# Patient Record
Sex: FEMALE | Race: WHITE | NOT HISPANIC OR LATINO | Employment: OTHER | URBAN - METROPOLITAN AREA
[De-identification: names, ages, dates, MRNs, and addresses within clinical notes are randomized per-mention and may not be internally consistent; named-entity substitution may affect disease eponyms.]

---

## 2017-11-29 ENCOUNTER — HOSPITAL ENCOUNTER (OUTPATIENT)
Dept: MAMMOGRAPHY | Facility: HOSPITAL | Age: 77
Discharge: HOME | End: 2017-11-29
Attending: FAMILY MEDICINE | Admitting: FAMILY MEDICINE

## 2018-02-27 ENCOUNTER — CONVERSION ENCOUNTER (OUTPATIENT)
Dept: FAMILY MEDICINE CLINIC | Facility: CLINIC | Age: 78
End: 2018-02-27

## 2018-03-07 ENCOUNTER — HOSPITAL ENCOUNTER (OUTPATIENT)
Dept: CARDIOLOGY | Facility: HOSPITAL | Age: 78
Discharge: HOME | End: 2018-03-07
Attending: FAMILY MEDICINE | Admitting: FAMILY MEDICINE

## 2018-05-18 ENCOUNTER — CONVERSION ENCOUNTER (OUTPATIENT)
Dept: FAMILY MEDICINE CLINIC | Facility: CLINIC | Age: 78
End: 2018-05-18

## 2018-05-18 ENCOUNTER — HOSPITAL ENCOUNTER (OUTPATIENT)
Dept: FAMILY MEDICINE CLINIC | Facility: CLINIC | Age: 78
Setting detail: SPECIMEN
Discharge: HOME | End: 2018-05-18
Attending: FAMILY MEDICINE | Admitting: FAMILY MEDICINE

## 2018-05-18 LAB
ALBUMIN SERPL-MCNC: 4 G/DL (ref 3.5–4.8)
ALBUMIN/GLOB SERPL: 1.4 {RATIO} (ref 1–1.7)
ALP SERPL-CCNC: 60 IU/L (ref 32–91)
ALT SERPL-CCNC: 14 IU/L (ref 14–54)
ANION GAP SERPL CALC-SCNC: 9.7 MMOL/L (ref 10–20)
AST SERPL-CCNC: 18 IU/L (ref 15–41)
BILIRUB SERPL-MCNC: 0.9 MG/DL (ref 0.3–1.2)
BUN SERPL-MCNC: 11 MG/DL (ref 8–20)
BUN/CREAT SERPL: 15.7 (ref 5.4–26.2)
CALCIUM SERPL-MCNC: 9.5 MG/DL (ref 8.9–10.3)
CHLORIDE SERPL-SCNC: 106 MMOL/L (ref 101–111)
CHOLEST SERPL-MCNC: 210 MG/DL
CHOLEST/HDLC SERPL: 2.5 {RATIO}
CONV CO2: 28 MMOL/L (ref 22–32)
CONV LDL CHOLESTEROL DIRECT: 104 MG/DL (ref 0–100)
CONV TOTAL PROTEIN: 6.8 G/DL (ref 6.1–7.9)
CREAT UR-MCNC: 0.7 MG/DL (ref 0.4–1)
GLOBULIN UR ELPH-MCNC: 2.8 G/DL (ref 2.5–3.8)
GLUCOSE SERPL-MCNC: 85 MG/DL (ref 65–99)
HDLC SERPL-MCNC: 83 MG/DL
LDLC/HDLC SERPL: 1.2 {RATIO}
LIPID INTERPRETATION: ABNORMAL
POTASSIUM SERPL-SCNC: 3.7 MMOL/L (ref 3.6–5.1)
SODIUM SERPL-SCNC: 140 MMOL/L (ref 136–144)
TRIGL SERPL-MCNC: 51 MG/DL
VLDLC SERPL CALC-MCNC: 23.3 MG/DL

## 2018-07-12 ENCOUNTER — CONVERSION ENCOUNTER (OUTPATIENT)
Dept: FAMILY MEDICINE CLINIC | Facility: CLINIC | Age: 78
End: 2018-07-12

## 2018-07-17 ENCOUNTER — HOSPITAL ENCOUNTER (OUTPATIENT)
Dept: CARDIOLOGY | Facility: HOSPITAL | Age: 78
Discharge: HOME | End: 2018-07-17
Attending: PODIATRIST | Admitting: PODIATRIST

## 2018-08-08 ENCOUNTER — CONVERSION ENCOUNTER (OUTPATIENT)
Dept: FAMILY MEDICINE CLINIC | Facility: CLINIC | Age: 78
End: 2018-08-08

## 2018-08-21 ENCOUNTER — HOSPITAL ENCOUNTER (OUTPATIENT)
Dept: CARDIOLOGY | Facility: HOSPITAL | Age: 78
Discharge: HOME | End: 2018-08-21
Attending: SURGERY | Admitting: SURGERY

## 2018-09-05 ENCOUNTER — CONVERSION ENCOUNTER (OUTPATIENT)
Dept: FAMILY MEDICINE CLINIC | Facility: CLINIC | Age: 78
End: 2018-09-05

## 2018-10-24 ENCOUNTER — CONVERSION ENCOUNTER (OUTPATIENT)
Dept: FAMILY MEDICINE CLINIC | Facility: CLINIC | Age: 78
End: 2018-10-24

## 2018-10-24 ENCOUNTER — HOSPITAL ENCOUNTER (OUTPATIENT)
Dept: FAMILY MEDICINE CLINIC | Facility: CLINIC | Age: 78
Setting detail: SPECIMEN
Discharge: HOME | End: 2018-10-24
Attending: FAMILY MEDICINE | Admitting: FAMILY MEDICINE

## 2018-10-24 LAB
ALBUMIN SERPL-MCNC: 3.8 G/DL (ref 3.5–4.8)
ALBUMIN/GLOB SERPL: 1.3 {RATIO} (ref 1–1.7)
ALP SERPL-CCNC: 68 IU/L (ref 32–91)
ALT SERPL-CCNC: 15 IU/L (ref 14–54)
ANION GAP SERPL CALC-SCNC: 14 MMOL/L (ref 10–20)
AST SERPL-CCNC: 20 IU/L (ref 15–41)
BASOPHILS # BLD AUTO: 0 10*3/UL (ref 0–0.2)
BASOPHILS NFR BLD AUTO: 0 % (ref 0–2)
BILIRUB SERPL-MCNC: 0.7 MG/DL (ref 0.3–1.2)
BUN SERPL-MCNC: 12 MG/DL (ref 8–20)
BUN/CREAT SERPL: 15 (ref 5.4–26.2)
CALCIUM SERPL-MCNC: 9.2 MG/DL (ref 8.9–10.3)
CHLORIDE SERPL-SCNC: 106 MMOL/L (ref 101–111)
CONV CO2: 27 MMOL/L (ref 22–32)
CONV TOTAL PROTEIN: 6.8 G/DL (ref 6.1–7.9)
CREAT UR-MCNC: 0.8 MG/DL (ref 0.4–1)
DIFFERENTIAL METHOD BLD: (no result)
EOSINOPHIL # BLD AUTO: 0 10*3/UL (ref 0–0.3)
EOSINOPHIL # BLD AUTO: 1 % (ref 0–3)
ERYTHROCYTE [DISTWIDTH] IN BLOOD BY AUTOMATED COUNT: 14 % (ref 11.5–14.5)
GLOBULIN UR ELPH-MCNC: 3 G/DL (ref 2.5–3.8)
GLUCOSE SERPL-MCNC: 144 MG/DL (ref 65–99)
HCT VFR BLD AUTO: 38.3 % (ref 35–49)
HGB BLD-MCNC: 12.5 G/DL (ref 12–15)
LYMPHOCYTES # BLD AUTO: 1.2 10*3/UL (ref 0.8–4.8)
LYMPHOCYTES NFR BLD AUTO: 20 % (ref 18–42)
MCH RBC QN AUTO: 25.7 PG (ref 26–32)
MCHC RBC AUTO-ENTMCNC: 32.6 G/DL (ref 32–36)
MCV RBC AUTO: 78.9 FL (ref 80–94)
MONOCYTES # BLD AUTO: 0.5 10*3/UL (ref 0.1–1.3)
MONOCYTES NFR BLD AUTO: 8 % (ref 2–11)
NEUTROPHILS # BLD AUTO: 4.3 10*3/UL (ref 2.3–8.6)
NEUTROPHILS NFR BLD AUTO: 71 % (ref 50–75)
NRBC BLD AUTO-RTO: 0 /100{WBCS}
NRBC/RBC NFR BLD MANUAL: 0 10*3/UL
PLATELET # BLD AUTO: 224 10*3/UL (ref 150–450)
PMV BLD AUTO: 8.7 FL (ref 7.4–10.4)
POTASSIUM SERPL-SCNC: 4 MMOL/L (ref 3.6–5.1)
RBC # BLD AUTO: 4.85 10*6/UL (ref 4–5.4)
SODIUM SERPL-SCNC: 143 MMOL/L (ref 136–144)
TSH SERPL-ACNC: 0.62 UIU/ML (ref 0.34–5.6)
WBC # BLD AUTO: 6 10*3/UL (ref 4.5–11.5)

## 2018-11-01 ENCOUNTER — ON CAMPUS - OUTPATIENT (AMBULATORY)
Dept: URBAN - METROPOLITAN AREA HOSPITAL 2 | Facility: HOSPITAL | Age: 78
End: 2018-11-01
Payer: MEDICARE

## 2018-11-01 VITALS
RESPIRATION RATE: 18 BRPM | RESPIRATION RATE: 20 BRPM | HEIGHT: 64 IN | DIASTOLIC BLOOD PRESSURE: 56 MMHG | HEART RATE: 73 BPM | SYSTOLIC BLOOD PRESSURE: 141 MMHG | HEART RATE: 72 BPM | TEMPERATURE: 97.58 F | RESPIRATION RATE: 15 BRPM | SYSTOLIC BLOOD PRESSURE: 125 MMHG | DIASTOLIC BLOOD PRESSURE: 79 MMHG | RESPIRATION RATE: 17 BRPM | HEART RATE: 80 BPM | SYSTOLIC BLOOD PRESSURE: 144 MMHG | OXYGEN SATURATION: 96 % | SYSTOLIC BLOOD PRESSURE: 133 MMHG | SYSTOLIC BLOOD PRESSURE: 152 MMHG | DIASTOLIC BLOOD PRESSURE: 75 MMHG | RESPIRATION RATE: 16 BRPM | SYSTOLIC BLOOD PRESSURE: 122 MMHG | DIASTOLIC BLOOD PRESSURE: 73 MMHG | SYSTOLIC BLOOD PRESSURE: 128 MMHG | HEART RATE: 81 BPM | HEART RATE: 70 BPM | SYSTOLIC BLOOD PRESSURE: 108 MMHG | OXYGEN SATURATION: 99 % | HEART RATE: 64 BPM | SYSTOLIC BLOOD PRESSURE: 150 MMHG | DIASTOLIC BLOOD PRESSURE: 71 MMHG | OXYGEN SATURATION: 98 % | WEIGHT: 157 LBS | DIASTOLIC BLOOD PRESSURE: 81 MMHG | DIASTOLIC BLOOD PRESSURE: 84 MMHG

## 2018-11-01 DIAGNOSIS — K44.9 DIAPHRAGMATIC HERNIA WITHOUT OBSTRUCTION OR GANGRENE: ICD-10-CM

## 2018-11-01 DIAGNOSIS — K57.30 DIVERTICULOSIS OF LARGE INTESTINE WITHOUT PERFORATION OR ABS: ICD-10-CM

## 2018-11-01 DIAGNOSIS — Z86.010 PERSONAL HISTORY OF COLONIC POLYPS: ICD-10-CM

## 2018-11-01 DIAGNOSIS — R13.10 DYSPHAGIA, UNSPECIFIED: ICD-10-CM

## 2018-11-01 DIAGNOSIS — K21.9 GASTRO-ESOPHAGEAL REFLUX DISEASE WITHOUT ESOPHAGITIS: ICD-10-CM

## 2018-11-01 DIAGNOSIS — K64.8 OTHER HEMORRHOIDS: ICD-10-CM

## 2018-11-01 PROCEDURE — 43450 DILATE ESOPHAGUS 1/MULT PASS: CPT | Performed by: INTERNAL MEDICINE

## 2018-11-01 PROCEDURE — 43235 EGD DIAGNOSTIC BRUSH WASH: CPT | Mod: 59 | Performed by: INTERNAL MEDICINE

## 2018-11-01 PROCEDURE — G0105 COLORECTAL SCRN; HI RISK IND: HCPCS | Performed by: INTERNAL MEDICINE

## 2018-11-01 RX ADMIN — GLYCOPYRROLATE 0.2 MG: 0.2 INJECTION, SOLUTION INTRAMUSCULAR; INTRAVENOUS at 10:34

## 2018-11-08 ENCOUNTER — HOSPITAL ENCOUNTER (OUTPATIENT)
Dept: MAMMOGRAPHY | Facility: HOSPITAL | Age: 78
Discharge: HOME | End: 2018-11-08

## 2018-11-21 ENCOUNTER — CONVERSION ENCOUNTER (OUTPATIENT)
Dept: FAMILY MEDICINE CLINIC | Facility: CLINIC | Age: 78
End: 2018-11-21

## 2018-11-21 ENCOUNTER — HOSPITAL ENCOUNTER (OUTPATIENT)
Dept: FAMILY MEDICINE CLINIC | Facility: CLINIC | Age: 78
Setting detail: SPECIMEN
Discharge: HOME | End: 2018-11-21
Attending: FAMILY MEDICINE | Admitting: FAMILY MEDICINE

## 2019-02-20 ENCOUNTER — CONVERSION ENCOUNTER (OUTPATIENT)
Dept: FAMILY MEDICINE CLINIC | Facility: CLINIC | Age: 79
End: 2019-02-20

## 2019-05-24 ENCOUNTER — CONVERSION ENCOUNTER (OUTPATIENT)
Dept: FAMILY MEDICINE CLINIC | Facility: CLINIC | Age: 79
End: 2019-05-24

## 2019-05-24 ENCOUNTER — HOSPITAL ENCOUNTER (OUTPATIENT)
Dept: FAMILY MEDICINE CLINIC | Facility: CLINIC | Age: 79
Setting detail: SPECIMEN
Discharge: HOME | End: 2019-05-24
Attending: FAMILY MEDICINE | Admitting: FAMILY MEDICINE

## 2019-05-24 LAB
BACTERIA SPEC AEROBE CULT: NORMAL
Lab: NORMAL
MICRO REPORT STATUS: NORMAL
SPECIMEN SOURCE: NORMAL

## 2019-06-03 VITALS
HEART RATE: 63 BPM | OXYGEN SATURATION: 96 % | DIASTOLIC BLOOD PRESSURE: 81 MMHG | BODY MASS INDEX: 29.83 KG/M2 | BODY MASS INDEX: 30.46 KG/M2 | BODY MASS INDEX: 30.43 KG/M2 | HEIGHT: 61 IN | HEIGHT: 61 IN | DIASTOLIC BLOOD PRESSURE: 70 MMHG | SYSTOLIC BLOOD PRESSURE: 141 MMHG | RESPIRATION RATE: 20 BRPM | HEIGHT: 61 IN | DIASTOLIC BLOOD PRESSURE: 84 MMHG | SYSTOLIC BLOOD PRESSURE: 131 MMHG | OXYGEN SATURATION: 96 % | HEIGHT: 61 IN | HEART RATE: 83 BPM | OXYGEN SATURATION: 98 % | DIASTOLIC BLOOD PRESSURE: 77 MMHG | HEART RATE: 80 BPM | HEART RATE: 77 BPM | WEIGHT: 157 LBS | SYSTOLIC BLOOD PRESSURE: 184 MMHG | HEART RATE: 74 BPM | OXYGEN SATURATION: 97 % | OXYGEN SATURATION: 96 % | SYSTOLIC BLOOD PRESSURE: 152 MMHG | DIASTOLIC BLOOD PRESSURE: 73 MMHG | BODY MASS INDEX: 29.83 KG/M2 | HEIGHT: 61 IN | WEIGHT: 158 LBS | HEART RATE: 78 BPM | OXYGEN SATURATION: 96 % | OXYGEN SATURATION: 95 % | WEIGHT: 161.2 LBS | DIASTOLIC BLOOD PRESSURE: 86 MMHG | HEART RATE: 70 BPM | BODY MASS INDEX: 29.64 KG/M2 | WEIGHT: 158 LBS | BODY MASS INDEX: 29.66 KG/M2 | OXYGEN SATURATION: 96 % | DIASTOLIC BLOOD PRESSURE: 79 MMHG | SYSTOLIC BLOOD PRESSURE: 168 MMHG | DIASTOLIC BLOOD PRESSURE: 73 MMHG | HEART RATE: 78 BPM | SYSTOLIC BLOOD PRESSURE: 183 MMHG | SYSTOLIC BLOOD PRESSURE: 138 MMHG | HEIGHT: 61 IN | SYSTOLIC BLOOD PRESSURE: 167 MMHG

## 2019-06-04 VITALS — OXYGEN SATURATION: 93 % | SYSTOLIC BLOOD PRESSURE: 130 MMHG | HEART RATE: 93 BPM | DIASTOLIC BLOOD PRESSURE: 65 MMHG

## 2019-06-06 NOTE — PROGRESS NOTES
Referring Provider:  Etelvina Ulloa MD  Primary Provider:  Artemio Main MD    CC:  soa, cough and sinus pressure.    History of Present Illness:         This is a 78 years old female who presents with shortness of breath.  Chest x-ray from earlier this week shows a possible lung nodule and CT has been ordered. Cough.  Postnasal drainage.  Sinus pressure.  The patient complains of shortness of breath,   cough and wheezing, but denies chest pain.  Associated symptoms include productive cough.  Significant past history includes hypertension.  The patient reports that symptoms are worse with deep breath.        Past Medical History:     Reviewed history from 02/20/2019 and no changes required:        Anemic-50+ yrs ago        Arthritis        tendonitis, wears bilateral ankle braces, [place]        IBS        hiatal hernia        heart cath 1998, calcified mitral valve        Hepatitis A vaccine, [facility] 2018        EGD and Colonoscopy 11/18, Dr. Bolton        hypertension    Past Surgical History:     Reviewed history from 11/21/2018 and no changes required:        Tonsillectomy-1945        Total Abdominal Hysterectomy-1987        Appendectomy-1987        Cholecystectomy-1996        Cataract Extraction-2009        angle closure glaucoma-laser-2001        EGD- hiatal hernia 11/1/18    Family History Summary:      Reviewed history Last on 05/21/2019 and no changes required:05/28/2019  Brother - Has Family History of Alcoholism - hepatits - Entered On: 8/8/2018  Mother - Has Family History of Stroke/CVA - Entered On: 8/8/2018  Father - Has Family History of Kidney/Renal Disease - Entered On: 8/8/2018  Father - Has Family History of Coronary Heart Disease - Entered On: 8/8/2018  Father - Has Family History of Bleeding Disease - Entered On: 8/8/2018  Mother - Has Family History of Hypertension - Entered On: 2/27/2018  Brother - Has Family History of Arthritis - Entered On: 2/27/2018  Mother - Has Family History of  Arthritis - Entered On: 2018    General Comments - FH:  FH Breast Cancer  FH Heart Disease  FH Stroke  FH Uterine Cancer      Social History:     Reviewed history from 2018 and no changes required:        Patient has never smoked.        Passive Smoke: N        Alcohol Use: N        Drug Use: N        Regular Exercise: N                 50 years        retired teacher        Risk Factors:     Smoked Tobacco Use:  Never smoker  Passive smoke exposure:  no  Drug use:  no  Caffeine use:  1 drinks per day  Alcohol use:  no  Exercise:  no  Seatbelt use:  100 %  Sun Exposure:  occasionally    Previous Tobacco Use: Signed On - 2019  Smoked Tobacco Use:  Never smoker  Passive smoke exposure:  no  Drug use:  no  Caffeine use:  1 drinks per day    Previous Alcohol Use: Signed On - 2019  Alcohol use:  no  Exercise:  no  Seatbelt use:  100 %  Sun Exposure:  occasionally    Colonoscopy History:     Date of Last Colonoscopy:  2018    Mammogram History:     Date of Last Mammogram:  2018      Review of Systems        See HPI      Vital Signs:    Patient Profile:    78 Years Old Female  Height:     61 inches (162.56 cm)  O2 Sat:     93 %  Temp:       99.3 degrees F oral  Pulse rate: 93 / minute  BP Sittin / 65  (left arm)    Cuff size:  regular      Problems: Active problems were reviewed with the patient during this visit.  Medications: Medications were reviewed with the patient during this visit.  Allergies: Allergies were reviewed with the patient during this visit.        Vitals Entered By: Lani Su CMA (May 24, 2019 2:49 PM)      Physical Exam    General:      mild respiratory distress.    Head:      normocephalic and atraumatic.    Ears:      TM's intact and clear with normal canals with grossly normal hearing.    Mouth:      no deformity or lesions with good dentition.    Neck:      no masses, thyromegaly, or abnormal cervical nodes.    Lungs:      wheezes on L and wheezes on  R.    Heart:      regular rhythm and normal rate.        Blood Pressure:  Today's BP: 130/65 mm Hg    Labwork:   Most Recent Lab Results:   LDL: 104 mg/dL 05/18/2018    Active Medications (reviewed today):  LEG CRAMP RELIEF ORAL TABLET (HOMEOPATHIC PRODUCTS)   VITAMIN B12 1000 MCG ORAL TABLET EXTENDED RELEASE (CYANOCOBALAMIN) Take 1 tablet by mouth daily  VITAMIN D3 2000 UNIT ORAL CAPSULE (CHOLECALCIFEROL) Take 1 tablet by mouth daily  TRIAMCINOLONE ACETONIDE 0.1 % EXTERNAL CREAM (TRIAMCINOLONE ACETONIDE) apply to affected area twice daily as needed for rash  LOSARTAN POTASSIUM 50 MG ORAL TABLET (LOSARTAN POTASSIUM) Take 1 tablet by mouth daily  PREMARIN VAGINAL CREAM 30GM (ESTROGENS, CONJUGATED) INSERT 1 GRAM VAGINALLY EVERY DAY AS DIRECTED    Current Allergies (reviewed today):  SULFA (Critical)  CIPRO (Critical)        Impression & Recommendations:    Problem # 1:  BRONCHITIS-ACUTE (ICD-466.0) (TDE89-O52.9)    Take antibiotics and other medications as directed. Encouraged to push clear liquids, get enough rest, and take acetaminophen as needed. To be seen in 5-7 days if no improvement, sooner if worse.    Her updated medication list for this problem includes:     Doxycycline Monohydrate 100 Mg Oral Capsule (Doxycycline monohydrate) ..... Take one (1) tablet by mouth twice a day      Medications Added to Medication List This Visit:  1)  Prednisone 10 Mg Oral Tablet (Prednisone) .... Take one (1) tablet by mouth daily  2)  Doxycycline Monohydrate 100 Mg Oral Capsule (Doxycycline monohydrate) .... Take one (1) tablet by mouth twice a day    Other Orders:  Urinalysis Dip Stick/Tablet Rgnt NON-AUTO W/O [place] (95554)  [facility] CULTURE,URINE ([facility])              Medications:  PREDNISONE 10 MG ORAL TABLET (PREDNISONE) Take one (1) tablet by mouth daily  #5[Tablet] x 0      Entered and Authorized by:  Etelvina Ulloa MD      Electronically signed by:   Etelvina Ulloa MD on 05/24/2019      Method used:    [facility]                [id number]              [address]              [place], [place]  [id number]              Ph: [phone]              Fax: [phone]      Note to Pharmacy: Route: ORAL;       RxID:   [id number]  DOXYCYCLINE MONOHYDRATE 100 MG ORAL CAPSULE (DOXYCYCLINE MONOHYDRATE) Take one (1) tablet by mouth twice a day  #20[Capsule] x 0      Entered and Authorized by:  Etelvina Ulloa MD      Electronically signed by:   Etelvina Ulloa MD on 05/24/2019      Method used:    [facility]               [id number]              [address] [place] [address].              [place], [place]  [id number]              Ph: [phone]              Fax: [phone]      Note to Pharmacy: Route: ORAL;       RxID:   [id number]                Medication Administration    Orders Added:  1)  Urinalysis Dip Stick/Tablet Rgnt NON-AUTO W/O [place] [13334]  2)  [facility] CULTURE,URINE [[facility]NC]  3)  Ofc Vst, Est Level III [79884]  ]    Date Collected: 05/24/2019  Date Received: 05/24/2019    Routine Urinalysis          Normal Range   Color:      yellow          Color: Yellow   Appearance:  sl. cloudy         Appearance: Clear  Leukocytes:  negative       Leukocytes: Negative   Nitrite:         negative       Nitrite: Negative  Urobilinogen:    0.2 mg/dL      Urobilinogen: Negative mg/dL  Protein:     100 mg/dL      Protein:  Negative mg/dL  pH:      5.5        pH:  4.5-8.0  Blood:       moderate       Blood:  Negative  Spec. Gravity:   1.030      Spec Gravity:  1.005-1.030  Ketones:     smal (15) mg/dL        Ketones:  Negative mg/dL  Bilirubin:   2+     Bilirubin:  Negative  Glucose:     normal mg/dL       Glucose:  Negative mg/dL   Comments:    C&S sent                Electronically signed by Etelvina Ulloa MD on 05/28/2019 at 3:52 PM  ________________________________________________________________________       Disclaimer: Converted Note message may not contain all data elements  that existed in the legacy source system. Please see [facility] for the original note details.

## 2019-08-14 ENCOUNTER — OFFICE VISIT (OUTPATIENT)
Dept: FAMILY MEDICINE CLINIC | Facility: CLINIC | Age: 79
End: 2019-08-14

## 2019-08-14 VITALS
TEMPERATURE: 98.1 F | OXYGEN SATURATION: 95 % | HEART RATE: 85 BPM | WEIGHT: 153 LBS | DIASTOLIC BLOOD PRESSURE: 79 MMHG | HEIGHT: 61 IN | BODY MASS INDEX: 28.89 KG/M2 | SYSTOLIC BLOOD PRESSURE: 159 MMHG

## 2019-08-14 DIAGNOSIS — R06.02 SHORTNESS OF BREATH: ICD-10-CM

## 2019-08-14 DIAGNOSIS — F43.21 GRIEF REACTION: ICD-10-CM

## 2019-08-14 DIAGNOSIS — R91.1 LUNG NODULE: Primary | ICD-10-CM

## 2019-08-14 PROBLEM — Z00.00 ENCOUNTER FOR GENERAL ADULT MEDICAL EXAMINATION WITHOUT ABNORMAL FINDINGS: Status: ACTIVE | Noted: 2018-05-18

## 2019-08-14 PROBLEM — F43.20 GRIEF REACTION: Status: ACTIVE | Noted: 2019-08-14

## 2019-08-14 PROBLEM — Z78.0 POSTMENOPAUSAL STATUS: Status: ACTIVE | Noted: 2018-05-18

## 2019-08-14 PROBLEM — R53.83 FATIGUE: Status: ACTIVE | Noted: 2018-10-24

## 2019-08-14 PROBLEM — L65.9 ALOPECIA: Status: ACTIVE | Noted: 2018-11-21

## 2019-08-14 PROBLEM — E55.9 VITAMIN D DEFICIENCY: Status: ACTIVE | Noted: 2018-11-21

## 2019-08-14 PROBLEM — I10 HYPERTENSION: Status: ACTIVE | Noted: 2018-10-24

## 2019-08-14 PROBLEM — I78.1 NEVUS, NON-NEOPLASTIC: Status: ACTIVE | Noted: 2018-07-12

## 2019-08-14 PROBLEM — M19.90 ARTHRITIS: Status: ACTIVE | Noted: 2019-08-14

## 2019-08-14 PROCEDURE — 99213 OFFICE O/P EST LOW 20 MIN: CPT | Performed by: FAMILY MEDICINE

## 2019-08-14 RX ORDER — LOSARTAN POTASSIUM 50 MG/1
50 TABLET ORAL DAILY
Qty: 30 TABLET | Refills: 11 | Status: SHIPPED | OUTPATIENT
Start: 2019-08-14 | End: 2019-09-25

## 2019-08-14 RX ORDER — CONJUGATED ESTROGENS 0.62 MG/G
CREAM VAGINAL
Refills: 3 | COMMUNITY
Start: 2019-08-07 | End: 2020-02-12 | Stop reason: SDUPTHER

## 2019-08-14 RX ORDER — ZOSTER VACCINE RECOMBINANT, ADJUVANTED 50 MCG/0.5
KIT INTRAMUSCULAR
Refills: 0 | COMMUNITY
Start: 2019-07-01 | End: 2021-01-05

## 2019-08-14 RX ORDER — LOSARTAN POTASSIUM 50 MG/1
TABLET ORAL DAILY
Refills: 1 | COMMUNITY
Start: 2019-08-07 | End: 2019-08-14 | Stop reason: SDUPTHER

## 2019-08-14 RX ORDER — ACETAMINOPHEN 160 MG
2000 TABLET,DISINTEGRATING ORAL DAILY
COMMUNITY
Start: 2019-02-20

## 2019-08-14 RX ORDER — TRIAMCINOLONE ACETONIDE 1 MG/G
CREAM TOPICAL
COMMUNITY
Start: 2018-11-21 | End: 2020-01-16

## 2019-08-27 ENCOUNTER — HOSPITAL ENCOUNTER (OUTPATIENT)
Dept: RESPIRATORY THERAPY | Facility: HOSPITAL | Age: 79
Discharge: HOME | End: 2019-08-27
Admitting: FAMILY MEDICINE

## 2019-08-27 VITALS — OXYGEN SATURATION: 97 % | HEART RATE: 74 BPM | RESPIRATION RATE: 17 BRPM

## 2019-08-27 DIAGNOSIS — R06.02 SHORTNESS OF BREATH: ICD-10-CM

## 2019-08-27 PROCEDURE — 94729 DIFFUSING CAPACITY: CPT

## 2019-08-27 PROCEDURE — 94727 GAS DIL/WSHOT DETER LNG VOL: CPT

## 2019-08-27 PROCEDURE — 94060 EVALUATION OF WHEEZING: CPT

## 2019-08-27 RX ORDER — ALBUTEROL SULFATE 2.5 MG/3ML
2.5 SOLUTION RESPIRATORY (INHALATION) ONCE
Status: COMPLETED | OUTPATIENT
Start: 2019-08-27 | End: 2019-08-27

## 2019-08-27 RX ADMIN — ALBUTEROL SULFATE 2.5 MG: 2.5 SOLUTION RESPIRATORY (INHALATION) at 15:30

## 2019-09-20 ENCOUNTER — TELEPHONE (OUTPATIENT)
Dept: FAMILY MEDICINE CLINIC | Facility: CLINIC | Age: 79
End: 2019-09-20

## 2019-09-20 DIAGNOSIS — R06.02 SHORTNESS OF BREATH: Primary | ICD-10-CM

## 2019-09-20 NOTE — TELEPHONE ENCOUNTER
Please let her know that her PFT shows an obstructive lung pattern.  This could be a sign of COPD.  I recommend that she see a pulmonologist to help determine what is going on.

## 2019-09-25 ENCOUNTER — TELEPHONE (OUTPATIENT)
Dept: FAMILY MEDICINE CLINIC | Facility: CLINIC | Age: 79
End: 2019-09-25

## 2019-09-25 ENCOUNTER — CLINICAL SUPPORT (OUTPATIENT)
Dept: FAMILY MEDICINE CLINIC | Facility: CLINIC | Age: 79
End: 2019-09-25

## 2019-09-25 ENCOUNTER — OFFICE VISIT (OUTPATIENT)
Dept: FAMILY MEDICINE CLINIC | Facility: CLINIC | Age: 79
End: 2019-09-25

## 2019-09-25 VITALS
OXYGEN SATURATION: 97 % | DIASTOLIC BLOOD PRESSURE: 78 MMHG | HEART RATE: 72 BPM | SYSTOLIC BLOOD PRESSURE: 167 MMHG | TEMPERATURE: 98.1 F

## 2019-09-25 DIAGNOSIS — R06.02 SHORTNESS OF BREATH: ICD-10-CM

## 2019-09-25 DIAGNOSIS — I10 HYPERTENSION, UNSPECIFIED TYPE: Primary | ICD-10-CM

## 2019-09-25 DIAGNOSIS — Z23 NEED FOR IMMUNIZATION AGAINST INFLUENZA: Primary | ICD-10-CM

## 2019-09-25 PROCEDURE — 99213 OFFICE O/P EST LOW 20 MIN: CPT | Performed by: FAMILY MEDICINE

## 2019-09-25 PROCEDURE — G0008 ADMIN INFLUENZA VIRUS VAC: HCPCS | Performed by: FAMILY MEDICINE

## 2019-09-25 PROCEDURE — 90653 IIV ADJUVANT VACCINE IM: CPT | Performed by: FAMILY MEDICINE

## 2019-09-25 RX ORDER — AMLODIPINE BESYLATE 5 MG/1
5 TABLET ORAL DAILY
Qty: 90 TABLET | Refills: 1 | Status: SHIPPED | OUTPATIENT
Start: 2019-09-25 | End: 2020-02-12 | Stop reason: SDUPTHER

## 2019-09-25 NOTE — ASSESSMENT & PLAN NOTE
She has been referred to pulmonologist She has never smoked.   Her sister had COPD without being a smoker.

## 2019-09-25 NOTE — PROGRESS NOTES
Subjective   Chief Complaint   Patient presents with   • Medication Problem     recalled Losartan for a 5th time   • Adenopathy     Chrystal Ruiz is a 78 y.o. female.     Hypertension   This is a chronic problem. The current episode started more than 1 year ago. The problem has been waxing and waning since onset. The problem is uncontrolled. Associated symptoms include anxiety, neck pain and shortness of breath. Pertinent negatives include no blurred vision, chest pain, headaches, palpitations or peripheral edema. Past treatments include ACE inhibitors. Current antihypertension treatment includes angiotensin blockers. There are no compliance problems.       No past medical history on file.  No past surgical history on file.  Allergies   Allergen Reactions   • Ciprofloxacin Unknown (See Comments)   • Sulfa Antibiotics Unknown (See Comments)     Social History     Socioeconomic History   • Marital status:      Spouse name: Not on file   • Number of children: Not on file   • Years of education: Not on file   • Highest education level: Not on file   Tobacco Use   • Smoking status: Never Smoker   • Smokeless tobacco: Never Used   Substance and Sexual Activity   • Alcohol use: No     Frequency: Never     Social History     Tobacco Use   Smoking Status Never Smoker   Smokeless Tobacco Never Used       family history includes COPD in her sister; Heart disease in her sister.  Current Outpatient Medications on File Prior to Visit   Medication Sig Dispense Refill   • Cholecalciferol (VITAMIN D3) 2000 units capsule VITAMIN D3 2000 UNIT CAPS     • Cyanocobalamin (VITAMIN B12) 1000 MCG tablet controlled-release Daily.     • Homeopathic Products (LEG CRAMP RELIEF) tablet LEG CRAMP RELIEF TABS     • PREMARIN 0.625 MG/GM vaginal cream INSERT 1 GRAM VAGINALLY QD UTD  3   • SHINGRIX 50 MCG/0.5ML reconstituted suspension ADM 0.5ML IM UTD  0   • triamcinolone (KENALOG) 0.1 % cream TRIAMCINOLONE ACETONIDE 0.1 % CREA     •  [DISCONTINUED] losartan (COZAAR) 50 MG tablet Take 1 tablet by mouth Daily. 30 tablet 11     No current facility-administered medications on file prior to visit.      Patient Active Problem List   Diagnosis   • Grief reaction   • Alopecia   • Arthritis   • Back pain   • Encounter for general adult medical examination without abnormal findings   • Fatigue   • Hypertension   • Lung nodule   • Vitamin D deficiency   • Shortness of breath   • Postmenopausal status   • Nevus, non-neoplastic       The following portions of the patient's history were reviewed and updated as appropriate: allergies, current medications, past family history, past medical history, past social history, past surgical history and problem list.    Review of Systems   Constitutional: Negative for chills and fever.   HENT: Negative for sinus pressure and sore throat.    Eyes: Negative for blurred vision.   Respiratory: Positive for shortness of breath. Negative for cough.    Cardiovascular: Negative for chest pain and palpitations.   Gastrointestinal: Negative for abdominal pain.   Endocrine: Negative for polyuria.   Musculoskeletal: Positive for neck pain.   Skin: Negative for rash.   Neurological: Negative for dizziness and headache.   Hematological: Negative for adenopathy.   Psychiatric/Behavioral: Negative for depressed mood.       Objective   /78 (BP Location: Left arm, Patient Position: Sitting, Cuff Size: Adult)   Pulse 72   Temp 98.1 °F (36.7 °C) (Oral)   SpO2 97%   Physical Exam   Constitutional: She is oriented to person, place, and time. She appears well-developed. No distress.   HENT:   Head: Normocephalic.   Eyes: Conjunctivae and lids are normal.   Neck: Trachea normal. No thyroid mass and no thyromegaly present.   Cardiovascular: Normal rate, regular rhythm and normal heart sounds.   Pulmonary/Chest: Effort normal and breath sounds normal.   Lymphadenopathy:     She has no cervical adenopathy.   Neurological: She is alert  and oriented to person, place, and time.   Skin: Skin is warm and dry.   Psychiatric: She has a normal mood and affect. Her speech is normal and behavior is normal. She is attentive.       No visits with results within 1 Week(s) from this visit.   Latest known visit with results is:   Admission on 05/29/2019, Discharged on 05/31/2019   Component Date Value Ref Range Status   • WBC 05/29/2019 7.2  4.5 - 11.5 10*3/uL Final   • RBC 05/29/2019 5.34  4.00 - 5.40 10*6/uL Final   • Hemoglobin 05/29/2019 13.7  12.0 - 15.0 g/dL Final   • Hematocrit 05/29/2019 42.3  35 - 49 % Final   • MCV 05/29/2019 79.3* 80 - 94 fL Final   • MCH 05/29/2019 25.7* 26 - 32 pg Final   • MCHC 05/29/2019 32.4  32 - 36 g/dL Final   • RDW 05/29/2019 13.3  11.5 - 14.5 % Final   • Platelets 05/29/2019 280  150 - 450 10*3/uL Final   • MPV 05/29/2019 8.8  7.4 - 10.4 fL Final   • Differential Type 05/29/2019 AUTO   Final   • Neutrophils Absolute 05/29/2019 4.8  2.3 - 8.6 10*3/uL Final   • Lymphocytes Absolute 05/29/2019 1.9  0.8 - 4.8 10*3/uL Final   • Monocytes Absolute 05/29/2019 0.4  0.1 - 1.3 10*3/uL Final   • Eosinophils Absolute 05/29/2019 0.0  0.0 - 0.3 10*3/uL Final   • Basophils Absolute 05/29/2019 0.0  0 - 0.2 10*3/uL Final   • Neutrophil Rel % 05/29/2019 68  50 - 75 % Final   • Lymphocyte Rel % 05/29/2019 26  18 - 42 % Final   • Monocyte Rel % 05/29/2019 6  2 - 11 % Final   • Eosinophil Rel % 05/29/2019 0  0 - 3 % Final   • Basophil Rel % 05/29/2019 0  0 - 2 % Final   • nRBC 05/29/2019 0  0 /100[WBCs] Final   • Absolute nRBC 05/29/2019 0  10*3/uL Final   • Sodium 05/29/2019 140  136 - 144 mmol/L Final   • Potassium 05/29/2019 3.2* 3.6 - 5.1 mmol/L Final   • Chloride 05/29/2019 103  101 - 111 mmol/L Final   • CO2 05/29/2019 24  22 - 32 mmol/L Final   • Glucose 05/29/2019 128* 65 - 99 mg/dL Final   • BUN 05/29/2019 17  8 - 20 mg/dL Final   • Creatinine 05/29/2019 0.8  0.4 - 1.0 mg/dl Final   • Calcium 05/29/2019 9.5  8.9 - 10.3 mg/dL Final   •  Total Protein 05/29/2019 7.4  6.1 - 7.9 g/dL Final   • Albumin 05/29/2019 4.1  3.5 - 4.8 g/dL Final   • Total Bilirubin 05/29/2019 1.0  0.3 - 1.2 mg/dL Final   • Alkaline Phosphatase 05/29/2019 57  32 - 91 IU/L Final   • AST (SGOT) 05/29/2019 18  15 - 41 IU/L Final   • ALT (SGPT) 05/29/2019 16  14 - 54 IU/L Final   • Anion Gap 05/29/2019 16.2  10 - 20 Final   • BUN/Creatinine Ratio 05/29/2019 21.3  5.4 - 26.2 Final   • GFR MDRD Non  05/29/2019 >60  >60 mL/min/1.73m2 Final   • GFR MDRD  05/29/2019 >60  >60 mL/min/1.73m2 Final   • Globulin 05/29/2019 3.3  2.5 - 3.8 G/dL Final   • A/G Ratio 05/29/2019 1.2  1.0 - 1.7 Final   • Troponin I 05/29/2019 <0.03  0.00 - 0.03 ng/mL Final    Comment: (SEE BELOW)  0.00 - 0.03    Negative. Repeat testing in 4-6 hrs if                clinically indicated.    0.04 - 0.29    Suspicious for myocardial injury. Serial                 measurements and clinical correlation may be                 necessary to confirm or exclude diagnosis of                acute coronary syndrome.                Repeat testing in 4-6 hrs if indicated.         >0.29    Consistent with myocardial injury.                Recommend clinical and laboratory correlation.    NOTE: Results may be falsely decreased if patient taking Biotin     • BNP 05/29/2019 71  <100 pg/mL Final    Comment: (SEE BELOW)  NOTE: Results may be falsely decreased if patient taking Biotin     • D-Dimer, Quant 05/29/2019 0.25  0.17 - 0.59 mg/L FEU Final   • Magnesium 05/29/2019 1.7* 1.8 - 2.5 mg/dL Final   • Troponin I 05/29/2019 <0.03  0.00 - 0.03 ng/mL Final    Comment: (SEE BELOW)  0.00 - 0.03    Negative. Repeat testing in 4-6 hrs if                clinically indicated.    0.04 - 0.29    Suspicious for myocardial injury. Serial                 measurements and clinical correlation may be                 necessary to confirm or exclude diagnosis of                acute coronary syndrome.                 Repeat testing in 4-6 hrs if indicated.         >0.29    Consistent with myocardial injury.                Recommend clinical and laboratory correlation.    NOTE: Results may be falsely decreased if patient taking Biotin     • ADENOVIRUS, PCR 05/29/2019 Not Detected  Not Detected Final   • Coronavirus 229E 05/29/2019 Not Detected  Not Detected Final   • Coronavirus HKU1 05/29/2019 Not Detected  Not Detected Final   • Coronavirus NL63 05/29/2019 Not Detected  Not Detected Final   • Coronavirus OC43 05/29/2019 Not Detected  Not Detected Final   • Human Metapneumovirus 05/29/2019 Not Detected  Not Detected Final   • Human Rhinovirus/Enterovirus 05/29/2019 Detected* Not Detected Final   • Influenza A PCR 05/29/2019 Not Detected  Not Detected Final   • Influenza A H3 05/29/2019 Not Detected  Not Detected Final   • Influenza A H1 2009 PCR 05/29/2019 Not Detected  Not Detected Final   • Influenza A H1 05/29/2019 Not Detected  Not Detected Final   • Influenza B PCR 05/29/2019 Not Detected  Not Detected Final   • Parainfluenza Virus 1 05/29/2019 Not Detected  Not Detected Final   • Parainfluenza Virus 2 05/29/2019 Not Detected  Not Detected Final   • Parainfluenza Virus 3 05/29/2019 Not Detected  Not Detected Final   • Parainfluenza Virus 4 05/29/2019 Not Detected  Not Detected Final   • RSV, PCR 05/29/2019 Not Detected  Not Detected Final   • Bordetella pertussis pcr 05/29/2019 Not Detected  Not Detected Final   • Chlamydophila pneumoniae PCR 05/29/2019 Not Detected  Not Detected Final   • Mycoplasma pneumo by PCR 05/29/2019 Not Detected  Not Detected Final   • Specimen 05/29/2019 ADITYA   Final   • Troponin I 05/30/2019 <0.03  0.00 - 0.03 ng/mL Final    Comment: (SEE BELOW)  0.00 - 0.03    Negative. Repeat testing in 4-6 hrs if                clinically indicated.    0.04 - 0.29    Suspicious for myocardial injury. Serial                 measurements and clinical correlation may be                 necessary to confirm or  exclude diagnosis of                acute coronary syndrome.                Repeat testing in 4-6 hrs if indicated.         >0.29    Consistent with myocardial injury.                Recommend clinical and laboratory correlation.    NOTE: Results may be falsely decreased if patient taking Biotin     • WBC 05/30/2019 5.2  4.5 - 11.5 10*3/uL Final   • RBC 05/30/2019 5.17  4.00 - 5.40 10*6/uL Final   • Hemoglobin 05/30/2019 13.1  12.0 - 15.0 g/dL Final   • Hematocrit 05/30/2019 41.0  35 - 49 % Final   • MCV 05/30/2019 79.4* 80 - 94 fL Final   • MCH 05/30/2019 25.3* 26 - 32 pg Final   • MCHC 05/30/2019 31.9* 32 - 36 g/dL Final   • RDW 05/30/2019 13.3  11.5 - 14.5 % Final   • Platelets 05/30/2019 252  150 - 450 10*3/uL Final   • MPV 05/30/2019 8.6  7.4 - 10.4 fL Final   • Differential Type 05/30/2019 AUTO   Final   • Neutrophils Absolute 05/30/2019 4.4  2.3 - 8.6 10*3/uL Final   • Lymphocytes Absolute 05/30/2019 0.7* 0.8 - 4.8 10*3/uL Final   • Monocytes Absolute 05/30/2019 0.1  0.1 - 1.3 10*3/uL Final   • Eosinophils Absolute 05/30/2019 0.0  0.0 - 0.3 10*3/uL Final   • Basophils Absolute 05/30/2019 0.0  0 - 0.2 10*3/uL Final   • Neutrophil Rel % 05/30/2019 85* 50 - 75 % Final   • Lymphocyte Rel % 05/30/2019 14* 18 - 42 % Final   • Monocyte Rel % 05/30/2019 1* 2 - 11 % Final   • Eosinophil Rel % 05/30/2019 0  0 - 3 % Final   • Basophil Rel % 05/30/2019 0  0 - 2 % Final   • nRBC 05/30/2019 0  0 /100[WBCs] Final   • Absolute nRBC 05/30/2019 0  10*3/uL Final   • Magnesium 05/30/2019 1.6* 1.8 - 2.5 mg/dL Final   • Sodium 05/30/2019 137  136 - 144 mmol/L Final   • Potassium 05/30/2019 4.5  3.6 - 5.1 mmol/L Final   • Chloride 05/30/2019 104  101 - 111 mmol/L Final   • CO2 05/30/2019 24  22 - 32 mmol/L Final   • Glucose 05/30/2019 158* 65 - 99 mg/dL Final   • BUN 05/30/2019 14  8 - 20 mg/dL Final   • Creatinine 05/30/2019 0.7  0.4 - 1.0 mg/dl Final   • Anion Gap 05/30/2019 13.5  10 - 20 Final   • BUN/Creatinine Ratio 05/30/2019  20.0  5.4 - 26.2 Final   • GFR MDRD Non  05/30/2019 >60  >60 mL/min/1.73m2 Final   • GFR MDRD  05/30/2019 >60  >60 mL/min/1.73m2 Final   • Calcium 05/30/2019 8.9  8.9 - 10.3 mg/dL Final   • Potassium 05/31/2019 3.8  3.6 - 5.1 mmol/L Final           Assessment/Plan   Problems Addressed this Visit        Cardiovascular and Mediastinum    Hypertension - Primary    Relevant Medications    amLODIPine (NORVASC) 5 MG tablet       Respiratory    Shortness of breath     She has been referred to pulmonologist She has never smoked.   Her sister had COPD without being a smoker.               Chrystal was seen today for medication problem and adenopathy.    Diagnoses and all orders for this visit:    Hypertension, unspecified type    Shortness of breath    Other orders  -     amLODIPine (NORVASC) 5 MG tablet; Take 1 tablet by mouth Daily.

## 2019-10-29 DIAGNOSIS — Z12.39 SCREENING FOR BREAST CANCER: Primary | ICD-10-CM

## 2019-10-29 DIAGNOSIS — R92.8 ABNORMAL MAMMOGRAM OF RIGHT BREAST: ICD-10-CM

## 2019-10-29 DIAGNOSIS — Z12.31 ENCOUNTER FOR SCREENING MAMMOGRAM FOR MALIGNANT NEOPLASM OF BREAST: ICD-10-CM

## 2019-11-08 ENCOUNTER — HOSPITAL ENCOUNTER (OUTPATIENT)
Dept: MAMMOGRAPHY | Facility: HOSPITAL | Age: 79
Discharge: HOME | End: 2019-11-08
Admitting: FAMILY MEDICINE

## 2019-11-08 PROCEDURE — 77063 BREAST TOMOSYNTHESIS BI: CPT

## 2019-11-08 PROCEDURE — 77067 SCR MAMMO BI INCL CAD: CPT

## 2019-11-14 ENCOUNTER — HOSPITAL ENCOUNTER (OUTPATIENT)
Dept: MAMMOGRAPHY | Facility: HOSPITAL | Age: 79
Discharge: HOME | End: 2019-11-14
Admitting: FAMILY MEDICINE

## 2019-11-14 ENCOUNTER — HOSPITAL ENCOUNTER (OUTPATIENT)
Dept: ULTRASOUND IMAGING | Facility: HOSPITAL | Age: 79
Discharge: HOME | End: 2019-11-14

## 2019-11-14 DIAGNOSIS — R92.8 ABNORMAL MAMMOGRAM OF RIGHT BREAST: ICD-10-CM

## 2019-11-14 PROCEDURE — G0279 TOMOSYNTHESIS, MAMMO: HCPCS

## 2019-11-14 PROCEDURE — 77065 DX MAMMO INCL CAD UNI: CPT

## 2020-01-16 RX ORDER — TRIAMCINOLONE ACETONIDE 1 MG/G
CREAM TOPICAL 2 TIMES DAILY
Qty: 80 G | Refills: 1 | Status: SHIPPED | OUTPATIENT
Start: 2020-01-16 | End: 2020-03-24

## 2020-02-12 ENCOUNTER — OFFICE VISIT (OUTPATIENT)
Dept: FAMILY MEDICINE CLINIC | Facility: CLINIC | Age: 80
End: 2020-02-12

## 2020-02-12 VITALS
BODY MASS INDEX: 29.42 KG/M2 | OXYGEN SATURATION: 95 % | SYSTOLIC BLOOD PRESSURE: 161 MMHG | HEART RATE: 83 BPM | DIASTOLIC BLOOD PRESSURE: 77 MMHG | TEMPERATURE: 97.4 F | WEIGHT: 157 LBS

## 2020-02-12 DIAGNOSIS — Z00.00 MEDICARE ANNUAL WELLNESS VISIT, SUBSEQUENT: Primary | ICD-10-CM

## 2020-02-12 DIAGNOSIS — I10 ESSENTIAL HYPERTENSION: ICD-10-CM

## 2020-02-12 DIAGNOSIS — Z83.49 FH: THYROID CONDITION: ICD-10-CM

## 2020-02-12 DIAGNOSIS — Z82.49 FAM HX-ISCHEM HEART DISEASE: ICD-10-CM

## 2020-02-12 PROCEDURE — 84443 ASSAY THYROID STIM HORMONE: CPT | Performed by: FAMILY MEDICINE

## 2020-02-12 PROCEDURE — 80053 COMPREHEN METABOLIC PANEL: CPT | Performed by: FAMILY MEDICINE

## 2020-02-12 PROCEDURE — G0439 PPPS, SUBSEQ VISIT: HCPCS | Performed by: FAMILY MEDICINE

## 2020-02-12 PROCEDURE — 80061 LIPID PANEL: CPT | Performed by: FAMILY MEDICINE

## 2020-02-12 PROCEDURE — 36415 COLL VENOUS BLD VENIPUNCTURE: CPT | Performed by: FAMILY MEDICINE

## 2020-02-12 RX ORDER — AMLODIPINE BESYLATE 10 MG/1
10 TABLET ORAL DAILY
Qty: 90 TABLET | Refills: 3 | Status: SHIPPED | OUTPATIENT
Start: 2020-02-12 | End: 2021-02-15 | Stop reason: SDUPTHER

## 2020-02-12 RX ORDER — CONJUGATED ESTROGENS 0.62 MG/G
CREAM VAGINAL 2 TIMES WEEKLY
Qty: 30 G | Refills: 3 | Status: SHIPPED | OUTPATIENT
Start: 2020-02-13 | End: 2021-01-06 | Stop reason: SDUPTHER

## 2020-02-12 NOTE — PROGRESS NOTES
Medicare Wellness Visit   The ABC's of the Annual Wellness Visit    Chief Complaint   Patient presents with   • Follow-up     6 mos f/u, also exposed to flu b over the wkend       HPI:  Chrystal Ruiz, -1940, is a 79 y.o. female who presents for a Medicare Wellness Visit.    Recent Hospitalizations:  No hospitalization(s) within the last year..    Current Medical Providers:  Patient Care Team:  Etelvina Ulloa MD as PCP - General  Etelvina Ulloa MD as PCP - Family Medicine  [facility] (Pulmonary Disease)    Health Habits and Functional and Cognitive Screening and Depression Screening:  Functional & Cognitive Status 2020   Do you have difficulty preparing food and eating? No   Do you have difficulty bathing yourself, getting dressed or grooming yourself? No   Do you have difficulty using the toilet? No   Do you have difficulty moving around from place to place? No   Do you have trouble with steps or getting out of a bed or a chair? No   Current Diet Well Balanced Diet   Dental Exam Up to date   Eye Exam Up to date   Do you need help using the phone?  No   Are you deaf or do you have serious difficulty hearing?  No   Do you need help with transportation? No   Do you need help shopping? No   Do you need help preparing meals?  No   Do you need help with housework?  No   Do you need help with laundry? No   Do you need help taking your medications? No   Do you ever drive or ride in a car without wearing a seat belt? No   Have you felt unusual stress, anger or loneliness in the last month? No   Who do you live with? Alone   If you need help, do you have trouble finding someone available to you? No   Do you have difficulty concentrating, remembering or making decisions? Yes       Compared to one year ago, the patient feels her physical health is better and her mental health is better.    Depression Screen:  PHQ-2/PHQ-9 Depression Screening 2020   Little interest or  pleasure in doing things 0   Feeling down, depressed, or hopeless 0   Trouble falling or staying asleep, or sleeping too much 0   Feeling tired or having little energy 0   Poor appetite or overeating 0   Feeling bad about yourself - or that you are a failure or have let yourself or your family down 0   Trouble concentrating on things, such as reading the newspaper or watching television 0   Moving or speaking so slowly that other people could have noticed. Or the opposite - being so fidgety or restless that you have been moving around a lot more than usual 0   Thoughts that you would be better off dead, or of hurting yourself in some way 0   Total Score 0   If you checked off any problems, how difficult have these problems made it for you to do your work, take care of things at home, or get along with other people? Not difficult at all         Past Medical/Family/Social History:  The following portions of the patient's history were reviewed and updated as appropriate: allergies, current medications, past family history, past medical history, past social history, past surgical history and problem list.    Allergies   Allergen Reactions   • Ciprofloxacin Unknown (See Comments)   • Sulfa Antibiotics Unknown (See Comments)         Current Outpatient Medications:   •  BREO ELLIPTA 200-25 MCG/INH inhaler, INHALE 1 PUFF PO ONCE A DAY, Disp: , Rfl:   •  amLODIPine (NORVASC) 10 MG tablet, Take 1 tablet by mouth Daily., Disp: 90 tablet, Rfl: 3  •  Cholecalciferol (VITAMIN D3) 2000 units capsule, VITAMIN D3 2000 UNIT CAPS, Disp: , Rfl:   •  Cyanocobalamin (VITAMIN B12) 1000 MCG tablet controlled-release, Daily., Disp: , Rfl:   •  Homeopathic Products (LEG CRAMP RELIEF) tablet, LEG CRAMP RELIEF TABS, Disp: , Rfl:   •  PREMARIN 0.625 MG/GM vaginal cream, Insert  into the vagina 2 (Two) Times a Week., Disp: 30 g, Rfl: 3  •  SHINGRIX 50 MCG/0.5ML reconstituted suspension, ADM 0.5ML IM UTD, Disp: , Rfl: 0  •  triamcinolone  (KENALOG) 0.1 % cream, Apply  topically to the appropriate area as directed 2 (Two) Times a Day., Disp: 80 g, Rfl: 1    Aspirin use counseling: Does not need ASA (and currently is not on it)    Current medication list contains no high risk medications.  No harmful drug interactions have been identified.     Family History   Problem Relation Age of Onset   • COPD Sister    • Heart disease Sister    • Breast cancer Mother    • Stroke Mother    • Heart disease Mother        Social History     Tobacco Use   • Smoking status: Never Smoker   • Smokeless tobacco: Never Used   Substance Use Topics   • Alcohol use: No     Frequency: Never       Past Surgical History:   Procedure Laterality Date   • OOPHORECTOMY         Patient Active Problem List   Diagnosis   • Grief reaction   • Alopecia   • Arthritis   • Back pain   • Encounter for general adult medical examination without abnormal findings   • Fatigue   • Hypertension   • Lung nodule   • Vitamin D deficiency   • Shortness of breath   • Postmenopausal status   • Nevus, non-neoplastic   • Medicare annual wellness visit, subsequent   • Fam hx-ischem heart disease       Review of Systems   Constitutional: Negative for chills and fever.   HENT: Negative for sinus pressure and sore throat.    Eyes: Negative for blurred vision.   Respiratory: Negative for cough and shortness of breath.    Cardiovascular: Negative for chest pain and palpitations.   Gastrointestinal: Negative for abdominal pain.   Endocrine: Negative for polyuria.   Skin: Negative for rash.   Neurological: Negative for dizziness and headache.   Hematological: Negative for adenopathy.   Psychiatric/Behavioral: Negative for depressed mood.       Objective     Vitals:    02/12/20 1414   BP: 161/77   BP Location: Right arm   Patient Position: Sitting   Cuff Size: Adult   Pulse: 83   Temp: 97.4 °F (36.3 °C)   TempSrc: Tympanic   SpO2: 95%   Weight: 71.2 kg (157 lb)       Patient's Body mass index is 29.42 kg/m². BMI  is above normal parameters. Recommendations include: exercise counseling.      No exam data present    The patient has no evidence of cognitve impairment.     Physical Exam   Constitutional: She is oriented to person, place, and time. She appears well-developed. No distress.   HENT:   Head: Normocephalic.   Eyes: Conjunctivae and lids are normal.   Neck: Trachea normal. No thyroid mass and no thyromegaly present.   Cardiovascular: Normal rate, regular rhythm and normal heart sounds.   Pulmonary/Chest: Effort normal and breath sounds normal.   Lymphadenopathy:     She has no cervical adenopathy.   Neurological: She is alert and oriented to person, place, and time.   Skin: Skin is warm and dry.   Psychiatric: She has a normal mood and affect. Her speech is normal and behavior is normal. She is attentive.       Recent Lab Results:     Lab Results   Component Value Date    CHOL 209 (H) 02/12/2020    TRIG 50 02/12/2020    HDL 90 (H) 02/12/2020    VLDL 10 02/12/2020    LDLHDL 1.21 02/12/2020     Office Visit on 02/12/2020   Component Date Value Ref Range Status   • Total Cholesterol 02/12/2020 209* 0 - 200 mg/dL Final   • Triglycerides 02/12/2020 50  0 - 150 mg/dL Final   • HDL Cholesterol 02/12/2020 90* 40 - 60 mg/dL Final   • LDL Cholesterol  02/12/2020 109* 0 - 100 mg/dL Final   • VLDL Cholesterol 02/12/2020 10  mg/dL Final   • LDL/HDL Ratio 02/12/2020 1.21   Final   • Glucose 02/12/2020 87  65 - 99 mg/dL Final   • BUN 02/12/2020 12  8 - 23 mg/dL Final   • Creatinine 02/12/2020 0.61  0.57 - 1.00 mg/dL Final   • Sodium 02/12/2020 141  136 - 145 mmol/L Final   • Potassium 02/12/2020 4.0  3.5 - 5.2 mmol/L Final   • Chloride 02/12/2020 103  98 - 107 mmol/L Final   • CO2 02/12/2020 28.0  22.0 - 29.0 mmol/L Final   • Calcium 02/12/2020 9.1  8.6 - 10.5 mg/dL Final   • Total Protein 02/12/2020 6.8  6.0 - 8.5 g/dL Final   • Albumin 02/12/2020 4.20  3.50 - 5.20 g/dL Final   • ALT (SGPT) 02/12/2020 13  1 - 33 U/L Final   • AST  (SGOT) 02/12/2020 15  1 - 32 U/L Final   • Alkaline Phosphatase 02/12/2020 67  39 - 117 U/L Final   • Total Bilirubin 02/12/2020 0.8  0.2 - 1.2 mg/dL Final   • eGFR Non African Amer 02/12/2020 94  >60 mL/min/1.73 Final   • Globulin 02/12/2020 2.6  gm/dL Final   • A/G Ratio 02/12/2020 1.6  g/dL Final   • BUN/Creatinine Ratio 02/12/2020 19.7  7.0 - 25.0 Final   • Anion Gap 02/12/2020 10.0  5.0 - 15.0 mmol/L Final   • TSH 02/12/2020 0.946  0.270 - 4.200 uIU/mL Final         Assessment/Plan   Age-appropriate Screening Schedule:  Refer to the list below for future screening recommendations based on patient's age, sex and/or medical conditions.      Health Maintenance   Topic Date Due   • TDAP/TD VACCINES (1 - Tdap) 10/26/1951   • ZOSTER VACCINE (2 of 2) 12/04/2019   • INFLUENZA VACCINE  Completed       Medicare Risks and Personalized Health Plan:  Advance Directive Discussion  Breast Cancer/Mammogram Screening  Diabetic Lab Screening   Osteoprorosis Risk      CMS-Preventive Services Quick Reference  Medicare Preventive Services Addressed:  Annual Wellness Visit (AWV)  Bone Density Measurements  Diabetes Screening-Lab Order for either glucose quantitative blood (except reagent strip), glucose;post glucose dose(includes glucose), or glucose tolerance test-3 specimens(includes glucose)  Screening Mammography     Advance Care Planning:  ACP discussion was held with the patient during this visit. Patient does not have an advance directive, information provided.    Diagnoses and all orders for this visit:    1. Medicare annual wellness visit, subsequent (Primary)    2. Essential hypertension  -     Lipid Panel  -     Comprehensive Metabolic Panel    3. Fam hx-ischem heart disease  -     Lipid Panel  -     Comprehensive Metabolic Panel    4. FH: thyroid condition  -     TSH    Other orders  -     amLODIPine (NORVASC) 10 MG tablet; Take 1 tablet by mouth Daily.  Dispense: 90 tablet; Refill: 3  -     PREMARIN 0.625 MG/GM vaginal  cream; Insert  into the vagina 2 (Two) Times a Week.  Dispense: 30 g; Refill: 3        An After Visit Summary and PPPS with all of these plans were given to the patient.      Follow Up:  Return in about 1 year (around 2/12/2021) for Medicare Wellness.

## 2020-02-13 LAB
ALBUMIN SERPL-MCNC: 4.2 G/DL (ref 3.5–5.2)
ALBUMIN/GLOB SERPL: 1.6 G/DL
ALP SERPL-CCNC: 67 U/L (ref 39–117)
ALT SERPL W P-5'-P-CCNC: 13 U/L (ref 1–33)
ANION GAP SERPL CALCULATED.3IONS-SCNC: 10 MMOL/L (ref 5–15)
AST SERPL-CCNC: 15 U/L (ref 1–32)
BILIRUB SERPL-MCNC: 0.8 MG/DL (ref 0.2–1.2)
BUN BLD-MCNC: 12 MG/DL (ref 8–23)
BUN/CREAT SERPL: 19.7 (ref 7–25)
CALCIUM SPEC-SCNC: 9.1 MG/DL (ref 8.6–10.5)
CHLORIDE SERPL-SCNC: 103 MMOL/L (ref 98–107)
CHOLEST SERPL-MCNC: 209 MG/DL (ref 0–200)
CO2 SERPL-SCNC: 28 MMOL/L (ref 22–29)
CREAT BLD-MCNC: 0.61 MG/DL (ref 0.57–1)
GFR SERPL CREATININE-BSD FRML MDRD: 94 ML/MIN/1.73
GLOBULIN UR ELPH-MCNC: 2.6 GM/DL
GLUCOSE BLD-MCNC: 87 MG/DL (ref 65–99)
HDLC SERPL-MCNC: 90 MG/DL (ref 40–60)
LDLC SERPL CALC-MCNC: 109 MG/DL (ref 0–100)
LDLC/HDLC SERPL: 1.21 {RATIO}
POTASSIUM BLD-SCNC: 4 MMOL/L (ref 3.5–5.2)
PROT SERPL-MCNC: 6.8 G/DL (ref 6–8.5)
SODIUM BLD-SCNC: 141 MMOL/L (ref 136–145)
TRIGL SERPL-MCNC: 50 MG/DL (ref 0–150)
TSH SERPL DL<=0.05 MIU/L-ACNC: 0.95 UIU/ML (ref 0.27–4.2)
VLDLC SERPL-MCNC: 10 MG/DL

## 2020-02-28 PROBLEM — Z83.49 FH: THYROID CONDITION: Status: ACTIVE | Noted: 2020-02-28

## 2020-03-20 ENCOUNTER — OFFICE VISIT (OUTPATIENT)
Dept: FAMILY MEDICINE CLINIC | Facility: CLINIC | Age: 80
End: 2020-03-20

## 2020-03-20 VITALS
BODY MASS INDEX: 29.8 KG/M2 | DIASTOLIC BLOOD PRESSURE: 77 MMHG | WEIGHT: 159 LBS | TEMPERATURE: 98.5 F | HEART RATE: 74 BPM | SYSTOLIC BLOOD PRESSURE: 151 MMHG | OXYGEN SATURATION: 94 %

## 2020-03-20 DIAGNOSIS — L03.119 CELLULITIS OF LOWER LEG: Primary | ICD-10-CM

## 2020-03-20 DIAGNOSIS — R60.0 PEDAL EDEMA: ICD-10-CM

## 2020-03-20 DIAGNOSIS — L95.9 VASCULITIS OF SKIN: ICD-10-CM

## 2020-03-20 PROCEDURE — 99213 OFFICE O/P EST LOW 20 MIN: CPT | Performed by: FAMILY MEDICINE

## 2020-03-20 RX ORDER — PREDNISONE 10 MG/1
10 TABLET ORAL 2 TIMES DAILY
Qty: 10 TABLET | Refills: 0 | Status: SHIPPED | OUTPATIENT
Start: 2020-03-20 | End: 2021-01-05

## 2020-03-20 RX ORDER — CEPHALEXIN 500 MG/1
500 CAPSULE ORAL 3 TIMES DAILY
Qty: 21 CAPSULE | Refills: 0 | Status: SHIPPED | OUTPATIENT
Start: 2020-03-20 | End: 2020-04-29

## 2020-03-20 NOTE — PROGRESS NOTES
Subjective   Chief Complaint   Patient presents with   • Rash     lower legs     Chrystal Ruiz is a 79 y.o. female.     Rash   This is a new problem. The current episode started today. The problem has been gradually worsening since onset. The affected locations include the right lower leg and left lower leg. The rash is characterized by redness and itchiness. She was exposed to nothing. Pertinent negatives include no anorexia, congestion, cough, facial edema, fever, rhinorrhea, shortness of breath or sore throat. Past treatments include topical steroids. The treatment provided no relief.      Past Medical History:   Diagnosis Date   • Asthma    • Hypertension      Past Surgical History:   Procedure Laterality Date   • OOPHORECTOMY       Allergies   Allergen Reactions   • Ciprofloxacin Unknown (See Comments)   • Sulfa Antibiotics Unknown (See Comments)     Social History     Socioeconomic History   • Marital status:      Spouse name: Not on file   • Number of children: Not on file   • Years of education: Not on file   • Highest education level: Not on file   Tobacco Use   • Smoking status: Never Smoker   • Smokeless tobacco: Never Used   Substance and Sexual Activity   • Alcohol use: No     Frequency: Never   • Sexual activity: Not Currently     Social History     Tobacco Use   Smoking Status Never Smoker   Smokeless Tobacco Never Used       family history includes Breast cancer in her mother; COPD in her sister; Heart disease in her mother and sister; Stroke in her mother.  Current Outpatient Medications on File Prior to Visit   Medication Sig Dispense Refill   • amLODIPine (NORVASC) 10 MG tablet Take 1 tablet by mouth Daily. 90 tablet 3   • BREO ELLIPTA 200-25 MCG/INH inhaler INHALE 1 PUFF PO ONCE A DAY     • Cholecalciferol (VITAMIN D3) 2000 units capsule VITAMIN D3 2000 UNIT CAPS     • Cyanocobalamin (VITAMIN B12) 1000 MCG tablet controlled-release Daily.     • Homeopathic Products (LEG CRAMP RELIEF)  tablet LEG CRAMP RELIEF TABS     • PREMARIN 0.625 MG/GM vaginal cream Insert  into the vagina 2 (Two) Times a Week. 30 g 3   • SHINGRIX 50 MCG/0.5ML reconstituted suspension ADM 0.5ML IM UTD  0   • triamcinolone (KENALOG) 0.1 % cream Apply  topically to the appropriate area as directed 2 (Two) Times a Day. 80 g 1     No current facility-administered medications on file prior to visit.      Patient Active Problem List   Diagnosis   • Grief reaction   • Alopecia   • Arthritis   • Back pain   • Encounter for general adult medical examination without abnormal findings   • Fatigue   • Hypertension   • Lung nodule   • Vitamin D deficiency   • Shortness of breath   • Postmenopausal status   • Nevus, non-neoplastic   • Medicare annual wellness visit, subsequent   • Fam hx-ischem heart disease   • FH: thyroid condition   • Cellulitis of lower leg   • Vasculitis of skin   • Pedal edema       The following portions of the patient's history were reviewed and updated as appropriate: allergies, current medications, past family history, past medical history, past social history, past surgical history and problem list.    Review of Systems   Constitutional: Negative for chills and fever.   HENT: Negative for congestion, rhinorrhea, sinus pressure and sore throat.    Eyes: Negative for blurred vision.   Respiratory: Negative for cough and shortness of breath.    Cardiovascular: Negative for chest pain and palpitations.   Gastrointestinal: Negative for abdominal pain and anorexia.   Endocrine: Negative for polyuria.   Skin: Positive for rash.   Neurological: Negative for dizziness and headache.   Hematological: Negative for adenopathy.   Psychiatric/Behavioral: Negative for depressed mood.       Objective   /77 (BP Location: Left arm, Patient Position: Sitting, Cuff Size: Adult)   Pulse 74   Temp 98.5 °F (36.9 °C) (Oral)   Wt 72.1 kg (159 lb)   SpO2 94%   BMI 29.80 kg/m²   Physical Exam   Constitutional: She is oriented to  person, place, and time. She appears well-developed. No distress.   HENT:   Head: Normocephalic.   Eyes: Conjunctivae and lids are normal.   Neck: Trachea normal. No thyroid mass and no thyromegaly present.   Cardiovascular: Normal rate, regular rhythm and normal heart sounds.   Pulmonary/Chest: Effort normal and breath sounds normal.   Musculoskeletal: She exhibits edema.   Lymphadenopathy:     She has no cervical adenopathy.   Neurological: She is alert and oriented to person, place, and time.   Skin: Skin is warm and dry. Rash noted. Rash is macular.        Psychiatric: She has a normal mood and affect. Her speech is normal and behavior is normal. She is attentive.       No visits with results within 1 Week(s) from this visit.   Latest known visit with results is:   Office Visit on 02/12/2020   Component Date Value Ref Range Status   • Total Cholesterol 02/12/2020 209* 0 - 200 mg/dL Final   • Triglycerides 02/12/2020 50  0 - 150 mg/dL Final   • HDL Cholesterol 02/12/2020 90* 40 - 60 mg/dL Final   • LDL Cholesterol  02/12/2020 109* 0 - 100 mg/dL Final   • VLDL Cholesterol 02/12/2020 10  mg/dL Final   • LDL/HDL Ratio 02/12/2020 1.21   Final   • Glucose 02/12/2020 87  65 - 99 mg/dL Final   • BUN 02/12/2020 12  8 - 23 mg/dL Final   • Creatinine 02/12/2020 0.61  0.57 - 1.00 mg/dL Final   • Sodium 02/12/2020 141  136 - 145 mmol/L Final   • Potassium 02/12/2020 4.0  3.5 - 5.2 mmol/L Final   • Chloride 02/12/2020 103  98 - 107 mmol/L Final   • CO2 02/12/2020 28.0  22.0 - 29.0 mmol/L Final   • Calcium 02/12/2020 9.1  8.6 - 10.5 mg/dL Final   • Total Protein 02/12/2020 6.8  6.0 - 8.5 g/dL Final   • Albumin 02/12/2020 4.20  3.50 - 5.20 g/dL Final   • ALT (SGPT) 02/12/2020 13  1 - 33 U/L Final   • AST (SGOT) 02/12/2020 15  1 - 32 U/L Final   • Alkaline Phosphatase 02/12/2020 67  39 - 117 U/L Final   • Total Bilirubin 02/12/2020 0.8  0.2 - 1.2 mg/dL Final   • eGFR Non African Amer 02/12/2020 94  >60 mL/min/1.73 Final   •  Globulin 02/12/2020 2.6  gm/dL Final   • A/G Ratio 02/12/2020 1.6  g/dL Final   • BUN/Creatinine Ratio 02/12/2020 19.7  7.0 - 25.0 Final   • Anion Gap 02/12/2020 10.0  5.0 - 15.0 mmol/L Final   • TSH 02/12/2020 0.946  0.270 - 4.200 uIU/mL Final       COVID-19 RISK SCREEN     1. Has the patient been exposed to a known COVID-19 (+) person or a person under investigation (PUI) for COVID-19 infection?  -- No     2. Has the patient traveled to or are they from a Level III endemic country? --  No    3. Has the patient traveled to or are they from a local community endemic area?   --  No    4. Does the patient have baseline higher exposure risk such as working in healthcare field or currently residing in healthcare facility?  --  No     Helpful websites (please copy and paste in an internet browser):       https://www.cdc.gov/coronavirus/2019-ncov/travelers/map-and-travel-notices.html#travel-1       https://govstatus.[place]/sdnhmxu00       https://www.[place].gov/coronavirus/      Assessment/Plan   Problems Addressed this Visit        Cardiovascular and Mediastinum    Vasculitis of skin     If not a lot better by Monday she may need a biopsy.            Other    Cellulitis of lower leg - Primary     Will treat with Keflex.  Patient advised to call back on Monday with an update.          Pedal edema     Chronic, stable.

## 2020-03-20 NOTE — PATIENT INSTRUCTIONS
Cellulitis, Adult    Cellulitis is a skin infection. The infected area is often warm, red, swollen, and sore. It occurs most often in the arms and lower legs. It is very important to get treated for this condition.  What are the causes?  This condition is caused by bacteria. The bacteria enter through a break in the skin, such as a cut, burn, insect bite, open sore, or crack.  What increases the risk?  This condition is more likely to occur in people who:  · Have a weak body defense system (immune system).  · Have open cuts, burns, bites, or scrapes on the skin.  · Are older than 60 years of age.  · Have a blood sugar problem (diabetes).  · Have a long-lasting (chronic) liver disease (cirrhosis) or kidney disease.  · Are very overweight (obese).  · Have a skin problem, such as:  ? Itchy rash (eczema).  ? Slow movement of blood in the veins (venous stasis).  ? Fluid buildup below the skin (edema).  · Have been treated with high-energy rays (radiation).  · Use IV drugs.  What are the signs or symptoms?  Symptoms of this condition include:  · Skin that is:  ? Red.  ? Streaking.  ? Spotting.  ? Swollen.  ? Sore or painful when you touch it.  ? Warm.  · A fever.  · Chills.  · Blisters.  How is this diagnosed?  This condition is diagnosed based on:  · Medical history.  · Physical exam.  · Blood tests.  · Imaging tests.  How is this treated?  Treatment for this condition may include:  · Medicines to treat infections or allergies.  · Home care, such as:  ? Rest.  ? Placing cold or warm cloths (compresses) on the skin.  · Hospital care, if the condition is very bad.  Follow these instructions at home:  Medicines  · Take over-the-counter and prescription medicines only as told by your doctor.  · If you were prescribed an antibiotic medicine, take it as told by your doctor. Do not stop taking it even if you start to feel better.  General instructions    · Drink enough fluid to keep your pee (urine) pale yellow.  · Do not touch  or rub the infected area.  · Raise (elevate) the infected area above the level of your heart while you are sitting or lying down.  · Place cold or warm cloths on the area as told by your doctor.  · Keep all follow-up visits as told by your doctor. This is important.  Contact a doctor if:  · You have a fever.  · You do not start to get better after 1-2 days of treatment.  · Your bone or joint under the infected area starts to hurt after the skin has healed.  · Your infection comes back. This can happen in the same area or another area.  · You have a swollen bump in the area.  · You have new symptoms.  · You feel ill and have muscle aches and pains.  Get help right away if:  · Your symptoms get worse.  · You feel very sleepy.  · You throw up (vomit) or have watery poop (diarrhea) for a long time.  · You see red streaks coming from the area.  · Your red area gets larger.  · Your red area turns dark in color.  These symptoms may represent a serious problem that is an emergency. Do not wait to see if the symptoms will go away. Get medical help right away. Call your local emergency services (911 in the [place] Do not drive yourself to the hospital.  Summary  · Cellulitis is a skin infection. The area is often warm, red, swollen, and sore.  · This condition is treated with medicines, rest, and cold and warm cloths.  · Take all medicines only as told by your doctor.  · Tell your doctor if symptoms do not start to get better after 1-2 days of treatment.  This information is not intended to replace advice given to you by your health care provider. Make sure you discuss any questions you have with your health care provider.  Document Released: 06/05/2009 Document Revised: 05/09/2019 Document Reviewed: 05/09/2019  [employer] Patient Education © 2020 [employer]

## 2020-03-23 ENCOUNTER — TELEPHONE (OUTPATIENT)
Dept: FAMILY MEDICINE CLINIC | Facility: CLINIC | Age: 80
End: 2020-03-23

## 2020-03-24 RX ORDER — TRIAMCINOLONE ACETONIDE 0.25 MG/G
OINTMENT TOPICAL 2 TIMES DAILY
Qty: 80 G | Refills: 0 | Status: SHIPPED | OUTPATIENT
Start: 2020-03-24 | End: 2021-01-06

## 2020-04-14 ENCOUNTER — TELEPHONE (OUTPATIENT)
Dept: FAMILY MEDICINE CLINIC | Facility: CLINIC | Age: 80
End: 2020-04-14

## 2020-04-14 DIAGNOSIS — Z12.31 VISIT FOR SCREENING MAMMOGRAM: Primary | ICD-10-CM

## 2020-04-14 DIAGNOSIS — R92.8 ABNORMAL MAMMOGRAM OF RIGHT BREAST: Primary | ICD-10-CM

## 2020-04-29 RX ORDER — AMOXICILLIN AND CLAVULANATE POTASSIUM 875; 125 MG/1; MG/1
1 TABLET, FILM COATED ORAL 2 TIMES DAILY
Qty: 20 TABLET | Refills: 0 | Status: SHIPPED | OUTPATIENT
Start: 2020-04-29 | End: 2021-01-05

## 2020-04-30 ENCOUNTER — TELEPHONE (OUTPATIENT)
Dept: FAMILY MEDICINE CLINIC | Facility: CLINIC | Age: 80
End: 2020-04-30

## 2020-05-01 NOTE — TELEPHONE ENCOUNTER
She needs to go to the ER.  By the time I get the CT done I won't have results before the weekend.

## 2020-05-19 ENCOUNTER — APPOINTMENT (OUTPATIENT)
Dept: MAMMOGRAPHY | Facility: HOSPITAL | Age: 80
End: 2020-05-19

## 2020-06-18 ENCOUNTER — APPOINTMENT (OUTPATIENT)
Dept: MAMMOGRAPHY | Facility: HOSPITAL | Age: 80
End: 2020-06-18

## 2020-07-29 ENCOUNTER — TELEPHONE (OUTPATIENT)
Dept: FAMILY MEDICINE CLINIC | Facility: CLINIC | Age: 80
End: 2020-07-29

## 2020-07-29 ENCOUNTER — HOSPITAL ENCOUNTER (OUTPATIENT)
Dept: MAMMOGRAPHY | Facility: HOSPITAL | Age: 80
Discharge: HOME | End: 2020-07-29
Admitting: FAMILY MEDICINE

## 2020-07-29 DIAGNOSIS — R92.8 ABNORMAL MAMMOGRAM OF RIGHT BREAST: ICD-10-CM

## 2020-07-29 PROCEDURE — 77065 DX MAMMO INCL CAD UNI: CPT

## 2020-07-29 PROCEDURE — G0279 TOMOSYNTHESIS, MAMMO: HCPCS

## 2020-08-12 ENCOUNTER — HOSPITAL ENCOUNTER (OUTPATIENT)
Dept: MAMMOGRAPHY | Facility: HOSPITAL | Age: 80
Discharge: HOME | End: 2020-08-12
Admitting: FAMILY MEDICINE

## 2020-08-12 DIAGNOSIS — R92.1 CALCIFICATION OF RIGHT BREAST: ICD-10-CM

## 2020-08-12 PROCEDURE — A4648 IMPLANTABLE TISSUE MARKER: HCPCS

## 2020-08-12 PROCEDURE — 88305 TISSUE EXAM BY PATHOLOGIST: CPT | Performed by: FAMILY MEDICINE

## 2020-08-12 PROCEDURE — 25010000003 LIDOCAINE 1 % SOLUTION: Performed by: FAMILY MEDICINE

## 2020-08-12 RX ORDER — LIDOCAINE HYDROCHLORIDE 10 MG/ML
3 INJECTION, SOLUTION INFILTRATION; PERINEURAL ONCE
Status: COMPLETED | OUTPATIENT
Start: 2020-08-12 | End: 2020-08-12

## 2020-08-12 RX ORDER — LIDOCAINE HYDROCHLORIDE AND EPINEPHRINE 10; 10 MG/ML; UG/ML
10 INJECTION, SOLUTION INFILTRATION; PERINEURAL ONCE
Status: COMPLETED | OUTPATIENT
Start: 2020-08-12 | End: 2020-08-12

## 2020-08-12 RX ADMIN — LIDOCAINE HYDROCHLORIDE,EPINEPHRINE BITARTRATE 8 ML: 10; .01 INJECTION, SOLUTION INFILTRATION; PERINEURAL at 13:25

## 2020-08-12 RX ADMIN — LIDOCAINE HYDROCHLORIDE 3 ML: 10 INJECTION, SOLUTION INFILTRATION; PERINEURAL at 13:25

## 2020-08-13 LAB
LAB AP CASE REPORT: NORMAL
LAB AP DIAGNOSIS COMMENT: NORMAL
PATH REPORT.FINAL DX SPEC: NORMAL
PATH REPORT.GROSS SPEC: NORMAL

## 2021-01-05 ENCOUNTER — OFFICE VISIT (OUTPATIENT)
Dept: FAMILY MEDICINE CLINIC | Facility: CLINIC | Age: 81
End: 2021-01-05

## 2021-01-05 ENCOUNTER — LAB (OUTPATIENT)
Dept: LAB | Facility: HOSPITAL | Age: 81
End: 2021-01-05

## 2021-01-05 ENCOUNTER — TELEPHONE (OUTPATIENT)
Dept: FAMILY MEDICINE CLINIC | Facility: CLINIC | Age: 81
End: 2021-01-05

## 2021-01-05 ENCOUNTER — HOSPITAL ENCOUNTER (OUTPATIENT)
Dept: CARDIOLOGY | Facility: HOSPITAL | Age: 81
Discharge: HOME | End: 2021-01-05

## 2021-01-05 VITALS
BODY MASS INDEX: 28.67 KG/M2 | TEMPERATURE: 97.1 F | OXYGEN SATURATION: 96 % | SYSTOLIC BLOOD PRESSURE: 148 MMHG | HEART RATE: 79 BPM | DIASTOLIC BLOOD PRESSURE: 66 MMHG | WEIGHT: 153 LBS

## 2021-01-05 DIAGNOSIS — L95.9 VASCULITIS OF SKIN: ICD-10-CM

## 2021-01-05 DIAGNOSIS — R60.0 PEDAL EDEMA: Primary | ICD-10-CM

## 2021-01-05 DIAGNOSIS — R06.02 SHORTNESS OF BREATH: ICD-10-CM

## 2021-01-05 DIAGNOSIS — M54.2 NECK PAIN: ICD-10-CM

## 2021-01-05 DIAGNOSIS — M79.662 BILATERAL CALF PAIN: ICD-10-CM

## 2021-01-05 DIAGNOSIS — R60.0 PEDAL EDEMA: ICD-10-CM

## 2021-01-05 DIAGNOSIS — M79.661 BILATERAL CALF PAIN: ICD-10-CM

## 2021-01-05 LAB

## 2021-01-05 PROCEDURE — 80053 COMPREHEN METABOLIC PANEL: CPT | Performed by: FAMILY MEDICINE

## 2021-01-05 PROCEDURE — 99214 OFFICE O/P EST MOD 30 MIN: CPT | Performed by: FAMILY MEDICINE

## 2021-01-05 PROCEDURE — 83880 ASSAY OF NATRIURETIC PEPTIDE: CPT

## 2021-01-05 PROCEDURE — 85025 COMPLETE CBC W/AUTO DIFF WBC: CPT | Performed by: FAMILY MEDICINE

## 2021-01-05 PROCEDURE — 93970 EXTREMITY STUDY: CPT

## 2021-01-05 PROCEDURE — 36415 COLL VENOUS BLD VENIPUNCTURE: CPT

## 2021-01-05 PROCEDURE — 86140 C-REACTIVE PROTEIN: CPT
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
## 2021-01-06 DIAGNOSIS — I83.11 VARICOSE VEINS OF BOTH LOWER EXTREMITIES WITH INFLAMMATION: ICD-10-CM

## 2021-01-06 DIAGNOSIS — R60.0 PEDAL EDEMA: Primary | ICD-10-CM

## 2021-01-06 DIAGNOSIS — I83.12 VARICOSE VEINS OF BOTH LOWER EXTREMITIES WITH INFLAMMATION: ICD-10-CM

## 2021-01-06 LAB
ALBUMIN SERPL-MCNC: 4.4 G/DL (ref 3.5–5.2)
ALBUMIN/GLOB SERPL: 1.8 G/DL
ALP SERPL-CCNC: 73 U/L (ref 39–117)
ALT SERPL W P-5'-P-CCNC: 15 U/L (ref 1–33)
ANION GAP SERPL CALCULATED.3IONS-SCNC: 11 MMOL/L (ref 5–15)
AST SERPL-CCNC: 19 U/L (ref 1–32)
BASOPHILS # BLD AUTO: 0.02 10*3/MM3 (ref 0–0.2)
BASOPHILS NFR BLD AUTO: 0.3 % (ref 0–1.5)
BILIRUB SERPL-MCNC: 0.7 MG/DL (ref 0–1.2)
BUN SERPL-MCNC: 14 MG/DL (ref 8–23)
BUN/CREAT SERPL: 22.2 (ref 7–25)
CALCIUM SPEC-SCNC: 9.1 MG/DL (ref 8.6–10.5)
CHLORIDE SERPL-SCNC: 106 MMOL/L (ref 98–107)
CO2 SERPL-SCNC: 26 MMOL/L (ref 22–29)
CREAT SERPL-MCNC: 0.63 MG/DL (ref 0.57–1)
CRP SERPL-MCNC: 0.18 MG/DL (ref 0–0.5)
DEPRECATED RDW RBC AUTO: 34.1 FL (ref 37–54)
EOSINOPHIL # BLD AUTO: 0.07 10*3/MM3 (ref 0–0.4)
EOSINOPHIL NFR BLD AUTO: 1.1 % (ref 0.3–6.2)
ERYTHROCYTE [DISTWIDTH] IN BLOOD BY AUTOMATED COUNT: 12.3 % (ref 12.3–15.4)
GFR SERPL CREATININE-BSD FRML MDRD: 91 ML/MIN/1.73
GLOBULIN UR ELPH-MCNC: 2.4 GM/DL
GLUCOSE SERPL-MCNC: 72 MG/DL (ref 65–99)
HCT VFR BLD AUTO: 36.3 % (ref 34–46.6)
HGB BLD-MCNC: 11.9 G/DL (ref 12–15.9)
IMM GRANULOCYTES # BLD AUTO: 0.02 10*3/MM3 (ref 0–0.05)
IMM GRANULOCYTES NFR BLD AUTO: 0.3 % (ref 0–0.5)
LYMPHOCYTES # BLD AUTO: 1.49 10*3/MM3 (ref 0.7–3.1)
LYMPHOCYTES NFR BLD AUTO: 23.7 % (ref 19.6–45.3)
MCH RBC QN AUTO: 25.9 PG (ref 26.6–33)
MCHC RBC AUTO-ENTMCNC: 32.8 G/DL (ref 31.5–35.7)
MCV RBC AUTO: 79.1 FL (ref 79–97)
MONOCYTES # BLD AUTO: 0.51 10*3/MM3 (ref 0.1–0.9)
MONOCYTES NFR BLD AUTO: 8.1 % (ref 5–12)
NEUTROPHILS NFR BLD AUTO: 4.19 10*3/MM3 (ref 1.7–7)
NEUTROPHILS NFR BLD AUTO: 66.5 % (ref 42.7–76)
NRBC BLD AUTO-RTO: 0 /100 WBC (ref 0–0.2)
NT-PROBNP SERPL-MCNC: 222.2 PG/ML (ref 0–1800)
PLATELET # BLD AUTO: 267 10*3/MM3 (ref 140–450)
PMV BLD AUTO: 10.5 FL (ref 6–12)
POTASSIUM SERPL-SCNC: 3.5 MMOL/L (ref 3.5–5.2)
PROT SERPL-MCNC: 6.8 G/DL (ref 6–8.5)
RBC # BLD AUTO: 4.59 10*6/MM3 (ref 3.77–5.28)
SODIUM SERPL-SCNC: 143 MMOL/L (ref 136–145)
WBC # BLD AUTO: 6.3 10*3/MM3 (ref 3.4–10.8)

## 2021-01-06 RX ORDER — CONJUGATED ESTROGENS 0.62 MG/G
CREAM VAGINAL 2 TIMES WEEKLY
Qty: 30 G | Refills: 3 | Status: ON HOLD | OUTPATIENT
Start: 2021-01-07 | End: 2022-07-07

## 2021-01-06 RX ORDER — TRIAMCINOLONE ACETONIDE 1 MG/G
CREAM TOPICAL 2 TIMES DAILY
Qty: 80 G | Refills: 2 | Status: SHIPPED | OUTPATIENT
Start: 2021-01-06 | End: 2021-05-28 | Stop reason: SDUPTHER

## 2021-01-06 RX ORDER — TRIAMCINOLONE ACETONIDE 1 MG/G
CREAM TOPICAL 2 TIMES DAILY
COMMUNITY
End: 2021-01-06 | Stop reason: SDUPTHER

## 2021-01-08 ENCOUNTER — TELEPHONE (OUTPATIENT)
Dept: FAMILY MEDICINE CLINIC | Facility: CLINIC | Age: 81
End: 2021-01-08

## 2021-01-08 NOTE — TELEPHONE ENCOUNTER
PATIENT NEEDS A REFERRAL TO A VASCULAR SPECIALIST.  PLEASE ADVISE    PATIENT CALL BACK #: [phone] cell- [phone]

## 2021-01-15 ENCOUNTER — TELEPHONE (OUTPATIENT)
Dept: FAMILY MEDICINE CLINIC | Facility: CLINIC | Age: 81
End: 2021-01-15

## 2021-01-15 NOTE — TELEPHONE ENCOUNTER
Caller: Chrystal Ruiz    Relationship to patient: Self    Best call back number: [phone]    Patient is needing: PATIENT IS CALLING IN REGARDS TO THE VASCULAR SURGERY REFERRAL THAT WAS SENT IN FOR HER. SHE CALLED THEM AND THEY STATED THAT THEY NEVER RECEIVED A REFERRAL SINCE THEY DO NOT ACCEPT FAXES AND SO DR HERNANDEZ OFFICE WOULD HAVE TO CONTACT THEM DIRECTLY. PATIENT STATED THAT SHE HAS BEEN DEALING WITH THIS ISSUE FOR ALMOST TWO WEEKS AND WOULD LIKE TO BE REFERRED AND MADE AN APPOINTMENT AS SOON AS POSSIBLE. PLEASE CONTACT PATIENT IF NEEDED    PLEASE ADVISE

## 2021-01-15 NOTE — TELEPHONE ENCOUNTER
I called Dr. Farrell's office and gave them her information.  They were going to call her to get her scheduled for an appt.

## 2021-01-18 ENCOUNTER — TELEPHONE (OUTPATIENT)
Dept: FAMILY MEDICINE CLINIC | Facility: CLINIC | Age: 81
End: 2021-01-18

## 2021-01-18 NOTE — TELEPHONE ENCOUNTER
Caller: Chrystal Ruiz    Relationship to patient: Self    Best call back number: [phone]     Concerns or Questions if Applicable:   MS. RUIZ WOULD  LIKE A CALL BACK REGARDING HER REFERRAL , SHE WOULD NOT GIVE ANY DETAILS .

## 2021-01-18 NOTE — TELEPHONE ENCOUNTER
PT CALLED TO REQUEST A CALL BACK FROM THE OFFICE TO DISCUSS A RECENT REFERRAL.    PT STATES THE QUESTION IS TIME SENSITIVE AND WOULD LIKE A CALL BACK ASAP.    CALLBACK NUMBER: [phone]

## 2021-01-18 NOTE — TELEPHONE ENCOUNTER
HER APPOINTMENT HAD ALREADY BEEN  SCHEDULED WITH DR. SANCHEZ , SHE CHANGED HER MIND & NOW HAS AN APPOINTMENT WITH DR. CUEVAS ON 01/22/21 @ 1:30. SHE WANTS TO THANK YOU FOR YOUR HELP & KINDNESS.

## 2021-01-29 ENCOUNTER — OFFICE VISIT (OUTPATIENT)
Dept: FAMILY MEDICINE CLINIC | Facility: CLINIC | Age: 81
End: 2021-01-29

## 2021-01-29 ENCOUNTER — TELEPHONE (OUTPATIENT)
Dept: FAMILY MEDICINE CLINIC | Facility: CLINIC | Age: 81
End: 2021-01-29

## 2021-01-29 VITALS
TEMPERATURE: 97.7 F | SYSTOLIC BLOOD PRESSURE: 132 MMHG | HEART RATE: 75 BPM | DIASTOLIC BLOOD PRESSURE: 67 MMHG | OXYGEN SATURATION: 94 %

## 2021-01-29 DIAGNOSIS — T50.Z95A ADVERSE EFFECT OF VACCINE, INITIAL ENCOUNTER: Primary | ICD-10-CM

## 2021-01-29 PROCEDURE — 99213 OFFICE O/P EST LOW 20 MIN: CPT | Performed by: FAMILY MEDICINE

## 2021-02-15 ENCOUNTER — TELEPHONE (OUTPATIENT)
Dept: FAMILY MEDICINE CLINIC | Facility: CLINIC | Age: 81
End: 2021-02-15

## 2021-02-15 RX ORDER — AMLODIPINE BESYLATE 10 MG/1
10 TABLET ORAL DAILY
Qty: 90 TABLET | Refills: 3 | Status: SHIPPED | OUTPATIENT
Start: 2021-02-15 | End: 2022-01-21

## 2021-02-15 NOTE — TELEPHONE ENCOUNTER
Pt requesting refill for amlodpine besylate 10 mg to go to [facility] on corner of [address] quantity of 90 please. Thanks.

## 2021-03-17 ENCOUNTER — OFFICE VISIT (OUTPATIENT)
Dept: FAMILY MEDICINE CLINIC | Facility: CLINIC | Age: 81
End: 2021-03-17

## 2021-03-17 ENCOUNTER — LAB (OUTPATIENT)
Dept: FAMILY MEDICINE CLINIC | Facility: CLINIC | Age: 81
End: 2021-03-17

## 2021-03-17 VITALS
WEIGHT: 152 LBS | OXYGEN SATURATION: 96 % | SYSTOLIC BLOOD PRESSURE: 137 MMHG | DIASTOLIC BLOOD PRESSURE: 68 MMHG | BODY MASS INDEX: 28.49 KG/M2 | HEART RATE: 66 BPM | TEMPERATURE: 97.7 F

## 2021-03-17 DIAGNOSIS — R06.02 SHORTNESS OF BREATH: ICD-10-CM

## 2021-03-17 DIAGNOSIS — R60.0 PEDAL EDEMA: ICD-10-CM

## 2021-03-17 DIAGNOSIS — R25.1 TREMOR: ICD-10-CM

## 2021-03-17 DIAGNOSIS — E78.5 HYPERLIPIDEMIA, UNSPECIFIED HYPERLIPIDEMIA TYPE: ICD-10-CM

## 2021-03-17 DIAGNOSIS — R91.1 LUNG NODULE: ICD-10-CM

## 2021-03-17 DIAGNOSIS — Z00.00 MEDICARE ANNUAL WELLNESS VISIT, SUBSEQUENT: Primary | ICD-10-CM

## 2021-03-17 PROCEDURE — 80053 COMPREHEN METABOLIC PANEL: CPT | Performed by: FAMILY MEDICINE

## 2021-03-17 PROCEDURE — 80061 LIPID PANEL: CPT | Performed by: FAMILY MEDICINE

## 2021-03-17 PROCEDURE — 36415 COLL VENOUS BLD VENIPUNCTURE: CPT | Performed by: FAMILY MEDICINE

## 2021-03-17 PROCEDURE — G0439 PPPS, SUBSEQ VISIT: HCPCS | Performed by: FAMILY MEDICINE

## 2021-03-17 PROCEDURE — 83880 ASSAY OF NATRIURETIC PEPTIDE: CPT | Performed by: FAMILY MEDICINE

## 2021-03-17 NOTE — PROGRESS NOTES
"Medicare Wellness Visit   The ABC's of the Annual Wellness Visit    Chief Complaint   Patient presents with   • Medicare Wellness-subsequent       HPI:  Chrystal Ruiz, -1940, is a 80 y.o. female who presents for a Medicare Wellness Visit. She feels like she has had more \"shakiness\" lately. She mostly notices it in her hands.  She sometimes has some problems writing.She has family history of strokes.   Recent Hospitalizations:  No hospitalization(s) within the last year..    Current Medical Providers:  Patient Care Team:  Eetlvina Ulloa MD as PCP - General  Etelvina Ulloa MD as PCP - Family Medicine  Mukund Farias MD as Consulting Physician (Pulmonary Disease)  Alan Wynn DPM as Consulting Physician (Podiatry)  Demetris Farrell MD as Surgeon (General Surgery)    Health Habits and Functional and Cognitive Screening and Depression Screening:  Functional & Cognitive Status 3/17/2021   Do you have difficulty preparing food and eating? No   Do you have difficulty bathing yourself, getting dressed or grooming yourself? No   Do you have difficulty using the toilet? No   Do you have difficulty moving around from place to place? No   Do you have trouble with steps or getting out of a bed or a chair? No   Current Diet Well Balanced Diet   Dental Exam Up to date   Eye Exam Up to date   Do you need help using the phone?  No   Are you deaf or do you have serious difficulty hearing?  No   Do you need help with transportation? No   Do you need help shopping? No   Do you need help preparing meals?  No   Do you need help with housework?  No   Do you need help with laundry? No   Do you need help taking your medications? No   Do you ever drive or ride in a car without wearing a seat belt? No   Have you felt unusual stress, anger or loneliness in the last month? No   Who do you live with? Alone   If you need help, do you have trouble finding someone available to you? No   Do you have difficulty " concentrating, remembering or making decisions? No       Compared to one year ago, the patient feels her physical health is the same and her mental health is the same.    Depression Screen:  PHQ-2/PHQ-9 Depression Screening 3/17/2021   Little interest or pleasure in doing things 0   Feeling down, depressed, or hopeless 0   Trouble falling or staying asleep, or sleeping too much -   Feeling tired or having little energy -   Poor appetite or overeating -   Feeling bad about yourself - or that you are a failure or have let yourself or your family down -   Trouble concentrating on things, such as reading the newspaper or watching television -   Moving or speaking so slowly that other people could have noticed. Or the opposite - being so fidgety or restless that you have been moving around a lot more than usual -   Thoughts that you would be better off dead, or of hurting yourself in some way -   Total Score 0   If you checked off any problems, how difficult have these problems made it for you to do your work, take care of things at home, or get along with other people? -         Past Medical/Family/Social History:  The following portions of the patient's history were reviewed and updated as appropriate: allergies, current medications, past family history, past medical history, past social history, past surgical history and problem list.    Allergies   Allergen Reactions   • Ciprofloxacin Unknown (See Comments)   • Sulfa Antibiotics Unknown (See Comments)         Current Outpatient Medications:   •  amLODIPine (NORVASC) 10 MG tablet, Take 1 tablet by mouth Daily., Disp: 90 tablet, Rfl: 3  •  BREO ELLIPTA 200-25 MCG/INH inhaler, INHALE 1 PUFF PO ONCE A DAY, Disp: , Rfl:   •  Cholecalciferol (VITAMIN D3) 2000 units capsule, VITAMIN D3 2000 UNIT CAPS, Disp: , Rfl:   •  Cyanocobalamin (VITAMIN B12) 1000 MCG tablet controlled-release, Daily., Disp: , Rfl:   •  Homeopathic Products (LEG CRAMP RELIEF) tablet, LEG CRAMP RELIEF  TABS, Disp: , Rfl:   •  Premarin 0.625 MG/GM vaginal cream, Insert  into the vagina 2 (Two) Times a Week., Disp: 30 g, Rfl: 3  •  triamcinolone (KENALOG) 0.1 % cream, Apply  topically to the appropriate area as directed 2 (Two) Times a Day., Disp: 80 g, Rfl: 2    Aspirin use counseling: Does not need ASA (and currently is not on it)    Current medication list contains no high risk medications.  No harmful drug interactions have been identified.     Family History   Problem Relation Age of Onset   • COPD Sister    • Heart disease Sister    • Breast cancer Mother    • Stroke Mother    • Heart disease Mother        Social History     Tobacco Use   • Smoking status: Never Smoker   • Smokeless tobacco: Never Used   Substance Use Topics   • Alcohol use: No       Past Surgical History:   Procedure Laterality Date   • OOPHORECTOMY         Patient Active Problem List   Diagnosis   • Grief reaction   • Alopecia   • Arthritis   • Back pain   • Encounter for general adult medical examination without abnormal findings   • Fatigue   • Hypertension   • Lung nodule   • Vitamin D deficiency   • Shortness of breath   • Postmenopausal status   • Nevus, non-neoplastic   • Medicare annual wellness visit, subsequent   • Fam hx-ischem heart disease   • FH: thyroid condition   • Cellulitis of lower leg   • Vasculitis of skin   • Pedal edema   • Bilateral calf pain   • Neck pain   • Immunization reaction   • Hyperlipidemia   • Tremor       Review of Systems    Objective     Vitals:    03/17/21 1343   BP: 137/68   BP Location: Left arm   Patient Position: Sitting   Cuff Size: Adult   Pulse: 66   Temp: 97.7 °F (36.5 °C)   SpO2: 96%   Weight: 68.9 kg (152 lb)       Patient's Body mass index is 28.49 kg/m². BMI is above normal parameters. Recommendations include: exercise counseling.      No exam data present    The patient has no evidence of cognitve impairment.     Physical Exam  Vitals reviewed.   Constitutional:       General: She is not in  acute distress.     Appearance: She is well-developed.   HENT:      Head: Normocephalic.   Eyes:      General: Lids are normal.      Conjunctiva/sclera: Conjunctivae normal.   Neck:      Thyroid: No thyroid mass or thyromegaly.      Trachea: Trachea normal.   Cardiovascular:      Rate and Rhythm: Normal rate and regular rhythm.      Heart sounds: Normal heart sounds.   Pulmonary:      Effort: Pulmonary effort is normal.      Breath sounds: Normal breath sounds.   Abdominal:      Palpations: Abdomen is soft.   Musculoskeletal:      Cervical back: Normal range of motion.      Right lower leg: Edema present.      Left lower leg: Edema present.   Lymphadenopathy:      Cervical: No cervical adenopathy.   Skin:     General: Skin is warm and dry.   Neurological:      Mental Status: She is alert and oriented to person, place, and time.   Psychiatric:         Attention and Perception: She is attentive.         Mood and Affect: Mood normal.         Speech: Speech normal.         Behavior: Behavior normal.         Recent Lab Results:     Lab Results   Component Value Date    CHOL 209 (H) 02/12/2020    TRIG 50 02/12/2020    HDL 90 (H) 02/12/2020    VLDL 10 02/12/2020    LDLHDL 1.21 02/12/2020     No visits with results within 1 Week(s) from this visit.   Latest known visit with results is:   Hospital Outpatient Visit on 01/05/2021   Component Date Value Ref Range Status   • Right Common Femoral Spont 01/05/2021 Y   Final   • Right Common Femoral Phasic 01/05/2021 Y   Final   • Right Common Femoral Augment 01/05/2021 Y   Final   • Right Common Femoral Competent 01/05/2021 Y   Final   • Right Common Femoral Compress 01/05/2021 C   Final   • Right Saphenofemoral Junction Comp* 01/05/2021 C   Final   • Right Proximal Femoral Compress 01/05/2021 C   Final   • Right Mid Femoral Spont 01/05/2021 Y   Final   • Right Mid Femoral Phasic 01/05/2021 Y   Final   • Right Mid Femoral Augment 01/05/2021 Y   Final   • Right Mid Femoral  Competent 01/05/2021 Y   Final   • Right Mid Femoral Compress 01/05/2021 C   Final   • Right Distal Femoral Compress 01/05/2021 C   Final   • Right Popliteal Spont 01/05/2021 Y   Final   • Right Popliteal Phasic 01/05/2021 Y   Final   • Right Popliteal Augment 01/05/2021 Y   Final   • Right Popliteal Competent 01/05/2021 Y   Final   • Right Popliteal Compress 01/05/2021 C   Final   • Right Posterior Tibial Compress 01/05/2021 C   Final   • Right Peroneal Compress 01/05/2021 C   Final   • Right GastronemiusSoleal Compress 01/05/2021 C   Final   • Right Greater Saph AK Compress 01/05/2021 C   Final   • Right Greater Saph BK Compress 01/05/2021 C   Final   • Right Lesser Saph Compress 01/05/2021 C   Final   • Left Common Femoral Spont 01/05/2021 Y   Final   • Left Common Femoral Phasic 01/05/2021 Y   Final   • Left Common Femoral Augment 01/05/2021 Y   Final   • Left Common Femoral Competent 01/05/2021 Y   Final   • Left Common Femoral Compress 01/05/2021 C   Final   • Left Saphenofemoral Junction Compr* 01/05/2021 C   Final   • Left Proximal Femoral Compress 01/05/2021 C   Final   • Left Mid Femoral Spont 01/05/2021 Y   Final   • Left Mid Femoral Phasic 01/05/2021 Y   Final   • Left Mid Femoral Augment 01/05/2021 Y   Final   • Left Mid Femoral Competent 01/05/2021 Y   Final   • Left Mid Femoral Compress 01/05/2021 C   Final   • Left Distal Femoral Compress 01/05/2021 C   Final   • Left Popliteal Spont 01/05/2021 Y   Final   • Left Popliteal Phasic 01/05/2021 Y   Final   • Left Popliteal Augment 01/05/2021 Y   Final   • Left Popliteal Competent 01/05/2021 Y   Final   • Left Popliteal Compress 01/05/2021 C   Final   • Left Posterior Tibial Compress 01/05/2021 C   Final   • Left Peroneal Compress 01/05/2021 C   Final   • Left GastronemiusSoleal Compress 01/05/2021 C   Final   • Left Greater Saph AK Compress 01/05/2021 C   Final   • Left Greater Saph BK Compress 01/05/2021 C   Final   • Left Lesser Saph Compress  01/05/2021 C   Final         Assessment/Plan   Age-appropriate Screening Schedule:  Refer to the list below for future screening recommendations based on patient's age, sex and/or medical conditions.      Health Maintenance   Topic Date Due   • DXA SCAN  Never done   • TDAP/TD VACCINES (1 - Tdap) Never done   • LIPID PANEL  03/17/2021   • INFLUENZA VACCINE  Completed   • ZOSTER VACCINE  Completed       Medicare Risks and Personalized Health Plan:  Advance Directive Discussion  Breast Cancer/Mammogram Screening  Cardiovascular risk  Diabetic Lab Screening   Fall Risk  Immunizations Discussed/Encouraged (specific immunizations; Pneumococcal 23 )      CMS-Preventive Services Quick Reference  Medicare Preventive Services Addressed:  Annual Wellness Visit (AWV)    Advance Care Planning:  ACP discussion was held with the patient during this visit. Patient does not have an advance directive, information provided.    Diagnoses and all orders for this visit:    1. Medicare annual wellness visit, subsequent (Primary)  Assessment & Plan:  Pt declines pneumovax today.      2. Hyperlipidemia, unspecified hyperlipidemia type  -     Lipid Panel  -     Comprehensive Metabolic Panel    3. Tremor  Assessment & Plan:  She declines referral to neurology.      4. Pedal edema  Assessment & Plan:  She is scheduled for a vascular procedure on 4/7/21.    Orders:  -     BNP; Future    5. Shortness of breath   -     BNP; Future    6. Lung nodule  Assessment & Plan:  Pt declines follow up CT scan.  She has an appt to see Dr. Farias.    Orders:  -     Cancel: CT Chest Without Contrast; Future      An After Visit Summary and PPPS with all of these plans were given to the patient.      Follow Up:  Return in about 1 year (around 3/17/2022) for Medicare Wellness.

## 2021-03-18 LAB
ALBUMIN SERPL-MCNC: 4.3 G/DL (ref 3.5–5.2)
ALBUMIN/GLOB SERPL: 1.7 G/DL
ALP SERPL-CCNC: 71 U/L (ref 39–117)
ALT SERPL W P-5'-P-CCNC: 13 U/L (ref 1–33)
ANION GAP SERPL CALCULATED.3IONS-SCNC: 9.9 MMOL/L (ref 5–15)
AST SERPL-CCNC: 20 U/L (ref 1–32)
BILIRUB SERPL-MCNC: 0.7 MG/DL (ref 0–1.2)
BUN SERPL-MCNC: 15 MG/DL (ref 8–23)
BUN/CREAT SERPL: 24.2 (ref 7–25)
CALCIUM SPEC-SCNC: 9.1 MG/DL (ref 8.6–10.5)
CHLORIDE SERPL-SCNC: 105 MMOL/L (ref 98–107)
CHOLEST SERPL-MCNC: 202 MG/DL (ref 0–200)
CO2 SERPL-SCNC: 26.1 MMOL/L (ref 22–29)
CREAT SERPL-MCNC: 0.62 MG/DL (ref 0.57–1)
GFR SERPL CREATININE-BSD FRML MDRD: 92 ML/MIN/1.73
GLOBULIN UR ELPH-MCNC: 2.6 GM/DL
GLUCOSE SERPL-MCNC: 82 MG/DL (ref 65–99)
HDLC SERPL-MCNC: 94 MG/DL (ref 40–60)
LDLC SERPL CALC-MCNC: 99 MG/DL (ref 0–100)
LDLC/HDLC SERPL: 1.05 {RATIO}
NT-PROBNP SERPL-MCNC: 192.8 PG/ML (ref 0–1800)
POTASSIUM SERPL-SCNC: 3.9 MMOL/L (ref 3.5–5.2)
PROT SERPL-MCNC: 6.9 G/DL (ref 6–8.5)
SODIUM SERPL-SCNC: 141 MMOL/L (ref 136–145)
TRIGL SERPL-MCNC: 46 MG/DL (ref 0–150)
VLDLC SERPL-MCNC: 9 MG/DL (ref 5–40)

## 2021-04-02 NOTE — TELEPHONE ENCOUNTER
SHE SAID DR. AVALOS USUALLY PRESCRIBES THIS FOR HER, BUT HER AND HER PHARMACY  CAN'T GET IN TOUCH WITH HIS OFFICE. SHE EVEN WENT TO THE OFFICE AND IT HAS MOVED. IT IS EMPTY.

## 2021-05-28 NOTE — TELEPHONE ENCOUNTER
Patient called in requesting a re-fill for    triamcinolone (KENALOG) 0.1 % cream    *PT SAID SHE WILL NEED IT TODAY    [facility] [address]    BEST CALL BACK # [phone]

## 2021-05-31 RX ORDER — TRIAMCINOLONE ACETONIDE 1 MG/G
CREAM TOPICAL 2 TIMES DAILY
Qty: 80 G | Refills: 2 | Status: SHIPPED | OUTPATIENT
Start: 2021-05-31 | End: 2022-01-21 | Stop reason: SDUPTHER

## 2021-10-08 ENCOUNTER — OFFICE VISIT (OUTPATIENT)
Dept: FAMILY MEDICINE CLINIC | Facility: CLINIC | Age: 81
End: 2021-10-08

## 2021-10-08 VITALS
SYSTOLIC BLOOD PRESSURE: 151 MMHG | BODY MASS INDEX: 27.17 KG/M2 | OXYGEN SATURATION: 94 % | TEMPERATURE: 98.4 F | WEIGHT: 145 LBS | DIASTOLIC BLOOD PRESSURE: 69 MMHG | HEART RATE: 85 BPM

## 2021-10-08 DIAGNOSIS — K62.5 BRIGHT RED RECTAL BLEEDING: ICD-10-CM

## 2021-10-08 DIAGNOSIS — J01.00 ACUTE NON-RECURRENT MAXILLARY SINUSITIS: Primary | ICD-10-CM

## 2021-10-08 PROCEDURE — 99213 OFFICE O/P EST LOW 20 MIN: CPT | Performed by: FAMILY MEDICINE

## 2021-10-08 RX ORDER — AMOXICILLIN 500 MG/1
500 CAPSULE ORAL 3 TIMES DAILY
Qty: 30 CAPSULE | Refills: 0 | Status: SHIPPED | OUTPATIENT
Start: 2021-10-08 | End: 2021-10-18 | Stop reason: SDUPTHER

## 2021-10-08 NOTE — ASSESSMENT & PLAN NOTE
Probable internal hemorrhoid.  Patient declines suppository medication.  She will increase water and fiber intake.

## 2021-10-08 NOTE — PROGRESS NOTES
Chief Complaint  Sinusitis and Rectal Bleeding    Subjective          Chrystal Ruiz presents to [facility]  Ear pressure, nasal drainage that started about 3 weeks ago.  Nasal pain and congestion.  No injury to her nose.  Her nose feels like something is crushed inside of it.  She gets a freckle on her nose that has a black center but it cleared up.  Occasional nose bleeds from her left nostril. Cheek bones feel heavy and she has some swelling beneath her left eye for the last week.      Sinusitis  This is a new problem. The current episode started 1 to 4 weeks ago. The problem has been gradually worsening since onset. There has been no fever. Associated symptoms include congestion and sinus pressure. Pertinent negatives include no chills, coughing, shortness of breath or sore throat.   Rectal Bleeding  This is a new problem. Associated symptoms include congestion. Pertinent negatives include no chills, coughing or sore throat.       Objective   Vital Signs:   /69 (BP Location: Left arm, Patient Position: Sitting, Cuff Size: Adult)   Pulse 85   Temp 98.4 °F (36.9 °C) (Infrared)   Wt 65.8 kg (145 lb)   SpO2 94%   BMI 27.17 kg/m²     Physical Exam  Vitals and nursing note reviewed.   Constitutional:       Appearance: Normal appearance.   HENT:      Right Ear: Tympanic membrane normal.      Left Ear: Tympanic membrane normal.      Nose:      Right Sinus: Maxillary sinus tenderness present.      Left Sinus: Maxillary sinus tenderness present.      Mouth/Throat:      Mouth: Mucous membranes are moist.   Cardiovascular:      Heart sounds: Normal heart sounds.   Pulmonary:      Breath sounds: Normal breath sounds.   Neurological:      Mental Status: She is alert.   Psychiatric:         Mood and Affect: Mood normal.        Result Review :   The following data was reviewed by: Etelvina Ulloa MD on 10/08/2021:  Common labs    Common Labsle 1/5/21 1/5/21 3/17/21 3/17/21    3975  1634 1424 1424   Glucose  72  82   BUN  14  15   Creatinine  0.63  0.62   eGFR Non African Am  91  92   Sodium  143  141   Potassium  3.5  3.9   Chloride  106  105   Calcium  9.1  9.1   Albumin  4.40  4.30   Total Bilirubin  0.7  0.7   Alkaline Phosphatase  73  71   AST (SGOT)  19  20   ALT (SGPT)  15  13   WBC 6.30      Hemoglobin 11.9 (A)      Hematocrit 36.3      Platelets 267      Total Cholesterol   202 (A)    Triglycerides   46    HDL Cholesterol   94 (A)    LDL Cholesterol    99    (A) Abnormal value                      Assessment and Plan    Diagnoses and all orders for this visit:    1. Acute non-recurrent maxillary sinusitis (Primary)  -     amoxicillin (AMOXIL) 500 MG capsule; Take 1 capsule by mouth 3 (Three) Times a Day.  Dispense: 30 capsule; Refill: 0    2. Bright red rectal bleeding  Assessment & Plan:  Probable internal hemorrhoid.  Patient declines suppository medication.  She will increase water and fiber intake.           Follow Up   No follow-ups on file.  Patient was given instructions and counseling regarding her condition or for health maintenance advice. Please see specific information pulled into the AVS if appropriate.

## 2021-10-11 ENCOUNTER — OFFICE VISIT (OUTPATIENT)
Dept: FAMILY MEDICINE CLINIC | Facility: CLINIC | Age: 81
End: 2021-10-11

## 2021-10-11 ENCOUNTER — LAB (OUTPATIENT)
Dept: LAB | Facility: HOSPITAL | Age: 81
End: 2021-10-11

## 2021-10-11 ENCOUNTER — HOSPITAL ENCOUNTER (OUTPATIENT)
Dept: GENERAL RADIOLOGY | Facility: HOSPITAL | Age: 81
Discharge: HOME | End: 2021-10-11

## 2021-10-11 VITALS
SYSTOLIC BLOOD PRESSURE: 132 MMHG | TEMPERATURE: 98 F | OXYGEN SATURATION: 95 % | HEART RATE: 88 BPM | DIASTOLIC BLOOD PRESSURE: 68 MMHG

## 2021-10-11 DIAGNOSIS — R91.1 LUNG NODULE: Primary | ICD-10-CM

## 2021-10-11 DIAGNOSIS — R05.9 COUGH: ICD-10-CM

## 2021-10-11 DIAGNOSIS — R05.9 COUGH: Primary | ICD-10-CM

## 2021-10-11 DIAGNOSIS — R07.1 CHEST PAIN ON BREATHING: ICD-10-CM

## 2021-10-11 LAB
ALBUMIN SERPL-MCNC: 4 G/DL (ref 3.5–5.2)
ALBUMIN/GLOB SERPL: 1.1 G/DL
ALP SERPL-CCNC: 94 U/L (ref 39–117)
ALT SERPL W P-5'-P-CCNC: 15 U/L (ref 1–33)
ANION GAP SERPL CALCULATED.3IONS-SCNC: 8.4 MMOL/L (ref 5–15)
AST SERPL-CCNC: 15 U/L (ref 1–32)
BASOPHILS # BLD AUTO: 0.02 10*3/MM3 (ref 0–0.2)
BASOPHILS NFR BLD AUTO: 0.2 % (ref 0–1.5)
BILIRUB SERPL-MCNC: 0.7 MG/DL (ref 0–1.2)
BUN SERPL-MCNC: 14 MG/DL (ref 8–23)
BUN/CREAT SERPL: 18.9 (ref 7–25)
CALCIUM SPEC-SCNC: 9.4 MG/DL (ref 8.6–10.5)
CHLORIDE SERPL-SCNC: 103 MMOL/L (ref 98–107)
CO2 SERPL-SCNC: 26.6 MMOL/L (ref 22–29)
CREAT SERPL-MCNC: 0.74 MG/DL (ref 0.57–1)
DEPRECATED RDW RBC AUTO: 36.4 FL (ref 37–54)
EOSINOPHIL # BLD AUTO: 0.08 10*3/MM3 (ref 0–0.4)
EOSINOPHIL NFR BLD AUTO: 0.8 % (ref 0.3–6.2)
ERYTHROCYTE [DISTWIDTH] IN BLOOD BY AUTOMATED COUNT: 12.9 % (ref 12.3–15.4)
GFR SERPL CREATININE-BSD FRML MDRD: 75 ML/MIN/1.73
GLOBULIN UR ELPH-MCNC: 3.5 GM/DL
GLUCOSE SERPL-MCNC: 100 MG/DL (ref 65–99)
HCT VFR BLD AUTO: 36.5 % (ref 34–46.6)
HGB BLD-MCNC: 11.5 G/DL (ref 12–15.9)
IMM GRANULOCYTES # BLD AUTO: 0.03 10*3/MM3 (ref 0–0.05)
IMM GRANULOCYTES NFR BLD AUTO: 0.3 % (ref 0–0.5)
LYMPHOCYTES # BLD AUTO: 1.38 10*3/MM3 (ref 0.7–3.1)
LYMPHOCYTES NFR BLD AUTO: 14.6 % (ref 19.6–45.3)
MCH RBC QN AUTO: 24.7 PG (ref 26.6–33)
MCHC RBC AUTO-ENTMCNC: 31.5 G/DL (ref 31.5–35.7)
MCV RBC AUTO: 78.3 FL (ref 79–97)
MONOCYTES # BLD AUTO: 0.81 10*3/MM3 (ref 0.1–0.9)
MONOCYTES NFR BLD AUTO: 8.6 % (ref 5–12)
NEUTROPHILS NFR BLD AUTO: 7.13 10*3/MM3 (ref 1.7–7)
NEUTROPHILS NFR BLD AUTO: 75.5 % (ref 42.7–76)
NRBC BLD AUTO-RTO: 0 /100 WBC (ref 0–0.2)
PLATELET # BLD AUTO: 337 10*3/MM3 (ref 140–450)
PMV BLD AUTO: 10.4 FL (ref 6–12)
POTASSIUM SERPL-SCNC: 4.5 MMOL/L (ref 3.5–5.2)
PROT SERPL-MCNC: 7.5 G/DL (ref 6–8.5)
RBC # BLD AUTO: 4.66 10*6/MM3 (ref 3.77–5.28)
SARS-COV-2 ORF1AB RESP QL NAA+PROBE: NOT DETECTED
SODIUM SERPL-SCNC: 138 MMOL/L (ref 136–145)
WBC # BLD AUTO: 9.45 10*3/MM3 (ref 3.4–10.8)

## 2021-10-11 PROCEDURE — 93000 ELECTROCARDIOGRAM COMPLETE: CPT | Performed by: FAMILY MEDICINE

## 2021-10-11 PROCEDURE — U0004 COV-19 TEST NON-CDC HGH THRU: HCPCS

## 2021-10-11 PROCEDURE — 80053 COMPREHEN METABOLIC PANEL: CPT | Performed by: FAMILY MEDICINE

## 2021-10-11 PROCEDURE — C9803 HOPD COVID-19 SPEC COLLECT: HCPCS

## 2021-10-11 PROCEDURE — 36415 COLL VENOUS BLD VENIPUNCTURE: CPT | Performed by: FAMILY MEDICINE

## 2021-10-11 PROCEDURE — U0005 INFEC AGEN DETEC AMPLI PROBE: HCPCS

## 2021-10-11 PROCEDURE — 85025 COMPLETE CBC W/AUTO DIFF WBC: CPT | Performed by: FAMILY MEDICINE

## 2021-10-11 PROCEDURE — 71046 X-RAY EXAM CHEST 2 VIEWS: CPT

## 2021-10-11 PROCEDURE — 99214 OFFICE O/P EST MOD 30 MIN: CPT | Performed by: FAMILY MEDICINE

## 2021-10-11 NOTE — PROGRESS NOTES
Chief Complaint  Shoulder Pain (rt shoulder blade pain radiates down to mid back and under rt axilla), Shortness of Breath, and Chest Pain (this am)    Subjective          Chrystal Ruiz presents to [facility]  She is feeling worse than a few days ago when she was here.  More shortness of breath and cough.  She sees Dr. Farias and has an appointment on 10/27/21.  Right upper back pain.  Sweats at night but no documented fever.    Shortness of Breath  This is a new problem. The current episode started in the past 7 days. The problem occurs constantly. Associated symptoms include chest pain, rhinorrhea and sputum production. Pertinent negatives include no ear pain, fever, headaches, leg swelling or sore throat.   Chest Pain   This is a new problem. The current episode started in the past 7 days. The onset quality is undetermined. The problem occurs daily. The problem has been waxing and waning. The pain is present in the lateral region. The pain is mild. The quality of the pain is described as sharp. The pain radiates to the upper back. Associated symptoms include shortness of breath and sputum production. Pertinent negatives include no fever or headaches. The pain is aggravated by breathing.       Objective   Vital Signs:   /68 (BP Location: Left arm, Patient Position: Sitting, Cuff Size: Adult)   Pulse 88   Temp 98 °F (36.7 °C) (Infrared)   SpO2 95%     Physical Exam  Vitals and nursing note reviewed.   Constitutional:       Appearance: Normal appearance.   Cardiovascular:      Rate and Rhythm: Regular rhythm.      Heart sounds: Normal heart sounds.   Pulmonary:      Breath sounds: Normal breath sounds.   Neurological:      Mental Status: She is alert. Mental status is at baseline.   Psychiatric:         Mood and Affect: Mood normal.        Result Review :   The following data was reviewed by: Etelvina Ulloa MD on 10/11/2021:  Common labs    Common Labsle 1/5/21 1/5/21  3/17/21 3/17/21    1634 1634 1424 1424   Glucose  72  82   BUN  14  15   Creatinine  0.63  0.62   eGFR Non African Am  91  92   Sodium  143  141   Potassium  3.5  3.9   Chloride  106  105   Calcium  9.1  9.1   Albumin  4.40  4.30   Total Bilirubin  0.7  0.7   Alkaline Phosphatase  73  71   AST (SGOT)  19  20   ALT (SGPT)  15  13   WBC 6.30      Hemoglobin 11.9 (A)      Hematocrit 36.3      Platelets 267      Total Cholesterol   202 (A)    Triglycerides   46    HDL Cholesterol   94 (A)    LDL Cholesterol    99    (A) Abnormal value                 ECG 12 Lead    Date/Time: 10/11/2021 12:34 PM  Performed by: Etelvina Ulloa MD  Authorized by: Etelvina Ulloa MD   Comparison: compared with previous ECG from 5/29/2019  Similar to previous ECG  Rhythm: sinus rhythm  Rate: normal  BPM: 78  Conduction: conduction normal  ST Segments: ST segments normal  QRS axis: normal  Other: no other findings    Clinical impression: normal ECG              Assessment and Plan    Diagnoses and all orders for this visit:    1. Cough (Primary)  -     XR Chest 2 View  -     CBC & Differential  -     Comprehensive Metabolic Panel  -     COVID-19,[facility] ROUTINE, NP/OP SWAB IN TRANSPORT MEDIA OR ESWAB 72 HR TAT - Swab, Anterior nasal; Future    2. Chest pain on breathing  -     ECG 12 Lead        Follow Up   No follow-ups on file.  Patient was given instructions and counseling regarding her condition or for health maintenance advice. Please see specific information pulled into the AVS if appropriate.

## 2021-10-12 DIAGNOSIS — R91.1 LUNG NODULE: ICD-10-CM

## 2021-10-12 DIAGNOSIS — Z12.31 VISIT FOR SCREENING MAMMOGRAM: Primary | ICD-10-CM

## 2021-10-14 ENCOUNTER — HOSPITAL ENCOUNTER (OUTPATIENT)
Dept: MAMMOGRAPHY | Facility: HOSPITAL | Age: 81
Discharge: HOME | End: 2021-10-14
Admitting: FAMILY MEDICINE

## 2021-10-14 ENCOUNTER — APPOINTMENT (OUTPATIENT)
Dept: OTHER | Facility: HOSPITAL | Age: 81
End: 2021-10-14

## 2021-10-14 DIAGNOSIS — Z09 FOLLOW UP: ICD-10-CM

## 2021-10-14 PROCEDURE — 77063 BREAST TOMOSYNTHESIS BI: CPT

## 2021-10-14 PROCEDURE — 77067 SCR MAMMO BI INCL CAD: CPT

## 2021-10-15 DIAGNOSIS — R92.8 ABNORMAL MAMMOGRAM OF LEFT BREAST: Primary | ICD-10-CM

## 2021-10-18 ENCOUNTER — TELEPHONE (OUTPATIENT)
Dept: FAMILY MEDICINE CLINIC | Facility: CLINIC | Age: 81
End: 2021-10-18

## 2021-10-18 DIAGNOSIS — J01.00 ACUTE NON-RECURRENT MAXILLARY SINUSITIS: ICD-10-CM

## 2021-10-18 RX ORDER — AMOXICILLIN 500 MG/1
500 CAPSULE ORAL 3 TIMES DAILY
Qty: 30 CAPSULE | Refills: 0 | Status: SHIPPED | OUTPATIENT
Start: 2021-10-18 | End: 2021-11-02

## 2021-10-18 NOTE — TELEPHONE ENCOUNTER
Caller: Chrystal Ruiz    Relationship: Self      Medication requested (name and dosage):   amoxicillin (AMOXIL) 500 MG capsule    Pharmacy where request should be sent:   [id number] [address] [place] [address] AT [facility] - [phone] [facility] [phone] FX     Additional details provided by patient: PATIENT STATES SHE HAS ONE LEFT FOR TODAY BUT PATIENT STATES SHE IS STILL NOT FEELING WELL. PATIENT STATES SHE IS STILL VERY CONGESTED AND WOULD LIKE TO REQUEST MORE ANTIBIOTICS     Best call back number: [phone]    Does the patient have less than a 3 day supply:  [x] Yes  [] No    Lyssa South Rep   10/18/21 08:25 EDT

## 2021-10-22 ENCOUNTER — HOSPITAL ENCOUNTER (OUTPATIENT)
Dept: ULTRASOUND IMAGING | Facility: HOSPITAL | Age: 81
Discharge: HOME | End: 2021-10-22

## 2021-10-22 ENCOUNTER — HOSPITAL ENCOUNTER (OUTPATIENT)
Dept: MAMMOGRAPHY | Facility: HOSPITAL | Age: 81
Discharge: HOME | End: 2021-10-22

## 2021-10-22 DIAGNOSIS — N64.89 BREAST ASYMMETRY: ICD-10-CM

## 2021-10-22 DIAGNOSIS — R92.8 ABNORMAL MAMMOGRAM OF LEFT BREAST: ICD-10-CM

## 2021-10-22 PROCEDURE — 77065 DX MAMMO INCL CAD UNI: CPT

## 2021-10-22 PROCEDURE — G0279 TOMOSYNTHESIS, MAMMO: HCPCS

## 2021-10-28 ENCOUNTER — OFFICE VISIT (OUTPATIENT)
Dept: SURGERY | Facility: CLINIC | Age: 81
End: 2021-10-28

## 2021-10-28 VITALS
HEART RATE: 81 BPM | HEIGHT: 61 IN | TEMPERATURE: 98.6 F | WEIGHT: 145 LBS | OXYGEN SATURATION: 98 % | BODY MASS INDEX: 27.38 KG/M2 | SYSTOLIC BLOOD PRESSURE: 131 MMHG | DIASTOLIC BLOOD PRESSURE: 66 MMHG

## 2021-10-28 DIAGNOSIS — R06.02 SHORTNESS OF BREATH: ICD-10-CM

## 2021-10-28 DIAGNOSIS — R91.8 LUNG NODULES: Primary | ICD-10-CM

## 2021-10-28 DIAGNOSIS — J43.1 PANLOBULAR EMPHYSEMA (HCC): ICD-10-CM

## 2021-10-28 PROCEDURE — 99205 OFFICE O/P NEW HI 60 MIN: CPT | Performed by: THORACIC SURGERY (CARDIOTHORACIC VASCULAR SURGERY)

## 2021-10-28 RX ORDER — ACETAMINOPHEN 500 MG
500 TABLET ORAL EVERY 6 HOURS PRN
COMMUNITY

## 2021-10-30 NOTE — PROGRESS NOTES
"Chief Complaint  Lung Nodule (New Patient referral Dr. Ulloa)    Subjective          Chrystal Ruiz presents to [facility] in consultation.  History of Present Illness  Ms. Zaldivar is a very pleasant 81-year-old lady who approximately 2 years ago was hospitalized secondary to worsening shortness of breath and dyspnea on exertion.  At that time she was found to have multiple lung nodules and has been followed serially since that time and a PET obtained at that time was negative.  She was recently seen and examined by her primary care physician with complaints of sinus pain, worsening shortness of breath and dyspnea on exertion that began a few weeks ago.  On work-up a chest x-ray was obtained which demonstrated lung nodules, this was followed with CT scan.  She does complain of worsening night sweats over the past several weeks as well as significant fatigue.    She was recently seen by Dr. Farias who has followed her for these lung nodules.  Her last CT surveillance was in April 2021 which demonstrated small bilateral lung nodules.  Her last FEV1 was 0.9.    She is a never smoker and does not have any history of exposure to tobacco.  She does have a family history of COPD in 2 of her siblings who are also never smokers.  She has no personal history of cancer.  She has a family history of cancer in her mother who had bilateral breast cancer at the age of 85.  Objective   Vital Signs:   /66 (BP Location: Left arm, Patient Position: Sitting, Cuff Size: Adult)   Pulse 81   Temp 98.6 °F (37 °C) (Infrared)   Ht 154.9 cm (61\")   Wt 65.8 kg (145 lb)   SpO2 98%   BMI 27.40 kg/m²     Physical Exam  Vitals and nursing note reviewed.   Constitutional:       Appearance: She is well-developed.   HENT:      Head: Normocephalic and atraumatic.      Nose: Nose normal.   Eyes:      Conjunctiva/sclera: Conjunctivae normal.   Cardiovascular:      Rate and Rhythm: Normal rate.   Pulmonary:      " Effort: Pulmonary effort is normal.   Abdominal:      Palpations: Abdomen is soft.   Musculoskeletal:      Cervical back: Neck supple.   Skin:     General: Skin is warm and dry.   Neurological:      Mental Status: She is alert and oriented to person, place, and time.   Psychiatric:         Behavior: Behavior normal.         Thought Content: Thought content normal.         Judgment: Judgment normal.        Result Review :            I have independently reviewed the CT of the chest performed on 10/11/2021 which demonstrates multiple new lung nodules throughout bilateral lungs, the largest of which is in the right upper lobe and measures 3.2 cm.  This is concerning for malignancy given that they were not present in April 2021.  There is no mediastinal or hilar lymphadenopathy.  There is no pleural pericardial effusion.  There is moderate COPD.     Assessment and Plan      Ms. Zaldivar is a very pleasant 81-year-old lady with multiple new bilateral pulmonary nodules.  Given that they are new since April 2021, they are concerning for malignancy.  This could also represent an inflammatory process given her worsening shortness of breath.  She will need a CT-guided biopsy for diagnosis.  Dr. Farias has already ordered this.  She will also need a PET CT scan for further work-up and staging.  I will plan to see her back after she obtains her CT-guided biopsy and PET CT scan.  Diagnoses and all orders for this visit:    1. Lung nodules (Primary)    2. Shortness of breath    3. Panlobular emphysema (HCC)      I spent 60 minutes caring for Chrystal on this date of service. This time includes time spent by me in the following activities:preparing for the visit, reviewing tests, obtaining and/or reviewing a separately obtained history, performing a medically appropriate examination and/or evaluation , counseling and educating the patient/family/caregiver, ordering medications, tests, or procedures, referring and communicating with other  health care professionals  and independently interpreting results and communicating that information with the patient/family/caregiver  Follow Up   No follow-ups on file.  Patient was given instructions and counseling regarding her condition or for health maintenance advice. Please see specific information pulled into the AVS if appropriate.

## 2021-11-01 ENCOUNTER — PATIENT ROUNDING (BHMG ONLY) (OUTPATIENT)
Dept: SURGERY | Facility: CLINIC | Age: 81
End: 2021-11-01

## 2021-11-01 ENCOUNTER — TELEPHONE (OUTPATIENT)
Dept: FAMILY MEDICINE CLINIC | Facility: CLINIC | Age: 81
End: 2021-11-01

## 2021-11-01 ENCOUNTER — TRANSCRIBE ORDERS (OUTPATIENT)
Dept: INTERVENTIONAL RADIOLOGY/VASCULAR | Facility: HOSPITAL | Age: 81
End: 2021-11-01

## 2021-11-01 DIAGNOSIS — Z01.812 ENCOUNTER FOR PREPROCEDURE SCREENING LABORATORY TESTING FOR COVID-19: Primary | ICD-10-CM

## 2021-11-01 DIAGNOSIS — Z20.822 ENCOUNTER FOR PREPROCEDURE SCREENING LABORATORY TESTING FOR COVID-19: Primary | ICD-10-CM

## 2021-11-03 ENCOUNTER — LAB (OUTPATIENT)
Dept: LAB | Facility: HOSPITAL | Age: 81
End: 2021-11-03

## 2021-11-03 DIAGNOSIS — Z20.822 ENCOUNTER FOR PREPROCEDURE SCREENING LABORATORY TESTING FOR COVID-19: ICD-10-CM

## 2021-11-03 DIAGNOSIS — Z01.812 ENCOUNTER FOR PREPROCEDURE SCREENING LABORATORY TESTING FOR COVID-19: ICD-10-CM

## 2021-11-03 PROCEDURE — U0004 COV-19 TEST NON-CDC HGH THRU: HCPCS

## 2021-11-03 PROCEDURE — U0005 INFEC AGEN DETEC AMPLI PROBE: HCPCS

## 2021-11-03 PROCEDURE — C9803 HOPD COVID-19 SPEC COLLECT: HCPCS

## 2021-11-04 ENCOUNTER — HOSPITAL ENCOUNTER (OUTPATIENT)
Dept: CARDIOLOGY | Facility: HOSPITAL | Age: 81
Discharge: HOME | End: 2021-11-04
Admitting: PODIATRIST

## 2021-11-04 ENCOUNTER — TRANSCRIBE ORDERS (OUTPATIENT)
Dept: ADMINISTRATIVE | Facility: HOSPITAL | Age: 81
End: 2021-11-04

## 2021-11-04 DIAGNOSIS — R60.9 EDEMA, UNSPECIFIED TYPE: ICD-10-CM

## 2021-11-04 DIAGNOSIS — M79.604 PAIN AND SWELLING OF LOWER EXTREMITY, RIGHT: ICD-10-CM

## 2021-11-04 DIAGNOSIS — M79.604 PAIN AND SWELLING OF LOWER EXTREMITY, RIGHT: Primary | ICD-10-CM

## 2021-11-04 DIAGNOSIS — M79.89 PAIN AND SWELLING OF LOWER EXTREMITY, RIGHT: Primary | ICD-10-CM

## 2021-11-04 DIAGNOSIS — M79.89 PAIN AND SWELLING OF LOWER EXTREMITY, RIGHT: ICD-10-CM

## 2021-11-04 DIAGNOSIS — R60.9 SWELLING: ICD-10-CM

## 2021-11-04 LAB
BH CV LOWER VASCULAR LEFT COMMON FEMORAL AUGMENT: NORMAL
BH CV LOWER VASCULAR LEFT COMMON FEMORAL COMPETENT: NORMAL
BH CV LOWER VASCULAR LEFT COMMON FEMORAL COMPRESS: NORMAL
BH CV LOWER VASCULAR LEFT COMMON FEMORAL PHASIC: NORMAL
BH CV LOWER VASCULAR LEFT COMMON FEMORAL SPONT: NORMAL
BH CV LOWER VASCULAR RIGHT COMMON FEMORAL AUGMENT: NORMAL
BH CV LOWER VASCULAR RIGHT COMMON FEMORAL COMPETENT: NORMAL
BH CV LOWER VASCULAR RIGHT COMMON FEMORAL COMPRESS: NORMAL
BH CV LOWER VASCULAR RIGHT COMMON FEMORAL PHASIC: NORMAL
BH CV LOWER VASCULAR RIGHT COMMON FEMORAL SPONT: NORMAL
BH CV LOWER VASCULAR RIGHT DISTAL FEMORAL COMPRESS: NORMAL
BH CV LOWER VASCULAR RIGHT GASTRONEMIUS COMPRESS: NORMAL
BH CV LOWER VASCULAR RIGHT GREATER SAPH AK COMPRESS: NORMAL
BH CV LOWER VASCULAR RIGHT GREATER SAPH BK COMPRESS: NORMAL
BH CV LOWER VASCULAR RIGHT LESSER SAPH COMPRESS: NORMAL
BH CV LOWER VASCULAR RIGHT MID FEMORAL AUGMENT: NORMAL
BH CV LOWER VASCULAR RIGHT MID FEMORAL COMPETENT: NORMAL
BH CV LOWER VASCULAR RIGHT MID FEMORAL COMPRESS: NORMAL
BH CV LOWER VASCULAR RIGHT MID FEMORAL PHASIC: NORMAL
BH CV LOWER VASCULAR RIGHT MID FEMORAL SPONT: NORMAL
BH CV LOWER VASCULAR RIGHT PERONEAL COMPRESS: NORMAL
BH CV LOWER VASCULAR RIGHT POPLITEAL AUGMENT: NORMAL
BH CV LOWER VASCULAR RIGHT POPLITEAL COMPETENT: NORMAL
BH CV LOWER VASCULAR RIGHT POPLITEAL COMPRESS: NORMAL
BH CV LOWER VASCULAR RIGHT POPLITEAL PHASIC: NORMAL
BH CV LOWER VASCULAR RIGHT POPLITEAL SPONT: NORMAL
BH CV LOWER VASCULAR RIGHT POSTERIOR TIBIAL COMPRESS: NORMAL
BH CV LOWER VASCULAR RIGHT PROXIMAL FEMORAL COMPRESS: NORMAL
BH CV LOWER VASCULAR RIGHT SAPHENOFEMORAL JUNCTION COMPRESS: NORMAL
BH CV VAS POP FLUID COLLECTED: 1
MAXIMAL PREDICTED HEART RATE: 138 BPM
SARS-COV-2 ORF1AB RESP QL NAA+PROBE: NOT DETECTED
STRESS TARGET HR: 117 BPM

## 2021-11-04 PROCEDURE — 93971 EXTREMITY STUDY: CPT

## 2021-11-05 ENCOUNTER — HOSPITAL ENCOUNTER (OUTPATIENT)
Dept: GENERAL RADIOLOGY | Facility: HOSPITAL | Age: 81
Discharge: HOME | End: 2021-11-05

## 2021-11-05 ENCOUNTER — HOSPITAL ENCOUNTER (OUTPATIENT)
Dept: CT IMAGING | Facility: HOSPITAL | Age: 81
Discharge: HOME | End: 2021-11-05

## 2021-11-05 VITALS
RESPIRATION RATE: 23 BRPM | HEART RATE: 68 BPM | SYSTOLIC BLOOD PRESSURE: 111 MMHG | WEIGHT: 145 LBS | OXYGEN SATURATION: 92 % | TEMPERATURE: 99.3 F | DIASTOLIC BLOOD PRESSURE: 54 MMHG | BODY MASS INDEX: 27.38 KG/M2 | HEIGHT: 61 IN

## 2021-11-05 DIAGNOSIS — J98.4 PULMONARY LESION, RIGHT: ICD-10-CM

## 2021-11-05 LAB
APTT PPP: 28.2 SECONDS (ref 24–31)
BASOPHILS # BLD AUTO: 0.1 10*3/MM3 (ref 0–0.2)
BASOPHILS NFR BLD AUTO: 0.5 % (ref 0–1.5)
DEPRECATED RDW RBC AUTO: 36.3 FL (ref 37–54)
EOSINOPHIL # BLD AUTO: 0.1 10*3/MM3 (ref 0–0.4)
EOSINOPHIL NFR BLD AUTO: 1 % (ref 0.3–6.2)
ERYTHROCYTE [DISTWIDTH] IN BLOOD BY AUTOMATED COUNT: 13.6 % (ref 12.3–15.4)
HCT VFR BLD AUTO: 34.3 % (ref 34–46.6)
HGB BLD-MCNC: 11.2 G/DL (ref 12–15.9)
INR PPP: 1.03 (ref 0.93–1.1)
LYMPHOCYTES # BLD AUTO: 1 10*3/MM3 (ref 0.7–3.1)
LYMPHOCYTES NFR BLD AUTO: 9.6 % (ref 19.6–45.3)
MCH RBC QN AUTO: 24.5 PG (ref 26.6–33)
MCHC RBC AUTO-ENTMCNC: 32.6 G/DL (ref 31.5–35.7)
MCV RBC AUTO: 75.1 FL (ref 79–97)
MONOCYTES # BLD AUTO: 1 10*3/MM3 (ref 0.1–0.9)
MONOCYTES NFR BLD AUTO: 9.3 % (ref 5–12)
NEUTROPHILS NFR BLD AUTO: 79.6 % (ref 42.7–76)
NEUTROPHILS NFR BLD AUTO: 8.1 10*3/MM3 (ref 1.7–7)
NRBC BLD AUTO-RTO: 0.1 /100 WBC (ref 0–0.2)
PLATELET # BLD AUTO: 376 10*3/MM3 (ref 140–450)
PMV BLD AUTO: 7.3 FL (ref 6–12)
PROTHROMBIN TIME: 11.4 SECONDS (ref 9.6–11.7)
RBC # BLD AUTO: 4.56 10*6/MM3 (ref 3.77–5.28)
WBC # BLD AUTO: 10.3 10*3/MM3 (ref 3.4–10.8)

## 2021-11-05 PROCEDURE — 0 LIDOCAINE 1 % SOLUTION: Performed by: RADIOLOGY

## 2021-11-05 PROCEDURE — 25010000002 MIDAZOLAM PER 1 MG: Performed by: RADIOLOGY

## 2021-11-05 PROCEDURE — 85730 THROMBOPLASTIN TIME PARTIAL: CPT | Performed by: RADIOLOGY

## 2021-11-05 PROCEDURE — 87070 CULTURE OTHR SPECIMN AEROBIC: CPT | Performed by: RADIOLOGY

## 2021-11-05 PROCEDURE — 71045 X-RAY EXAM CHEST 1 VIEW: CPT

## 2021-11-05 PROCEDURE — 88305 TISSUE EXAM BY PATHOLOGIST: CPT | Performed by: RADIOLOGY

## 2021-11-05 PROCEDURE — 88333 PATH CONSLTJ SURG CYTO XM 1: CPT | Performed by: RADIOLOGY

## 2021-11-05 PROCEDURE — 99152 MOD SED SAME PHYS/QHP 5/>YRS: CPT

## 2021-11-05 PROCEDURE — 87205 SMEAR GRAM STAIN: CPT | Performed by: RADIOLOGY

## 2021-11-05 PROCEDURE — 25010000002 ONDANSETRON PER 1 MG: Performed by: RADIOLOGY

## 2021-11-05 PROCEDURE — 85610 PROTHROMBIN TIME: CPT | Performed by: RADIOLOGY

## 2021-11-05 PROCEDURE — 88334 PATH CONSLTJ SURG CYTO XM EA: CPT | Performed by: RADIOLOGY

## 2021-11-05 PROCEDURE — 85025 COMPLETE CBC W/AUTO DIFF WBC: CPT | Performed by: RADIOLOGY

## 2021-11-05 PROCEDURE — 25010000002 FENTANYL CITRATE (PF) 50 MCG/ML SOLUTION: Performed by: RADIOLOGY

## 2021-11-05 RX ORDER — LIDOCAINE HYDROCHLORIDE 10 MG/ML
INJECTION, SOLUTION INFILTRATION; PERINEURAL
Status: DISPENSED
Start: 2021-11-05 | End: 2021-11-05

## 2021-11-05 RX ORDER — ACETAMINOPHEN 325 MG/1
650 TABLET ORAL EVERY 4 HOURS PRN
Status: DISCONTINUED | OUTPATIENT
Start: 2021-11-05 | End: 2021-11-06 | Stop reason: HOSPADM

## 2021-11-05 RX ORDER — FENTANYL CITRATE 50 UG/ML
INJECTION, SOLUTION INTRAMUSCULAR; INTRAVENOUS
Status: DISPENSED
Start: 2021-11-05 | End: 2021-11-05

## 2021-11-05 RX ORDER — LIDOCAINE HYDROCHLORIDE 10 MG/ML
INJECTION, SOLUTION INFILTRATION; PERINEURAL
Status: COMPLETED | OUTPATIENT
Start: 2021-11-05 | End: 2021-11-05

## 2021-11-05 RX ORDER — ONDANSETRON 2 MG/ML
INJECTION INTRAMUSCULAR; INTRAVENOUS
Status: DISPENSED
Start: 2021-11-05 | End: 2021-11-05

## 2021-11-05 RX ORDER — MIDAZOLAM HYDROCHLORIDE 1 MG/ML
INJECTION INTRAMUSCULAR; INTRAVENOUS
Status: COMPLETED | OUTPATIENT
Start: 2021-11-05 | End: 2021-11-05

## 2021-11-05 RX ORDER — SODIUM CHLORIDE 0.9 % (FLUSH) 0.9 %
10 SYRINGE (ML) INJECTION AS NEEDED
Status: DISCONTINUED | OUTPATIENT
Start: 2021-11-05 | End: 2021-11-05 | Stop reason: HOSPADM

## 2021-11-05 RX ORDER — ONDANSETRON 2 MG/ML
INJECTION INTRAMUSCULAR; INTRAVENOUS
Status: COMPLETED | OUTPATIENT
Start: 2021-11-05 | End: 2021-11-05

## 2021-11-05 RX ORDER — MIDAZOLAM HYDROCHLORIDE 1 MG/ML
INJECTION INTRAMUSCULAR; INTRAVENOUS
Status: DISPENSED
Start: 2021-11-05 | End: 2021-11-05

## 2021-11-05 RX ORDER — SODIUM CHLORIDE 0.9 % (FLUSH) 0.9 %
10 SYRINGE (ML) INJECTION EVERY 12 HOURS SCHEDULED
Status: DISCONTINUED | OUTPATIENT
Start: 2021-11-05 | End: 2021-11-05 | Stop reason: HOSPADM

## 2021-11-05 RX ORDER — SODIUM CHLORIDE 9 MG/ML
75 INJECTION, SOLUTION INTRAVENOUS CONTINUOUS
Status: DISCONTINUED | OUTPATIENT
Start: 2021-11-05 | End: 2021-11-06 | Stop reason: HOSPADM

## 2021-11-05 RX ORDER — FENTANYL CITRATE 50 UG/ML
INJECTION, SOLUTION INTRAMUSCULAR; INTRAVENOUS
Status: COMPLETED | OUTPATIENT
Start: 2021-11-05 | End: 2021-11-05

## 2021-11-05 RX ADMIN — LIDOCAINE HYDROCHLORIDE 8 ML: 10 INJECTION, SOLUTION INFILTRATION; PERINEURAL at 10:57

## 2021-11-05 RX ADMIN — MIDAZOLAM 0.5 MG: 1 INJECTION INTRAMUSCULAR; INTRAVENOUS at 10:59

## 2021-11-05 RX ADMIN — FENTANYL CITRATE 50 MCG: 50 INJECTION, SOLUTION INTRAMUSCULAR; INTRAVENOUS at 10:57

## 2021-11-05 RX ADMIN — SODIUM CHLORIDE 75 ML/HR: 0.9 INJECTION, SOLUTION INTRAVENOUS at 09:39

## 2021-11-05 RX ADMIN — FENTANYL CITRATE 50 MCG: 50 INJECTION, SOLUTION INTRAMUSCULAR; INTRAVENOUS at 10:59

## 2021-11-05 RX ADMIN — MIDAZOLAM 0.5 MG: 1 INJECTION INTRAMUSCULAR; INTRAVENOUS at 10:57

## 2021-11-05 RX ADMIN — Medication 10 ML: at 09:39

## 2021-11-05 RX ADMIN — ONDANSETRON 4 MG: 2 INJECTION INTRAMUSCULAR; INTRAVENOUS at 10:38

## 2021-11-05 NOTE — DISCHARGE INSTRUCTIONS
A responsible adult should stay with you and you should rest quietly for the rest of the day. Do not drink alcohol, drive or cook for 24 hours following your procedure.  Progress your diet as tolerated.  Resume your usual medications including aspirin.  When you remove your dressing in 48 hours, a small amount of blood is to be expected. Do not be alarmed.  If you feel it is bleeding excessively apply pressure and proceed to the Emergency room.  Do not shower, bath, or get your dressing wet at all for 48 hours.  You may shower after the dressing is removed. No lifting more that 10 pounds for 48 hours.  If severe pain, increased shortness of air or racing heartbeat occur, seek immediate medical attention.  Follow up with Dr. Farias for results.

## 2021-11-08 LAB
BACTERIA FLD CULT: NORMAL
GRAM STN SPEC: NORMAL
LAB AP CASE REPORT: NORMAL
LAB AP DIAGNOSIS COMMENT: NORMAL
Lab: NORMAL
PATH REPORT.FINAL DX SPEC: NORMAL
PATH REPORT.GROSS SPEC: NORMAL

## 2021-11-11 ENCOUNTER — OFFICE VISIT (OUTPATIENT)
Dept: SURGERY | Facility: CLINIC | Age: 81
End: 2021-11-11

## 2021-11-11 VITALS
HEIGHT: 61 IN | OXYGEN SATURATION: 97 % | DIASTOLIC BLOOD PRESSURE: 74 MMHG | WEIGHT: 144 LBS | BODY MASS INDEX: 27.19 KG/M2 | SYSTOLIC BLOOD PRESSURE: 146 MMHG | TEMPERATURE: 98.2 F | HEART RATE: 78 BPM

## 2021-11-11 DIAGNOSIS — R91.1 LUNG NODULE: Primary | ICD-10-CM

## 2021-11-11 PROCEDURE — 99214 OFFICE O/P EST MOD 30 MIN: CPT | Performed by: THORACIC SURGERY (CARDIOTHORACIC VASCULAR SURGERY)

## 2021-11-15 ENCOUNTER — OFFICE VISIT (OUTPATIENT)
Dept: FAMILY MEDICINE CLINIC | Facility: CLINIC | Age: 81
End: 2021-11-15

## 2021-11-15 VITALS — DIASTOLIC BLOOD PRESSURE: 70 MMHG | SYSTOLIC BLOOD PRESSURE: 144 MMHG | TEMPERATURE: 97.1 F | HEART RATE: 85 BPM

## 2021-11-15 DIAGNOSIS — Z23 NEED FOR VACCINATION: ICD-10-CM

## 2021-11-15 DIAGNOSIS — R91.1 LUNG NODULE: ICD-10-CM

## 2021-11-15 DIAGNOSIS — M19.90 ARTHRITIS: ICD-10-CM

## 2021-11-15 DIAGNOSIS — L03.115 CELLULITIS OF RIGHT ANTERIOR LOWER LEG: Primary | ICD-10-CM

## 2021-11-15 DIAGNOSIS — M25.561 ACUTE PAIN OF RIGHT KNEE: ICD-10-CM

## 2021-11-15 PROCEDURE — G0008 ADMIN INFLUENZA VIRUS VAC: HCPCS | Performed by: FAMILY MEDICINE

## 2021-11-15 PROCEDURE — 90662 IIV NO PRSV INCREASED AG IM: CPT | Performed by: FAMILY MEDICINE

## 2021-11-15 PROCEDURE — 99214 OFFICE O/P EST MOD 30 MIN: CPT | Performed by: FAMILY MEDICINE

## 2021-11-15 RX ORDER — CEPHALEXIN 500 MG/1
500 CAPSULE ORAL 3 TIMES DAILY
Qty: 21 CAPSULE | Refills: 0 | Status: SHIPPED | OUTPATIENT
Start: 2021-11-15 | End: 2021-12-01

## 2021-12-01 ENCOUNTER — OFFICE VISIT (OUTPATIENT)
Dept: FAMILY MEDICINE CLINIC | Facility: CLINIC | Age: 81
End: 2021-12-01

## 2021-12-01 VITALS
DIASTOLIC BLOOD PRESSURE: 78 MMHG | OXYGEN SATURATION: 97 % | WEIGHT: 149 LBS | HEART RATE: 77 BPM | BODY MASS INDEX: 28.15 KG/M2 | TEMPERATURE: 97.7 F | SYSTOLIC BLOOD PRESSURE: 134 MMHG

## 2021-12-01 DIAGNOSIS — M25.561 ACUTE PAIN OF RIGHT KNEE: Primary | ICD-10-CM

## 2021-12-01 DIAGNOSIS — R91.1 LUNG NODULE: ICD-10-CM

## 2021-12-01 PROCEDURE — 99213 OFFICE O/P EST LOW 20 MIN: CPT | Performed by: FAMILY MEDICINE

## 2021-12-01 NOTE — PROGRESS NOTES
Chief Complaint  Leg Pain (rt leg f/u wants to try to fix b4 surgery )    Subjective          Chrystal Ruiz presents to [facility]  She has a follow up with Dr. Farias next week and Dr. Mcconnell on Jan 6, 2022.    Leg Pain   The incident occurred more than 1 week ago. There was no injury mechanism. The pain is present in the right knee. The quality of the pain is described as aching. The pain is moderate. The pain has been constant since onset. Pertinent negatives include no inability to bear weight, muscle weakness, numbness or tingling. She reports no foreign bodies present. The symptoms are aggravated by movement and weight bearing. She has tried rest, non-weight bearing and acetaminophen for the symptoms. The treatment provided no relief.       Objective   Vital Signs:   /78 (BP Location: Left arm, Patient Position: Sitting, Cuff Size: Adult)   Pulse 77   Temp 97.7 °F (36.5 °C) (Infrared)   Wt 67.6 kg (149 lb)   SpO2 97%   BMI 28.15 kg/m²     Physical Exam  Vitals and nursing note reviewed.   Constitutional:       Appearance: Normal appearance.   Cardiovascular:      Rate and Rhythm: Regular rhythm.      Heart sounds: Normal heart sounds.   Pulmonary:      Breath sounds: Normal breath sounds.   Musculoskeletal:      Cervical back: Neck supple.      Right knee: Swelling present. Decreased range of motion. Tenderness present.   Skin:     General: Skin is warm.   Neurological:      General: No focal deficit present.      Mental Status: She is alert.   Psychiatric:         Mood and Affect: Mood normal.        Result Review :   The following data was reviewed by: Etelvina Ulloa MD on 12/01/2021:  Common labs    Common Labsle 3/17/21 3/17/21 10/11/21 10/11/21 11/5/21    1424 1424 1257 1257    Glucose  82  100 (A)    BUN  15  14    Creatinine  0.62  0.74    eGFR Non African Am  92  75    Sodium  141  138    Potassium  3.9  4.5    Chloride  105  103    Calcium  9.1  9.4     Albumin  4.30  4.00    Total Bilirubin  0.7  0.7    Alkaline Phosphatase  71  94    AST (SGOT)  20  15    ALT (SGPT)  13  15    WBC   9.45  10.30   Hemoglobin   11.5 (A)  11.2 (A)   Hematocrit   36.5  34.3   Platelets   337  376   Total Cholesterol 202 (A)       Triglycerides 46       HDL Cholesterol 94 (A)       LDL Cholesterol  99       (A) Abnormal value                      Assessment and Plan    Diagnoses and all orders for this visit:    1. Acute pain of right knee (Primary)  Assessment & Plan:  Tylenol 1-2 tabs po q4h prn  We will try to get her in to see an Ortho ASAP    Orders:  -     Ambulatory Referral to Orthopedic Surgery    2. Lung nodule  Assessment & Plan:  She is expecting lung surgery soon for possible lung cancer and will need to be able to ambulate after surgery.         Follow Up   No follow-ups on file.  Patient was given instructions and counseling regarding her condition or for health maintenance advice. Please see specific information pulled into the AVS if appropriate.

## 2021-12-03 ENCOUNTER — OFFICE VISIT (OUTPATIENT)
Dept: ORTHOPEDIC SURGERY | Facility: CLINIC | Age: 81
End: 2021-12-03

## 2021-12-03 ENCOUNTER — TELEPHONE (OUTPATIENT)
Dept: SURGERY | Facility: CLINIC | Age: 81
End: 2021-12-03

## 2021-12-03 VITALS
DIASTOLIC BLOOD PRESSURE: 72 MMHG | BODY MASS INDEX: 28.13 KG/M2 | HEIGHT: 61 IN | SYSTOLIC BLOOD PRESSURE: 130 MMHG | HEART RATE: 91 BPM | WEIGHT: 149 LBS

## 2021-12-03 DIAGNOSIS — M25.561 CHRONIC PAIN OF RIGHT KNEE: Primary | ICD-10-CM

## 2021-12-03 DIAGNOSIS — G89.29 CHRONIC PAIN OF RIGHT KNEE: Primary | ICD-10-CM

## 2021-12-03 PROCEDURE — 99203 OFFICE O/P NEW LOW 30 MIN: CPT | Performed by: ORTHOPAEDIC SURGERY

## 2021-12-03 PROCEDURE — 20610 DRAIN/INJ JOINT/BURSA W/O US: CPT | Performed by: ORTHOPAEDIC SURGERY

## 2021-12-03 RX ORDER — TRIAMCINOLONE ACETONIDE 40 MG/ML
80 INJECTION, SUSPENSION INTRA-ARTICULAR; INTRAMUSCULAR
Status: COMPLETED | OUTPATIENT
Start: 2021-12-03 | End: 2021-12-03

## 2021-12-03 RX ORDER — LIDOCAINE HYDROCHLORIDE 10 MG/ML
2 INJECTION, SOLUTION EPIDURAL; INFILTRATION; INTRACAUDAL; PERINEURAL
Status: COMPLETED | OUTPATIENT
Start: 2021-12-03 | End: 2021-12-03

## 2021-12-03 RX ADMIN — LIDOCAINE HYDROCHLORIDE 2 ML: 10 INJECTION, SOLUTION EPIDURAL; INFILTRATION; INTRACAUDAL; PERINEURAL at 11:32

## 2021-12-03 RX ADMIN — TRIAMCINOLONE ACETONIDE 80 MG: 40 INJECTION, SUSPENSION INTRA-ARTICULAR; INTRAMUSCULAR at 11:32

## 2021-12-03 NOTE — PROGRESS NOTES
Large Joint Arthrocentesis  Date/Time: 12/3/2021 11:32 AM  Consent given by: patient  Site marked: site marked  Timeout: Immediately prior to procedure a time out was called to verify the correct patient, procedure, equipment, support staff and site/side marked as required   Supporting Documentation  Indications: pain   Procedure Details  Location: knee -   Needle size: 25 G  Medications administered: 80 mg triamcinolone acetonide 40 MG/ML; 2 mL lidocaine PF 1% 1 %  Patient tolerance: patient tolerated the procedure well with no immediate complications

## 2021-12-03 NOTE — PROGRESS NOTES
"Chief Complaint  Pain and Initial Evaluation of the Right Knee    Subjective    History of Present Illness      Chrystal Ruiz is a 81 y.o. female who presents to [facility] for right knee pain.    History of Present Illness     The patient presents to the office with increasing pain and discomfort of the right knee. She states her symptoms have been becoming worse and her mobility is progressively becoming limited because of the knee pain and discomfort. She reports that she is limping quite a bit and in fact, her left hip has now started to bother her to some degree because of her right knee pain and discomfort. She denies any history of trauma to the knee. She adds that she has a history of osteoarthritis in her family and states that her mother had bilateral knee replacements. Additionally, she notes that she has a lung nodule and is most certainly not interested in any type of surgical options at this point until the primary status can be squared away.    Pain Location:  medial aspect of the right  knee  Radiation: none  Quality: sharp, stabbing  Intensity/Severity: moderate to severe, worsening over the past 6 weeks  Duration: worsening over the past 6 weeks  Progression of symptoms: yes  Onset quality: gradual   Timing: intermittent  Aggravating Factors: going up and down stairs, deep flexion of the knee  Alleviating Factors: NSAIDs  Previous Episodes: yes  Associated Symptoms: pain, swelling, clicking, and catching  ADLs Affected: yes  Previous Treatment: NSAIDs       Objective   Vital Signs:   /72   Pulse 91   Ht 154.9 cm (61\")   Wt 67.6 kg (149 lb)   BMI 28.15 kg/m²     Physical Exam  Physical Exam  Vitals signs and nursing note reviewed.   Constitutional:       Appearance: Normal appearance.   Pulmonary:      Effort: Pulmonary effort is normal.   Skin:     General: Skin is warm and dry.      Capillary Refill: Capillary refill takes less than 2 seconds. "   Neurological:      General: No focal deficit present.      Mental Status: He is alert and oriented to person, place, and time. Mental status is at baseline.   Psychiatric:         Mood and Affect: Mood normal.         Behavior: Behavior normal.         Thought Content: Thought content normal.         Judgment: Judgment normal.     Ortho Exam   Right knee (varus). Patient has crepitus throughout range of motion. Positive patellar grind test. Mild effusion. Lachman is negative. Pivot shift is negative. Anterior and posterior drawer signs are negative. Significant joint line tenderness is noted on the medial aspect of the knee. Patient has a varus orientation of the knee. There is fullness and tenderness in the Popliteal fossa. Mild distention of a Popliteal cyst is noted in this location. Range of motion in flexion is from 0-110 degrees. Neurovascular status is intact.  Dorsalis pedis and posterior tibial artery pulses are palpable. Common peroneal nerve function is well preserved. Patient's gait is cautious and antalgic. Skin and soft tissues are mildly swollen, consistent with synovitis and effusion. The patient has a significant limp with the first few steps after starting the gait cycle. Getting out of a chair takes a lot of effort due to pain on knee flexion.             Result Review :   The following data was reviewed by: Mihai Oneill MD on 12/03/2021:    xrays obtained today  bilateral Knee X-Ray  Indication: Pain and discomfort on the medial aspect of the knee.  AP, Lateral views  Findings: Moderately advanced osteoarthritis of the knee with narrowing of the joint space. Bone-on-bone appearance is not yet noted. The patellofemoral distance is still well preserved.  no bony lesion  Soft tissues within normal limits  decreased joint spaces  Hardware appropriately positioned not applicable      no prior studies available for comparison.    This patient's x-ray report was graded according to the Kellgren and  Nima classification.  This took into account the joint space narrowing, osteophyte formation, sclerosis of the distal femur/proximal tibia along with deformity of those bones.  The findings were indicative of K L grade 2.    X-RAY was ordered and reviewed by Mihai Oneill MD          Procedures           Assessment   Assessment and Plan    Diagnoses and all orders for this visit:    1. Chronic pain of right knee (Primary)  -     XR Knee 3 View Right          Follow Up   · Compression/brace  · Rest, ice, compression, and elevation (RICE) therapy  · Stretching and strengthening exercises  · OTC Tylenol 500-1000mg by mouth every 6 hours as needed for pain   · Follow up in 3 month(s)  • Patient was given instructions and counseling regarding her condition or for health maintenance advice. Please see specific information pulled into the AVS if appropriate.   •  Compression brace to the knee to prevent it from buckling and giving out.  • Intra-articular injection of steroid from an anteromedial approach.  • Viscosupplementation risks and benefits discussed with the patient and we will call her insurance company to make sure that they approve her Monovisc injection, which can be given to her in about 6 weeks.  • Calcium and vitamin D for bone health.  • I offered a brace to the patient to prevent her from popping and giving out.  • Falls precautions discussed with the patient as well.  • The patient will follow up in 6 to 8 weeks for possible viscosupplementation injection.  • Nonoperative management discussed with the patient at length at this point.    Mihai Oneill MD   Date of Encounter: 12/3/2021        EMR Dragon/Transcription disclaimer:  Much of this encounter note is an electronic transcription/translation of spoken language to printed text. The electronic translation of spoken language may permit erroneous, or at times, nonsensical words or phrases to be inadvertently transcribed; Although I have reviewed the  note for such errors, some may still exist.     Transcribed from ambient dictation for Mihai Oneill MD by Marie Lion.  12/03/21   12:14 EST    Patient verbalized consent to the visit recording.  I have personally performed the services described in this document as transcribed by the above individual, and it is both accurate and complete.  Mihai Oneill MD  12/3/2021  12:19 EST

## 2021-12-03 NOTE — PROGRESS NOTES
"Chief Complaint  Lung Nodule (2 week follow up PET and CT guided lung biopsy)    Subjective          Chrystal Ruiz presents to [facility] in follow-up.  History of Present Illness  Ms. Ruiz is a very pleasant 81-year-old lady who approximately 2 years ago was hospitalized secondary to worsening shortness of breath and dyspnea on exertion.  At that time she was found to have multiple lung nodules and has been followed serially since that time and a PET obtained at that time was negative.  She was recently seen and examined by her primary care physician with complaints of sinus pain, worsening shortness of breath and dyspnea on exertion that began a few weeks ago.  On work-up a chest x-ray was obtained which demonstrated lung nodules, this was followed with CT scan.  She does complain of worsening night sweats over the past several weeks as well as significant fatigue.     She was recently seen by Dr. Farias who has followed her for these lung nodules.  Her last CT surveillance was in April 2021 which demonstrated small bilateral lung nodules.  Her last FEV1 was 0.9.     She is a never smoker and does not have any history of exposure to tobacco.  She does have a family history of COPD in 2 of her siblings who are also never smokers.  She has no personal history of cancer.  She has a family history of cancer in her mother who had bilateral breast cancer at the age of 85.    The patient presents today to discuss her recent PET CT scan and biopsy.  She has no new complaints.  Objective   Vital Signs:   /74 (BP Location: Left arm, Patient Position: Sitting, Cuff Size: Adult)   Pulse 78   Temp 98.2 °F (36.8 °C) (Infrared)   Ht 154.9 cm (61\")   Wt 65.3 kg (144 lb)   SpO2 97%   BMI 27.21 kg/m²     Physical Exam  Vitals and nursing note reviewed.   Constitutional:       Appearance: She is well-developed.   HENT:      Head: Normocephalic and atraumatic.      Nose: Nose normal. "   Eyes:      Conjunctiva/sclera: Conjunctivae normal.   Cardiovascular:      Rate and Rhythm: Normal rate.   Pulmonary:      Effort: Pulmonary effort is normal.   Abdominal:      Palpations: Abdomen is soft.   Musculoskeletal:      Cervical back: Neck supple.   Skin:     General: Skin is warm and dry.   Neurological:      Mental Status: She is alert and oriented to person, place, and time.   Psychiatric:         Behavior: Behavior normal.         Thought Content: Thought content normal.         Judgment: Judgment normal.        Result Review :       Data reviewed: Radiologic studies :     I have independently reviewed the PET CT scan performed on 11/8/2021 which demonstrates multiple enlarging bilateral hypermetabolic lung nodules.  The largest of which measures 2 cm in the right apex.  There is no other evidence of metastatic disease.        Pathology from CT-guided biopsy: Benign lung parenchyma with chronic inflammation and fibrosis  Assessment and Plan      Ms. Joseph tate 81-year-old lady with multiple pulmonary nodules that are enlarging and hypermetabolic.  The largest of which measures 2 cm right apex which is concerning for malignancy.  CT-guided biopsy of the lesions demonstrate some chronic fibrosis/inflammation which could be an explanation for these nodules.  Given the concern for malignancy, discussed possible VATS wedge resection for definitive diagnosis versus watchful waiting.  The patient would like to get a short-term surveillance CT scan and I will plan to see her in 8 weeks with a CT of the chest.  Diagnoses and all orders for this visit:    1. Lung nodule (Primary)  -     CT Chest Without Contrast Diagnostic; Future      I spent 30 minutes caring for Chrystal on this date of service. This time includes time spent by me in the following activities:preparing for the visit, reviewing tests, obtaining and/or reviewing a separately obtained history, performing a medically appropriate examination  and/or evaluation , counseling and educating the patient/family/caregiver and independently interpreting results and communicating that information with the patient/family/caregiver  Follow Up   No follow-ups on file.  Patient was given instructions and counseling regarding her condition or for health maintenance advice. Please see specific information pulled into the AVS if appropriate.

## 2021-12-03 NOTE — TELEPHONE ENCOUNTER
PATIENT CALLED THE OFFICE TODAY.  PATIENT STATES SHE IS HAVING UPCOMING SURGERY BUT I DON'T SEE THE CASE REQUEST. I HAVE HER BOOKED FOR A 8 WEEK FOLLOW UP CT CHEST ON 1-6-22 WITH YOU. SHE IS CONCERNED BECAUSE A MAJOR PART OF HER LUNG SURGERY RECOVERY WILL BE HER ABILITY TO MOVE AROUND. SHE STATES SHE IS IN SOME TROUBLE WITH HER KNEE AND IS NOT VERY MOBILE AT THE MOMENT. SHE IS GOING TO SEE OUR  ORTHO DR. GOLD MORALES BUT IS AFRAID HE IS GOING TO WANT TO GIVE HER A STEROID INJECTION AND SHE KNOWS SHE CAN'T HAVE STEROIDS AROUND SURGERY TIME. SHE IS VERY ANXIOUS AND WANTS TO KNOW WHAT SHE SHOULD ALLOW DR. PELAEZ TO DO TO HER KNEE AND NOT PREVENT HER FROM HAVING LUNG SURGERY    Per Dr. cMconnell - she can have steroids in her knee.    Patient infomred

## 2021-12-05 PROBLEM — Z23 NEED FOR VACCINATION: Status: ACTIVE | Noted: 2021-12-05

## 2021-12-19 NOTE — ASSESSMENT & PLAN NOTE
She is expecting lung surgery soon for possible lung cancer and will need to be able to ambulate after surgery.

## 2021-12-29 ENCOUNTER — TELEPHONE (OUTPATIENT)
Dept: FAMILY MEDICINE CLINIC | Facility: CLINIC | Age: 81
End: 2021-12-29

## 2021-12-29 RX ORDER — CEPHALEXIN 500 MG/1
500 CAPSULE ORAL 3 TIMES DAILY
Qty: 21 CAPSULE | Refills: 0 | Status: SHIPPED | OUTPATIENT
Start: 2021-12-29 | End: 2022-01-21

## 2021-12-29 NOTE — TELEPHONE ENCOUNTER
The patients legs from knee down are swollen w/ rash, she is afraid it may be cellulitis, her legs seem to be weeping as well. She doesn't want to go to the UC as recommended. There are no appointments to give her.     Thank you

## 2021-12-29 NOTE — TELEPHONE ENCOUNTER
I will send in a Rx for Keflex antibiotic.  If she does not improve she will have to make an appt to see me here or go to an C.

## 2022-01-06 ENCOUNTER — OFFICE VISIT (OUTPATIENT)
Dept: SURGERY | Facility: CLINIC | Age: 82
End: 2022-01-06

## 2022-01-06 VITALS
BODY MASS INDEX: 27 KG/M2 | DIASTOLIC BLOOD PRESSURE: 71 MMHG | TEMPERATURE: 99.3 F | HEIGHT: 61 IN | HEART RATE: 106 BPM | OXYGEN SATURATION: 97 % | SYSTOLIC BLOOD PRESSURE: 123 MMHG | WEIGHT: 143 LBS

## 2022-01-06 DIAGNOSIS — R91.1 LUNG NODULE: Primary | ICD-10-CM

## 2022-01-06 PROCEDURE — 99215 OFFICE O/P EST HI 40 MIN: CPT | Performed by: THORACIC SURGERY (CARDIOTHORACIC VASCULAR SURGERY)

## 2022-01-10 ENCOUNTER — PREP FOR SURGERY (OUTPATIENT)
Dept: OTHER | Facility: HOSPITAL | Age: 82
End: 2022-01-10

## 2022-01-10 DIAGNOSIS — R79.1 ABNORMAL COAGULATION PROFILE: ICD-10-CM

## 2022-01-10 DIAGNOSIS — R91.1 LUNG NODULE: Primary | ICD-10-CM

## 2022-01-10 PROBLEM — R91.8 LUNG NODULES: Status: ACTIVE | Noted: 2022-01-10

## 2022-01-10 RX ORDER — CEFAZOLIN SODIUM 2 G/100ML
2 INJECTION, SOLUTION INTRAVENOUS ONCE
Status: CANCELLED | OUTPATIENT
Start: 2022-02-04 | End: 2022-01-10

## 2022-01-10 RX ORDER — ACETAMINOPHEN 500 MG
1000 TABLET ORAL ONCE
Status: CANCELLED | OUTPATIENT
Start: 2022-02-04 | End: 2022-01-10

## 2022-01-10 RX ORDER — GABAPENTIN 300 MG/1
300 CAPSULE ORAL ONCE
Status: CANCELLED | OUTPATIENT
Start: 2022-02-04 | End: 2022-01-10

## 2022-01-10 RX ORDER — SODIUM CHLORIDE 0.9 % (FLUSH) 0.9 %
3-10 SYRINGE (ML) INJECTION AS NEEDED
Status: CANCELLED | OUTPATIENT
Start: 2022-02-04

## 2022-01-10 RX ORDER — SODIUM CHLORIDE 0.9 % (FLUSH) 0.9 %
3 SYRINGE (ML) INJECTION EVERY 12 HOURS SCHEDULED
Status: CANCELLED | OUTPATIENT
Start: 2022-02-04

## 2022-01-10 RX ORDER — HEPARIN SODIUM 5000 [USP'U]/ML
5000 INJECTION, SOLUTION INTRAVENOUS; SUBCUTANEOUS ONCE
Status: CANCELLED | OUTPATIENT
Start: 2022-02-04

## 2022-01-19 ENCOUNTER — TELEPHONE (OUTPATIENT)
Dept: FAMILY MEDICINE CLINIC | Facility: CLINIC | Age: 82
End: 2022-01-19

## 2022-01-21 ENCOUNTER — OFFICE VISIT (OUTPATIENT)
Dept: FAMILY MEDICINE CLINIC | Facility: CLINIC | Age: 82
End: 2022-01-21

## 2022-01-21 ENCOUNTER — LAB (OUTPATIENT)
Dept: FAMILY MEDICINE CLINIC | Facility: CLINIC | Age: 82
End: 2022-01-21

## 2022-01-21 VITALS
WEIGHT: 153 LBS | TEMPERATURE: 98.4 F | OXYGEN SATURATION: 96 % | SYSTOLIC BLOOD PRESSURE: 117 MMHG | BODY MASS INDEX: 28.91 KG/M2 | HEART RATE: 136 BPM | DIASTOLIC BLOOD PRESSURE: 73 MMHG

## 2022-01-21 DIAGNOSIS — I10 ESSENTIAL (PRIMARY) HYPERTENSION: ICD-10-CM

## 2022-01-21 DIAGNOSIS — R60.0 PEDAL EDEMA: ICD-10-CM

## 2022-01-21 DIAGNOSIS — L30.9 DERMATITIS: ICD-10-CM

## 2022-01-21 DIAGNOSIS — R00.0 TACHYCARDIA: ICD-10-CM

## 2022-01-21 DIAGNOSIS — R00.0 TACHYCARDIA: Primary | ICD-10-CM

## 2022-01-21 PROCEDURE — 84443 ASSAY THYROID STIM HORMONE: CPT | Performed by: FAMILY MEDICINE

## 2022-01-21 PROCEDURE — 83880 ASSAY OF NATRIURETIC PEPTIDE: CPT | Performed by: FAMILY MEDICINE

## 2022-01-21 PROCEDURE — 85025 COMPLETE CBC W/AUTO DIFF WBC: CPT | Performed by: FAMILY MEDICINE

## 2022-01-21 PROCEDURE — 36415 COLL VENOUS BLD VENIPUNCTURE: CPT | Performed by: FAMILY MEDICINE

## 2022-01-21 PROCEDURE — 99214 OFFICE O/P EST MOD 30 MIN: CPT | Performed by: FAMILY MEDICINE

## 2022-01-21 PROCEDURE — 80053 COMPREHEN METABOLIC PANEL: CPT | Performed by: FAMILY MEDICINE

## 2022-01-21 RX ORDER — METOPROLOL SUCCINATE 25 MG/1
25 TABLET, EXTENDED RELEASE ORAL DAILY
Qty: 90 TABLET | Refills: 1 | Status: ON HOLD | OUTPATIENT
Start: 2022-01-21 | End: 2022-03-21

## 2022-01-21 RX ORDER — TRIAMCINOLONE ACETONIDE 1 MG/G
CREAM TOPICAL 2 TIMES DAILY
Qty: 80 G | Refills: 2 | Status: SHIPPED | OUTPATIENT
Start: 2022-01-21 | End: 2022-08-24

## 2022-01-21 RX ORDER — AMLODIPINE BESYLATE 5 MG/1
5 TABLET ORAL DAILY
Qty: 90 TABLET | Refills: 1 | Status: SHIPPED | OUTPATIENT
Start: 2022-01-21 | End: 2022-02-01 | Stop reason: HOSPADM

## 2022-01-22 LAB
ALBUMIN SERPL-MCNC: 3 G/DL (ref 3.5–5.2)
ALBUMIN/GLOB SERPL: 0.9 G/DL
ALP SERPL-CCNC: 152 U/L (ref 39–117)
ALT SERPL W P-5'-P-CCNC: 17 U/L (ref 1–33)
ANION GAP SERPL CALCULATED.3IONS-SCNC: 12.1 MMOL/L (ref 5–15)
AST SERPL-CCNC: 19 U/L (ref 1–32)
BASOPHILS # BLD AUTO: 0.04 10*3/MM3 (ref 0–0.2)
BASOPHILS NFR BLD AUTO: 0.2 % (ref 0–1.5)
BILIRUB SERPL-MCNC: 0.6 MG/DL (ref 0–1.2)
BUN SERPL-MCNC: 13 MG/DL (ref 8–23)
BUN/CREAT SERPL: 17.8 (ref 7–25)
CALCIUM SPEC-SCNC: 8.8 MG/DL (ref 8.6–10.5)
CHLORIDE SERPL-SCNC: 100 MMOL/L (ref 98–107)
CO2 SERPL-SCNC: 23.9 MMOL/L (ref 22–29)
CREAT SERPL-MCNC: 0.73 MG/DL (ref 0.57–1)
DEPRECATED RDW RBC AUTO: 38.2 FL (ref 37–54)
EOSINOPHIL # BLD AUTO: 0.13 10*3/MM3 (ref 0–0.4)
EOSINOPHIL NFR BLD AUTO: 0.7 % (ref 0.3–6.2)
ERYTHROCYTE [DISTWIDTH] IN BLOOD BY AUTOMATED COUNT: 14.3 % (ref 12.3–15.4)
GFR SERPL CREATININE-BSD FRML MDRD: 76 ML/MIN/1.73
GLOBULIN UR ELPH-MCNC: 3.5 GM/DL
GLUCOSE SERPL-MCNC: 121 MG/DL (ref 65–99)
HCT VFR BLD AUTO: 29.3 % (ref 34–46.6)
HGB BLD-MCNC: 8.9 G/DL (ref 12–15.9)
IMM GRANULOCYTES # BLD AUTO: 0.21 10*3/MM3 (ref 0–0.05)
IMM GRANULOCYTES NFR BLD AUTO: 1.1 % (ref 0–0.5)
LYMPHOCYTES # BLD AUTO: 1.3 10*3/MM3 (ref 0.7–3.1)
LYMPHOCYTES NFR BLD AUTO: 6.6 % (ref 19.6–45.3)
MCH RBC QN AUTO: 22.9 PG (ref 26.6–33)
MCHC RBC AUTO-ENTMCNC: 30.4 G/DL (ref 31.5–35.7)
MCV RBC AUTO: 75.3 FL (ref 79–97)
MONOCYTES # BLD AUTO: 1.29 10*3/MM3 (ref 0.1–0.9)
MONOCYTES NFR BLD AUTO: 6.6 % (ref 5–12)
NEUTROPHILS NFR BLD AUTO: 16.62 10*3/MM3 (ref 1.7–7)
NEUTROPHILS NFR BLD AUTO: 84.8 % (ref 42.7–76)
NRBC BLD AUTO-RTO: 0 /100 WBC (ref 0–0.2)
NT-PROBNP SERPL-MCNC: 1624 PG/ML (ref 0–1800)
PLATELET # BLD AUTO: 497 10*3/MM3 (ref 140–450)
PMV BLD AUTO: 9.8 FL (ref 6–12)
POTASSIUM SERPL-SCNC: 4.4 MMOL/L (ref 3.5–5.2)
PROT SERPL-MCNC: 6.5 G/DL (ref 6–8.5)
RBC # BLD AUTO: 3.89 10*6/MM3 (ref 3.77–5.28)
SODIUM SERPL-SCNC: 136 MMOL/L (ref 136–145)
TSH SERPL DL<=0.05 MIU/L-ACNC: 1.48 UIU/ML (ref 0.27–4.2)
WBC NRBC COR # BLD: 19.59 10*3/MM3 (ref 3.4–10.8)

## 2022-01-22 RX ORDER — HYDROCHLOROTHIAZIDE 12.5 MG/1
12.5 TABLET ORAL DAILY
Qty: 90 TABLET | Refills: 1 | Status: SHIPPED | OUTPATIENT
Start: 2022-01-22 | End: 2022-02-01 | Stop reason: HOSPADM

## 2022-01-24 ENCOUNTER — HOSPITAL ENCOUNTER (INPATIENT)
Facility: HOSPITAL | Age: 82
LOS: 7 days | Discharge: HOME HEALTH | End: 2022-02-01
Attending: HOSPITALIST | Admitting: HOSPITALIST

## 2022-01-24 ENCOUNTER — APPOINTMENT (OUTPATIENT)
Dept: GENERAL RADIOLOGY | Facility: HOSPITAL | Age: 82
End: 2022-01-24

## 2022-01-24 DIAGNOSIS — I50.31 ACUTE DIASTOLIC HEART FAILURE: ICD-10-CM

## 2022-01-24 DIAGNOSIS — R00.2 PALPITATIONS: ICD-10-CM

## 2022-01-24 DIAGNOSIS — R60.0 BILATERAL LOWER EXTREMITY EDEMA: ICD-10-CM

## 2022-01-24 DIAGNOSIS — R53.1 GENERALIZED WEAKNESS: Primary | ICD-10-CM

## 2022-01-24 LAB
ALBUMIN SERPL-MCNC: 2.8 G/DL (ref 3.5–5.2)
ALBUMIN/GLOB SERPL: 0.8 G/DL
ALP SERPL-CCNC: 117 U/L (ref 39–117)
ALT SERPL W P-5'-P-CCNC: 12 U/L (ref 1–33)
ANION GAP SERPL CALCULATED.3IONS-SCNC: 11 MMOL/L (ref 5–15)
AST SERPL-CCNC: 12 U/L (ref 1–32)
BACTERIA UR QL AUTO: ABNORMAL /HPF
BASOPHILS # BLD AUTO: 0.1 10*3/MM3 (ref 0–0.2)
BASOPHILS NFR BLD AUTO: 1 % (ref 0–1.5)
BILIRUB SERPL-MCNC: 0.3 MG/DL (ref 0–1.2)
BILIRUB UR QL STRIP: NEGATIVE
BUN SERPL-MCNC: 12 MG/DL (ref 8–23)
BUN/CREAT SERPL: 24 (ref 7–25)
CALCIUM SPEC-SCNC: 8.9 MG/DL (ref 8.6–10.5)
CHLORIDE SERPL-SCNC: 105 MMOL/L (ref 98–107)
CLARITY UR: CLEAR
CO2 SERPL-SCNC: 23 MMOL/L (ref 22–29)
COLOR UR: ABNORMAL
CREAT SERPL-MCNC: 0.5 MG/DL (ref 0.57–1)
DEPRECATED RDW RBC AUTO: 42 FL (ref 37–54)
EOSINOPHIL # BLD AUTO: 0.1 10*3/MM3 (ref 0–0.4)
EOSINOPHIL NFR BLD AUTO: 1 % (ref 0.3–6.2)
ERYTHROCYTE [DISTWIDTH] IN BLOOD BY AUTOMATED COUNT: 16.5 % (ref 12.3–15.4)
GFR SERPL CREATININE-BSD FRML MDRD: 118 ML/MIN/1.73
GLOBULIN UR ELPH-MCNC: 3.5 GM/DL
GLUCOSE SERPL-MCNC: 108 MG/DL (ref 65–99)
GLUCOSE UR STRIP-MCNC: NEGATIVE MG/DL
HCT VFR BLD AUTO: 26.3 % (ref 34–46.6)
HGB BLD-MCNC: 8.8 G/DL (ref 12–15.9)
HGB UR QL STRIP.AUTO: ABNORMAL
HOLD SPECIMEN: NORMAL
HYALINE CASTS UR QL AUTO: ABNORMAL /LPF
KETONES UR QL STRIP: ABNORMAL
LEUKOCYTE ESTERASE UR QL STRIP.AUTO: ABNORMAL
LYMPHOCYTES # BLD AUTO: 0.9 10*3/MM3 (ref 0.7–3.1)
LYMPHOCYTES NFR BLD AUTO: 5.8 % (ref 19.6–45.3)
MCH RBC QN AUTO: 23.6 PG (ref 26.6–33)
MCHC RBC AUTO-ENTMCNC: 33.3 G/DL (ref 31.5–35.7)
MCV RBC AUTO: 70.9 FL (ref 79–97)
MONOCYTES # BLD AUTO: 0.7 10*3/MM3 (ref 0.1–0.9)
MONOCYTES NFR BLD AUTO: 4.5 % (ref 5–12)
NEUTROPHILS NFR BLD AUTO: 13.1 10*3/MM3 (ref 1.7–7)
NEUTROPHILS NFR BLD AUTO: 87.7 % (ref 42.7–76)
NITRITE UR QL STRIP: NEGATIVE
NRBC BLD AUTO-RTO: 0 /100 WBC (ref 0–0.2)
NT-PROBNP SERPL-MCNC: 1130 PG/ML (ref 0–1800)
PH UR STRIP.AUTO: 6.5 [PH] (ref 5–8)
PLATELET # BLD AUTO: 469 10*3/MM3 (ref 140–450)
PMV BLD AUTO: 6.7 FL (ref 6–12)
POTASSIUM SERPL-SCNC: 3.9 MMOL/L (ref 3.5–5.2)
PROT SERPL-MCNC: 6.3 G/DL (ref 6–8.5)
PROT UR QL STRIP: ABNORMAL
RBC # BLD AUTO: 3.71 10*6/MM3 (ref 3.77–5.28)
RBC # UR STRIP: ABNORMAL /HPF
REF LAB TEST METHOD: ABNORMAL
SARS-COV-2 RNA PNL SPEC NAA+PROBE: NOT DETECTED
SODIUM SERPL-SCNC: 139 MMOL/L (ref 136–145)
SP GR UR STRIP: 1.02 (ref 1–1.03)
SQUAMOUS #/AREA URNS HPF: ABNORMAL /HPF
TROPONIN T SERPL-MCNC: <0.01 NG/ML (ref 0–0.03)
UROBILINOGEN UR QL STRIP: ABNORMAL
WBC # UR STRIP: ABNORMAL /HPF
WBC NRBC COR # BLD: 14.9 10*3/MM3 (ref 3.4–10.8)

## 2022-01-24 PROCEDURE — 83880 ASSAY OF NATRIURETIC PEPTIDE: CPT | Performed by: NURSE PRACTITIONER

## 2022-01-24 PROCEDURE — 71045 X-RAY EXAM CHEST 1 VIEW: CPT

## 2022-01-24 PROCEDURE — 93005 ELECTROCARDIOGRAM TRACING: CPT | Performed by: NURSE PRACTITIONER

## 2022-01-24 PROCEDURE — 81001 URINALYSIS AUTO W/SCOPE: CPT | Performed by: NURSE PRACTITIONER

## 2022-01-24 PROCEDURE — 99223 1ST HOSP IP/OBS HIGH 75: CPT | Performed by: HOSPITALIST

## 2022-01-24 PROCEDURE — G0378 HOSPITAL OBSERVATION PER HR: HCPCS

## 2022-01-24 PROCEDURE — 25010000002 ENOXAPARIN PER 10 MG: Performed by: HOSPITALIST

## 2022-01-24 PROCEDURE — 25010000002 FUROSEMIDE PER 20 MG: Performed by: NURSE PRACTITIONER

## 2022-01-24 PROCEDURE — 85025 COMPLETE CBC W/AUTO DIFF WBC: CPT | Performed by: NURSE PRACTITIONER

## 2022-01-24 PROCEDURE — 87635 SARS-COV-2 COVID-19 AMP PRB: CPT | Performed by: HOSPITALIST

## 2022-01-24 PROCEDURE — 84484 ASSAY OF TROPONIN QUANT: CPT | Performed by: NURSE PRACTITIONER

## 2022-01-24 PROCEDURE — 80053 COMPREHEN METABOLIC PANEL: CPT | Performed by: NURSE PRACTITIONER

## 2022-01-24 PROCEDURE — 99284 EMERGENCY DEPT VISIT MOD MDM: CPT

## 2022-01-24 RX ORDER — FUROSEMIDE 10 MG/ML
40 INJECTION INTRAMUSCULAR; INTRAVENOUS EVERY 12 HOURS
Status: DISCONTINUED | OUTPATIENT
Start: 2022-01-25 | End: 2022-01-29

## 2022-01-24 RX ORDER — FUROSEMIDE 10 MG/ML
40 INJECTION INTRAMUSCULAR; INTRAVENOUS ONCE
Status: COMPLETED | OUTPATIENT
Start: 2022-01-24 | End: 2022-01-24

## 2022-01-24 RX ORDER — ACETAMINOPHEN 650 MG/1
650 SUPPOSITORY RECTAL EVERY 4 HOURS PRN
Status: DISCONTINUED | OUTPATIENT
Start: 2022-01-24 | End: 2022-02-01 | Stop reason: HOSPADM

## 2022-01-24 RX ORDER — METOPROLOL SUCCINATE 25 MG/1
25 TABLET, EXTENDED RELEASE ORAL DAILY
Status: DISCONTINUED | OUTPATIENT
Start: 2022-01-24 | End: 2022-02-01 | Stop reason: HOSPADM

## 2022-01-24 RX ORDER — ACETAMINOPHEN 325 MG/1
650 TABLET ORAL EVERY 4 HOURS PRN
Status: DISCONTINUED | OUTPATIENT
Start: 2022-01-24 | End: 2022-02-01 | Stop reason: HOSPADM

## 2022-01-24 RX ORDER — AMLODIPINE BESYLATE 5 MG/1
5 TABLET ORAL DAILY
Status: DISCONTINUED | OUTPATIENT
Start: 2022-01-24 | End: 2022-01-28

## 2022-01-24 RX ORDER — SODIUM CHLORIDE 0.9 % (FLUSH) 0.9 %
10 SYRINGE (ML) INJECTION AS NEEDED
Status: DISCONTINUED | OUTPATIENT
Start: 2022-01-24 | End: 2022-02-01 | Stop reason: HOSPADM

## 2022-01-24 RX ORDER — SODIUM CHLORIDE 0.9 % (FLUSH) 0.9 %
10 SYRINGE (ML) INJECTION EVERY 12 HOURS SCHEDULED
Status: DISCONTINUED | OUTPATIENT
Start: 2022-01-24 | End: 2022-02-01 | Stop reason: HOSPADM

## 2022-01-24 RX ORDER — ACETAMINOPHEN 160 MG/5ML
650 SOLUTION ORAL EVERY 4 HOURS PRN
Status: DISCONTINUED | OUTPATIENT
Start: 2022-01-24 | End: 2022-02-01 | Stop reason: HOSPADM

## 2022-01-24 RX ORDER — ONDANSETRON 2 MG/ML
4 INJECTION INTRAMUSCULAR; INTRAVENOUS EVERY 4 HOURS PRN
Status: DISCONTINUED | OUTPATIENT
Start: 2022-01-24 | End: 2022-02-01 | Stop reason: HOSPADM

## 2022-01-24 RX ADMIN — METOPROLOL SUCCINATE 25 MG: 25 TABLET, EXTENDED RELEASE ORAL at 21:03

## 2022-01-24 RX ADMIN — SODIUM CHLORIDE, PRESERVATIVE FREE 10 ML: 5 INJECTION INTRAVENOUS at 21:03

## 2022-01-24 RX ADMIN — AMLODIPINE BESYLATE 5 MG: 5 TABLET ORAL at 21:03

## 2022-01-24 RX ADMIN — FUROSEMIDE 40 MG: 10 INJECTION INTRAMUSCULAR; INTRAVENOUS at 18:48

## 2022-01-24 RX ADMIN — ENOXAPARIN SODIUM 40 MG: 40 INJECTION SUBCUTANEOUS at 21:03

## 2022-01-25 ENCOUNTER — APPOINTMENT (OUTPATIENT)
Dept: CARDIOLOGY | Facility: HOSPITAL | Age: 82
End: 2022-01-25

## 2022-01-25 LAB
ALBUMIN SERPL-MCNC: 2.3 G/DL (ref 3.5–5.2)
ALBUMIN/GLOB SERPL: 0.6 G/DL
ALP SERPL-CCNC: 109 U/L (ref 39–117)
ALT SERPL W P-5'-P-CCNC: 12 U/L (ref 1–33)
ANION GAP SERPL CALCULATED.3IONS-SCNC: 10 MMOL/L (ref 5–15)
AST SERPL-CCNC: 13 U/L (ref 1–32)
BASOPHILS # BLD AUTO: 0.1 10*3/MM3 (ref 0–0.2)
BASOPHILS NFR BLD AUTO: 0.5 % (ref 0–1.5)
BH CV ECHO MEAS - ACS: 1.6 CM
BH CV ECHO MEAS - AO MAX PG (FULL): 2.2 MMHG
BH CV ECHO MEAS - AO MAX PG: 7 MMHG
BH CV ECHO MEAS - AO MEAN PG (FULL): 1 MMHG
BH CV ECHO MEAS - AO MEAN PG: 3.6 MMHG
BH CV ECHO MEAS - AO ROOT AREA (BSA CORRECTED): 1.6
BH CV ECHO MEAS - AO ROOT AREA: 5.9 CM^2
BH CV ECHO MEAS - AO ROOT DIAM: 2.8 CM
BH CV ECHO MEAS - AO V2 MAX: 131.8 CM/SEC
BH CV ECHO MEAS - AO V2 MEAN: 88.6 CM/SEC
BH CV ECHO MEAS - AO V2 VTI: 23.7 CM
BH CV ECHO MEAS - ASC AORTA: 2.9 CM
BH CV ECHO MEAS - AVA(I,A): 2.3 CM^2
BH CV ECHO MEAS - AVA(I,D): 2.3 CM^2
BH CV ECHO MEAS - AVA(V,A): 1.9 CM^2
BH CV ECHO MEAS - AVA(V,D): 1.9 CM^2
BH CV ECHO MEAS - BSA(HAYCOCK): 1.8 M^2
BH CV ECHO MEAS - BSA: 1.7 M^2
BH CV ECHO MEAS - BZI_BMI: 28.9 KILOGRAMS/M^2
BH CV ECHO MEAS - BZI_METRIC_HEIGHT: 154.9 CM
BH CV ECHO MEAS - BZI_METRIC_WEIGHT: 69.4 KG
BH CV ECHO MEAS - EDV(CUBED): 93.3 ML
BH CV ECHO MEAS - EDV(MOD-SP4): 51.6 ML
BH CV ECHO MEAS - EDV(TEICH): 94.1 ML
BH CV ECHO MEAS - EF(CUBED): 61 %
BH CV ECHO MEAS - EF(MOD-BP): 63 %
BH CV ECHO MEAS - EF(MOD-SP4): 63.2 %
BH CV ECHO MEAS - EF(TEICH): 52.7 %
BH CV ECHO MEAS - ESV(CUBED): 36.4 ML
BH CV ECHO MEAS - ESV(MOD-SP4): 19 ML
BH CV ECHO MEAS - ESV(TEICH): 44.6 ML
BH CV ECHO MEAS - FS: 26.9 %
BH CV ECHO MEAS - IVS/LVPW: 0.84
BH CV ECHO MEAS - IVSD: 0.81 CM
BH CV ECHO MEAS - LA DIMENSION(2D): 3.6 CM
BH CV ECHO MEAS - LV DIASTOLIC VOL/BSA (35-75): 30.6 ML/M^2
BH CV ECHO MEAS - LV MASS(C)D: 132.1 GRAMS
BH CV ECHO MEAS - LV MASS(C)DI: 78.4 GRAMS/M^2
BH CV ECHO MEAS - LV MAX PG: 4.8 MMHG
BH CV ECHO MEAS - LV MEAN PG: 2.6 MMHG
BH CV ECHO MEAS - LV SYSTOLIC VOL/BSA (12-30): 11.3 ML/M^2
BH CV ECHO MEAS - LV V1 MAX: 109.2 CM/SEC
BH CV ECHO MEAS - LV V1 MEAN: 75.3 CM/SEC
BH CV ECHO MEAS - LV V1 VTI: 23.3 CM
BH CV ECHO MEAS - LVIDD: 4.5 CM
BH CV ECHO MEAS - LVIDS: 3.3 CM
BH CV ECHO MEAS - LVOT AREA: 2.3 CM^2
BH CV ECHO MEAS - LVOT DIAM: 1.7 CM
BH CV ECHO MEAS - LVPWD: 0.96 CM
BH CV ECHO MEAS - MV A MAX VEL: 48.1 CM/SEC
BH CV ECHO MEAS - MV DEC SLOPE: 412.7 CM/SEC^2
BH CV ECHO MEAS - MV DEC TIME: 0.23 SEC
BH CV ECHO MEAS - MV E MAX VEL: 95.5 CM/SEC
BH CV ECHO MEAS - MV E/A: 2
BH CV ECHO MEAS - MV MAX PG: 3.5 MMHG
BH CV ECHO MEAS - MV MEAN PG: 1.3 MMHG
BH CV ECHO MEAS - MV V2 MAX: 94.2 CM/SEC
BH CV ECHO MEAS - MV V2 MEAN: 54.4 CM/SEC
BH CV ECHO MEAS - MV V2 VTI: 23.3 CM
BH CV ECHO MEAS - MVA(VTI): 2.3 CM^2
BH CV ECHO MEAS - PA ACC TIME: 0.06 SEC
BH CV ECHO MEAS - PA MAX PG (FULL): 1.3 MMHG
BH CV ECHO MEAS - PA MAX PG: 3.9 MMHG
BH CV ECHO MEAS - PA MEAN PG (FULL): 0.98 MMHG
BH CV ECHO MEAS - PA MEAN PG: 2.7 MMHG
BH CV ECHO MEAS - PA PR(ACCEL): 49.9 MMHG
BH CV ECHO MEAS - PA V2 MAX: 99.1 CM/SEC
BH CV ECHO MEAS - PA V2 MEAN: 79.3 CM/SEC
BH CV ECHO MEAS - PA V2 VTI: 17.9 CM
BH CV ECHO MEAS - PI END-D VEL: 85.1 CM/SEC
BH CV ECHO MEAS - PI MAX PG: 16 MMHG
BH CV ECHO MEAS - PI MAX VEL: 199.9 CM/SEC
BH CV ECHO MEAS - RAP SYSTOLE: 3 MMHG
BH CV ECHO MEAS - RV MAX PG: 2.6 MMHG
BH CV ECHO MEAS - RV MEAN PG: 1.7 MMHG
BH CV ECHO MEAS - RV V1 MAX: 81.3 CM/SEC
BH CV ECHO MEAS - RV V1 MEAN: 62.2 CM/SEC
BH CV ECHO MEAS - RV V1 VTI: 18.9 CM
BH CV ECHO MEAS - RVDD: 2.4 CM
BH CV ECHO MEAS - RVSP: 26.6 MMHG
BH CV ECHO MEAS - SI(AO): 83.6 ML/M^2
BH CV ECHO MEAS - SI(CUBED): 33.8 ML/M^2
BH CV ECHO MEAS - SI(LVOT): 32.1 ML/M^2
BH CV ECHO MEAS - SI(MOD-SP4): 19.3 ML/M^2
BH CV ECHO MEAS - SI(TEICH): 29.4 ML/M^2
BH CV ECHO MEAS - SV(AO): 140.9 ML
BH CV ECHO MEAS - SV(CUBED): 56.9 ML
BH CV ECHO MEAS - SV(LVOT): 54 ML
BH CV ECHO MEAS - SV(MOD-SP4): 32.6 ML
BH CV ECHO MEAS - SV(TEICH): 49.6 ML
BH CV ECHO MEAS - TR MAX VEL: 242.6 CM/SEC
BILIRUB SERPL-MCNC: 0.3 MG/DL (ref 0–1.2)
BUN SERPL-MCNC: 10 MG/DL (ref 8–23)
BUN/CREAT SERPL: 20 (ref 7–25)
CALCIUM SPEC-SCNC: 8.3 MG/DL (ref 8.6–10.5)
CHLORIDE SERPL-SCNC: 101 MMOL/L (ref 98–107)
CHOLEST SERPL-MCNC: 128 MG/DL (ref 0–200)
CO2 SERPL-SCNC: 25 MMOL/L (ref 22–29)
CREAT SERPL-MCNC: 0.5 MG/DL (ref 0.57–1)
D-LACTATE SERPL-SCNC: 1.1 MMOL/L (ref 0.5–2)
DEPRECATED RDW RBC AUTO: 41.6 FL (ref 37–54)
EOSINOPHIL # BLD AUTO: 0.3 10*3/MM3 (ref 0–0.4)
EOSINOPHIL NFR BLD AUTO: 1.7 % (ref 0.3–6.2)
ERYTHROCYTE [DISTWIDTH] IN BLOOD BY AUTOMATED COUNT: 16.5 % (ref 12.3–15.4)
GFR SERPL CREATININE-BSD FRML MDRD: 118 ML/MIN/1.73
GLOBULIN UR ELPH-MCNC: 3.7 GM/DL
GLUCOSE SERPL-MCNC: 108 MG/DL (ref 65–99)
HBA1C MFR BLD: 6.3 % (ref 3.5–5.6)
HCT VFR BLD AUTO: 27.6 % (ref 34–46.6)
HDLC SERPL-MCNC: 35 MG/DL (ref 40–60)
HGB BLD-MCNC: 8.9 G/DL (ref 12–15.9)
LDLC SERPL CALC-MCNC: 76 MG/DL (ref 0–100)
LDLC/HDLC SERPL: 2.16 {RATIO}
LYMPHOCYTES # BLD AUTO: 1.3 10*3/MM3 (ref 0.7–3.1)
LYMPHOCYTES NFR BLD AUTO: 7.9 % (ref 19.6–45.3)
MAGNESIUM SERPL-MCNC: 1.5 MG/DL (ref 1.6–2.4)
MCH RBC QN AUTO: 23.1 PG (ref 26.6–33)
MCHC RBC AUTO-ENTMCNC: 32.1 G/DL (ref 31.5–35.7)
MCV RBC AUTO: 71.8 FL (ref 79–97)
MONOCYTES # BLD AUTO: 1 10*3/MM3 (ref 0.1–0.9)
MONOCYTES NFR BLD AUTO: 6.4 % (ref 5–12)
MRSA DNA SPEC QL NAA+PROBE: NORMAL
NEUTROPHILS NFR BLD AUTO: 13.6 10*3/MM3 (ref 1.7–7)
NEUTROPHILS NFR BLD AUTO: 83.5 % (ref 42.7–76)
NRBC BLD AUTO-RTO: 0 /100 WBC (ref 0–0.2)
PHOSPHATE SERPL-MCNC: 2.7 MG/DL (ref 2.5–4.5)
PLATELET # BLD AUTO: 503 10*3/MM3 (ref 140–450)
PMV BLD AUTO: 7.2 FL (ref 6–12)
POTASSIUM SERPL-SCNC: 3.8 MMOL/L (ref 3.5–5.2)
PROCALCITONIN SERPL-MCNC: 0.13 NG/ML (ref 0–0.25)
PROT SERPL-MCNC: 6 G/DL (ref 6–8.5)
RBC # BLD AUTO: 3.85 10*6/MM3 (ref 3.77–5.28)
SODIUM SERPL-SCNC: 136 MMOL/L (ref 136–145)
T4 FREE SERPL-MCNC: 1.34 NG/DL (ref 0.93–1.7)
TRIGL SERPL-MCNC: 87 MG/DL (ref 0–150)
TSH SERPL DL<=0.05 MIU/L-ACNC: 2.07 UIU/ML (ref 0.27–4.2)
VLDLC SERPL-MCNC: 17 MG/DL (ref 5–40)
WBC NRBC COR # BLD: 16.3 10*3/MM3 (ref 3.4–10.8)

## 2022-01-25 PROCEDURE — 87641 MR-STAPH DNA AMP PROBE: CPT | Performed by: HOSPITALIST

## 2022-01-25 PROCEDURE — 84443 ASSAY THYROID STIM HORMONE: CPT | Performed by: HOSPITALIST

## 2022-01-25 PROCEDURE — 84145 PROCALCITONIN (PCT): CPT | Performed by: HOSPITALIST

## 2022-01-25 PROCEDURE — 93306 TTE W/DOPPLER COMPLETE: CPT | Performed by: INTERNAL MEDICINE

## 2022-01-25 PROCEDURE — 83735 ASSAY OF MAGNESIUM: CPT | Performed by: HOSPITALIST

## 2022-01-25 PROCEDURE — 84439 ASSAY OF FREE THYROXINE: CPT | Performed by: HOSPITALIST

## 2022-01-25 PROCEDURE — 80061 LIPID PANEL: CPT | Performed by: HOSPITALIST

## 2022-01-25 PROCEDURE — 85025 COMPLETE CBC W/AUTO DIFF WBC: CPT | Performed by: HOSPITALIST

## 2022-01-25 PROCEDURE — 25010000002 FUROSEMIDE PER 20 MG: Performed by: HOSPITALIST

## 2022-01-25 PROCEDURE — 97162 PT EVAL MOD COMPLEX 30 MIN: CPT

## 2022-01-25 PROCEDURE — 83605 ASSAY OF LACTIC ACID: CPT | Performed by: HOSPITALIST

## 2022-01-25 PROCEDURE — 25010000002 ENOXAPARIN PER 10 MG: Performed by: HOSPITALIST

## 2022-01-25 PROCEDURE — 84100 ASSAY OF PHOSPHORUS: CPT | Performed by: HOSPITALIST

## 2022-01-25 PROCEDURE — 83036 HEMOGLOBIN GLYCOSYLATED A1C: CPT | Performed by: HOSPITALIST

## 2022-01-25 PROCEDURE — 93306 TTE W/DOPPLER COMPLETE: CPT

## 2022-01-25 PROCEDURE — 25010000002 MAGNESIUM SULFATE 2 GM/50ML SOLUTION: Performed by: HOSPITALIST

## 2022-01-25 PROCEDURE — 80053 COMPREHEN METABOLIC PANEL: CPT | Performed by: HOSPITALIST

## 2022-01-25 PROCEDURE — 99232 SBSQ HOSP IP/OBS MODERATE 35: CPT | Performed by: HOSPITALIST

## 2022-01-25 PROCEDURE — 25010000002 CEFTRIAXONE PER 250 MG: Performed by: HOSPITALIST

## 2022-01-25 RX ORDER — MAGNESIUM SULFATE HEPTAHYDRATE 40 MG/ML
2 INJECTION, SOLUTION INTRAVENOUS AS NEEDED
Status: DISCONTINUED | OUTPATIENT
Start: 2022-01-25 | End: 2022-02-01 | Stop reason: HOSPADM

## 2022-01-25 RX ORDER — MAGNESIUM SULFATE HEPTAHYDRATE 40 MG/ML
4 INJECTION, SOLUTION INTRAVENOUS AS NEEDED
Status: DISCONTINUED | OUTPATIENT
Start: 2022-01-25 | End: 2022-02-01 | Stop reason: HOSPADM

## 2022-01-25 RX ORDER — DOCUSATE SODIUM 100 MG/1
100 CAPSULE, LIQUID FILLED ORAL 2 TIMES DAILY
Status: DISCONTINUED | OUTPATIENT
Start: 2022-01-25 | End: 2022-01-29

## 2022-01-25 RX ADMIN — DOCUSATE SODIUM 100 MG: 100 CAPSULE, LIQUID FILLED ORAL at 21:09

## 2022-01-25 RX ADMIN — ENOXAPARIN SODIUM 40 MG: 40 INJECTION SUBCUTANEOUS at 17:04

## 2022-01-25 RX ADMIN — MAGNESIUM SULFATE HEPTAHYDRATE 2 G: 2 INJECTION, SOLUTION INTRAVENOUS at 14:20

## 2022-01-25 RX ADMIN — FUROSEMIDE 40 MG: 10 INJECTION, SOLUTION INTRAMUSCULAR; INTRAVENOUS at 05:07

## 2022-01-25 RX ADMIN — CEFTRIAXONE 1 G: 1 INJECTION, POWDER, FOR SOLUTION INTRAMUSCULAR; INTRAVENOUS at 11:42

## 2022-01-25 RX ADMIN — MAGNESIUM SULFATE HEPTAHYDRATE 2 G: 2 INJECTION, SOLUTION INTRAVENOUS at 09:45

## 2022-01-25 RX ADMIN — SODIUM CHLORIDE, PRESERVATIVE FREE 10 ML: 5 INJECTION INTRAVENOUS at 08:15

## 2022-01-25 RX ADMIN — METOPROLOL SUCCINATE 25 MG: 25 TABLET, EXTENDED RELEASE ORAL at 08:13

## 2022-01-25 RX ADMIN — ACETAMINOPHEN 650 MG: 325 TABLET, FILM COATED ORAL at 14:35

## 2022-01-25 RX ADMIN — MAGNESIUM SULFATE HEPTAHYDRATE 2 G: 2 INJECTION, SOLUTION INTRAVENOUS at 12:23

## 2022-01-25 RX ADMIN — ACETAMINOPHEN 650 MG: 325 TABLET, FILM COATED ORAL at 03:25

## 2022-01-25 RX ADMIN — AMLODIPINE BESYLATE 5 MG: 5 TABLET ORAL at 08:13

## 2022-01-25 RX ADMIN — FUROSEMIDE 40 MG: 10 INJECTION, SOLUTION INTRAMUSCULAR; INTRAVENOUS at 17:04

## 2022-01-25 RX ADMIN — SODIUM CHLORIDE, PRESERVATIVE FREE 10 ML: 5 INJECTION INTRAVENOUS at 21:09

## 2022-01-26 ENCOUNTER — APPOINTMENT (OUTPATIENT)
Dept: CT IMAGING | Facility: HOSPITAL | Age: 82
End: 2022-01-26

## 2022-01-26 ENCOUNTER — APPOINTMENT (OUTPATIENT)
Dept: CARDIOLOGY | Facility: HOSPITAL | Age: 82
End: 2022-01-26

## 2022-01-26 LAB
ANION GAP SERPL CALCULATED.3IONS-SCNC: 10 MMOL/L (ref 5–15)
BASOPHILS # BLD AUTO: 0.2 10*3/MM3 (ref 0–0.2)
BASOPHILS NFR BLD AUTO: 1 % (ref 0–1.5)
BH CV LOW VAS RIGHT LESSER SAPH VESSEL: 1
BH CV LOWER VASCULAR LEFT COMMON FEMORAL AUGMENT: NORMAL
BH CV LOWER VASCULAR LEFT COMMON FEMORAL COMPETENT: NORMAL
BH CV LOWER VASCULAR LEFT COMMON FEMORAL COMPRESS: NORMAL
BH CV LOWER VASCULAR LEFT COMMON FEMORAL PHASIC: NORMAL
BH CV LOWER VASCULAR LEFT COMMON FEMORAL SPONT: NORMAL
BH CV LOWER VASCULAR LEFT DISTAL FEMORAL COMPRESS: NORMAL
BH CV LOWER VASCULAR LEFT GASTRONEMIUS COMPRESS: NORMAL
BH CV LOWER VASCULAR LEFT GREATER SAPH AK COMPRESS: NORMAL
BH CV LOWER VASCULAR LEFT GREATER SAPH AK THROMBUS: NORMAL
BH CV LOWER VASCULAR LEFT GREATER SAPH BK COMPRESS: NORMAL
BH CV LOWER VASCULAR LEFT LESSER SAPH COMPRESS: NORMAL
BH CV LOWER VASCULAR LEFT LESSER SAPH THROMBUS: NORMAL
BH CV LOWER VASCULAR LEFT MID FEMORAL AUGMENT: NORMAL
BH CV LOWER VASCULAR LEFT MID FEMORAL COMPETENT: NORMAL
BH CV LOWER VASCULAR LEFT MID FEMORAL COMPRESS: NORMAL
BH CV LOWER VASCULAR LEFT MID FEMORAL PHASIC: NORMAL
BH CV LOWER VASCULAR LEFT MID FEMORAL SPONT: NORMAL
BH CV LOWER VASCULAR LEFT PERONEAL COMPRESS: NORMAL
BH CV LOWER VASCULAR LEFT POPLITEAL AUGMENT: NORMAL
BH CV LOWER VASCULAR LEFT POPLITEAL COMPETENT: NORMAL
BH CV LOWER VASCULAR LEFT POPLITEAL COMPRESS: NORMAL
BH CV LOWER VASCULAR LEFT POPLITEAL PHASIC: NORMAL
BH CV LOWER VASCULAR LEFT POPLITEAL SPONT: NORMAL
BH CV LOWER VASCULAR LEFT POSTERIOR TIBIAL COMPRESS: NORMAL
BH CV LOWER VASCULAR LEFT PROXIMAL FEMORAL COMPRESS: NORMAL
BH CV LOWER VASCULAR LEFT SAPHENOFEMORAL JUNCTION COMPRESS: NORMAL
BH CV LOWER VASCULAR RIGHT COMMON FEMORAL AUGMENT: NORMAL
BH CV LOWER VASCULAR RIGHT COMMON FEMORAL COMPETENT: NORMAL
BH CV LOWER VASCULAR RIGHT COMMON FEMORAL COMPRESS: NORMAL
BH CV LOWER VASCULAR RIGHT COMMON FEMORAL PHASIC: NORMAL
BH CV LOWER VASCULAR RIGHT COMMON FEMORAL SPONT: NORMAL
BH CV LOWER VASCULAR RIGHT DISTAL FEMORAL COMPRESS: NORMAL
BH CV LOWER VASCULAR RIGHT GASTRONEMIUS COMPRESS: NORMAL
BH CV LOWER VASCULAR RIGHT GREATER SAPH AK COMPRESS: NORMAL
BH CV LOWER VASCULAR RIGHT GREATER SAPH BK COMPRESS: NORMAL
BH CV LOWER VASCULAR RIGHT LESSER SAPH COMPRESS: NORMAL
BH CV LOWER VASCULAR RIGHT LESSER SAPH THROMBUS: NORMAL
BH CV LOWER VASCULAR RIGHT MID FEMORAL AUGMENT: NORMAL
BH CV LOWER VASCULAR RIGHT MID FEMORAL COMPETENT: NORMAL
BH CV LOWER VASCULAR RIGHT MID FEMORAL COMPRESS: NORMAL
BH CV LOWER VASCULAR RIGHT MID FEMORAL PHASIC: NORMAL
BH CV LOWER VASCULAR RIGHT MID FEMORAL SPONT: NORMAL
BH CV LOWER VASCULAR RIGHT PERONEAL COMPRESS: NORMAL
BH CV LOWER VASCULAR RIGHT POPLITEAL AUGMENT: NORMAL
BH CV LOWER VASCULAR RIGHT POPLITEAL COMPETENT: NORMAL
BH CV LOWER VASCULAR RIGHT POPLITEAL COMPRESS: NORMAL
BH CV LOWER VASCULAR RIGHT POPLITEAL PHASIC: NORMAL
BH CV LOWER VASCULAR RIGHT POPLITEAL SPONT: NORMAL
BH CV LOWER VASCULAR RIGHT POSTERIOR TIBIAL COMPRESS: NORMAL
BH CV LOWER VASCULAR RIGHT PROXIMAL FEMORAL COMPRESS: NORMAL
BH CV LOWER VASCULAR RIGHT SAPHENOFEMORAL JUNCTION COMPRESS: NORMAL
BUN SERPL-MCNC: 15 MG/DL (ref 8–23)
BUN/CREAT SERPL: 27.8 (ref 7–25)
CALCIUM SPEC-SCNC: 8 MG/DL (ref 8.6–10.5)
CHLORIDE SERPL-SCNC: 100 MMOL/L (ref 98–107)
CO2 SERPL-SCNC: 26 MMOL/L (ref 22–29)
CREAT SERPL-MCNC: 0.54 MG/DL (ref 0.57–1)
CRP SERPL-MCNC: 16.43 MG/DL (ref 0–0.5)
DEPRECATED RDW RBC AUTO: 41.6 FL (ref 37–54)
EOSINOPHIL # BLD AUTO: 0.4 10*3/MM3 (ref 0–0.4)
EOSINOPHIL NFR BLD AUTO: 2.2 % (ref 0.3–6.2)
ERYTHROCYTE [DISTWIDTH] IN BLOOD BY AUTOMATED COUNT: 16.6 % (ref 12.3–15.4)
GFR SERPL CREATININE-BSD FRML MDRD: 108 ML/MIN/1.73
GLUCOSE SERPL-MCNC: 148 MG/DL (ref 65–99)
HCT VFR BLD AUTO: 27.5 % (ref 34–46.6)
HGB BLD-MCNC: 8.7 G/DL (ref 12–15.9)
LYMPHOCYTES # BLD AUTO: 1 10*3/MM3 (ref 0.7–3.1)
LYMPHOCYTES NFR BLD AUTO: 5.5 % (ref 19.6–45.3)
MAGNESIUM SERPL-MCNC: 2.2 MG/DL (ref 1.6–2.4)
MAXIMAL PREDICTED HEART RATE: 138 BPM
MCH RBC QN AUTO: 22.6 PG (ref 26.6–33)
MCHC RBC AUTO-ENTMCNC: 31.6 G/DL (ref 31.5–35.7)
MCV RBC AUTO: 71.6 FL (ref 79–97)
MONOCYTES # BLD AUTO: 0.8 10*3/MM3 (ref 0.1–0.9)
MONOCYTES NFR BLD AUTO: 4.4 % (ref 5–12)
NEUTROPHILS NFR BLD AUTO: 16 10*3/MM3 (ref 1.7–7)
NEUTROPHILS NFR BLD AUTO: 86.9 % (ref 42.7–76)
NRBC BLD AUTO-RTO: 0 /100 WBC (ref 0–0.2)
PLATELET # BLD AUTO: 480 10*3/MM3 (ref 140–450)
PMV BLD AUTO: 7.2 FL (ref 6–12)
POTASSIUM SERPL-SCNC: 3.7 MMOL/L (ref 3.5–5.2)
PROCALCITONIN SERPL-MCNC: 0.15 NG/ML (ref 0–0.25)
QT INTERVAL: 390 MS
RBC # BLD AUTO: 3.84 10*6/MM3 (ref 3.77–5.28)
SODIUM SERPL-SCNC: 136 MMOL/L (ref 136–145)
STRESS TARGET HR: 117 BPM
WBC NRBC COR # BLD: 18.4 10*3/MM3 (ref 3.4–10.8)

## 2022-01-26 PROCEDURE — 25010000002 ENOXAPARIN PER 10 MG: Performed by: HOSPITALIST

## 2022-01-26 PROCEDURE — 70486 CT MAXILLOFACIAL W/O DYE: CPT

## 2022-01-26 PROCEDURE — 84145 PROCALCITONIN (PCT): CPT | Performed by: INTERNAL MEDICINE

## 2022-01-26 PROCEDURE — 99232 SBSQ HOSP IP/OBS MODERATE 35: CPT | Performed by: HOSPITALIST

## 2022-01-26 PROCEDURE — 87040 BLOOD CULTURE FOR BACTERIA: CPT | Performed by: INTERNAL MEDICINE

## 2022-01-26 PROCEDURE — 93970 EXTREMITY STUDY: CPT

## 2022-01-26 PROCEDURE — 25010000002 FUROSEMIDE PER 20 MG: Performed by: HOSPITALIST

## 2022-01-26 PROCEDURE — 25010000002 CEFTRIAXONE PER 250 MG: Performed by: HOSPITALIST

## 2022-01-26 PROCEDURE — 83735 ASSAY OF MAGNESIUM: CPT | Performed by: HOSPITALIST

## 2022-01-26 PROCEDURE — 86140 C-REACTIVE PROTEIN: CPT | Performed by: HOSPITALIST

## 2022-01-26 PROCEDURE — 85025 COMPLETE CBC W/AUTO DIFF WBC: CPT | Performed by: HOSPITALIST

## 2022-01-26 PROCEDURE — 80048 BASIC METABOLIC PNL TOTAL CA: CPT | Performed by: HOSPITALIST

## 2022-01-26 RX ADMIN — ACETAMINOPHEN 650 MG: 325 TABLET, FILM COATED ORAL at 03:31

## 2022-01-26 RX ADMIN — DOCUSATE SODIUM 100 MG: 100 CAPSULE, LIQUID FILLED ORAL at 21:25

## 2022-01-26 RX ADMIN — SODIUM CHLORIDE, PRESERVATIVE FREE 10 ML: 5 INJECTION INTRAVENOUS at 08:31

## 2022-01-26 RX ADMIN — ACETAMINOPHEN 650 MG: 325 TABLET, FILM COATED ORAL at 18:03

## 2022-01-26 RX ADMIN — ENOXAPARIN SODIUM 40 MG: 40 INJECTION SUBCUTANEOUS at 15:41

## 2022-01-26 RX ADMIN — CEFTRIAXONE 1 G: 1 INJECTION, POWDER, FOR SOLUTION INTRAMUSCULAR; INTRAVENOUS at 11:27

## 2022-01-26 RX ADMIN — SODIUM CHLORIDE, PRESERVATIVE FREE 10 ML: 5 INJECTION INTRAVENOUS at 21:26

## 2022-01-26 RX ADMIN — FUROSEMIDE 40 MG: 10 INJECTION, SOLUTION INTRAMUSCULAR; INTRAVENOUS at 05:40

## 2022-01-26 RX ADMIN — FUROSEMIDE 40 MG: 10 INJECTION, SOLUTION INTRAMUSCULAR; INTRAVENOUS at 17:03

## 2022-01-26 RX ADMIN — DOCUSATE SODIUM 100 MG: 100 CAPSULE, LIQUID FILLED ORAL at 08:31

## 2022-01-26 RX ADMIN — METOPROLOL SUCCINATE 25 MG: 25 TABLET, EXTENDED RELEASE ORAL at 08:31

## 2022-01-27 PROCEDURE — 97116 GAIT TRAINING THERAPY: CPT

## 2022-01-27 PROCEDURE — 97535 SELF CARE MNGMENT TRAINING: CPT

## 2022-01-27 PROCEDURE — 93010 ELECTROCARDIOGRAM REPORT: CPT | Performed by: INTERNAL MEDICINE

## 2022-01-27 PROCEDURE — 63710000001 PREDNISONE PER 1 MG: Performed by: INTERNAL MEDICINE

## 2022-01-27 PROCEDURE — 25010000002 FUROSEMIDE PER 20 MG: Performed by: HOSPITALIST

## 2022-01-27 PROCEDURE — 93005 ELECTROCARDIOGRAM TRACING: CPT | Performed by: INTERNAL MEDICINE

## 2022-01-27 PROCEDURE — 97166 OT EVAL MOD COMPLEX 45 MIN: CPT

## 2022-01-27 PROCEDURE — 97110 THERAPEUTIC EXERCISES: CPT

## 2022-01-27 PROCEDURE — 25010000002 ENOXAPARIN PER 10 MG: Performed by: HOSPITALIST

## 2022-01-27 PROCEDURE — 99222 1ST HOSP IP/OBS MODERATE 55: CPT | Performed by: INTERNAL MEDICINE

## 2022-01-27 PROCEDURE — 99232 SBSQ HOSP IP/OBS MODERATE 35: CPT | Performed by: HOSPITALIST

## 2022-01-27 PROCEDURE — 25010000002 PIPERACILLIN SOD-TAZOBACTAM PER 1 G: Performed by: INTERNAL MEDICINE

## 2022-01-27 RX ORDER — POLYETHYLENE GLYCOL 3350 17 G/17G
17 POWDER, FOR SOLUTION ORAL DAILY PRN
Status: DISCONTINUED | OUTPATIENT
Start: 2022-01-27 | End: 2022-01-29

## 2022-01-27 RX ORDER — ASPIRIN 81 MG/1
81 TABLET ORAL DAILY
Status: DISCONTINUED | OUTPATIENT
Start: 2022-01-27 | End: 2022-02-01 | Stop reason: HOSPADM

## 2022-01-27 RX ORDER — PREDNISONE 20 MG/1
20 TABLET ORAL
Status: DISCONTINUED | OUTPATIENT
Start: 2022-01-27 | End: 2022-01-30

## 2022-01-27 RX ADMIN — DOCUSATE SODIUM 100 MG: 100 CAPSULE, LIQUID FILLED ORAL at 09:22

## 2022-01-27 RX ADMIN — PREDNISONE 20 MG: 20 TABLET ORAL at 09:22

## 2022-01-27 RX ADMIN — SODIUM CHLORIDE, PRESERVATIVE FREE 10 ML: 5 INJECTION INTRAVENOUS at 20:27

## 2022-01-27 RX ADMIN — ASPIRIN 81 MG: 81 TABLET, COATED ORAL at 09:22

## 2022-01-27 RX ADMIN — PIPERACILLIN AND TAZOBACTAM 3.38 G: 3; .375 INJECTION, POWDER, LYOPHILIZED, FOR SOLUTION INTRAVENOUS at 17:07

## 2022-01-27 RX ADMIN — DOCUSATE SODIUM 100 MG: 100 CAPSULE, LIQUID FILLED ORAL at 20:27

## 2022-01-27 RX ADMIN — FUROSEMIDE 40 MG: 10 INJECTION, SOLUTION INTRAMUSCULAR; INTRAVENOUS at 17:07

## 2022-01-27 RX ADMIN — SODIUM CHLORIDE, PRESERVATIVE FREE 10 ML: 5 INJECTION INTRAVENOUS at 09:23

## 2022-01-27 RX ADMIN — METOPROLOL SUCCINATE 25 MG: 25 TABLET, EXTENDED RELEASE ORAL at 09:22

## 2022-01-27 RX ADMIN — FUROSEMIDE 40 MG: 10 INJECTION, SOLUTION INTRAMUSCULAR; INTRAVENOUS at 05:57

## 2022-01-27 RX ADMIN — PIPERACILLIN AND TAZOBACTAM 3.38 G: 3; .375 INJECTION, POWDER, LYOPHILIZED, FOR SOLUTION INTRAVENOUS at 23:56

## 2022-01-27 RX ADMIN — ACETAMINOPHEN 650 MG: 325 TABLET, FILM COATED ORAL at 03:52

## 2022-01-27 RX ADMIN — ENOXAPARIN SODIUM 40 MG: 40 INJECTION SUBCUTANEOUS at 17:08

## 2022-01-27 RX ADMIN — PIPERACILLIN AND TAZOBACTAM 3.38 G: 3; .375 INJECTION, POWDER, LYOPHILIZED, FOR SOLUTION INTRAVENOUS at 09:22

## 2022-01-27 RX ADMIN — AMLODIPINE BESYLATE 5 MG: 5 TABLET ORAL at 09:22

## 2022-01-27 RX ADMIN — POLYETHYLENE GLYCOL 3350 17 G: 17 POWDER, FOR SOLUTION ORAL at 09:22

## 2022-01-28 ENCOUNTER — APPOINTMENT (OUTPATIENT)
Dept: NUCLEAR MEDICINE | Facility: HOSPITAL | Age: 82
End: 2022-01-28

## 2022-01-28 LAB
ANION GAP SERPL CALCULATED.3IONS-SCNC: 9 MMOL/L (ref 5–15)
BASOPHILS # BLD AUTO: 0 10*3/MM3 (ref 0–0.2)
BASOPHILS NFR BLD AUTO: 0.3 % (ref 0–1.5)
BH CV REST NUCLEAR ISOTOPE DOSE: 7.8 MCI
BH CV STRESS BP STAGE 1: NORMAL
BH CV STRESS COMMENTS STAGE 1: NORMAL
BH CV STRESS DOSE REGADENOSON STAGE 1: 0.4
BH CV STRESS DURATION MIN STAGE 1: 4
BH CV STRESS DURATION SEC STAGE 1: 10
BH CV STRESS HR STAGE 1: 118
BH CV STRESS NUCLEAR ISOTOPE DOSE: 21.2 MCI
BH CV STRESS PROTOCOL 1: NORMAL
BH CV STRESS RECOVERY BP: NORMAL MMHG
BH CV STRESS RECOVERY HR: 96 BPM
BH CV STRESS STAGE 1: 1
BUN SERPL-MCNC: 17 MG/DL (ref 8–23)
BUN/CREAT SERPL: 31.5 (ref 7–25)
CALCIUM SPEC-SCNC: 8.5 MG/DL (ref 8.6–10.5)
CHLORIDE SERPL-SCNC: 99 MMOL/L (ref 98–107)
CO2 SERPL-SCNC: 29 MMOL/L (ref 22–29)
CREAT SERPL-MCNC: 0.54 MG/DL (ref 0.57–1)
DEPRECATED RDW RBC AUTO: 41.6 FL (ref 37–54)
EOSINOPHIL # BLD AUTO: 0.1 10*3/MM3 (ref 0–0.4)
EOSINOPHIL NFR BLD AUTO: 0.4 % (ref 0.3–6.2)
ERYTHROCYTE [DISTWIDTH] IN BLOOD BY AUTOMATED COUNT: 16.5 % (ref 12.3–15.4)
GFR SERPL CREATININE-BSD FRML MDRD: 108 ML/MIN/1.73
GLUCOSE SERPL-MCNC: 156 MG/DL (ref 65–99)
HCT VFR BLD AUTO: 25.3 % (ref 34–46.6)
HGB BLD-MCNC: 8.2 G/DL (ref 12–15.9)
LYMPHOCYTES # BLD AUTO: 1.5 10*3/MM3 (ref 0.7–3.1)
LYMPHOCYTES NFR BLD AUTO: 9.5 % (ref 19.6–45.3)
MAGNESIUM SERPL-MCNC: 1.8 MG/DL (ref 1.6–2.4)
MAXIMAL PREDICTED HEART RATE: 138 BPM
MCH RBC QN AUTO: 23.1 PG (ref 26.6–33)
MCHC RBC AUTO-ENTMCNC: 32.5 G/DL (ref 31.5–35.7)
MCV RBC AUTO: 71.1 FL (ref 79–97)
MONOCYTES # BLD AUTO: 0.8 10*3/MM3 (ref 0.1–0.9)
MONOCYTES NFR BLD AUTO: 4.8 % (ref 5–12)
NEUTROPHILS NFR BLD AUTO: 13.6 10*3/MM3 (ref 1.7–7)
NEUTROPHILS NFR BLD AUTO: 85 % (ref 42.7–76)
NRBC BLD AUTO-RTO: 0.1 /100 WBC (ref 0–0.2)
PERCENT MAX PREDICTED HR: 85.51 %
PLATELET # BLD AUTO: 484 10*3/MM3 (ref 140–450)
PMV BLD AUTO: 7.3 FL (ref 6–12)
POTASSIUM SERPL-SCNC: 3.4 MMOL/L (ref 3.5–5.2)
QT INTERVAL: 382 MS
RBC # BLD AUTO: 3.56 10*6/MM3 (ref 3.77–5.28)
SODIUM SERPL-SCNC: 137 MMOL/L (ref 136–145)
STRESS BASELINE BP: NORMAL MMHG
STRESS BASELINE HR: 103 BPM
STRESS PERCENT HR: 101 %
STRESS POST PEAK BP: NORMAL MMHG
STRESS POST PEAK HR: 118 BPM
STRESS TARGET HR: 117 BPM
TSH SERPL DL<=0.05 MIU/L-ACNC: 0.51 UIU/ML (ref 0.27–4.2)
WBC NRBC COR # BLD: 16 10*3/MM3 (ref 3.4–10.8)

## 2022-01-28 PROCEDURE — 25010000002 ENOXAPARIN PER 10 MG: Performed by: HOSPITALIST

## 2022-01-28 PROCEDURE — 83735 ASSAY OF MAGNESIUM: CPT | Performed by: INTERNAL MEDICINE

## 2022-01-28 PROCEDURE — A9502 TC99M TETROFOSMIN: HCPCS | Performed by: HOSPITALIST

## 2022-01-28 PROCEDURE — 25010000002 PIPERACILLIN SOD-TAZOBACTAM PER 1 G: Performed by: INTERNAL MEDICINE

## 2022-01-28 PROCEDURE — 93018 CV STRESS TEST I&R ONLY: CPT | Performed by: INTERNAL MEDICINE

## 2022-01-28 PROCEDURE — 0 TECHNETIUM TETROFOSMIN KIT: Performed by: HOSPITALIST

## 2022-01-28 PROCEDURE — 84443 ASSAY THYROID STIM HORMONE: CPT | Performed by: INTERNAL MEDICINE

## 2022-01-28 PROCEDURE — 25010000002 REGADENOSON 0.4 MG/5ML SOLUTION: Performed by: HOSPITALIST

## 2022-01-28 PROCEDURE — 85025 COMPLETE CBC W/AUTO DIFF WBC: CPT | Performed by: HOSPITALIST

## 2022-01-28 PROCEDURE — 93017 CV STRESS TEST TRACING ONLY: CPT

## 2022-01-28 PROCEDURE — 99232 SBSQ HOSP IP/OBS MODERATE 35: CPT | Performed by: HOSPITALIST

## 2022-01-28 PROCEDURE — 25010000002 FUROSEMIDE PER 20 MG: Performed by: HOSPITALIST

## 2022-01-28 PROCEDURE — 80048 BASIC METABOLIC PNL TOTAL CA: CPT | Performed by: HOSPITALIST

## 2022-01-28 PROCEDURE — 78452 HT MUSCLE IMAGE SPECT MULT: CPT

## 2022-01-28 PROCEDURE — 78452 HT MUSCLE IMAGE SPECT MULT: CPT | Performed by: INTERNAL MEDICINE

## 2022-01-28 PROCEDURE — 99232 SBSQ HOSP IP/OBS MODERATE 35: CPT | Performed by: INTERNAL MEDICINE

## 2022-01-28 RX ORDER — LACTULOSE 10 G/15ML
30 SOLUTION ORAL 3 TIMES DAILY
Status: DISCONTINUED | OUTPATIENT
Start: 2022-01-28 | End: 2022-01-29

## 2022-01-28 RX ORDER — DILTIAZEM HYDROCHLORIDE 180 MG/1
180 CAPSULE, COATED, EXTENDED RELEASE ORAL
Status: DISCONTINUED | OUTPATIENT
Start: 2022-01-28 | End: 2022-02-01 | Stop reason: HOSPADM

## 2022-01-28 RX ADMIN — PIPERACILLIN AND TAZOBACTAM 3.38 G: 3; .375 INJECTION, POWDER, LYOPHILIZED, FOR SOLUTION INTRAVENOUS at 15:02

## 2022-01-28 RX ADMIN — TETROFOSMIN 1 DOSE: 1.38 INJECTION, POWDER, LYOPHILIZED, FOR SOLUTION INTRAVENOUS at 11:40

## 2022-01-28 RX ADMIN — DILTIAZEM HYDROCHLORIDE 180 MG: 180 CAPSULE, COATED, EXTENDED RELEASE ORAL at 13:50

## 2022-01-28 RX ADMIN — FUROSEMIDE 40 MG: 10 INJECTION, SOLUTION INTRAMUSCULAR; INTRAVENOUS at 05:31

## 2022-01-28 RX ADMIN — PIPERACILLIN AND TAZOBACTAM 3.38 G: 3; .375 INJECTION, POWDER, LYOPHILIZED, FOR SOLUTION INTRAVENOUS at 22:11

## 2022-01-28 RX ADMIN — SODIUM CHLORIDE, PRESERVATIVE FREE 10 ML: 5 INJECTION INTRAVENOUS at 22:12

## 2022-01-28 RX ADMIN — REGADENOSON 0.4 MG: 0.08 INJECTION, SOLUTION INTRAVENOUS at 11:40

## 2022-01-28 RX ADMIN — TETROFOSMIN 1 DOSE: 1.38 INJECTION, POWDER, LYOPHILIZED, FOR SOLUTION INTRAVENOUS at 10:15

## 2022-01-28 RX ADMIN — PIPERACILLIN AND TAZOBACTAM 3.38 G: 3; .375 INJECTION, POWDER, LYOPHILIZED, FOR SOLUTION INTRAVENOUS at 08:37

## 2022-01-28 RX ADMIN — ENOXAPARIN SODIUM 40 MG: 40 INJECTION SUBCUTANEOUS at 15:01

## 2022-01-28 RX ADMIN — FUROSEMIDE 40 MG: 10 INJECTION, SOLUTION INTRAMUSCULAR; INTRAVENOUS at 18:21

## 2022-01-28 RX ADMIN — SODIUM CHLORIDE, PRESERVATIVE FREE 10 ML: 5 INJECTION INTRAVENOUS at 08:37

## 2022-01-28 RX ADMIN — LACTULOSE 30 G: 20 SOLUTION ORAL at 15:01

## 2022-01-29 LAB
ANION GAP SERPL CALCULATED.3IONS-SCNC: 11 MMOL/L (ref 5–15)
BASOPHILS # BLD AUTO: 0.1 10*3/MM3 (ref 0–0.2)
BASOPHILS NFR BLD AUTO: 0.4 % (ref 0–1.5)
BUN SERPL-MCNC: 17 MG/DL (ref 8–23)
BUN/CREAT SERPL: 25 (ref 7–25)
CALCIUM SPEC-SCNC: 8.5 MG/DL (ref 8.6–10.5)
CHLORIDE SERPL-SCNC: 95 MMOL/L (ref 98–107)
CO2 SERPL-SCNC: 29 MMOL/L (ref 22–29)
CREAT SERPL-MCNC: 0.68 MG/DL (ref 0.57–1)
CRP SERPL-MCNC: 12.33 MG/DL (ref 0–0.5)
DEPRECATED RDW RBC AUTO: 42 FL (ref 37–54)
EOSINOPHIL # BLD AUTO: 0.2 10*3/MM3 (ref 0–0.4)
EOSINOPHIL NFR BLD AUTO: 1.2 % (ref 0.3–6.2)
ERYTHROCYTE [DISTWIDTH] IN BLOOD BY AUTOMATED COUNT: 16.8 % (ref 12.3–15.4)
GFR SERPL CREATININE-BSD FRML MDRD: 83 ML/MIN/1.73
GLUCOSE SERPL-MCNC: 132 MG/DL (ref 65–99)
HCT VFR BLD AUTO: 28.4 % (ref 34–46.6)
HGB BLD-MCNC: 9.3 G/DL (ref 12–15.9)
LYMPHOCYTES # BLD AUTO: 1.7 10*3/MM3 (ref 0.7–3.1)
LYMPHOCYTES NFR BLD AUTO: 8.3 % (ref 19.6–45.3)
MCH RBC QN AUTO: 23 PG (ref 26.6–33)
MCHC RBC AUTO-ENTMCNC: 32.7 G/DL (ref 31.5–35.7)
MCV RBC AUTO: 70.4 FL (ref 79–97)
MONOCYTES # BLD AUTO: 1.5 10*3/MM3 (ref 0.1–0.9)
MONOCYTES NFR BLD AUTO: 7.2 % (ref 5–12)
NEUTROPHILS NFR BLD AUTO: 17.2 10*3/MM3 (ref 1.7–7)
NEUTROPHILS NFR BLD AUTO: 82.9 % (ref 42.7–76)
NRBC BLD AUTO-RTO: 0 /100 WBC (ref 0–0.2)
PLATELET # BLD AUTO: 556 10*3/MM3 (ref 140–450)
PMV BLD AUTO: 7.1 FL (ref 6–12)
POTASSIUM SERPL-SCNC: 3.4 MMOL/L (ref 3.5–5.2)
RBC # BLD AUTO: 4.04 10*6/MM3 (ref 3.77–5.28)
SODIUM SERPL-SCNC: 135 MMOL/L (ref 136–145)
WBC NRBC COR # BLD: 20.7 10*3/MM3 (ref 3.4–10.8)

## 2022-01-29 PROCEDURE — 86140 C-REACTIVE PROTEIN: CPT | Performed by: HOSPITALIST

## 2022-01-29 PROCEDURE — 99232 SBSQ HOSP IP/OBS MODERATE 35: CPT | Performed by: INTERNAL MEDICINE

## 2022-01-29 PROCEDURE — 25010000002 FUROSEMIDE PER 20 MG: Performed by: HOSPITALIST

## 2022-01-29 PROCEDURE — 80048 BASIC METABOLIC PNL TOTAL CA: CPT | Performed by: HOSPITALIST

## 2022-01-29 PROCEDURE — 25010000002 ENOXAPARIN PER 10 MG: Performed by: HOSPITALIST

## 2022-01-29 PROCEDURE — 63710000001 PREDNISONE PER 1 MG: Performed by: INTERNAL MEDICINE

## 2022-01-29 PROCEDURE — 85025 COMPLETE CBC W/AUTO DIFF WBC: CPT | Performed by: HOSPITALIST

## 2022-01-29 PROCEDURE — 25010000002 PIPERACILLIN SOD-TAZOBACTAM PER 1 G: Performed by: INTERNAL MEDICINE

## 2022-01-29 PROCEDURE — 99232 SBSQ HOSP IP/OBS MODERATE 35: CPT | Performed by: HOSPITALIST

## 2022-01-29 RX ORDER — POTASSIUM CHLORIDE 20 MEQ/1
40 TABLET, EXTENDED RELEASE ORAL ONCE
Status: DISCONTINUED | OUTPATIENT
Start: 2022-01-29 | End: 2022-01-29 | Stop reason: SDUPTHER

## 2022-01-29 RX ORDER — POTASSIUM CHLORIDE 20 MEQ/1
40 TABLET, EXTENDED RELEASE ORAL ONCE
Status: COMPLETED | OUTPATIENT
Start: 2022-01-29 | End: 2022-01-29

## 2022-01-29 RX ORDER — FUROSEMIDE 10 MG/ML
20 INJECTION INTRAMUSCULAR; INTRAVENOUS
Status: DISCONTINUED | OUTPATIENT
Start: 2022-01-30 | End: 2022-01-30

## 2022-01-29 RX ADMIN — PIPERACILLIN AND TAZOBACTAM 3.38 G: 3; .375 INJECTION, POWDER, LYOPHILIZED, FOR SOLUTION INTRAVENOUS at 08:21

## 2022-01-29 RX ADMIN — METOPROLOL SUCCINATE 25 MG: 25 TABLET, EXTENDED RELEASE ORAL at 08:24

## 2022-01-29 RX ADMIN — PREDNISONE 20 MG: 20 TABLET ORAL at 08:21

## 2022-01-29 RX ADMIN — DOCUSATE SODIUM 100 MG: 100 CAPSULE, LIQUID FILLED ORAL at 08:21

## 2022-01-29 RX ADMIN — DILTIAZEM HYDROCHLORIDE 180 MG: 180 CAPSULE, COATED, EXTENDED RELEASE ORAL at 08:21

## 2022-01-29 RX ADMIN — LACTULOSE 30 G: 20 SOLUTION ORAL at 08:21

## 2022-01-29 RX ADMIN — POTASSIUM CHLORIDE 40 MEQ: 1500 TABLET, EXTENDED RELEASE ORAL at 06:15

## 2022-01-29 RX ADMIN — SODIUM CHLORIDE, PRESERVATIVE FREE 10 ML: 5 INJECTION INTRAVENOUS at 08:22

## 2022-01-29 RX ADMIN — PIPERACILLIN AND TAZOBACTAM 3.38 G: 3; .375 INJECTION, POWDER, LYOPHILIZED, FOR SOLUTION INTRAVENOUS at 15:44

## 2022-01-29 RX ADMIN — PIPERACILLIN AND TAZOBACTAM 3.38 G: 3; .375 INJECTION, POWDER, LYOPHILIZED, FOR SOLUTION INTRAVENOUS at 23:51

## 2022-01-29 RX ADMIN — FUROSEMIDE 40 MG: 10 INJECTION, SOLUTION INTRAMUSCULAR; INTRAVENOUS at 06:15

## 2022-01-29 RX ADMIN — ACETAMINOPHEN 650 MG: 325 TABLET, FILM COATED ORAL at 00:16

## 2022-01-29 RX ADMIN — SODIUM CHLORIDE, PRESERVATIVE FREE 10 ML: 5 INJECTION INTRAVENOUS at 20:00

## 2022-01-29 RX ADMIN — POTASSIUM CHLORIDE 40 MEQ: 1500 TABLET, EXTENDED RELEASE ORAL at 15:44

## 2022-01-29 RX ADMIN — ENOXAPARIN SODIUM 40 MG: 40 INJECTION SUBCUTANEOUS at 15:44

## 2022-01-29 RX ADMIN — ASPIRIN 81 MG: 81 TABLET, COATED ORAL at 08:21

## 2022-01-30 LAB
ANION GAP SERPL CALCULATED.3IONS-SCNC: 9 MMOL/L (ref 5–15)
BASOPHILS # BLD AUTO: 0 10*3/MM3 (ref 0–0.2)
BASOPHILS NFR BLD AUTO: 0.3 % (ref 0–1.5)
BUN SERPL-MCNC: 25 MG/DL (ref 8–23)
BUN/CREAT SERPL: 38.5 (ref 7–25)
CALCIUM SPEC-SCNC: 8.8 MG/DL (ref 8.6–10.5)
CHLORIDE SERPL-SCNC: 98 MMOL/L (ref 98–107)
CO2 SERPL-SCNC: 29 MMOL/L (ref 22–29)
CREAT SERPL-MCNC: 0.65 MG/DL (ref 0.57–1)
DEPRECATED RDW RBC AUTO: 40.7 FL (ref 37–54)
EOSINOPHIL # BLD AUTO: 0.1 10*3/MM3 (ref 0–0.4)
EOSINOPHIL NFR BLD AUTO: 0.6 % (ref 0.3–6.2)
ERYTHROCYTE [DISTWIDTH] IN BLOOD BY AUTOMATED COUNT: 16.2 % (ref 12.3–15.4)
GFR SERPL CREATININE-BSD FRML MDRD: 87 ML/MIN/1.73
GLUCOSE SERPL-MCNC: 144 MG/DL (ref 65–99)
HCT VFR BLD AUTO: 25.7 % (ref 34–46.6)
HGB BLD-MCNC: 8.3 G/DL (ref 12–15.9)
LYMPHOCYTES # BLD AUTO: 1.8 10*3/MM3 (ref 0.7–3.1)
LYMPHOCYTES NFR BLD AUTO: 10.1 % (ref 19.6–45.3)
MCH RBC QN AUTO: 22.7 PG (ref 26.6–33)
MCHC RBC AUTO-ENTMCNC: 32.3 G/DL (ref 31.5–35.7)
MCV RBC AUTO: 70.2 FL (ref 79–97)
MONOCYTES # BLD AUTO: 0.9 10*3/MM3 (ref 0.1–0.9)
MONOCYTES NFR BLD AUTO: 4.9 % (ref 5–12)
NEUTROPHILS NFR BLD AUTO: 14.7 10*3/MM3 (ref 1.7–7)
NEUTROPHILS NFR BLD AUTO: 84.1 % (ref 42.7–76)
NRBC BLD AUTO-RTO: 0 /100 WBC (ref 0–0.2)
PLATELET # BLD AUTO: 516 10*3/MM3 (ref 140–450)
PMV BLD AUTO: 7 FL (ref 6–12)
POTASSIUM SERPL-SCNC: 4.1 MMOL/L (ref 3.5–5.2)
RBC # BLD AUTO: 3.67 10*6/MM3 (ref 3.77–5.28)
SODIUM SERPL-SCNC: 136 MMOL/L (ref 136–145)
WBC NRBC COR # BLD: 17.4 10*3/MM3 (ref 3.4–10.8)

## 2022-01-30 PROCEDURE — 25010000002 PIPERACILLIN SOD-TAZOBACTAM PER 1 G: Performed by: INTERNAL MEDICINE

## 2022-01-30 PROCEDURE — 80048 BASIC METABOLIC PNL TOTAL CA: CPT | Performed by: HOSPITALIST

## 2022-01-30 PROCEDURE — 25010000002 FUROSEMIDE PER 20 MG: Performed by: INTERNAL MEDICINE

## 2022-01-30 PROCEDURE — 99232 SBSQ HOSP IP/OBS MODERATE 35: CPT | Performed by: INTERNAL MEDICINE

## 2022-01-30 PROCEDURE — 85025 COMPLETE CBC W/AUTO DIFF WBC: CPT | Performed by: HOSPITALIST

## 2022-01-30 PROCEDURE — 99232 SBSQ HOSP IP/OBS MODERATE 35: CPT | Performed by: HOSPITALIST

## 2022-01-30 PROCEDURE — 25010000002 ENOXAPARIN PER 10 MG: Performed by: HOSPITALIST

## 2022-01-30 PROCEDURE — 63710000001 PREDNISONE PER 1 MG: Performed by: INTERNAL MEDICINE

## 2022-01-30 RX ORDER — CEFDINIR 300 MG/1
300 CAPSULE ORAL EVERY 12 HOURS SCHEDULED
Status: DISCONTINUED | OUTPATIENT
Start: 2022-01-30 | End: 2022-02-01 | Stop reason: HOSPADM

## 2022-01-30 RX ORDER — PREDNISONE 10 MG/1
10 TABLET ORAL
Status: DISCONTINUED | OUTPATIENT
Start: 2022-01-31 | End: 2022-02-01 | Stop reason: HOSPADM

## 2022-01-30 RX ADMIN — ASPIRIN 81 MG: 81 TABLET, COATED ORAL at 08:12

## 2022-01-30 RX ADMIN — ENOXAPARIN SODIUM 40 MG: 40 INJECTION SUBCUTANEOUS at 16:20

## 2022-01-30 RX ADMIN — PREDNISONE 20 MG: 20 TABLET ORAL at 08:13

## 2022-01-30 RX ADMIN — SODIUM CHLORIDE, PRESERVATIVE FREE 10 ML: 5 INJECTION INTRAVENOUS at 08:13

## 2022-01-30 RX ADMIN — FUROSEMIDE 20 MG: 10 INJECTION, SOLUTION INTRAMUSCULAR; INTRAVENOUS at 08:13

## 2022-01-30 RX ADMIN — CEFDINIR 300 MG: 300 CAPSULE ORAL at 14:07

## 2022-01-30 RX ADMIN — DILTIAZEM HYDROCHLORIDE 180 MG: 180 CAPSULE, COATED, EXTENDED RELEASE ORAL at 08:13

## 2022-01-30 RX ADMIN — PIPERACILLIN AND TAZOBACTAM 3.38 G: 3; .375 INJECTION, POWDER, LYOPHILIZED, FOR SOLUTION INTRAVENOUS at 08:13

## 2022-01-30 RX ADMIN — CEFDINIR 300 MG: 300 CAPSULE ORAL at 20:21

## 2022-01-30 RX ADMIN — SODIUM CHLORIDE, PRESERVATIVE FREE 10 ML: 5 INJECTION INTRAVENOUS at 20:21

## 2022-01-31 LAB
ANION GAP SERPL CALCULATED.3IONS-SCNC: 11 MMOL/L (ref 5–15)
ANISOCYTOSIS BLD QL: ABNORMAL
BACTERIA SPEC AEROBE CULT: NORMAL
BACTERIA SPEC AEROBE CULT: NORMAL
BLASTS NFR BLD MANUAL: 1 % (ref 0–0)
BUN SERPL-MCNC: 36 MG/DL (ref 8–23)
BUN/CREAT SERPL: 39.6 (ref 7–25)
CALCIUM SPEC-SCNC: 8.7 MG/DL (ref 8.6–10.5)
CHLORIDE SERPL-SCNC: 97 MMOL/L (ref 98–107)
CO2 SERPL-SCNC: 27 MMOL/L (ref 22–29)
CREAT SERPL-MCNC: 0.91 MG/DL (ref 0.57–1)
DEPRECATED RDW RBC AUTO: 41.6 FL (ref 37–54)
ERYTHROCYTE [DISTWIDTH] IN BLOOD BY AUTOMATED COUNT: 16.6 % (ref 12.3–15.4)
GFR SERPL CREATININE-BSD FRML MDRD: 59 ML/MIN/1.73
GLUCOSE SERPL-MCNC: 160 MG/DL (ref 65–99)
HCT VFR BLD AUTO: 26.5 % (ref 34–46.6)
HGB BLD-MCNC: 8.7 G/DL (ref 12–15.9)
LAB AP CASE REPORT: NORMAL
LYMPHOCYTES # BLD MANUAL: 1.46 10*3/MM3 (ref 0.7–3.1)
LYMPHOCYTES NFR BLD MANUAL: 7 % (ref 5–12)
MCH RBC QN AUTO: 23.2 PG (ref 26.6–33)
MCHC RBC AUTO-ENTMCNC: 32.9 G/DL (ref 31.5–35.7)
MCV RBC AUTO: 70.5 FL (ref 79–97)
MICROCYTES BLD QL: ABNORMAL
MONOCYTES # BLD: 1.28 10*3/MM3 (ref 0.1–0.9)
MYELOCYTES NFR BLD MANUAL: 1 % (ref 0–0)
NEUTROPHILS # BLD AUTO: 15.19 10*3/MM3 (ref 1.7–7)
NEUTROPHILS NFR BLD MANUAL: 83 % (ref 42.7–76)
PATH REPORT.FINAL DX SPEC: NORMAL
PATHOLOGY REVIEW: YES
PLATELET # BLD AUTO: 568 10*3/MM3 (ref 140–450)
PMV BLD AUTO: 7 FL (ref 6–12)
POTASSIUM SERPL-SCNC: 4.2 MMOL/L (ref 3.5–5.2)
RBC # BLD AUTO: 3.76 10*6/MM3 (ref 3.77–5.28)
SCAN SLIDE: NORMAL
SMALL PLATELETS BLD QL SMEAR: ABNORMAL
SODIUM SERPL-SCNC: 135 MMOL/L (ref 136–145)
TOXIC GRANULATION: ABNORMAL
VARIANT LYMPHS NFR BLD MANUAL: 8 % (ref 19.6–45.3)
WBC NRBC COR # BLD: 18.3 10*3/MM3 (ref 3.4–10.8)

## 2022-01-31 PROCEDURE — 63710000001 PREDNISONE PER 5 MG: Performed by: INTERNAL MEDICINE

## 2022-01-31 PROCEDURE — 99232 SBSQ HOSP IP/OBS MODERATE 35: CPT | Performed by: INTERNAL MEDICINE

## 2022-01-31 PROCEDURE — 97530 THERAPEUTIC ACTIVITIES: CPT

## 2022-01-31 PROCEDURE — 97116 GAIT TRAINING THERAPY: CPT

## 2022-01-31 PROCEDURE — 99231 SBSQ HOSP IP/OBS SF/LOW 25: CPT | Performed by: HOSPITALIST

## 2022-01-31 PROCEDURE — 25010000002 ENOXAPARIN PER 10 MG: Performed by: HOSPITALIST

## 2022-01-31 PROCEDURE — 85025 COMPLETE CBC W/AUTO DIFF WBC: CPT | Performed by: HOSPITALIST

## 2022-01-31 PROCEDURE — 85007 BL SMEAR W/DIFF WBC COUNT: CPT | Performed by: HOSPITALIST

## 2022-01-31 PROCEDURE — 99222 1ST HOSP IP/OBS MODERATE 55: CPT | Performed by: NURSE PRACTITIONER

## 2022-01-31 PROCEDURE — 80048 BASIC METABOLIC PNL TOTAL CA: CPT | Performed by: HOSPITALIST

## 2022-01-31 RX ADMIN — ASPIRIN 81 MG: 81 TABLET, COATED ORAL at 07:44

## 2022-01-31 RX ADMIN — PREDNISONE 10 MG: 10 TABLET ORAL at 07:45

## 2022-01-31 RX ADMIN — METOPROLOL SUCCINATE 25 MG: 25 TABLET, EXTENDED RELEASE ORAL at 07:44

## 2022-01-31 RX ADMIN — APIXABAN 2.5 MG: 2.5 TABLET, FILM COATED ORAL at 20:07

## 2022-01-31 RX ADMIN — ENOXAPARIN SODIUM 40 MG: 40 INJECTION SUBCUTANEOUS at 15:27

## 2022-01-31 RX ADMIN — SODIUM CHLORIDE, PRESERVATIVE FREE 10 ML: 5 INJECTION INTRAVENOUS at 20:07

## 2022-01-31 RX ADMIN — CEFDINIR 300 MG: 300 CAPSULE ORAL at 20:07

## 2022-01-31 RX ADMIN — SODIUM CHLORIDE, PRESERVATIVE FREE 10 ML: 5 INJECTION INTRAVENOUS at 07:44

## 2022-01-31 RX ADMIN — DILTIAZEM HYDROCHLORIDE 180 MG: 180 CAPSULE, COATED, EXTENDED RELEASE ORAL at 07:45

## 2022-01-31 RX ADMIN — CEFDINIR 300 MG: 300 CAPSULE ORAL at 07:44

## 2022-02-01 ENCOUNTER — APPOINTMENT (OUTPATIENT)
Dept: GENERAL RADIOLOGY | Facility: HOSPITAL | Age: 82
End: 2022-02-01

## 2022-02-01 ENCOUNTER — READMISSION MANAGEMENT (OUTPATIENT)
Dept: CALL CENTER | Facility: HOSPITAL | Age: 82
End: 2022-02-01

## 2022-02-01 ENCOUNTER — HOME HEALTH ADMISSION (OUTPATIENT)
Dept: HOME HEALTH SERVICES | Facility: HOME HEALTHCARE | Age: 82
End: 2022-02-01

## 2022-02-01 VITALS
RESPIRATION RATE: 16 BRPM | BODY MASS INDEX: 29.72 KG/M2 | HEART RATE: 84 BPM | TEMPERATURE: 98.8 F | SYSTOLIC BLOOD PRESSURE: 125 MMHG | WEIGHT: 157.41 LBS | DIASTOLIC BLOOD PRESSURE: 64 MMHG | HEIGHT: 61 IN | OXYGEN SATURATION: 95 %

## 2022-02-01 PROBLEM — I48.91 A-FIB: Status: ACTIVE | Noted: 2022-02-01

## 2022-02-01 PROBLEM — R53.83 FATIGUE: Status: RESOLVED | Noted: 2018-10-24 | Resolved: 2022-02-01

## 2022-02-01 PROBLEM — R53.1 GENERALIZED WEAKNESS: Status: RESOLVED | Noted: 2022-01-24 | Resolved: 2022-02-01

## 2022-02-01 PROBLEM — I50.31 ACUTE DIASTOLIC HEART FAILURE: Status: RESOLVED | Noted: 2022-01-24 | Resolved: 2022-02-01

## 2022-02-01 LAB
ANION GAP SERPL CALCULATED.3IONS-SCNC: 12 MMOL/L (ref 5–15)
ANISOCYTOSIS BLD QL: ABNORMAL
BASOPHILS # BLD MANUAL: 0.21 10*3/MM3 (ref 0–0.2)
BASOPHILS NFR BLD MANUAL: 1 % (ref 0–1.5)
BLASTS NFR BLD MANUAL: 2 % (ref 0–0)
BUN SERPL-MCNC: 25 MG/DL (ref 8–23)
BUN/CREAT SERPL: 37.3 (ref 7–25)
CALCIUM SPEC-SCNC: 8.6 MG/DL (ref 8.6–10.5)
CHLORIDE SERPL-SCNC: 98 MMOL/L (ref 98–107)
CO2 SERPL-SCNC: 23 MMOL/L (ref 22–29)
CREAT SERPL-MCNC: 0.67 MG/DL (ref 0.57–1)
DEPRECATED RDW RBC AUTO: 42.9 FL (ref 37–54)
EOSINOPHIL # BLD MANUAL: 0.62 10*3/MM3 (ref 0–0.4)
EOSINOPHIL NFR BLD MANUAL: 3 % (ref 0.3–6.2)
ERYTHROCYTE [DISTWIDTH] IN BLOOD BY AUTOMATED COUNT: 16.8 % (ref 12.3–15.4)
GFR SERPL CREATININE-BSD FRML MDRD: 84 ML/MIN/1.73
GLUCOSE SERPL-MCNC: 125 MG/DL (ref 65–99)
HCT VFR BLD AUTO: 27.5 % (ref 34–46.6)
HGB BLD-MCNC: 9 G/DL (ref 12–15.9)
LYMPHOCYTES # BLD MANUAL: 1.46 10*3/MM3 (ref 0.7–3.1)
LYMPHOCYTES NFR BLD MANUAL: 6 % (ref 5–12)
MCH RBC QN AUTO: 23.4 PG (ref 26.6–33)
MCHC RBC AUTO-ENTMCNC: 32.7 G/DL (ref 31.5–35.7)
MCV RBC AUTO: 71.5 FL (ref 79–97)
MICROCYTES BLD QL: ABNORMAL
MONOCYTES # BLD: 1.25 10*3/MM3 (ref 0.1–0.9)
MYELOCYTES NFR BLD MANUAL: 0 % (ref 0–0)
NEUTROPHILS # BLD AUTO: 16.64 10*3/MM3 (ref 1.7–7)
NEUTROPHILS NFR BLD MANUAL: 71 % (ref 42.7–76)
NEUTS BAND NFR BLD MANUAL: 9 % (ref 0–5)
NEUTS VAC BLD QL SMEAR: ABNORMAL
PLATELET # BLD AUTO: 606 10*3/MM3 (ref 140–450)
PMV BLD AUTO: 7.2 FL (ref 6–12)
POIKILOCYTOSIS BLD QL SMEAR: ABNORMAL
POTASSIUM SERPL-SCNC: 4.5 MMOL/L (ref 3.5–5.2)
PROMYELOCYTES NFR BLD MANUAL: 1 % (ref 0–0)
RBC # BLD AUTO: 3.84 10*6/MM3 (ref 3.77–5.28)
SMALL PLATELETS BLD QL SMEAR: ABNORMAL
SODIUM SERPL-SCNC: 133 MMOL/L (ref 136–145)
VARIANT LYMPHS NFR BLD MANUAL: 7 % (ref 19.6–45.3)
WBC NRBC COR # BLD: 20.8 10*3/MM3 (ref 3.4–10.8)

## 2022-02-01 PROCEDURE — 63710000001 PREDNISONE PER 5 MG: Performed by: INTERNAL MEDICINE

## 2022-02-01 PROCEDURE — 85025 COMPLETE CBC W/AUTO DIFF WBC: CPT | Performed by: HOSPITALIST

## 2022-02-01 PROCEDURE — 80048 BASIC METABOLIC PNL TOTAL CA: CPT | Performed by: HOSPITALIST

## 2022-02-01 PROCEDURE — 99238 HOSP IP/OBS DSCHRG MGMT 30/<: CPT | Performed by: HOSPITALIST

## 2022-02-01 PROCEDURE — 99232 SBSQ HOSP IP/OBS MODERATE 35: CPT | Performed by: INTERNAL MEDICINE

## 2022-02-01 PROCEDURE — 85007 BL SMEAR W/DIFF WBC COUNT: CPT | Performed by: HOSPITALIST

## 2022-02-01 PROCEDURE — 71045 X-RAY EXAM CHEST 1 VIEW: CPT

## 2022-02-01 RX ORDER — FUROSEMIDE 40 MG/1
40 TABLET ORAL DAILY PRN
Qty: 30 TABLET | Refills: 0 | Status: SHIPPED | OUTPATIENT
Start: 2022-02-01 | End: 2022-04-01 | Stop reason: SDUPTHER

## 2022-02-01 RX ORDER — DILTIAZEM HYDROCHLORIDE 180 MG/1
180 CAPSULE, COATED, EXTENDED RELEASE ORAL
Qty: 30 CAPSULE | Refills: 0 | Status: SHIPPED | OUTPATIENT
Start: 2022-02-02 | End: 2022-03-01 | Stop reason: SDUPTHER

## 2022-02-01 RX ORDER — CEFDINIR 300 MG/1
300 CAPSULE ORAL EVERY 12 HOURS SCHEDULED
Qty: 5 CAPSULE | Refills: 0
Start: 2022-02-01 | End: 2022-02-02 | Stop reason: SDUPTHER

## 2022-02-01 RX ORDER — FUROSEMIDE 40 MG/1
40 TABLET ORAL DAILY
Status: DISCONTINUED | OUTPATIENT
Start: 2022-02-01 | End: 2022-02-01 | Stop reason: HOSPADM

## 2022-02-01 RX ORDER — POTASSIUM CHLORIDE 750 MG/1
10 TABLET, FILM COATED, EXTENDED RELEASE ORAL DAILY
Status: DISCONTINUED | OUTPATIENT
Start: 2022-02-01 | End: 2022-02-01 | Stop reason: HOSPADM

## 2022-02-01 RX ADMIN — POTASSIUM CHLORIDE 10 MEQ: 750 TABLET, EXTENDED RELEASE ORAL at 10:13

## 2022-02-01 RX ADMIN — ASPIRIN 81 MG: 81 TABLET, COATED ORAL at 08:12

## 2022-02-01 RX ADMIN — METOPROLOL SUCCINATE 25 MG: 25 TABLET, EXTENDED RELEASE ORAL at 08:12

## 2022-02-01 RX ADMIN — CEFDINIR 300 MG: 300 CAPSULE ORAL at 08:12

## 2022-02-01 RX ADMIN — DILTIAZEM HYDROCHLORIDE 180 MG: 180 CAPSULE, COATED, EXTENDED RELEASE ORAL at 08:12

## 2022-02-01 RX ADMIN — APIXABAN 2.5 MG: 2.5 TABLET, FILM COATED ORAL at 08:12

## 2022-02-01 RX ADMIN — SODIUM CHLORIDE, PRESERVATIVE FREE 10 ML: 5 INJECTION INTRAVENOUS at 08:11

## 2022-02-01 RX ADMIN — PREDNISONE 10 MG: 10 TABLET ORAL at 08:12

## 2022-02-01 NOTE — PLAN OF CARE
Goal Outcome Evaluation:  Plan of Care Reviewed With: patient        Progress: improving  Outcome Summary: Pt up in the chair for the evening.  Pt started on Eliquis tonight.  She was hesitant to take a blood thinner but said she would take the dose last night and then talk to Dr Dumont about it today.  Her VATS procedure is being delayed due to her doctor being sick.  Son is to be here today to take patient home.

## 2022-02-01 NOTE — DISCHARGE SUMMARY
[facility] Medicine Services  DISCHARGE SUMMARY    Patient Name: Chrystal Ruiz  : 1940  MRN: [id number]    Date of Admission: 2022  Date of Discharge:  2022  Primary Care Physician: Etelvina Ulloa MD      Presenting Problem:   Palpitations [R00.2]  Generalized weakness [R53.1]  Bilateral lower extremity edema [R60.0]    Active and Resolved Hospital Problems:  Active Hospital Problems    Diagnosis POA   • A-fib (HCC) [I48.91] Yes     Priority: High   • Acute non-recurrent maxillary sinusitis [J01.00] Yes     Priority: High   • Lung nodule [R91.1] Yes     Priority: High   • Essential (primary) hypertension  [I10] Yes     Priority: Medium   • Hyperlipidemia [E78.5] Yes     Priority: Medium   • Hypertension [I10] Yes     Priority: Medium      Resolved Hospital Problems    Diagnosis POA   • **Acute diastolic heart failure (HCC) [I50.31] Yes     Priority: High   • Generalized weakness [R53.1] Yes     Priority: High   • Fatigue [R53.83] Yes     Priority: High         Hospital Course     Hospital Course:      The patient is an 81-year-old vaccinated/boosted female with history of pulmonary nodules s/p right CT-guided biopsy in 2021 which showed benign nodule.  PET scan had shown hypermetabolic activity therefore there were plans to have VATS to be done on 2022.  The patient had been treated with few days of amoxicillin and Keflex for cellulitis as an outpatient.    The patient was sent to the ED by PCP for evaluation of leukocytosis and generalized fatigue.    In the ED, she was diagnosed with acute diastolic heart failure since chest x-ray showed pulmonary congestion.    The patient was then admitted to the medical floor and was continued on diuretics.  TTE on 2022 showed LVEF 61-65% with some moderate pulmonary valve regurgitation and grade 2 diastolic dysfunction.  The patient's pulmonologist was then consulted. The patient started to have fevers  during the hospitalization and was started on antibiotics due to possible pneumonia.  Bilateral lower extremity venous duplex on 1/26/2022 showed chronic lower extremity thrombophlebitis.  Cardiologist evaluated the patient and underwent stress test on 1/20/2022 which ruled out ischemia.  The patient had complained of intermittent palpitations likely due to paroxysmal atrial fibrillation thus was started on Eliquis and Cardizem.  Amlodipine and HCTZ were discontinued. She has been seen by thoracic surgery team and has an appointment on 1/10/2022.    She was discharged home in the morning of 2/1/2022.     Problem list addressed during the hospitalization    Acute diastolic heart failure: Resolved  -Treated with IV Lasix  -TTE 1/24/2022--> LVEF 61-65%.  Moderate pulmonary valve regurgitation.  Grade 2 diastolic dysfunction.  -Consulted Dr. Dumont 1/27/2022  -s/p stress test 1/20/2022--> ischemia ruled out  -Discharged on Lasix PRN     Suspected pneumonia:  -Patient with fevers after admission  -Treated with Rocephin and Zosyn.  Discharge on Omnicef  -Bilateral lower extremity venous duplex 1/26/2022--> chronic lower extremity thrombophlebitis     Generalized fatigue: Improving  -Checked TSH and free T4     Hypertension:  -Continue metoprolol and cardizem  -Discontinue amlodipine and HCTZ     Pulmonology nodule:  -Follow-up with Dr. Mcconnell, thoracic surgeon for scheduling of VATS       DISCHARGE Follow Up Recommendations for labs and diagnostics:       Reasons For Change In Medications and Indications for New Medications:      Day of Discharge     Vital Signs:  Temp:  [97.9 °F (36.6 °C)-99.1 °F (37.3 °C)] 98.8 °F (37.1 °C)  Heart Rate:  [72-84] 84  Resp:  [16-18] 16  BP: (117-130)/(56-70) 125/64    Physical Exam:  Physical Exam  HENT:      Head: Normocephalic.      Mouth/Throat:      Mouth: Mucous membranes are moist.   Eyes:      General: No scleral icterus.     Extraocular Movements: Extraocular movements intact.       Pupils: Pupils are equal, round, and reactive to light.   Cardiovascular:      Rate and Rhythm: Normal rate.      Pulses: Normal pulses.   Pulmonary:      Effort: Pulmonary effort is normal.   Abdominal:      General: Bowel sounds are normal.   Musculoskeletal:         General: Normal range of motion.      Cervical back: Normal range of motion.   Skin:     General: Skin is warm.   Neurological:      Mental Status: She is alert. Mental status is at baseline.   Psychiatric:         Mood and Affect: Mood normal.            Pertinent  and/or Most Recent Results     LAB RESULTS:      Lab 02/01/22 0430 01/31/22 0245 01/30/22 0323 01/29/22 0346 01/28/22 0242 01/26/22  0330 01/26/22  0330   WBC 20.80* 18.30* 17.40* 20.70* 16.00*   < > 18.40*   HEMOGLOBIN 9.0* 8.7* 8.3* 9.3* 8.2*   < > 8.7*   HEMATOCRIT 27.5* 26.5* 25.7* 28.4* 25.3*   < > 27.5*   PLATELETS 606* 568* 516* 556* 484*   < > 480*   NEUTROS ABS 16.64* 15.19* 14.70* 17.20* 13.60*   < > 16.00*   LYMPHS ABS  --   --  1.80 1.70 1.50  --  1.00   MONOS ABS  --   --  0.90 1.50* 0.80  --  0.80   EOS ABS 0.62*  --  0.10 0.20 0.10  --  0.40   MCV 71.5* 70.5* 70.2* 70.4* 71.1*   < > 71.6*   CRP  --   --   --  12.33*  --   --  16.43*   PROCALCITONIN  --   --   --   --   --   --  0.15    < > = values in this interval not displayed.         Lab 02/01/22 0430 01/31/22 0245 01/30/22 0323 01/29/22 0346 01/28/22 0242 01/26/22  0330 01/26/22  0330   SODIUM 133* 135* 136 135* 137   < > 136   POTASSIUM 4.5 4.2 4.1 3.4* 3.4*   < > 3.7   CHLORIDE 98 97* 98 95* 99   < > 100   CO2 23.0 27.0 29.0 29.0 29.0   < > 26.0   ANION GAP 12.0 11.0 9.0 11.0 9.0   < > 10.0   BUN 25* 36* 25* 17 17   < > 15   CREATININE 0.67 0.91 0.65 0.68 0.54*   < > 0.54*   GLUCOSE 125* 160* 144* 132* 156*   < > 148*   CALCIUM 8.6 8.7 8.8 8.5* 8.5*   < > 8.0*   MAGNESIUM  --   --   --   --  1.8  --  2.2   TSH  --   --   --   --  0.514  --   --     < > = values in this interval not displayed.                          Brief Urine Lab Results  (Last result in the past 365 days)      Color   Clarity   Blood   Leuk Est   Nitrite   Protein   CREAT   Urine HCG        01/24/22 1657 Orange  Comment: Any Substance that causes an abnormal urine color can alter the accuracy of the chemical reactions.   Clear   Small (1+)   Trace   Negative   Trace               Microbiology Results (last 10 days)     Procedure Component Value - Date/Time    Blood Culture - Blood, Hand, Left [[id number]]  (Normal) Collected: 01/26/22 0330    Lab Status: Final result Specimen: Blood from Hand, Left Updated: 01/31/22 0345     Blood Culture No growth at 5 days    Blood Culture - Blood, Arm, Right [[id number]]  (Normal) Collected: 01/26/22 0322    Lab Status: Final result Specimen: Blood from Arm, Right Updated: 01/31/22 0345     Blood Culture No growth at 5 days    MRSA Screen, PCR (Inpatient) - Swab, Nares [[id number]]  (Normal) Collected: 01/25/22 1051    Lab Status: Final result Specimen: Swab from Nares Updated: 01/25/22 1251     MRSA PCR No MRSA Detected    COVID PRE-OP / PRE-PROCEDURE SCREENING ORDER (NO ISOLATION) - Swab, Nasopharynx [[id number]]  (Normal) Collected: 01/24/22 1848    Lab Status: Final result Specimen: Swab from Nasopharynx Updated: 01/24/22 1911    Narrative:      The following orders were created for panel order COVID PRE-OP / PRE-PROCEDURE SCREENING ORDER (NO ISOLATION) - Swab, Nasopharynx.  Procedure                               Abnormality         Status                     ---------                               -----------         ------                     COVID-19,CEPHEID/GINO,CO...[[id number]]  Normal              Final result                 Please view results for these tests on the individual orders.    COVID-19,CEPHEID/GINO,COR/TURNER/PAD/VIVIAN IN-HOUSE(OR EMERGENT/ADD-ON),NP SWAB IN TRANSPORT MEDIA 3-4 HR TAT, RT-PCR - Swab, Nasopharynx [[id number]]  (Normal) Collected: 01/24/22 1848    Lab Status: Final result Specimen:  Swab from Nasopharynx Updated: 01/24/22 1911     COVID19 Not Detected    Narrative:      Fact sheet for providers: https://www.fda.gov/media/727140/download     Fact sheet for patients: https://www.fda.gov/media/639823/download  Fact sheet for providers: https://www.fda.gov/media/998946/download    Fact sheet for patients: https://www.fda.gov/media/031344/download    Test performed by PCR.          XR Chest 1 View    Result Date: 2/1/2022  Impression:  No evidence of CHF. No pulmonary edema or pleural effusion.  Small areas of bilateral upper lobe disease favored to be atelectasis versus scarring.  There is a nodule left upper lobe. This measures 5 mm. Also suggestion of a similar size right upper lobe nodule. Follow-up chest CT recommended  Electronically Signed By-Patrice Dale On:2/1/2022 7:48 AM This report was finalized on [id number] by  Patrice Dale, .    XR Chest 1 View    Result Date: 1/24/2022  Impression: 1.Increased prominence of central pulmonary vessels, likely due to pulmonary vascular congestion/edema, given the provided history. 2.Possible small right pleural effusion.  Electronically Signed By-Katelynn Wing MD On:1/24/2022 4:53 PM This report was finalized on [id number] by  Katelynn Wing MD.    CT Sinus Without Contrast    Result Date: 1/26/2022  Impression: IMPRESSION : Paranasal sinuses are clear. No CT evidence of sinusitis.  Electronically Signed By-Katelynn Wing MD On:1/26/2022 11:59 AM This report was finalized on [id number] by  Katelynn Wing MD.      Results for orders placed during the hospital encounter of 01/24/22    Duplex Venous Lower Extremity - Bilateral CAR    Interpretation Summary  · Chronic right lower extremity superficial thrombophlebitis noted in the small saphenous.  · Chronic left lower extremity superficial thrombophlebitis noted in the great saphenous (above knee) and small saphenous.  · All other veins appeared normal bilaterally.      Results for orders placed  during the hospital encounter of 01/24/22    Duplex Venous Lower Extremity - Bilateral CAR    Interpretation Summary  · Chronic right lower extremity superficial thrombophlebitis noted in the small saphenous.  · Chronic left lower extremity superficial thrombophlebitis noted in the great saphenous (above knee) and small saphenous.  · All other veins appeared normal bilaterally.      Results for orders placed during the hospital encounter of 01/24/22    Adult Transthoracic Echo Complete W/ Cont if Necessary Per Protocol    Interpretation Summary  · Estimated right ventricular systolic pressure from tricuspid regurgitation is normal (<35 mmHg).  · Moderate pulmonic valve regurgitation is present.  · Left ventricular ejection fraction appears to be 61 - 65%.  · Left ventricular diastolic function is consistent with (grade II w/high LAP) pseudonormalization.      Labs Pending at Discharge:      Procedures Performed           Consults:   Consults     Date and Time Order Name Status Description    1/31/2022 11:19 AM Inpatient Thoracic Surgery Consult Completed     1/27/2022  8:50 AM Inpatient Cardiology Consult Completed     1/25/2022  9:07 AM Inpatient Pulmonology Consult Completed             Discharge Details        Discharge Medications      New Medications      Instructions Start Date   apixaban 2.5 MG tablet tablet  Commonly known as: ELIQUIS   2.5 mg, Oral, Every 12 Hours Scheduled      cefdinir 300 MG capsule  Commonly known as: OMNICEF   300 mg, Oral, Every 12 Hours Scheduled      dilTIAZem  MG 24 hr capsule  Commonly known as: CARDIZEM CD   180 mg, Oral, Every 24 Hours Scheduled   Start Date: February 2, 2022     furosemide 40 MG tablet  Commonly known as: LASIX   40 mg, Oral, Daily PRN         Changes to Medications      Instructions Start Date   Premarin 0.625 MG/GM vaginal cream  Generic drug: conjugated estrogens  What changed:   · when to take this  · reasons to take this   Vaginal, 2 Times Weekly          Continue These Medications      Instructions Start Date   acetaminophen 500 MG tablet  Commonly known as: TYLENOL   500 mg, Oral, Every 6 Hours PRN      Breo Ellipta 200-25 MCG/INH inhaler  Generic drug: Fluticasone Furoate-Vilanterol   1 puff, Inhalation, Daily - RT      Leg Cramp Relief tablet   LEG CRAMP RELIEF TABS      metoprolol succinate XL 25 MG 24 hr tablet  Commonly known as: Toprol XL   25 mg, Oral, Daily      triamcinolone 0.1 % cream  Commonly known as: KENALOG   Topical, 2 Times Daily      Vitamin B12 1000 MCG tablet controlled-release   1,000 mcg, Oral, Daily      Vitamin D3 50 MCG (2000 UT) capsule   2,000 Units, Oral, Daily         Stop These Medications    amLODIPine 5 MG tablet  Commonly known as: NORVASC     hydroCHLOROthiazide 12.5 MG tablet  Commonly known as: HYDRODIURIL            Allergies   Allergen Reactions   • Ciprofloxacin Unknown (See Comments)   • Sulfa Antibiotics Unknown (See Comments)   • Cyprodenate Unknown - High Severity   • Iodine Unknown - High Severity         Discharge Disposition:   Home or Self Care    Diet:  Hospital:No active diet order        Discharge Activity:   Activity Instructions    Activity as tolerated           Discharge Condition: stable      CODE STATUS:  Code Status and Medical Interventions:   Ordered at: 01/24/22 1833     Level Of Support Discussed With:    Patient     Code Status (Patient has no pulse and is not breathing):    CPR (Attempt to Resuscitate)     Medical Interventions (Patient has pulse or is breathing):    Full Support         Future Appointments   Date Time Provider Department Center   2/2/2022 To Be Determined Meena Soriano RN [facility]   2/3/2022 To Be Determined Kelvin Turner PT [facility]   2/7/2022 To Be Determined Etelvina Novak OT [facility]   2/9/2022  4:30 PM  [facility]   3/9/2022  1:45 PM Mihai Oneill MD [facility]   3/23/2022  1:30 PM Etelvina Ulloa MD [facility]        Additional Instructions for the Follow-ups that You Need to Schedule     Ambulatory Referral to Home Health   As directed      Face to Face Visit Date: 1/28/2022    Follow-up provider for Plan of Care?: I treated the patient in an acute care facility and will not continue treatment after discharge.    Follow-up provider: ETELVINA ULLOA [[id number]]    Reason/Clinical Findings: post hospitalization    Describe mobility limitations that make leaving home difficult: weakness    Nursing/Therapeutic Services Requested: Skilled Nursing Physical Therapy Occupational Therapy    Skilled nursing orders: CHF management    PT orders: Other (add comment) (eval and treat for decreased mobility and assist with adl)    Occupational orders: Other (eval and treat for decreased mobility and assist with adls.)    Frequency: 1 Week 1         Discharge Follow-up with PCP   As directed       Currently Documented PCP:    Etelvina Ulloa MD    PCP Phone Number:    [phone]     Follow Up Details: 2 weeks         Discharge Follow-up with Specified Provider: Dr. Mcconnell on 2/10/22   As directed      To: Dr. Mcconnell on 2/10/22    Follow Up Details: pulmonary nodule               Time spent on Discharge including face to face service:  15 minutes    This patient has been examined wearing appropriate Personal Protective Equipment and discussed with nursing. 02/01/22      Signature: Electronically signed by Ruben Garcia DO, 02/01/22, 1:31 PM EST.

## 2022-02-01 NOTE — CASE MANAGEMENT/SOCIAL WORK
Continued Stay Note  [facility]     Patient Name: Chrystal Ruiz  MRN: [id number]  Today's Date: 2/1/2022    Admit Date: 1/24/2022     Discharge Plan     Row Name 02/01/22 1330       Plan    Plan Comments Informed Nita liaison with home health on d/c today.    Final Discharge Disposition Code 06 - home with home health care    Final Note [facility]              Phone communication or documentation only - no physical contact with patient or family.      Dianna Mensah RN

## 2022-02-01 NOTE — PROGRESS NOTES
Referring Provider: Ruben Garcia,*    Reason for follow-up:  Shortness of breath  Possible intermittent atrial fibrillation     Patient Care Team:  Etelvina Ulloa MD as PCP - General  Etelvina Ulloa MD as PCP - Family Medicine  Mukund Farias MD as Consulting Physician (Pulmonary Disease)  Alan Wynn DPM as Consulting Physician (Podiatry)  Demetris Farrell MD as Surgeon (General Surgery)    Subjective .      ROS    Since I have last seen, the patient has been without any chest discomfort ,shortness of breath, palpitations, dizziness or syncope.  Denies having any headache ,abdominal pain ,nausea, vomiting , diarrhea constipation, loss of weight or loss of appetite.  Denies having any excessive bruising ,hematuria or blood in the stool.    Review of all systems negative except as indicated    History  Past Medical History:   Diagnosis Date   • Arthritis    • Asthma    • Hypertension        Past Surgical History:   Procedure Laterality Date   • CATARACT EXTRACTION     • CHOLECYSTECTOMY     • HYSTERECTOMY  1987   • OOPHORECTOMY     • TONSILLECTOMY         Family History   Problem Relation Age of Onset   • COPD Sister    • Heart disease Sister    • Breast cancer Mother    • Stroke Mother    • Heart disease Mother        Social History     Tobacco Use   • Smoking status: Never Smoker   • Smokeless tobacco: Never Used   Vaping Use   • Vaping Use: Never used   Substance Use Topics   • Alcohol use: No   • Drug use: Never        Medications Prior to Admission   Medication Sig Dispense Refill Last Dose   • acetaminophen (TYLENOL) 500 MG tablet Take 500 mg by mouth Every 6 (Six) Hours As Needed for Mild Pain .   1/23/2022 at Unknown time   • amLODIPine (NORVASC) 5 MG tablet Take 1 tablet by mouth Daily. 90 tablet 1 1/23/2022 at Unknown time   • Breo Ellipta 200-25 MCG/INH inhaler Inhale 1 puff Daily. 1 each 2 1/24/2022 at Unknown time   • Cholecalciferol (VITAMIN D3) 2000 units capsule Take  2,000 Units by mouth Daily.   1/23/2022 at Unknown time   • Cyanocobalamin (VITAMIN B12) 1000 MCG tablet controlled-release Take 1,000 mcg by mouth Daily.   1/23/2022 at Unknown time   • hydroCHLOROthiazide (HYDRODIURIL) 12.5 MG tablet Take 1 tablet by mouth Daily. (Patient taking differently: Take 12.5 mg by mouth Daily. New RX- Has not started taking) 90 tablet 1 Patient Taking Differently at Unknown time   • metoprolol succinate XL (Toprol XL) 25 MG 24 hr tablet Take 1 tablet by mouth Daily. 90 tablet 1 1/23/2022 at Unknown time   • Premarin 0.625 MG/GM vaginal cream Insert  into the vagina 2 (Two) Times a Week. (Patient taking differently: Insert  into the vagina As Needed.) 30 g 3 Patient Taking Differently at Unknown time   • triamcinolone (KENALOG) 0.1 % cream Apply  topically to the appropriate area as directed 2 (Two) Times a Day. 80 g 2 1/23/2022 at Unknown time   • Homeopathic Products (LEG CRAMP RELIEF) tablet LEG CRAMP RELIEF TABS          Allergies  Ciprofloxacin, Sulfa antibiotics, Cyprodenate, and Iodine    Scheduled Meds:apixaban, 2.5 mg, Oral, Q12H  aspirin, 81 mg, Oral, Daily  cefdinir, 300 mg, Oral, Q12H  dilTIAZem CD, 180 mg, Oral, Q24H  metoprolol succinate XL, 25 mg, Oral, Daily  predniSONE, 10 mg, Oral, Daily With Breakfast  sodium chloride, 10 mL, Intravenous, Q12H      Continuous Infusions:   PRN Meds:.•  acetaminophen **OR** acetaminophen **OR** acetaminophen  •  magnesium sulfate **OR** magnesium sulfate **OR** magnesium sulfate  •  ondansetron  •  [COMPLETED] Insert peripheral IV **AND** sodium chloride  •  sodium chloride    Objective     VITAL SIGNS  Vitals:    01/31/22 1308 01/31/22 1557 01/31/22 2300 02/01/22 0352   BP: 115/68 117/70 120/56 130/68   BP Location: Right arm Right arm Right arm Right arm   Patient Position: Lying Lying Lying Lying   Pulse: 73 72 72 76   Resp: 16 16 16 18   Temp: 98 °F (36.7 °C) 97.9 °F (36.6 °C) 98.4 °F (36.9 °C) 99.1 °F (37.3 °C)   TempSrc: Oral Oral  "Oral Oral   SpO2: 96% 95% 100% 94%   Weight:    71.4 kg (157 lb 6.5 oz)   Height:           Flowsheet Rows      First Filed Value   Admission Height 154.9 cm (61\") Documented at 01/24/2022 1250   Admission Weight 69.9 kg (154 lb) Documented at 01/24/2022 1250            Intake/Output Summary (Last 24 hours) at 2/1/2022 0631  Last data filed at 2/1/2022 0352  Gross per 24 hour   Intake 660 ml   Output 200 ml   Net 460 ml        TELEMETRY: Sinus rhythm    Physical Exam:  The patient is alert, oriented and in no distress.  Vital signs as noted above.  Head and neck revealed no carotid bruits or jugular venous distention.  No thyromegaly or lymphadenopathy is present  Lungs clear.  No wheezing.  Breath sounds are normal bilaterally.  Heart normal first and second heart sounds.  No murmur. No precordial rub is present.  No gallop is present.  Abdomen soft and nontender.  No organomegaly is present.  Extremities with good peripheral pulses without any pedal edema.  Skin warm and dry.  Musculoskeletal system is grossly normal  CNS grossly normal      Results Review:   I reviewed the patient's new clinical results.  Lab Results (last 24 hours)     Procedure Component Value Units Date/Time    CBC & Differential [[id number]]  (Abnormal) Collected: 02/01/22 0430    Specimen: Blood Updated: 02/01/22 0622    Narrative:      The following orders were created for panel order CBC & Differential.  Procedure                               Abnormality         Status                     ---------                               -----------         ------                     CBC Auto Differential[[id number]]        Abnormal            Preliminary result         Scan Slide[[id number]]                                                                    Please view results for these tests on the individual orders.    Basic Metabolic Panel [[id number]]  (Abnormal) Collected: 02/01/22 0430    Specimen: Blood Updated: 02/01/22 0547     Glucose 125 " mg/dL      BUN 25 mg/dL      Creatinine 0.67 mg/dL      Sodium 133 mmol/L      Potassium 4.5 mmol/L      Chloride 98 mmol/L      CO2 23.0 mmol/L      Calcium 8.6 mg/dL      eGFR Non African Amer 84 mL/min/1.73      BUN/Creatinine Ratio 37.3     Anion Gap 12.0 mmol/L     Narrative:      GFR Normal >60  Chronic Kidney Disease <60  Kidney Failure <15      CBC Auto Differential [[id number]]  (Abnormal) Collected: 02/01/22 0430    Specimen: Blood Updated: 02/01/22 0510     WBC 20.80 10*3/mm3      RBC 3.84 10*6/mm3      Hemoglobin 9.0 g/dL      Hematocrit 27.5 %      MCV 71.5 fL      MCH 23.4 pg      MCHC 32.7 g/dL      RDW 16.8 %      RDW-SD 42.9 fl      MPV 7.2 fL      Platelets 606 10*3/mm3      nRBC 0.0 /100 WBC     Pathology Consultation [[id number]] Collected: 01/31/22 0245    Specimen: Blood, Venous Line Updated: 01/31/22 1046     Final Diagnosis --     Leukocytosis with left shifted granulocytes  Anemia  Thrombocytosis  No blasts identified       Case Report --     Surgical Pathology Report                         Case: [id number]                                  Authorizing Provider:  Ruben Garcia,   Collected:           01/31/2022 02:45 AM                                 DO                                                                           Ordering Location:     [address]    Received:            01/31/2022 07:37 AM                                 MEDICAL INPATIENT                                                            Pathologist:           Alfonzo Simmons MD                                                            Specimen:    Blood, Venous Line                                                                         Path Consult Reflex [[id number]] Collected: 01/31/22 0245    Specimen: Blood Updated: 01/31/22 0644     Pathology Review Yes    Manual Differential [[id number]]  (Abnormal) Collected: 01/31/22 0245    Specimen: Blood Updated: 01/31/22 0644     Neutrophil % 83.0 %       Lymphocyte % 8.0 %      Monocyte % 7.0 %      Myelocyte % 1.0 %      Blasts % 1.0 %      Neutrophils Absolute 15.19 10*3/mm3      Lymphocytes Absolute 1.46 10*3/mm3      Monocytes Absolute 1.28 10*3/mm3      Anisocytosis Slight/1+     Microcytes Slight/1+     Toxic Granulation Slight/1+     Platelet Estimate Increased    Narrative:      Blasts and/or Pros >0%  Metas and/or Myelos >10%    CBC & Differential [[id number]]  (Abnormal) Collected: 01/31/22 0245    Specimen: Blood Updated: 01/31/22 0644    Narrative:      The following orders were created for panel order CBC & Differential.  Procedure                               Abnormality         Status                     ---------                               -----------         ------                     CBC Auto Differential[[id number]]        Abnormal            Final result               Scan Slide[[id number]]                                       Final result                 Please view results for these tests on the individual orders.    CBC Auto Differential [[id number]]  (Abnormal) Collected: 01/31/22 0245    Specimen: Blood Updated: 01/31/22 0644     WBC 18.30 10*3/mm3      RBC 3.76 10*6/mm3      Hemoglobin 8.7 g/dL      Hematocrit 26.5 %      MCV 70.5 fL      MCH 23.2 pg      MCHC 32.9 g/dL      RDW 16.6 %      RDW-SD 41.6 fl      MPV 7.0 fL      Platelets 568 10*3/mm3     Narrative:      The previously reported component NRBC is no longer being reported. Previous result was 0.0 /100 WBC (Reference Range: 0.0-0.2 /100 WBC) on 1/31/2022 at 0337 EST.    Scan Slide [[id number]] Collected: 01/31/22 0245    Specimen: Blood Updated: 01/31/22 0644     Scan Slide --     Comment: See Manual Differential Results             Imaging Results (Last 24 Hours)     Procedure Component Value Units Date/Time    XR Chest 1 View [[id number]] Resulted: 02/01/22 0519     Updated: 02/01/22 0522      LAB RESULTS (LAST 7 DAYS)    CBC  Results from last 7 days   Lab Units 02/01/22  [id number]  01/31/22  0245 01/30/22 0323 01/29/22 0346 01/28/22 0242 01/26/22  0330   WBC 10*3/mm3 20.80* 18.30* 17.40* 20.70* 16.00* 18.40*   RBC 10*6/mm3 3.84 3.76* 3.67* 4.04 3.56* 3.84   HEMOGLOBIN g/dL 9.0* 8.7* 8.3* 9.3* 8.2* 8.7*   HEMATOCRIT % 27.5* 26.5* 25.7* 28.4* 25.3* 27.5*   MCV fL 71.5* 70.5* 70.2* 70.4* 71.1* 71.6*   PLATELETS 10*3/mm3 606* 568* 516* 556* 484* 480*       BMP  Results from last 7 days   Lab Units 02/01/22  0430 01/31/22 0245 01/30/22 0323 01/29/22 0346 01/28/22 0242 01/26/22  0330   SODIUM mmol/L 133* 135* 136 135* 137 136   POTASSIUM mmol/L 4.5 4.2 4.1 3.4* 3.4* 3.7   CHLORIDE mmol/L 98 97* 98 95* 99 100   CO2 mmol/L 23.0 27.0 29.0 29.0 29.0 26.0   BUN mg/dL 25* 36* 25* 17 17 15   CREATININE mg/dL 0.67 0.91 0.65 0.68 0.54* 0.54*   GLUCOSE mg/dL 125* 160* 144* 132* 156* 148*   MAGNESIUM mg/dL  --   --   --   --  1.8 2.2       CMP   Results from last 7 days   Lab Units 02/01/22  0430 01/31/22 0245 01/30/22 0323 01/29/22 0346 01/28/22 0242 01/26/22  0330   SODIUM mmol/L 133* 135* 136 135* 137 136   POTASSIUM mmol/L 4.5 4.2 4.1 3.4* 3.4* 3.7   CHLORIDE mmol/L 98 97* 98 95* 99 100   CO2 mmol/L 23.0 27.0 29.0 29.0 29.0 26.0   BUN mg/dL 25* 36* 25* 17 17 15   CREATININE mg/dL 0.67 0.91 0.65 0.68 0.54* 0.54*   GLUCOSE mg/dL 125* 160* 144* 132* 156* 148*         BNP        TROPONIN        CoAg        Creatinine Clearance  Estimated Creatinine Clearance: 49 mL/min (by C-G formula based on SCr of 0.67 mg/dL).    ABG        Radiology  No radiology results for the last day            EKG                    I personally viewed and interpreted the patient's EKG/Telemetry data: Sinus rhythm premature atrial contractions.    ECHOCARDIOGRAM:    Results for orders placed during the hospital encounter of 01/24/22    Adult Transthoracic Echo Complete W/ Cont if Necessary Per Protocol    Interpretation Summary  · Estimated right ventricular systolic pressure from tricuspid regurgitation is normal (<35  mmHg).  · Moderate pulmonic valve regurgitation is present.  · Left ventricular ejection fraction appears to be 61 - 65%.  · Left ventricular diastolic function is consistent with (grade II w/high LAP) pseudonormalization.          STRESS MYOVIEW:    Cardiolite (Tc-99m Sestamibi) stress test    CARDIAC CATHETERIZATION:            OTHER:         Assessment/Plan     Principal Problem:    Acute diastolic heart failure (HCC)  Active Problems:    Fatigue    Hypertension    Lung nodule    Hyperlipidemia    Generalized weakness      ]]]]]]]]]]]]]]]]]]]]]  Impression  ===========  -Shortness of breath and fatigue.     -Tachycardia-possible intermittent atrial fibrillation.  EKG showed sinus rhythm.     -Hypertension dyslipidemia     -Hypomagnesemia     -Anemia with hemoglobin of 8.7  Leukocytosis with WBC of 18.4.     -Probable congestive heart failure-diastolic  Echocardiogram 1/24/2022 revealed ejection fraction of 65% moderate pulmonary valve regurgitation     -Pulmonary nodule with hypermetabolic nodule in the right upper lung.  Status post CT-guided biopsy of right upper lobe nodule 11/5/2021-benign.  Patient is scheduled for VATS procedure 2/4/2022     -Status post cholecystectomy hysterectomy oophorectomy tonsillectomy     -Allergic to ciprofloxacin and sulfa iodine cyprodenate     -Non-smoker  ============  Plan  =========  Patient presented with shortness of breath and fatigue.  Possible diastolic congestive heart failure.  Recent echocardiogram as above.  Start p.o. Lasix 40 mg a day and discontinue IV Lasix.  Potassium supplements.    Patient may be having intermittent atrial fibrillation.  Stress Cardiolite test-normal 1/28/2022  Continue Cardizem patient is on metoprolol 25 mg daily aspirin.    Paroxysmal atrial fibrillation.  Patient in sinus rhythm.    Anticoagulation  Start Eliquis 2.5 mg twice a day since patient is having intermittent atrial fibrillation.  Observe for any bleeding issues.  Have discussed  with patient regarding the risks and benefits pros and cons of anticoagulation including bleeding and cause for concern for stroke with cardioembolic episodes if she is not on anticoagulation.    Lung nodule  Surgery has been postponed due to illness of provider.    Anemia-hemoglobin 8.2  8.7-2/1/2022    Hypokalemia--improved at 4.5.    Renal function  BUN/creatinine 17/0.54.  36/0.91  25/0.67-2/1/2022    Further plan will depend on patient's progress.  ]]]]]]]]]]]]]]]]]]]]]          Macho Dumont MD  02/01/22  06:31 EST

## 2022-02-01 NOTE — OUTREACH NOTE
Prep Survey      Responses   [facility] patient discharged from? [facility]   Is LACE score < 7 ? No   Emergency Room discharge w/ pulse ox? No   Eligibility [facility]   Date of Admission 01/24/22   Date of Discharge 02/01/22   Discharge Disposition Home or Self Care   Discharge diagnosis Acute diastolic heart failure,   Suspected pneumonia,    Generalized fatigue,    Hypertension   Does the patient have one of the following disease processes/diagnoses(primary or secondary)? CHF   Does the patient have Home health ordered? Yes   What is the Home health agency?  [facility], [place], operated by [facility]   Is there a DME ordered? No   Prep survey completed? Yes          Ele Whitten RN

## 2022-02-02 ENCOUNTER — HOME CARE VISIT (OUTPATIENT)
Dept: HOME HEALTH SERVICES | Facility: HOME HEALTHCARE | Age: 82
End: 2022-02-02

## 2022-02-02 ENCOUNTER — APPOINTMENT (OUTPATIENT)
Dept: PREADMISSION TESTING | Facility: HOSPITAL | Age: 82
End: 2022-02-02

## 2022-02-02 ENCOUNTER — TRANSITIONAL CARE MANAGEMENT TELEPHONE ENCOUNTER (OUTPATIENT)
Dept: CALL CENTER | Facility: HOSPITAL | Age: 82
End: 2022-02-02

## 2022-02-02 PROCEDURE — G0299 HHS/HOSPICE OF RN EA 15 MIN: HCPCS

## 2022-02-02 PROCEDURE — G0151 HHCP-SERV OF PT,EA 15 MIN: HCPCS

## 2022-02-02 RX ORDER — CEFDINIR 300 MG/1
300 CAPSULE ORAL EVERY 12 HOURS SCHEDULED
Qty: 5 CAPSULE | Refills: 0 | Status: SHIPPED | OUTPATIENT
Start: 2022-02-02 | End: 2022-02-05

## 2022-02-02 NOTE — OUTREACH NOTE
Call Center TCM Note      Responses   [facility] patient discharged from? [facility]   Does the patient have one of the following disease processes/diagnoses(primary or secondary)? CHF   TCM attempt successful? Yes   Call start time 1123   Call end time 1139   Discharge diagnosis Acute diastolic heart failure,   Suspected pneumonia,    Generalized fatigue,    Hypertension   Meds reviewed with patient/caregiver? Yes   Is the patient having any side effects they believe may be caused by any medication additions or changes? No   Does the patient have all medications ordered at discharge? No   What is keeping the patient from filling the prescriptions? Lost script/didn't receive   Nursing Interventions Nurse called pharmacy   Notified Case Management Script issues   Prescription comments Pt did not receive ABT    Is the patient taking all medications as directed (includes completed medication regime)? No   What is preventing the patient from taking all medications as directed? Other   Nursing Interventions Nurse notified pharmacy for assistance,  Nurse provided patient education,  Notified provider   Medication comments Pharm did not receive script for Omnicef. Route to CM and informed PCP office   Does the patient have a primary care provider?  Yes   Does the patient have an appointment with their PCP within 7 days of discharge? No   Comments regarding PCP No available HOSP DC FU Appts noted that meet TCM guidelines Will Route to PCP office.    What is preventing the patient from scheduling follow up appointments within 7 days of discharge? --  [No available HOSP DC FU Appts noted that meet TCM guidelines Will Route to PCP office. ]   Nursing Interventions Educated patient on importance of making appointment   Has the patient kept scheduled appointments due by today? N/A   What is the Home health agency?  [facility]   Has home health visited the patient within 72 hours of discharge? Call prior to 72  hours   Home health comments Coming out today.    Pulse Ox monitoring None   Psychosocial issues? No   Did the patient receive a copy of their discharge instructions? Yes   Nursing interventions Reviewed instructions with patient   What is the patient's perception of their health status since discharge? Improving   Nursing interventions Nurse provided patient education   Is the patient weighing daily? Yes   Does the patient have scales? Yes   Daily weight interventions Education provided on importance of daily weight   Is the patient able to teach back Heart Failure diet management? Yes   Is the patient able to teach back signs and symptoms of worsening condition? (i.e. weight gain, shortness of air, etc.) Yes   If the patient is a current smoker, are they able to teach back resources for cessation? Not a smoker   Is the patient/caregiver able to teach back the hierarchy of who to call/visit for symptoms/problems? PCP, Specialist, Home health nurse, Urgent Care, ED, 911 Yes   TCM call completed? Yes   Wrap up additional comments Pt did not receive ABT. Pharm did not receive order for ABT. Notified CM and PCP.          PT did not receive ceftinir ABT   Stella Zendejas RN    2/2/2022, 11:46 EST

## 2022-02-03 VITALS
SYSTOLIC BLOOD PRESSURE: 146 MMHG | TEMPERATURE: 98.2 F | RESPIRATION RATE: 18 BRPM | HEIGHT: 61 IN | OXYGEN SATURATION: 96 % | BODY MASS INDEX: 26.88 KG/M2 | HEART RATE: 82 BPM | DIASTOLIC BLOOD PRESSURE: 72 MMHG | WEIGHT: 142.4 LBS

## 2022-02-03 VITALS
DIASTOLIC BLOOD PRESSURE: 58 MMHG | OXYGEN SATURATION: 97 % | RESPIRATION RATE: 18 BRPM | SYSTOLIC BLOOD PRESSURE: 124 MMHG | HEART RATE: 93 BPM | TEMPERATURE: 98.5 F

## 2022-02-03 NOTE — HOME HEALTH
Ms. Chrystal Ruiz is an 81-year old patient who lives alone in [facility] home and was discharged from [facility] yesterday following 8-day hospitalization with new diagnosis of diastolic CHF. Son, who lives in [facility], reported that he had multiple telephone calls with patient for over a week due c/o progressive weakness, ble edema and increasing shortness of breath before she was admitted. Patient was independent in indoor and community ambulation without assistive device, including driving, before the change in medical conditions and hospitalization.    Patient still presents with generalized weakness and shows 3/5 gross strength in ble. Functionally, she performs sit to stand with multiple attempts and requires sba/occasional cga and verbal/visual cues for correct . Patient does not want to use rolling and unsafe with quad cane due to severe lateral trunk sways, discontinous and labored steps with shortness of breath. I discussed and emphasized the need to use rolling walker for safety and energy conservation at this time and patient finally agreed and son was pleased with patient's agreement to use rolling walker as recommended by this PT. Able to ambulate about 60ft with sba/occasional cga and cues to step closer to walker to increase support and feet clearance.    Ms. Ruiz would benefit from further skilled PT services for therapeutic/home exercise program, transfer teaching and gait/endurance training.

## 2022-02-03 NOTE — HOME HEALTH
81-year-old female sent to [facility] ED by PCP for evaluation of leukocytosis and generalized fatigue.  Pt diagnosed with acute diastolic heart failure. Home 2/1/22.  Pt lives alone in a well kept home in a community were her neighbors check on her frequently.  Son present today and has been with pt for past 8 days.  He has to leave and go to his home in [facility] today.  Pt is anxious about him leaving. Pt denies depression but would not keep eye contact with nurse.  Pt does C/O anxiety.  Feels overwhelmed after hospital stay.   Tremors noted.  Son states appt with Dr Ulloa and will discuss pts anxiety.  Pt unsteady but not wanting to use assistive device.  Pt was driving and doing everything for herself before this hospital stay. Patient started on Eliquis and Cardizem. Amlodipine and HCTZ were discontinued.  Pt thought metoprolol was also DCed and has not taken today.  Call placed to Dr Goetz while in home to see if pt to continue metoprolol as on DC sheet.  Message left.  Awaiting return call.   Edema and Braces noted to bilateral legs.  Pt states edema is down and she has lost 10 lbs during hospital stay.  Pt states slept well last night.  Instructed on pacing activities and rest and recuperate.  Has tingling to left arm. r    HX of : Grief reaction Alopecia, Arthritis, Back pain, Encounter for general adult medical examination without abnormal findings, Hypertension, Lung nodule, Vitamin D deficiency, Shortness of breath, Postmenopausal status , Nevus, non-neoplastic Cellulitis of right anterior lower leg Vasculitis of skin, Pedal edema Hyperlipidemia ,Tremor,Bright red rectal bleeding, Acute non-recurrent maxillary sinusitis,Acute pain of right knee, Chronic pain of right knee, Tachycardia , Essential (primary) hypertension, Dermatitis A-fib    Plan for next visit:   assess post hospital stay, assess bp,  teach on meds, Bring CHF book,  teach on CHF,  assess anxiety, edema, Wt. assess compliance with daily  candido,  taking meds.  See if response back from Dr Goetz regarding Metoprolol, Cardiopulmonary assessment. instruction on medication, home safety, and measures to prevent rehospitalization.

## 2022-02-04 PROCEDURE — G0180 MD CERTIFICATION HHA PATIENT: HCPCS | Performed by: FAMILY MEDICINE

## 2022-02-07 ENCOUNTER — OFFICE VISIT (OUTPATIENT)
Dept: FAMILY MEDICINE CLINIC | Facility: CLINIC | Age: 82
End: 2022-02-07

## 2022-02-07 ENCOUNTER — LAB (OUTPATIENT)
Dept: FAMILY MEDICINE CLINIC | Facility: CLINIC | Age: 82
End: 2022-02-07

## 2022-02-07 VITALS
WEIGHT: 140 LBS | DIASTOLIC BLOOD PRESSURE: 79 MMHG | OXYGEN SATURATION: 94 % | HEART RATE: 76 BPM | TEMPERATURE: 98.2 F | BODY MASS INDEX: 26.45 KG/M2 | SYSTOLIC BLOOD PRESSURE: 132 MMHG

## 2022-02-07 DIAGNOSIS — D72.819 LEUKOPENIA, UNSPECIFIED TYPE: ICD-10-CM

## 2022-02-07 DIAGNOSIS — I50.31 ACUTE DIASTOLIC CHF (CONGESTIVE HEART FAILURE): Primary | ICD-10-CM

## 2022-02-07 DIAGNOSIS — R91.8 LUNG NODULES: ICD-10-CM

## 2022-02-07 DIAGNOSIS — I10 ESSENTIAL (PRIMARY) HYPERTENSION: ICD-10-CM

## 2022-02-07 DIAGNOSIS — D64.9 ANEMIA, UNSPECIFIED TYPE: ICD-10-CM

## 2022-02-07 DIAGNOSIS — I48.0 PAROXYSMAL ATRIAL FIBRILLATION: ICD-10-CM

## 2022-02-07 LAB
BASOPHILS # BLD AUTO: 0.07 10*3/MM3 (ref 0–0.2)
BASOPHILS NFR BLD AUTO: 0.3 % (ref 0–1.5)
DEPRECATED RDW RBC AUTO: 40.5 FL (ref 37–54)
EOSINOPHIL # BLD AUTO: 0.22 10*3/MM3 (ref 0–0.4)
EOSINOPHIL NFR BLD AUTO: 1 % (ref 0.3–6.2)
ERYTHROCYTE [DISTWIDTH] IN BLOOD BY AUTOMATED COUNT: 15 % (ref 12.3–15.4)
HCT VFR BLD AUTO: 30.5 % (ref 34–46.6)
HGB BLD-MCNC: 9.3 G/DL (ref 12–15.9)
LYMPHOCYTES # BLD AUTO: 1.28 10*3/MM3 (ref 0.7–3.1)
LYMPHOCYTES NFR BLD AUTO: 5.8 % (ref 19.6–45.3)
MCH RBC QN AUTO: 22.9 PG (ref 26.6–33)
MCHC RBC AUTO-ENTMCNC: 30.5 G/DL (ref 31.5–35.7)
MCV RBC AUTO: 74.9 FL (ref 79–97)
MONOCYTES # BLD AUTO: 1.41 10*3/MM3 (ref 0.1–0.9)
MONOCYTES NFR BLD AUTO: 6.4 % (ref 5–12)
NEUTROPHILS NFR BLD AUTO: 18.63 10*3/MM3 (ref 1.7–7)
NEUTROPHILS NFR BLD AUTO: 84.5 % (ref 42.7–76)
PLATELET # BLD AUTO: 599 10*3/MM3 (ref 140–450)
PMV BLD AUTO: 9.8 FL (ref 6–12)
RBC # BLD AUTO: 4.07 10*6/MM3 (ref 3.77–5.28)
WBC NRBC COR # BLD: 22.05 10*3/MM3 (ref 3.4–10.8)

## 2022-02-07 PROCEDURE — 99495 TRANSJ CARE MGMT MOD F2F 14D: CPT | Performed by: FAMILY MEDICINE

## 2022-02-07 PROCEDURE — 1111F DSCHRG MED/CURRENT MED MERGE: CPT | Performed by: FAMILY MEDICINE

## 2022-02-07 PROCEDURE — 85025 COMPLETE CBC W/AUTO DIFF WBC: CPT | Performed by: FAMILY MEDICINE

## 2022-02-07 PROCEDURE — 36415 COLL VENOUS BLD VENIPUNCTURE: CPT | Performed by: FAMILY MEDICINE

## 2022-02-07 NOTE — PROGRESS NOTES
Transitional Care Follow Up Visit  Subjective     Chrystal Ruiz is a 81 y.o. female who presents for a transitional care management visit.    Within 48 business hours after discharge our office contacted her via telephone to coordinate her care and needs.      I reviewed and discussed the details of that call along with the discharge summary, hospital problems, inpatient lab results, inpatient diagnostic studies, and consultation reports with Chrystal.     Current outpatient and discharge medications have been reconciled for the patient.  Reviewed by: Etelvina Ulloa MD      Date of TCM Phone Call 2/1/2022   [facility]   Date of Admission 1/24/2022   Date of Discharge 2/1/2022   Discharge Disposition Home or Self Care     Risk for Readmission (LACE) Score: 10 (2/1/2022  6:01 AM)      History of Present Illness   Course During Hospital Stay:  The patient is an 81-year-old vaccinated/boosted female with history of pulmonary nodules s/p right CT-guided biopsy in November 2021 which showed benign nodule.  PET scan had shown hypermetabolic activity therefore there were plans to have VATS to be done on 2/4/2022.  The patient had been treated with few days of amoxicillin and Keflex for cellulitis and diuretic for lower extremity edema as an outpatient.The patient was sent to the ED by PCP for evaluation of leukocytosis and generalized fatigue. In the ED, she was diagnosed with acute diastolic heart failure since chest x-ray showed pulmonary congestion.     The patient was then admitted to the medical floor and was continued on diuretics.  TTE on 1/24/2022 showed LVEF 61-65% with some moderate pulmonary valve regurgitation and grade 2 diastolic dysfunction.  The patient's pulmonologist was then consulted. The patient started to have fevers during the hospitalization and was started on antibiotics due to possible pneumonia.  Bilateral lower extremity venous duplex on 1/26/2022 showed chronic lower extremity  thrombophlebitis.  Cardiologist evaluated the patient and underwent stress test on 1/27/2022 which ruled out ischemia.  The patient had complained of intermittent palpitations likely due to paroxysmal atrial fibrillation thus was started on Eliquis and Cardizem.  Amlodipine and HCTZ were discontinued. She has been seen by thoracic surgery team and has an appointment on 2/11/2022.     The following portions of the patient's history were reviewed and updated as appropriate: allergies, current medications, past family history, past medical history, past social history, past surgical history and problem list.    Review of Systems    Objective   Physical Exam  Vitals and nursing note reviewed.   Constitutional:       General: She is not in acute distress.     Appearance: She is well-developed.   HENT:      Head: Normocephalic.   Eyes:      General: Lids are normal.      Conjunctiva/sclera: Conjunctivae normal.   Neck:      Thyroid: No thyroid mass or thyromegaly.      Trachea: Trachea normal.   Cardiovascular:      Rate and Rhythm: Normal rate and regular rhythm.      Heart sounds: Normal heart sounds.   Pulmonary:      Effort: Pulmonary effort is normal.      Breath sounds: Normal breath sounds.   Musculoskeletal:      Cervical back: Normal range of motion.      Right lower leg: No edema.      Left lower leg: No edema.   Lymphadenopathy:      Cervical: No cervical adenopathy.   Skin:     General: Skin is warm and dry.      Findings: No rash.   Neurological:      Mental Status: She is alert and oriented to person, place, and time.   Psychiatric:         Attention and Perception: She is attentive.         Mood and Affect: Mood normal.         Speech: Speech normal.         Behavior: Behavior normal.         Assessment/Plan   Diagnoses and all orders for this visit:    1. Acute diastolic CHF (congestive heart failure) (HCC) (Primary)    2. Paroxysmal atrial fibrillation (HCC)  Assessment & Plan:  Appears to be in sinus  rhythm now.        3. Essential (primary) hypertension     4. Lung nodules  Assessment & Plan:  Scheduled for VATS surgery with Dr. Mcconnell on 2/11/22      5. Leukopenia, unspecified type  Assessment & Plan:  Check CBC today.    Orders:  -     CBC & Differential    6. Anemia, unspecified type  Assessment & Plan:  Check CBC today.    Orders:  -     CBC & Differential

## 2022-02-08 ENCOUNTER — HOME CARE VISIT (OUTPATIENT)
Dept: HOME HEALTH SERVICES | Facility: HOME HEALTHCARE | Age: 82
End: 2022-02-08

## 2022-02-08 PROCEDURE — G0157 HHC PT ASSISTANT EA 15: HCPCS

## 2022-02-08 NOTE — PROGRESS NOTES
"Pt stated she has not seen a hematologist/oncologist and is not sure at this time if she wants to, she \" has to many doctor appointments and calls right now\" "

## 2022-02-09 ENCOUNTER — APPOINTMENT (OUTPATIENT)
Dept: PREADMISSION TESTING | Facility: HOSPITAL | Age: 82
End: 2022-02-09

## 2022-02-09 ENCOUNTER — READMISSION MANAGEMENT (OUTPATIENT)
Dept: CALL CENTER | Facility: HOSPITAL | Age: 82
End: 2022-02-09

## 2022-02-09 ENCOUNTER — HOME CARE VISIT (OUTPATIENT)
Dept: HOME HEALTH SERVICES | Facility: HOME HEALTHCARE | Age: 82
End: 2022-02-09

## 2022-02-09 VITALS
DIASTOLIC BLOOD PRESSURE: 76 MMHG | HEART RATE: 83 BPM | SYSTOLIC BLOOD PRESSURE: 124 MMHG | TEMPERATURE: 96.9 F | OXYGEN SATURATION: 97 %

## 2022-02-09 VITALS
RESPIRATION RATE: 18 BRPM | SYSTOLIC BLOOD PRESSURE: 128 MMHG | HEART RATE: 82 BPM | OXYGEN SATURATION: 98 % | DIASTOLIC BLOOD PRESSURE: 70 MMHG | TEMPERATURE: 98.6 F

## 2022-02-09 VITALS — HEART RATE: 85 BPM | DIASTOLIC BLOOD PRESSURE: 60 MMHG | OXYGEN SATURATION: 100 % | SYSTOLIC BLOOD PRESSURE: 110 MMHG

## 2022-02-09 PROCEDURE — G0152 HHCP-SERV OF OT,EA 15 MIN: HCPCS

## 2022-02-09 PROCEDURE — G0299 HHS/HOSPICE OF RN EA 15 MIN: HCPCS

## 2022-02-09 NOTE — OUTREACH NOTE
CHF Week 2 Survey      Responses   [facility] patient discharged from? [place]   Does the patient have one of the following disease processes/diagnoses(primary or secondary)? CHF   Week 2 attempt successful? Yes   Call start time 1807   Call end time 1813   Discharge diagnosis Acute diastolic heart failure,   Suspected pneumonia,    Generalized fatigue,    Hypertension   Meds reviewed with patient/caregiver? Yes   Is the patient having any side effects they believe may be caused by any medication additions or changes? No   Does the patient have all medications ordered at discharge? Yes   Is the patient taking all medications as directed (includes completed medication regime)? Yes   Does the patient have a primary care provider?  Yes   Has the patient kept scheduled appointments due by today? Yes   Comments Saw PCP on Monday.  Was suppose to follow up with Dr Mcconnell in 2 weeks regarding if she is able to have surgery but hasn't hear from them.  Advised to contact their office for an appt.   What is the Home health agency?  [facility]   Has home health visited the patient within 72 hours of discharge? Yes   Psychosocial issues? No   What is the patient's perception of their health status since discharge? Improving   Is the patient weighing daily? Yes   Does the patient have scales? Yes   Daily weight interventions Education provided on importance of daily weight   Is the patient/caregiver able to teach back the hierarchy of who to call/visit for symptoms/problems? PCP, Specialist, Home health nurse, Urgent Care, ED, 911 Yes   Additional teach back comments States she is getting stronger every day.     CHF Week 2 call completed? Yes   Wrap up additional comments Pt to contact surgeon's office regarding an appt.          Katalina Ortiz LPN

## 2022-02-10 ENCOUNTER — HOME CARE VISIT (OUTPATIENT)
Dept: HOME HEALTH SERVICES | Facility: HOME HEALTHCARE | Age: 82
End: 2022-02-10

## 2022-02-10 ENCOUNTER — TELEPHONE (OUTPATIENT)
Dept: SURGERY | Facility: CLINIC | Age: 82
End: 2022-02-10

## 2022-02-10 PROCEDURE — G0157 HHC PT ASSISTANT EA 15: HCPCS

## 2022-02-10 NOTE — TELEPHONE ENCOUNTER
Called patieint x 2 and left message on cell to offer appt at 2-24-22 at 230 pm patient to call back and confirm that time.

## 2022-02-10 NOTE — HOME HEALTH
Patient greeted SN at door utilizing a walker for ambulation. Reports continued weakness since discharge home from hospital which is currently being addressed by PT. Patient expressed concern that there was bright red blood in her stool that began that day, though denies weakness worse than normal, pallor, or other s/sx of GI bleed. Bleeding likely caused by hemorrhoid on assessment, though SN advised patient to continue monitoring stool for signs of bleeding for several days and report if there is no improvement or worsening of symptoms. CHF educational booklet provided and reviewed with patient and patient verbalized understanding of daily weighing, sodium restriction, BLE elevation and compression and activity monitoring. BLE edema of 1+ noted to BLE, advised elevation and compression. Patient denies worsening SOA, CP, recent falls or other symptoms. All questions addressed.      Plan for next visit: review CHF education understanding, BLE edema assessment, SOA assessment, f/u on blood in stool medication review

## 2022-02-11 ENCOUNTER — HOME CARE VISIT (OUTPATIENT)
Dept: HOME HEALTH SERVICES | Facility: HOME HEALTHCARE | Age: 82
End: 2022-02-11

## 2022-02-11 VITALS
SYSTOLIC BLOOD PRESSURE: 126 MMHG | HEART RATE: 54 BPM | OXYGEN SATURATION: 98 % | DIASTOLIC BLOOD PRESSURE: 78 MMHG | TEMPERATURE: 97.7 F

## 2022-02-11 VITALS
TEMPERATURE: 98.7 F | DIASTOLIC BLOOD PRESSURE: 82 MMHG | SYSTOLIC BLOOD PRESSURE: 104 MMHG | HEART RATE: 116 BPM | OXYGEN SATURATION: 96 %

## 2022-02-11 PROCEDURE — G0158 HHC OT ASSISTANT EA 15: HCPCS

## 2022-02-11 NOTE — ACP (ADVANCE CARE PLANNING)
Pt is a 81 y.o female who lives in alone in a patio home.  Pt recently hospitalized secondary to chf and a fib      PLOF pt independent with all ADLs      Currently pt independent with feeding grooming and toileting.  Pt requires MIN assist with bathing and dressing. Pt requires total assist with IADLs      Skilled OT for ther ex adl training and transfers.  Pt and therapist in agreement with goals and poc.      Pt denies any falls or med changes    next visit ther ex transfers                  f

## 2022-02-12 NOTE — HOME HEALTH
The patient greeted DUGGAN at the door. The patient was using walker for ambulation. Initial visit and rapport established.     Upon arrival, pt vitals were obtained with HR at 116 and irregular. BP at 104/82 and temp at 98.7. Pt sat and rested in chair x 5 minutes and HR reassessed at 111-124 and continues to remain irregular. DUGGAN called pt's PCP, Dr. Ulloa, and was instructed by nurse that pt to rest and recheck in an hour or 2. pt has no way to recheck. Educ on need to obtain pulsoximeter and how to read and she will have friend get one for her. Secondary to unstable vitals, skilled OT focusing on education only today and no strengthening or endurance challenges.     Next OT visit to be for HEP training for BUE strengthening, cardiopulmonary strengthening and education with pt in agreement.

## 2022-02-14 ENCOUNTER — HOME CARE VISIT (OUTPATIENT)
Dept: HOME HEALTH SERVICES | Facility: HOME HEALTHCARE | Age: 82
End: 2022-02-14

## 2022-02-14 VITALS
WEIGHT: 140.2 LBS | TEMPERATURE: 97.8 F | BODY MASS INDEX: 26.49 KG/M2 | HEART RATE: 85 BPM | SYSTOLIC BLOOD PRESSURE: 120 MMHG | RESPIRATION RATE: 18 BRPM | DIASTOLIC BLOOD PRESSURE: 62 MMHG | OXYGEN SATURATION: 98 %

## 2022-02-14 PROCEDURE — G0299 HHS/HOSPICE OF RN EA 15 MIN: HCPCS

## 2022-02-15 ENCOUNTER — HOME CARE VISIT (OUTPATIENT)
Dept: HOME HEALTH SERVICES | Facility: HOME HEALTHCARE | Age: 82
End: 2022-02-15

## 2022-02-15 PROCEDURE — G0157 HHC PT ASSISTANT EA 15: HCPCS

## 2022-02-15 NOTE — HOME HEALTH
Patient expressing anxiety about HR that was noted to be elevated during last OT visit. Today HR was  in afib. MD had expressed that patient was just placed on cardizem and this could take several weeks to take full effect. SN reassured patient that HR was not concerning at this time due to it not sustaining 120+ at which time she should report to MD. SN set up f/u with cardiologist 3/10/22. Patient also reports she has had constipation for the last five days. On assessment, BS are normal and abdomen is soft, nondistended. Patient expresses she is passing flatus. SN recommended miralax which patient reports she has had relief with in the past but would like to try something more natural and asked if she could take prune juice. SN educated patient that prune juice could work, but might require several drinks. Educated patient on when to contact MD and report worsening constipation. BLE edema addressed which is unchanged from last visit. Patient reports compliance with sodium restriciton and compression, but states she has not been elevating as much as recommended. SN highly encouraged elevation. Patient overall reports improvement in strength and no worsening SOA, CP, or other symptoms. Denies falls.      Plan for next visit: evaluate BLE edema, assess constipation, assess afib, medication review, disease management education (afib, CHF).

## 2022-02-16 ENCOUNTER — READMISSION MANAGEMENT (OUTPATIENT)
Dept: CALL CENTER | Facility: HOSPITAL | Age: 82
End: 2022-02-16

## 2022-02-16 VITALS
HEART RATE: 83 BPM | OXYGEN SATURATION: 99 % | SYSTOLIC BLOOD PRESSURE: 126 MMHG | DIASTOLIC BLOOD PRESSURE: 76 MMHG | TEMPERATURE: 97.7 F

## 2022-02-16 NOTE — OUTREACH NOTE
CHF Week 3 Survey      Responses   [facility] patient discharged from? [place]   Does the patient have one of the following disease processes/diagnoses(primary or secondary)? CHF   Week 3 attempt successful? Yes   Call start time 1238   Call end time 1240   Discharge diagnosis Acute diastolic heart failure,   Suspected pneumonia,    Generalized fatigue,    Hypertension   Is patient permission given to speak with other caregiver? Yes   List who call center can speak with son   Meds reviewed with patient/caregiver? Yes   Is the patient taking all medications as directed (includes completed medication regime)? Yes   Has the patient kept scheduled appointments due by today? Yes   What is the Home health agency?  [facility]   Has home health visited the patient within 72 hours of discharge? Yes   Pulse Ox monitoring None   Psychosocial issues? No   Comments Pt has had one episode of irreg HR, PCP is aware.    What is the patient's perception of their health status since discharge? Returned to baseline/stable   Is the patient weighing daily? Yes   Is the patient/caregiver able to teach back the hierarchy of who to call/visit for symptoms/problems? PCP, Specialist, Home health nurse, Urgent Care, ED, 911 Yes   CHF Week 3 call completed? Yes   Revoked No further contact(revokes)-requires comment   Is the patient interested in additional calls from an ambulatory ?  NOTE:  applies to high risk patients requiring additional follow-up. No   Graduated/Revoked comments esther Barrera RN

## 2022-02-17 ENCOUNTER — HOME CARE VISIT (OUTPATIENT)
Dept: HOME HEALTH SERVICES | Facility: HOME HEALTHCARE | Age: 82
End: 2022-02-17

## 2022-02-17 PROCEDURE — G0157 HHC PT ASSISTANT EA 15: HCPCS

## 2022-02-17 NOTE — CASE COMMUNICATION
Informational Purposes Only:  As per home health protocol MD must be notified of any cancelled visits. Patient cancelled the visit that was scheduled for 2.11.22 and requests no visit until next week.     [facility]

## 2022-02-18 ENCOUNTER — HOME CARE VISIT (OUTPATIENT)
Dept: HOME HEALTH SERVICES | Facility: HOME HEALTHCARE | Age: 82
End: 2022-02-18

## 2022-02-18 VITALS
DIASTOLIC BLOOD PRESSURE: 76 MMHG | OXYGEN SATURATION: 99 % | SYSTOLIC BLOOD PRESSURE: 126 MMHG | HEART RATE: 87 BPM | TEMPERATURE: 98 F

## 2022-02-18 PROCEDURE — G0299 HHS/HOSPICE OF RN EA 15 MIN: HCPCS

## 2022-02-20 VITALS
OXYGEN SATURATION: 98 % | SYSTOLIC BLOOD PRESSURE: 128 MMHG | HEART RATE: 78 BPM | DIASTOLIC BLOOD PRESSURE: 68 MMHG | RESPIRATION RATE: 19 BRPM | TEMPERATURE: 98.2 F

## 2022-02-21 NOTE — HOME HEALTH
Patient doing well. States she goes Thurs to MD to see what the plan is as far as surgery. Reports she does her daily weights and knows when to call MD. Watches sodium in foods and restricts as much as she can. still working with PT as well.

## 2022-02-22 ENCOUNTER — HOME CARE VISIT (OUTPATIENT)
Dept: HOME HEALTH SERVICES | Facility: HOME HEALTHCARE | Age: 82
End: 2022-02-22

## 2022-02-22 PROCEDURE — G0157 HHC PT ASSISTANT EA 15: HCPCS

## 2022-02-23 ENCOUNTER — HOME CARE VISIT (OUTPATIENT)
Dept: HOME HEALTH SERVICES | Facility: HOME HEALTHCARE | Age: 82
End: 2022-02-23

## 2022-02-23 VITALS
HEART RATE: 78 BPM | TEMPERATURE: 97.8 F | DIASTOLIC BLOOD PRESSURE: 80 MMHG | SYSTOLIC BLOOD PRESSURE: 124 MMHG | OXYGEN SATURATION: 98 %

## 2022-02-23 PROCEDURE — G0151 HHCP-SERV OF PT,EA 15 MIN: HCPCS

## 2022-02-24 ENCOUNTER — OFFICE VISIT (OUTPATIENT)
Dept: SURGERY | Facility: CLINIC | Age: 82
End: 2022-02-24

## 2022-02-24 VITALS
WEIGHT: 138 LBS | TEMPERATURE: 98.4 F | SYSTOLIC BLOOD PRESSURE: 130 MMHG | HEIGHT: 61 IN | BODY MASS INDEX: 26.06 KG/M2 | DIASTOLIC BLOOD PRESSURE: 71 MMHG | OXYGEN SATURATION: 97 % | HEART RATE: 100 BPM

## 2022-02-24 VITALS
HEART RATE: 98 BPM | SYSTOLIC BLOOD PRESSURE: 108 MMHG | RESPIRATION RATE: 18 BRPM | DIASTOLIC BLOOD PRESSURE: 58 MMHG | TEMPERATURE: 97.7 F | OXYGEN SATURATION: 98 %

## 2022-02-24 DIAGNOSIS — R91.1 LUNG NODULE: Primary | ICD-10-CM

## 2022-02-24 PROCEDURE — 99214 OFFICE O/P EST MOD 30 MIN: CPT | Performed by: THORACIC SURGERY (CARDIOTHORACIC VASCULAR SURGERY)

## 2022-02-24 NOTE — HOME HEALTH
Patient demonstrated 4+/5 gross strength in ble stated compliant with illustrated home exercise program. She is independent in all household transfers and able to ambulate more than 200ft safely and independently with rolling walker. She has appt with Dr. Etelvina Mcconnell, a thoracic surgeon, tomorrow and requested discharge from further PT services due to possible surgery schedule for lung node removal in couple of weeks.

## 2022-02-25 ENCOUNTER — HOME CARE VISIT (OUTPATIENT)
Dept: HOME HEALTH SERVICES | Facility: HOME HEALTHCARE | Age: 82
End: 2022-02-25

## 2022-02-25 VITALS
OXYGEN SATURATION: 99 % | TEMPERATURE: 97.5 F | HEART RATE: 70 BPM | RESPIRATION RATE: 19 BRPM | DIASTOLIC BLOOD PRESSURE: 64 MMHG | SYSTOLIC BLOOD PRESSURE: 122 MMHG

## 2022-02-25 PROCEDURE — G0299 HHS/HOSPICE OF RN EA 15 MIN: HCPCS

## 2022-02-25 NOTE — PROGRESS NOTES
"Chief Complaint  Lung Nodule (discuss VATS)    Subjective          Chrystal Ruiz presents to [facility] THORACIC SURGERY  History of Present Illness  Ms. Ruiz is a very pleasant 81-year-old female with a right apical lung nodule that is hypermetabolic. She was scheduled for surgical resection at the beginning of 02/2022 but was admitted to the hospital, secondary to diastolic heart failure. She presents today for reevaluation.     She continues to complain of significant weakness.  She has not bounced back as quickly from her recent hospitalization where she was diagnosed with CHF and atrial fibrillation as she expected.    Objective   Vital Signs:   /71 (BP Location: Left arm, Patient Position: Sitting, Cuff Size: Adult)   Pulse 100   Temp 98.4 °F (36.9 °C) (Infrared)   Ht 154.9 cm (61\")   Wt 62.6 kg (138 lb)   SpO2 97%   BMI 26.07 kg/m²     Physical Exam  Vitals and nursing note reviewed.   Constitutional:       Appearance: She is well-developed.   HENT:      Head: Normocephalic and atraumatic.      Nose: Nose normal.   Eyes:      Conjunctiva/sclera: Conjunctivae normal.   Cardiovascular:      Rate and Rhythm: Normal rate.   Pulmonary:      Effort: Pulmonary effort is normal.   Abdominal:      Palpations: Abdomen is soft.   Musculoskeletal:         General: Swelling present.      Cervical back: Neck supple.   Skin:     General: Skin is warm and dry.   Neurological:      Mental Status: She is alert and oriented to person, place, and time.   Psychiatric:         Behavior: Behavior normal.         Thought Content: Thought content normal.         Judgment: Judgment normal.          Result Review :                     Assessment and Plan    Diagnoses and all orders for this visit:    1. Lung nodule (Primary)  -     CT Chest Without Contrast; Future  -     CT Chest Without Contrast; Future  -     Ambulatory Referral to Radiation Oncology    Ms. Ruiz is a very pleasant 81-year-old lady " with a right apical to centimeter hypermetabolic lung nodule concerning for bronchogenic malignancy.  She also has multiple other small lung nodules bilaterally.  The right apical nodule is the most concerning for malignancy.  Biopsy of this nodule did demonstrate some inflammation, but the nodule continues to enlarge.  We had discussed a surgical resection of her apical lung nodule, but with her recent admission for CHF, I do not think that she would tolerate an operative intervention.  We also discussed stereotactic radiation.  We will plan referral to Dr. Whitten for consideration of stereotactic radiation.  I will plan to discuss her at tumor board.  This was discussed at length with the patient's friend Smita as well as her son Kashif.  Given the length of time since her CT of the chest, like to have a repeat CT of the chest performed within the next week for radiation planning.  I will plan to see her back in 4 months with a CT of the chest for continued surveillance.  I spent 30 minutes caring for Chrystal on this date of service. This time includes time spent by me in the following activities:preparing for the visit, reviewing tests, obtaining and/or reviewing a separately obtained history, performing a medically appropriate examination and/or evaluation , counseling and educating the patient/family/caregiver, ordering medications, tests, or procedures, referring and communicating with other health care professionals , documenting information in the medical record and independently interpreting results and communicating that information with the patient/family/caregiver  Follow Up   No follow-ups on file.  Patient was given instructions and counseling regarding her condition or for health maintenance advice. Please see specific information pulled into the AVS if appropriate.       Transcribed from ambient dictation for Etelvina Mcconnell MD by Jyoti Swenson.  02/25/22   10:40 EST    Patient verbalized consent to the  visit recording.

## 2022-02-26 ENCOUNTER — HOME CARE VISIT (OUTPATIENT)
Dept: HOME HEALTH SERVICES | Facility: HOME HEALTHCARE | Age: 82
End: 2022-02-26

## 2022-02-26 NOTE — HOME HEALTH
Patient reports she has been doing very well and gaining her strength. She reports she feels all goals of home healthcare have been met and is requesting she be discharged efffective today and have all discipline visits canceled. Patient further reports she is to have a CAT scan next week to further evaluate her lung nodules and althought she knows she will not be having surgery, may be considering radiation. Encouraged patient to reconsider  discharge of SN since she was facing new treatments so SN could be available if she needed assistance or assessments. Patient reports she has a great support system and she does not feel this is necessary. Patient is being discharged from  services effective today per her request.

## 2022-03-01 ENCOUNTER — TELEPHONE (OUTPATIENT)
Dept: FAMILY MEDICINE CLINIC | Facility: CLINIC | Age: 82
End: 2022-03-01

## 2022-03-01 RX ORDER — DILTIAZEM HYDROCHLORIDE 180 MG/1
180 CAPSULE, COATED, EXTENDED RELEASE ORAL
Qty: 90 CAPSULE | Refills: 1 | Status: SHIPPED | OUTPATIENT
Start: 2022-03-01 | End: 2022-04-25

## 2022-03-01 RX ORDER — DILTIAZEM HYDROCHLORIDE 180 MG/1
180 CAPSULE, COATED, EXTENDED RELEASE ORAL
Qty: 30 CAPSULE | Refills: 0 | Status: CANCELLED | OUTPATIENT
Start: 2022-03-01

## 2022-03-01 NOTE — TELEPHONE ENCOUNTER
Caller: Crhystal Ruiz    Relationship: Self    Best call back number: [phone]    Requested Prescriptions:   Requested Prescriptions     Pending Prescriptions Disp Refills   • apixaban (ELIQUIS) 2.5 MG tablet tablet 60 tablet 0     Sig: Take 1 tablet by mouth Every 12 (Twelve) Hours. Indications: Atrial Fibrillation   • dilTIAZem CD (CARDIZEM CD) 180 MG 24 hr capsule 30 capsule 0     Sig: Take 1 capsule by mouth Daily.        Pharmacy where request should be sent: [id number] [address] AT [address] [phone] [place] [phone]      Additional details provided by patient: PATIENT HAS 3 DAYS LEFT OF THESE MEDICATIONS    PATIENT WOULD ALSO LIKE SOMEONE TO CALL HER ABOUT THESE MEDICATIONS. PATIENT IS CONFUSED.   Does the patient have less than a 3 day supply:  [x] Yes  [] No    Lyssa Rosado Rep   03/01/22 09:32 EST

## 2022-03-01 NOTE — TELEPHONE ENCOUNTER
Medication requested (name and dose): eliquis 2.5mg  diltiziem 180mg      Ph[facility] sent[facility]    Additional details provided by patient: none  Best call back number: [phone]    Does the patient have less than a 3 day supply:  [x] Yes  [] No    Lyssa Villalobos Rep  03/01/22, 09:45 EST

## 2022-03-01 NOTE — TELEPHONE ENCOUNTER
I refilled both of these medicines.  What is her question?   I see she also has a phone message from today to her cardiologist about the same thing.

## 2022-03-02 ENCOUNTER — HOSPITAL ENCOUNTER (OUTPATIENT)
Dept: CT IMAGING | Facility: HOSPITAL | Age: 82
Discharge: HOME | End: 2022-03-02
Admitting: THORACIC SURGERY (CARDIOTHORACIC VASCULAR SURGERY)

## 2022-03-02 ENCOUNTER — TELEPHONE (OUTPATIENT)
Dept: FAMILY MEDICINE CLINIC | Facility: CLINIC | Age: 82
End: 2022-03-02

## 2022-03-02 DIAGNOSIS — R91.1 LUNG NODULE: ICD-10-CM

## 2022-03-02 PROCEDURE — 71250 CT THORAX DX C-: CPT

## 2022-03-02 NOTE — TELEPHONE ENCOUNTER
She is scheduled for a chest CT.  That would be able to show if she had pneumonia better than a chest x-ray so she does not need a chest x-ray.

## 2022-03-02 NOTE — TELEPHONE ENCOUNTER
Caller: Chrystal Ruiz    Relationship: Self    Best call back number: [phone]    What orders are you requesting (i.e. lab or imaging): CHEST XRAY    In what timeframe would the patient need to come in: ASAP-PATIENT IS GETTING CT SCAN TODAY , WOULD LIKE TO GET A CHEST XRAY WHILE SHE IS THERE.     Where will you receive your lab/imaging services: [facility]    Additional notes: PATIENT IS WONDERING IF SHE STILL HAS PNEUMONIA, WOULD LIKE TO GET CHECKED WHILE SHE IS AT THE HOSPITAL TODAY. PLEASE ADVISE.

## 2022-03-03 ENCOUNTER — TELEPHONE (OUTPATIENT)
Dept: RADIATION ONCOLOGY | Facility: HOSPITAL | Age: 82
End: 2022-03-03

## 2022-03-03 NOTE — PROGRESS NOTES
RADIATION THERAPY CONSULT NOTE    NAME: Chrystal Ruiz  YOB: 1940  MRN #: [id number]  DATE OF SERVICE: 3/4/2022  REFERRING PROVIDER: Etelvina Mcconnell MD  [address] [place] [address]  PRIMARY CARE PROVIDER: Etelvina Ulloa MD    DIAGNOSIS:  Right apex NSCLC    REASON FOR CONSULTATION/CHIEF COMPLAINT:  Right lung cancer  I was asked to see the patient at the request of the referring provider noted below for advice and recommendations regarding this diagnosis and the role of radiation therapy.                              REQUESTING PHYSICIAN:  Etelvina Mcconnell Md  [address] [place] [address]    RECORDS OBTAINED:  Records of the patients history including those obtained from the referring provider were reviewed and summarized in detail.    HISTORY OF PRESENT ILLNESS:  Chrystal Ruiz is a 81 y.o. female who Dr. Mcconnell presented to the multi-D tumor board at [facility] this week for consideration of treatment/diagnositic options.    Her medical history is positive for a right apical, growing/AVID hypermetabolic lung nodule---putative NSCLC.    She had prior biopsy of a R mid lung nodule that was inflammatory.      indings:  Head and neck: There is no FDG avid disease in the head or the visualized neck. No adenopathy or mass. No acute findings.    PET/CT 11/8/2021:  Chest: There is a hypermetabolic nodule in the right lung apex measuring 21 mm with a maximum SUV of 9.7 consistent with carcinoma. There is 6 mm subpleural left upper lobe nodule anteriorly on axial image 29 with a maximum SUV of 3.7 also concerning for malignancy. There are 2 small mildly hypermetabolic nodules in the left lung apex best seen on axial images 25 and 26 measuring 4 mm and 6 mm, respectively. There are small right upper lobe and left lower lobe nodules with increased metabolic activity both best seen on axial image 40 measuring 4 mm each. There are additional smaller nodules in both lungs  without significantly increased metabolic activity. There is no hypermetabolic mediastinal, axillary or thoracic inlet adenopathy. No pneumothorax or pleural effusion. Heart size is normal. There is a small hiatal hernia.    Abdomen and pelvis: No hypermetabolic disease below the diaphragm. No adenopathy or mass. No acute findings. Patient is status post cholecystectomy. There is mild colonic fecal retention. There are aortic calcifications.    Musculoskeletal and extremities: There is an area of hypermetabolic activity at the C7 spinous process with SUV 3.3. There is no obvious bony lesion in this area. This is nonspecific and could be related to remote trauma or degenerative change. Metastasis not excluded. There are no additional hypermetabolic foci in the bone.      She was then followed with close imaging surveillance and had a CT chest on 1/3/2022:  FINDINGS:  Many of the pulmonary nodules present in the comparison have decreased in size, others unchanged. For example a nodule in the left lung apex axial image 21 now measures 3 mm, previously measuring 1.3 cm. Tiny nodule in the right apex on axial image 17 now measures 3 mm producing measuring 7 mm. No new or enlarging nodules. Irregular pleural-based nodule like focus at the right apex measures 2.6 x 1.6 cm. Nodules elsewhere measure less than 1 cm.  Normal-sized lymph nodes throughout the mediastinum and elsewhere in the chest, no evidence of adenopathy. No acute findings of the mediastinum. Limited noncontrast images of the upper abdomen are unremarkable. No acute or aggressive osseous findings.  IMPRESSION:  Hypermetabolic upper lobe nodules on the comparison have decreased with the exception of an irregular nodule in the right apex measuring 2.6 x 1.6 cm. This hypermetabolic 2.6 x 1.6 cm nodule has increased in size since April 2021, measuring 2.0 x 1.1 cm at that time.     Radiology and thoracic teams reviewed and felt high risk biopsy given location,  anatomy, lung and SBRT was recommended.    Today, she notes: no chest specific symptoms.  She does have fatigue issues and I feel could benefit greatly from a hematology consult--arranging.      The following portions of the patient's history were reviewed and updated as appropriate: allergies, current medications, past family history, past medical history, past social history, past surgical history and problem list. Reviewed with the patient and remain unchanged.    PAST MEDICAL HISTORY:  she has a past medical history of A-fib (HCC), Arthritis, Asthma, CHF (congestive heart failure) (HCC), and Hypertension.    MEDICATIONS:    Current Outpatient Medications:   •  acetaminophen (TYLENOL) 500 MG tablet, Take 500 mg by mouth Every 6 (Six) Hours As Needed for Mild Pain ., Disp: , Rfl:   •  apixaban (ELIQUIS) 2.5 MG tablet tablet, Take 1 tablet by mouth Every 12 (Twelve) Hours. Indications: Atrial Fibrillation, Disp: 180 tablet, Rfl: 1  •  Breo Ellipta 200-25 MCG/INH inhaler, Inhale 1 puff Daily., Disp: 1 each, Rfl: 2  •  Cholecalciferol (VITAMIN D3) 2000 units capsule, Take 2,000 Units by mouth Daily., Disp: , Rfl:   •  Cyanocobalamin (VITAMIN B12) 1000 MCG tablet controlled-release, Take 1,000 mcg by mouth Daily., Disp: , Rfl:   •  dilTIAZem CD (CARDIZEM CD) 180 MG 24 hr capsule, Take 1 capsule by mouth Daily., Disp: 90 capsule, Rfl: 1  •  furosemide (LASIX) 40 MG tablet, Take 1 tablet by mouth Daily As Needed (edema)., Disp: 30 tablet, Rfl: 0  •  Homeopathic Products (LEG CRAMP RELIEF) tablet, LEG CRAMP RELIEF TABS, Disp: , Rfl:   •  metoprolol succinate XL (Toprol XL) 25 MG 24 hr tablet, Take 1 tablet by mouth Daily., Disp: 90 tablet, Rfl: 1  •  Premarin 0.625 MG/GM vaginal cream, Insert  into the vagina 2 (Two) Times a Week. (Patient taking differently: Insert  into the vagina As Needed.), Disp: 30 g, Rfl: 3  •  triamcinolone (KENALOG) 0.1 % cream, Apply  topically to the appropriate area as directed 2 (Two) Times  a Day., Disp: 80 g, Rfl: 2  No current facility-administered medications for this visit.    ALLERGIES:    Allergies   Allergen Reactions   • Ciprofloxacin Unknown (See Comments)   • Sulfa Antibiotics Unknown (See Comments)   • Cyprodenate Unknown - High Severity   • Iodine Unknown - High Severity       PAST SURGICAL HISTORY:  she has a past surgical history that includes Oophorectomy; Cholecystectomy; Tonsillectomy; Hysterectomy (1987); and Cataract extraction.    PREVIOUS RADIOTHERAPY OR CHEMOTHERAPY:  no    FAMILY HISTORY:  herfamily history includes Breast cancer (age of onset: 85) in her mother; COPD in her sister; Endometrial cancer (age of onset: 70) in her mother; Heart disease in her mother and sister; Heart failure in her father and sister; Stroke in her mother.    SOCIAL HISTORY:  she reports that she has never smoked. She has never used smokeless tobacco. She reports that she does not drink alcohol and does not use drugs.    PAIN AND PAIN MANAGEMENT:  No pain.  Vitals:    03/04/22 1607   BP: 107/72   Pulse: 103   Resp: 16   Temp: 97.7 °F (36.5 °C)   TempSrc: Temporal   SpO2: 96%   Weight: 64.5 kg (142 lb 3.2 oz)   PainSc: 0-No pain       NUTRITIONAL STATUS: no issues    KPS:  80  PHQ-9 Total Score: 0     REVIEW OF SYSTEMS: + fatigue as noted.  General: No fevers, chills, weight change, or drenching night sweats. Skin: No rashes or jaundice.  HEENT: No change in vision or hearing, no headaches.  Neck: No dysphagia or masses.  Heme/Lymph: No easy bruising or bleeding.  Respiratory System: No shortness of breath or cough.  Cardiovascular: No chest pain, palpitations, or dyspnea on exertion.  - Pacemaker. GI: No nausea, vomiting, diarrhea, melena, or hematochezia.  : No dysuria or hematuria.  Endocrine: No heat or cold intolerance. Musculoskeletal: No myalgias or arthralgias.  Neuro: No weakness, numbness, syncope, or seizures. Psych: No mood changes or depression. Ext: Denies swelling.      Objective    VITAL SIGNS:  Vitals:    03/04/22 1607   BP: 107/72   Pulse: 103   Resp: 16   Temp: 97.7 °F (36.5 °C)   TempSrc: Temporal   SpO2: 96%   Weight: 64.5 kg (142 lb 3.2 oz)   PainSc: 0-No pain       PHYSICAL EXAM:  GENERAL:  No apparent distress. Sitting comfortably in room.    HEENT:  Normocephalic, atraumatic. Pupils are equal, round, reactive to light. Sclera anicteric. Conjunctiva not injected. Oropharynx without erythema, ulcerations or thrush.   NECK:  Supple with no masses.  LYMPHATIC:  No cervical, supraclavicular or axillary adenopathy appreciated bilaterally.   CARDIOVASCULAR:  S1 & S2 detected; no murmurs, rubs or gallops.  CHEST:  Clear to auscultation bilaterally; no wheezes, crackles or rubs. Work of breathing normal.  ABDOMEN:  Bowel sounds present. Abdomen is soft, nontender, nondistended.   MUSCULOSKELETAL:  No tenderness to palpation along the spine or scapulae. Normal range of motion.  EXTREMITIES:  No clubbing, cyanosis, edema.  SKIN:  No erythema, rashes, ulcerations noted.   NEUROLOGIC:  Cranial nerves II-XII grossly intact bilaterally. No focal neurologic deficits.  PSYCHIATRIC:  Alert, aware, and appropriate.      PERTINENT IMAGING/PATHOLOGY/LABS (Medical Decision Making):      COORDINATION OF CARE:  A copy of this note is sent to the referring provider.    PATHOLOGY (Reviewed):         IMAGING (Reviewed):     CT Chest Without Contrast Diagnostic    Result Date: 3/2/2022  1. Irregular nodular opacity in the right apex measures about 2.5 x 1.5 cm. This is unchanged from 01/03/2022. It was reportedly hypermetabolic on a prior PETCT. This could be malignant or due to an infectious/inflammatory process. Continued  follow-up is recommended. 2. There are additional bilateral nodules that do not appear significant changed. 3. Additional findings as reported above.  Electronically Signed By-Nita Bradley MD On:3/2/2022 4:42 PM This report was finalized on [id number] by  Nita Bradley MD.    XR Chest  1 View    Result Date: 2/1/2022   No evidence of CHF. No pulmonary edema or pleural effusion.  Small areas of bilateral upper lobe disease favored to be atelectasis versus scarring.  There is a nodule left upper lobe. This measures 5 mm. Also suggestion of a similar size right upper lobe nodule. Follow-up chest CT recommended  Electronically Signed By-Patrice Dale On:2/1/2022 7:48 AM This report was finalized on [id number] by  Patrice Dale, .    XR Chest 1 View    Result Date: 1/24/2022  1.Increased prominence of central pulmonary vessels, likely due to pulmonary vascular congestion/edema, given the provided history. 2.Possible small right pleural effusion.  Electronically Signed By-Katelynn Wing MD On:1/24/2022 4:53 PM This report was finalized on [id number] by  Katelynn Wing MD.    CT Sinus Without Contrast    Result Date: 1/26/2022  IMPRESSION : Paranasal sinuses are clear. No CT evidence of sinusitis.  Electronically Signed By-Katelynn Wing MD On:1/26/2022 11:59 AM This report was finalized on [id number] by  Katelynn Wing MD.      LABS (Reviewed):  HEMATOLOGY:  WBC   Date Value Ref Range Status   03/09/2022 16.30 (H) 3.40 - 10.80 10*3/mm3 Final     RBC   Date Value Ref Range Status   03/09/2022 3.41 (L) 3.77 - 5.28 10*6/mm3 Final     Hemoglobin   Date Value Ref Range Status   03/09/2022 7.6 (L) 12.0 - 15.9 g/dL Final     Hematocrit   Date Value Ref Range Status   03/09/2022 23.8 (L) 34.0 - 46.6 % Final     Platelets   Date Value Ref Range Status   03/09/2022 585 (H) 140 - 450 10*3/mm3 Final     CHEMISTRY:  Lab Results   Component Value Date    GLUCOSE 103 (H) 03/09/2022    BUN 19 03/09/2022    CREATININE 1.09 (H) 03/09/2022    EGFRIFNONA 84 02/01/2022    BCR 17.4 03/09/2022    K 3.6 03/09/2022    CO2 25.0 03/09/2022    CALCIUM 8.6 03/09/2022    ALBUMIN 2.40 (L) 03/09/2022    LABIL2 1.2 05/29/2019    AST 11 03/09/2022    ALT 8 03/09/2022       Assessment/Plan   ASSESSMENT AND PLAN:    1. Malignant  neoplasm of upper lobe of right lung (HCC)    2. Bronchogenic cancer of right lung (HCC)       -Patient has been presented at Multi-D tumor board and the right apex  Has been staged as NSCLC, stage I, [id number]  -Tumor board reviewed all imaging, prior biopsy and gave full recommendation for ablative XRT    I discussed that location makes it tough for potential 5 fraction SBRT; will give it consideration as well as 8, 10 and 15 fraction regimens given the location and proximity of the nearby brachial plexus.    Referral made to Hematology given labs and symptoms.    4D CT sim ordered.    Long discussion today about radiation therapy and she has elected to move forward.      This assessment comes from my review of the imaging, pathology, physician notes and other pertinent information as mentioned.    DISPOSITION:  4D CT sim ordered.      TIME SPENT WITH PATIENT: I spent 50 minutes caring for Chrystal on this date of service. This time includes time spent by me in the following activities: preparing for the visit, reviewing tests, obtaining and/or reviewing a separately obtained history, performing a medically appropriate examination and/or evaluation, counseling and educating the patient/family/caregiver, ordering medications, tests, or procedures, referring and communicating with other health care professionals, documenting information in the medical record and care coordination           CC: Etelvina Mcconnell MD Crone, Angela Joy, MD John A Cox, MD  3/4/2022  6:17 PM EST

## 2022-03-04 ENCOUNTER — CONSULT (OUTPATIENT)
Dept: RADIATION ONCOLOGY | Facility: HOSPITAL | Age: 82
End: 2022-03-04

## 2022-03-04 ENCOUNTER — HOSPITAL ENCOUNTER (OUTPATIENT)
Dept: RADIATION ONCOLOGY | Facility: HOSPITAL | Age: 82
Setting detail: RADIATION/ONCOLOGY SERIES
End: 2022-03-04

## 2022-03-04 VITALS
OXYGEN SATURATION: 96 % | HEART RATE: 103 BPM | DIASTOLIC BLOOD PRESSURE: 72 MMHG | SYSTOLIC BLOOD PRESSURE: 107 MMHG | WEIGHT: 142.2 LBS | TEMPERATURE: 97.7 F | RESPIRATION RATE: 16 BRPM | BODY MASS INDEX: 26.87 KG/M2

## 2022-03-04 DIAGNOSIS — C34.11 MALIGNANT NEOPLASM OF UPPER LOBE OF RIGHT LUNG: Primary | ICD-10-CM

## 2022-03-04 DIAGNOSIS — C34.91 BRONCHOGENIC CANCER OF RIGHT LUNG: ICD-10-CM

## 2022-03-04 PROCEDURE — G0463 HOSPITAL OUTPT CLINIC VISIT: HCPCS

## 2022-03-04 PROCEDURE — 99204 OFFICE O/P NEW MOD 45 MIN: CPT | Performed by: RADIOLOGY

## 2022-03-07 ENCOUNTER — TELEPHONE (OUTPATIENT)
Dept: ONCOLOGY | Facility: CLINIC | Age: 82
End: 2022-03-07

## 2022-03-08 NOTE — PROGRESS NOTES
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 female who presented to our office on 22 for consultation regarding anemia and thrombocytosis.     3/9/2022: Ms. Ruiz was referred for the investigation of anemia and thrombocytosis that was identified in the process of treating congestive heart failure and a right lung tumor. She came in a wheel chair complaining of severe fatigue and difficulties functioning because of this weakness. She admitted to hematochezia that had been associated to constipation that had been going on for some time, at least a few weeks. She had been afebrile. No pain. The laboratory exams reported microcytic anemia and thrombocytosis that had been present since the end of . A decision was made to transfuse her with one unit of red cells. Tests for iron deficiency were sent. She was scheduled to see me in one week.     Subjective:  • 22.  Here for the first time. She was referred by Dr. Whitten who is planning to treat a left lung nodule with stereotactic radiosurgery. She was complaining of progressive fatigue and weakness. She was noted to have anemia and thrombocytosis. She denied chest pain but had been admitted with atrial fibrillation and rapid ventricular response in late 2022. At the time she seemed to have pneumonia and later underwent a PET scan to investigate a lung nodule that was identified at the time. While most of the abnormalities seen in the previous imaging studies had decreased in size and seemed to be resolving, there was one dominant right upper lobe nodule.  Plans for empiric stereotactic radiosurgery to this nodule. She admitted to hematochezia. This had been going on for some time and was associated to constipation but not to pain. She had been free of fevers. No edema and no skin rash.     The following portions of the patient's history were reviewed and updated as appropriate: allergies, current medications, past family history, past medical history, past social history, past surgical history and problem list.    Past Medical History:   Diagnosis Date   • Arthritis    • Asthma    • Hypertension        Past Surgical History:   Procedure Laterality Date   • CATARACT EXTRACTION     • CHOLECYSTECTOMY     • HYSTERECTOMY  1987    Endometriosis   • OOPHORECTOMY     • TONSILLECTOMY         Current Outpatient Medications:   •  acetaminophen (TYLENOL) 500 MG tablet, Take 500 mg by mouth Every 6 (Six) Hours As Needed for Mild Pain ., Disp: , Rfl:   •  apixaban (ELIQUIS) 2.5 MG tablet tablet, Take 1 tablet by mouth Every 12 (Twelve) Hours. Indications: Atrial Fibrillation, Disp: 180 tablet, Rfl: 1  •  Breo Ellipta 200-25 MCG/INH inhaler, Inhale 1 puff Daily., Disp: 1 each, Rfl: 2  •  Cholecalciferol (VITAMIN D3) 2000 units capsule, Take 2,000 Units by mouth Daily., Disp: , Rfl:   •  Cyanocobalamin (VITAMIN B12) 1000 MCG tablet controlled-release, Take 1,000 mcg by mouth Daily., Disp: , Rfl:   •  dilTIAZem CD (CARDIZEM CD) 180 MG 24 hr capsule, Take 1 capsule by mouth Daily., Disp: 90 capsule, Rfl: 1  •  furosemide (LASIX) 40 MG tablet, Take 1 tablet by mouth Daily As Needed (edema)., Disp: 30 tablet, Rfl: 0  •  Homeopathic Products (LEG CRAMP RELIEF) tablet, LEG CRAMP RELIEF TABS, Disp: , Rfl:   •  Premarin 0.625 MG/GM vaginal cream, Insert  into the vagina 2 (Two) Times a Week. (Patient taking differently: Insert  into the vagina As Needed.), Disp: 30 g, Rfl: 3  •  triamcinolone (KENALOG) 0.1 % cream, Apply  topically to the appropriate area as directed 2 (Two) Times a Day.,  Disp: 80 g, Rfl: 2  •  metoprolol succinate XL (Toprol XL) 25 MG 24 hr tablet, Take 1 tablet by mouth Daily., Disp: 90 tablet, Rfl: 1    Allergies   Allergen Reactions   • Ciprofloxacin Unknown (See Comments)   • Sulfa Antibiotics Unknown (See Comments)   • Cyprodenate Unknown - High Severity   • Iodine Unknown - High Severity     Family History   Problem Relation Age of Onset   • Breast cancer Mother 85   • Stroke Mother    • Heart disease Mother    • Endometrial cancer Mother 70   • Heart failure Father    • Heart failure Sister    • COPD Sister    • Heart disease Sister      Cancer-related family history includes Breast cancer (age of onset: 85) in her mother; Endometrial cancer (age of onset: 70) in her mother.    Social History     Tobacco Use   • Smoking status: Never Smoker   • Smokeless tobacco: Never Used   Vaping Use   • Vaping Use: Never used   Substance Use Topics   • Alcohol use: No   • Drug use: Never     Social History     Social History Narrative   • Not on file      ROS:     Review of Systems   Constitutional: Positive for fatigue. Negative for activity change, appetite change, chills, diaphoresis, fever and unexpected weight change.   HENT: Negative for congestion, dental problem, drooling, ear discharge, ear pain, facial swelling, hearing loss, mouth sores, nosebleeds, postnasal drip, rhinorrhea, sinus pressure, sinus pain, sneezing, sore throat, tinnitus, trouble swallowing and voice change.    Eyes: Negative for photophobia, pain, discharge, redness, itching and visual disturbance.   Respiratory: Negative for apnea, cough, choking, chest tightness, shortness of breath, wheezing and stridor.    Cardiovascular: Negative for chest pain, palpitations and leg swelling.   Gastrointestinal: Positive for blood in stool. Negative for abdominal distention, abdominal pain, anal bleeding, constipation, diarrhea, nausea, rectal pain and vomiting.   Endocrine: Negative for cold intolerance, heat intolerance,  "polydipsia and polyuria.   Genitourinary: Negative for decreased urine volume, difficulty urinating, dysuria, flank pain, frequency, genital sores, hematuria and urgency.   Musculoskeletal: Negative for arthralgias, back pain, gait problem, joint swelling, myalgias, neck pain and neck stiffness.   Skin: Negative for color change, pallor and rash.   Neurological: Positive for weakness. Negative for dizziness, tremors, seizures, syncope, facial asymmetry, speech difficulty, light-headedness, numbness and headaches.   Hematological: Negative for adenopathy. Does not bruise/bleed easily.   Psychiatric/Behavioral: Negative for agitation, behavioral problems, confusion, decreased concentration, hallucinations, self-injury, sleep disturbance and suicidal ideas. The patient is not nervous/anxious.      Objective:    Vitals:    03/09/22 1040   BP: 117/67   Pulse: 83   Resp: 18   Temp: 97.1 °F (36.2 °C)   SpO2: 97%   Weight: 64.4 kg (142 lb)   Height: 154.9 cm (61\")   PainSc: 0-No pain     Body mass index is 26.83 kg/m².  ECOG  (3) Capable of limited self-care, confined to bed or chair > 50% of waking hours    Physical Exam:     Physical Exam  Constitutional:       General: She is not in acute distress.     Appearance: Normal appearance. She is ill-appearing. She is not toxic-appearing or diaphoretic.      Comments: Well built, appears frail. Transported in a wheel chair. No jaundice.    HENT:      Head: Normocephalic and atraumatic.      Right Ear: External ear normal.      Left Ear: External ear normal.      Nose: Nose normal.      Mouth/Throat:      Mouth: Mucous membranes are moist.      Pharynx: Oropharynx is clear. No oropharyngeal exudate or posterior oropharyngeal erythema.   Eyes:      General: No scleral icterus.        Right eye: No discharge.         Left eye: No discharge.      Conjunctiva/sclera: Conjunctivae normal.      Pupils: Pupils are equal, round, and reactive to light.   Cardiovascular:      Rate and " Rhythm: Normal rate and regular rhythm.      Pulses: Normal pulses.      Heart sounds: No murmur heard.    No friction rub. No gallop.   Pulmonary:      Effort: No respiratory distress.      Breath sounds: No stridor. No wheezing, rhonchi or rales.   Abdominal:      General: Abdomen is flat. Bowel sounds are normal. There is no distension.      Palpations: Abdomen is soft. There is no mass.      Tenderness: There is no abdominal tenderness. There is no right CVA tenderness, left CVA tenderness, guarding or rebound.      Hernia: No hernia is present.   Musculoskeletal:         General: No tenderness, deformity or signs of injury.      Cervical back: No rigidity.      Right lower leg: Edema present.      Left lower leg: Edema present.   Lymphadenopathy:      Cervical: No cervical adenopathy.   Skin:     Coloration: Skin is not jaundiced.      Findings: No bruising, lesion or rash.   Neurological:      General: No focal deficit present.      Mental Status: She is alert and oriented to person, place, and time.      Cranial Nerves: No cranial nerve deficit.      Motor: No weakness.      Gait: Gait normal.   Psychiatric:         Mood and Affect: Mood normal.         Behavior: Behavior normal.         Thought Content: Thought content normal.         Judgment: Judgment normal.     I Frank Cheng MD performed the physical exam on 3/9/2022 at 13:29    Lab Results - Last 18 Months   Lab Units 03/09/22  1029 02/07/22  1132 02/01/22  0430   WBC 10*3/mm3 18.75* 22.05* 20.80*   HEMOGLOBIN g/dL 7.7* 9.3* 9.0*   HEMATOCRIT % 25.0* 30.5* 27.5*   PLATELETS 10*3/mm3 607* 599* 606*   MCV fL 72.5* 74.9* 71.5*     Lab Results - Last 18 Months   Lab Units 02/01/22  0430 01/31/22  0245 01/30/22  0323 01/26/22  0330 01/25/22  0130 01/24/22  1617 01/21/22  1510   SODIUM mmol/L 133* 135* 136   < > 136 139 136   POTASSIUM mmol/L 4.5 4.2 4.1   < > 3.8 3.9 4.4   CHLORIDE mmol/L 98 97* 98   < > 101 105 100   CO2 mmol/L 23.0 27.0 29.0   < > 25.0  23.0 23.9   BUN mg/dL 25* 36* 25*   < > 10 12 13   CREATININE mg/dL 0.67 0.91 0.65   < > 0.50* 0.50* 0.73   CALCIUM mg/dL 8.6 8.7 8.8   < > 8.3* 8.9 8.8   BILIRUBIN mg/dL  --   --   --   --  0.3 0.3 0.6   ALK PHOS U/L  --   --   --   --  109 117 152*   ALT (SGPT) U/L  --   --   --   --  12 12 17   AST (SGOT) U/L  --   --   --   --  13 12 19   GLUCOSE mg/dL 125* 160* 144*   < > 108* 108* 121*    < > = values in this interval not displayed.     Lab Results   Component Value Date    GLUCOSE 125 (H) 02/01/2022    BUN 25 (H) 02/01/2022    CREATININE 0.67 02/01/2022    EGFRIFNONA 84 02/01/2022    BCR 37.3 (H) 02/01/2022    K 4.5 02/01/2022    CO2 23.0 02/01/2022    CALCIUM 8.6 02/01/2022    ALBUMIN 2.30 (L) 01/25/2022    LABIL2 1.2 05/29/2019    AST 13 01/25/2022    ALT 12 01/25/2022     Lab Results - Last 18 Months   Lab Units 11/05/21  0924   INR  1.03   APTT seconds 28.2     Lab Results   Component Value Date    PTT 28.2 11/05/2021    INR 1.03 11/05/2021     Assessment/Plan     Assessment:  1. Microcytic anemia: The clinical picture and the history of gastrointestinal bleeding is consistent with iron deficiency. Testing has been sent.   2. Gastrointestinal bleeding. Discussed with her. She will need a colonoscopy in the near future.   3. Anemia in association with congestive heart failure. She is fairly symptomatic and the combination, I believe, warrants a blood transfusion in spite of her hemoglobin being greater than 7 g/dl. Explained the plans.   4. She is to see me with results in approximately one week  5. Spent more than one hour on this encounter, reviewing previous records and laboratory exams, interviewing and examining the patient, counseling and in discussions with her and her family and documenting the encounter.     Plan:  1. As above.     Frank Cheng MD on 3/9/2022 at 13:34

## 2022-03-09 ENCOUNTER — APPOINTMENT (OUTPATIENT)
Dept: LAB | Facility: HOSPITAL | Age: 82
End: 2022-03-09

## 2022-03-09 ENCOUNTER — CLINICAL SUPPORT (OUTPATIENT)
Dept: ORTHOPEDIC SURGERY | Facility: CLINIC | Age: 82
End: 2022-03-09

## 2022-03-09 ENCOUNTER — HOSPITAL ENCOUNTER (OUTPATIENT)
Dept: INFUSION THERAPY | Facility: HOSPITAL | Age: 82
Setting detail: INFUSION SERIES
Discharge: HOME | End: 2022-03-09

## 2022-03-09 ENCOUNTER — CONSULT (OUTPATIENT)
Dept: ONCOLOGY | Facility: CLINIC | Age: 82
End: 2022-03-09

## 2022-03-09 VITALS
OXYGEN SATURATION: 97 % | WEIGHT: 142 LBS | DIASTOLIC BLOOD PRESSURE: 67 MMHG | SYSTOLIC BLOOD PRESSURE: 117 MMHG | BODY MASS INDEX: 26.81 KG/M2 | RESPIRATION RATE: 18 BRPM | TEMPERATURE: 97.1 F | HEIGHT: 61 IN | HEART RATE: 83 BPM

## 2022-03-09 DIAGNOSIS — D50.8 OTHER IRON DEFICIENCY ANEMIA: Primary | ICD-10-CM

## 2022-03-09 DIAGNOSIS — M17.11 PRIMARY OSTEOARTHRITIS OF RIGHT KNEE: Primary | ICD-10-CM

## 2022-03-09 DIAGNOSIS — C34.11 MALIGNANT NEOPLASM OF UPPER LOBE OF RIGHT LUNG: Primary | ICD-10-CM

## 2022-03-09 DIAGNOSIS — C34.11 MALIGNANT NEOPLASM OF UPPER LOBE OF RIGHT LUNG: ICD-10-CM

## 2022-03-09 LAB
ABO GROUP BLD: NORMAL
ALBUMIN SERPL-MCNC: 2.4 G/DL (ref 3.5–5.2)
ALBUMIN/GLOB SERPL: 0.6 G/DL
ALP SERPL-CCNC: 101 U/L (ref 39–117)
ALT SERPL W P-5'-P-CCNC: 8 U/L (ref 1–33)
ANION GAP SERPL CALCULATED.3IONS-SCNC: 10 MMOL/L (ref 5–15)
AST SERPL-CCNC: 11 U/L (ref 1–32)
BASOPHILS # BLD AUTO: 0.04 10*3/MM3 (ref 0–0.2)
BASOPHILS # BLD AUTO: 0.1 10*3/MM3 (ref 0–0.2)
BASOPHILS NFR BLD AUTO: 0.2 % (ref 0–1.5)
BASOPHILS NFR BLD AUTO: 0.6 % (ref 0–1.5)
BB HOLD TUBE: NORMAL
BILIRUB SERPL-MCNC: 0.7 MG/DL (ref 0–1.2)
BLD GP AB SCN SERPL QL: NEGATIVE
BUN SERPL-MCNC: 19 MG/DL (ref 8–23)
BUN/CREAT SERPL: 17.4 (ref 7–25)
CALCIUM SPEC-SCNC: 8.6 MG/DL (ref 8.6–10.5)
CHLORIDE SERPL-SCNC: 101 MMOL/L (ref 98–107)
CO2 SERPL-SCNC: 25 MMOL/L (ref 22–29)
CREAT SERPL-MCNC: 1.09 MG/DL (ref 0.57–1)
DAT POLY-SP REAG RBC QL: NEGATIVE
DEPRECATED RDW RBC AUTO: 46.2 FL (ref 37–54)
DEPRECATED RDW RBC AUTO: 46.8 FL (ref 37–54)
EGFRCR SERPLBLD CKD-EPI 2021: 50.8 ML/MIN/1.73
EOSINOPHIL # BLD AUTO: 0.17 10*3/MM3 (ref 0–0.4)
EOSINOPHIL # BLD AUTO: 0.2 10*3/MM3 (ref 0–0.4)
EOSINOPHIL NFR BLD AUTO: 0.9 % (ref 0.3–6.2)
EOSINOPHIL NFR BLD AUTO: 1 % (ref 0.3–6.2)
ERYTHROCYTE [DISTWIDTH] IN BLOOD BY AUTOMATED COUNT: 18.3 % (ref 12.3–15.4)
ERYTHROCYTE [DISTWIDTH] IN BLOOD BY AUTOMATED COUNT: 19 % (ref 12.3–15.4)
FERRITIN SERPL-MCNC: 450.3 NG/ML (ref 13–150)
GLOBULIN UR ELPH-MCNC: 3.9 GM/DL
GLUCOSE SERPL-MCNC: 103 MG/DL (ref 65–99)
HAPTOGLOB SERPL-MCNC: 340 MG/DL (ref 30–200)
HCT VFR BLD AUTO: 23.8 % (ref 34–46.6)
HCT VFR BLD AUTO: 25 % (ref 34–46.6)
HGB BLD-MCNC: 7.6 G/DL (ref 12–15.9)
HGB BLD-MCNC: 7.7 G/DL (ref 12–15.9)
IRON 24H UR-MRATE: 13 MCG/DL (ref 37–145)
IRON SATN MFR SERPL: 7 % (ref 20–50)
LDH SERPL-CCNC: 173 U/L (ref 135–214)
LYMPHOCYTES # BLD AUTO: 1.2 10*3/MM3 (ref 0.7–3.1)
LYMPHOCYTES # BLD AUTO: 1.22 10*3/MM3 (ref 0.7–3.1)
LYMPHOCYTES NFR BLD AUTO: 6.5 % (ref 19.6–45.3)
LYMPHOCYTES NFR BLD AUTO: 7.6 % (ref 19.6–45.3)
MCH RBC QN AUTO: 22.3 PG (ref 26.6–33)
MCH RBC QN AUTO: 22.4 PG (ref 26.6–33)
MCHC RBC AUTO-ENTMCNC: 30.8 G/DL (ref 31.5–35.7)
MCHC RBC AUTO-ENTMCNC: 32 G/DL (ref 31.5–35.7)
MCV RBC AUTO: 69.9 FL (ref 79–97)
MCV RBC AUTO: 72.5 FL (ref 79–97)
MONOCYTES # BLD AUTO: 0.7 10*3/MM3 (ref 0.1–0.9)
MONOCYTES # BLD AUTO: 0.93 10*3/MM3 (ref 0.1–0.9)
MONOCYTES NFR BLD AUTO: 4.2 % (ref 5–12)
MONOCYTES NFR BLD AUTO: 5 % (ref 5–12)
NEUTROPHILS NFR BLD AUTO: 14.1 10*3/MM3 (ref 1.7–7)
NEUTROPHILS NFR BLD AUTO: 16.39 10*3/MM3 (ref 1.7–7)
NEUTROPHILS NFR BLD AUTO: 86.6 % (ref 42.7–76)
NEUTROPHILS NFR BLD AUTO: 87.4 % (ref 42.7–76)
NRBC BLD AUTO-RTO: 0 /100 WBC (ref 0–0.2)
PLATELET # BLD AUTO: 585 10*3/MM3 (ref 140–450)
PLATELET # BLD AUTO: 607 10*3/MM3 (ref 140–450)
PMV BLD AUTO: 6.7 FL (ref 6–12)
PMV BLD AUTO: 8.6 FL (ref 6–12)
POTASSIUM SERPL-SCNC: 3.6 MMOL/L (ref 3.5–5.2)
PROT SERPL-MCNC: 6.3 G/DL (ref 6–8.5)
RBC # BLD AUTO: 3.41 10*6/MM3 (ref 3.77–5.28)
RBC # BLD AUTO: 3.45 10*6/MM3 (ref 3.77–5.28)
RETICS # AUTO: 0.06 10*6/MM3 (ref 0.02–0.13)
RETICS/RBC NFR AUTO: 1.86 % (ref 0.7–1.9)
RH BLD: POSITIVE
SODIUM SERPL-SCNC: 136 MMOL/L (ref 136–145)
T&S EXPIRATION DATE: NORMAL
TIBC SERPL-MCNC: 189 MCG/DL (ref 298–536)
TRANSFERRIN SERPL-MCNC: 127 MG/DL (ref 200–360)
VIT B12 BLD-MCNC: 1009 PG/ML (ref 211–946)
WBC NRBC COR # BLD: 16.3 10*3/MM3 (ref 3.4–10.8)
WBC NRBC COR # BLD: 18.75 10*3/MM3 (ref 3.4–10.8)

## 2022-03-09 PROCEDURE — 86880 COOMBS TEST DIRECT: CPT | Performed by: INTERNAL MEDICINE

## 2022-03-09 PROCEDURE — 83615 LACTATE (LD) (LDH) ENZYME: CPT | Performed by: INTERNAL MEDICINE

## 2022-03-09 PROCEDURE — 85025 COMPLETE CBC W/AUTO DIFF WBC: CPT | Performed by: INTERNAL MEDICINE

## 2022-03-09 PROCEDURE — 86850 RBC ANTIBODY SCREEN: CPT | Performed by: INTERNAL MEDICINE

## 2022-03-09 PROCEDURE — 86900 BLOOD TYPING SEROLOGIC ABO: CPT

## 2022-03-09 PROCEDURE — 82607 VITAMIN B-12: CPT | Performed by: INTERNAL MEDICINE

## 2022-03-09 PROCEDURE — 36415 COLL VENOUS BLD VENIPUNCTURE: CPT

## 2022-03-09 PROCEDURE — 99205 OFFICE O/P NEW HI 60 MIN: CPT | Performed by: INTERNAL MEDICINE

## 2022-03-09 PROCEDURE — 85045 AUTOMATED RETICULOCYTE COUNT: CPT | Performed by: INTERNAL MEDICINE

## 2022-03-09 PROCEDURE — 80053 COMPREHEN METABOLIC PANEL: CPT | Performed by: INTERNAL MEDICINE

## 2022-03-09 PROCEDURE — 99213 OFFICE O/P EST LOW 20 MIN: CPT | Performed by: ORTHOPAEDIC SURGERY

## 2022-03-09 PROCEDURE — 86901 BLOOD TYPING SEROLOGIC RH(D): CPT | Performed by: INTERNAL MEDICINE

## 2022-03-09 PROCEDURE — 82728 ASSAY OF FERRITIN: CPT | Performed by: INTERNAL MEDICINE

## 2022-03-09 PROCEDURE — 83540 ASSAY OF IRON: CPT | Performed by: INTERNAL MEDICINE

## 2022-03-09 PROCEDURE — 86900 BLOOD TYPING SEROLOGIC ABO: CPT | Performed by: INTERNAL MEDICINE

## 2022-03-09 PROCEDURE — 83010 ASSAY OF HAPTOGLOBIN QUANT: CPT | Performed by: INTERNAL MEDICINE

## 2022-03-09 PROCEDURE — 86901 BLOOD TYPING SEROLOGIC RH(D): CPT

## 2022-03-09 PROCEDURE — 84466 ASSAY OF TRANSFERRIN: CPT | Performed by: INTERNAL MEDICINE

## 2022-03-09 PROCEDURE — 86923 COMPATIBILITY TEST ELECTRIC: CPT

## 2022-03-09 PROCEDURE — 36415 COLL VENOUS BLD VENIPUNCTURE: CPT | Performed by: INTERNAL MEDICINE

## 2022-03-09 RX ORDER — SODIUM CHLORIDE 9 MG/ML
250 INJECTION, SOLUTION INTRAVENOUS AS NEEDED
Status: DISCONTINUED | OUTPATIENT
Start: 2022-03-09 | End: 2022-03-11 | Stop reason: HOSPADM

## 2022-03-09 RX ORDER — SODIUM CHLORIDE 9 MG/ML
250 INJECTION, SOLUTION INTRAVENOUS AS NEEDED
Status: CANCELLED | OUTPATIENT
Start: 2022-03-09

## 2022-03-09 NOTE — PROGRESS NOTES
INJECTION    Patient: Chrystal Ruiz    YOB: 1940    MRN: [id number]    Chief Complaint   Patient presents with   • Right Knee - Follow-up       History of Present Illness: {Joint injection HPI AS:76115}    This problem {IS/ IS NOT:80492} new to this examiner.     Allergies:   Allergies   Allergen Reactions   • Ciprofloxacin Unknown (See Comments)   • Sulfa Antibiotics Unknown (See Comments)   • Cyprodenate Unknown - High Severity   • Iodine Unknown - High Severity       Medications:   Home Medications:  Current Outpatient Medications on File Prior to Visit   Medication Sig   • acetaminophen (TYLENOL) 500 MG tablet Take 500 mg by mouth Every 6 (Six) Hours As Needed for Mild Pain .   • apixaban (ELIQUIS) 2.5 MG tablet tablet Take 1 tablet by mouth Every 12 (Twelve) Hours. Indications: Atrial Fibrillation   • Breo Ellipta 200-25 MCG/INH inhaler Inhale 1 puff Daily.   • Cholecalciferol (VITAMIN D3) 2000 units capsule Take 2,000 Units by mouth Daily.   • Cyanocobalamin (VITAMIN B12) 1000 MCG tablet controlled-release Take 1,000 mcg by mouth Daily.   • dilTIAZem CD (CARDIZEM CD) 180 MG 24 hr capsule Take 1 capsule by mouth Daily.   • furosemide (LASIX) 40 MG tablet Take 1 tablet by mouth Daily As Needed (edema).   • Homeopathic Products (LEG CRAMP RELIEF) tablet LEG CRAMP RELIEF TABS   • metoprolol succinate XL (Toprol XL) 25 MG 24 hr tablet Take 1 tablet by mouth Daily.   • Premarin 0.625 MG/GM vaginal cream Insert  into the vagina 2 (Two) Times a Week. (Patient taking differently: Insert  into the vagina As Needed.)   • triamcinolone (KENALOG) 0.1 % cream Apply  topically to the appropriate area as directed 2 (Two) Times a Day.     No current facility-administered medications on file prior to visit.     Current Medications:  Scheduled Meds:  PRN Meds:.    I have reviewed the patient's medical history in detail and updated the computerized patient record.  Review and summarization of old records include:   "  Past Medical History:   Diagnosis Date   • Arthritis    • Asthma    • Hypertension      Past Surgical History:   Procedure Laterality Date   • CATARACT EXTRACTION     • CHOLECYSTECTOMY     • HYSTERECTOMY  1987    Endometriosis   • OOPHORECTOMY     • TONSILLECTOMY       Social History     Occupational History   • Not on file   Tobacco Use   • Smoking status: Never Smoker   • Smokeless tobacco: Never Used   Vaping Use   • Vaping Use: Never used   Substance and Sexual Activity   • Alcohol use: No   • Drug use: Never   • Sexual activity: Not Currently      Social History     Social History Narrative   • Not on file     Family History   Problem Relation Age of Onset   • Breast cancer Mother 85   • Stroke Mother    • Heart disease Mother    • Endometrial cancer Mother 70   • Heart failure Father    • Heart failure Sister    • COPD Sister    • Heart disease Sister        Review of Systems        Wt Readings from Last 3 Encounters:   03/09/22 64.4 kg (142 lb)   03/04/22 64.5 kg (142 lb 3.2 oz)   02/24/22 62.6 kg (138 lb)     Ht Readings from Last 3 Encounters:   03/09/22 154.9 cm (61\")   02/24/22 154.9 cm (61\")   02/02/22 154.9 cm (61\")     There is no height or weight on file to calculate BMI.  No height and weight on file for this encounter.  There were no vitals filed for this visit.    Physical Exam    Musculoskeletal:    {Musculoskeletal Exams:58834}      Diagnostics:      Procedure:  Large Joint Arthrocentesis: R knee  Date/Time: 3/9/2022 1:57 PM  Consent given by: patient  Site marked: site marked  Timeout: Immediately prior to procedure a time out was called to verify the correct patient, procedure, equipment, support staff and site/side marked as required   Supporting Documentation  Indications: pain   Procedure Details  Location: knee - R knee  Preparation: Patient was prepped and draped in the usual sterile fashion  Needle size: 25 G  Approach: anteromedial  Medications administered: 88 mg Hyaluronan 88 MG/4ML; " 2 mL lidocaine PF 1% 1 %  Patient tolerance: patient tolerated the procedure well with no immediate complications            Assessment:   Diagnoses and all orders for this visit:    1. Primary osteoarthritis of right knee (Primary)    Other orders  -     Large Joint Arthrocentesis          Plan:   · Injected patient's {RIGHT/LEFT:99835} {Body Location for PLAN ORTHO:68623} joint(s)with {Joint Injection Medication Choices:84767} from an {anteromedial, anterolateral:53719} approach   · Compression/brace   · Rest, ice, compression, and elevation (RICE) therapy  · OTC {OTC pain:26032}  · Follow up in *** {WEEKS/MONTHS:13416}    Date of encounter: 03/09/2022   Radha Wolfe MA

## 2022-03-09 NOTE — PROGRESS NOTES
Chief Complaint  Follow-up of the Right Knee    Subjective    History of Present Illness      Chrystal Ruiz is a 81 y.o. female who presents to [facility] for follow-up on right knee pathology.  History of Present Illness the patient states that she is doing a lot better than her previous visit.  The knee pain seems to have settled down quite a bit.  She does have some swelling and crepitus throughout the range of motion.  She does have some symptoms over the patellofemoral joint on deep flexion as well.  She states that previous injections have helped her tremendously to minimize her symptoms.  At this point the patient would like to defer injection to the knee if at all possible.  She does use a brace for assistance with ambulation.  Pain Location:  RIGHT knee  Radiation: none  Quality: dull  Intensity/Severity: mild   Duration: Several months  Progression of symptoms: no worsening, reports improvement  Onset quality: gradual   Timing: intermittent  Aggravating Factors: going up and down stairs  Alleviating Factors: NSAIDs, steroid injection (intra-articular)  Previous Episodes: yes  Associated Symptoms: decreased strength  ADLs Affected: ambulating, meal preparation  Previous Treatment: intra-articular injection       Objective   Vital Signs:   There were no vitals taken for this visit.    Physical Exam  Physical Exam  Vitals signs and nursing note reviewed.   Constitutional:       Appearance: Normal appearance.   Pulmonary:      Effort: Pulmonary effort is normal.   Skin:     General: Skin is warm and dry.      Capillary Refill: Capillary refill takes less than 2 seconds.   Neurological:      General: No focal deficit present.      Mental Status: He is alert and oriented to person, place, and time. Mental status is at baseline.   Psychiatric:         Mood and Affect: Mood normal.         Behavior: Behavior normal.         Thought Content: Thought content normal.         Judgment:  Judgment normal.     Ortho Exam   Right knee (varus). Patient has crepitus throughout range of motion. Positive patellar grind test. Mild effusion. Lachman is negative. Pivot shift is negative. Anterior and posterior drawer signs are negative. Significant joint line tenderness is noted on the medial aspect of the knee. Patient has a varus orientation of the knee. There is fullness and tenderness in the Popliteal fossa. Mild distention of a Popliteal cyst is noted in this location. Range of motion in flexion is from 0-110 degrees. Neurovascular status is intact.  Dorsalis pedis and posterior tibial artery pulses are palpable. Common peroneal nerve function is well preserved. Patient's gait is cautious and antalgic. Skin and soft tissues are mildly swollen, consistent with synovitis and effusion. The patient has a significant limp with the first few steps after starting the gait cycle. Getting out of a chair takes a lot of effort due to pain on knee flexion.         Result Review :   The following data was reviewed by: Mihai Oneill MD on 03/09/2022:  Radiologic studies - see below for interpretation  no diagnostic testing performed this visit            Procedures           Assessment   Assessment and Plan    Diagnoses and all orders for this visit:    1. Primary osteoarthritis of right knee (Primary)    Other orders  -     Cancel: Large Joint Arthrocentesis: R knee          Follow Up   · Compression/brace to the knee to prevent it from buckling and giving out.  · Calcium and vitamin D for bone health.  · Glucosamine, chondroitin and turmeric for cartilage health.  · Falls precautions.  · Viscosupplementation injection has been approved for the patient and she will let me know when she is ready for that form of intervention.  · Rest, ice, compression, and elevation (RICE) therapy  · Stretching and strengthening exercises  · OTC Alternate Ibuprofen and Tylenol as needed  · Follow up in 3 month(s)  • Patient was given  instructions and counseling regarding her condition or for health maintenance advice. Please see specific information pulled into the AVS if appropriate.     Mihai Oneill MD   Date of Encounter: 3/9/2022        EMR Dragon/Transcription disclaimer:  Much of this encounter note is an electronic transcription/translation of spoken language to printed text. The electronic translation of spoken language may permit erroneous, or at times, nonsensical words or phrases to be inadvertently transcribed; Although I have reviewed the note for such errors, some may still exist.

## 2022-03-10 ENCOUNTER — HOSPITAL ENCOUNTER (OUTPATIENT)
Dept: INFUSION THERAPY | Facility: HOSPITAL | Age: 82
Setting detail: INFUSION SERIES
Discharge: HOME | End: 2022-03-10

## 2022-03-10 ENCOUNTER — OFFICE VISIT (OUTPATIENT)
Dept: CARDIOLOGY | Facility: CLINIC | Age: 82
End: 2022-03-10

## 2022-03-10 VITALS
WEIGHT: 147 LBS | DIASTOLIC BLOOD PRESSURE: 69 MMHG | OXYGEN SATURATION: 97 % | BODY MASS INDEX: 27.75 KG/M2 | HEIGHT: 61 IN | SYSTOLIC BLOOD PRESSURE: 151 MMHG | HEART RATE: 86 BPM

## 2022-03-10 VITALS
HEART RATE: 74 BPM | RESPIRATION RATE: 16 BRPM | DIASTOLIC BLOOD PRESSURE: 70 MMHG | OXYGEN SATURATION: 98 % | TEMPERATURE: 98.3 F | SYSTOLIC BLOOD PRESSURE: 120 MMHG

## 2022-03-10 DIAGNOSIS — C34.11 MALIGNANT NEOPLASM OF UPPER LOBE OF RIGHT LUNG: ICD-10-CM

## 2022-03-10 DIAGNOSIS — Z79.01 CHRONIC ANTICOAGULATION: ICD-10-CM

## 2022-03-10 DIAGNOSIS — I48.0 PAROXYSMAL ATRIAL FIBRILLATION: Primary | ICD-10-CM

## 2022-03-10 PROCEDURE — P9016 RBC LEUKOCYTES REDUCED: HCPCS

## 2022-03-10 PROCEDURE — 86900 BLOOD TYPING SEROLOGIC ABO: CPT

## 2022-03-10 PROCEDURE — 36415 COLL VENOUS BLD VENIPUNCTURE: CPT

## 2022-03-10 PROCEDURE — 36430 TRANSFUSION BLD/BLD COMPNT: CPT

## 2022-03-10 PROCEDURE — 99214 OFFICE O/P EST MOD 30 MIN: CPT | Performed by: INTERNAL MEDICINE

## 2022-03-10 NOTE — PROGRESS NOTES
Encounter Date:03/10/2022      Patient ID: Chrystal Ruiz is a 81 y.o. female.    Chief Complaint:  Paroxysmal atrial fibrillation  Hypertension  Dyslipidemia  History of anemia    History of Present Illness  The patient recently was admitted to [facility] for shortness of breath and fatigue.  Patient was having intermittent atrial fibrillation.  Amlodipine was changed to diltiazem CD for rhythm control.  Patient was started on Eliquis for paroxysmal atrial fibrillation.  Patient has anemia and recently required PRBC transfusion.    Since I have last seen, the patient has been without any chest discomfort ,shortness of breath, palpitations, dizziness or syncope.  Denies having any headache ,abdominal pain ,nausea, vomiting , diarrhea constipation, loss of weight or loss of appetite.  Denies having any excessive bruising ,hematuria.  Review of all systems negative except as indicated.    Reviewed ROS.    Assessment and Plan       ]]]]]]]]]]]]]]]]]]]]]  Impression  ===========  -Paroxysmal atrial fibrillation.  Patient is maintaining sinus rhythm.  EKG showed sinus rhythm.    Stress Cardiolite test-normal 1/28/2022  -Hypertension dyslipidemia     -Hypomagnesemia     -Anemia with hemoglobin of 8.7  Leukocytosis with WBC of 18.4.  Hemoglobin 7.6-3/9/2022  Patient received PRBC transfusion.     -Congestive heart [id number]  Echocardiogram 1/24/2022 revealed ejection fraction of 65% moderate pulmonary valve regurgitation     -Pulmonary nodule with hypermetabolic nodule in the right upper lung.  Status post CT-guided biopsy of right upper lobe nodule 11/5/2021-benign.  Patient is scheduled for VATS procedure 2/4/2022     -Status post cholecystectomy hysterectomy oophorectomy tonsillectomy     -Allergic to ciprofloxacin and sulfa iodine cyprodenate     -Non-smoker  ============  Plan  =========  Patient recently presented with shortness of breath and fatigue to [facility]  Hospital..  Possible diastolic congestive heart failure.  Recent echocardiogram as above.  Patient had diuresis.  Patient was having paroxysmal atrial fibrillation.    Stress Cardiolite test-normal 1/28/2022  Patient was switched from amlodipine to Cardizem from rhythm control in addition to metoprolol 25 mg a day.    Paroxysmal atrial fibrillation.  Patient in sinus rhythm.     Anticoagulation  Patient was on Eliquis.  Patient had anemia requiring PRBC transfusion.  I had a lengthy discussion with patient and patient's family at bedside regarding the options.  Since patient is having low blood count best option would be to discontinue Eliquis although cardioembolic risk is increased.  This was discussed extensively with patient and patient's family.  I have discussed with him about watchman procedure since patient is having anemia requiring PRBC transfusion.  Patient is reluctant to pursue this at this time and wants to think about it.     Lung nodule  Surgery has been postponed due to illness of provider.     Patient did not want to be seen in 6-week but wants to be seen in 6 months.    Further plan will depend on patient's progress.  Plan will depend on patient's progress.  ]]]]]]]]]]]]]]]]]]]]]          Diagnosis Plan   1. Paroxysmal atrial fibrillation (HCC)     2. Chronic anticoagulation     LAB RESULTS (LAST 7 DAYS)    CBC  Results from last 7 days   Lab Units 03/09/22  1305 03/09/22  1029   WBC 10*3/mm3 16.30* 18.75*   RBC 10*6/mm3 3.41* 3.45*   HEMOGLOBIN g/dL 7.6* 7.7*   HEMATOCRIT % 23.8* 25.0*   MCV fL 69.9* 72.5*   PLATELETS 10*3/mm3 585* 607*       BMP  Results from last 7 days   Lab Units 03/09/22  1305   SODIUM mmol/L 136   POTASSIUM mmol/L 3.6   CHLORIDE mmol/L 101   CO2 mmol/L 25.0   BUN mg/dL 19   CREATININE mg/dL 1.09*   GLUCOSE mg/dL 103*       CMP   Results from last 7 days   Lab Units 03/09/22  1305   SODIUM mmol/L 136   POTASSIUM mmol/L 3.6   CHLORIDE mmol/L 101   CO2 mmol/L 25.0   BUN mg/dL  19   CREATININE mg/dL 1.09*   GLUCOSE mg/dL 103*   ALBUMIN g/dL 2.40*   BILIRUBIN mg/dL 0.7   ALK PHOS U/L 101   AST (SGOT) U/L 11   ALT (SGPT) U/L 8         BNP        TROPONIN        CoAg        Creatinine Clearance  Estimated Creatinine Clearance: 34.8 mL/min (A) (by C-G formula based on SCr of 1.09 mg/dL (H)).    ABG        Radiology  No radiology results for the last day                The following portions of the patient's history were reviewed and updated as appropriate: allergies, current medications, past family history, past medical history, past social history, past surgical history and problem list.    Review of Systems   Constitutional: Negative for malaise/fatigue.   Cardiovascular: Negative for chest pain, leg swelling, palpitations and syncope.   Respiratory: Negative for shortness of breath.    Skin: Negative for rash.   Gastrointestinal: Negative for nausea and vomiting.   Neurological: Negative for dizziness, light-headedness and numbness.   All other systems reviewed and are negative.        Current Outpatient Medications:   •  acetaminophen (TYLENOL) 500 MG tablet, Take 500 mg by mouth Every 6 (Six) Hours As Needed for Mild Pain ., Disp: , Rfl:   •  apixaban (ELIQUIS) 2.5 MG tablet tablet, Take 1 tablet by mouth Every 12 (Twelve) Hours. Indications: Atrial Fibrillation, Disp: 180 tablet, Rfl: 1  •  Breo Ellipta 200-25 MCG/INH inhaler, Inhale 1 puff Daily., Disp: 1 each, Rfl: 2  •  Cholecalciferol (VITAMIN D3) 2000 units capsule, Take 2,000 Units by mouth Daily., Disp: , Rfl:   •  Cyanocobalamin (VITAMIN B12) 1000 MCG tablet controlled-release, Take 1,000 mcg by mouth Daily., Disp: , Rfl:   •  dilTIAZem CD (CARDIZEM CD) 180 MG 24 hr capsule, Take 1 capsule by mouth Daily., Disp: 90 capsule, Rfl: 1  •  furosemide (LASIX) 40 MG tablet, Take 1 tablet by mouth Daily As Needed (edema)., Disp: 30 tablet, Rfl: 0  •  Homeopathic Products (LEG CRAMP RELIEF) tablet, LEG CRAMP RELIEF TABS, Disp: , Rfl:   •   metoprolol succinate XL (Toprol XL) 25 MG 24 hr tablet, Take 1 tablet by mouth Daily., Disp: 90 tablet, Rfl: 1  •  Premarin 0.625 MG/GM vaginal cream, Insert  into the vagina 2 (Two) Times a Week. (Patient taking differently: Insert  into the vagina As Needed.), Disp: 30 g, Rfl: 3  •  triamcinolone (KENALOG) 0.1 % cream, Apply  topically to the appropriate area as directed 2 (Two) Times a Day., Disp: 80 g, Rfl: 2  No current facility-administered medications for this visit.    Facility-Administered Medications Ordered in Other Visits:   •  sodium chloride 0.9 % infusion 250 mL, 250 mL, Intravenous, PRN, Frank Cheng MD    Allergies   Allergen Reactions   • Ciprofloxacin Unknown (See Comments)   • Sulfa Antibiotics Unknown (See Comments)   • Cyprodenate Unknown - High Severity   • Iodine Unknown - High Severity       Family History   Problem Relation Age of Onset   • Breast cancer Mother 85   • Stroke Mother    • Heart disease Mother    • Endometrial cancer Mother 70   • Heart failure Father    • Heart failure Sister    • COPD Sister    • Heart disease Sister        Past Surgical History:   Procedure Laterality Date   • CATARACT EXTRACTION     • CHOLECYSTECTOMY     • HYSTERECTOMY  1987    Endometriosis   • OOPHORECTOMY     • TONSILLECTOMY         Past Medical History:   Diagnosis Date   • A-fib ([facility])    • Arthritis    • Asthma    • CHF (congestive heart failure) ([facility])    • Hypertension        Family History   Problem Relation Age of Onset   • Breast cancer Mother 85   • Stroke Mother    • Heart disease Mother    • Endometrial cancer Mother 70   • Heart failure Father    • Heart failure Sister    • COPD Sister    • Heart disease Sister        Social History     Socioeconomic History   • Marital status:    Tobacco Use   • Smoking status: Never Smoker   • Smokeless tobacco: Never Used   Vaping Use   • Vaping Use: Never used   Substance and Sexual Activity   • Alcohol use: No   • Drug use: Never   • Sexual  "activity: Not Currently         Procedures      Objective:       Physical Exam    /69 (BP Location: Left arm, Patient Position: Sitting, Cuff Size: Adult)   Pulse 86   Ht 154.9 cm (61\")   Wt 66.7 kg (147 lb)   SpO2 97%   BMI 27.78 kg/m²   The patient is alert, oriented and in no distress.    Vital signs as noted above.    Head and neck revealed no carotid bruits or jugular venous distension.  No thyromegaly or lymphadenopathy is present.    Lungs clear.  No wheezing.  Breath sounds are normal bilaterally.    Heart normal first and second heart sounds.  No murmur..  No pericardial rub is present.  No gallop is present.    Abdomen soft and nontender.  No organomegaly is present.    Extremities revealed good peripheral pulses without any pedal edema.    Skin warm and dry.    Musculoskeletal system is grossly normal.    CNS grossly normal.    Reviewed and unchanged from last visit.        "

## 2022-03-11 ENCOUNTER — TELEPHONE (OUTPATIENT)
Dept: ONCOLOGY | Facility: HOSPITAL | Age: 82
End: 2022-03-11

## 2022-03-11 ENCOUNTER — HOSPITAL ENCOUNTER (OUTPATIENT)
Dept: RADIATION ONCOLOGY | Facility: HOSPITAL | Age: 82
Discharge: HOME | End: 2022-03-11

## 2022-03-11 PROBLEM — C34.91 BRONCHOGENIC CANCER OF RIGHT LUNG: Status: ACTIVE | Noted: 2022-03-11

## 2022-03-11 LAB
BH BB BLOOD EXPIRATION DATE: NORMAL
BH BB BLOOD TYPE BARCODE: 5100
BH BB DISPENSE STATUS: NORMAL
BH BB PRODUCT CODE: NORMAL
BH BB UNIT NUMBER: NORMAL
CROSSMATCH INTERPRETATION: NORMAL
UNIT  ABO: NORMAL
UNIT  RH: NORMAL

## 2022-03-11 PROCEDURE — 77334 RADIATION TREATMENT AID(S): CPT | Performed by: RADIOLOGY

## 2022-03-11 PROCEDURE — 77332 RADIATION TREATMENT AID(S): CPT | Performed by: RADIOLOGY

## 2022-03-11 PROCEDURE — 77263 THER RADIOLOGY TX PLNG CPLX: CPT | Performed by: RADIOLOGY

## 2022-03-11 NOTE — TELEPHONE ENCOUNTER
Distress Screening Follow-up    Diagnosis: Bronchogenic cancer of the right lung     Location of Distress Screening: [facility]     Comments:  OSW called Chrystal and left a message requesting call back.    Chrystal called back on 3/14/22. She is pleasant, alert and oriented to person, place and time. She said her distress was related to her getting out of the hospital and doing all the things they have told her to do but she did not feel she was getting better. She then found out that she had significant anemia and once treated for this felt better both physically and emotionally. She feels supported by her family and friends and basic needs are met. She was made aware of OSW role and contact information. OSW will remain available.      Interventions:   None at this time.     Distress Level: 5 (3/9/2022 10:00 AM)    Physical Concerns:    Appearance: No  Bathing/Dressing: No  Breathing: No  Changes in urination: Yes  Constipation: Yes  Diarrhea: No  Eating: No  Fatigue: Yes  Feeling swollen: No  Fevers: No  Getting around: Yes  Indigestion: No  Memory/Concentration: No  Mouth sores: No  Nausea: No  Nose dry/congested: No  Pain: No  Sexual: No  Skin dry/itchy: No  Sleep: No  Substance abuse: No  Tingling hands/feet: Yes  Physical Concerns Comment: n    Practical Problems:    : No  Food: No  Housing: No  Insurance/Financial: No  Transportation: No  Work/School: No  Treatment decisions: No  Practical Concerns Comments: n    Emotional Concerns:    Depression: No  Nervousness: No  Sadness: No  Worry: Yes  Loss of Interest/Activities: No  Emotional Concerns Comment: No    Family Concerns:    Dealing with children: No  Dealing with partner: No  Ability to have children: No  Family health issues: No  Family Concerns Comment: No    Spiritual Concerns:    Spiritual/Anabaptist Concerns: No

## 2022-03-16 ENCOUNTER — OFFICE VISIT (OUTPATIENT)
Dept: ONCOLOGY | Facility: CLINIC | Age: 82
End: 2022-03-16

## 2022-03-16 ENCOUNTER — APPOINTMENT (OUTPATIENT)
Dept: LAB | Facility: HOSPITAL | Age: 82
End: 2022-03-16

## 2022-03-16 VITALS
SYSTOLIC BLOOD PRESSURE: 115 MMHG | HEART RATE: 83 BPM | TEMPERATURE: 97.4 F | HEIGHT: 61 IN | BODY MASS INDEX: 27.75 KG/M2 | RESPIRATION RATE: 18 BRPM | DIASTOLIC BLOOD PRESSURE: 69 MMHG | OXYGEN SATURATION: 97 % | WEIGHT: 147 LBS

## 2022-03-16 DIAGNOSIS — Z01.812 ENCOUNTER FOR PREPROCEDURE SCREENING LABORATORY TESTING FOR COVID-19: Primary | ICD-10-CM

## 2022-03-16 DIAGNOSIS — E61.1 IRON DEFICIENCY: ICD-10-CM

## 2022-03-16 DIAGNOSIS — K90.49 MALABSORPTION DUE TO INTOLERANCE, NOT ELSEWHERE CLASSIFIED: ICD-10-CM

## 2022-03-16 DIAGNOSIS — C34.11 MALIGNANT NEOPLASM OF UPPER LOBE OF RIGHT LUNG: Primary | ICD-10-CM

## 2022-03-16 DIAGNOSIS — Z20.822 ENCOUNTER FOR PREPROCEDURE SCREENING LABORATORY TESTING FOR COVID-19: Primary | ICD-10-CM

## 2022-03-16 LAB
BASOPHILS # BLD AUTO: 0.03 10*3/MM3 (ref 0–0.2)
BASOPHILS NFR BLD AUTO: 0.2 % (ref 0–1.5)
DEPRECATED RDW RBC AUTO: 56.5 FL (ref 37–54)
EOSINOPHIL # BLD AUTO: 0.22 10*3/MM3 (ref 0–0.4)
EOSINOPHIL NFR BLD AUTO: 1.6 % (ref 0.3–6.2)
ERYTHROCYTE [DISTWIDTH] IN BLOOD BY AUTOMATED COUNT: 21.3 % (ref 12.3–15.4)
FERRITIN SERPL-MCNC: 534.1 NG/ML (ref 13–150)
HCT VFR BLD AUTO: 30 % (ref 34–46.6)
HGB BLD-MCNC: 9 G/DL (ref 12–15.9)
HOLD SPECIMEN: NORMAL
HOLD SPECIMEN: NORMAL
IRON 24H UR-MRATE: 28 MCG/DL (ref 37–145)
IRON SATN MFR SERPL: 14 % (ref 20–50)
LYMPHOCYTES # BLD AUTO: 1.07 10*3/MM3 (ref 0.7–3.1)
LYMPHOCYTES NFR BLD AUTO: 7.6 % (ref 19.6–45.3)
MCH RBC QN AUTO: 23.1 PG (ref 26.6–33)
MCHC RBC AUTO-ENTMCNC: 30 G/DL (ref 31.5–35.7)
MCV RBC AUTO: 77.1 FL (ref 79–97)
MONOCYTES # BLD AUTO: 0.7 10*3/MM3 (ref 0.1–0.9)
MONOCYTES NFR BLD AUTO: 5 % (ref 5–12)
NEUTROPHILS NFR BLD AUTO: 11.99 10*3/MM3 (ref 1.7–7)
NEUTROPHILS NFR BLD AUTO: 85.6 % (ref 42.7–76)
PLATELET # BLD AUTO: 584 10*3/MM3 (ref 140–450)
PMV BLD AUTO: 8.1 FL (ref 6–12)
RBC # BLD AUTO: 3.89 10*6/MM3 (ref 3.77–5.28)
TIBC SERPL-MCNC: 197 MCG/DL (ref 298–536)
TRANSFERRIN SERPL-MCNC: 132 MG/DL (ref 200–360)
WBC NRBC COR # BLD: 14.01 10*3/MM3 (ref 3.4–10.8)

## 2022-03-16 PROCEDURE — 82728 ASSAY OF FERRITIN: CPT | Performed by: INTERNAL MEDICINE

## 2022-03-16 PROCEDURE — 36415 COLL VENOUS BLD VENIPUNCTURE: CPT | Performed by: INTERNAL MEDICINE

## 2022-03-16 PROCEDURE — 83540 ASSAY OF IRON: CPT | Performed by: INTERNAL MEDICINE

## 2022-03-16 PROCEDURE — 99214 OFFICE O/P EST MOD 30 MIN: CPT | Performed by: INTERNAL MEDICINE

## 2022-03-16 PROCEDURE — 85025 COMPLETE CBC W/AUTO DIFF WBC: CPT | Performed by: INTERNAL MEDICINE

## 2022-03-16 PROCEDURE — 84466 ASSAY OF TRANSFERRIN: CPT | Performed by: INTERNAL MEDICINE

## 2022-03-16 RX ORDER — SODIUM CHLORIDE 9 MG/ML
250 INJECTION, SOLUTION INTRAVENOUS ONCE
Status: CANCELLED | OUTPATIENT
Start: 2022-03-21

## 2022-03-16 RX ORDER — SODIUM CHLORIDE 9 MG/ML
250 INJECTION, SOLUTION INTRAVENOUS ONCE
Status: CANCELLED | OUTPATIENT
Start: 2022-03-23

## 2022-03-16 RX ORDER — SODIUM CHLORIDE 9 MG/ML
250 INJECTION, SOLUTION INTRAVENOUS ONCE
Status: CANCELLED | OUTPATIENT
Start: 2022-03-28

## 2022-03-16 RX ORDER — SODIUM CHLORIDE 9 MG/ML
250 INJECTION, SOLUTION INTRAVENOUS ONCE
Status: CANCELLED | OUTPATIENT
Start: 2022-03-22

## 2022-03-16 RX ORDER — SODIUM CHLORIDE 9 MG/ML
250 INJECTION, SOLUTION INTRAVENOUS ONCE
Status: CANCELLED | OUTPATIENT
Start: 2022-03-24

## 2022-03-16 RX ORDER — SODIUM CHLORIDE 9 MG/ML
250 INJECTION, SOLUTION INTRAVENOUS ONCE
Status: CANCELLED | OUTPATIENT
Start: 2022-03-25

## 2022-03-16 RX ORDER — SODIUM CHLORIDE 9 MG/ML
250 INJECTION, SOLUTION INTRAVENOUS ONCE
Status: CANCELLED | OUTPATIENT
Start: 2022-04-04

## 2022-03-16 RX ORDER — SODIUM CHLORIDE 9 MG/ML
250 INJECTION, SOLUTION INTRAVENOUS ONCE
Status: CANCELLED | OUTPATIENT
Start: 2022-04-18

## 2022-03-16 RX ORDER — SODIUM CHLORIDE 9 MG/ML
250 INJECTION, SOLUTION INTRAVENOUS ONCE
Status: CANCELLED | OUTPATIENT
Start: 2022-04-25

## 2022-03-16 RX ORDER — SODIUM CHLORIDE 9 MG/ML
250 INJECTION, SOLUTION INTRAVENOUS ONCE
Status: CANCELLED | OUTPATIENT
Start: 2022-04-11

## 2022-03-16 NOTE — PROGRESS NOTES
HEMATOLOGY ONCOLOGY OUTPATIENT FOLLOW UP       Patient name: Chrystal Ruiz  : 1940  MRN: [id number]  Primary Care Physician: Etelvina Ulloa MD  Referring Physician: Etelvina Ulloa MD  Reason For Consult:     No chief complaint on file.    HPI:   History of Present Illness:  Chrystal Ruiz is 81 y.o. female who presented to our office on 22 for consultation regarding anemia and thrombocytosis.     3/9/2022: Ms. Ruiz was referred for the investigation of anemia and thrombocytosis that was identified in the process of treating congestive heart failure and a right lung tumor. She came in a wheel chair complaining of severe fatigue and difficulties functioning because of this weakness. She admitted to hematochezia that had been associated to constipation that had been going on for some time, at least a few weeks. She had been afebrile. No pain. The laboratory exams reported microcytic anemia and thrombocytosis that had been present since the end of . A decision was made to transfuse her with one unit of red cells. Tests for iron deficiency were sent. She was scheduled to see me in one week.     3/16/2022. Ms. Ruiz was back in the office for follow up. She was feeling much better after the transfusion. She was still weak but not as much and her affect was also better. The laboratory exams did not clearly suggest iron deficiency but she persisted with microcytic anemia and had a history of gastrointestinal bleeding, such that iron deficiency was strongly considered the cause of the symptoms and laboratory manifestations. She had taken oral iron with multiple side effects and no clear improvement. Plans for intravenous iron were made.     Subjective:  • 2022: She feels better today. The transfusion had resulted in great improvement of her symptoms. Her improved feeling had also resulted in her affect being better. She was eating better. She reports her strength has  improved. She has been better able to do some things. She has been free of chest pain and no dyspnea. No abdominal pain or diarrhea. She has severe edema and erythema of the distal lower extremities.     The following portions of the patient's history were reviewed and updated as appropriate: allergies, current medications, past family history, past medical history, past social history, past surgical history and problem list.    Past Medical History:   Diagnosis Date   • A-fib (HCC)    • Arthritis    • Asthma    • CHF (congestive heart failure) (HCC)    • Hypertension        Past Surgical History:   Procedure Laterality Date   • CATARACT EXTRACTION     • CHOLECYSTECTOMY     • HYSTERECTOMY  1987    Endometriosis   • OOPHORECTOMY     • TONSILLECTOMY         Current Outpatient Medications:   •  acetaminophen (TYLENOL) 500 MG tablet, Take 500 mg by mouth Every 6 (Six) Hours As Needed for Mild Pain ., Disp: , Rfl:   •  apixaban (ELIQUIS) 2.5 MG tablet tablet, Take 1 tablet by mouth Every 12 (Twelve) Hours. Indications: Atrial Fibrillation, Disp: 180 tablet, Rfl: 1  •  Breo Ellipta 200-25 MCG/INH inhaler, Inhale 1 puff Daily., Disp: 1 each, Rfl: 2  •  Cholecalciferol (VITAMIN D3) 2000 units capsule, Take 2,000 Units by mouth Daily., Disp: , Rfl:   •  Cyanocobalamin (VITAMIN B12) 1000 MCG tablet controlled-release, Take 1,000 mcg by mouth Daily., Disp: , Rfl:   •  dilTIAZem CD (CARDIZEM CD) 180 MG 24 hr capsule, Take 1 capsule by mouth Daily., Disp: 90 capsule, Rfl: 1  •  furosemide (LASIX) 40 MG tablet, Take 1 tablet by mouth Daily As Needed (edema)., Disp: 30 tablet, Rfl: 0  •  Homeopathic Products (LEG CRAMP RELIEF) tablet, LEG CRAMP RELIEF TABS, Disp: , Rfl:   •  metoprolol succinate XL (Toprol XL) 25 MG 24 hr tablet, Take 1 tablet by mouth Daily., Disp: 90 tablet, Rfl: 1  •  Premarin 0.625 MG/GM vaginal cream, Insert  into the vagina 2 (Two) Times a Week. (Patient taking differently: Insert  into the vagina As  Needed.), Disp: 30 g, Rfl: 3  •  triamcinolone (KENALOG) 0.1 % cream, Apply  topically to the appropriate area as directed 2 (Two) Times a Day., Disp: 80 g, Rfl: 2    Allergies   Allergen Reactions   • Ciprofloxacin Unknown (See Comments)   • Sulfa Antibiotics Unknown (See Comments)   • Cyprodenate Unknown - High Severity   • Iodine Unknown - High Severity     Family History   Problem Relation Age of Onset   • Breast cancer Mother 85   • Stroke Mother    • Heart disease Mother    • Endometrial cancer Mother 70   • Heart failure Father    • Heart failure Sister    • COPD Sister    • Heart disease Sister      Cancer-related family history includes Breast cancer (age of onset: 85) in her mother; Endometrial cancer (age of onset: 70) in her mother.    Social History     Tobacco Use   • Smoking status: Never Smoker   • Smokeless tobacco: Never Used   Vaping Use   • Vaping Use: Never used   Substance Use Topics   • Alcohol use: No   • Drug use: Never     Social History     Social History Narrative   • Not on file      ROS:     Review of Systems   Constitutional: Negative for activity change, appetite change, chills, diaphoresis, fatigue, fever and unexpected weight change.   HENT: Negative for congestion, dental problem, drooling, ear discharge, ear pain, facial swelling, hearing loss, mouth sores, nosebleeds, postnasal drip, rhinorrhea, sinus pressure, sinus pain, sneezing, sore throat, tinnitus, trouble swallowing and voice change.    Eyes: Negative for photophobia, pain, discharge, redness, itching and visual disturbance.   Respiratory: Negative for apnea, cough, choking, chest tightness, shortness of breath, wheezing and stridor.    Cardiovascular: Negative for chest pain, palpitations and leg swelling.   Gastrointestinal: Positive for blood in stool. Negative for abdominal distention, abdominal pain, anal bleeding, constipation, diarrhea, nausea, rectal pain and vomiting.   Endocrine: Negative for cold intolerance,  heat intolerance, polydipsia and polyuria.   Genitourinary: Negative for decreased urine volume, difficulty urinating, dysuria, flank pain, frequency, genital sores, hematuria and urgency.   Musculoskeletal: Negative for arthralgias, back pain, gait problem, joint swelling, myalgias, neck pain and neck stiffness.   Skin: Positive for color change. Negative for pallor and rash.        Erythema of the lower extremities associated to severe bilateral edema.    Neurological: Negative for dizziness, tremors, seizures, syncope, facial asymmetry, speech difficulty, weakness, light-headedness, numbness and headaches.   Hematological: Negative for adenopathy. Does not bruise/bleed easily.   Psychiatric/Behavioral: Negative for agitation, behavioral problems, confusion, decreased concentration, hallucinations, self-injury, sleep disturbance and suicidal ideas. The patient is not nervous/anxious.      Objective:    There were no vitals filed for this visit.  There is no height or weight on file to calculate BMI.  ECOG  (3) Capable of limited self-care, confined to bed or chair > 50% of waking hours    Physical Exam:     Physical Exam  Constitutional:       General: She is not in acute distress.     Appearance: Normal appearance. She is ill-appearing. She is not toxic-appearing or diaphoretic.      Comments: Transported in a wheel chair. Does not seem in distress. Conversant and in good spirits. No jaundice.    HENT:      Head: Normocephalic and atraumatic.      Right Ear: External ear normal.      Left Ear: External ear normal.      Nose: Nose normal.      Mouth/Throat:      Mouth: Mucous membranes are moist.      Pharynx: Oropharynx is clear. No oropharyngeal exudate or posterior oropharyngeal erythema.   Eyes:      General: No scleral icterus.        Right eye: No discharge.         Left eye: No discharge.      Conjunctiva/sclera: Conjunctivae normal.      Pupils: Pupils are equal, round, and reactive to light.    Cardiovascular:      Rate and Rhythm: Normal rate and regular rhythm.      Pulses: Normal pulses.      Heart sounds: No murmur heard.    No friction rub. No gallop.   Pulmonary:      Effort: No respiratory distress.      Breath sounds: No stridor. No wheezing, rhonchi or rales.   Abdominal:      General: Abdomen is flat. Bowel sounds are normal. There is no distension.      Palpations: Abdomen is soft. There is no mass.      Tenderness: There is no abdominal tenderness. There is no right CVA tenderness, left CVA tenderness, guarding or rebound.      Hernia: No hernia is present.   Musculoskeletal:         General: No tenderness, deformity or signs of injury.      Cervical back: No rigidity.      Right lower leg: Edema present.      Left lower leg: Edema present.      Comments: Bilateral severe edema. There is some erythema of the skin without tenderness to palpation. No increase in local temperature.    Lymphadenopathy:      Cervical: No cervical adenopathy.   Skin:     Coloration: Skin is not jaundiced.      Findings: No bruising, lesion or rash.   Neurological:      General: No focal deficit present.      Mental Status: She is alert and oriented to person, place, and time.      Cranial Nerves: No cranial nerve deficit.      Motor: No weakness.      Gait: Gait normal.   Psychiatric:         Mood and Affect: Mood normal.         Behavior: Behavior normal.         Thought Content: Thought content normal.         Judgment: Judgment normal.     CM Cheng MD performed the physical exam on 3/16/2022 as documented above.     Lab Results - Last 18 Months   Lab Units 03/09/22  1305 03/09/22  1029 02/07/22  1132   WBC 10*3/mm3 16.30* 18.75* 22.05*   HEMOGLOBIN g/dL 7.6* 7.7* 9.3*   HEMATOCRIT % 23.8* 25.0* 30.5*   PLATELETS 10*3/mm3 585* 607* 599*   MCV fL 69.9* 72.5* 74.9*     Lab Results - Last 18 Months   Lab Units 03/09/22  1305 02/01/22  0430 01/31/22  0245 01/26/22  0330 01/25/22  0130 01/24/22  1617   SODIUM  mmol/L 136 133* 135*   < > 136 139   POTASSIUM mmol/L 3.6 4.5 4.2   < > 3.8 3.9   CHLORIDE mmol/L 101 98 97*   < > 101 105   CO2 mmol/L 25.0 23.0 27.0   < > 25.0 23.0   BUN mg/dL 19 25* 36*   < > 10 12   CREATININE mg/dL 1.09* 0.67 0.91   < > 0.50* 0.50*   CALCIUM mg/dL 8.6 8.6 8.7   < > 8.3* 8.9   BILIRUBIN mg/dL 0.7  --   --   --  0.3 0.3   ALK PHOS U/L 101  --   --   --  109 117   ALT (SGPT) U/L 8  --   --   --  12 12   AST (SGOT) U/L 11  --   --   --  13 12   GLUCOSE mg/dL 103* 125* 160*   < > 108* 108*    < > = values in this interval not displayed.     Lab Results   Component Value Date    GLUCOSE 103 (H) 03/09/2022    BUN 19 03/09/2022    CREATININE 1.09 (H) 03/09/2022    EGFRIFNONA 84 02/01/2022    BCR 17.4 03/09/2022    K 3.6 03/09/2022    CO2 25.0 03/09/2022    CALCIUM 8.6 03/09/2022    ALBUMIN 2.40 (L) 03/09/2022    LABIL2 1.2 05/29/2019    AST 11 03/09/2022    ALT 8 03/09/2022     Lab Results - Last 18 Months   Lab Units 11/05/21  0924   INR  1.03   APTT seconds 28.2     Lab Results   Component Value Date    PTT 28.2 11/05/2021    INR 1.03 11/05/2021     Assessment/Plan     Assessment:  1. Microcytic anemia: Improved after the transfusion. Persistent however. I do suspect iron deficiency.   2. Gastrointestinal bleeding. Discussed colonoscopy. She's not interested in any procedure. Will continue to discuss  3. Anemia in association with congestive heart failure. Improved.   4. Will proceed with iron replacement intravenously. Oral iron has been tried without success and with many side effects.   5. Malabsorption.     Plan:  1. As above.     Frank Cheng MD on 3/16/2022 at 12:42

## 2022-03-18 PROCEDURE — 77300 RADIATION THERAPY DOSE PLAN: CPT | Performed by: RADIOLOGY

## 2022-03-18 PROCEDURE — 77338 DESIGN MLC DEVICE FOR IMRT: CPT | Performed by: RADIOLOGY

## 2022-03-18 PROCEDURE — 77301 RADIOTHERAPY DOSE PLAN IMRT: CPT | Performed by: RADIOLOGY

## 2022-03-18 PROCEDURE — 77293 RESPIRATOR MOTION MGMT SIMUL: CPT | Performed by: RADIOLOGY

## 2022-03-21 ENCOUNTER — HOSPITAL ENCOUNTER (OUTPATIENT)
Facility: HOSPITAL | Age: 82
Setting detail: OBSERVATION
Discharge: HOME | End: 2022-03-23
Attending: EMERGENCY MEDICINE | Admitting: EMERGENCY MEDICINE

## 2022-03-21 ENCOUNTER — APPOINTMENT (OUTPATIENT)
Dept: GENERAL RADIOLOGY | Facility: HOSPITAL | Age: 82
End: 2022-03-21

## 2022-03-21 DIAGNOSIS — D64.9 CHRONIC ANEMIA: ICD-10-CM

## 2022-03-21 DIAGNOSIS — I50.9 ACUTE ON CHRONIC CONGESTIVE HEART FAILURE, UNSPECIFIED HEART FAILURE TYPE: ICD-10-CM

## 2022-03-21 DIAGNOSIS — R06.00 DYSPNEA, UNSPECIFIED TYPE: Primary | ICD-10-CM

## 2022-03-21 LAB
ALBUMIN SERPL-MCNC: 2.7 G/DL (ref 3.5–5.2)
ALBUMIN/GLOB SERPL: 0.6 G/DL
ALP SERPL-CCNC: 106 U/L (ref 39–117)
ALT SERPL W P-5'-P-CCNC: 8 U/L (ref 1–33)
ANION GAP SERPL CALCULATED.3IONS-SCNC: 11 MMOL/L (ref 5–15)
AST SERPL-CCNC: 11 U/L (ref 1–32)
BASOPHILS # BLD AUTO: 0.2 10*3/MM3 (ref 0–0.2)
BASOPHILS NFR BLD AUTO: 1.4 % (ref 0–1.5)
BILIRUB SERPL-MCNC: 0.4 MG/DL (ref 0–1.2)
BUN SERPL-MCNC: 16 MG/DL (ref 8–23)
BUN/CREAT SERPL: 17.4 (ref 7–25)
CALCIUM SPEC-SCNC: 9.5 MG/DL (ref 8.6–10.5)
CHLORIDE SERPL-SCNC: 103 MMOL/L (ref 98–107)
CO2 SERPL-SCNC: 24 MMOL/L (ref 22–29)
CREAT SERPL-MCNC: 0.92 MG/DL (ref 0.57–1)
DEPRECATED RDW RBC AUTO: 57.3 FL (ref 37–54)
EGFRCR SERPLBLD CKD-EPI 2021: 62.3 ML/MIN/1.73
EOSINOPHIL # BLD AUTO: 0.5 10*3/MM3 (ref 0–0.4)
EOSINOPHIL NFR BLD AUTO: 3.9 % (ref 0.3–6.2)
ERYTHROCYTE [DISTWIDTH] IN BLOOD BY AUTOMATED COUNT: 22.5 % (ref 12.3–15.4)
GLOBULIN UR ELPH-MCNC: 4.3 GM/DL
GLUCOSE SERPL-MCNC: 106 MG/DL (ref 65–99)
HCT VFR BLD AUTO: 27.5 % (ref 34–46.6)
HGB BLD-MCNC: 9 G/DL (ref 12–15.9)
LYMPHOCYTES # BLD AUTO: 1.1 10*3/MM3 (ref 0.7–3.1)
LYMPHOCYTES NFR BLD AUTO: 9 % (ref 19.6–45.3)
MAGNESIUM SERPL-MCNC: 1.7 MG/DL (ref 1.6–2.4)
MCH RBC QN AUTO: 23.7 PG (ref 26.6–33)
MCHC RBC AUTO-ENTMCNC: 32.6 G/DL (ref 31.5–35.7)
MCV RBC AUTO: 72.6 FL (ref 79–97)
MONOCYTES # BLD AUTO: 0.6 10*3/MM3 (ref 0.1–0.9)
MONOCYTES NFR BLD AUTO: 4.5 % (ref 5–12)
NEUTROPHILS NFR BLD AUTO: 10.3 10*3/MM3 (ref 1.7–7)
NEUTROPHILS NFR BLD AUTO: 81.2 % (ref 42.7–76)
NRBC BLD AUTO-RTO: 0 /100 WBC (ref 0–0.2)
NT-PROBNP SERPL-MCNC: 3891 PG/ML (ref 0–1800)
PLATELET # BLD AUTO: 535 10*3/MM3 (ref 140–450)
PMV BLD AUTO: 6.3 FL (ref 6–12)
POTASSIUM SERPL-SCNC: 4.3 MMOL/L (ref 3.5–5.2)
PROT SERPL-MCNC: 7 G/DL (ref 6–8.5)
RBC # BLD AUTO: 3.78 10*6/MM3 (ref 3.77–5.28)
SARS-COV-2 RNA PNL SPEC NAA+PROBE: NOT DETECTED
SODIUM SERPL-SCNC: 138 MMOL/L (ref 136–145)
TROPONIN T SERPL-MCNC: 0.03 NG/ML (ref 0–0.03)
TROPONIN T SERPL-MCNC: 0.03 NG/ML (ref 0–0.03)
WBC NRBC COR # BLD: 12.7 10*3/MM3 (ref 3.4–10.8)

## 2022-03-21 PROCEDURE — C9803 HOPD COVID-19 SPEC COLLECT: HCPCS

## 2022-03-21 PROCEDURE — 96375 TX/PRO/DX INJ NEW DRUG ADDON: CPT

## 2022-03-21 PROCEDURE — 80053 COMPREHEN METABOLIC PANEL: CPT | Performed by: EMERGENCY MEDICINE

## 2022-03-21 PROCEDURE — 93005 ELECTROCARDIOGRAM TRACING: CPT | Performed by: EMERGENCY MEDICINE

## 2022-03-21 PROCEDURE — 99285 EMERGENCY DEPT VISIT HI MDM: CPT

## 2022-03-21 PROCEDURE — G0378 HOSPITAL OBSERVATION PER HR: HCPCS

## 2022-03-21 PROCEDURE — 83735 ASSAY OF MAGNESIUM: CPT | Performed by: EMERGENCY MEDICINE

## 2022-03-21 PROCEDURE — 84484 ASSAY OF TROPONIN QUANT: CPT | Performed by: NURSE PRACTITIONER

## 2022-03-21 PROCEDURE — 25010000002 FUROSEMIDE PER 20 MG: Performed by: EMERGENCY MEDICINE

## 2022-03-21 PROCEDURE — 87635 SARS-COV-2 COVID-19 AMP PRB: CPT | Performed by: EMERGENCY MEDICINE

## 2022-03-21 PROCEDURE — 84484 ASSAY OF TROPONIN QUANT: CPT | Performed by: EMERGENCY MEDICINE

## 2022-03-21 PROCEDURE — 99214 OFFICE O/P EST MOD 30 MIN: CPT | Performed by: INTERNAL MEDICINE

## 2022-03-21 PROCEDURE — 71045 X-RAY EXAM CHEST 1 VIEW: CPT

## 2022-03-21 PROCEDURE — 36415 COLL VENOUS BLD VENIPUNCTURE: CPT | Performed by: NURSE PRACTITIONER

## 2022-03-21 PROCEDURE — 83880 ASSAY OF NATRIURETIC PEPTIDE: CPT | Performed by: EMERGENCY MEDICINE

## 2022-03-21 PROCEDURE — 85025 COMPLETE CBC W/AUTO DIFF WBC: CPT | Performed by: EMERGENCY MEDICINE

## 2022-03-21 RX ORDER — SODIUM CHLORIDE 0.9 % (FLUSH) 0.9 %
10 SYRINGE (ML) INJECTION AS NEEDED
Status: DISCONTINUED | OUTPATIENT
Start: 2022-03-21 | End: 2022-03-23 | Stop reason: HOSPADM

## 2022-03-21 RX ORDER — ACETAMINOPHEN 160 MG/5ML
650 SOLUTION ORAL EVERY 4 HOURS PRN
Status: DISCONTINUED | OUTPATIENT
Start: 2022-03-21 | End: 2022-03-23 | Stop reason: HOSPADM

## 2022-03-21 RX ORDER — SODIUM CHLORIDE 0.9 % (FLUSH) 0.9 %
10 SYRINGE (ML) INJECTION EVERY 12 HOURS SCHEDULED
Status: DISCONTINUED | OUTPATIENT
Start: 2022-03-21 | End: 2022-03-23 | Stop reason: HOSPADM

## 2022-03-21 RX ORDER — FUROSEMIDE 10 MG/ML
80 INJECTION INTRAMUSCULAR; INTRAVENOUS ONCE
Status: COMPLETED | OUTPATIENT
Start: 2022-03-21 | End: 2022-03-21

## 2022-03-21 RX ORDER — ONDANSETRON 2 MG/ML
4 INJECTION INTRAMUSCULAR; INTRAVENOUS EVERY 6 HOURS PRN
Status: DISCONTINUED | OUTPATIENT
Start: 2022-03-21 | End: 2022-03-23 | Stop reason: HOSPADM

## 2022-03-21 RX ORDER — TRIAMCINOLONE ACETONIDE 1 MG/G
1 CREAM TOPICAL EVERY 12 HOURS SCHEDULED
Status: DISCONTINUED | OUTPATIENT
Start: 2022-03-21 | End: 2022-03-23 | Stop reason: HOSPADM

## 2022-03-21 RX ORDER — ONDANSETRON 4 MG/1
4 TABLET, FILM COATED ORAL EVERY 6 HOURS PRN
Status: DISCONTINUED | OUTPATIENT
Start: 2022-03-21 | End: 2022-03-23 | Stop reason: HOSPADM

## 2022-03-21 RX ORDER — DILTIAZEM HYDROCHLORIDE 180 MG/1
180 CAPSULE, COATED, EXTENDED RELEASE ORAL NIGHTLY
Status: DISCONTINUED | OUTPATIENT
Start: 2022-03-21 | End: 2022-03-23 | Stop reason: HOSPADM

## 2022-03-21 RX ORDER — ACETAMINOPHEN 325 MG/1
650 TABLET ORAL EVERY 4 HOURS PRN
Status: DISCONTINUED | OUTPATIENT
Start: 2022-03-21 | End: 2022-03-23 | Stop reason: HOSPADM

## 2022-03-21 RX ORDER — ACETAMINOPHEN 650 MG/1
650 SUPPOSITORY RECTAL EVERY 4 HOURS PRN
Status: DISCONTINUED | OUTPATIENT
Start: 2022-03-21 | End: 2022-03-23 | Stop reason: HOSPADM

## 2022-03-21 RX ADMIN — TRIAMCINOLONE ACETONIDE 1 APPLICATION: 1 CREAM TOPICAL at 21:18

## 2022-03-21 RX ADMIN — Medication 10 ML: at 21:17

## 2022-03-21 RX ADMIN — FUROSEMIDE 80 MG: 10 INJECTION, SOLUTION INTRAMUSCULAR; INTRAVENOUS at 10:57

## 2022-03-21 RX ADMIN — Medication 10 ML: at 17:30

## 2022-03-21 RX ADMIN — DILTIAZEM HYDROCHLORIDE 180 MG: 180 CAPSULE, COATED, EXTENDED RELEASE ORAL at 21:17

## 2022-03-21 NOTE — ED NOTES
Pt reports SOB since this morning. Pt was supposed to have an iron infusion today. Pt reports she had a blood transfusion last week here due to low hemoglobin. Pt reports she was not as sob last week when her hemoglobin was low but she was weak. Pt reports weakness today.

## 2022-03-21 NOTE — H&P
Formerly [facility] H&P    Patient Name: Chrystal Ruiz  : 1940  MRN: [id number]  Primary Care Physician: Etelvina Ulloa MD  Date of admission: 3/21/2022     Patient Care Team:  Etelvina Ulloa MD as PCP - General  Etelvina Ulloa MD as PCP - Family Medicine  Mukund Farias MD as Consulting Physician (Pulmonary Disease)  Alan Wynn DPM as Consulting Physician (Podiatry)  Demetris Farrell MD as Surgeon (General Surgery)  Etelvina Mcconnell MD as Surgeon (Thoracic Surgery)  Macho Dumont MD as Consulting Physician (Cardiology)          Subjective   History Present Illness     Chief Complaint:   Chief Complaint   Patient presents with   • Shortness of Breath         Ms. Ruiz is a 81 y.o.  presents to [facility] complaining of shortness of breath.      81-year-old female presents to the ER with a chief complaint of shortness of breath which is worsened significantly over the last several days.  The patient has lower extremity edema and has been on diuretic therapy.  The patient has a history of recent severe anemia requiring blood transfusion thought to be secondary to blood loss iron deficiency anemia.  The patient had recent identification of pulmonary nodule suspicious for cancer after being diagnosed with pneumonia in 2022.  The patient had planned video-assisted thoracotomy wedge resection mid February which was not performed secondary to patient condition.  After this, a plan was arranged to have radiation therapy with Dr. Whitten.  The patient has a history of chronic atrial fibrillation and is on anticoagulation therapy.  The patient was seen recently by Dr. Dumont with discussion of watchman procedure and discontinuation of anticoagulation.  At that time the patient wanted to think about the procedure and continue with current A. fib treatment.    Review of records:  Echocardiogram dated 2022 showing moderate pulmonic valve regurgitation, ejection  fraction 61 to 65%, grade 2 with high LAP diastolic dysfunction; Stress Cardiolite 1/28/2022 patient was switched from amlodipine to Cardizem for rhythm control in addition to metoprolol 25 mg daily patient on Eliquis anemia requiring blood transfusion.         Review of Systems   Cardiovascular: Positive for dyspnea on exertion and leg swelling. Negative for chest pain.   Respiratory: Positive for cough and shortness of breath. Negative for hemoptysis and sputum production.    All other systems reviewed and are negative.          Personal History     Past Medical History:   Past Medical History:   Diagnosis Date   • A-fib (HCC)    • Anemia    • Arthritis    • Asthma    • CHF (congestive heart failure) (HCC)    • Hypertension        Surgical History:      Past Surgical History:   Procedure Laterality Date   • CATARACT EXTRACTION     • CHOLECYSTECTOMY     • HYSTERECTOMY  1987    Endometriosis   • OOPHORECTOMY     • TONSILLECTOMY             Family History: family history includes Breast cancer (age of onset: 85) in her mother; COPD in her sister; Endometrial cancer (age of onset: 70) in her mother; Heart disease in her mother and sister; Heart failure in her father and sister; Stroke in her mother. Otherwise pertinent FHx was reviewed and unremarkable.     Social History:  reports that she has never smoked. She has never used smokeless tobacco. She reports that she does not drink alcohol and does not use drugs.      Medications:  Prior to Admission medications    Medication Sig Start Date End Date Taking? Authorizing Provider   acetaminophen (TYLENOL) 500 MG tablet Take 500 mg by mouth Every 6 (Six) Hours As Needed for Mild Pain .    Provider, MD Deborah   apixaban (ELIQUIS) 2.5 MG tablet tablet Take 1 tablet by mouth Every 12 (Twelve) Hours. Indications: Atrial Fibrillation 3/1/22   Etelvina Ulloa MD   Breo Ellipta 200-25 MCG/INH inhaler Inhale 1 puff Daily. 4/2/21   Etelvina Ulloa MD   Cholecalciferol  (VITAMIN D3) 2000 units capsule Take 2,000 Units by mouth Daily. 2/20/19   Deborah King MD   Cyanocobalamin (VITAMIN B12) 1000 MCG tablet controlled-release Take 1,000 mcg by mouth Daily. 2/20/19   Deborah King MD   dilTIAZem CD (CARDIZEM CD) 180 MG 24 hr capsule Take 1 capsule by mouth Daily. 3/1/22   Etelvina Ulloa MD   furosemide (LASIX) 40 MG tablet Take 1 tablet by mouth Daily As Needed (edema). 2/1/22   Ruben Garcia, DO   Homeopathic Products (LEG CRAMP RELIEF) tablet LEG CRAMP RELIEF TABS 5/21/19   Deborah King MD   metoprolol succinate XL (Toprol XL) 25 MG 24 hr tablet Take 1 tablet by mouth Daily. 1/21/22   Etelvina Ulloa MD   Premarin 0.625 MG/GM vaginal cream Insert  into the vagina 2 (Two) Times a Week.  Patient taking differently: Insert  into the vagina As Needed. 1/7/21   Etelvina Ulloa MD   triamcinolone (KENALOG) 0.1 % cream Apply  topically to the appropriate area as directed 2 (Two) Times a Day. 1/21/22   Etelvina Ulloa MD       Allergies:    Allergies   Allergen Reactions   • Ciprofloxacin Unknown (See Comments)   • Sulfa Antibiotics Unknown (See Comments)   • Cyprodenate Unknown - High Severity   • Iodine Unknown - High Severity       Objective   Objective     Vital Signs  Temp:  [97.8 °F (36.6 °C)] 97.8 °F (36.6 °C)  Heart Rate:  [] 77  Resp:  [22] 22  BP: (137-171)/(65-98) 153/65  SpO2:  [95 %-99 %] 99 %  on  Flow (L/min):  [2] 2;   Device (Oxygen Therapy): room air  Body mass index is 27.4 kg/m².    Physical Exam  Vitals and nursing note reviewed.   Constitutional:       Appearance: Normal appearance.   HENT:      Head: Normocephalic and atraumatic.      Right Ear: External ear normal.      Left Ear: External ear normal.      Nose: Nose normal.      Mouth/Throat:      Mouth: Mucous membranes are moist.   Eyes:      General: No scleral icterus.        Right eye: No discharge.         Left eye: No discharge.      Extraocular Movements:  Extraocular movements intact.      Conjunctiva/sclera: Conjunctivae normal.      Pupils: Pupils are equal, round, and reactive to light.   Cardiovascular:      Rate and Rhythm: Normal rate and regular rhythm.      Pulses: Normal pulses.      Heart sounds: Murmur heard.   Pulmonary:      Effort: Pulmonary effort is normal. No respiratory distress.      Comments: Diminished bilaterally; patient speaking in full sentences  Abdominal:      General: Bowel sounds are normal.      Palpations: Abdomen is soft.   Musculoskeletal:         General: Normal range of motion.      Cervical back: Normal range of motion and neck supple.      Right lower leg: Edema present.      Left lower leg: Edema present.      Comments: The patient's bilateral legs are edematous but the skin is consistent with recent worse edema.  There is not lichenification of the skin at this point showing recurrent edema, but there is laxity in the skin as if it is recently been significantly stretched   Skin:     General: Skin is warm and dry.      Capillary Refill: Capillary refill takes less than 2 seconds.   Neurological:      Mental Status: She is alert.   Psychiatric:         Mood and Affect: Mood normal.         Behavior: Behavior normal.         Thought Content: Thought content normal.         Judgment: Judgment normal.           Results Review:  I have personally reviewed most recent cardiac tracings, lab results and radiology images and interpretations and agree with findings.    Results from last 7 days   Lab Units 03/21/22  0828   WBC 10*3/mm3 12.70*   HEMOGLOBIN g/dL 9.0*   HEMATOCRIT % 27.5*   PLATELETS 10*3/mm3 535*     Results from last 7 days   Lab Units 03/21/22  1353 03/21/22  0828   SODIUM mmol/L  --  138   POTASSIUM mmol/L  --  4.3   CHLORIDE mmol/L  --  103   CO2 mmol/L  --  24.0   BUN mg/dL  --  16   CREATININE mg/dL  --  0.92   GLUCOSE mg/dL  --  106*   CALCIUM mg/dL  --  9.5   ALT (SGPT) U/L  --  8   AST (SGOT) U/L  --  11   TROPONIN  T ng/mL 0.029 0.032*   PROBNP pg/mL  --  3,891.0*     Estimated Creatinine Clearance: 40.9 mL/min (by C-G formula based on SCr of 0.92 mg/dL).  Brief Urine Lab Results  (Last result in the past 365 days)      Color   Clarity   Blood   Leuk Est   Nitrite   Protein   CREAT   Urine HCG        01/24/22 1657 Orange  Comment: Any Substance that causes an abnormal urine color can alter the accuracy of the chemical reactions.   Clear   Small (1+)   Trace   Negative   Trace                 Microbiology Results (last 10 days)     Procedure Component Value - Date/Time    COVID-19,CEPHEID/GINO,COR/TURNER/PAD/VIVIAN IN-HOUSE(OR EMERGENT/ADD-ON),NP SWAB IN TRANSPORT MEDIA 3-4 HR TAT, RT-PCR - Swab, Nasopharynx [[id number]]  (Normal) Collected: 03/21/22 0836    Lab Status: Final result Specimen: Swab from Nasopharynx Updated: 03/21/22 0910     COVID19 Not Detected    Narrative:      Fact sheet for providers: https://www.fda.gov/media/295473/download     Fact sheet for patients: https://www.fda.gov/media/320821/download  Fact sheet for providers: https://www.fda.gov/media/041497/download    Fact sheet for patients: https://www.fda.gov/media/880780/download    Test performed by PCR.          ECG/EMG Results (most recent)     Procedure Component Value Units Date/Time    ECG 12 Lead [[id number]] Collected: 03/21/22 0821     Updated: 03/21/22 0822     QT Interval 357 ms     Narrative:      HEART RATE= 101  bpm  RR Interval= 592  ms  NH Interval= 182  ms  P Horizontal Axis=   deg  P Front Axis= 68  deg  QRSD Interval= 92  ms  QT Interval= 357  ms  QRS Axis= 67  deg  T Wave Axis= 17  deg  - OTHERWISE NORMAL ECG -  Sinus tachycardia  Electronically Signed By:   Date and Time of Study: 2022-03-21 08:21:15          Results for orders placed during the hospital encounter of 01/24/22    Duplex Venous Lower Extremity - Bilateral CAR    Interpretation Summary  · Chronic right lower extremity superficial thrombophlebitis noted in the small saphenous.  ·  Chronic left lower extremity superficial thrombophlebitis noted in the great saphenous (above knee) and small saphenous.  · All other veins appeared normal bilaterally.      Results for orders placed during the hospital encounter of 01/24/22    Adult Transthoracic Echo Complete W/ Cont if Necessary Per Protocol    Interpretation Summary  · Estimated right ventricular systolic pressure from tricuspid regurgitation is normal (<35 mmHg).  · Moderate pulmonic valve regurgitation is present.  · Left ventricular ejection fraction appears to be 61 - 65%.  · Left ventricular diastolic function is consistent with (grade II w/high LAP) pseudonormalization.      XR Chest 1 View    Result Date: 3/21/2022   1. No acute airspace disease is seen. Small nodular densities seen within both lungs on prior chest x-ray and CT chest are inconspicuous on today's examination. 2. Trace bibasilar pleural effusions versus pleural parenchymal scarring.  Electronically Signed By-Loni Serrano MD On:3/21/2022 8:49 AM This report was finalized on [id number] by  Loni Serrano MD.        Estimated Creatinine Clearance: 40.9 mL/min (by C-G formula based on SCr of 0.92 mg/dL).    Assessment/Plan   Assessment/Plan       Active Hospital Problems    Diagnosis  POA   • Dyspnea [R06.00]  Yes      Resolved Hospital Problems   No resolved problems to display.     Dyspnea on exertion--likely multifactorial with fluid volume overload and pulmonary edema secondary to diastolic dysfunction and anemia: Oxygen support as needed    HFpEF with known grade 2 diastolic dysfunction: Continue diuresis; continue p.o. Lasix  -Patient received IV  -Minimally elevated troponin at 0.032 which is likely secondary to CHF with cardiac stretch    Anemia, chronic likely secondary to blood loss with Xarelto therapy for atrial fibrillation: Repeat CBC in a.m.; add IV iron supplementation x1; hematology consult    Pulmonary lesion consistent with CA: Hematology  consult  -2/11/2022 patient had thoracoscopy video-assisted wedge resection was postponed secondary to patient condition.  -Patient is scheduled for Dr. Whitten for stereotactic radiation.    Atrial fibrillation, chronic with anticoagulation therapy: Continue Xarelto; continue Cardizem  -Dr. Dumont has discussed possible watchman procedure which patient declined at that time    COPD, chronic: Continue Breo Ellipta with formulary substitution        VTE Prophylaxis -   Mechanical Order History:     None      Pharmalogical Order History:      Ordered     Dose Route Frequency Stop    03/21/22 1324  enoxaparin (LOVENOX) syringe 40 mg  Status:  Discontinued         40 mg SC Every 24 Hours 03/21/22 1324                CODE STATUS:    Code Status and Medical Interventions:   Ordered at: 03/21/22 1324     Code Status (Patient has no pulse and is not breathing):    CPR (Attempt to Resuscitate)     Medical Interventions (Patient has pulse or is breathing):    Full Support       This patient has been examined wearing personal protective equipment.     I discussed the patient's findings and my recommendations with patient and family.      Signature:Electronically signed by ARIANA Vargas, 03/22/22, 8:42 AM EDT.

## 2022-03-21 NOTE — ED PROVIDER NOTES
"Subjective   History of Present Illness  Shortness of breath  81-year-old female states she felt short of breath and she was gasping for air this morning.  States it started while she was still laying in bed and worsen when she tried to get up and be active.  States she has had some intermittent shortness of breath recent days but was more severe today.  States she does have a constant dry cough over the last 2 months since her diagnosis of pneumonia in January.  She reports no fever or chest pain.  She states she has some chronic leg edema that is unchanged.  She has had recent blood transfusion for \"iron deficiency anemia \"and was scheduled for an iron infusion today.  Review of Systems   Constitutional: Positive for fatigue.   HENT: Negative.    Respiratory: Positive for cough and shortness of breath.    Cardiovascular: Positive for leg swelling. Negative for chest pain.   Gastrointestinal: Negative.    Genitourinary: Negative.    Musculoskeletal: Negative.    Skin: Negative.    Neurological: Negative.        Past Medical History:   Diagnosis Date   • A-fib ([facility])    • Anemia    • Arthritis    • Asthma    • CHF (congestive heart failure) ([facility])    • Hypertension        Allergies   Allergen Reactions   • Ciprofloxacin Unknown (See Comments)   • Sulfa Antibiotics Unknown (See Comments)   • Cyprodenate Unknown - High Severity   • Iodine Unknown - High Severity       Past Surgical History:   Procedure Laterality Date   • CATARACT EXTRACTION     • CHOLECYSTECTOMY     • HYSTERECTOMY  1987    Endometriosis   • OOPHORECTOMY     • TONSILLECTOMY         Family History   Problem Relation Age of Onset   • Breast cancer Mother 85   • Stroke Mother    • Heart disease Mother    • Endometrial cancer Mother 70   • Heart failure Father    • Heart failure Sister    • COPD Sister    • Heart disease Sister        Social History     Socioeconomic History   • Marital status:    Tobacco Use   • Smoking status: Never Smoker   • " "Smokeless tobacco: Never Used   Vaping Use   • Vaping Use: Never used   Substance and Sexual Activity   • Alcohol use: No   • Drug use: Never   • Sexual activity: Not Currently     Prior to Admission medications    Medication Sig Start Date End Date Taking? Authorizing Provider   acetaminophen (TYLENOL) 500 MG tablet Take 500 mg by mouth Every 6 (Six) Hours As Needed for Mild Pain .    Deborah King MD   apixaban (ELIQUIS) 2.5 MG tablet tablet Take 1 tablet by mouth Every 12 (Twelve) Hours. Indications: Atrial Fibrillation 3/1/22   Etelvina Ulloa MD   Breo Ellipta 200-25 MCG/INH inhaler Inhale 1 puff Daily. 4/2/21   Etelvina Ulloa MD   Cholecalciferol (VITAMIN D3) 2000 units capsule Take 2,000 Units by mouth Daily. 2/20/19   Deborah King MD   Cyanocobalamin (VITAMIN B12) 1000 MCG tablet controlled-release Take 1,000 mcg by mouth Daily. 2/20/19   Deborah King MD   dilTIAZem CD (CARDIZEM CD) 180 MG 24 hr capsule Take 1 capsule by mouth Daily. 3/1/22   Etelvina Ulloa MD   furosemide (LASIX) 40 MG tablet Take 1 tablet by mouth Daily As Needed (edema). 2/1/22   Ruben Garcia, DO   Homeopathic Products (LEG CRAMP RELIEF) tablet LEG CRAMP RELIEF TABS 5/21/19   Deborah King MD   metoprolol succinate XL (Toprol XL) 25 MG 24 hr tablet Take 1 tablet by mouth Daily. 1/21/22   Etelvina Ulloa MD   Premarin 0.625 MG/GM vaginal cream Insert  into the vagina 2 (Two) Times a Week.  Patient taking differently: Insert  into the vagina As Needed. 1/7/21   Etelvina Ulloa MD   triamcinolone (KENALOG) 0.1 % cream Apply  topically to the appropriate area as directed 2 (Two) Times a Day. 1/21/22   Etelvina Ulloa MD     /70 (BP Location: Right arm, Patient Position: Sitting)   Pulse 89   Temp 97.5 °F (36.4 °C) (Oral)   Resp 18   Ht 154.9 cm (61\")   Wt 64 kg (141 lb)   SpO2 95%   BMI 26.64 kg/m²   I examined the patient using the appropriate personal protective " equipment.          Objective   Physical Exam  General: Well-developed well-appearing, no acute distress, alert and appropriate  Eyes:  sclera nonicteric  HEENT: Mucous membranes moist, no mucosal swelling  Neck: Supple, no nuchal rigidity, no stridor  Respirations: Mildly tachypneic, clear equal breath sounds bilaterally,  Heart regular rate and rhythm, no murmurs rubs or gallops,   Abdomen soft nontender nondistended, no hepatosplenomegaly, no hernia, no mass, normal bowel sounds, no CVA tenderness  Extremities trace edema to bilateral lower extremities, calves are symmetric and nontender  Neuro cranial nerves grossly intact, no focal limb deficits  Psych oriented, pleasant affect, slightly anxious  Skin no rash, brisk cap refill  Procedures           ED Course         My EKG interpretation sinus rhythm, rate of 101, normal QT interval, no acute ST or T wave abnormality       Results for orders placed or performed during the hospital encounter of 03/21/22   COVID-19,CEPHEID/GINO,COR/TURNER/PAD/VIVIAN IN-HOUSE(OR EMERGENT/ADD-ON),NP SWAB IN TRANSPORT MEDIA 3-4 HR TAT, RT-PCR - Swab, Nasopharynx    Specimen: Nasopharynx; Swab   Result Value Ref Range    COVID19 Not Detected Not Detected - Ref. Range   Comprehensive Metabolic Panel    Specimen: Arm, Left; Blood   Result Value Ref Range    Glucose 106 (H) 65 - 99 mg/dL    BUN 16 8 - 23 mg/dL    Creatinine 0.92 0.57 - 1.00 mg/dL    Sodium 138 136 - 145 mmol/L    Potassium 4.3 3.5 - 5.2 mmol/L    Chloride 103 98 - 107 mmol/L    CO2 24.0 22.0 - 29.0 mmol/L    Calcium 9.5 8.6 - 10.5 mg/dL    Total Protein 7.0 6.0 - 8.5 g/dL    Albumin 2.70 (L) 3.50 - 5.20 g/dL    ALT (SGPT) 8 1 - 33 U/L    AST (SGOT) 11 1 - 32 U/L    Alkaline Phosphatase 106 39 - 117 U/L    Total Bilirubin 0.4 0.0 - 1.2 mg/dL    Globulin 4.3 gm/dL    A/G Ratio 0.6 g/dL    BUN/Creatinine Ratio 17.4 7.0 - 25.0    Anion Gap 11.0 5.0 - 15.0 mmol/L    eGFR 62.3 >60.0 mL/min/1.73   Troponin    Specimen: Arm, Left;  Blood   Result Value Ref Range    Troponin T 0.032 (C) 0.000 - 0.030 ng/mL   BNP    Specimen: Arm, Left; Blood   Result Value Ref Range    proBNP 3,891.0 (H) 0.0 - 1,800.0 pg/mL   Magnesium    Specimen: Arm, Left; Blood   Result Value Ref Range    Magnesium 1.7 1.6 - 2.4 mg/dL   CBC Auto Differential    Specimen: Blood   Result Value Ref Range    WBC 12.70 (H) 3.40 - 10.80 10*3/mm3    RBC 3.78 3.77 - 5.28 10*6/mm3    Hemoglobin 9.0 (L) 12.0 - 15.9 g/dL    Hematocrit 27.5 (L) 34.0 - 46.6 %    MCV 72.6 (L) 79.0 - 97.0 fL    MCH 23.7 (L) 26.6 - 33.0 pg    MCHC 32.6 31.5 - 35.7 g/dL    RDW 22.5 (H) 12.3 - 15.4 %    RDW-SD 57.3 (H) 37.0 - 54.0 fl    MPV 6.3 6.0 - 12.0 fL    Platelets 535 (H) 140 - 450 10*3/mm3    Neutrophil % 81.2 (H) 42.7 - 76.0 %    Lymphocyte % 9.0 (L) 19.6 - 45.3 %    Monocyte % 4.5 (L) 5.0 - 12.0 %    Eosinophil % 3.9 0.3 - 6.2 %    Basophil % 1.4 0.0 - 1.5 %    Neutrophils, Absolute 10.30 (H) 1.70 - 7.00 10*3/mm3    Lymphocytes, Absolute 1.10 0.70 - 3.10 10*3/mm3    Monocytes, Absolute 0.60 0.10 - 0.90 10*3/mm3    Eosinophils, Absolute 0.50 (H) 0.00 - 0.40 10*3/mm3    Basophils, Absolute 0.20 0.00 - 0.20 10*3/mm3    nRBC 0.0 0.0 - 0.2 /100 WBC   Troponin    Specimen: Blood   Result Value Ref Range    Troponin T 0.029 0.000 - 0.030 ng/mL   Basic Metabolic Panel    Specimen: Blood   Result Value Ref Range    Glucose 106 (H) 65 - 99 mg/dL    BUN 16 8 - 23 mg/dL    Creatinine 0.99 0.57 - 1.00 mg/dL    Sodium 138 136 - 145 mmol/L    Potassium 4.4 3.5 - 5.2 mmol/L    Chloride 103 98 - 107 mmol/L    CO2 25.0 22.0 - 29.0 mmol/L    Calcium 8.7 8.6 - 10.5 mg/dL    BUN/Creatinine Ratio 16.2 7.0 - 25.0    Anion Gap 10.0 5.0 - 15.0 mmol/L    eGFR 57.0 (L) >60.0 mL/min/1.73   CBC Auto Differential    Specimen: Blood   Result Value Ref Range    WBC 14.00 (H) 3.40 - 10.80 10*3/mm3    RBC 3.76 (L) 3.77 - 5.28 10*6/mm3    Hemoglobin 8.7 (L) 12.0 - 15.9 g/dL    Hematocrit 27.2 (L) 34.0 - 46.6 %    MCV 72.3 (L) 79.0 -  97.0 fL    MCH 23.2 (L) 26.6 - 33.0 pg    MCHC 32.1 31.5 - 35.7 g/dL    RDW 22.5 (H) 12.3 - 15.4 %    RDW-SD 56.9 (H) 37.0 - 54.0 fl    MPV 6.4 6.0 - 12.0 fL    Platelets 506 (H) 140 - 450 10*3/mm3    Neutrophil % 81.6 (H) 42.7 - 76.0 %    Lymphocyte % 9.3 (L) 19.6 - 45.3 %    Monocyte % 5.7 5.0 - 12.0 %    Eosinophil % 2.9 0.3 - 6.2 %    Basophil % 0.5 0.0 - 1.5 %    Neutrophils, Absolute 11.40 (H) 1.70 - 7.00 10*3/mm3    Lymphocytes, Absolute 1.30 0.70 - 3.10 10*3/mm3    Monocytes, Absolute 0.80 0.10 - 0.90 10*3/mm3    Eosinophils, Absolute 0.40 0.00 - 0.40 10*3/mm3    Basophils, Absolute 0.10 0.00 - 0.20 10*3/mm3    nRBC 0.0 0.0 - 0.2 /100 WBC   Scan Slide    Specimen: Blood   Result Value Ref Range    RBC Morphology Normal Normal    WBC Morphology Normal Normal    Platelet Estimate Increased Normal   Basic Metabolic Panel    Specimen: Blood   Result Value Ref Range    Glucose 155 (H) 65 - 99 mg/dL    BUN 18 8 - 23 mg/dL    Creatinine 1.11 (H) 0.57 - 1.00 mg/dL    Sodium 135 (L) 136 - 145 mmol/L    Potassium 5.3 (H) 3.5 - 5.2 mmol/L    Chloride 101 98 - 107 mmol/L    CO2 24.0 22.0 - 29.0 mmol/L    Calcium 8.6 8.6 - 10.5 mg/dL    BUN/Creatinine Ratio 16.2 7.0 - 25.0    Anion Gap 10.0 5.0 - 15.0 mmol/L    eGFR 49.7 (L) >60.0 mL/min/1.73   CBC (No Diff)    Specimen: Blood   Result Value Ref Range    WBC 15.50 (H) 3.40 - 10.80 10*3/mm3    RBC 3.61 (L) 3.77 - 5.28 10*6/mm3    Hemoglobin 8.5 (L) 12.0 - 15.9 g/dL    Hematocrit 26.3 (L) 34.0 - 46.6 %    MCV 72.8 (L) 79.0 - 97.0 fL    MCH 23.5 (L) 26.6 - 33.0 pg    MCHC 32.3 31.5 - 35.7 g/dL    RDW 23.1 (H) 12.3 - 15.4 %    RDW-SD 59.1 (H) 37.0 - 54.0 fl    MPV 6.6 6.0 - 12.0 fL    Platelets 558 (H) 140 - 450 10*3/mm3   Magnesium    Specimen: Blood   Result Value Ref Range    Magnesium 2.0 1.6 - 2.4 mg/dL   ECG 12 Lead   Result Value Ref Range    QT Interval 357 ms   ECG 12 Lead   Result Value Ref Range    QT Interval 364 ms     No radiology results for the last day                                 [facility]  There was computer error at the time of care.  This medical decision making addendum is not contemporaneous.  Patient presented with some dyspnea and anemia.  She was placed in hospital observation for further evaluation.  Final diagnoses:   Dyspnea, unspecified type   Chronic anemia   Acute on chronic congestive heart failure, unspecified heart failure type (HCC)       ED Disposition  ED Disposition     ED Disposition   Decision to Admit    Condition   --    Comment   --             Etelvina Ulloa MD  [address]  [place] [address] [phone]      5-7 days         Medication List      New Prescriptions    metoprolol succinate XL 25 MG 24 hr tablet  Commonly known as: TOPROL-XL  Take 1 tablet by mouth Daily for 30 days.        Changed    dilTIAZem  MG 24 hr capsule  Commonly known as: CARDIZEM CD  Take 1 capsule by mouth Daily.  What changed: when to take this     Premarin 0.625 MG/GM vaginal cream  Generic drug: conjugated estrogens  Insert  into the vagina 2 (Two) Times a Week.  What changed:   · when to take this  · reasons to take this           Where to Get Your Medications      These medications were sent to [id number] - [place], [place] - [address]  AT [address] [facility] - [phone]  - [phone]   [address], [place] [address]    Phone: [phone]   · metoprolol succinate XL 25 MG 24 hr tablet          Camden Zhou MD  04/06/22 1535       Camden Zhou MD  04/07/22 5550

## 2022-03-21 NOTE — CONSULTS
Hematology/Oncology Inpatient Consultation    Patient name: Chrystal Ruiz  : 1940  MRN: [id number]  Referring Provider: Camden Zhou MD    Reason for Consultation: Anemia    Chief complaint: Weakness, SOB    History of present illness:    Chrystal Ruiz is a 81 y.o. female who presented to [facility] on 3/21/2022 with complaints of weakness and SOB that is worse today with activity with a continual dry cough since diagnosed with pneumonia in 2022. The patient had a blood transfusion for Hgb 7.6 with symptomatic fatigue and weakness, and was due for Venofer infusion today.  The patient is noted to have slight elevation of troponin and proBNP of 3891.   EKG shows tachycardia.    The patient is admitted to observation for further evaluation.    22  Hematology/Oncology was consulted for anemia.  The patient is an established patient of Dr Cheng who was to begin iron infusions today and originally seen on 3/9/2022 for investigation of anemia and thrombocytosis found while treating congestive heart failure and right lung tumor.  She had symptoms of severe fatigue and difficulty functioning in her daily activities due to weakness.  The patient had iron labs drawn with showing iron saturation of 7%, iron of 13.  The patient was given a unit of PRBCs due to symptoms, and felt better after transfusion when she was seen in the office on 3/16/2022.  The patient took oral iron with multiple side effects and no clear improvement.  The patient was to begin IV iron today. She is noted to have a Hgb of 9.0 and an elevated troponin and proBNP.      She  has a past medical history of A-fib (HCC), Anemia, Arthritis, Asthma, CHF (congestive heart failure) ([facility]), and Hypertension.    PCP: Etelvina Ulloa MD    History:  Past Medical History:   Diagnosis Date   • A-fib (HCC)    • Anemia    • Arthritis    • Asthma    • CHF (congestive heart failure) (HCC)    • Hypertension    ,   Past  "Surgical History:   Procedure Laterality Date   • CATARACT EXTRACTION     • CHOLECYSTECTOMY     • HYSTERECTOMY  1987    Endometriosis   • OOPHORECTOMY     • TONSILLECTOMY     ,   Family History   Problem Relation Age of Onset   • Breast cancer Mother 85   • Stroke Mother    • Heart disease Mother    • Endometrial cancer Mother 70   • Heart failure Father    • Heart failure Sister    • COPD Sister    • Heart disease Sister    ,   Social History     Tobacco Use   • Smoking status: Never Smoker   • Smokeless tobacco: Never Used   Vaping Use   • Vaping Use: Never used   Substance Use Topics   • Alcohol use: No   • Drug use: Never   , (Not in a hospital admission)  , Scheduled Meds:   , Continuous Infusions:   , PRN Meds:  •  [COMPLETED] Insert peripheral IV **AND** sodium chloride   Allergies:  Ciprofloxacin, Sulfa antibiotics, Cyprodenate, and Iodine    Subjective     ROS:  Review of Systems   Constitutional: Positive for fatigue. Negative for chills and fever.   Eyes: Negative for visual disturbance.   Respiratory: Positive for cough and shortness of breath.    Cardiovascular: Positive for leg swelling. Negative for chest pain and palpitations.   Gastrointestinal: Negative for abdominal pain, constipation and diarrhea.   Endocrine: Negative.    Genitourinary: Negative for dysuria and hematuria.   Skin: Negative for rash and wound.   Neurological: Negative for dizziness and weakness.   Hematological: Does not bruise/bleed easily.   Psychiatric/Behavioral: Negative for agitation and confusion.        Objective   Vital Signs:   /98   Pulse 86   Temp 97.8 °F (36.6 °C)   Resp 22   Ht 154.9 cm (61\")   Wt 65.8 kg (145 lb)   SpO2 95%   BMI 27.40 kg/m²     Physical Exam: (performed by MD)  Physical Exam  Vitals and nursing note reviewed.   Constitutional:       Appearance: She is ill-appearing.   HENT:      Head: Normocephalic.      Mouth/Throat:      Pharynx: Oropharynx is clear.   Eyes:      Pupils: Pupils are " equal, round, and reactive to light.   Cardiovascular:      Rate and Rhythm: Normal rate and regular rhythm.      Pulses: Normal pulses.      Heart sounds: Normal heart sounds.   Pulmonary:      Effort: Pulmonary effort is normal.      Comments: Tachypnea  Abdominal:      General: Bowel sounds are normal.      Palpations: Abdomen is soft.   Musculoskeletal:         General: Normal range of motion.      Cervical back: Normal range of motion.      Right lower leg: Edema present.      Left lower leg: Edema present.   Skin:     General: Skin is warm and dry.      Comments: Erythema bilateral lower legs   Neurological:      Mental Status: She is alert and oriented to person, place, and time.   Psychiatric:         Mood and Affect: Mood normal.         Thought Content: Thought content normal.         Results Review:  Lab Results (last 48 hours)     Procedure Component Value Units Date/Time    COVID-19,CEPHEID/GINO,COR/TURNER/PAD/VIVIAN IN-HOUSE(OR EMERGENT/ADD-ON),NP SWAB IN TRANSPORT MEDIA 3-4 HR TAT, RT-PCR - Swab, Nasopharynx [[id number]]  (Normal) Collected: 03/21/22 0836    Specimen: Swab from Nasopharynx Updated: 03/21/22 0910     COVID19 Not Detected    Narrative:      Fact sheet for providers: https://www.fda.gov/media/095354/download     Fact sheet for patients: https://www.fda.gov/media/640562/download  Fact sheet for providers: https://www.fda.gov/media/386392/download    Fact sheet for patients: https://www.fda.gov/media/208658/download    Test performed by PCR.    Troponin [[id number]]  (Abnormal) Collected: 03/21/22 0828    Specimen: Blood from Arm, Left Updated: 03/21/22 0902     Troponin T 0.032 ng/mL     Narrative:      Troponin T Reference Range:  <= 0.03 ng/mL-   Negative for AMI  >0.03 ng/mL-     Abnormal for myocardial necrosis.  Clinicians would have to utilize clinical acumen, EKG, Troponin and serial changes to determine if it is an Acute Myocardial Infarction or myocardial injury due to an underlying  chronic condition.       Results may be falsely decreased if patient taking Biotin.      Comprehensive Metabolic Panel [[id number]]  (Abnormal) Collected: 03/21/22 0828    Specimen: Blood from Arm, Left Updated: 03/21/22 0857     Glucose 106 mg/dL      BUN 16 mg/dL      Creatinine 0.92 mg/dL      Sodium 138 mmol/L      Potassium 4.3 mmol/L      Chloride 103 mmol/L      CO2 24.0 mmol/L      Calcium 9.5 mg/dL      Total Protein 7.0 g/dL      Albumin 2.70 g/dL      ALT (SGPT) 8 U/L      AST (SGOT) 11 U/L      Alkaline Phosphatase 106 U/L      Total Bilirubin 0.4 mg/dL      Globulin 4.3 gm/dL      A/G Ratio 0.6 g/dL      BUN/Creatinine Ratio 17.4     Anion Gap 11.0 mmol/L      eGFR 62.3 mL/min/1.73      Comment: National Kidney Foundation and American Society of Nephrology (ASN) Task Force recommended calculation based on the Chronic Kidney Disease Epidemiology Collaboration (CKD-EPI) equation refit without adjustment for race.       Narrative:      GFR Normal >60  Chronic Kidney Disease <60  Kidney Failure <15      Magnesium [[id number]]  (Normal) Collected: 03/21/22 0828    Specimen: Blood from Arm, Left Updated: 03/21/22 0857     Magnesium 1.7 mg/dL     BNP [[id number]]  (Abnormal) Collected: 03/21/22 0828    Specimen: Blood from Arm, Left Updated: 03/21/22 0854     proBNP 3,891.0 pg/mL     Narrative:      Among patients with dyspnea, NT-proBNP is highly sensitive for the detection of acute congestive heart failure. In addition NT-proBNP of <300 pg/ml effectively rules out acute congestive heart failure with 99% negative predictive value.    Results may be falsely decreased if patient taking Biotin.      CBC & Differential [[id number]]  (Abnormal) Collected: 03/21/22 0828    Specimen: Blood Updated: 03/21/22 0839    Narrative:      The following orders were created for panel order CBC & Differential.  Procedure                               Abnormality         Status                     ---------                                -----------         ------                     CBC Auto Differential[[id number]]        Abnormal            Final result               Scan Slide[[id number]]                                                                    Please view results for these tests on the individual orders.    CBC Auto Differential [[id number]]  (Abnormal) Collected: 03/21/22 0828    Specimen: Blood Updated: 03/21/22 0839     WBC 12.70 10*3/mm3      RBC 3.78 10*6/mm3      Hemoglobin 9.0 g/dL      Hematocrit 27.5 %      MCV 72.6 fL      MCH 23.7 pg      MCHC 32.6 g/dL      RDW 22.5 %      RDW-SD 57.3 fl      MPV 6.3 fL      Platelets 535 10*3/mm3      Neutrophil % 81.2 %      Lymphocyte % 9.0 %      Monocyte % 4.5 %      Eosinophil % 3.9 %      Basophil % 1.4 %      Neutrophils, Absolute 10.30 10*3/mm3      Lymphocytes, Absolute 1.10 10*3/mm3      Monocytes, Absolute 0.60 10*3/mm3      Eosinophils, Absolute 0.50 10*3/mm3      Basophils, Absolute 0.20 10*3/mm3      nRBC 0.0 /100 WBC            Pending Results:     Imaging Reviewed:   XR Chest 1 View    Result Date: 3/21/2022   1. No acute airspace disease is seen. Small nodular densities seen within both lungs on prior chest x-ray and CT chest are inconspicuous on today's examination. 2. Trace bibasilar pleural effusions versus pleural parenchymal scarring.  Electronically Signed By-Loni Serrano MD On:3/21/2022 8:49 AM This report was finalized on [id number] by  Loni Serrano MD.           Assessment/Plan   ASSESSMENT:    This is a 81-year-old female presenting with shortness of breath with activity, laying flat, and weakness with labs revealing proBNP of 3871. Cardiology is on board.  The patient was given 1 unit PRBCs last week due to symptomatic Hgb of 7.6 believed to be a combination of chronic disease state and iron deficiency.  After failure of oral iron by malabsorption and intolerance, the patient was scheduled for IV iron to start today.  Her hemoglobin is better today at  9.0.  Oncology will follow with cardiology and plan to give IV iron when stabilized.    Microcytic anemia: Hemoglobin 9.0  Associated with chronic disease, iron deficiency  Scheduled for IV iron replacement    Iron deficiency anemia: Oral iron intolerance, iron malabsorption  Only slight elevation in iron levels with oral iron    Gastrointestinal bleeding: Colonoscopy discussed at office visit, patient not interested in procedure    CHF: proBNP 3871  Cardiology on board    Other chronic conditions: A. fib, arthritis, asthma, HTN    PLAN  1. Plan IV iron when stable  2. Follow with cardiology    Electronically signed by ARIANA Durand, 03/21/22, 12:37 PM EDT.      I've known Ms. Ruiz now for a few weeks. She came to see me with anemia, that seemed to be the result of iron deficiency that , this in turn seemed to be the result of chronic gastrointestinal bleeding, was compounding her chronic congestive heart failure. She was given a blood transfusion. She was scheduled to undergo intravenous iron administration as she had a history of poor tolerance to the supplement. She came to the hospital before the first dose of iron. Laboratory exams at the time of admission revealed a markedly elevated BNP. On exam she is oriented. Conversant and in no distress. The precordium is hyperdynamic. She is tachycardic. Breath sounds are clear. The abdomen is soft. No edema. She persists with anemia but it is not much worse than before. However, at this time it is unlikely that it is contributing much to the picture. Will postpone the administration of iron so as to reduce the burden of fluids. Discussed with her and her family. Will continue to follow.     Frank Cheng MD on 3/21/2022 at 17:21

## 2022-03-22 LAB
ANION GAP SERPL CALCULATED.3IONS-SCNC: 10 MMOL/L (ref 5–15)
BASOPHILS # BLD AUTO: 0.1 10*3/MM3 (ref 0–0.2)
BASOPHILS NFR BLD AUTO: 0.5 % (ref 0–1.5)
BUN SERPL-MCNC: 16 MG/DL (ref 8–23)
BUN/CREAT SERPL: 16.2 (ref 7–25)
CALCIUM SPEC-SCNC: 8.7 MG/DL (ref 8.6–10.5)
CHLORIDE SERPL-SCNC: 103 MMOL/L (ref 98–107)
CO2 SERPL-SCNC: 25 MMOL/L (ref 22–29)
CREAT SERPL-MCNC: 0.99 MG/DL (ref 0.57–1)
DEPRECATED RDW RBC AUTO: 56.9 FL (ref 37–54)
EGFRCR SERPLBLD CKD-EPI 2021: 57 ML/MIN/1.73
EOSINOPHIL # BLD AUTO: 0.4 10*3/MM3 (ref 0–0.4)
EOSINOPHIL NFR BLD AUTO: 2.9 % (ref 0.3–6.2)
ERYTHROCYTE [DISTWIDTH] IN BLOOD BY AUTOMATED COUNT: 22.5 % (ref 12.3–15.4)
GLUCOSE SERPL-MCNC: 106 MG/DL (ref 65–99)
HCT VFR BLD AUTO: 27.2 % (ref 34–46.6)
HGB BLD-MCNC: 8.7 G/DL (ref 12–15.9)
LYMPHOCYTES # BLD AUTO: 1.3 10*3/MM3 (ref 0.7–3.1)
LYMPHOCYTES NFR BLD AUTO: 9.3 % (ref 19.6–45.3)
MCH RBC QN AUTO: 23.2 PG (ref 26.6–33)
MCHC RBC AUTO-ENTMCNC: 32.1 G/DL (ref 31.5–35.7)
MCV RBC AUTO: 72.3 FL (ref 79–97)
MONOCYTES # BLD AUTO: 0.8 10*3/MM3 (ref 0.1–0.9)
MONOCYTES NFR BLD AUTO: 5.7 % (ref 5–12)
NEUTROPHILS NFR BLD AUTO: 11.4 10*3/MM3 (ref 1.7–7)
NEUTROPHILS NFR BLD AUTO: 81.6 % (ref 42.7–76)
NRBC BLD AUTO-RTO: 0 /100 WBC (ref 0–0.2)
PLATELET # BLD AUTO: 506 10*3/MM3 (ref 140–450)
PMV BLD AUTO: 6.4 FL (ref 6–12)
POTASSIUM SERPL-SCNC: 4.4 MMOL/L (ref 3.5–5.2)
RBC # BLD AUTO: 3.76 10*6/MM3 (ref 3.77–5.28)
RBC MORPH BLD: NORMAL
SMALL PLATELETS BLD QL SMEAR: NORMAL
SODIUM SERPL-SCNC: 138 MMOL/L (ref 136–145)
WBC MORPH BLD: NORMAL
WBC NRBC COR # BLD: 14 10*3/MM3 (ref 3.4–10.8)

## 2022-03-22 PROCEDURE — 94799 UNLISTED PULMONARY SVC/PX: CPT

## 2022-03-22 PROCEDURE — 94640 AIRWAY INHALATION TREATMENT: CPT

## 2022-03-22 PROCEDURE — 85025 COMPLETE CBC W/AUTO DIFF WBC: CPT | Performed by: NURSE PRACTITIONER

## 2022-03-22 PROCEDURE — 94664 DEMO&/EVAL PT USE INHALER: CPT

## 2022-03-22 PROCEDURE — G0378 HOSPITAL OBSERVATION PER HR: HCPCS

## 2022-03-22 PROCEDURE — 80048 BASIC METABOLIC PNL TOTAL CA: CPT | Performed by: NURSE PRACTITIONER

## 2022-03-22 PROCEDURE — 99215 OFFICE O/P EST HI 40 MIN: CPT | Performed by: INTERNAL MEDICINE

## 2022-03-22 PROCEDURE — 85007 BL SMEAR W/DIFF WBC COUNT: CPT | Performed by: NURSE PRACTITIONER

## 2022-03-22 PROCEDURE — 99214 OFFICE O/P EST MOD 30 MIN: CPT | Performed by: INTERNAL MEDICINE

## 2022-03-22 RX ORDER — FUROSEMIDE 40 MG/1
40 TABLET ORAL DAILY
Status: DISCONTINUED | OUTPATIENT
Start: 2022-03-22 | End: 2022-03-23 | Stop reason: HOSPADM

## 2022-03-22 RX ORDER — SPIRONOLACTONE 25 MG/1
25 TABLET ORAL DAILY
Status: DISCONTINUED | OUTPATIENT
Start: 2022-03-22 | End: 2022-03-23 | Stop reason: HOSPADM

## 2022-03-22 RX ORDER — DILTIAZEM HYDROCHLORIDE 180 MG/1
180 CAPSULE, COATED, EXTENDED RELEASE ORAL NIGHTLY
Status: DISCONTINUED | OUTPATIENT
Start: 2022-03-22 | End: 2022-03-22 | Stop reason: SDUPTHER

## 2022-03-22 RX ORDER — FUROSEMIDE 40 MG/1
40 TABLET ORAL DAILY PRN
Status: DISCONTINUED | OUTPATIENT
Start: 2022-03-22 | End: 2022-03-23 | Stop reason: HOSPADM

## 2022-03-22 RX ORDER — BUDESONIDE AND FORMOTEROL FUMARATE DIHYDRATE 160; 4.5 UG/1; UG/1
2 AEROSOL RESPIRATORY (INHALATION)
Refills: 2 | Status: DISCONTINUED | OUTPATIENT
Start: 2022-03-22 | End: 2022-03-23 | Stop reason: HOSPADM

## 2022-03-22 RX ADMIN — TRIAMCINOLONE ACETONIDE 1 APPLICATION: 1 CREAM TOPICAL at 10:01

## 2022-03-22 RX ADMIN — Medication 10 ML: at 21:10

## 2022-03-22 RX ADMIN — BUDESONIDE AND FORMOTEROL FUMARATE DIHYDRATE 2 PUFF: 160; 4.5 AEROSOL RESPIRATORY (INHALATION) at 11:11

## 2022-03-22 RX ADMIN — DILTIAZEM HYDROCHLORIDE 180 MG: 180 CAPSULE, COATED, EXTENDED RELEASE ORAL at 21:10

## 2022-03-22 RX ADMIN — Medication 10 ML: at 10:01

## 2022-03-22 RX ADMIN — FUROSEMIDE 40 MG: 40 TABLET ORAL at 10:17

## 2022-03-22 RX ADMIN — TRIAMCINOLONE ACETONIDE 1 APPLICATION: 1 CREAM TOPICAL at 21:10

## 2022-03-22 RX ADMIN — SPIRONOLACTONE 25 MG: 25 TABLET ORAL at 10:17

## 2022-03-22 RX ADMIN — BUDESONIDE AND FORMOTEROL FUMARATE DIHYDRATE 2 PUFF: 160; 4.5 AEROSOL RESPIRATORY (INHALATION) at 18:45

## 2022-03-22 NOTE — CONSULTS
Referring Provider: Camden Zhou MD  Reason for Consultation:  Shortness of breath    Patient Care Team:  Etelvina Ulloa MD as PCP - General  Etelvina Ulloa MD as PCP - Family Medicine  Mukund Farias MD as Consulting Physician (Pulmonary Disease)  Alan Wynn DPM as Consulting Physician (Podiatry)  Demetris Farrell MD as Surgeon (General Surgery)  Etelvina Mcconnell MD as Surgeon (Thoracic Surgery)  Macho Dumnot MD as Consulting Physician (Cardiology)    Chief complaint  Shortness of breath    Subjective .     History of present illness:  Chrystal Ruiz is a 81 y.o. female who presents with with history of increasing shortness of breath with activity and sometimes at rest.  Denies having any chest discomfort heaviness or tightness of the chest.  Patient came to the emergency room with increasing shortness of breath.  Patient is having dry cough for last couple months and patient was diagnosed to have pneumonia.  Denies having any fever chills.  Patient has chronic lower extremity edema.  Patient was found to have iron deficiency anemia and hemoglobin was low and patient has been receiving iron infusions.  No other associated aggravating or elevating factors.  Cardiology consultation was requested..        ROS      Patient is not having any palpitations, dizziness or syncope.  Denies having any headache ,abdominal pain ,nausea, vomiting , diarrhea constipation, loss of weight or loss of appetite.  Denies having any excessive bruising ,hematuria or blood in the stool.    Review of all systems negative except as indicated      History  Past Medical History:   Diagnosis Date   • A-fib (HCC)    • Anemia    • Arthritis    • Asthma    • CHF (congestive heart failure) (HCC)    • Hypertension        Past Surgical History:   Procedure Laterality Date   • CATARACT EXTRACTION     • CHOLECYSTECTOMY     • HYSTERECTOMY  1987    Endometriosis   • OOPHORECTOMY     • TONSILLECTOMY         Family  History   Problem Relation Age of Onset   • Breast cancer Mother 85   • Stroke Mother    • Heart disease Mother    • Endometrial cancer Mother 70   • Heart failure Father    • Heart failure Sister    • COPD Sister    • Heart disease Sister        Social History     Tobacco Use   • Smoking status: Never Smoker   • Smokeless tobacco: Never Used   Vaping Use   • Vaping Use: Never used   Substance Use Topics   • Alcohol use: No   • Drug use: Never        Medications Prior to Admission   Medication Sig Dispense Refill Last Dose   • acetaminophen (TYLENOL) 500 MG tablet Take 500 mg by mouth Every 6 (Six) Hours As Needed for Mild Pain .   3/20/2022 at Unknown time   • Breo Ellipta 200-25 MCG/INH inhaler Inhale 1 puff Daily. 1 each 2 3/20/2022 at Unknown time   • Cholecalciferol (VITAMIN D3) 2000 units capsule Take 2,000 Units by mouth Daily.   3/20/2022 at Unknown time   • Cyanocobalamin (VITAMIN B12) 1000 MCG tablet controlled-release Take 2,500 mcg by mouth Daily.   3/20/2022 at Unknown time   • dilTIAZem CD (CARDIZEM CD) 180 MG 24 hr capsule Take 1 capsule by mouth Daily. (Patient taking differently: Take 180 mg by mouth every night at bedtime.) 90 capsule 1 3/20/2022 at Unknown time   • furosemide (LASIX) 40 MG tablet Take 1 tablet by mouth Daily As Needed (edema). 30 tablet 0 Past Week at Unknown time   • Homeopathic Products (LEG CRAMP RELIEF) tablet LEG CRAMP RELIEF TABS   Past Month at Unknown time   • triamcinolone (KENALOG) 0.1 % cream Apply  topically to the appropriate area as directed 2 (Two) Times a Day. 80 g 2 3/20/2022 at Unknown time   • Premarin 0.625 MG/GM vaginal cream Insert  into the vagina 2 (Two) Times a Week. (Patient taking differently: Insert  into the vagina As Needed.) 30 g 3 More than a month at Unknown time         Ciprofloxacin, Sulfa antibiotics, Cyprodenate, and Iodine    Scheduled Meds:dilTIAZem CD, 180 mg, Oral, Nightly  sodium chloride, 10 mL, Intravenous, Q12H  triamcinolone, 1  "application, Topical, Q12H      Continuous Infusions:   PRN Meds:.•  acetaminophen **OR** acetaminophen **OR** acetaminophen  •  ondansetron **OR** ondansetron  •  [COMPLETED] Insert peripheral IV **AND** sodium chloride  •  sodium chloride    Objective     VITAL SIGNS  Vitals:    03/21/22 1446 03/21/22 1642 03/21/22 2246 03/22/22 0550   BP: 153/65 160/78 159/74 152/68   BP Location:  Right arm Right arm Right arm   Patient Position:  Lying Lying Lying   Pulse: 77 77 92    Resp:  20 18 20   Temp:  98.5 °F (36.9 °C) 99 °F (37.2 °C) 97.8 °F (36.6 °C)   TempSrc:  Oral Oral Oral   SpO2:  97% 95% 96%   Weight:  64 kg (141 lb)     Height:  154.9 cm (61\")         Flowsheet Rows    Flowsheet Row First Filed Value   Admission Height 154.9 cm (61\") Documented at 03/21/2022 0807   Admission Weight 65.8 kg (145 lb) Documented at 03/21/2022 0807            Intake/Output Summary (Last 24 hours) at 3/22/2022 0644  Last data filed at 3/22/2022 0600  Gross per 24 hour   Intake 24 ml   Output 5400 ml   Net -5376 ml        TELEMETRY: Sinus rhythm    Physical Exam:  The patient is alert, oriented and in no distress.  Vital signs as noted above.  Head and neck revealed no carotid bruits or jugular venous distention.  No thyromegaly or lymph adenopathy is present  Lungs clear.  No wheezing.  Breath sounds are normal bilaterally.  Heart normal first and second heart sounds.No murmur.  No precordial rub is present.  No gallop is present.  Abdomen soft and nontender.  No organomegaly is present.  Extremities with good peripheral pulses.  1+ edema skin warm and dry.  Musculoskeletal system is grossly normal  CNS grossly normal      Results Review:   I reviewed the patient's new clinical results.  Lab Results (last 24 hours)     Procedure Component Value Units Date/Time    Troponin [[id number]]  (Normal) Collected: 03/21/22 1353    Specimen: Blood Updated: 03/21/22 1513     Troponin T 0.029 ng/mL     Narrative:      Troponin T Reference " Range:  <= 0.03 ng/mL-   Negative for AMI  >0.03 ng/mL-     Abnormal for myocardial necrosis.  Clinicians would have to utilize clinical acumen, EKG, Troponin and serial changes to determine if it is an Acute Myocardial Infarction or myocardial injury due to an underlying chronic condition.       Results may be falsely decreased if patient taking Biotin.      COVID-19,CEPHEID/GINO,COR/TURNER/PAD/VIVIAN IN-HOUSE(OR EMERGENT/ADD-ON),NP SWAB IN TRANSPORT MEDIA 3-4 HR TAT, RT-PCR - Swab, Nasopharynx [[id number]]  (Normal) Collected: 03/21/22 0836    Specimen: Swab from Nasopharynx Updated: 03/21/22 0910     COVID19 Not Detected    Narrative:      Fact sheet for providers: https://www.fda.gov/media/865819/download     Fact sheet for patients: https://www.fda.gov/media/053154/download  Fact sheet for providers: https://www.fda.gov/media/766672/download    Fact sheet for patients: https://www.fda.gov/media/108743/download    Test performed by PCR.    Troponin [[id number]]  (Abnormal) Collected: 03/21/22 0828    Specimen: Blood from Arm, Left Updated: 03/21/22 0902     Troponin T 0.032 ng/mL     Narrative:      Troponin T Reference Range:  <= 0.03 ng/mL-   Negative for AMI  >0.03 ng/mL-     Abnormal for myocardial necrosis.  Clinicians would have to utilize clinical acumen, EKG, Troponin and serial changes to determine if it is an Acute Myocardial Infarction or myocardial injury due to an underlying chronic condition.       Results may be falsely decreased if patient taking Biotin.      Comprehensive Metabolic Panel [[id number]]  (Abnormal) Collected: 03/21/22 0828    Specimen: Blood from Arm, Left Updated: 03/21/22 0857     Glucose 106 mg/dL      BUN 16 mg/dL      Creatinine 0.92 mg/dL      Sodium 138 mmol/L      Potassium 4.3 mmol/L      Chloride 103 mmol/L      CO2 24.0 mmol/L      Calcium 9.5 mg/dL      Total Protein 7.0 g/dL      Albumin 2.70 g/dL      ALT (SGPT) 8 U/L      AST (SGOT) 11 U/L      Alkaline Phosphatase 106 U/L       Total Bilirubin 0.4 mg/dL      Globulin 4.3 gm/dL      A/G Ratio 0.6 g/dL      BUN/Creatinine Ratio 17.4     Anion Gap 11.0 mmol/L      eGFR 62.3 mL/min/1.73      Comment: National Kidney Foundation and American Society of Nephrology (ASN) Task Force recommended calculation based on the Chronic Kidney Disease Epidemiology Collaboration (CKD-EPI) equation refit without adjustment for race.       Narrative:      GFR Normal >60  Chronic Kidney Disease <60  Kidney Failure <15      Magnesium [[id number]]  (Normal) Collected: 03/21/22 0828    Specimen: Blood from Arm, Left Updated: 03/21/22 0857     Magnesium 1.7 mg/dL     BNP [[id number]]  (Abnormal) Collected: 03/21/22 0828    Specimen: Blood from Arm, Left Updated: 03/21/22 0854     proBNP 3,891.0 pg/mL     Narrative:      Among patients with dyspnea, NT-proBNP is highly sensitive for the detection of acute congestive heart failure. In addition NT-proBNP of <300 pg/ml effectively rules out acute congestive heart failure with 99% negative predictive value.    Results may be falsely decreased if patient taking Biotin.      CBC & Differential [[id number]]  (Abnormal) Collected: 03/21/22 0828    Specimen: Blood Updated: 03/21/22 0839    Narrative:      The following orders were created for panel order CBC & Differential.  Procedure                               Abnormality         Status                     ---------                               -----------         ------                     CBC Auto Differential[[id number]]        Abnormal            Final result               Scan Slide[[id number]]                                                                    Please view results for these tests on the individual orders.    CBC Auto Differential [[id number]]  (Abnormal) Collected: 03/21/22 0828    Specimen: Blood Updated: 03/21/22 0839     WBC 12.70 10*3/mm3      RBC 3.78 10*6/mm3      Hemoglobin 9.0 g/dL      Hematocrit 27.5 %      MCV 72.6 fL      MCH 23.7 pg       MCHC 32.6 g/dL      RDW 22.5 %      RDW-SD 57.3 fl      MPV 6.3 fL      Platelets 535 10*3/mm3      Neutrophil % 81.2 %      Lymphocyte % 9.0 %      Monocyte % 4.5 %      Eosinophil % 3.9 %      Basophil % 1.4 %      Neutrophils, Absolute 10.30 10*3/mm3      Lymphocytes, Absolute 1.10 10*3/mm3      Monocytes, Absolute 0.60 10*3/mm3      Eosinophils, Absolute 0.50 10*3/mm3      Basophils, Absolute 0.20 10*3/mm3      nRBC 0.0 /100 WBC           Imaging Results (Last 24 Hours)     Procedure Component Value Units Date/Time    XR Chest 1 View [[id number]] Collected: 03/21/22 0847     Updated: 03/21/22 0851    Narrative:      DATE OF EXAM:  3/21/2022 8:25 AM     PROCEDURE:  XR CHEST 1 VW-     INDICATIONS:  soa       COMPARISON:  AP portable chest 2/1/2022. CT chest 3/2/2022.     TECHNIQUE:   Single radiographic view of the chest was obtained.     FINDINGS:  Biapical pleural-parenchymal thickening is unchanged from prior  examination. Previously described upper lobe pulmonary nodules are less  conspicuous on today's examination. Coarse linear densities are seen in  the suprahilar regions of each lung, favored to represent areas of  scarring. There is blunting of the costophrenic angles bilaterally which  could represent pleural scarring versus trace pleural effusions. Small  noncalcified nodules seen in both lungs on prior CT chest or  inconspicuous on today's study. There is stable mild cardiac  enlargement. There is upper lumbar curvature toward the left.       Impression:         1. No acute airspace disease is seen. Small nodular densities seen  within both lungs on prior chest x-ray and CT chest are inconspicuous on  today's examination.  2. Trace bibasilar pleural effusions versus pleural parenchymal  scarring.     Electronically Signed By-Loni Serrano MD On:3/21/2022 8:49 AM  This report was finalized on [id number] by  Loni Serrano MD.      LAB RESULTS (LAST 7 DAYS)    CBC  Results from last 7 days   Lab  Units 03/21/22  0828 03/16/22  1038   WBC 10*3/mm3 12.70* 14.01*   RBC 10*6/mm3 3.78 3.89   HEMOGLOBIN g/dL 9.0* 9.0*   HEMATOCRIT % 27.5* 30.0*   MCV fL 72.6* 77.1*   PLATELETS 10*3/mm3 535* 584*       BMP  Results from last 7 days   Lab Units 03/21/22  0828   SODIUM mmol/L 138   POTASSIUM mmol/L 4.3   CHLORIDE mmol/L 103   CO2 mmol/L 24.0   BUN mg/dL 16   CREATININE mg/dL 0.92   GLUCOSE mg/dL 106*   MAGNESIUM mg/dL 1.7       CMP   Results from last 7 days   Lab Units 03/21/22  0828   SODIUM mmol/L 138   POTASSIUM mmol/L 4.3   CHLORIDE mmol/L 103   CO2 mmol/L 24.0   BUN mg/dL 16   CREATININE mg/dL 0.92   GLUCOSE mg/dL 106*   ALBUMIN g/dL 2.70*   BILIRUBIN mg/dL 0.4   ALK PHOS U/L 106   AST (SGOT) U/L 11   ALT (SGPT) U/L 8         BNP        TROPONIN  Results from last 7 days   Lab Units 03/21/22  1353   TROPONIN T ng/mL 0.029       CoAg        Creatinine Clearance  Estimated Creatinine Clearance: 40.4 mL/min (by C-G formula based on SCr of 0.92 mg/dL).    ABG        Radiology  XR Chest 1 View    Result Date: 3/21/2022   1. No acute airspace disease is seen. Small nodular densities seen within both lungs on prior chest x-ray and CT chest are inconspicuous on today's examination. 2. Trace bibasilar pleural effusions versus pleural parenchymal scarring.  Electronically Signed By-Loni Serrano MD On:3/21/2022 8:49 AM This report was finalized on [id number] by  Loni Serrano MD.              EKG            I personally viewed and interpreted the patient's EKG/Telemetry data: Sinus rhythm    ECHOCARDIOGRAM:    Results for orders placed during the hospital encounter of 01/24/22    Adult Transthoracic Echo Complete W/ Cont if Necessary Per Protocol    Interpretation Summary  · Estimated right ventricular systolic pressure from tricuspid regurgitation is normal (<35 mmHg).  · Moderate pulmonic valve regurgitation is present.  · Left ventricular ejection fraction appears to be 61 - 65%.  · Left ventricular diastolic  function is consistent with (grade II w/high LAP) pseudonormalization.              Cardiolite (Tc-99m Sestamibi) stress test      OTHER:     Assessment/Plan     Active Problems:    Dyspnea    ]]]]]]]]]]]]]]]]]]]]]  Impression  ===========  -Shortness of breath  Elevated proBNP.  Possible diastolic congestive heart failure.  Echocardiogram normal ejection fraction 1/24/2022  Stress Cardiolite test normal-1/28/2022    -Paroxysmal atrial fibrillation.  Patient is maintaining sinus rhythm.  EKG showed sinus rhythm.     -Hypertension dyslipidemia     -Hypomagnesemia     -Anemia-iron deficiency  Hemoglobin 9 g.     -Pulmonary nodule with hypermetabolic nodule in the right upper lung.  Status post CT-guided biopsy of right upper lobe nodule 11/5/2021-benign.  Patient is scheduled for VATS procedure 2/4/2022     -Status post cholecystectomy hysterectomy oophorectomy tonsillectomy     -Allergic to ciprofloxacin and sulfa iodine cyprodenate     -Non-smoker  ============  Plan  =========  Shortness of breath  Possible diastolic congestive heart failure.  Recent echocardiogram and stress Cardiolite test-as above.  Cautious and continuous diuresis.     Paroxysmal atrial fibrillation.  Patient in sinus rhythm.  Patient is on Cardizem and metoprolol.     Anticoagulation  Patient was on Eliquis.  Patient had anemia requiring PRBC transfusion.  I had a lengthy discussion recently with patient and patient's family at bedside regarding the options.  Since patient is having low blood count best option would be to discontinue Eliquis although cardioembolic risk is increased.  This was discussed extensively with patient and patient's family.  I have discussed with him about watchman procedure since patient is having anemia requiring PRBC transfusion.  Patient is reluctant to pursue this at this time and wants to think about it.     Lung nodule  Oncology radiation therapy following.    Anemia-hemoglobin 9.0    Further plan will depend on  patient's progress.  ]]]]]]]]]]]]]]]]]]]]]       Macho Dumont MD  03/22/22  06:44 EDT

## 2022-03-22 NOTE — CASE MANAGEMENT/SOCIAL WORK
"Discharge Planning Assessment   [facility]     Patient Name: Chrystal Ruiz  MRN: [id number]  Today's Date: 3/22/2022    Admit Date: 3/21/2022     Discharge Needs Assessment     Row Name 03/22/22 1056       Living Environment    People in Home alone    Current Living Arrangements home    Primary Care Provided by self    Provides Primary Care For no one    Family Caregiver if Needed child(aviva), adult    Family Caregiver Names Piero Jacob \" Kashif\"    Quality of Family Relationships supportive;helpful    Able to Return to Prior Arrangements yes       Resource/Environmental Concerns    Resource/Environmental Concerns none       Transition Planning    Patient/Family Anticipates Transition to home    Patient/Family Anticipated Services at Transition none    Transportation Anticipated family or friend will provide  Kashif - son       Discharge Needs Assessment    Equipment Currently Used at Home walker, standard    Concerns to be Addressed denies needs/concerns at this time    Anticipated Changes Related to Illness none    Equipment Needed After Discharge none    Current Discharge Risk lives alone               Discharge Plan     Row Name 03/22/22 1057       Plan    Plan Home    Patient/Family in Agreement with Plan yes    Plan Comments Pt reports she lives alone and is independent with ADLs.Reports that piero Rowland is helpful and she has support of friends and neighbors if needed. She voiced concern that she was supposed to start radiation treatment today, and didn't know if appointment was cancelled due to her being hospitalized.Pt's nurse Brenda informed of this.Pt intends to return home at dc, and currentlly denies dc needs.                     Demographic Summary     Row Name 03/22/22 0925       General Information    Admission Type observation    Arrived From home    Required Notices Provided Observation Status Notice    Referral Source admission list    Reason for Consult discharge planning    Preferred Language English    "    Contact Information    Permission Granted to Share Info With                Functional Status     Row Name 03/22/22 1056       Functional Status    Usual Activity Tolerance moderate    Current Activity Tolerance moderate       Functional Status, IADL    Medications independent    Meal Preparation independent    Housekeeping independent    Laundry independent    Shopping independent                   Patient Forms     Row Name 03/22/22 1100       Patient Forms    Patient Observation Letter Delivered  AREVALO dated 3/21/22 noted in EPIC              Nataliya Peñaloza RN, [facility]  Office: [phone]  Fax: [phone]  [email or web address]      I met with patient in room wearing PPE: mask and goggles.     Maintained distance greater than six feet and spent </=15 minutes in the room      Nataliya Peñaloza RN

## 2022-03-22 NOTE — PROGRESS NOTES
Hematology/Oncology Inpatient Progress Note    PATIENT NAME: Chrystal Ruiz  : 1940  MRN: [id number]    CHIEF COMPLAINT: Weakness, SOB    HISTORY OF PRESENT ILLNESS:    Chrystal Ruiz is a 81 y.o. female who presented to [facility] on 3/21/2022 with complaints of weakness and SOB that is worse today with activity with a continual dry cough since diagnosed with pneumonia in 2022. The patient had a blood transfusion for Hgb 7.6 with symptomatic fatigue and weakness, and was due for Venofer infusion today.  The patient is noted to have slight elevation of troponin and proBNP of 3891.   EKG shows tachycardia.    The patient is admitted to observation for further evaluation.     22  Hematology/Oncology was consulted for anemia.  The patient is an established patient of Dr Cheng who was to begin iron infusions today and originally seen on 3/9/2022 for investigation of anemia and thrombocytosis found while treating congestive heart failure and right lung tumor.  She had symptoms of severe fatigue and difficulty functioning in her daily activities due to weakness.  The patient had iron labs drawn with showing iron saturation of 7%, iron of 13.  The patient was given a unit of PRBCs due to symptoms, and felt better after transfusion when she was seen in the office on 3/16/2022.  The patient took oral iron with multiple side effects and no clear improvement.  The patient was to begin IV iron today. She is noted to have a Hgb of 9.0 and an elevated troponin and proBNP. Oncology will continue to monitor with cardiology until stable, then IV iron will be administered.        She  has a past medical history of A-fib (HCC), Anemia, Arthritis, Asthma, CHF (congestive heart failure) (HCC), and Hypertension.     PCP: Etelvina Ulloa MD    History of present illness was reviewed and is unchanged from the previous visit. 22    Subjective     ROS:  Review of Systems   Constitutional:  "Positive for fatigue. Negative for chills and fever.   HENT: Negative.    Eyes: Negative for visual disturbance.   Respiratory: Positive for cough and shortness of breath.    Cardiovascular: Positive for leg swelling. Negative for chest pain and palpitations.   Gastrointestinal: Negative for abdominal pain, constipation and diarrhea.   Genitourinary: Negative for dysuria and hematuria.   Musculoskeletal: Negative for neck stiffness.   Skin: Negative for rash and wound.   Neurological: Negative for dizziness.   Hematological: Does not bruise/bleed easily.   Psychiatric/Behavioral: Negative for agitation and confusion.        MEDICATIONS:    Scheduled Meds:  dilTIAZem CD, 180 mg, Oral, Nightly  sodium chloride, 10 mL, Intravenous, Q12H  triamcinolone, 1 application, Topical, Q12H       Continuous Infusions:      PRN Meds:  •  acetaminophen **OR** acetaminophen **OR** acetaminophen  •  ondansetron **OR** ondansetron  •  [COMPLETED] Insert peripheral IV **AND** sodium chloride  •  sodium chloride     ALLERGIES:    Allergies   Allergen Reactions   • Ciprofloxacin Unknown (See Comments)   • Sulfa Antibiotics Unknown (See Comments)   • Cyprodenate Unknown - High Severity   • Iodine Unknown - High Severity       Objective    VITALS:   /68 (BP Location: Right arm, Patient Position: Lying)   Pulse 92   Temp 97.8 °F (36.6 °C) (Oral)   Resp 20   Ht 154.9 cm (61\")   Wt 64 kg (141 lb)   SpO2 96%   BMI 26.64 kg/m²     PHYSICAL EXAM: (performed by MD)  Physical Exam  Vitals and nursing note reviewed.   Constitutional:       General: She is not in acute distress.  HENT:      Head: Normocephalic.      Mouth/Throat:      Pharynx: Oropharynx is clear.   Eyes:      Pupils: Pupils are equal, round, and reactive to light.   Cardiovascular:      Rate and Rhythm: Normal rate and regular rhythm.      Pulses: Normal pulses.      Heart sounds: Normal heart sounds.   Pulmonary:      Effort: Pulmonary effort is normal.      " Comments: SOB with talking  Abdominal:      General: Bowel sounds are normal.      Palpations: Abdomen is soft.   Musculoskeletal:         General: Normal range of motion.      Cervical back: Normal range of motion.      Right lower leg: Edema present.      Left lower leg: Edema present.   Skin:     General: Skin is warm and dry.      Findings: No rash.      Comments: Bilateral legs below knees, dark skin   Neurological:      Mental Status: She is alert and oriented to person, place, and time.   Psychiatric:         Mood and Affect: Mood normal.         Thought Content: Thought content normal.           RECENT LABS:  Lab Results (last 24 hours)     Procedure Component Value Units Date/Time    Troponin [[id number]]  (Normal) Collected: 03/21/22 1353    Specimen: Blood Updated: 03/21/22 1513     Troponin T 0.029 ng/mL     Narrative:      Troponin T Reference Range:  <= 0.03 ng/mL-   Negative for AMI  >0.03 ng/mL-     Abnormal for myocardial necrosis.  Clinicians would have to utilize clinical acumen, EKG, Troponin and serial changes to determine if it is an Acute Myocardial Infarction or myocardial injury due to an underlying chronic condition.       Results may be falsely decreased if patient taking Biotin.      COVID-19,CEPHEID/GINO,COR/TURNER/PAD/VIVIAN IN-HOUSE(OR EMERGENT/ADD-ON),NP SWAB IN TRANSPORT MEDIA 3-4 HR TAT, RT-PCR - Swab, Nasopharynx [[id number]]  (Normal) Collected: 03/21/22 0836    Specimen: Swab from Nasopharynx Updated: 03/21/22 0910     COVID19 Not Detected    Narrative:      Fact sheet for providers: https://www.fda.gov/media/229388/download     Fact sheet for patients: https://www.fda.gov/media/051058/download  Fact sheet for providers: https://www.fda.gov/media/413561/download    Fact sheet for patients: https://www.fda.gov/media/883005/download    Test performed by PCR.    Troponin [[id number]]  (Abnormal) Collected: 03/21/22 0828    Specimen: Blood from Arm, Left Updated: 03/21/22 0902     Troponin T  0.032 ng/mL     Narrative:      Troponin T Reference Range:  <= 0.03 ng/mL-   Negative for AMI  >0.03 ng/mL-     Abnormal for myocardial necrosis.  Clinicians would have to utilize clinical acumen, EKG, Troponin and serial changes to determine if it is an Acute Myocardial Infarction or myocardial injury due to an underlying chronic condition.       Results may be falsely decreased if patient taking Biotin.      Comprehensive Metabolic Panel [[id number]]  (Abnormal) Collected: 03/21/22 0828    Specimen: Blood from Arm, Left Updated: 03/21/22 0857     Glucose 106 mg/dL      BUN 16 mg/dL      Creatinine 0.92 mg/dL      Sodium 138 mmol/L      Potassium 4.3 mmol/L      Chloride 103 mmol/L      CO2 24.0 mmol/L      Calcium 9.5 mg/dL      Total Protein 7.0 g/dL      Albumin 2.70 g/dL      ALT (SGPT) 8 U/L      AST (SGOT) 11 U/L      Alkaline Phosphatase 106 U/L      Total Bilirubin 0.4 mg/dL      Globulin 4.3 gm/dL      A/G Ratio 0.6 g/dL      BUN/Creatinine Ratio 17.4     Anion Gap 11.0 mmol/L      eGFR 62.3 mL/min/1.73      Comment: National Kidney Foundation and American Society of Nephrology (ASN) Task Force recommended calculation based on the Chronic Kidney Disease Epidemiology Collaboration (CKD-EPI) equation refit without adjustment for race.       Narrative:      GFR Normal >60  Chronic Kidney Disease <60  Kidney Failure <15      Magnesium [[id number]]  (Normal) Collected: 03/21/22 0828    Specimen: Blood from Arm, Left Updated: 03/21/22 0857     Magnesium 1.7 mg/dL     BNP [[id number]]  (Abnormal) Collected: 03/21/22 0828    Specimen: Blood from Arm, Left Updated: 03/21/22 0854     proBNP 3,891.0 pg/mL     Narrative:      Among patients with dyspnea, NT-proBNP is highly sensitive for the detection of acute congestive heart failure. In addition NT-proBNP of <300 pg/ml effectively rules out acute congestive heart failure with 99% negative predictive value.    Results may be falsely decreased if patient taking  Biotin.      CBC & Differential [[id number]]  (Abnormal) Collected: 03/21/22 0828    Specimen: Blood Updated: 03/21/22 0839    Narrative:      The following orders were created for panel order CBC & Differential.  Procedure                               Abnormality         Status                     ---------                               -----------         ------                     CBC Auto Differential[[id number]]        Abnormal            Final result               Scan Slide[[id number]]                                                                    Please view results for these tests on the individual orders.    CBC Auto Differential [[id number]]  (Abnormal) Collected: 03/21/22 0828    Specimen: Blood Updated: 03/21/22 0839     WBC 12.70 10*3/mm3      RBC 3.78 10*6/mm3      Hemoglobin 9.0 g/dL      Hematocrit 27.5 %      MCV 72.6 fL      MCH 23.7 pg      MCHC 32.6 g/dL      RDW 22.5 %      RDW-SD 57.3 fl      MPV 6.3 fL      Platelets 535 10*3/mm3      Neutrophil % 81.2 %      Lymphocyte % 9.0 %      Monocyte % 4.5 %      Eosinophil % 3.9 %      Basophil % 1.4 %      Neutrophils, Absolute 10.30 10*3/mm3      Lymphocytes, Absolute 1.10 10*3/mm3      Monocytes, Absolute 0.60 10*3/mm3      Eosinophils, Absolute 0.50 10*3/mm3      Basophils, Absolute 0.20 10*3/mm3      nRBC 0.0 /100 WBC           PENDING RESULTS:     IMAGING REVIEWED:  XR Chest 1 View    Result Date: 3/21/2022   1. No acute airspace disease is seen. Small nodular densities seen within both lungs on prior chest x-ray and CT chest are inconspicuous on today's examination. 2. Trace bibasilar pleural effusions versus pleural parenchymal scarring.  Electronically Signed By-Loni Serrano MD On:3/21/2022 8:49 AM This report was finalized on [id number] by  Loni Serrano MD.      Assessment/Plan   ASSESSMENT:  This is a 81-year-old female presenting with shortness of breath with activity, laying flat, and weakness with labs revealing proBNP of 3871.  Cardiology is on board.  The patient was given 1 unit PRBCs last week due to symptomatic Hgb of 7.6 believed to be a combination of chronic disease state and iron deficiency.  After failure of oral iron by malabsorption and intolerance, the patient was scheduled for IV iron to start today. Oncology will continue to follow with cardiology and plan to give IV iron when stabilized.     Microcytic anemia: Hemoglobin 9.0  Associated with chronic disease, iron deficiency  Scheduled for IV iron replacement     Iron deficiency anemia: Oral iron intolerance, iron malabsorption  Only slight elevation in iron levels with oral iron     Gastrointestinal bleeding: Colonoscopy discussed at office visit, patient not interested in procedure     CHF: proBNP 3871  Cardiology on board     Other chronic conditions: A. fib, arthritis, asthma, HTN     PLAN  1. Continue to plan IV iron when stable  2. Continue to follow with cardiology        Electronically signed by ARIANA Durand, 03/22/22, 8:31 AM EDT.    Ms. Ruiz has had a good night. She is feeling better today than she did yesterday. She has had no bleeding and her respiration is less difficult. She has been free of chest pain. No cough and no expectoration. She has been without diarrhea. On exam she is alert and conversant. She does not seem in distress. She is not jaundiced. The precordium is not hyperdynamic like yesterday but she remains tachycardic. The lungs are clear and the abdomen is soft. The laboratory exams were reviewed. Not unexpectedly she has had a drop in the hemoglobin since the admission. She persists with moderate leukocytosis and thrombocytosis. I have postponed the administration of iron until she is ready for discharge. Discussed with her and with her son on the phone. Will continue to follow.     Frank Cheng MD on 3/22/2022 at 13:12

## 2022-03-22 NOTE — PLAN OF CARE
Goal Outcome Evaluation:           Progress: improving  Outcome Evaluation: New admission. Pt complains of shortness of air with exertion and talking. Hem/Onc Dr. Cheng consult, Cardiology Dr. Dumont consult. continue to monitor

## 2022-03-22 NOTE — PLAN OF CARE
Problem: Adult Inpatient Plan of Care  Goal: Plan of Care Review  Outcome: Ongoing, Progressing  Goal: Patient-Specific Goal (Individualized)  Outcome: Ongoing, Progressing  Goal: Absence of Hospital-Acquired Illness or Injury  Outcome: Ongoing, Progressing  Intervention: Identify and Manage Fall Risk  Recent Flowsheet Documentation  Taken 3/22/2022 0145 by Bailey Gr RN  Safety Promotion/Fall Prevention: safety round/check completed  Taken 3/21/2022 2345 by Bailey Gr RN  Safety Promotion/Fall Prevention: safety round/check completed  Taken 3/21/2022 2145 by Bailey Gr RN  Safety Promotion/Fall Prevention: safety round/check completed  Taken 3/21/2022 1945 by Bailey Gr RN  Safety Promotion/Fall Prevention: safety round/check completed  Intervention: Prevent and Manage VTE (Venous Thromboembolism) Risk  Recent Flowsheet Documentation  Taken 3/21/2022 1945 by Bailey Gr RN  VTE Prevention/Management:   bilateral   dorsiflexion/plantar flexion performed  Goal: Optimal Comfort and Wellbeing  Outcome: Ongoing, Progressing  Intervention: Provide Person-Centered Care  Recent Flowsheet Documentation  Taken 3/21/2022 1945 by Bailey Gr RN  Trust Relationship/Rapport:   care explained   questions answered   questions encouraged   thoughts/feelings acknowledged  Goal: Readiness for Transition of Care  Outcome: Ongoing, Progressing   Goal Outcome Evaluation:    Patient resting in bed at this time.  No complaints of pain this shift.  Dyspnea on exertion.  Vitals stable and on room air.  Safety precautions are in use.  Will continue to monitor.

## 2022-03-22 NOTE — PROGRESS NOTES
[facility] Daily Progress Note        LOS 0 days      Patient Care Team:  Etelvina Ulloa MD as PCP - General  Etelvina Ulloa MD as PCP - Family Medicine  Mukund Farias MD as Consulting Physician (Pulmonary Disease)  Alan Wynn DPM as Consulting Physician (Podiatry)  Demetris Farrell MD as Surgeon (General Surgery)  Etelvina Mcconnell MD as Surgeon (Thoracic Surgery)  Macho Dumont MD as Consulting Physician (Cardiology)    Patient Location: 109/1      Subjective   Subjective     Chief Complaint / Subjective  Chief Complaint   Patient presents with   • Shortness of Breath         Brief Synopsis of Hospital Course/HPI  81-year-old female presents to the ER with a chief complaint of shortness of breath which is worsened significantly over the last several days.  The patient has lower extremity edema and has been on diuretic therapy.  The patient has a history of recent severe anemia requiring blood transfusion thought to be secondary to blood loss iron deficiency anemia.  The patient had recent identification of pulmonary nodule suspicious for cancer after being diagnosed with pneumonia in January 2022.  The patient had planned video-assisted thoracotomy wedge resection mid February which was not performed secondary to patient condition.  After this, a plan was arranged to have radiation therapy with Dr. Whitten.  The patient has a history of chronic atrial fibrillation and is on anticoagulation therapy.  The patient was seen recently by Dr. Dumont with discussion of watchman procedure and discontinuation of anticoagulation.  At that time the patient wanted to think about the procedure and continue with current A. fib treatment.     Review of records:  Echocardiogram dated January 2022 showing moderate pulmonic valve regurgitation, ejection fraction 61 to 65%, grade 2 with high LAP diastolic dysfunction; Stress Cardiolite 1/28/2022 patient was switched from amlodipine to Cardizem for  "rhythm control in addition to metoprolol 25 mg daily patient on Eliquis anemia requiring blood transfusion.       Date:: 3/22/2022: Confirmed with the patient at bedside that she is no longer on anticoagulation secondary to her anemia.  The patient diuresed 5400 cc from the 80 of Lasix given in ER yesterday evening.  Will not give any additional Lasix at this time but will continue 40 mg Lasix daily.  Dr. Dumont has added spironolactone 25 mg daily.  He patient had been told to take Lasix if she had a 2 pound weight gain in a day. The patient's hemoglobin remained stable at 8.7 and 9.0 which is near baseline over the last 3 months.  The patient needs iron transfusion to help facilitate RBC production as she has known iron deficiency her recent lab work.  The patient was discussed with Dr. Cheng who recommended proceeding with iron infusion. The patient is worried about receiving iron infusion because her  had trouble when he received iron infusion and wants to wait until tomorrow when a provider will be present while she receives the infusion. I spoke with cancer care center and the patient is scheduled for radiation therapy tomorrow at 4:00 pm.         Review of Systems   Cardiovascular: Positive for dyspnea on exertion and leg swelling. Negative for chest pain.   All other systems reviewed and are negative.        Objective   Objective      Vital Signs  Temp:  [97.8 °F (36.6 °C)-99 °F (37.2 °C)] 98.1 °F (36.7 °C)  Heart Rate:  [] 103  Resp:  [18-20] 18  BP: (112-160)/(60-78) 112/60  Oxygen Therapy  SpO2: 95 %  Pulse Oximetry Type: Intermittent  Device (Oxygen Therapy): room air  Flow (L/min): 2  Flowsheet Rows    Flowsheet Row First Filed Value   Admission Height 154.9 cm (61\") Documented at 03/21/2022 0807   Admission Weight 65.8 kg (145 lb) Documented at 03/21/2022 0807        Intake & Output (last 3 days)       03/19 0701 03/20 0700 03/20 0701 03/21 0700 03/21 0701  03/22 0700 03/22 0701 03/23 " 0700    P.O.   16 666    I.V. (mL/kg)   8 (0.1)     Total Intake(mL/kg)   24 (0.4) 666 (10.4)    Urine (mL/kg/hr)   5400 600 (1.2)    Total Output   5400 600    Net   -[phone] +66                Lines, Drains & Airways     Active LDAs     Name Placement date Placement time Site Days    Peripheral IV 03/21/22 0825 Left Antecubital 03/21/22  0825  Antecubital  1                  Physical Exam:    Physical Exam  Vitals and nursing note reviewed.   Constitutional:       Appearance: Normal appearance.   HENT:      Head: Normocephalic and atraumatic.      Right Ear: External ear normal.      Left Ear: External ear normal.      Nose: Nose normal.      Mouth/Throat:      Mouth: Mucous membranes are moist.   Eyes:      General: No scleral icterus.        Right eye: No discharge.         Left eye: No discharge.      Extraocular Movements: Extraocular movements intact.      Conjunctiva/sclera: Conjunctivae normal.      Pupils: Pupils are equal, round, and reactive to light.   Cardiovascular:      Rate and Rhythm: Normal rate. Rhythm irregular.      Pulses: Normal pulses.      Heart sounds: Murmur heard.   Pulmonary:      Effort: Pulmonary effort is normal.      Breath sounds: Normal breath sounds.   Abdominal:      General: Bowel sounds are normal.      Palpations: Abdomen is soft.   Musculoskeletal:         General: No swelling. Normal range of motion.      Cervical back: Normal range of motion and neck supple.      Right lower leg: Edema present.      Left lower leg: Edema present.      Comments: Lower extremity edema improving   Skin:     General: Skin is warm and dry.      Capillary Refill: Capillary refill takes less than 2 seconds.   Neurological:      General: No focal deficit present.      Mental Status: She is alert and oriented to person, place, and time.   Psychiatric:         Mood and Affect: Mood normal.         Behavior: Behavior normal.         Thought Content: Thought content normal.         Judgment: Judgment  normal.               Procedures:              Results Review:     I reviewed the patient's new clinical results.      Lab Results (last 24 hours)     Procedure Component Value Units Date/Time    Basic Metabolic Panel [[id number]]  (Abnormal) Collected: 03/22/22 0854    Specimen: Blood Updated: 03/22/22 0943     Glucose 106 mg/dL      BUN 16 mg/dL      Creatinine 0.99 mg/dL      Sodium 138 mmol/L      Potassium 4.4 mmol/L      Chloride 103 mmol/L      CO2 25.0 mmol/L      Calcium 8.7 mg/dL      BUN/Creatinine Ratio 16.2     Anion Gap 10.0 mmol/L      eGFR 57.0 mL/min/1.73      Comment: National Kidney Foundation and American Society of Nephrology (ASN) Task Force recommended calculation based on the Chronic Kidney Disease Epidemiology Collaboration (CKD-EPI) equation refit without adjustment for race.       Narrative:      GFR Normal >60  Chronic Kidney Disease <60  Kidney Failure <15      CBC & Differential [[id number]]  (Abnormal) Collected: 03/22/22 0854    Specimen: Blood Updated: 03/22/22 0920    Narrative:      The following orders were created for panel order CBC & Differential.  Procedure                               Abnormality         Status                     ---------                               -----------         ------                     CBC Auto Differential[[id number]]        Abnormal            Final result               Scan Slide[[id number]]                                       Final result                 Please view results for these tests on the individual orders.    Scan Slide [[id number]] Collected: 03/22/22 0854    Specimen: Blood Updated: 03/22/22 0920     RBC Morphology Normal     WBC Morphology Normal     Platelet Estimate Increased    Narrative:      Slide Reviewed    CBC Auto Differential [[id number]]  (Abnormal) Collected: 03/22/22 0854    Specimen: Blood Updated: 03/22/22 0919     WBC 14.00 10*3/mm3      RBC 3.76 10*6/mm3      Hemoglobin 8.7 g/dL      Hematocrit 27.2 %      MCV 72.3  fL      MCH 23.2 pg      MCHC 32.1 g/dL      RDW 22.5 %      RDW-SD 56.9 fl      MPV 6.4 fL      Platelets 506 10*3/mm3      Neutrophil % 81.6 %      Lymphocyte % 9.3 %      Monocyte % 5.7 %      Eosinophil % 2.9 %      Basophil % 0.5 %      Neutrophils, Absolute 11.40 10*3/mm3      Lymphocytes, Absolute 1.30 10*3/mm3      Monocytes, Absolute 0.80 10*3/mm3      Eosinophils, Absolute 0.40 10*3/mm3      Basophils, Absolute 0.10 10*3/mm3      nRBC 0.0 /100 WBC     Troponin [[id number]]  (Normal) Collected: 03/21/22 1353    Specimen: Blood Updated: 03/21/22 1513     Troponin T 0.029 ng/mL     Narrative:      Troponin T Reference Range:  <= 0.03 ng/mL-   Negative for AMI  >0.03 ng/mL-     Abnormal for myocardial necrosis.  Clinicians would have to utilize clinical acumen, EKG, Troponin and serial changes to determine if it is an Acute Myocardial Infarction or myocardial injury due to an underlying chronic condition.       Results may be falsely decreased if patient taking Biotin.          No results found for: HGBA1C            No results found for: LIPASE  Lab Results   Component Value Date    CHOL 128 01/25/2022    TRIG 87 01/25/2022    HDL 35 (L) 01/25/2022    LDL 76 01/25/2022       Lab Results   Lab Value Date/Time    INTRAOP  11/05/2021 1103     TP DX#1: Rare atypical cells.    TP DX#2: Blood and rare atypical cells.     Alfonzo Simmons MD      FINALDX  01/31/2022 0245     Leukocytosis with left shifted granulocytes  Anemia  Thrombocytosis  No blasts identified      FINALDX  11/05/2021 1103     Right lung, core biopsy:    Benign lung parenchyma with chronic inflammation and collagen fibrosis    No malignancy identified    See comment    JUANITA/tkd       COMDX  11/05/2021 1103     Clinical correlation is recommended to ensure the biopsies are entirely representative of the clinically targeted lesion.    JUANITA/tkd       COMDX  08/12/2020 1323     Multiple deeper levels have been examined.           Microbiology Results (last  10 days)     Procedure Component Value - Date/Time    COVID-19,CEPHEID/GINO,COR/TURNER/PAD/VIVIAN IN-HOUSE(OR EMERGENT/ADD-ON),NP SWAB IN TRANSPORT MEDIA 3-4 HR TAT, RT-PCR - Swab, Nasopharynx [[id number]]  (Normal) Collected: 03/21/22 0836    Lab Status: Final result Specimen: Swab from Nasopharynx Updated: 03/21/22 0910     COVID19 Not Detected    Narrative:      Fact sheet for providers: https://www.fda.gov/media/680453/download     Fact sheet for patients: https://www.fda.gov/media/377533/download  Fact sheet for providers: https://www.fda.gov/media/110852/download    Fact sheet for patients: https://www.fda.gov/media/197893/download    Test performed by PCR.          ECG/EMG Results (most recent)     Procedure Component Value Units Date/Time    ECG 12 Lead [[id number]] Collected: 03/21/22 0821     Updated: 03/21/22 0822     QT Interval 357 ms     Narrative:      HEART RATE= 101  bpm  RR Interval= 592  ms  TX Interval= 182  ms  P Horizontal Axis=   deg  P Front Axis= 68  deg  QRSD Interval= 92  ms  QT Interval= 357  ms  QRS Axis= 67  deg  T Wave Axis= 17  deg  - OTHERWISE NORMAL ECG -  Sinus tachycardia  Electronically Signed By:   Date and Time of Study: 2022-03-21 08:21:15    SCANNED - TELEMETRY   [[id number]] Resulted: 03/21/22     Updated: 03/22/22 0959    SCANNED - TELEMETRY   [[id number]] Resulted: 03/21/22     Updated: 03/22/22 1419          Results for orders placed during the hospital encounter of 01/24/22    Duplex Venous Lower Extremity - Bilateral CAR    Interpretation Summary  · Chronic right lower extremity superficial thrombophlebitis noted in the small saphenous.  · Chronic left lower extremity superficial thrombophlebitis noted in the great saphenous (above knee) and small saphenous.  · All other veins appeared normal bilaterally.      Results for orders placed during the hospital encounter of 01/24/22    Adult Transthoracic Echo Complete W/ Cont if Necessary Per Protocol    Interpretation Summary  ·  Estimated right ventricular systolic pressure from tricuspid regurgitation is normal (<35 mmHg).  · Moderate pulmonic valve regurgitation is present.  · Left ventricular ejection fraction appears to be 61 - 65%.  · Left ventricular diastolic function is consistent with (grade II w/high LAP) pseudonormalization.      XR Chest 1 View    Result Date: 3/21/2022   1. No acute airspace disease is seen. Small nodular densities seen within both lungs on prior chest x-ray and CT chest are inconspicuous on today's examination. 2. Trace bibasilar pleural effusions versus pleural parenchymal scarring.  Electronically Signed By-Loni Serrano MD On:3/21/2022 8:49 AM This report was finalized on [id number] by  Loni Serrano MD.          Xrays, labs reviewed personally by physician.    Medication Review:   I have reviewed the patient's current medication list      Scheduled Meds  budesonide-formoterol, 2 puff, Inhalation, BID - RT  dilTIAZem CD, 180 mg, Oral, Nightly  ferric gluconate, 125 mg, Intravenous, Once  furosemide, 40 mg, Oral, Daily  sodium chloride, 10 mL, Intravenous, Q12H  spironolactone, 25 mg, Oral, Daily  triamcinolone, 1 application, Topical, Q12H        Meds Infusions       Meds PRN  •  acetaminophen **OR** acetaminophen **OR** acetaminophen  •  furosemide  •  ondansetron **OR** ondansetron  •  [COMPLETED] Insert peripheral IV **AND** sodium chloride  •  sodium chloride        Assessment/Plan   Assessment/Plan     Active Hospital Problems    Diagnosis  POA   • Dyspnea [R06.00]  Yes      Resolved Hospital Problems   No resolved problems to display.       MEDICAL DECISION MAKING COMPLEXITY BY PROBLEM:   Dyspnea on exertion, improving--likely multifactorial with fluid volume overload and pulmonary edema secondary to diastolic dysfunction and anemia: Oxygen support as needed     HFpEF with known grade 2 diastolic dysfunction: Continue diuresis; continue p.o. Lasix; spironolactone added by cardiology  -Patient  diuresed 5400 cc net loss overnight  -Patient received IV  -Minimally elevated troponin at 0.032 which is likely secondary to CHF with cardiac stretch     Anemia, chronic likely secondary to blood loss with Xarelto therapy for atrial fibrillation: Repeat CBC in a.m.; add IV iron supplementation x1; hematology consult     Pulmonary nodule concerning for CA: Hematology consult  -2/11/2022 patient was scheduled for thoracoscopy video-assisted wedge resection was postponed secondary to patient condition.   -Patient is scheduled for Dr. Whitten for stereotactic radiation; patient scheduled for radiation therapy 4:00 PM tomorrow 3/23/2022     Atrial fibrillation, chronic with anticoagulation therapy: Continue Xarelto; continue Cardizem  -Dr. Dumont has discussed possible watchman procedure which patient declined at that time     COPD, chronic: Continue Breo Ellipta with formulary substitution         VTE Prophylaxis -   Mechanical Order History:     None      Pharmalogical Order History:      Ordered     Dose Route Frequency Stop    03/21/22 1324  enoxaparin (LOVENOX) syringe 40 mg  Status:  Discontinued         40 mg SC Every 24 Hours 03/21/22 1324                  Code Status -   Code Status and Medical Interventions:   Ordered at: 03/21/22 1324     Code Status (Patient has no pulse and is not breathing):    CPR (Attempt to Resuscitate)     Medical Interventions (Patient has pulse or is breathing):    Full Support       This patient has been examined wearing appropriate Personal Protective Equipment. 03/22/22        Discharge Planning  The patient will receive her first iron tomorrow a.m. as well as repeat cbc and bmp. If hemoglobin  remains stable with hemoglobin near 8.5 to 9 which is near baseline over the last few months, patient can be discharged to follow up with radiation oncology at 4:00. She will need Rx for spironolactone daily and lasix daily per recommendation from cardiology.. Future iron infusions have been  arranged at [facility] by Dr. Landeros.         Electronically signed by ARIANA Vargas, 03/22/22, 14:47 EDT.  [facility] Hospitalist Team

## 2022-03-22 NOTE — PLAN OF CARE
Goal Outcome Evaluation:  Plan of Care Reviewed With: patient        Progress: improving  Outcome Evaluation: Patient is feeling much better today. Still having some shortness of air with exertion. Patient is wanting to go home. She will have an iron infusion in the am and then would be ready for discharge. continue to monitor.

## 2022-03-23 ENCOUNTER — DOCUMENTATION (OUTPATIENT)
Dept: ONCOLOGY | Facility: CLINIC | Age: 82
End: 2022-03-23

## 2022-03-23 ENCOUNTER — HOSPITAL ENCOUNTER (OUTPATIENT)
Dept: RADIATION ONCOLOGY | Facility: HOSPITAL | Age: 82
Discharge: HOME | End: 2022-03-23

## 2022-03-23 ENCOUNTER — READMISSION MANAGEMENT (OUTPATIENT)
Dept: CALL CENTER | Facility: HOSPITAL | Age: 82
End: 2022-03-23

## 2022-03-23 VITALS
WEIGHT: 141 LBS | OXYGEN SATURATION: 95 % | HEART RATE: 89 BPM | SYSTOLIC BLOOD PRESSURE: 131 MMHG | BODY MASS INDEX: 26.62 KG/M2 | DIASTOLIC BLOOD PRESSURE: 70 MMHG | TEMPERATURE: 97.5 F | RESPIRATION RATE: 18 BRPM | HEIGHT: 61 IN

## 2022-03-23 LAB
ANION GAP SERPL CALCULATED.3IONS-SCNC: 10 MMOL/L (ref 5–15)
BUN SERPL-MCNC: 18 MG/DL (ref 8–23)
BUN/CREAT SERPL: 16.2 (ref 7–25)
CALCIUM SPEC-SCNC: 8.6 MG/DL (ref 8.6–10.5)
CHLORIDE SERPL-SCNC: 101 MMOL/L (ref 98–107)
CO2 SERPL-SCNC: 24 MMOL/L (ref 22–29)
CREAT SERPL-MCNC: 1.11 MG/DL (ref 0.57–1)
DEPRECATED RDW RBC AUTO: 59.1 FL (ref 37–54)
EGFRCR SERPLBLD CKD-EPI 2021: 49.7 ML/MIN/1.73
ERYTHROCYTE [DISTWIDTH] IN BLOOD BY AUTOMATED COUNT: 23.1 % (ref 12.3–15.4)
GLUCOSE SERPL-MCNC: 155 MG/DL (ref 65–99)
HCT VFR BLD AUTO: 26.3 % (ref 34–46.6)
HGB BLD-MCNC: 8.5 G/DL (ref 12–15.9)
MAGNESIUM SERPL-MCNC: 2 MG/DL (ref 1.6–2.4)
MCH RBC QN AUTO: 23.5 PG (ref 26.6–33)
MCHC RBC AUTO-ENTMCNC: 32.3 G/DL (ref 31.5–35.7)
MCV RBC AUTO: 72.8 FL (ref 79–97)
PLATELET # BLD AUTO: 558 10*3/MM3 (ref 140–450)
PMV BLD AUTO: 6.6 FL (ref 6–12)
POTASSIUM SERPL-SCNC: 5.3 MMOL/L (ref 3.5–5.2)
RBC # BLD AUTO: 3.61 10*6/MM3 (ref 3.77–5.28)
SODIUM SERPL-SCNC: 135 MMOL/L (ref 136–145)
WBC NRBC COR # BLD: 15.5 10*3/MM3 (ref 3.4–10.8)

## 2022-03-23 PROCEDURE — 80048 BASIC METABOLIC PNL TOTAL CA: CPT | Performed by: INTERNAL MEDICINE

## 2022-03-23 PROCEDURE — 96365 THER/PROPH/DIAG IV INF INIT: CPT

## 2022-03-23 PROCEDURE — 93005 ELECTROCARDIOGRAM TRACING: CPT | Performed by: INTERNAL MEDICINE

## 2022-03-23 PROCEDURE — 85027 COMPLETE CBC AUTOMATED: CPT | Performed by: INTERNAL MEDICINE

## 2022-03-23 PROCEDURE — 77014 CHG CT GUIDANCE RADIATION THERAPY FLDS PLACEMENT: CPT | Performed by: RADIOLOGY

## 2022-03-23 PROCEDURE — 83735 ASSAY OF MAGNESIUM: CPT | Performed by: INTERNAL MEDICINE

## 2022-03-23 PROCEDURE — 99214 OFFICE O/P EST MOD 30 MIN: CPT | Performed by: INTERNAL MEDICINE

## 2022-03-23 PROCEDURE — 93010 ELECTROCARDIOGRAM REPORT: CPT | Performed by: INTERNAL MEDICINE

## 2022-03-23 PROCEDURE — 25010000002 NA FERRIC GLUC CPLX PER 12.5 MG: Performed by: INTERNAL MEDICINE

## 2022-03-23 PROCEDURE — 77427 RADIATION TX MANAGEMENT X5: CPT | Performed by: RADIOLOGY

## 2022-03-23 PROCEDURE — 77386: CPT | Performed by: RADIOLOGY

## 2022-03-23 PROCEDURE — G0378 HOSPITAL OBSERVATION PER HR: HCPCS

## 2022-03-23 RX ORDER — METOPROLOL SUCCINATE 25 MG/1
25 TABLET, EXTENDED RELEASE ORAL
Qty: 30 TABLET | Refills: 0 | Status: SHIPPED | OUTPATIENT
Start: 2022-03-23 | End: 2022-04-21 | Stop reason: DRUGHIGH

## 2022-03-23 RX ORDER — SPIRONOLACTONE 25 MG/1
25 TABLET ORAL DAILY
Qty: 30 TABLET | Refills: 0 | Status: SHIPPED | OUTPATIENT
Start: 2022-03-23 | End: 2022-04-01 | Stop reason: SDUPTHER

## 2022-03-23 RX ORDER — METOPROLOL SUCCINATE 25 MG/1
25 TABLET, EXTENDED RELEASE ORAL
Status: DISCONTINUED | OUTPATIENT
Start: 2022-03-23 | End: 2022-03-23 | Stop reason: HOSPADM

## 2022-03-23 RX ADMIN — SODIUM CHLORIDE 125 MG: 9 INJECTION, SOLUTION INTRAVENOUS at 06:19

## 2022-03-23 NOTE — PROGRESS NOTES
Referring Provider: Camden Zhou MD    Reason for follow-up:  Palpitations  Shortness of breath  Diastolic congestive heart failure     Patient Care Team:  Etelvina Ulloa MD as PCP - General  Etelvina Ulloa MD as PCP - Family Medicine  Mukund Farias MD as Consulting Physician (Pulmonary Disease)  Alan Wynn DPM as Consulting Physician (Podiatry)  Demetris Farrell MD as Surgeon (General Surgery)  Etelvina Mcconnell MD as Surgeon (Thoracic Surgery)  Macho Dumont MD as Consulting Physician (Cardiology)    Subjective .  Feeling better     ROS    Since I have last seen, the patient has been without any chest discomfort ,shortness of breath, palpitations, dizziness or syncope.  Denies having any headache ,abdominal pain ,nausea, vomiting , diarrhea constipation, loss of weight or loss of appetite.  Denies having any excessive bruising ,hematuria or blood in the stool.    Review of all systems negative except as indicated    History  Past Medical History:   Diagnosis Date   • A-fib (HCC)    • Anemia    • Arthritis    • Asthma    • CHF (congestive heart failure) (HCC)    • Hypertension        Past Surgical History:   Procedure Laterality Date   • CATARACT EXTRACTION     • CHOLECYSTECTOMY     • HYSTERECTOMY  1987    Endometriosis   • OOPHORECTOMY     • TONSILLECTOMY         Family History   Problem Relation Age of Onset   • Breast cancer Mother 85   • Stroke Mother    • Heart disease Mother    • Endometrial cancer Mother 70   • Heart failure Father    • Heart failure Sister    • COPD Sister    • Heart disease Sister        Social History     Tobacco Use   • Smoking status: Never Smoker   • Smokeless tobacco: Never Used   Vaping Use   • Vaping Use: Never used   Substance Use Topics   • Alcohol use: No   • Drug use: Never        Medications Prior to Admission   Medication Sig Dispense Refill Last Dose   • acetaminophen (TYLENOL) 500 MG tablet Take 500 mg by mouth Every 6 (Six) Hours As Needed  for Mild Pain .   3/20/2022 at Unknown time   • Breo Ellipta 200-25 MCG/INH inhaler Inhale 1 puff Daily. 1 each 2 3/20/2022 at Unknown time   • Cholecalciferol (VITAMIN D3) 2000 units capsule Take 2,000 Units by mouth Daily.   3/20/2022 at Unknown time   • Cyanocobalamin (VITAMIN B12) 1000 MCG tablet controlled-release Take 2,500 mcg by mouth Daily.   3/20/2022 at Unknown time   • dilTIAZem CD (CARDIZEM CD) 180 MG 24 hr capsule Take 1 capsule by mouth Daily. (Patient taking differently: Take 180 mg by mouth every night at bedtime.) 90 capsule 1 3/20/2022 at Unknown time   • furosemide (LASIX) 40 MG tablet Take 1 tablet by mouth Daily As Needed (edema). 30 tablet 0 Past Week at Unknown time   • Homeopathic Products (LEG CRAMP RELIEF) tablet LEG CRAMP RELIEF TABS   Past Month at Unknown time   • triamcinolone (KENALOG) 0.1 % cream Apply  topically to the appropriate area as directed 2 (Two) Times a Day. 80 g 2 3/20/2022 at Unknown time   • Premarin 0.625 MG/GM vaginal cream Insert  into the vagina 2 (Two) Times a Week. (Patient taking differently: Insert  into the vagina As Needed.) 30 g 3 More than a month at Unknown time       Allergies  Ciprofloxacin, Sulfa antibiotics, Cyprodenate, and Iodine    Scheduled Meds:budesonide-formoterol, 2 puff, Inhalation, BID - RT  dilTIAZem CD, 180 mg, Oral, Nightly  ferric gluconate, 125 mg, Intravenous, Once  furosemide, 40 mg, Oral, Daily  sodium chloride, 10 mL, Intravenous, Q12H  spironolactone, 25 mg, Oral, Daily  triamcinolone, 1 application, Topical, Q12H      Continuous Infusions:   PRN Meds:.•  acetaminophen **OR** acetaminophen **OR** acetaminophen  •  furosemide  •  ondansetron **OR** ondansetron  •  [COMPLETED] Insert peripheral IV **AND** sodium chloride  •  sodium chloride    Objective     VITAL SIGNS  Vitals:    03/22/22 1401 03/22/22 1757 03/22/22 2221 03/23/22 0618   BP: 112/60 144/77 124/69 131/70   BP Location: Right arm Right arm Right arm Right arm   Patient  "Position: Lying Lying Lying Sitting   Pulse: 103 87 88 89   Resp: 18 18 18 18   Temp: 98.1 °F (36.7 °C) 98.1 °F (36.7 °C) 98.7 °F (37.1 °C) 97.5 °F (36.4 °C)   TempSrc: Oral Oral Oral Oral   SpO2: 95% 97% 95% 95%   Weight:       Height:           Flowsheet Rows    Flowsheet Row First Filed Value   Admission Height 154.9 cm (61\") Documented at 03/21/2022 0807   Admission Weight 65.8 kg (145 lb) Documented at 03/21/2022 0807            Intake/Output Summary (Last 24 hours) at 3/23/2022 0646  Last data filed at 3/22/2022 1900  Gross per 24 hour   Intake 902 ml   Output 1250 ml   Net -348 ml        TELEMETRY: Sinus rhythm    Physical Exam:  The patient is alert, oriented and in no distress.  Vital signs as noted above.  Head and neck revealed no carotid bruits or jugular venous distention.  No thyromegaly or lymphadenopathy is present  Lungs clear.  No wheezing.  Breath sounds are normal bilaterally.  Heart normal first and second heart sounds.  No murmur. No precordial rub is present.  No gallop is present.  Abdomen soft and nontender.  No organomegaly is present.  Extremities with good peripheral pulses without any pedal edema.  Skin warm and dry.  Musculoskeletal system is grossly normal  CNS grossly normal      Results Review:   I reviewed the patient's new clinical results.  Lab Results (last 24 hours)     Procedure Component Value Units Date/Time    Basic Metabolic Panel [[id number]]  (Abnormal) Collected: 03/22/22 0854    Specimen: Blood Updated: 03/22/22 0943     Glucose 106 mg/dL      BUN 16 mg/dL      Creatinine 0.99 mg/dL      Sodium 138 mmol/L      Potassium 4.4 mmol/L      Chloride 103 mmol/L      CO2 25.0 mmol/L      Calcium 8.7 mg/dL      BUN/Creatinine Ratio 16.2     Anion Gap 10.0 mmol/L      eGFR 57.0 mL/min/1.73      Comment: National Kidney Foundation and American Society of Nephrology (ASN) Task Force recommended calculation based on the Chronic Kidney Disease Epidemiology Collaboration (CKD-EPI) " equation refit without adjustment for race.       Narrative:      GFR Normal >60  Chronic Kidney Disease <60  Kidney Failure <15      CBC & Differential [[id number]]  (Abnormal) Collected: 03/22/22 0854    Specimen: Blood Updated: 03/22/22 0920    Narrative:      The following orders were created for panel order CBC & Differential.  Procedure                               Abnormality         Status                     ---------                               -----------         ------                     CBC Auto Differential[[id number]]        Abnormal            Final result               Scan Slide[[id number]]                                       Final result                 Please view results for these tests on the individual orders.    Scan Slide [[id number]] Collected: 03/22/22 0854    Specimen: Blood Updated: 03/22/22 0920     RBC Morphology Normal     WBC Morphology Normal     Platelet Estimate Increased    Narrative:      Slide Reviewed    CBC Auto Differential [[id number]]  (Abnormal) Collected: 03/22/22 0854    Specimen: Blood Updated: 03/22/22 0919     WBC 14.00 10*3/mm3      RBC 3.76 10*6/mm3      Hemoglobin 8.7 g/dL      Hematocrit 27.2 %      MCV 72.3 fL      MCH 23.2 pg      MCHC 32.1 g/dL      RDW 22.5 %      RDW-SD 56.9 fl      MPV 6.4 fL      Platelets 506 10*3/mm3      Neutrophil % 81.6 %      Lymphocyte % 9.3 %      Monocyte % 5.7 %      Eosinophil % 2.9 %      Basophil % 0.5 %      Neutrophils, Absolute 11.40 10*3/mm3      Lymphocytes, Absolute 1.30 10*3/mm3      Monocytes, Absolute 0.80 10*3/mm3      Eosinophils, Absolute 0.40 10*3/mm3      Basophils, Absolute 0.10 10*3/mm3      nRBC 0.0 /100 WBC           Imaging Results (Last 24 Hours)     ** No results found for the last 24 hours. **      LAB RESULTS (LAST 7 DAYS)    CBC  Results from last 7 days   Lab Units 03/22/22  0854 03/21/22  0828 03/16/22  1038   WBC 10*3/mm3 14.00* 12.70* 14.01*   RBC 10*6/mm3 3.76* 3.78 3.89   HEMOGLOBIN g/dL 8.7*  9.0* 9.0*   HEMATOCRIT % 27.2* 27.5* 30.0*   MCV fL 72.3* 72.6* 77.1*   PLATELETS 10*3/mm3 506* 535* 584*       BMP  Results from last 7 days   Lab Units 03/22/22  0854 03/21/22  0828   SODIUM mmol/L 138 138   POTASSIUM mmol/L 4.4 4.3   CHLORIDE mmol/L 103 103   CO2 mmol/L 25.0 24.0   BUN mg/dL 16 16   CREATININE mg/dL 0.99 0.92   GLUCOSE mg/dL 106* 106*   MAGNESIUM mg/dL  --  1.7       CMP   Results from last 7 days   Lab Units 03/22/22  0854 03/21/22  0828   SODIUM mmol/L 138 138   POTASSIUM mmol/L 4.4 4.3   CHLORIDE mmol/L 103 103   CO2 mmol/L 25.0 24.0   BUN mg/dL 16 16   CREATININE mg/dL 0.99 0.92   GLUCOSE mg/dL 106* 106*   ALBUMIN g/dL  --  2.70*   BILIRUBIN mg/dL  --  0.4   ALK PHOS U/L  --  106   AST (SGOT) U/L  --  11   ALT (SGPT) U/L  --  8         BNP        TROPONIN  Results from last 7 days   Lab Units 03/21/22  1353   TROPONIN T ng/mL 0.029       CoAg        Creatinine Clearance  Estimated Creatinine Clearance: 37.6 mL/min (by C-G formula based on SCr of 0.99 mg/dL).    ABG        Radiology  XR Chest 1 View    Result Date: 3/21/2022   1. No acute airspace disease is seen. Small nodular densities seen within both lungs on prior chest x-ray and CT chest are inconspicuous on today's examination. 2. Trace bibasilar pleural effusions versus pleural parenchymal scarring.  Electronically Signed By-Loni Serrano MD On:3/21/2022 8:49 AM This report was finalized on [id number] by  Loni Serrano MD.              EKG                I personally viewed and interpreted the patient's EKG/Telemetry data: Sinus rhythm    ECHOCARDIOGRAM:    Results for orders placed during the hospital encounter of 01/24/22    Adult Transthoracic Echo Complete W/ Cont if Necessary Per Protocol    Interpretation Summary  · Estimated right ventricular systolic pressure from tricuspid regurgitation is normal (<35 mmHg).  · Moderate pulmonic valve regurgitation is present.  · Left ventricular ejection fraction appears to be 61 -  65%.  · Left ventricular diastolic function is consistent with (grade II w/high LAP) pseudonormalization.          STRESS MYOVIEW:    Cardiolite (Tc-99m Sestamibi) stress test    CARDIAC CATHETERIZATION:            OTHER:         Assessment/Plan     Active Problems:    Dyspnea      ]]]]]]]]]]]]]]]]]]]]]  Impression  ===========  -Shortness of breath  Elevated proBNP.  Possible diastolic congestive heart failure.  Echocardiogram normal ejection fraction 1/24/2022  Stress Cardiolite test normal-1/28/2022     -Paroxysmal atrial fibrillation.  Patient is maintaining sinus rhythm.  EKG showed sinus rhythm.     -Hypertension dyslipidemia     -Hypomagnesemia     -Anemia-iron deficiency  Hemoglobin 9 g.     -Pulmonary nodule with hypermetabolic nodule in the right upper lung.  Status post CT-guided biopsy of right upper lobe nodule 11/5/2021-benign.  Patient is scheduled for VATS procedure 2/4/2022     -Status post cholecystectomy hysterectomy oophorectomy tonsillectomy     -Allergic to ciprofloxacin and sulfa iodine cyprodenate     -Non-smoker  ============  Plan  =========  Shortness of breath  Possible diastolic congestive heart failure.  Recent echocardiogram and stress Cardiolite test-as above.  Continue spironolactone and Lasix.Paroxysmal atrial fibrillation.  Patient in sinus rhythm.  Patient is on Cardizem.  Start metoprolol.     Anticoagulation  Patient was on Eliquis.  Patient had anemia requiring PRBC transfusion.  I had a lengthy discussion recently with patient and patient's family at bedside regarding the options.  Since patient is having low blood count best option would be to discontinue Eliquis although cardioembolic risk is increased.  This was discussed extensively with patient and patient's family.  I have discussed with him about watchman procedure since patient is having anemia requiring PRBC transfusion.  Patient is reluctant to pursue this at this time and wants to think about it.     Lung  nodule  Oncology radiation therapy following.  Apparently starting radiation today.     Anemia-hemoglobin 9.0   8.7-3/23/2022    Medications were reviewed and updated.  Current medications include Lasix 40 mg a day spironolactone 25 mg a day metoprolol XL 25 mg a day diltiazem  mg a day.    Follow-up in the office in 2 weeks.    Further plan will depend on patient's progress.  ]]]]]]]]]]]]]]]]]]]]]           Macho Dumont MD  03/23/22  06:46 EDT

## 2022-03-23 NOTE — PLAN OF CARE
Goal Outcome Evaluation:  Plan of Care Reviewed With: patient        Progress: improving  Outcome Evaluation: Pt is feeling better with less shortness of air. Pt got her iron infusion today. Pt will be discharged home. continue to monitor

## 2022-03-23 NOTE — DISCHARGE SUMMARY
[facility]    Etelvina Ulloa MD    CHIEF COMPLAINT:         HISTORY OF PRESENT ILLNESS:    Obtained from progress note on 3/22/2022:  Brief Synopsis of Hospital Course/HPI  81-year-old female presents to the ER with a chief complaint of shortness of breath which is worsened significantly over the last several days.  The patient has lower extremity edema and has been on diuretic therapy.  The patient has a history of recent severe anemia requiring blood transfusion thought to be secondary to blood loss iron deficiency anemia.  The patient had recent identification of pulmonary nodule suspicious for cancer after being diagnosed with pneumonia in January 2022.  The patient had planned video-assisted thoracotomy wedge resection mid February which was not performed secondary to patient condition.  After this, a plan was arranged to have radiation therapy with Dr. Whitten.  The patient has a history of chronic atrial fibrillation and is on anticoagulation therapy.  The patient was seen recently by Dr. Dumont with discussion of watchman procedure and discontinuation of anticoagulation.  At that time the patient wanted to think about the procedure and continue with current A. fib treatment.     Review of records:  Echocardiogram dated January 2022 showing moderate pulmonic valve regurgitation, ejection fraction 61 to 65%, grade 2 with high LAP diastolic dysfunction; Stress Cardiolite 1/28/2022 patient was switched from amlodipine to Cardizem for rhythm control in addition to metoprolol 25 mg daily patient on Eliquis anemia requiring blood transfusion.         Date:: 3/22/2022: Confirmed with the patient at bedside that she is no longer on anticoagulation secondary to her anemia.  The patient diuresed 5400 cc from the 80 of Lasix given in ER yesterday evening.  Will not give any additional Lasix at this time but will continue 40 mg Lasix daily.  Dr. Dumont has added spironolactone 25 mg daily.  He  patient had been told to take Lasix if she had a 2 pound weight gain in a day. The patient's hemoglobin remained stable at 8.7 and 9.0 which is near baseline over the last 3 months.  The patient needs iron transfusion to help facilitate RBC production as she has known iron deficiency her recent lab work.  The patient was discussed with Dr. Cheng who recommended proceeding with iron infusion. The patient is worried about receiving iron infusion because her  had trouble when he received iron infusion and wants to wait until tomorrow when a provider will be present while she receives the infusion. I spoke with [facility] and the patient is scheduled for radiation therapy tomorrow at 4:00 pm.     3/23/2022: Patient reports she feels like her breathing has continued to improve and she tolerated iron infusion without difficulty on the morning of 3/23/2022.  She was seen by her oncologist this morning who note patient is okay for discharge with plans for radiation treatment on an outpatient basis this afternoon        Past Medical History:   Diagnosis Date   • A-fib (HCC)    • Anemia    • Arthritis    • Asthma    • CHF (congestive heart failure) ([facility])    • Hypertension      Past Surgical History:   Procedure Laterality Date   • CATARACT EXTRACTION     • CHOLECYSTECTOMY     • HYSTERECTOMY  1987    Endometriosis   • OOPHORECTOMY     • TONSILLECTOMY       Family History   Problem Relation Age of Onset   • Breast cancer Mother 85   • Stroke Mother    • Heart disease Mother    • Endometrial cancer Mother 70   • Heart failure Father    • Heart failure Sister    • COPD Sister    • Heart disease Sister      Social History     Tobacco Use   • Smoking status: Never Smoker   • Smokeless tobacco: Never Used   Vaping Use   • Vaping Use: Never used   Substance Use Topics   • Alcohol use: No   • Drug use: Never     Medications Prior to Admission   Medication Sig Dispense Refill Last Dose   • acetaminophen (TYLENOL) 500  MG tablet Take 500 mg by mouth Every 6 (Six) Hours As Needed for Mild Pain .   3/20/2022 at Unknown time   • Breo Ellipta 200-25 MCG/INH inhaler Inhale 1 puff Daily. 1 each 2 3/20/2022 at Unknown time   • Cholecalciferol (VITAMIN D3) 2000 units capsule Take 2,000 Units by mouth Daily.   3/20/2022 at Unknown time   • Cyanocobalamin (VITAMIN B12) 1000 MCG tablet controlled-release Take 2,500 mcg by mouth Daily.   3/20/2022 at Unknown time   • dilTIAZem CD (CARDIZEM CD) 180 MG 24 hr capsule Take 1 capsule by mouth Daily. (Patient taking differently: Take 180 mg by mouth every night at bedtime.) 90 capsule 1 3/20/2022 at Unknown time   • furosemide (LASIX) 40 MG tablet Take 1 tablet by mouth Daily As Needed (edema). 30 tablet 0 Past Week at Unknown time   • Homeopathic Products (LEG CRAMP RELIEF) tablet LEG CRAMP RELIEF TABS   Past Month at Unknown time   • triamcinolone (KENALOG) 0.1 % cream Apply  topically to the appropriate area as directed 2 (Two) Times a Day. 80 g 2 3/20/2022 at Unknown time   • Premarin 0.625 MG/GM vaginal cream Insert  into the vagina 2 (Two) Times a Week. (Patient taking differently: Insert  into the vagina As Needed.) 30 g 3 More than a month at Unknown time     Allergies:  Ciprofloxacin, Sulfa antibiotics, Cyprodenate, and Iodine    Immunization History   Administered Date(s) Administered   • COVID-19 (MODERNA) 1st, 2nd, 3rd Dose Only 01/20/2021, 02/17/2021, 08/26/2021   • FLUAD TRI 65YR+ 10/25/2013, 11/13/2014, 09/30/2015, 09/25/2019   • Fluad Quad 65+ 09/08/2020   • Fluzone High Dose =>65 Years ([employer] ONLY) 10/05/2016, 10/17/2017, 09/25/2019   • Fluzone High-Dose 65+yrs 11/15/2021   • H1N1 All Forms 01/29/2010   • Hepatitis A 04/24/2018, 10/24/2018   • Influenza Quad Vaccine (Inpatient) 09/08/2020   • Pneumococcal Conjugate 13-Valent (PCV13) 02/24/2016   • Shingrix 07/01/2019, 10/09/2019           REVIEW OF SYSTEMS:    Review of Systems   Constitutional: Negative.   HENT: Negative.     Eyes: Negative.    Cardiovascular: Positive for dyspnea on exertion and leg swelling.   Respiratory: Positive for shortness of breath.    Endocrine: Negative.    Skin: Negative.    Musculoskeletal: Negative.    Gastrointestinal: Negative.    Genitourinary: Negative.    Neurological: Negative.    Psychiatric/Behavioral: Negative.        Vital Signs  Temp:  [97.5 °F (36.4 °C)-98.7 °F (37.1 °C)] 97.5 °F (36.4 °C)  Heart Rate:  [] 89  Resp:  [18-20] 18  BP: (112-144)/(60-77) 131/70          Physical Exam:  Physical Exam  Vitals reviewed.   Constitutional:       General: She is not in acute distress.     Appearance: Normal appearance. She is normal weight. She is not ill-appearing, toxic-appearing or diaphoretic.   HENT:      Head: Normocephalic and atraumatic.      Right Ear: External ear normal.      Left Ear: External ear normal.      Nose: Nose normal.      Mouth/Throat:      Mouth: Mucous membranes are moist.   Eyes:      Extraocular Movements: Extraocular movements intact.   Cardiovascular:      Rate and Rhythm: Normal rate and regular rhythm.      Pulses: Normal pulses.      Heart sounds: Normal heart sounds.   Pulmonary:      Effort: Pulmonary effort is normal.      Breath sounds: Normal breath sounds.   Abdominal:      General: Bowel sounds are normal. There is no distension.      Palpations: Abdomen is soft.      Tenderness: There is no abdominal tenderness.   Musculoskeletal:         General: Normal range of motion.      Cervical back: Normal range of motion.      Right lower leg: No edema.      Left lower leg: No edema.   Skin:     General: Skin is warm and dry.      Capillary Refill: Capillary refill takes less than 2 seconds.   Neurological:      General: No focal deficit present.      Mental Status: She is alert and oriented to person, place, and time.   Psychiatric:         Behavior: Behavior normal.         Thought Content: Thought content normal.         Judgment: Judgment normal.      Comments:  Patient mildly anxious at the time of my exam         Emotional Behavior:   Anxious   Debilities:  None  Results Review:    I reviewed the patient's new clinical results.  Lab Results (most recent)     Procedure Component Value Units Date/Time    Basic Metabolic Panel [[id number]]  (Abnormal) Collected: 03/22/22 0854    Specimen: Blood Updated: 03/22/22 0943     Glucose 106 mg/dL      BUN 16 mg/dL      Creatinine 0.99 mg/dL      Sodium 138 mmol/L      Potassium 4.4 mmol/L      Chloride 103 mmol/L      CO2 25.0 mmol/L      Calcium 8.7 mg/dL      BUN/Creatinine Ratio 16.2     Anion Gap 10.0 mmol/L      eGFR 57.0 mL/min/1.73      Comment: National Kidney Foundation and American Society of Nephrology (ASN) Task Force recommended calculation based on the Chronic Kidney Disease Epidemiology Collaboration (CKD-EPI) equation refit without adjustment for race.       Narrative:      GFR Normal >60  Chronic Kidney Disease <60  Kidney Failure <15      CBC & Differential [[id number]]  (Abnormal) Collected: 03/22/22 0854    Specimen: Blood Updated: 03/22/22 0920    Narrative:      The following orders were created for panel order CBC & Differential.  Procedure                               Abnormality         Status                     ---------                               -----------         ------                     CBC Auto Differential[[id number]]        Abnormal            Final result               Scan Slide[[id number]]                                       Final result                 Please view results for these tests on the individual orders.    Scan Slide [[id number]] Collected: 03/22/22 0854    Specimen: Blood Updated: 03/22/22 0920     RBC Morphology Normal     WBC Morphology Normal     Platelet Estimate Increased    Narrative:      Slide Reviewed    CBC Auto Differential [[id number]]  (Abnormal) Collected: 03/22/22 0854    Specimen: Blood Updated: 03/22/22 0919     WBC 14.00 10*3/mm3      RBC 3.76 10*6/mm3       Hemoglobin 8.7 g/dL      Hematocrit 27.2 %      MCV 72.3 fL      MCH 23.2 pg      MCHC 32.1 g/dL      RDW 22.5 %      RDW-SD 56.9 fl      MPV 6.4 fL      Platelets 506 10*3/mm3      Neutrophil % 81.6 %      Lymphocyte % 9.3 %      Monocyte % 5.7 %      Eosinophil % 2.9 %      Basophil % 0.5 %      Neutrophils, Absolute 11.40 10*3/mm3      Lymphocytes, Absolute 1.30 10*3/mm3      Monocytes, Absolute 0.80 10*3/mm3      Eosinophils, Absolute 0.40 10*3/mm3      Basophils, Absolute 0.10 10*3/mm3      nRBC 0.0 /100 WBC     Troponin [[id number]]  (Normal) Collected: 03/21/22 1353    Specimen: Blood Updated: 03/21/22 1513     Troponin T 0.029 ng/mL     Narrative:      Troponin T Reference Range:  <= 0.03 ng/mL-   Negative for AMI  >0.03 ng/mL-     Abnormal for myocardial necrosis.  Clinicians would have to utilize clinical acumen, EKG, Troponin and serial changes to determine if it is an Acute Myocardial Infarction or myocardial injury due to an underlying chronic condition.       Results may be falsely decreased if patient taking Biotin.      COVID-19,CEPHEID/GINO,COR/TURNER/PAD/VIVIAN IN-HOUSE(OR EMERGENT/ADD-ON),NP SWAB IN TRANSPORT MEDIA 3-4 HR TAT, RT-PCR - Swab, Nasopharynx [[id number]]  (Normal) Collected: 03/21/22 0836    Specimen: Swab from Nasopharynx Updated: 03/21/22 0910     COVID19 Not Detected    Narrative:      Fact sheet for providers: https://www.fda.gov/media/932652/download     Fact sheet for patients: https://www.fda.gov/media/859285/download  Fact sheet for providers: https://www.fda.gov/media/731736/download    Fact sheet for patients: https://www.fda.gov/media/751806/download    Test performed by PCR.    Troponin [[id number]]  (Abnormal) Collected: 03/21/22 0828    Specimen: Blood from Arm, Left Updated: 03/21/22 0902     Troponin T 0.032 ng/mL     Narrative:      Troponin T Reference Range:  <= 0.03 ng/mL-   Negative for AMI  >0.03 ng/mL-     Abnormal for myocardial necrosis.  Clinicians would have to  utilize clinical acumen, EKG, Troponin and serial changes to determine if it is an Acute Myocardial Infarction or myocardial injury due to an underlying chronic condition.       Results may be falsely decreased if patient taking Biotin.      Comprehensive Metabolic Panel [[id number]]  (Abnormal) Collected: 03/21/22 0828    Specimen: Blood from Arm, Left Updated: 03/21/22 0857     Glucose 106 mg/dL      BUN 16 mg/dL      Creatinine 0.92 mg/dL      Sodium 138 mmol/L      Potassium 4.3 mmol/L      Chloride 103 mmol/L      CO2 24.0 mmol/L      Calcium 9.5 mg/dL      Total Protein 7.0 g/dL      Albumin 2.70 g/dL      ALT (SGPT) 8 U/L      AST (SGOT) 11 U/L      Alkaline Phosphatase 106 U/L      Total Bilirubin 0.4 mg/dL      Globulin 4.3 gm/dL      A/G Ratio 0.6 g/dL      BUN/Creatinine Ratio 17.4     Anion Gap 11.0 mmol/L      eGFR 62.3 mL/min/1.73      Comment: National Kidney Foundation and American Society of Nephrology (ASN) Task Force recommended calculation based on the Chronic Kidney Disease Epidemiology Collaboration (CKD-EPI) equation refit without adjustment for race.       Narrative:      GFR Normal >60  Chronic Kidney Disease <60  Kidney Failure <15      Magnesium [[id number]]  (Normal) Collected: 03/21/22 0828    Specimen: Blood from Arm, Left Updated: 03/21/22 0857     Magnesium 1.7 mg/dL     BNP [[id number]]  (Abnormal) Collected: 03/21/22 0828    Specimen: Blood from Arm, Left Updated: 03/21/22 0854     proBNP 3,891.0 pg/mL     Narrative:      Among patients with dyspnea, NT-proBNP is highly sensitive for the detection of acute congestive heart failure. In addition NT-proBNP of <300 pg/ml effectively rules out acute congestive heart failure with 99% negative predictive value.    Results may be falsely decreased if patient taking Biotin.      CBC & Differential [[id number]]  (Abnormal) Collected: 03/21/22 0828    Specimen: Blood Updated: 03/21/22 0839    Narrative:      The following orders were created for  panel order CBC & Differential.  Procedure                               Abnormality         Status                     ---------                               -----------         ------                     CBC Auto Differential[[id number]]        Abnormal            Final result               Scan Slide[[id number]]                                                                    Please view results for these tests on the individual orders.    CBC Auto Differential [[id number]]  (Abnormal) Collected: 03/21/22 0828    Specimen: Blood Updated: 03/21/22 0839     WBC 12.70 10*3/mm3      RBC 3.78 10*6/mm3      Hemoglobin 9.0 g/dL      Hematocrit 27.5 %      MCV 72.6 fL      MCH 23.7 pg      MCHC 32.6 g/dL      RDW 22.5 %      RDW-SD 57.3 fl      MPV 6.3 fL      Platelets 535 10*3/mm3      Neutrophil % 81.2 %      Lymphocyte % 9.0 %      Monocyte % 4.5 %      Eosinophil % 3.9 %      Basophil % 1.4 %      Neutrophils, Absolute 10.30 10*3/mm3      Lymphocytes, Absolute 1.10 10*3/mm3      Monocytes, Absolute 0.60 10*3/mm3      Eosinophils, Absolute 0.50 10*3/mm3      Basophils, Absolute 0.20 10*3/mm3      nRBC 0.0 /100 WBC           Imaging Results (Most Recent)     Procedure Component Value Units Date/Time    XR Chest 1 View [[id number]] Collected: 03/21/22 0847     Updated: 03/21/22 0851    Narrative:      DATE OF EXAM:  3/21/2022 8:25 AM     PROCEDURE:  XR CHEST 1 VW-     INDICATIONS:  soa       COMPARISON:  AP portable chest 2/1/2022. CT chest 3/2/2022.     TECHNIQUE:   Single radiographic view of the chest was obtained.     FINDINGS:  Biapical pleural-parenchymal thickening is unchanged from prior  examination. Previously described upper lobe pulmonary nodules are less  conspicuous on today's examination. Coarse linear densities are seen in  the suprahilar regions of each lung, favored to represent areas of  scarring. There is blunting of the costophrenic angles bilaterally which  could represent pleural scarring versus  trace pleural effusions. Small  noncalcified nodules seen in both lungs on prior CT chest or  inconspicuous on today's study. There is stable mild cardiac  enlargement. There is upper lumbar curvature toward the left.       Impression:         1. No acute airspace disease is seen. Small nodular densities seen  within both lungs on prior chest x-ray and CT chest are inconspicuous on  today's examination.  2. Trace bibasilar pleural effusions versus pleural parenchymal  scarring.     Electronically Signed By-Loni Serrano MD On:3/21/2022 8:49 AM  This report was finalized on [id number] by  Loni Serrano MD.        reviewed    ECG/EMG Results (most recent)     Procedure Component Value Units Date/Time    ECG 12 Lead [[id number]] Collected: 03/21/22 0821     Updated: 03/21/22 0822     QT Interval 357 ms     Narrative:      HEART RATE= 101  bpm  RR Interval= 592  ms  IA Interval= 182  ms  P Horizontal Axis=   deg  P Front Axis= 68  deg  QRSD Interval= 92  ms  QT Interval= 357  ms  QRS Axis= 67  deg  T Wave Axis= 17  deg  - OTHERWISE NORMAL ECG -  Sinus tachycardia  Electronically Signed By:   Date and Time of Study: 2022-03-21 08:21:15    SCANNED - TELEMETRY   [[id number]] Resulted: 03/21/22     Updated: 03/22/22 0959    SCANNED - TELEMETRY   [[id number]] Resulted: 03/21/22     Updated: 03/22/22 1419    ECG 12 Lead [[id number]] Collected: 03/23/22 0320     Updated: 03/23/22 0321     QT Interval 364 ms     Narrative:      HEART RATE= 85  bpm  RR Interval= 708  ms  IA Interval= 159  ms  P Horizontal Axis= 23  deg  P Front Axis= 66  deg  QRSD Interval= 89  ms  QT Interval= 364  ms  QRS Axis= 83  deg  T Wave Axis= 39  deg  - ABNORMAL ECG -  Sinus rhythm  Anterior infarct, old  Electronically Signed By:   Date and Time of Study: 2022-03-23 03:20:22        reviewed    Results for orders placed during the hospital encounter of 01/24/22    Duplex Venous Lower Extremity - Bilateral CAR    Interpretation Summary  · Chronic right  lower extremity superficial thrombophlebitis noted in the small saphenous.  · Chronic left lower extremity superficial thrombophlebitis noted in the great saphenous (above knee) and small saphenous.  · All other veins appeared normal bilaterally.      Results for orders placed during the hospital encounter of 01/24/22    Adult Transthoracic Echo Complete W/ Cont if Necessary Per Protocol    Interpretation Summary  · Estimated right ventricular systolic pressure from tricuspid regurgitation is normal (<35 mmHg).  · Moderate pulmonic valve regurgitation is present.  · Left ventricular ejection fraction appears to be 61 - 65%.  · Left ventricular diastolic function is consistent with (grade II w/high LAP) pseudonormalization.      Microbiology Results (last 10 days)     Procedure Component Value - Date/Time    COVID-19,CEPHEID/GINO,COR/TURNER/PAD/VIVIAN IN-HOUSE(OR EMERGENT/ADD-ON),NP SWAB IN TRANSPORT MEDIA 3-4 HR TAT, RT-PCR - Swab, Nasopharynx [[id number]]  (Normal) Collected: 03/21/22 0836    Lab Status: Final result Specimen: Swab from Nasopharynx Updated: 03/21/22 0910     COVID19 Not Detected    Narrative:      Fact sheet for providers: https://www.fda.gov/media/489495/download     Fact sheet for patients: https://www.fda.gov/media/410373/download  Fact sheet for providers: https://www.fda.gov/media/260380/download    Fact sheet for patients: https://www.fda.gov/media/250092/download    Test performed by PCR.          Assessment/Plan     Dyspnea       Dyspnea on exertion, improving--likely multifactorial with fluid volume overload and pulmonary edema secondary to diastolic dysfunction and anemia: Oxygen support as needed     HFpEF with known grade 2 diastolic dysfunction: Continue diuresis; continue p.o. Lasix; spironolactone added by cardiology  -Patient diuresed 5400 cc net loss overnight  -Patient received IV  -Minimally elevated troponin at 0.032 which is likely secondary to CHF with cardiac stretch     Anemia,  chronic likely secondary to blood loss with Xarelto therapy for atrial fibrillation: Repeat CBC in a.m.; add IV iron supplementation x1; hematology consult     Pulmonary nodule concerning for CA: Hematology consult  -2/11/2022 patient was scheduled for thoracoscopy video-assisted wedge resection was postponed secondary to patient condition.   -Patient is scheduled for Dr. Whitten for stereotactic radiation; patient scheduled for radiation therapy 4:00 PM tomorrow 3/23/2022     Atrial fibrillation, chronic with anticoagulation therapy: Xarelto discontinued by cardiology; continue Cardizem  -Dr. Dumont has discussed possible watchman procedure which patient declined at that time     COPD, chronic: Continue Breo Ellipta with formulary substitution    I discussed the patients findings and my recommendations with patient and nursing staff.     Discharge Diagnosis:      Dyspnea      Hospital Course  Patient is a 81 y.o. female presented with LAO with an HPI noted above.  Patient recently diagnosed with lung cancer with plans to start treatment being somewhat delayed due to her anemia recently requiring transfusions as well as hospitalization.  She is found to have a slightly elevated troponin of 0.032.  Chest x-ray showed no acute airspace disease with small nodular densities within both lungs similar to prior chest x-ray and CT being inconspicuous on this exam with trace bibasilar pleural effusions versus pleural parenchymal scarring noted.  proBNP was elevated at 3891.0.  Echocardiogram from 1/25/2022 showed an EF of 61 to 65% with moderate pulmonic valve regurgitation and left ventricular diastolic dysfunction with consistent with grade 2 dysfunction and pseudonormalization.  She was given 80 mg IV Lasix which resulted in 5400 mL net fluid loss.  Cardiology was consulted who evaluated case with patient and recommended changing back to p.o. diuretics while adding spironolactone with recommendations to discontinue  anticoagulation due to continued anemia.  He also discussed potential for watchman procedure which patient is not interested in pursuing at this time.  Oncology was consulted evaluated patient noting her anemia as well as leukocytosis and thrombocytosis recommending iron infusion which patient tolerated on the morning of 3/23/2022.  They note patient okay for discharge with plan follow-up on an outpatient basis as well as continuing with plan to begin radiation therapy on the afternoon of 3/23/2022.  At this time patient reports she feels her symptoms have significantly improved and she is anxious for discharge and continuation of therapy.  Her full testing/notes and plan were discussed with patient along with concerning/alarm symptoms which to call 911/return to the ED.  All questions were answered and she verbalizes her understanding and agreement.    Past Medical History:     Past Medical History:   Diagnosis Date   • A-fib (HCC)    • Anemia    • Arthritis    • Asthma    • CHF (congestive heart failure) (HCC)    • Hypertension        Past Surgical History:     Past Surgical History:   Procedure Laterality Date   • CATARACT EXTRACTION     • CHOLECYSTECTOMY     • HYSTERECTOMY  1987    Endometriosis   • OOPHORECTOMY     • TONSILLECTOMY         Social History:   Social History     Socioeconomic History   • Marital status:    Tobacco Use   • Smoking status: Never Smoker   • Smokeless tobacco: Never Used   Vaping Use   • Vaping Use: Never used   Substance and Sexual Activity   • Alcohol use: No   • Drug use: Never   • Sexual activity: Not Currently       Procedures Performed         Consults:   Consults     Date and Time Order Name Status Description    3/21/2022 10:44 AM Hematology & Oncology Inpatient Consult      3/21/2022 10:44 AM Inpatient Cardiology Consult Completed           Condition on Discharge:     Stable    Discharge Disposition  Home or Self Care    Discharge Medications     Discharge Medications       New Medications      Instructions Start Date   spironolactone 25 MG tablet  Commonly known as: ALDACTONE   25 mg, Oral, Daily         Changes to Medications      Instructions Start Date   dilTIAZem  MG 24 hr capsule  Commonly known as: CARDIZEM CD  What changed: when to take this   180 mg, Oral, Every 24 Hours Scheduled      Premarin 0.625 MG/GM vaginal cream  Generic drug: conjugated estrogens  What changed:   · when to take this  · reasons to take this   Vaginal, 2 Times Weekly         Continue These Medications      Instructions Start Date   acetaminophen 500 MG tablet  Commonly known as: TYLENOL   500 mg, Oral, Every 6 Hours PRN      Breo Ellipta 200-25 MCG/INH inhaler  Generic drug: Fluticasone Furoate-Vilanterol   1 puff, Inhalation, Daily - RT      furosemide 40 MG tablet  Commonly known as: LASIX   40 mg, Oral, Daily PRN      Leg Cramp Relief tablet   LEG CRAMP RELIEF TABS      triamcinolone 0.1 % cream  Commonly known as: KENALOG   Topical, 2 Times Daily      Vitamin B12 1000 MCG tablet controlled-release   2,500 mcg, Oral, Daily      Vitamin D3 50 MCG (2000 UT) capsule   2,000 Units, Oral, Daily             Discharge Diet:     Activity at Discharge:     Follow-up Appointments  Future Appointments   Date Time Provider Department Center   3/23/2022  1:30 PM Etelvina Ulloa MD [facility]   3/28/2022 10:00 AM INF ROOM 26 - CHAIR A  [facility]   4/4/2022 11:00 AM INF ROOM 33 - CHAIR C  [facility]   4/4/2022 11:30 AM Frank Cheng MD [facility]   4/11/2022 10:30 AM INF ROOM 33 - CHAIR C  [facility]   6/23/2022 11:45 AM Etelvina Mcconnell MD [facility]   7/6/2022  1:15 PM Mihai Oneill MD [facility]   9/13/2022  3:30 PM Macho Dumont MD [facility]     Additional Instructions for the Follow-ups that You Need to Schedule     Discharge Follow-up with PCP   As directed       Currently Documented PCP:    Etelvina Ulloa MD    PCP  Phone Number:    [phone]     Follow Up Details: 5-7 days         Discharge Follow-up with Specified Provider: cardiology   As directed      To: cardiology    Follow Up Details: as instructed         Discharge Follow-up with Specified Provider: oncology   As directed      To: oncology    Follow Up Details: as instructed               Test Results Pending at Discharge  Pending Labs     Order Current Status    Basic Metabolic Panel In process    CBC (No Diff) In process    Magnesium In process           Risk for Readmission (LACE) Score: 7 (3/23/2022  6:01 AM)          oLn Stafford PA-C  03/23/22  09:43 EDT

## 2022-03-23 NOTE — PROGRESS NOTES
Hematology/Oncology Inpatient Progress Note    PATIENT NAME: Chrystal Ruiz  : 1940  MRN: [id number]    CHIEF COMPLAINT: Weakness, SOB    HISTORY OF PRESENT ILLNESS:    Chrystal Ruiz is a 81 y.o. female who presented to [facility] on 3/21/2022 with complaints of weakness and SOB that is worse today with activity with a continual dry cough since diagnosed with pneumonia in 2022. The patient had a blood transfusion for Hgb 7.6 with symptomatic fatigue and weakness, and was due for Venofer infusion today.  The patient is noted to have slight elevation of troponin and proBNP of 3891.   EKG shows tachycardia.    The patient is admitted to observation for further evaluation.     22  Hematology/Oncology was consulted for anemia.  The patient is an established patient of Dr Cheng who was to begin iron infusions today and originally seen on 3/9/2022 for investigation of anemia and thrombocytosis found while treating congestive heart failure and right lung tumor.  She had symptoms of severe fatigue and difficulty functioning in her daily activities due to weakness.  The patient had iron labs drawn with showing iron saturation of 7%, iron of 13.  The patient was given a unit of PRBCs due to symptoms, and felt better after transfusion when she was seen in the office on 3/16/2022.  The patient took oral iron with multiple side effects and no clear improvement.  The patient was to begin IV iron today. She is noted to have a Hgb of 9.0 and an elevated troponin and proBNP. Oncology will continue to monitor with cardiology until stable, then IV iron will be administered.    3/23/2022: Ms. Ruiz was feeling much better. Plans for discharge were being made. She had received a dose of intravenous iron without difficulties. A discussion was had with her ane her son on the phone. Plans to proceed with radiation were in place.         She  has a past medical history of A-fib (HCC), Anemia,  Arthritis, Asthma, CHF (congestive heart failure) (HCC), and Hypertension.     PCP: Etelvina Ulloa MD    History of present illness was reviewed and is unchanged from the previous visit. 03/22/22    Subjective   Feels much better today. Was able to eat and sleep well. No nausea or vomiting and no pain. Her dyspnea is clearly improved and she has noted a significant reduction in the size of her lower extremities. No skin rash.   ROS:  Review of Systems   Constitutional: Positive for fatigue. Negative for activity change, appetite change, chills, diaphoresis, fever and unexpected weight change.   HENT: Negative.  Negative for congestion, dental problem, drooling, ear discharge, ear pain, facial swelling, hearing loss, mouth sores, nosebleeds, postnasal drip, rhinorrhea, sinus pressure, sinus pain, sneezing, sore throat, tinnitus, trouble swallowing and voice change.    Eyes: Negative for photophobia, pain, discharge, redness, itching and visual disturbance.   Respiratory: Positive for cough and shortness of breath. Negative for apnea, choking, chest tightness, wheezing and stridor.    Cardiovascular: Positive for leg swelling. Negative for chest pain and palpitations.   Gastrointestinal: Negative for abdominal distention, abdominal pain, anal bleeding, blood in stool, constipation, diarrhea, nausea, rectal pain and vomiting.   Endocrine: Negative for cold intolerance, heat intolerance, polydipsia and polyuria.   Genitourinary: Negative for decreased urine volume, difficulty urinating, dysuria, flank pain, frequency, genital sores, hematuria and urgency.   Musculoskeletal: Negative for arthralgias, back pain, gait problem, joint swelling, myalgias, neck pain and neck stiffness.   Skin: Negative for color change, pallor, rash and wound.   Neurological: Negative for dizziness, tremors, seizures, syncope, facial asymmetry, speech difficulty, weakness, light-headedness, numbness and headaches.   Hematological: Negative  for adenopathy. Does not bruise/bleed easily.   Psychiatric/Behavioral: Negative for agitation, behavioral problems, confusion, decreased concentration, hallucinations, self-injury, sleep disturbance and suicidal ideas. The patient is not nervous/anxious.       MEDICATIONS:    Scheduled Meds:     Continuous Infusions:  No current facility-administered medications for this visit.     PRN Meds:       ALLERGIES:    Allergies   Allergen Reactions   • Ciprofloxacin Unknown (See Comments)   • Sulfa Antibiotics Unknown (See Comments)   • Cyprodenate Unknown - High Severity   • Iodine Unknown - High Severity     Objective    VITALS:   There were no vitals taken for this visit.    PHYSICAL EXAM: (performed by MD)  Physical Exam  Vitals and nursing note reviewed.   Constitutional:       General: She is not in acute distress.  HENT:      Head: Normocephalic.      Mouth/Throat:      Pharynx: Oropharynx is clear.   Eyes:      Pupils: Pupils are equal, round, and reactive to light.   Cardiovascular:      Rate and Rhythm: Normal rate and regular rhythm.      Pulses: Normal pulses.      Heart sounds: Normal heart sounds.   Pulmonary:      Effort: Pulmonary effort is normal.      Comments: SOB with talking  Abdominal:      General: Bowel sounds are normal.      Palpations: Abdomen is soft.   Musculoskeletal:         General: Normal range of motion.      Cervical back: Normal range of motion.      Right lower leg: Edema (Improved) present.      Left lower leg: Edema (Improved) present.   Skin:     General: Skin is warm and dry.      Findings: No rash.      Comments: Bilateral legs below knees, dark skin   Neurological:      Mental Status: She is alert and oriented to person, place, and time.   Psychiatric:         Mood and Affect: Mood normal.         Thought Content: Thought content normal.     I Frank Cheng MD performed the physical exam on 3/23/2022 as documented above.       RECENT LABS:  Lab Results (last 24 hours)     ** No  results found for the last 24 hours. **        PENDING RESULTS:     IMAGING REVIEWED:  No radiology results for the last day    Assessment/Plan   ASSESSMENT:  1.  Iron deficiency anemia. She has tolerated the first dose of intravenous iron. She will continue follow up and treatment as outpatient. Discussed with her and her son.      PLAN  1. Discharge home today. See me in approximately one week.     Frank Cheng MD on 3/23/2022 at 12:33 PM.

## 2022-03-23 NOTE — CASE MANAGEMENT/SOCIAL WORK
Case Management Discharge Note                Selected Continued Care - Discharged on 3/23/2022 Admission date: 3/21/2022 - Discharge disposition: Home or Self Care     Final Discharge Disposition Code: 01 - home or self-care

## 2022-03-23 NOTE — OUTREACH NOTE
Prep Survey    Flowsheet Row Responses   [facility] patient discharged from? [place]   Is LACE score < 7 ? No   Emergency Room discharge w/ pulse ox? No   Eligibility [facility]   Date of Admission 03/21/22   Date of Discharge 03/23/22   Discharge Disposition Home or Self Care   Discharge diagnosis HFpEF,   Anemia   Does the patient have one of the following disease processes/diagnoses(primary or secondary)? CHF   Does the patient have Home health ordered? No   Is there a DME ordered? No   Prep survey completed? Yes          CARMELA PINK - Registered Nurse

## 2022-03-23 NOTE — PLAN OF CARE
Problem: Adult Inpatient Plan of Care  Goal: Plan of Care Review  Outcome: Ongoing, Progressing  Goal: Patient-Specific Goal (Individualized)  Outcome: Ongoing, Progressing  Goal: Absence of Hospital-Acquired Illness or Injury  Outcome: Ongoing, Progressing  Intervention: Identify and Manage Fall Risk  Recent Flowsheet Documentation  Taken 3/23/2022 0315 by Bailey Gr RN  Safety Promotion/Fall Prevention: safety round/check completed  Taken 3/23/2022 0115 by Bailey Gr RN  Safety Promotion/Fall Prevention: safety round/check completed  Taken 3/22/2022 2307 by Bailey Gr RN  Safety Promotion/Fall Prevention: safety round/check completed  Taken 3/22/2022 2105 by Bailey Gr RN  Safety Promotion/Fall Prevention: safety round/check completed  Taken 3/22/2022 1915 by Bailey Gr RN  Safety Promotion/Fall Prevention:   safety round/check completed   activity supervised  Intervention: Prevent Skin Injury  Recent Flowsheet Documentation  Taken 3/22/2022 1915 by Bailey Gr RN  Skin Protection: adhesive use limited  Intervention: Prevent and Manage VTE (Venous Thromboembolism) Risk  Recent Flowsheet Documentation  Taken 3/22/2022 1915 by Bailey Gr RN  VTE Prevention/Management:   bilateral   dorsiflexion/plantar flexion performed  Intervention: Prevent Infection  Recent Flowsheet Documentation  Taken 3/23/2022 0315 by Bailey Gr RN  Infection Prevention:   hand hygiene promoted   rest/sleep promoted   single patient room provided  Taken 3/23/2022 0115 by Bailey Gr RN  Infection Prevention:   single patient room provided   rest/sleep promoted   hand hygiene promoted  Taken 3/22/2022 2307 by Bailey Gr RN  Infection Prevention:   hand hygiene promoted   rest/sleep promoted   single patient room provided  Taken 3/22/2022 2105 by Bailey Gr RN  Infection Prevention:   hand hygiene promoted   rest/sleep promoted   single patient room provided  Taken 3/22/2022 1915 by Bailey Gr RN  Infection  Prevention:   hand hygiene promoted   single patient room provided   rest/sleep promoted  Goal: Optimal Comfort and Wellbeing  Outcome: Ongoing, Progressing  Intervention: Provide Person-Centered Care  Recent Flowsheet Documentation  Taken 3/22/2022 1915 by Bailey Gr RN  Trust Relationship/Rapport:   care explained   questions answered   questions encouraged   thoughts/feelings acknowledged  Goal: Readiness for Transition of Care  Outcome: Ongoing, Progressing   Goal Outcome Evaluation:      Patient resting in bed comfortably.  No complaints of pain or dyspnea this shift.  The plan is to infuse a dose of iron this morning and possibly be discharged.  Vitals stable and on room air.  Safety precautions are in use.  Will continue to monitor.

## 2022-03-24 ENCOUNTER — HOSPITAL ENCOUNTER (OUTPATIENT)
Dept: RADIATION ONCOLOGY | Facility: HOSPITAL | Age: 82
Discharge: HOME | End: 2022-03-24

## 2022-03-24 ENCOUNTER — TRANSITIONAL CARE MANAGEMENT TELEPHONE ENCOUNTER (OUTPATIENT)
Dept: CALL CENTER | Facility: HOSPITAL | Age: 82
End: 2022-03-24

## 2022-03-24 PROBLEM — D50.8 OTHER IRON DEFICIENCY ANEMIAS: Status: ACTIVE | Noted: 2022-03-24

## 2022-03-24 LAB — QT INTERVAL: 357 MS

## 2022-03-24 PROCEDURE — 77386: CPT | Performed by: RADIOLOGY

## 2022-03-24 PROCEDURE — 77014 CHG CT GUIDANCE RADIATION THERAPY FLDS PLACEMENT: CPT | Performed by: RADIOLOGY

## 2022-03-24 NOTE — PROGRESS NOTES
Per message from Chrystal Diaz in authorizations, D50.8 code added to referral and supportive plan for insurance.

## 2022-03-24 NOTE — OUTREACH NOTE
Call Center TCM Note    Flowsheet Row Responses   [facility] patient discharged from? [place]   Does the patient have one of the following disease processes/diagnoses(primary or secondary)? CHF   TCM attempt successful? Yes  [Kashif, son,  ANNIA Gasca,  JUDI Adams,  ERINN Clark]   Call start time 1124   Call end time 1129   Discharge diagnosis HFpEF,   Anemia   Person spoke with today (if not patient) and relationship ANNIA Gasca   Meds reviewed with patient/caregiver? Yes   Is the patient having any side effects they believe may be caused by any medication additions or changes? No   Does the patient have all medications ordered at discharge? Yes   Is the patient taking all medications as directed (includes completed medication regime)? Yes   Does the patient have a primary care provider?  Yes   Does the patient have an appointment with their PCP within 7 days of discharge? Yes   Comments regarding PCP hospital fu appt on 4/1/22 at 2:30 AM   Has the patient kept scheduled appointments due by today? N/A   What is the Home health agency?  [facility]   Has home health visited the patient within 72 hours of discharge? Call prior to 72 hours   Pulse Ox monitoring None   Did the patient receive a copy of their discharge instructions? Yes   Nursing interventions Reviewed instructions with patient   What is the patient's perception of their health status since discharge? Improving   Nursing interventions Nurse provided patient education   Is the patient weighing daily? Yes   Does the patient have scales? Yes   Daily weight interventions Education provided on importance of daily weight   Is the patient able to teach back Heart Failure diet management? Yes   Is the patient able to teach back Heart Failure Zones? Yes   Is the patient able to teach back signs and symptoms of worsening condition? (i.e. weight gain, shortness of air, etc.) Yes   If the patient is a current smoker, are they able to teach  back resources for cessation? Not a smoker   Is the patient/caregiver able to teach back the hierarchy of who to call/visit for symptoms/problems? PCP, Specialist, Home health nurse, Urgent Care, ED, 911 Yes   TCM call completed? Yes   Wrap up additional comments ANNIA Gasca states pt is doing better. Pt was at a [facility] appt at time of call with son. Elo verified PCP hospital  appt on 4/1/22. No questions/concerns.           Claudine Shah RN    3/24/2022, 11:31 EDT

## 2022-03-25 ENCOUNTER — HOSPITAL ENCOUNTER (OUTPATIENT)
Dept: RADIATION ONCOLOGY | Facility: HOSPITAL | Age: 82
Discharge: HOME | End: 2022-03-25

## 2022-03-25 ENCOUNTER — RADIATION ONCOLOGY WEEKLY ASSESSMENT (OUTPATIENT)
Dept: RADIATION ONCOLOGY | Facility: HOSPITAL | Age: 82
End: 2022-03-25

## 2022-03-25 VITALS
BODY MASS INDEX: 26.81 KG/M2 | HEIGHT: 61 IN | RESPIRATION RATE: 18 BRPM | OXYGEN SATURATION: 99 % | DIASTOLIC BLOOD PRESSURE: 67 MMHG | WEIGHT: 142 LBS | TEMPERATURE: 96.8 F | SYSTOLIC BLOOD PRESSURE: 106 MMHG | HEART RATE: 65 BPM

## 2022-03-25 DIAGNOSIS — C34.91 BRONCHOGENIC CANCER OF RIGHT LUNG: Primary | ICD-10-CM

## 2022-03-25 PROCEDURE — FACE2FACE: Performed by: RADIOLOGY

## 2022-03-25 PROCEDURE — 77386: CPT | Performed by: RADIOLOGY

## 2022-03-25 PROCEDURE — 77014 CHG CT GUIDANCE RADIATION THERAPY FLDS PLACEMENT: CPT | Performed by: RADIOLOGY

## 2022-03-26 LAB — QT INTERVAL: 364 MS

## 2022-03-28 ENCOUNTER — APPOINTMENT (OUTPATIENT)
Dept: ONCOLOGY | Facility: HOSPITAL | Age: 82
End: 2022-03-28

## 2022-03-28 ENCOUNTER — HOSPITAL ENCOUNTER (OUTPATIENT)
Dept: ONCOLOGY | Facility: HOSPITAL | Age: 82
Setting detail: INFUSION SERIES
Discharge: HOME | End: 2022-03-28

## 2022-03-28 ENCOUNTER — HOSPITAL ENCOUNTER (OUTPATIENT)
Dept: RADIATION ONCOLOGY | Facility: HOSPITAL | Age: 82
Discharge: HOME | End: 2022-03-28

## 2022-03-28 VITALS
BODY MASS INDEX: 24.73 KG/M2 | OXYGEN SATURATION: 99 % | HEIGHT: 61 IN | HEART RATE: 72 BPM | RESPIRATION RATE: 18 BRPM | SYSTOLIC BLOOD PRESSURE: 121 MMHG | WEIGHT: 131 LBS | TEMPERATURE: 98.1 F | DIASTOLIC BLOOD PRESSURE: 69 MMHG

## 2022-03-28 DIAGNOSIS — D50.8 OTHER IRON DEFICIENCY ANEMIA: ICD-10-CM

## 2022-03-28 DIAGNOSIS — E61.1 IRON DEFICIENCY: ICD-10-CM

## 2022-03-28 DIAGNOSIS — K90.49 MALABSORPTION DUE TO INTOLERANCE, NOT ELSEWHERE CLASSIFIED: ICD-10-CM

## 2022-03-28 DIAGNOSIS — D50.8 OTHER IRON DEFICIENCY ANEMIAS: Primary | ICD-10-CM

## 2022-03-28 LAB
BASOPHILS # BLD AUTO: 0.03 10*3/MM3 (ref 0–0.2)
BASOPHILS NFR BLD AUTO: 0.3 % (ref 0–1.5)
DEPRECATED RDW RBC AUTO: 61.8 FL (ref 37–54)
EOSINOPHIL # BLD AUTO: 0.26 10*3/MM3 (ref 0–0.4)
EOSINOPHIL NFR BLD AUTO: 2.3 % (ref 0.3–6.2)
ERYTHROCYTE [DISTWIDTH] IN BLOOD BY AUTOMATED COUNT: 22.3 % (ref 12.3–15.4)
HCT VFR BLD AUTO: 27.6 % (ref 34–46.6)
HGB BLD-MCNC: 8.1 G/DL (ref 12–15.9)
LYMPHOCYTES # BLD AUTO: 1.06 10*3/MM3 (ref 0.7–3.1)
LYMPHOCYTES NFR BLD AUTO: 9.3 % (ref 19.6–45.3)
MCH RBC QN AUTO: 23.2 PG (ref 26.6–33)
MCHC RBC AUTO-ENTMCNC: 29.3 G/DL (ref 31.5–35.7)
MCV RBC AUTO: 79.1 FL (ref 79–97)
MONOCYTES # BLD AUTO: 0.71 10*3/MM3 (ref 0.1–0.9)
MONOCYTES NFR BLD AUTO: 6.2 % (ref 5–12)
NEUTROPHILS NFR BLD AUTO: 81.9 % (ref 42.7–76)
NEUTROPHILS NFR BLD AUTO: 9.33 10*3/MM3 (ref 1.7–7)
PLATELET # BLD AUTO: 574 10*3/MM3 (ref 140–450)
PMV BLD AUTO: 8.9 FL (ref 6–12)
RBC # BLD AUTO: 3.49 10*6/MM3 (ref 3.77–5.28)
WBC NRBC COR # BLD: 11.39 10*3/MM3 (ref 3.4–10.8)

## 2022-03-28 PROCEDURE — 96365 THER/PROPH/DIAG IV INF INIT: CPT

## 2022-03-28 PROCEDURE — 25010000002 IRON SUCROSE PER 1 MG: Performed by: INTERNAL MEDICINE

## 2022-03-28 PROCEDURE — 85025 COMPLETE CBC W/AUTO DIFF WBC: CPT

## 2022-03-28 PROCEDURE — 77386: CPT | Performed by: RADIOLOGY

## 2022-03-28 PROCEDURE — 77014 CHG CT GUIDANCE RADIATION THERAPY FLDS PLACEMENT: CPT | Performed by: RADIOLOGY

## 2022-03-28 PROCEDURE — 36415 COLL VENOUS BLD VENIPUNCTURE: CPT

## 2022-03-28 RX ORDER — SODIUM CHLORIDE 9 MG/ML
250 INJECTION, SOLUTION INTRAVENOUS ONCE
Status: COMPLETED | OUTPATIENT
Start: 2022-03-28 | End: 2022-03-28

## 2022-03-28 RX ADMIN — SODIUM CHLORIDE 250 ML: 9 INJECTION, SOLUTION INTRAVENOUS at 11:47

## 2022-03-28 RX ADMIN — IRON SUCROSE 200 MG: 20 INJECTION, SOLUTION INTRAVENOUS at 11:50

## 2022-03-28 NOTE — PROGRESS NOTES
Pt. Was here at clinic for C1 Venofer.   Pt. States that she is having increasing fatigue, pt. Stated that she had a blood transfusion a few weeks ago and she was worried about her HGB being low. Pt. Also stated she had an iron infusion at the hospital last week. MD notified of pt's status.   Orders given to draw cbc today and pt. To receive 3 more doses of venofer per Dr. Cheng.   CBC results reviewed by MESFIN VALDEZ to give venofer today per Dr. Cheng   Pt. Verbalized understanding of all orders/instructions.   Pt. Discharged from clinic and AVS was given.

## 2022-03-29 ENCOUNTER — HOSPITAL ENCOUNTER (OUTPATIENT)
Dept: RADIATION ONCOLOGY | Facility: HOSPITAL | Age: 82
Discharge: HOME | End: 2022-03-29

## 2022-03-29 PROCEDURE — 77014 CHG CT GUIDANCE RADIATION THERAPY FLDS PLACEMENT: CPT | Performed by: RADIOLOGY

## 2022-03-29 PROCEDURE — 77386: CPT | Performed by: RADIOLOGY

## 2022-03-30 ENCOUNTER — HOSPITAL ENCOUNTER (OUTPATIENT)
Dept: RADIATION ONCOLOGY | Facility: HOSPITAL | Age: 82
Discharge: HOME | End: 2022-03-30

## 2022-03-30 PROCEDURE — 77386: CPT | Performed by: RADIOLOGY

## 2022-03-30 PROCEDURE — 77014 CHG CT GUIDANCE RADIATION THERAPY FLDS PLACEMENT: CPT | Performed by: RADIOLOGY

## 2022-03-30 PROCEDURE — 77336 RADIATION PHYSICS CONSULT: CPT | Performed by: RADIOLOGY

## 2022-03-30 PROCEDURE — 77427 RADIATION TX MANAGEMENT X5: CPT | Performed by: RADIOLOGY

## 2022-03-31 ENCOUNTER — HOSPITAL ENCOUNTER (OUTPATIENT)
Dept: RADIATION ONCOLOGY | Facility: HOSPITAL | Age: 82
Discharge: HOME | End: 2022-03-31

## 2022-03-31 ENCOUNTER — READMISSION MANAGEMENT (OUTPATIENT)
Dept: CALL CENTER | Facility: HOSPITAL | Age: 82
End: 2022-03-31

## 2022-03-31 PROCEDURE — 77386: CPT | Performed by: RADIOLOGY

## 2022-03-31 PROCEDURE — 77014 CHG CT GUIDANCE RADIATION THERAPY FLDS PLACEMENT: CPT | Performed by: RADIOLOGY

## 2022-03-31 NOTE — OUTREACH NOTE
CHF Week 2 Survey    Flowsheet Row Responses   [facility] patient discharged from? [facility]   Does the patient have one of the following disease processes/diagnoses(primary or secondary)? CHF   Week 2 attempt successful? Yes   Call start time 1618   Call end time 1625   Meds reviewed with patient/caregiver? Yes   Is the patient having any side effects they believe may be caused by any medication additions or changes? No   Does the patient have all medications ordered at discharge? Yes   Is the patient taking all medications as directed (includes completed medication regime)? Yes   Comments regarding appointments States will go for 7th radiation treatment for lung cancer this evening.   Does the patient have a primary care provider?  Yes   Does the patient have an appointment with their PCP within 7 days of discharge? Yes   Has the patient kept scheduled appointments due by today? N/A   Has home health visited the patient within 72 hours of discharge? N/A   Home health comments States was discharged from .   Pulse Ox monitoring None   Psychosocial issues? No   What is the patient's perception of their health status since discharge? Improving   Nursing interventions Nurse provided patient education   Is the patient weighing daily? No  [States weighs weekly-states managing HF by s/s rather than weight.]   Does the patient have scales? Yes   Is the patient able to teach back Heart Failure diet management? Yes   Is the patient able to teach back Heart Failure Zones? Yes   Is the patient able to teach back signs and symptoms of worsening condition? (i.e. weight gain, shortness of air, etc.) Yes   Is the patient/caregiver able to teach back the hierarchy of who to call/visit for symptoms/problems? PCP, Specialist, Home health nurse, Urgent Care, ED, 911 Yes   CHF Week 2 call completed? Yes   Wrap up additional comments States is improving-edema is controlled by Lasic. States still occasionally has SOA. Denies any  needs today.          JOSE ARMANDO MUNOZ - Registered Nurse

## 2022-04-01 ENCOUNTER — HOSPITAL ENCOUNTER (OUTPATIENT)
Dept: RADIATION ONCOLOGY | Facility: HOSPITAL | Age: 82
Setting detail: RADIATION/ONCOLOGY SERIES
End: 2022-04-01

## 2022-04-01 ENCOUNTER — OFFICE VISIT (OUTPATIENT)
Dept: FAMILY MEDICINE CLINIC | Facility: CLINIC | Age: 82
End: 2022-04-01

## 2022-04-01 ENCOUNTER — RADIATION ONCOLOGY WEEKLY ASSESSMENT (OUTPATIENT)
Dept: RADIATION ONCOLOGY | Facility: HOSPITAL | Age: 82
End: 2022-04-01

## 2022-04-01 ENCOUNTER — HOSPITAL ENCOUNTER (OUTPATIENT)
Dept: RADIATION ONCOLOGY | Facility: HOSPITAL | Age: 82
Discharge: HOME | End: 2022-04-01

## 2022-04-01 VITALS
TEMPERATURE: 97.2 F | BODY MASS INDEX: 25.13 KG/M2 | OXYGEN SATURATION: 97 % | SYSTOLIC BLOOD PRESSURE: 119 MMHG | DIASTOLIC BLOOD PRESSURE: 61 MMHG | HEART RATE: 75 BPM | WEIGHT: 133 LBS

## 2022-04-01 VITALS
HEART RATE: 73 BPM | OXYGEN SATURATION: 98 % | BODY MASS INDEX: 24.39 KG/M2 | RESPIRATION RATE: 18 BRPM | SYSTOLIC BLOOD PRESSURE: 117 MMHG | HEIGHT: 61 IN | WEIGHT: 129.2 LBS | DIASTOLIC BLOOD PRESSURE: 71 MMHG | TEMPERATURE: 97.1 F

## 2022-04-01 DIAGNOSIS — I48.0 PAROXYSMAL ATRIAL FIBRILLATION: ICD-10-CM

## 2022-04-01 DIAGNOSIS — I50.31 ACUTE DIASTOLIC CHF (CONGESTIVE HEART FAILURE): Primary | ICD-10-CM

## 2022-04-01 DIAGNOSIS — I10 ESSENTIAL (PRIMARY) HYPERTENSION: ICD-10-CM

## 2022-04-01 DIAGNOSIS — C34.91 BRONCHOGENIC CANCER OF RIGHT LUNG: Primary | ICD-10-CM

## 2022-04-01 PROCEDURE — 99495 TRANSJ CARE MGMT MOD F2F 14D: CPT | Performed by: FAMILY MEDICINE

## 2022-04-01 PROCEDURE — 77014 CHG CT GUIDANCE RADIATION THERAPY FLDS PLACEMENT: CPT | Performed by: RADIOLOGY

## 2022-04-01 PROCEDURE — FACE2FACE: Performed by: RADIOLOGY

## 2022-04-01 PROCEDURE — 77386: CPT | Performed by: RADIOLOGY

## 2022-04-01 PROCEDURE — 1111F DSCHRG MED/CURRENT MED MERGE: CPT | Performed by: FAMILY MEDICINE

## 2022-04-01 RX ORDER — SPIRONOLACTONE 25 MG/1
25 TABLET ORAL 2 TIMES DAILY
Qty: 180 TABLET | Refills: 1 | Status: SHIPPED | OUTPATIENT
Start: 2022-04-01 | End: 2022-05-20 | Stop reason: SDUPTHER

## 2022-04-01 RX ORDER — FUROSEMIDE 40 MG/1
40 TABLET ORAL DAILY PRN
Qty: 90 TABLET | Refills: 2 | Status: ON HOLD | OUTPATIENT
Start: 2022-04-01 | End: 2022-07-07

## 2022-04-01 NOTE — PROGRESS NOTES
Transitional Care Follow Up Visit  Subjective     Chrystal Ruiz is a 81 y.o. female who presents for a transitional care management visit.    Within 48 business hours after discharge our office contacted her via telephone to coordinate her care and needs.      I reviewed and discussed the details of that call along with the discharge summary, hospital problems, inpatient lab results, inpatient diagnostic studies, and consultation reports with Chrystal.     Current outpatient and discharge medications have been reconciled for the patient.  Reviewed by: Etelvina Ulloa MD      Date of TCM Phone Call 3/23/2022   [facility]   Date of Admission 3/21/2022   Date of Discharge 3/23/2022   Discharge Disposition Home or Self Care     Risk for Readmission (LACE) Score: 7 (3/23/2022  6:01 AM)      History of Present Illness   Course During Hospital Stay:  Patient is a 81 y.o. female presented with LAO.  Patient recently diagnosed with lung cancer with plans for XRT being somewhat delayed due to her anemia recently requiring transfusions as well as hospitalization.  She is found to have a slightly elevated troponin of 0.032.  Chest x-ray showed no acute airspace disease with small nodular densities within both lungs similar to prior chest x-ray and CT being inconspicuous on this exam with trace bibasilar pleural effusions versus pleural parenchymal scarring noted.  proBNP was elevated at 3891.0.  Echocardiogram from 1/25/2022 showed an EF of 61 to 65% with moderate pulmonic valve regurgitation and left ventricular diastolic dysfunction with consistent with grade 2 dysfunction and pseudonormalization.  She was given 80 mg IV Lasix which resulted in 5400 mL net fluid loss.  Cardiology was consulted who evaluated case with patient and recommended changing back to oral diuretics while adding spironolactone with recommendations to discontinue anticoagulation due to continued anemia.  He also discussed potential for  watchman procedure which patient is not interested in pursuing at this time.  Oncology was consulted evaluated patient noting her anemia as well as leukocytosis and thrombocytosis recommending iron infusion which patient tolerated on the morning of 3/23/2022.  Began radiation therapy on the afternoon of 3/23/2022.  She is scheduled for her 3rd iron infusion next week. She asks for a referral to a different cardiologist because she does not think her current cardiologist is aggressive enough.      The following portions of the patient's history were reviewed and updated as appropriate: allergies, current medications, past family history, past medical history, past social history, past surgical history and problem list.    Review of Systems    Objective   Physical Exam  Vitals and nursing note reviewed.   Constitutional:       General: She is not in acute distress.     Appearance: She is well-developed.   HENT:      Head: Normocephalic.   Eyes:      General: Lids are normal.      Conjunctiva/sclera: Conjunctivae normal.   Neck:      Thyroid: No thyroid mass or thyromegaly.      Trachea: Trachea normal.   Cardiovascular:      Rate and Rhythm: Normal rate and regular rhythm.      Heart sounds: Normal heart sounds.   Pulmonary:      Effort: Pulmonary effort is normal.      Breath sounds: Normal breath sounds.   Musculoskeletal:      Cervical back: Normal range of motion.      Right lower le+ Pitting Edema present.      Left lower le+ Pitting Edema present.   Lymphadenopathy:      Cervical: No cervical adenopathy.   Skin:     General: Skin is warm and dry.   Neurological:      Mental Status: She is alert and oriented to person, place, and time.   Psychiatric:         Attention and Perception: She is attentive.         Mood and Affect: Mood normal. Affect is flat.         Speech: Speech normal.         Behavior: Behavior normal.         Assessment/Plan   Diagnoses and all orders for this visit:    1. Acute  diastolic CHF (congestive heart failure) (HCC) (Primary)  -     Ambulatory Referral to Cardiology  -     spironolactone (ALDACTONE) 25 MG tablet; Take 1 tablet by mouth 2 (Two) Times a Day.  Dispense: 180 tablet; Refill: 1    2. Paroxysmal atrial fibrillation (HCC)  -     Ambulatory Referral to Cardiology    3. Essential (primary) hypertension  -     Ambulatory Referral to Cardiology    Other orders  -     furosemide (LASIX) 40 MG tablet; Take 1 tablet by mouth Daily As Needed (edema).  Dispense: 90 tablet; Refill: 2

## 2022-04-04 ENCOUNTER — OFFICE VISIT (OUTPATIENT)
Dept: ONCOLOGY | Facility: CLINIC | Age: 82
End: 2022-04-04

## 2022-04-04 ENCOUNTER — HOSPITAL ENCOUNTER (OUTPATIENT)
Dept: RADIATION ONCOLOGY | Facility: HOSPITAL | Age: 82
Discharge: HOME | End: 2022-04-04

## 2022-04-04 ENCOUNTER — HOSPITAL ENCOUNTER (OUTPATIENT)
Dept: ONCOLOGY | Facility: HOSPITAL | Age: 82
Setting detail: INFUSION SERIES
Discharge: HOME | End: 2022-04-04

## 2022-04-04 VITALS
WEIGHT: 125.9 LBS | SYSTOLIC BLOOD PRESSURE: 109 MMHG | DIASTOLIC BLOOD PRESSURE: 66 MMHG | HEART RATE: 67 BPM | OXYGEN SATURATION: 100 % | TEMPERATURE: 97.1 F | BODY MASS INDEX: 23.77 KG/M2 | HEIGHT: 61 IN

## 2022-04-04 DIAGNOSIS — D50.8 OTHER IRON DEFICIENCY ANEMIAS: Primary | ICD-10-CM

## 2022-04-04 DIAGNOSIS — C34.11 MALIGNANT NEOPLASM OF UPPER LOBE OF RIGHT LUNG: Primary | ICD-10-CM

## 2022-04-04 DIAGNOSIS — K90.49 MALABSORPTION DUE TO INTOLERANCE, NOT ELSEWHERE CLASSIFIED: ICD-10-CM

## 2022-04-04 DIAGNOSIS — E61.1 IRON DEFICIENCY: ICD-10-CM

## 2022-04-04 LAB
BASOPHILS # BLD AUTO: 0.02 10*3/MM3 (ref 0–0.2)
BASOPHILS NFR BLD AUTO: 0.2 % (ref 0–1.5)
DEPRECATED RDW RBC AUTO: 62.2 FL (ref 37–54)
EOSINOPHIL # BLD AUTO: 0.23 10*3/MM3 (ref 0–0.4)
EOSINOPHIL NFR BLD AUTO: 2.3 % (ref 0.3–6.2)
ERYTHROCYTE [DISTWIDTH] IN BLOOD BY AUTOMATED COUNT: 22.1 % (ref 12.3–15.4)
HCT VFR BLD AUTO: 31.2 % (ref 34–46.6)
HGB BLD-MCNC: 9.3 G/DL (ref 12–15.9)
LYMPHOCYTES # BLD AUTO: 0.91 10*3/MM3 (ref 0.7–3.1)
LYMPHOCYTES NFR BLD AUTO: 9.2 % (ref 19.6–45.3)
MCH RBC QN AUTO: 23.7 PG (ref 26.6–33)
MCHC RBC AUTO-ENTMCNC: 29.8 G/DL (ref 31.5–35.7)
MCV RBC AUTO: 79.4 FL (ref 79–97)
MONOCYTES # BLD AUTO: 0.66 10*3/MM3 (ref 0.1–0.9)
MONOCYTES NFR BLD AUTO: 6.7 % (ref 5–12)
NEUTROPHILS NFR BLD AUTO: 8.04 10*3/MM3 (ref 1.7–7)
NEUTROPHILS NFR BLD AUTO: 81.6 % (ref 42.7–76)
PLATELET # BLD AUTO: 473 10*3/MM3 (ref 140–450)
PMV BLD AUTO: 8.6 FL (ref 6–12)
RBC # BLD AUTO: 3.93 10*6/MM3 (ref 3.77–5.28)
WBC NRBC COR # BLD: 9.86 10*3/MM3 (ref 3.4–10.8)

## 2022-04-04 PROCEDURE — 85025 COMPLETE CBC W/AUTO DIFF WBC: CPT | Performed by: INTERNAL MEDICINE

## 2022-04-04 PROCEDURE — 96365 THER/PROPH/DIAG IV INF INIT: CPT

## 2022-04-04 PROCEDURE — 36415 COLL VENOUS BLD VENIPUNCTURE: CPT

## 2022-04-04 PROCEDURE — 77386: CPT | Performed by: RADIOLOGY

## 2022-04-04 PROCEDURE — 25010000002 IRON SUCROSE PER 1 MG: Performed by: INTERNAL MEDICINE

## 2022-04-04 PROCEDURE — 77014 CHG CT GUIDANCE RADIATION THERAPY FLDS PLACEMENT: CPT | Performed by: RADIOLOGY

## 2022-04-04 PROCEDURE — 99214 OFFICE O/P EST MOD 30 MIN: CPT | Performed by: INTERNAL MEDICINE

## 2022-04-04 RX ORDER — SODIUM CHLORIDE 9 MG/ML
250 INJECTION, SOLUTION INTRAVENOUS ONCE
Status: COMPLETED | OUTPATIENT
Start: 2022-04-04 | End: 2022-04-04

## 2022-04-04 RX ADMIN — SODIUM CHLORIDE 250 ML: 9 INJECTION, SOLUTION INTRAVENOUS at 11:58

## 2022-04-04 RX ADMIN — IRON SUCROSE 200 MG: 20 INJECTION, SOLUTION INTRAVENOUS at 11:57

## 2022-04-04 NOTE — PROGRESS NOTES
Patient here for Venofer, and MD visit. Patient has no complaints at this time. Patient has AVS for next infusion.

## 2022-04-04 NOTE — PROGRESS NOTES
HEMATOLOGY ONCOLOGY OUTPATIENT FOLLOW UP       Patient name: Chrystal Ruiz  : 1940  MRN: [id number]  Primary Care Physician: Etelvina Ulloa MD  Referring Physician: Etelvina Ulloa MD  Reason For Consult:     No chief complaint on file.    HPI:   History of Present Illness:  Chrystal Ruiz is 81 y.o. female who presented to our office on 22 for consultation regarding anemia and thrombocytosis.     3/9/2022: Ms. Ruiz was referred for the investigation of anemia and thrombocytosis that was identified in the process of treating congestive heart failure and a right lung tumor. She came in a wheel chair complaining of severe fatigue and difficulties functioning because of this weakness. She admitted to hematochezia that had been associated to constipation that had been going on for some time, at least a few weeks. She had been afebrile. No pain. The laboratory exams reported microcytic anemia and thrombocytosis that had been present since the end of . A decision was made to transfuse her with one unit of red cells. Tests for iron deficiency were sent. She was scheduled to see me in one week.     3/16/2022. Ms. Ruiz was back in the office for follow up. She was feeling much better after the transfusion. She was still weak but not as much and her affect was also better. The laboratory exams did not clearly suggest iron deficiency but she persisted with microcytic anemia and had a history of gastrointestinal bleeding, such that iron deficiency was strongly considered the cause of the symptoms and laboratory manifestations. She had taken oral iron with multiple side effects and no clear improvement. Plans for intravenous iron were made.     3/22/2022: Admitted with evidence of decompensation of the congestive heart failure. She was treated with diuretics with prompt symptomatic relief. She was given one dose of intravenous iron. She was discharged feeling much better.      4/4/2022: Back in the office for follow up. She had been receiving iron intravenously without complications. She reported unintended weight loss. The exam was not different than before. The laboratory exams revealed progressive improvement of the blood count with increase in the hemoglobin and the mean corpuscular volume and resolution of the leukocytosis and thrombocytosis. A decision was made to obtain a scan of the abdomen to investigate the history of blood loss in the stools and the unintended weight loss.     Subjective:  • 4/4/2022: Feels better. Stronger for the last week. Eating well and no nausea. No pain and has not had any melena or hematochezia. Has remained afebrile. In the last several months has lost about 20 lbs without trying.     The following portions of the patient's history were reviewed and updated as appropriate: allergies, current medications, past family history, past medical history, past social history, past surgical history and problem list.    Past Medical History:   Diagnosis Date   • A-fib (HCC)    • Anemia    • Arthritis    • Asthma    • CHF (congestive heart failure) (HCC)    • Hypertension      Past Surgical History:   Procedure Laterality Date   • CATARACT EXTRACTION     • CHOLECYSTECTOMY     • HYSTERECTOMY  1987    Endometriosis   • OOPHORECTOMY     • TONSILLECTOMY         Current Outpatient Medications:   •  acetaminophen (TYLENOL) 500 MG tablet, Take 500 mg by mouth Every 6 (Six) Hours As Needed for Mild Pain ., Disp: , Rfl:   •  Breo Ellipta 200-25 MCG/INH inhaler, Inhale 1 puff Daily., Disp: 1 each, Rfl: 2  •  Cholecalciferol (VITAMIN D3) 2000 units capsule, Take 2,000 Units by mouth Daily., Disp: , Rfl:   •  Cyanocobalamin (VITAMIN B12) 1000 MCG tablet controlled-release, Take 2,500 mcg by mouth Daily., Disp: , Rfl:   •  dilTIAZem CD (CARDIZEM CD) 180 MG 24 hr capsule, Take 1 capsule by mouth Daily. (Patient taking differently: Take 180 mg by mouth every night at  bedtime.), Disp: 90 capsule, Rfl: 1  •  furosemide (LASIX) 40 MG tablet, Take 1 tablet by mouth Daily As Needed (edema)., Disp: 90 tablet, Rfl: 2  •  Homeopathic Products (LEG CRAMP RELIEF) tablet, LEG CRAMP RELIEF TABS, Disp: , Rfl:   •  metoprolol succinate XL (TOPROL-XL) 25 MG 24 hr tablet, Take 1 tablet by mouth Daily for 30 days., Disp: 30 tablet, Rfl: 0  •  Premarin 0.625 MG/GM vaginal cream, Insert  into the vagina 2 (Two) Times a Week. (Patient taking differently: Insert  into the vagina As Needed.), Disp: 30 g, Rfl: 3  •  spironolactone (ALDACTONE) 25 MG tablet, Take 1 tablet by mouth 2 (Two) Times a Day., Disp: 180 tablet, Rfl: 1  •  triamcinolone (KENALOG) 0.1 % cream, Apply  topically to the appropriate area as directed 2 (Two) Times a Day., Disp: 80 g, Rfl: 2    Allergies   Allergen Reactions   • Ciprofloxacin Unknown (See Comments)   • Sulfa Antibiotics Unknown (See Comments)   • Cyprodenate Unknown - High Severity   • Iodine Unknown - High Severity     Family History   Problem Relation Age of Onset   • Breast cancer Mother 85   • Stroke Mother    • Heart disease Mother    • Endometrial cancer Mother 70   • Heart failure Father    • Heart failure Sister    • COPD Sister    • Heart disease Sister      Cancer-related family history includes Breast cancer (age of onset: 85) in her mother; Endometrial cancer (age of onset: 70) in her mother.    Social History     Tobacco Use   • Smoking status: Never Smoker   • Smokeless tobacco: Never Used   Vaping Use   • Vaping Use: Never used   Substance Use Topics   • Alcohol use: No   • Drug use: Never     Social History     Social History Narrative   • Not on file      ROS:     Review of Systems   Constitutional: Positive for unexpected weight change. Negative for activity change, appetite change, chills, diaphoresis, fatigue and fever.   HENT: Negative for congestion, dental problem, drooling, ear discharge, ear pain, facial swelling, hearing loss, mouth sores,  nosebleeds, postnasal drip, rhinorrhea, sinus pressure, sinus pain, sneezing, sore throat, tinnitus, trouble swallowing and voice change.    Eyes: Negative for photophobia, pain, discharge, redness, itching and visual disturbance.   Respiratory: Negative for apnea, cough, choking, chest tightness, shortness of breath, wheezing and stridor.    Cardiovascular: Negative for chest pain, palpitations and leg swelling.   Gastrointestinal: Negative for abdominal distention, abdominal pain, anal bleeding, blood in stool, constipation, diarrhea, nausea, rectal pain and vomiting.   Endocrine: Negative for cold intolerance, heat intolerance, polydipsia and polyuria.   Genitourinary: Negative for decreased urine volume, difficulty urinating, dysuria, flank pain, frequency, genital sores, hematuria and urgency.   Musculoskeletal: Negative for arthralgias, back pain, gait problem, joint swelling, myalgias, neck pain and neck stiffness.   Skin: Negative for color change, pallor and rash.   Neurological: Negative for dizziness, tremors, seizures, syncope, facial asymmetry, speech difficulty, weakness, light-headedness, numbness and headaches.   Hematological: Negative for adenopathy. Does not bruise/bleed easily.   Psychiatric/Behavioral: Negative for agitation, behavioral problems, confusion, decreased concentration, hallucinations, self-injury, sleep disturbance and suicidal ideas. The patient is not nervous/anxious.      Objective:    There were no vitals filed for this visit.  There is no height or weight on file to calculate BMI.  ECOG  (3) Capable of limited self-care, confined to bed or chair > 50% of waking hours    Physical Exam:     Physical Exam  Constitutional:       General: She is not in acute distress.     Appearance: Normal appearance. She is not ill-appearing, toxic-appearing or diaphoretic.   HENT:      Head: Normocephalic and atraumatic.      Right Ear: External ear normal.      Left Ear: External ear normal.       Nose: Nose normal.      Mouth/Throat:      Mouth: Mucous membranes are moist.      Pharynx: Oropharynx is clear. No oropharyngeal exudate or posterior oropharyngeal erythema.   Eyes:      General: No scleral icterus.        Right eye: No discharge.         Left eye: No discharge.      Conjunctiva/sclera: Conjunctivae normal.      Pupils: Pupils are equal, round, and reactive to light.   Cardiovascular:      Rate and Rhythm: Normal rate and regular rhythm.      Pulses: Normal pulses.      Heart sounds: No murmur heard.    No friction rub. No gallop.   Pulmonary:      Effort: No respiratory distress.      Breath sounds: No stridor. No wheezing, rhonchi or rales.   Abdominal:      General: Abdomen is flat. Bowel sounds are normal. There is no distension.      Palpations: Abdomen is soft. There is no mass.      Tenderness: There is no abdominal tenderness. There is no right CVA tenderness, left CVA tenderness, guarding or rebound.      Hernia: No hernia is present.   Musculoskeletal:         General: No tenderness, deformity or signs of injury.      Cervical back: No rigidity.      Right lower leg: No edema.      Left lower leg: No edema.   Lymphadenopathy:      Cervical: No cervical adenopathy.   Skin:     Coloration: Skin is not jaundiced.      Findings: No bruising, lesion or rash.   Neurological:      General: No focal deficit present.      Mental Status: She is alert and oriented to person, place, and time.      Cranial Nerves: No cranial nerve deficit.      Motor: No weakness.      Gait: Gait normal.   Psychiatric:         Mood and Affect: Mood normal.         Behavior: Behavior normal.         Thought Content: Thought content normal.         Judgment: Judgment normal.     I Frank Cheng MD performed the physical exam on 4/4/2022 as documented above.     Lab Results - Last 18 Months   Lab Units 03/28/22  1157 03/23/22  0923 03/22/22  0854   WBC 10*3/mm3 11.39* 15.50* 14.00*   HEMOGLOBIN g/dL 8.1* 8.5* 8.7*    HEMATOCRIT % 27.6* 26.3* 27.2*   PLATELETS 10*3/mm3 574* 558* 506*   MCV fL 79.1 72.8* 72.3*     Lab Results - Last 18 Months   Lab Units 03/23/22  0923 03/22/22  0854 03/21/22  0828 03/09/22  1305 01/26/22  0330 01/25/22  0130   SODIUM mmol/L 135* 138 138 136   < > 136   POTASSIUM mmol/L 5.3* 4.4 4.3 3.6   < > 3.8   CHLORIDE mmol/L 101 103 103 101   < > 101   CO2 mmol/L 24.0 25.0 24.0 25.0   < > 25.0   BUN mg/dL 18 16 16 19   < > 10   CREATININE mg/dL 1.11* 0.99 0.92 1.09*   < > 0.50*   CALCIUM mg/dL 8.6 8.7 9.5 8.6   < > 8.3*   BILIRUBIN mg/dL  --   --  0.4 0.7  --  0.3   ALK PHOS U/L  --   --  106 101  --  109   ALT (SGPT) U/L  --   --  8 8  --  12   AST (SGOT) U/L  --   --  11 11  --  13   GLUCOSE mg/dL 155* 106* 106* 103*   < > 108*    < > = values in this interval not displayed.     Lab Results   Component Value Date    GLUCOSE 155 (H) 03/23/2022    BUN 18 03/23/2022    CREATININE 1.11 (H) 03/23/2022    EGFRIFNONA 84 02/01/2022    BCR 16.2 03/23/2022    K 5.3 (H) 03/23/2022    CO2 24.0 03/23/2022    CALCIUM 8.6 03/23/2022    ALBUMIN 2.70 (L) 03/21/2022    LABIL2 1.2 05/29/2019    AST 11 03/21/2022    ALT 8 03/21/2022     Lab Results - Last 18 Months   Lab Units 11/05/21  0924   INR  1.03   APTT seconds 28.2     Lab Results   Component Value Date    PTT 28.2 11/05/2021    INR 1.03 11/05/2021     Assessment/Plan     Assessment:  1. Microcytic anemia: Persists with anemia. The microcytosis is resolved. The thrombocytosis is also resolved. Will continue the intravenous iron.   2. Gastrointestinal bleeding. Seems resolved for now. However, in light of the unintended weight loss, a scan has been ordered. Discussed with her and explained the logic of this.   3. Malabsorption.   4. She will see me with results after she returns from a planned trip to [place].     Plan:  1. As above.      Frank Cheng MD on 4/4/2022 at 15:02

## 2022-04-05 ENCOUNTER — HOSPITAL ENCOUNTER (OUTPATIENT)
Dept: RADIATION ONCOLOGY | Facility: HOSPITAL | Age: 82
Discharge: HOME | End: 2022-04-05

## 2022-04-05 PROCEDURE — 77386: CPT | Performed by: RADIOLOGY

## 2022-04-05 PROCEDURE — 77014 CHG CT GUIDANCE RADIATION THERAPY FLDS PLACEMENT: CPT | Performed by: RADIOLOGY

## 2022-04-06 ENCOUNTER — HOSPITAL ENCOUNTER (OUTPATIENT)
Dept: RADIATION ONCOLOGY | Facility: HOSPITAL | Age: 82
Discharge: HOME | End: 2022-04-06

## 2022-04-06 PROCEDURE — 77386: CPT | Performed by: RADIOLOGY

## 2022-04-06 PROCEDURE — 77427 RADIATION TX MANAGEMENT X5: CPT | Performed by: RADIOLOGY

## 2022-04-06 PROCEDURE — 77336 RADIATION PHYSICS CONSULT: CPT | Performed by: RADIOLOGY

## 2022-04-06 PROCEDURE — 77014 CHG CT GUIDANCE RADIATION THERAPY FLDS PLACEMENT: CPT | Performed by: RADIOLOGY

## 2022-04-06 NOTE — PROGRESS NOTES
Patient Name: Chrystal Ruiz Date: 2022       : 1940        MRN #: [id number] Diagnosis: No diagnosis found.                  RADIATION ON TREATMENT VISIT NOTE - CHEST    Treatment Summary    Treatment Site Ref. ID Energy Dose/Fx (cGy) #Fx Dose Correction (cGy) Total Dose (cGy) Start Date End Date Elapsed Days   RtUpperLobe RtUpperLobe 6X 400 13 / 15 0 5,200 3/23/2022 2022 16       General:           Review of Systems    [x] No new complaints [] Fever/chills  Night sweats  [] Decreased energy level [] Decreased appetite [] Oxygen   [] Dry cough [] Productive cough [] SOB  [] Hemoptysis [] Dysphagia      [] Fatigue,  severity: 1 Relief w/ Rest [] Pain,  severity: ----------------, location:     Pain medication regimen: NONE      Esophagitis regimen: NONE      Skin regimen: NONE      Comments/Notes:       KPS: 70%       Vital Signs: There were no vitals taken for this visit.    Weight:   Wt Readings from Last 3 Encounters:   22 57.1 kg (125 lb 14.4 oz)   22 60.3 kg (133 lb)   22 58.6 kg (129 lb 3.2 oz)       Medication:   Current Outpatient Medications:   •  acetaminophen (TYLENOL) 500 MG tablet, Take 500 mg by mouth Every 6 (Six) Hours As Needed for Mild Pain ., Disp: , Rfl:   •  Breo Ellipta 200-25 MCG/INH inhaler, Inhale 1 puff Daily., Disp: 1 each, Rfl: 2  •  Cholecalciferol (VITAMIN D3) 2000 units capsule, Take 2,000 Units by mouth Daily., Disp: , Rfl:   •  Cyanocobalamin (VITAMIN B12) 1000 MCG tablet controlled-release, Take 2,500 mcg by mouth Daily., Disp: , Rfl:   •  dilTIAZem CD (CARDIZEM CD) 180 MG 24 hr capsule, Take 1 capsule by mouth Daily. (Patient taking differently: Take 180 mg by mouth every night at bedtime.), Disp: 90 capsule, Rfl: 1  •  furosemide (LASIX) 40 MG tablet, Take 1 tablet by mouth Daily As Needed (edema)., Disp: 90 tablet, Rfl: 2  •  Homeopathic Products (LEG CRAMP RELIEF) tablet, LEG CRAMP RELIEF TABS, Disp: , Rfl:   •  metoprolol succinate XL  (TOPROL-XL) 25 MG 24 hr tablet, Take 1 tablet by mouth Daily for 30 days., Disp: 30 tablet, Rfl: 0  •  Premarin 0.625 MG/GM vaginal cream, Insert  into the vagina 2 (Two) Times a Week. (Patient taking differently: Insert  into the vagina As Needed.), Disp: 30 g, Rfl: 3  •  spironolactone (ALDACTONE) 25 MG tablet, Take 1 tablet by mouth 2 (Two) Times a Day., Disp: 180 tablet, Rfl: 1  •  triamcinolone (KENALOG) 0.1 % cream, Apply  topically to the appropriate area as directed 2 (Two) Times a Day., Disp: 80 g, Rfl: 2       LABS (Reviewed):  Hematology WBC   Date Value Ref Range Status   04/04/2022 9.86 3.40 - 10.80 10*3/mm3 Final     RBC   Date Value Ref Range Status   04/04/2022 3.93 3.77 - 5.28 10*6/mm3 Final     Hemoglobin   Date Value Ref Range Status   04/04/2022 9.3 (L) 12.0 - 15.9 g/dL Final     Hematocrit   Date Value Ref Range Status   04/04/2022 31.2 (L) 34.0 - 46.6 % Final     Platelets   Date Value Ref Range Status   04/04/2022 473 (H) 140 - 450 10*3/mm3 Final      Chemistry   Lab Results   Component Value Date    GLUCOSE 155 (H) 03/23/2022    BUN 18 03/23/2022    CREATININE 1.11 (H) 03/23/2022    EGFRIFNONA 84 02/01/2022    BCR 16.2 03/23/2022    K 5.3 (H) 03/23/2022    CO2 24.0 03/23/2022    CALCIUM 8.6 03/23/2022    ALBUMIN 2.70 (L) 03/21/2022    LABIL2 1.2 05/29/2019    AST 11 03/21/2022    ALT 8 03/21/2022         Physical Exam:         Pulmonary: [] Cough:     [] Dyspnea:  Neck:  [] Lymphadenopathy  Lungs:  [] Clear to auscultation  CVS:  [] Regular rate & rhythm  Skin:  thGthrthathdtheth:th th4th GI:  [] Dysphagia:     [] Esophagitis:     Comments/Notes:     [x] Review of labs, images, dosimetry, dose delivered, & treatment parameters.    Comments:     [x] Patient treatment setup reviewed.    Comments:     Recommendations:     [x] Continue RT  [] Change RT Plan [] Hold RT, length:        Approved Electronically By:  George Thrasher MD, 4/6/2022, 13:29 EDT          Confidentiality of this medical record shall be  maintained except when use or disclosure is required or permitted by law, regulation or written authorization by the patient.

## 2022-04-07 ENCOUNTER — HOSPITAL ENCOUNTER (OUTPATIENT)
Dept: RADIATION ONCOLOGY | Facility: HOSPITAL | Age: 82
Discharge: HOME | End: 2022-04-07

## 2022-04-07 ENCOUNTER — TELEPHONE (OUTPATIENT)
Dept: ONCOLOGY | Facility: HOSPITAL | Age: 82
End: 2022-04-07

## 2022-04-07 PROCEDURE — 77386: CPT | Performed by: RADIOLOGY

## 2022-04-07 PROCEDURE — 77014 CHG CT GUIDANCE RADIATION THERAPY FLDS PLACEMENT: CPT | Performed by: RADIOLOGY

## 2022-04-07 NOTE — TELEPHONE ENCOUNTER
PT called needing to reschedule Iron infusion as she has XRT at the same time.  Explained that we have no other appt time on Monday and that I am not seeing any xrt appts. Pt transferred to radiation therapy scheduling department.

## 2022-04-08 ENCOUNTER — HOSPITAL ENCOUNTER (OUTPATIENT)
Dept: RADIATION ONCOLOGY | Facility: HOSPITAL | Age: 82
Discharge: HOME | End: 2022-04-08

## 2022-04-08 ENCOUNTER — RADIATION ONCOLOGY WEEKLY ASSESSMENT (OUTPATIENT)
Dept: RADIATION ONCOLOGY | Facility: HOSPITAL | Age: 82
End: 2022-04-08

## 2022-04-08 ENCOUNTER — READMISSION MANAGEMENT (OUTPATIENT)
Dept: CALL CENTER | Facility: HOSPITAL | Age: 82
End: 2022-04-08

## 2022-04-08 VITALS
WEIGHT: 124 LBS | OXYGEN SATURATION: 96 % | HEART RATE: 78 BPM | SYSTOLIC BLOOD PRESSURE: 136 MMHG | DIASTOLIC BLOOD PRESSURE: 67 MMHG | RESPIRATION RATE: 16 BRPM | BODY MASS INDEX: 23.43 KG/M2

## 2022-04-08 DIAGNOSIS — C34.91 BRONCHOGENIC CANCER OF RIGHT LUNG: Primary | ICD-10-CM

## 2022-04-08 PROCEDURE — 77386: CPT | Performed by: RADIOLOGY

## 2022-04-08 PROCEDURE — 77014 CHG CT GUIDANCE RADIATION THERAPY FLDS PLACEMENT: CPT | Performed by: RADIOLOGY

## 2022-04-08 NOTE — OUTREACH NOTE
CHF Week 3 Survey    Flowsheet Row Responses   [facility] patient discharged from? [facility]   Does the patient have one of the following disease processes/diagnoses(primary or secondary)? CHF   Week 3 attempt successful? No   Unsuccessful attempts Attempt 1          SCOTTIE CORONA - Registered Nurse

## 2022-04-11 ENCOUNTER — HOSPITAL ENCOUNTER (OUTPATIENT)
Dept: RADIATION ONCOLOGY | Facility: HOSPITAL | Age: 82
Discharge: HOME | End: 2022-04-11

## 2022-04-11 ENCOUNTER — HOSPITAL ENCOUNTER (OUTPATIENT)
Dept: ONCOLOGY | Facility: HOSPITAL | Age: 82
Setting detail: INFUSION SERIES
Discharge: HOME | End: 2022-04-11

## 2022-04-11 VITALS
SYSTOLIC BLOOD PRESSURE: 119 MMHG | TEMPERATURE: 97.1 F | BODY MASS INDEX: 23.32 KG/M2 | DIASTOLIC BLOOD PRESSURE: 69 MMHG | HEIGHT: 61 IN | WEIGHT: 123.5 LBS | OXYGEN SATURATION: 98 % | HEART RATE: 63 BPM | RESPIRATION RATE: 18 BRPM

## 2022-04-11 DIAGNOSIS — K90.49 MALABSORPTION DUE TO INTOLERANCE, NOT ELSEWHERE CLASSIFIED: ICD-10-CM

## 2022-04-11 DIAGNOSIS — E61.1 IRON DEFICIENCY: ICD-10-CM

## 2022-04-11 DIAGNOSIS — D50.8 OTHER IRON DEFICIENCY ANEMIAS: Primary | ICD-10-CM

## 2022-04-11 PROCEDURE — 96365 THER/PROPH/DIAG IV INF INIT: CPT

## 2022-04-11 PROCEDURE — 77014 CHG CT GUIDANCE RADIATION THERAPY FLDS PLACEMENT: CPT | Performed by: RADIOLOGY

## 2022-04-11 PROCEDURE — 77386: CPT | Performed by: RADIOLOGY

## 2022-04-11 PROCEDURE — 36415 COLL VENOUS BLD VENIPUNCTURE: CPT

## 2022-04-11 PROCEDURE — 25010000002 IRON SUCROSE PER 1 MG: Performed by: INTERNAL MEDICINE

## 2022-04-11 RX ORDER — SODIUM CHLORIDE 9 MG/ML
250 INJECTION, SOLUTION INTRAVENOUS ONCE
Status: COMPLETED | OUTPATIENT
Start: 2022-04-11 | End: 2022-04-11

## 2022-04-11 RX ADMIN — IRON SUCROSE 200 MG: 20 INJECTION, SOLUTION INTRAVENOUS at 11:09

## 2022-04-11 RX ADMIN — SODIUM CHLORIDE 250 ML: 9 INJECTION, SOLUTION INTRAVENOUS at 11:09

## 2022-04-11 NOTE — PATIENT INSTRUCTIONS
RADIATION THERAPY DISCHARGE INSTRUCTIONS  Chest    CONGRATULATIONS! You completed 15 radiation treatments for treatment of your malignant neoplasm of upper lobe, right bronchus or lung cancer. These discharge instructions are important for you to follow until your one-month follow up appointment with Dr. Michael Whitten. Please make sure to review these instructions and call the Radiation Oncology Department if you have any questions or concerns with symptoms you may experience. Thank you for trusting us with your cancer treatment!    DIET  Eat a regular, well balanced diet that is high in protein such as meat, eggs, cheese, and nut butters  Drink 48 to 64 ounces of fluid daily  Use nutritional supplements if you are not able to eat full meals  Monitor your weight and report continued weight loss to your doctor    MEDICATIONS  Use Tylenol as needed to decrease discomfort and irritation to treatment area  Take pain medications only as prescribed  Take a laxative or stool softener as needed to prevent constipation due to pain medications    SKIN CARE  Wash treated skin gently with your hands using a mild, non-drying soap such as Dove® or Aveeno® until skin returns to normal  Pat skin dry - do not rub  Keep treated skin moist with frequent applications of Aquaphor® or unscented lotion (such as Eucerin)®  Always protect your treated skin when outdoors by wearing protective clothing and applying sunscreen SPF 15 or higher at least 30 minutes before going outdoors and reapply frequently  Resume use of deodorant to the armpit of the affected arm when skin reactions improve    ACTIVITY  Fatigue is a normal side effect of radiation therapy and should improve over time  Alternate rest and activity  Exercise such as walking may help to improve your fatigue    FOLLOW-UP  Continue follow-up appointments with all other doctors as necessary  Call your radiation oncology doctor if you are concerned with any side effects you are  experiencing, such as increased shortness of breath, fevers or chills, night sweats, increased coughing or new cough, blood in your sputum, or difficulty swallowing: [phone]    _______________________________________________________________________    Completed by: Nataliya Bahena RN BSN, Radiation Oncology Nurse on  04/12/2022 at 09:39 EDT

## 2022-04-11 NOTE — PROGRESS NOTES
RADIATION THERAPY DISCHARGE INSTRUCTIONS    Chrystal Ruiz completed 6000 cGy in 15 fractions for treatment of malignant neoplasm of upper lobe, right bronchus or lung. The following instructions were provided to the patient and/or family in their printed after visit summary (AVS) as well as discussed in-person with the radiation oncology nurse. The patient and/or family member had the opportunity to ask questions and verbalized their questions were adequately answered. Patient is scheduled for one-month follow-up appointment with Dr. Michael Whitten on Friday, May 13, 2022 at 11:00am.     DIET  [x]  Eat a regular, well balanced diet that is high in protein such as meat, eggs, cheese, and nut butters  [x]  Drink 48 to 64 ounces of fluid daily  []  Drink lots of fluids to help decrease thick oral secretions  [x]  Use nutritional supplements if you are not able to eat full meals  [x]  Monitor your weight and report continued weight loss to your doctor    MEDICATIONS  [x]  Use Tylenol as needed to decrease discomfort and irritation to treatment area  []  Use Tylenol as needed to decrease breast discomfort and irritation due to swelling and skin reaction  [x]  Take pain medications only as prescribed  [x]  Take a laxative or stool softener as needed to prevent constipation due to pain medications  []  Use a synthetic saliva product (such as Salivart®, Glandosane®, or MouthKote®) as needed to alleviate dry mouth or throat  []  Use Chrystal's Magic Mouthwash or other oral pain relief medication before meals and before taking medications as needed to ease the discomfort of swallowing  []  Use an over-the-counter decongestant (such as Sudafed®) if your ears feel plugged  []  Use Imodium (up to 8 pills per day) as needed for loose stools    SKIN CARE  [x]  Wash treated skin gently with your hands using a mild, non-drying soap such as Dove® or Aveeno® until skin returns to normal  [x]  Pat skin dry - do not rub  [x]  Keep treated skin  moist with frequent applications of Aquaphor® or unscented lotion (such as Eucerin)®  [x]  Always protect your treated skin when outdoors by wearing protective clothing and applying sunscreen SPF 15 or higher at least 30 minutes before going outdoors and reapply frequently  [x]  Resume use of deodorant to the armpit of the affected arm when skin reactions improve    ORAL CARE  []  Continue oral rinses as directed until mouth soreness goes away  []  Avoid using commercial mouthwash that contains alcohol  []  Rinse mouth with salt and soda solution: 2 teaspoons salt and 2 teaspoons baking soda dissolved in 8 ounces warm water  []  Perform oral care twice daily and after each meal using a soft or extra soft toothbrush  []  Continue regular visits to your dentist and follow guidelines to care for your teeth    ACTIVITY  [x]  Fatigue is a normal side effect of radiation therapy and should improve over time  [x]  Alternate rest and activity  [x]  Exercise such as walking may help to improve your fatigue    FOLLOW-UP  [x]  Continue follow-up appointments with all other doctors as necessary  []  Continue to have regular mammograms as ordered by your physician  []  Ensure you have a PSA level drawn at [facility] laboratory (no appointment necessary) at least two days prior to your one month follow-up appointment with Dr. Whitten  []  Call your radiation oncology doctor if you are concerned with any side effects you are experiencing: [phone]  [x]  Call your radiation oncology doctor if you are concerned with any side effects you are experiencing, such as increased shortness of breath, fevers or chills, night sweats, increased coughing or new cough, blood in your sputum, or difficulty swallowing: [phone]    SPECIAL INSTRUCTIONS  []  Perform self-breast exams monthly (see below)  []  Continue all range of motion and mobility exercise as instructed (if applicable)  []  Never allow blood draws or blood  pressures to be taken on your affected arm unless in an emergency situation (if applicable)  []  Practice careful nail care and avoid open skin to your affected arm and hand  []  Call your physician if you notice swelling of your affected arm or hand that is unusual or doesn't go away  []  Continue performing the on-treatment skin care routine recommended by your radiation oncology until your 1-month follow-up appointment  []  Do not stop taking your steroid medication suddenly; your medical or radiation oncologist will slowly decrease your dose to prevent any adverse effects.  []  Do not drive until your doctor has said it's okay to do so  []  Reintroduce fiber into your diet slowly so you will know which foods cause loose stools or cramps  []  Use sitz baths as directed  []  Side effects should subside in a couple weeks; tell your doctor if you have increased burning, frequency, or blood in your urine or stool  []  Notify your medical or radiation oncologist if you notice any white patches inside your mouth or on your tongue, with or without foul taste. This could be a yeast infection and prompt treatment is important.  _______________________________________________________________________    Completed by: Nataliya Bahena RN BSN, Radiation Oncology Nurse on 04/12/2022  at 09:40 EDT

## 2022-04-12 ENCOUNTER — HOSPITAL ENCOUNTER (OUTPATIENT)
Dept: RADIATION ONCOLOGY | Facility: HOSPITAL | Age: 82
Discharge: HOME | End: 2022-04-12

## 2022-04-12 ENCOUNTER — RADIATION ONCOLOGY WEEKLY ASSESSMENT (OUTPATIENT)
Dept: RADIATION ONCOLOGY | Facility: HOSPITAL | Age: 82
End: 2022-04-12

## 2022-04-12 VITALS
RESPIRATION RATE: 18 BRPM | OXYGEN SATURATION: 100 % | SYSTOLIC BLOOD PRESSURE: 108 MMHG | BODY MASS INDEX: 23.41 KG/M2 | HEART RATE: 66 BPM | TEMPERATURE: 97.1 F | WEIGHT: 124 LBS | HEIGHT: 61 IN | DIASTOLIC BLOOD PRESSURE: 67 MMHG

## 2022-04-12 DIAGNOSIS — C34.91 BRONCHOGENIC CANCER OF RIGHT LUNG: Primary | ICD-10-CM

## 2022-04-12 PROCEDURE — 77386: CPT | Performed by: RADIOLOGY

## 2022-04-12 PROCEDURE — 77014 CHG CT GUIDANCE RADIATION THERAPY FLDS PLACEMENT: CPT | Performed by: RADIOLOGY

## 2022-04-12 PROCEDURE — FACE2FACE: Performed by: RADIOLOGY

## 2022-04-12 NOTE — PROGRESS NOTES
"Patient Name: Chrystal Ruiz Date: 2022       : 1940        MRN #: [id number] Diagnosis:   Encounter Diagnosis   Name Primary?   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 ID Energy Dose/Fx (cGy) #Fx Dose Correction (cGy) Total Dose (cGy) Start Date End Date Elapsed Days   RtUpperLobe RtUpperLobe 6X 400 15 / 15 0 6,000 3/23/2022 2022 20     Intent:curative  General:           Review of Systems    [] No new complaints [] Fever/chills  Night sweats  [] Decreased energy level [] Decreased appetite [] Oxygen   [] Dry cough [] Productive cough [] SOB  [] Hemoptysis [] Dysphagia      [x] Fatigue,  severity: 1 Relief w/ Rest [] Pain,  severity: ----------------, location:     Pain medication regimen: NONE      Esophagitis regimen: NONE      Skin regimen: Aquaphor     Comments/Notes:  No CW/rib/shoulder/arm pain with treatment  No new cough or increased SOB    KPS: 80%       Vital Signs: /67   Pulse 66   Temp 97.1 °F (36.2 °C) (Temporal)   Resp 18   Ht 154.9 cm (61\")   Wt 56.2 kg (124 lb)   SpO2 100%   BMI 23.43 kg/m²     Weight:   Wt Readings from Last 3 Encounters:   22 56.2 kg (124 lb)   22 56 kg (123 lb 8 oz)   22 56.2 kg (124 lb)       Medication:   Current Outpatient Medications:   •  acetaminophen (TYLENOL) 500 MG tablet, Take 500 mg by mouth Every 6 (Six) Hours As Needed for Mild Pain ., Disp: , Rfl:   •  Breo Ellipta 200-25 MCG/INH inhaler, Inhale 1 puff Daily., Disp: 1 each, Rfl: 2  •  Cholecalciferol (VITAMIN D3) 2000 units capsule, Take 2,000 Units by mouth Daily., Disp: , Rfl:   •  Cyanocobalamin (VITAMIN B12) 1000 MCG tablet controlled-release, Take 2,500 mcg by mouth Daily., Disp: , Rfl:   •  dilTIAZem CD (CARDIZEM CD) 180 MG 24 hr capsule, Take 1 capsule by mouth Daily. (Patient taking differently: Take 180 mg by mouth every night at bedtime.), Disp: 90 " capsule, Rfl: 1  •  furosemide (LASIX) 40 MG tablet, Take 1 tablet by mouth Daily As Needed (edema)., Disp: 90 tablet, Rfl: 2  •  Homeopathic Products (LEG CRAMP RELIEF) tablet, LEG CRAMP RELIEF TABS, Disp: , Rfl:   •  metoprolol succinate XL (TOPROL-XL) 25 MG 24 hr tablet, Take 1 tablet by mouth Daily for 30 days., Disp: 30 tablet, Rfl: 0  •  Premarin 0.625 MG/GM vaginal cream, Insert  into the vagina 2 (Two) Times a Week. (Patient taking differently: Insert  into the vagina As Needed.), Disp: 30 g, Rfl: 3  •  spironolactone (ALDACTONE) 25 MG tablet, Take 1 tablet by mouth 2 (Two) Times a Day., Disp: 180 tablet, Rfl: 1  •  triamcinolone (KENALOG) 0.1 % cream, Apply  topically to the appropriate area as directed 2 (Two) Times a Day., Disp: 80 g, Rfl: 2  No current facility-administered medications for this visit.       LABS (Reviewed):  Hematology WBC   Date Value Ref Range Status   04/04/2022 9.86 3.40 - 10.80 10*3/mm3 Final     RBC   Date Value Ref Range Status   04/04/2022 3.93 3.77 - 5.28 10*6/mm3 Final     Hemoglobin   Date Value Ref Range Status   04/04/2022 9.3 (L) 12.0 - 15.9 g/dL Final     Hematocrit   Date Value Ref Range Status   04/04/2022 31.2 (L) 34.0 - 46.6 % Final     Platelets   Date Value Ref Range Status   04/04/2022 473 (H) 140 - 450 10*3/mm3 Final      Chemistry   Lab Results   Component Value Date    GLUCOSE 155 (H) 03/23/2022    BUN 18 03/23/2022    CREATININE 1.11 (H) 03/23/2022    EGFRIFNONA 84 02/01/2022    BCR 16.2 03/23/2022    K 5.3 (H) 03/23/2022    CO2 24.0 03/23/2022    CALCIUM 8.6 03/23/2022    ALBUMIN 2.70 (L) 03/21/2022    LABIL2 1.2 05/29/2019    AST 11 03/21/2022    ALT 8 03/21/2022         Physical Exam:         Pulmonary: [x] Cough:intermittent since her pneumonia--low bother     [] Dyspnea:  Neck:  [] Lymphadenopathy  Lungs:  [x] Clear to auscultation  CVS:  [x] Regular rate & rhythm  Skin:  stGstrstastdstest:st st1st GI:  [] Dysphagia:     [x] Esophagitis:none     Comments/Notes:     [x]  Review of labs, images, dosimetry, dose delivered, & treatment parameters.    Comments:     [x] Patient treatment setup reviewed.    Comments:     Recommendations: 1 month f/u here  Completed curative intent therapy.    [x] Continue RT  [] Change RT Plan [] Hold RT, length:        Approved Electronically By:  Michael Whitten MD, 4/12/2022, 11:15 EDT          Confidentiality of this medical record shall be maintained except when use or disclosure is required or permitted by law, regulation or written authorization by the patient.

## 2022-04-13 ENCOUNTER — READMISSION MANAGEMENT (OUTPATIENT)
Dept: CALL CENTER | Facility: HOSPITAL | Age: 82
End: 2022-04-13

## 2022-04-13 NOTE — OUTREACH NOTE
CHF Week 3 Survey    Flowsheet Row Responses   [facility] patient discharged from? [facility]   Does the patient have one of the following disease processes/diagnoses(primary or secondary)? CHF   Week 3 attempt successful? Yes   Call start time 1545   Call end time 1548   Discharge diagnosis HFpEF,   Anemia   Meds reviewed with patient/caregiver? Yes   Is the patient taking all medications as directed (includes completed medication regime)? Yes   Has the patient kept scheduled appointments due by today? Yes   Has home health visited the patient within 72 hours of discharge? N/A   Pulse Ox monitoring None   Psychosocial issues? No   Comments Pt reports no further SOA or edema. She reports she no longer does daily wts as she feels it is too stressfull to her to monitor daily.   What is the patient's perception of their health status since discharge? Returned to baseline/stable   Is the patient weighing daily? No   Is the patient able to teach back Heart Failure Zones? Yes   CHF Week 3 call completed? Yes   Revoked No further contact(revokes)-requires comment   Is the patient interested in additional calls from an ambulatory ?  NOTE:  applies to high risk patients requiring additional follow-up. No   Graduated/Revoked comments stable   Wrap up additional comments finishing last radiation 4-12,  having repeat scan 4-20.          SCOTTIE CORONA - Registered Nurse

## 2022-04-20 ENCOUNTER — APPOINTMENT (OUTPATIENT)
Dept: CT IMAGING | Facility: HOSPITAL | Age: 82
End: 2022-04-20

## 2022-04-20 ENCOUNTER — HOSPITAL ENCOUNTER (OUTPATIENT)
Dept: PET IMAGING | Facility: HOSPITAL | Age: 82
Discharge: HOME | End: 2022-04-20
Admitting: INTERNAL MEDICINE

## 2022-04-20 DIAGNOSIS — C34.11 MALIGNANT NEOPLASM OF UPPER LOBE OF RIGHT LUNG: ICD-10-CM

## 2022-04-20 PROCEDURE — 74150 CT ABDOMEN W/O CONTRAST: CPT

## 2022-04-21 ENCOUNTER — OFFICE VISIT (OUTPATIENT)
Dept: CARDIOLOGY | Facility: CLINIC | Age: 82
End: 2022-04-21

## 2022-04-21 VITALS
BODY MASS INDEX: 23.33 KG/M2 | SYSTOLIC BLOOD PRESSURE: 104 MMHG | HEART RATE: 91 BPM | HEIGHT: 61 IN | WEIGHT: 123.6 LBS | RESPIRATION RATE: 18 BRPM | DIASTOLIC BLOOD PRESSURE: 62 MMHG

## 2022-04-21 DIAGNOSIS — I48.0 PAROXYSMAL ATRIAL FIBRILLATION: Primary | ICD-10-CM

## 2022-04-21 PROCEDURE — 99214 OFFICE O/P EST MOD 30 MIN: CPT | Performed by: INTERNAL MEDICINE

## 2022-04-21 RX ORDER — METOPROLOL SUCCINATE 50 MG/1
50 TABLET, EXTENDED RELEASE ORAL DAILY
Qty: 30 TABLET | Refills: 5 | Status: SHIPPED | OUTPATIENT
Start: 2022-04-21 | End: 2022-09-07

## 2022-04-21 RX ORDER — DILTIAZEM HYDROCHLORIDE 120 MG/1
120 CAPSULE, EXTENDED RELEASE ORAL DAILY
Qty: 90 CAPSULE | Refills: 1 | Status: SHIPPED | OUTPATIENT
Start: 2022-04-21 | End: 2022-05-20

## 2022-04-21 NOTE — PROGRESS NOTES
Cardiology Clinic Note  Jeremy Mccall MD, PhD    Subjective:     Encounter Date:04/21/2022      Patient ID: Chrystal Ruiz is a 81 y.o. female.    Chief Complaint:  Chief Complaint   Patient presents with   • Congestive Heart Failure       HPI:  I the pleasure to see this 81-year-old female previously established with another cardiologist but otherwise looking for second opinion.  She was recently hospitalized with findings of diastolic dysfunction grade 2 with mild volume overload and diastolic CHF exacerbation also with new onset atrial fibrillation.  She has had chronic anemia and was ultimately laced on anticoagulation but had worsening anemia and this was stopped.  She had a discussion with other doctors related to watchman procedure but elected to think about this further.  We did discuss that further contemplation with no anticoagulation still has not uncovered stroke risk that we did discuss and should be recognized although we are not pushing for procedures but just to recognize risk with anticoagulation would be bleeding and with off anticoagulation would be relative to stroke and 81 years old with [id number] score of at  3-4 at 8-10 %/year.  She is presently on Cardizem but says this is completely taken away her taste.  She is under work-up with chest radiation for a pulmonary or lung nodule suspicious for neoplasm.  She has had 30 pounds of weight loss unintentionally.  We discussed that this is likely not secondary to her Lasix or fluid pills as this would represent over 15 L of fluid off.  She says she is eating at least 3 meals a day.  We did discuss that we would leave this work-up to oncology as well as primary care and thoracic surgery.  We had a lengthy discussion today on atrial fibrillation of paroxysmal nature, hypertension, weight and volume status, diastolic CHF each individually.  With respect to her A. fib she is concerned about blood thinners but also concerned about stroke and we did  "present again watchman procedure which would need anticoagulation for 40 days and then cessation indefinitely.  She would need a transesophageal echo as she has been off anticoagulation for at least 6 weeks which she understands.  We discussed diastolic CHF with volume control essentially and heart rate control which is not optimal at 90s today only on low-dose beta-blocker.  We discussed Lasix of which she has no peripheral edema and she may be able to go to every other day Lasix dosing.    Review of systems otherwise negative x14 point review of systems except as mentioned above next      Historical data copied forward from previous encounters in EMR including the history, exam, and assessment/plan has been reviewed and is unchanged unless noted otherwise.    Cardiac medicines reviewed with risk, benefits, and necessity of each discussed.    Risk and benefit of cardiac testing reviewed including death heart attack stroke pain bleeding infection need for vascular /cardiovascular surgery were discussed and the patient     Objective:         /62 (BP Location: Left arm, Patient Position: Sitting)   Pulse 91   Resp 18   Ht 154.9 cm (61\")   Wt 56.1 kg (123 lb 9.6 oz)   BMI 23.35 kg/m²     Physical Exam  Regular rate and rhythm with no rubs murmurs gallops today  No heave no lift  No peripheral edema next no carotid bruits or JVD  Normal radial pulses  Normal cap refill  Soft nontender nondistended  Clear to auscultation bilaterally  Assessment:         Diagnoses and all orders for this visit:    1. Paroxysmal atrial fibrillation (HCC) (Primary)  -     Ambulatory Referral to Cardiac Electrophysiology    Other orders  -     metoprolol succinate XL (TOPROL-XL) 50 MG 24 hr tablet; Take 1 tablet by mouth Daily.  Dispense: 30 tablet; Refill: 5  -     dilTIAZem XR (DILACOR XR) 120 MG 24 hr capsule; Take 1 capsule by mouth Daily.  Dispense: 90 capsule; Refill: 1      Has bled score greater than 3  [id number] score " greater than 3  Watchman discussed with the patient extensively along with risk and benefits and indications    I believe she is a good candidate for watchman clinically with anemia requiring transfusion also with significant stroke risk from paroxysmal atrial fibrillation    This visit will serve as shared decision-making for watchman procedure     Plan:        Paroxysmal A. fib  Advance metoprolol extended release to 50 daily  Decrease Cardizem to 120 daily  Will defer sotalol at this point in time, would not use amiodarone with pulmonary pathology presently  Will continue to defer anticoagulation at this time given bleeding risk  Refer to electrophysiology for watchman evaluation  Avoid NSAIDs including aspirin    Essential hypertension well-controlled  Hyperlipidemia continue present medicines  No history of any CAD  Normal stress test in the last 1 year  Preserved LVEF    Follow-up 3 months    Jeremy Mccall MD, PhD    The pleasure to be involved in this patient's cardiovascular care.  Please call with any questions or concerns  Jeremy Mccall MD, PhD    Most recent EKG as reviewed and interpreted by me:  Procedures     Most recent echo as reviewed and interpreted by me:  Results for orders placed during the hospital encounter of 01/24/22    Adult Transthoracic Echo Complete W/ Cont if Necessary Per Protocol    Interpretation Summary  · Estimated right ventricular systolic pressure from tricuspid regurgitation is normal (<35 mmHg).  · Moderate pulmonic valve regurgitation is present.  · Left ventricular ejection fraction appears to be 61 - 65%.  · Left ventricular diastolic function is consistent with (grade II w/high LAP) pseudonormalization.      Most recent stress test as reviewed and interpreted by me:  Results for orders placed during the hospital encounter of 01/24/22    Stress Test With Myocardial Perfusion One Day    Interpretation Summary  Indications  Shortness of breath    This study was performed  under my direct supervision.    Resting ECG  Sinus rhythm.    The patient was injected with Lexiscan intravenously while constantly monitoring electrocardiogram and vital signs.  Patient did not have any chest discomfort ST abnormalities or ectopy with injection of Lexiscan.    Cardiolite was used as an imaging agent.    Cardiolite images showed uniform distribution of radionuclide without any evidence for myocardial ischemia.    Gated SPECT images revealed normal left ventricle size and contractility with ejection fraction of 74%.    Impression  ========  Lexiscan Cardiolite test is negative for myocardial ischemia.    Gated SPECT images revealed normal left ventricular size and contractility with ejection fraction of 74%.      Most recent cardiac catheterization as reviewed interpreted by me:  No results found for this or any previous visit.    The following portions of the patient's history were reviewed and updated as appropriate: allergies, current medications, past family history, past medical history, past social history, past surgical history and problem list.      ROS:  14 point review of systems negative except as mentioned above    Current Outpatient Medications:   •  acetaminophen (TYLENOL) 500 MG tablet, Take 500 mg by mouth Every 6 (Six) Hours As Needed for Mild Pain ., Disp: , Rfl:   •  Breo Ellipta 200-25 MCG/INH inhaler, Inhale 1 puff Daily., Disp: 1 each, Rfl: 2  •  Cholecalciferol (VITAMIN D3) 2000 units capsule, Take 2,000 Units by mouth Daily., Disp: , Rfl:   •  Cyanocobalamin (VITAMIN B12) 1000 MCG tablet controlled-release, Take 2,500 mcg by mouth Daily., Disp: , Rfl:   •  dilTIAZem CD (CARDIZEM CD) 180 MG 24 hr capsule, Take 1 capsule by mouth Daily. (Patient taking differently: Take 180 mg by mouth every night at bedtime.), Disp: 90 capsule, Rfl: 1  •  furosemide (LASIX) 40 MG tablet, Take 1 tablet by mouth Daily As Needed (edema). (Patient taking differently: Take 40 mg by mouth Daily.),  Disp: 90 tablet, Rfl: 2  •  spironolactone (ALDACTONE) 25 MG tablet, Take 1 tablet by mouth 2 (Two) Times a Day. (Patient taking differently: Take 25 mg by mouth Daily.), Disp: 180 tablet, Rfl: 1  •  triamcinolone (KENALOG) 0.1 % cream, Apply  topically to the appropriate area as directed 2 (Two) Times a Day., Disp: 80 g, Rfl: 2  •  dilTIAZem XR (DILACOR XR) 120 MG 24 hr capsule, Take 1 capsule by mouth Daily., Disp: 90 capsule, Rfl: 1  •  metoprolol succinate XL (TOPROL-XL) 50 MG 24 hr tablet, Take 1 tablet by mouth Daily., Disp: 30 tablet, Rfl: 5  •  Premarin 0.625 MG/GM vaginal cream, Insert  into the vagina 2 (Two) Times a Week. (Patient taking differently: Insert  into the vagina As Needed.), Disp: 30 g, Rfl: 3    Problem List:  Patient Active Problem List   Diagnosis   • Grief reaction   • Alopecia   • Arthritis   • Back pain   • Encounter for general adult medical examination without abnormal findings   • Hypertension   • Lung nodule   • Vitamin D deficiency   • Shortness of breath   • Postmenopausal status   • Nevus, non-neoplastic   • Medicare annual wellness visit, subsequent   • Fam hx-ischem heart disease   • FH: thyroid condition   • Cellulitis of right anterior lower leg   • Vasculitis of skin   • Pedal edema   • Bilateral calf pain   • Neck pain   • Immunization reaction   • Hyperlipidemia   • Tremor   • Bright red rectal bleeding   • Acute non-recurrent maxillary sinusitis   • Acute pain of right knee   • Chronic pain of right knee   • Need for vaccination   • Lung nodules   • Tachycardia   • Essential (primary) hypertension    • Dermatitis   • Acute diastolic CHF (congestive heart failure) (HCC)   • A-fib (HCC)   • Leukopenia   • Anemia   • Primary osteoarthritis of right knee   • Bronchogenic cancer of right lung (HCC)   • Iron deficiency   • Malabsorption due to intolerance, not elsewhere classified   • Dyspnea   • Other iron deficiency anemias     Past Medical History:  Past Medical History:    Diagnosis Date   • A-fib (HCC)    • Anemia    • Arthritis    • Asthma    • CHF (congestive heart failure) (HCC)    • Hypertension      Past Surgical History:  Past Surgical History:   Procedure Laterality Date   • CATARACT EXTRACTION     • CHOLECYSTECTOMY     • HYSTERECTOMY  1987    Endometriosis   • OOPHORECTOMY     • TONSILLECTOMY       Social History:  Social History     Socioeconomic History   • Marital status:    Tobacco Use   • Smoking status: Never Smoker   • Smokeless tobacco: Never Used   Vaping Use   • Vaping Use: Never used   Substance and Sexual Activity   • Alcohol use: No   • Drug use: Never   • Sexual activity: Not Currently     Allergies:  Allergies   Allergen Reactions   • Ciprofloxacin Unknown (See Comments)   • Sulfa Antibiotics Unknown (See Comments)   • Cyprodenate Unknown - High Severity   • Iodine Unknown - High Severity     Immunizations:  Immunization History   Administered Date(s) Administered   • COVID-19 (MODERNA) 1st, 2nd, 3rd Dose Only 01/20/2021, 02/17/2021, 08/26/2021   • COVID-19 (MODERNA) BOOSTER 01/20/2021, 02/17/2021, 08/26/2021   • FLUAD TRI 65YR+ 10/25/2013, 11/13/2014, 09/30/2015, 09/25/2019   • Fluad Quad 65+ 09/08/2020   • Fluzone High Dose =>65 Years ([facility] ONLY) 10/05/2016, 10/17/2017, 09/25/2019   • Fluzone High-Dose 65+yrs 11/15/2021   • H1N1 All Forms 01/29/2010   • Hepatitis A 04/24/2018, 10/24/2018   • Influenza Quad Vaccine (Inpatient) 09/08/2020   • Pneumococcal Conjugate 13-Valent (PCV13) 02/24/2016   • Shingrix 07/01/2019, 10/09/2019            In-Office Procedure(s):  No orders to display        ASCVD RIsk Score::  The ASCVD Risk score ([place] WILSON Jr., et al., 2013) failed to calculate for the following reasons:    The 2013 ASCVD risk score is only valid for ages 40 to 79    The patient has a prior MI or stroke diagnosis    Imaging:    Results for orders placed during the hospital encounter of 03/21/22    XR Chest 1 View    Narrative  DATE OF  EXAM:  3/21/2022 8:25 AM    PROCEDURE:  XR CHEST 1 VW-    INDICATIONS:  soa    COMPARISON:  AP portable chest 2/1/2022. CT chest 3/2/2022.    TECHNIQUE:  Single radiographic view of the chest was obtained.    FINDINGS:  Biapical pleural-parenchymal thickening is unchanged from prior  examination. Previously described upper lobe pulmonary nodules are less  conspicuous on today's examination. Coarse linear densities are seen in  the suprahilar regions of each lung, favored to represent areas of  scarring. There is blunting of the costophrenic angles bilaterally which  could represent pleural scarring versus trace pleural effusions. Small  noncalcified nodules seen in both lungs on prior CT chest or  inconspicuous on today's study. There is stable mild cardiac  enlargement. There is upper lumbar curvature toward the left.    Impression  1. No acute airspace disease is seen. Small nodular densities seen  within both lungs on prior chest x-ray and CT chest are inconspicuous on  today's examination.  2. Trace bibasilar pleural effusions versus pleural parenchymal  scarring.    Electronically Signed By-Loni Serrano MD On:3/21/2022 8:49 AM  This report was finalized on [id number] by  Loni Serrano MD.       Results for orders placed during the hospital encounter of 04/20/22    CT Abdomen Without Contrast    Narrative  DATE OF EXAM:  4/20/2022 11:44 AM    PROCEDURE:  CT ABDOMEN WO CONTRAST-    INDICATIONS:  malignant neoplasm of upper lobe of right lung; C34.11-Malignant  neoplasm of upper lobe, right bronchus or lung    COMPARISON:  CT chest 3/2/2022. No prior CT abdomen for comparison at this  institution.    TECHNIQUE:  Routine transaxial slices were obtained through the abdomen without the  administration of intravenous contrast. Reconstructed coronal and  sagittal images were also obtained. Automated exposure control and  iterative construction methods were used.    FINDINGS:  The gallbladder surgically absent.  No focal liver lesion is identified  on this noncontrast study. The spleen, pancreas, and kidneys are normal.  The adrenal glands are within normal limits. The bowel does not appear  abnormally thickened, dilated or inflamed. There are no pathologically  enlarged lymph nodes. There is no ascites. Mild calcific atherosclerosis  in the abdominal aorta.    Lumbar levoscoliosis. Degenerative disc endplate changes in the lumbar  spine. No suspicious osteolytic or osteoblastic lesions are appreciated.    No suspicious pulmonary nodules are seen within the lung bases. The lung  bases are free of acute airspace disease. Heart size is within normal  limits. Tiny esophageal hiatal hernia is noted.    Impression  1. No evidence of metastatic disease in the abdomen.  2. Cholecystectomy.  3. Lumbar levoscoliosis with advanced degenerative disc and endplate  changes.    Electronically Signed By-Loni Serrano MD On:4/20/2022 1:38 PM  This report was finalized on [id number] by  Loni Serrano MD.      Results for orders placed during the hospital encounter of 04/20/22    CT Abdomen Without Contrast    Narrative  DATE OF EXAM:  4/20/2022 11:44 AM    PROCEDURE:  CT ABDOMEN WO CONTRAST-    INDICATIONS:  malignant neoplasm of upper lobe of right lung; C34.11-Malignant  neoplasm of upper lobe, right bronchus or lung    COMPARISON:  CT chest 3/2/2022. No prior CT abdomen for comparison at this  institution.    TECHNIQUE:  Routine transaxial slices were obtained through the abdomen without the  administration of intravenous contrast. Reconstructed coronal and  sagittal images were also obtained. Automated exposure control and  iterative construction methods were used.    FINDINGS:  The gallbladder surgically absent. No focal liver lesion is identified  on this noncontrast study. The spleen, pancreas, and kidneys are normal.  The adrenal glands are within normal limits. The bowel does not appear  abnormally thickened, dilated or  inflamed. There are no pathologically  enlarged lymph nodes. There is no ascites. Mild calcific atherosclerosis  in the abdominal aorta.    Lumbar levoscoliosis. Degenerative disc endplate changes in the lumbar  spine. No suspicious osteolytic or osteoblastic lesions are appreciated.    No suspicious pulmonary nodules are seen within the lung bases. The lung  bases are free of acute airspace disease. Heart size is within normal  limits. Tiny esophageal hiatal hernia is noted.    Impression  1. No evidence of metastatic disease in the abdomen.  2. Cholecystectomy.  3. Lumbar levoscoliosis with advanced degenerative disc and endplate  changes.    Electronically Signed By-Loni Serrano MD On:4/20/2022 1:38 PM  This report was finalized on [id number] by  Loni Serrano MD.      Lab Review:   Hospital Outpatient Visit on 04/04/2022   Component Date Value   • WBC 04/04/2022 9.86    • RBC 04/04/2022 3.93    • Hemoglobin 04/04/2022 9.3 (A)   • Hematocrit 04/04/2022 31.2 (A)   • MCV 04/04/2022 79.4    • MCH 04/04/2022 23.7 (A)   • MCHC 04/04/2022 29.8 (A)   • RDW 04/04/2022 22.1 (A)   • RDW-SD 04/04/2022 62.2 (A)   • MPV 04/04/2022 8.6    • Platelets 04/04/2022 473 (A)   • Neutrophil % 04/04/2022 81.6 (A)   • Lymphocyte % 04/04/2022 9.2 (A)   • Monocyte % 04/04/2022 6.7    • Eosinophil % 04/04/2022 2.3    • Basophil % 04/04/2022 0.2    • Neutrophils, Absolute 04/04/2022 8.04 (A)   • Lymphocytes, Absolute 04/04/2022 0.91    • Monocytes, Absolute 04/04/2022 0.66    • Eosinophils, Absolute 04/04/2022 0.23    • Basophils, Absolute 04/04/2022 0.02    Hospital Outpatient Visit on 03/28/2022   Component Date Value   • WBC 03/28/2022 11.39 (A)   • RBC 03/28/2022 3.49 (A)   • Hemoglobin 03/28/2022 8.1 (A)   • Hematocrit 03/28/2022 27.6 (A)   • MCV 03/28/2022 79.1    • MCH 03/28/2022 23.2 (A)   • MCHC 03/28/2022 29.3 (A)   • RDW 03/28/2022 22.3 (A)   • RDW-SD 03/28/2022 61.8 (A)   • MPV 03/28/2022 8.9    • Platelets 03/28/2022  574 (A)   • Neutrophil % 03/28/2022 81.9 (A)   • Lymphocyte % 03/28/2022 9.3 (A)   • Monocyte % 03/28/2022 6.2    • Eosinophil % 03/28/2022 2.3    • Basophil % 03/28/2022 0.3    • Neutrophils, Absolute 03/28/2022 9.33 (A)   • Lymphocytes, Absolute 03/28/2022 1.06    • Monocytes, Absolute 03/28/2022 0.71    • Eosinophils, Absolute 03/28/2022 0.26    • Basophils, Absolute 03/28/2022 0.03    Admission on 03/21/2022, Discharged on 03/23/2022   Component Date Value   • QT Interval 03/21/2022 357    • COVID19 03/21/2022 Not Detected    • Glucose 03/21/2022 106 (A)   • BUN 03/21/2022 16    • Creatinine 03/21/2022 0.92    • Sodium 03/21/2022 138    • Potassium 03/21/2022 4.3    • Chloride 03/21/2022 103    • CO2 03/21/2022 24.0    • Calcium 03/21/2022 9.5    • Total Protein 03/21/2022 7.0    • Albumin 03/21/2022 2.70 (A)   • ALT (SGPT) 03/21/2022 8    • AST (SGOT) 03/21/2022 11    • Alkaline Phosphatase 03/21/2022 106    • Total Bilirubin 03/21/2022 0.4    • Globulin 03/21/2022 4.3    • A/G Ratio 03/21/2022 0.6    • BUN/Creatinine Ratio 03/21/2022 17.4    • Anion Gap 03/21/2022 11.0    • eGFR 03/21/2022 62.3    • Troponin T 03/21/2022 0.032 (A)   • proBNP 03/21/2022 3,891.0 (A)   • Magnesium 03/21/2022 1.7    • WBC 03/21/2022 12.70 (A)   • RBC 03/21/2022 3.78    • Hemoglobin 03/21/2022 9.0 (A)   • Hematocrit 03/21/2022 27.5 (A)   • MCV 03/21/2022 72.6 (A)   • MCH 03/21/2022 23.7 (A)   • MCHC 03/21/2022 32.6    • RDW 03/21/2022 22.5 (A)   • RDW-SD 03/21/2022 57.3 (A)   • MPV 03/21/2022 6.3    • Platelets 03/21/2022 535 (A)   • Neutrophil % 03/21/2022 81.2 (A)   • Lymphocyte % 03/21/2022 9.0 (A)   • Monocyte % 03/21/2022 4.5 (A)   • Eosinophil % 03/21/2022 3.9    • Basophil % 03/21/2022 1.4    • Neutrophils, Absolute 03/21/2022 10.30 (A)   • Lymphocytes, Absolute 03/21/2022 1.10    • Monocytes, Absolute 03/21/2022 0.60    • Eosinophils, Absolute 03/21/2022 0.50 (A)   • Basophils, Absolute 03/21/2022 0.20    • nRBC 03/21/2022  0.0    • Troponin T 03/21/2022 0.029    • Glucose 03/22/2022 106 (A)   • BUN 03/22/2022 16    • Creatinine 03/22/2022 0.99    • Sodium 03/22/2022 138    • Potassium 03/22/2022 4.4    • Chloride 03/22/2022 103    • CO2 03/22/2022 25.0    • Calcium 03/22/2022 8.7    • BUN/Creatinine Ratio 03/22/2022 16.2    • Anion Gap 03/22/2022 10.0    • eGFR 03/22/2022 57.0 (A)   • WBC 03/22/2022 14.00 (A)   • RBC 03/22/2022 3.76 (A)   • Hemoglobin 03/22/2022 8.7 (A)   • Hematocrit 03/22/2022 27.2 (A)   • MCV 03/22/2022 72.3 (A)   • MCH 03/22/2022 23.2 (A)   • MCHC 03/22/2022 32.1    • RDW 03/22/2022 22.5 (A)   • RDW-SD 03/22/2022 56.9 (A)   • MPV 03/22/2022 6.4    • Platelets 03/22/2022 506 (A)   • Neutrophil % 03/22/2022 81.6 (A)   • Lymphocyte % 03/22/2022 9.3 (A)   • Monocyte % 03/22/2022 5.7    • Eosinophil % 03/22/2022 2.9    • Basophil % 03/22/2022 0.5    • Neutrophils, Absolute 03/22/2022 11.40 (A)   • Lymphocytes, Absolute 03/22/2022 1.30    • Monocytes, Absolute 03/22/2022 0.80    • Eosinophils, Absolute 03/22/2022 0.40    • Basophils, Absolute 03/22/2022 0.10    • nRBC 03/22/2022 0.0    • QT Interval 03/23/2022 364    • RBC Morphology 03/22/2022 Normal    • WBC Morphology 03/22/2022 Normal    • Platelet Estimate 03/22/2022 Increased    • Glucose 03/23/2022 155 (A)   • BUN 03/23/2022 18    • Creatinine 03/23/2022 1.11 (A)   • Sodium 03/23/2022 135 (A)   • Potassium 03/23/2022 5.3 (A)   • Chloride 03/23/2022 101    • CO2 03/23/2022 24.0    • Calcium 03/23/2022 8.6    • BUN/Creatinine Ratio 03/23/2022 16.2    • Anion Gap 03/23/2022 10.0    • eGFR 03/23/2022 49.7 (A)   • WBC 03/23/2022 15.50 (A)   • RBC 03/23/2022 3.61 (A)   • Hemoglobin 03/23/2022 8.5 (A)   • Hematocrit 03/23/2022 26.3 (A)   • MCV 03/23/2022 72.8 (A)   • MCH 03/23/2022 23.5 (A)   • MCHC 03/23/2022 32.3    • RDW 03/23/2022 23.1 (A)   • RDW-SD 03/23/2022 59.1 (A)   • MPV 03/23/2022 6.6    • Platelets 03/23/2022 558 (A)   • Magnesium 03/23/2022 2.0    Office  Visit on 03/16/2022   Component Date Value   • WBC 03/16/2022 14.01 (A)   • RBC 03/16/2022 3.89    • Hemoglobin 03/16/2022 9.0 (A)   • Hematocrit 03/16/2022 30.0 (A)   • MCV 03/16/2022 77.1 (A)   • MCH 03/16/2022 23.1 (A)   • MCHC 03/16/2022 30.0 (A)   • RDW 03/16/2022 21.3 (A)   • RDW-SD 03/16/2022 56.5 (A)   • MPV 03/16/2022 8.1    • Platelets 03/16/2022 584 (A)   • Neutrophil % 03/16/2022 85.6 (A)   • Lymphocyte % 03/16/2022 7.6 (A)   • Monocyte % 03/16/2022 5.0    • Eosinophil % 03/16/2022 1.6    • Basophil % 03/16/2022 0.2    • Neutrophils, Absolute 03/16/2022 11.99 (A)   • Lymphocytes, Absolute 03/16/2022 1.07    • Monocytes, Absolute 03/16/2022 0.70    • Eosinophils, Absolute 03/16/2022 0.22    • Basophils, Absolute 03/16/2022 0.03    • Iron 03/16/2022 28 (A)   • Iron Saturation 03/16/2022 14 (A)   • Transferrin 03/16/2022 132 (A)   • TIBC 03/16/2022 197 (A)   • Ferritin 03/16/2022 534.10 (A)   Appointment on 03/16/2022   Component Date Value   • Extra Tube 03/16/2022 Hold for add-ons.    • Extra Tube 03/16/2022 Hold for add-ons.    Hospital Outpatient Visit on 03/09/2022   Component Date Value   • BB HOLD TUBE 03/09/2022 CONVERTED TO TYPE AND SCREEN    • WBC 03/09/2022 16.30 (A)   • RBC 03/09/2022 3.41 (A)   • Hemoglobin 03/09/2022 7.6 (A)   • Hematocrit 03/09/2022 23.8 (A)   • MCV 03/09/2022 69.9 (A)   • MCH 03/09/2022 22.4 (A)   • MCHC 03/09/2022 32.0    • RDW 03/09/2022 19.0 (A)   • RDW-SD 03/09/2022 46.8    • MPV 03/09/2022 6.7    • Platelets 03/09/2022 585 (A)   • Neutrophil % 03/09/2022 86.6 (A)   • Lymphocyte % 03/09/2022 7.6 (A)   • Monocyte % 03/09/2022 4.2 (A)   • Eosinophil % 03/09/2022 1.0    • Basophil % 03/09/2022 0.6    • Neutrophils, Absolute 03/09/2022 14.10 (A)   • Lymphocytes, Absolute 03/09/2022 1.20    • Monocytes, Absolute 03/09/2022 0.70    • Eosinophils, Absolute 03/09/2022 0.20    • Basophils, Absolute 03/09/2022 0.10    • nRBC 03/09/2022 0.0    • Product Code 03/11/2022 [id number]     • Unit Number 03/11/2022 [id number]    • UNIT  ABO 03/11/2022 O    • UNIT  RH 03/11/2022 POS    • Crossmatch Interpretation 03/11/2022 Compatible    • Dispense Status 03/11/2022 PT    • Blood Expiration Date 03/11/2022 [id number]    • Blood Type Barcode 03/11/2022 5100    • ABO Type 03/09/2022 O    • RH type 03/09/2022 Positive    • Antibody Screen 03/09/2022 Negative    • T&S Expiration Date 03/09/2022 3/12/2022 11:59:59 PM    Consult on 03/09/2022   Component Date Value   • WBC 03/09/2022 18.75 (A)   • RBC 03/09/2022 3.45 (A)   • Hemoglobin 03/09/2022 7.7 (A)   • Hematocrit 03/09/2022 25.0 (A)   • MCV 03/09/2022 72.5 (A)   • MCH 03/09/2022 22.3 (A)   • MCHC 03/09/2022 30.8 (A)   • RDW 03/09/2022 18.3 (A)   • RDW-SD 03/09/2022 46.2    • MPV 03/09/2022 8.6    • Platelets 03/09/2022 607 (A)   • Neutrophil % 03/09/2022 87.4 (A)   • Lymphocyte % 03/09/2022 6.5 (A)   • Monocyte % 03/09/2022 5.0    • Eosinophil % 03/09/2022 0.9    • Basophil % 03/09/2022 0.2    • Neutrophils, Absolute 03/09/2022 16.39 (A)   • Lymphocytes, Absolute 03/09/2022 1.22    • Monocytes, Absolute 03/09/2022 0.93 (A)   • Eosinophils, Absolute 03/09/2022 0.17    • Basophils, Absolute 03/09/2022 0.04    • Glucose 03/09/2022 103 (A)   • BUN 03/09/2022 19    • Creatinine 03/09/2022 1.09 (A)   • Sodium 03/09/2022 136    • Potassium 03/09/2022 3.6    • Chloride 03/09/2022 101    • CO2 03/09/2022 25.0    • Calcium 03/09/2022 8.6    • Total Protein 03/09/2022 6.3    • Albumin 03/09/2022 2.40 (A)   • ALT (SGPT) 03/09/2022 8    • AST (SGOT) 03/09/2022 11    • Alkaline Phosphatase 03/09/2022 101    • Total Bilirubin 03/09/2022 0.7    • Globulin 03/09/2022 3.9    • A/G Ratio 03/09/2022 0.6    • BUN/Creatinine Ratio 03/09/2022 17.4    • Anion Gap 03/09/2022 10.0    • eGFR 03/09/2022 50.8 (A)   • Iron 03/09/2022 13 (A)   • Iron Saturation 03/09/2022 7 (A)   • Transferrin 03/09/2022 127 (A)   • TIBC 03/09/2022 189 (A)   • Ferritin 03/09/2022 450.30 (A)    • Vitamin B-12 03/09/2022 1,009 (A)   • Reticulocyte % 03/09/2022 1.86    • Reticulocyte Absolute 03/09/2022 0.0635    • MATHEW 03/09/2022 Negative    • LDH 03/09/2022 173    • Haptoglobin 03/09/2022 340 (A)   Office Visit on 02/07/2022   Component Date Value   • WBC 02/07/2022 22.05 (A)   • RBC 02/07/2022 4.07    • Hemoglobin 02/07/2022 9.3 (A)   • Hematocrit 02/07/2022 30.5 (A)   • MCV 02/07/2022 74.9 (A)   • MCH 02/07/2022 22.9 (A)   • MCHC 02/07/2022 30.5 (A)   • RDW 02/07/2022 15.0    • RDW-SD 02/07/2022 40.5    • MPV 02/07/2022 9.8    • Platelets 02/07/2022 599 (A)   • Neutrophil % 02/07/2022 84.5 (A)   • Lymphocyte % 02/07/2022 5.8 (A)   • Monocyte % 02/07/2022 6.4    • Eosinophil % 02/07/2022 1.0    • Basophil % 02/07/2022 0.3    • Neutrophils, Absolute 02/07/2022 18.63 (A)   • Lymphocytes, Absolute 02/07/2022 1.28    • Monocytes, Absolute 02/07/2022 1.41 (A)   • Eosinophils, Absolute 02/07/2022 0.22    • Basophils, Absolute 02/07/2022 0.07    No results displayed because visit has over 200 results.      Lab on 01/21/2022   Component Date Value   • proBNP 01/21/2022 1,624.0    There may be more visits with results that are not included.     Recent labs reviewed and interpreted for clinical significance and application            Level of Care:           Jeremy Mccall MD  04/21/22  .

## 2022-04-22 NOTE — PROGRESS NOTES
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 female who presented to our office on 22 for consultation regarding anemia and thrombocytosis.     3/9/2022: Ms. Ruiz was referred for the investigation of anemia and thrombocytosis that was identified in the process of treating congestive heart failure and a right lung tumor. She came in a wheel chair complaining of severe fatigue and difficulties functioning because of this weakness. She admitted to hematochezia that had been associated to constipation that had been going on for some time, at least a few weeks. She had been afebrile. No pain. The laboratory exams reported microcytic anemia and thrombocytosis that had been present since the end of . A decision was made to transfuse her with one unit of red cells. Tests for iron deficiency were sent. She was scheduled to see me in one week.     3/16/2022. Ms. Ruiz was back in the office for follow up. She was feeling much better after the transfusion. She was still weak but not as much and her affect was also better. The laboratory exams did not clearly suggest iron deficiency but she persisted with microcytic anemia and had a history of gastrointestinal bleeding, such that iron deficiency was strongly considered the cause of the symptoms and laboratory manifestations. She had taken oral iron with multiple side effects and no clear improvement. Plans for intravenous iron were made.     3/22/2022: Admitted with evidence of decompensation of the congestive heart failure. She was treated with diuretics with prompt symptomatic relief. She was  given one dose of intravenous iron. She was discharged feeling much better.     4/4/2022: Back in the office for follow up. She had been receiving iron intravenously without complications. She reported unintended weight loss. The exam was not different than before. The laboratory exams revealed progressive improvement of the blood count with increase in the hemoglobin and the mean corpuscular volume and resolution of the leukocytosis and thrombocytosis. A decision was made to obtain a scan of the abdomen to investigate the history of blood loss in the stools and the unintended weight loss.     4/25/2022: Ms. Ruiz was back in the office for follow up. She was feeling much better and had returned to most of her normal activities. She arrived walking and reported that she had been walking more and more. She still had some fatigue but not the weakness she had before. She also had better appetite. She complained, however, that some of the medication she was receiving had resulted in changes in her taste perception. She had no more dyspnea than before and she had been free of chest pain. She had been offered the Watchman procedure, since she was not able to take anticoagulation safely. She denies abdominal pain or diarrhea. No dysuria. No edema. No skin rash.     Subjective:  • 4/25/2022: Back in the office for follow up. She comes walking and has not needed the wheel chair. She has been eating well in spite of the changes in her taste perception. She has not had any new respiratory symptoms. She has been without chest pain. No dysphagia. No abdominal pain or diarrhea and no dysuria. No edema. No skin rash.     The following portions of the patient's history were reviewed and updated as appropriate: allergies, current medications, past family history, past medical history, past social history, past surgical history and problem list.    Past Medical History:   Diagnosis Date   • A-fib (HCC)    • Anemia    • Arthritis     • Asthma    • CHF (congestive heart failure) (HCC)    • Hypertension      Past Surgical History:   Procedure Laterality Date   • CATARACT EXTRACTION     • CHOLECYSTECTOMY     • HYSTERECTOMY  1987    Endometriosis   • OOPHORECTOMY     • TONSILLECTOMY         Current Outpatient Medications:   •  acetaminophen (TYLENOL) 500 MG tablet, Take 500 mg by mouth Every 6 (Six) Hours As Needed for Mild Pain ., Disp: , Rfl:   •  Breo Ellipta 200-25 MCG/INH inhaler, Inhale 1 puff Daily., Disp: 1 each, Rfl: 2  •  Cholecalciferol (VITAMIN D3) 2000 units capsule, Take 2,000 Units by mouth Daily., Disp: , Rfl:   •  Cyanocobalamin (VITAMIN B12) 1000 MCG tablet controlled-release, Take 2,500 mcg by mouth Daily., Disp: , Rfl:   •  dilTIAZem CD (CARDIZEM CD) 180 MG 24 hr capsule, Take 1 capsule by mouth Daily. (Patient taking differently: Take 180 mg by mouth every night at bedtime.), Disp: 90 capsule, Rfl: 1  •  dilTIAZem XR (DILACOR XR) 120 MG 24 hr capsule, Take 1 capsule by mouth Daily., Disp: 90 capsule, Rfl: 1  •  furosemide (LASIX) 40 MG tablet, Take 1 tablet by mouth Daily As Needed (edema). (Patient taking differently: Take 40 mg by mouth Daily.), Disp: 90 tablet, Rfl: 2  •  metoprolol succinate XL (TOPROL-XL) 50 MG 24 hr tablet, Take 1 tablet by mouth Daily., Disp: 30 tablet, Rfl: 5  •  Premarin 0.625 MG/GM vaginal cream, Insert  into the vagina 2 (Two) Times a Week. (Patient taking differently: Insert  into the vagina As Needed.), Disp: 30 g, Rfl: 3  •  spironolactone (ALDACTONE) 25 MG tablet, Take 1 tablet by mouth 2 (Two) Times a Day. (Patient taking differently: Take 25 mg by mouth Daily.), Disp: 180 tablet, Rfl: 1  •  triamcinolone (KENALOG) 0.1 % cream, Apply  topically to the appropriate area as directed 2 (Two) Times a Day., Disp: 80 g, Rfl: 2    Allergies   Allergen Reactions   • Ciprofloxacin Unknown (See Comments)   • Sulfa Antibiotics Unknown (See Comments)   • Cyprodenate Unknown - High Severity   • Iodine  Unknown - High Severity     Family History   Problem Relation Age of Onset   • Breast cancer Mother 85   • Stroke Mother    • Heart disease Mother    • Endometrial cancer Mother 70   • Heart failure Father    • Heart failure Sister    • COPD Sister    • Heart disease Sister      Cancer-related family history includes Breast cancer (age of onset: 85) in her mother; Endometrial cancer (age of onset: 70) in her mother.    Social History     Tobacco Use   • Smoking status: Never Smoker   • Smokeless tobacco: Never Used   Vaping Use   • Vaping Use: Never used   Substance Use Topics   • Alcohol use: No   • Drug use: Never     Social History     Social History Narrative   • Not on file      ROS:     Review of Systems   Constitutional: Negative for activity change, appetite change, chills, diaphoresis, fatigue, fever and unexpected weight change.   HENT: Negative for congestion, dental problem, drooling, ear discharge, ear pain, facial swelling, hearing loss, mouth sores, nosebleeds, postnasal drip, rhinorrhea, sinus pressure, sinus pain, sneezing, sore throat, tinnitus, trouble swallowing and voice change.    Eyes: Negative for photophobia, pain, discharge, redness, itching and visual disturbance.   Respiratory: Negative for apnea, cough, choking, chest tightness, shortness of breath, wheezing and stridor.    Cardiovascular: Negative for chest pain, palpitations and leg swelling.   Gastrointestinal: Negative for abdominal distention, abdominal pain, anal bleeding, blood in stool, constipation, diarrhea, nausea, rectal pain and vomiting.   Endocrine: Negative for cold intolerance, heat intolerance, polydipsia and polyuria.   Genitourinary: Negative for decreased urine volume, difficulty urinating, dysuria, flank pain, frequency, genital sores, hematuria and urgency.   Musculoskeletal: Negative for arthralgias, back pain, gait problem, joint swelling, myalgias, neck pain and neck stiffness.   Skin: Negative for color change,  "pallor and rash.   Neurological: Negative for dizziness, tremors, seizures, syncope, facial asymmetry, speech difficulty, weakness, light-headedness, numbness and headaches.   Hematological: Negative for adenopathy. Does not bruise/bleed easily.   Psychiatric/Behavioral: Negative for agitation, behavioral problems, confusion, decreased concentration, hallucinations, self-injury, sleep disturbance and suicidal ideas. The patient is not nervous/anxious.      Objective:    Vitals:    04/25/22 1118   BP: 112/60   Pulse: 71   Resp: 18   Temp: 97.1 °F (36.2 °C)   SpO2: 99%   Weight: 57 kg (125 lb 9.6 oz)   Height: 154.9 cm (61\")   PainSc: 0-No pain     Body mass index is 23.73 kg/m².  ECOG  (3) Capable of limited self-care, confined to bed or chair > 50% of waking hours    Physical Exam:     Physical Exam  Constitutional:       General: She is not in acute distress.     Appearance: Normal appearance. She is not ill-appearing, toxic-appearing or diaphoretic.   HENT:      Head: Normocephalic and atraumatic.      Right Ear: External ear normal.      Left Ear: External ear normal.      Nose: Nose normal.      Mouth/Throat:      Mouth: Mucous membranes are moist.      Pharynx: Oropharynx is clear. No oropharyngeal exudate or posterior oropharyngeal erythema.   Eyes:      General: No scleral icterus.        Right eye: No discharge.         Left eye: No discharge.      Conjunctiva/sclera: Conjunctivae normal.      Pupils: Pupils are equal, round, and reactive to light.   Cardiovascular:      Rate and Rhythm: Normal rate and regular rhythm.      Pulses: Normal pulses.      Heart sounds: No murmur heard.    No friction rub. No gallop.   Pulmonary:      Effort: No respiratory distress.      Breath sounds: No stridor. No wheezing, rhonchi or rales.   Abdominal:      General: Abdomen is flat. Bowel sounds are normal. There is no distension.      Palpations: Abdomen is soft. There is no mass.      Tenderness: There is no abdominal " tenderness. There is no right CVA tenderness, left CVA tenderness, guarding or rebound.      Hernia: No hernia is present.   Musculoskeletal:         General: No tenderness, deformity or signs of injury.      Cervical back: No rigidity.      Right lower leg: No edema.      Left lower leg: No edema.   Lymphadenopathy:      Cervical: No cervical adenopathy.   Skin:     Coloration: Skin is not jaundiced.      Findings: No bruising, lesion or rash.   Neurological:      General: No focal deficit present.      Mental Status: She is alert and oriented to person, place, and time.      Cranial Nerves: No cranial nerve deficit.      Motor: No weakness.      Gait: Gait normal.   Psychiatric:         Mood and Affect: Mood normal.         Behavior: Behavior normal.         Thought Content: Thought content normal.         Judgment: Judgment normal.     CM Cheng MD performed the physical exam on 4/25/2022 as documented above.     Lab Results - Last 18 Months   Lab Units 04/25/22  1138 04/04/22  1132 03/28/22  1157   WBC 10*3/mm3 9.06 9.86 11.39*   HEMOGLOBIN g/dL 9.5* 9.3* 8.1*   HEMATOCRIT % 32.2* 31.2* 27.6*   PLATELETS 10*3/mm3 360 473* 574*   MCV fL 81.1 79.4 79.1     Lab Results - Last 18 Months   Lab Units 03/23/22  0923 03/22/22  0854 03/21/22  0828 03/09/22  1305 01/26/22  0330 01/25/22  0130   SODIUM mmol/L 135* 138 138 136   < > 136   POTASSIUM mmol/L 5.3* 4.4 4.3 3.6   < > 3.8   CHLORIDE mmol/L 101 103 103 101   < > 101   CO2 mmol/L 24.0 25.0 24.0 25.0   < > 25.0   BUN mg/dL 18 16 16 19   < > 10   CREATININE mg/dL 1.11* 0.99 0.92 1.09*   < > 0.50*   CALCIUM mg/dL 8.6 8.7 9.5 8.6   < > 8.3*   BILIRUBIN mg/dL  --   --  0.4 0.7  --  0.3   ALK PHOS U/L  --   --  106 101  --  109   ALT (SGPT) U/L  --   --  8 8  --  12   AST (SGOT) U/L  --   --  11 11  --  13   GLUCOSE mg/dL 155* 106* 106* 103*   < > 108*    < > = values in this interval not displayed.     Lab Results   Component Value Date    GLUCOSE 155 (H)  03/23/2022    BUN 18 03/23/2022    CREATININE 1.11 (H) 03/23/2022    EGFRIFNONA 84 02/01/2022    BCR 16.2 03/23/2022    K 5.3 (H) 03/23/2022    CO2 24.0 03/23/2022    CALCIUM 8.6 03/23/2022    ALBUMIN 2.70 (L) 03/21/2022    LABIL2 1.2 05/29/2019    AST 11 03/21/2022    ALT 8 03/21/2022     Lab Results - Last 18 Months   Lab Units 11/05/21  0924   INR  1.03   APTT seconds 28.2     Lab Results   Component Value Date    PTT 28.2 11/05/2021    INR 1.03 11/05/2021     Assessment/Plan     Assessment:  1. Microcytic anemia: Persists, however, the cells are no longer microcytic. I suspect she continues to have gastrointestinal bleeding. Will continue to follow closely.  2. Gastrointestinal bleeding. The scans revealed no evidence of a malignancy. She is no longer losing weight and, in fact, she has regained some of the weight she had lost.   3. Malabsorption.   4. She will see me with results of a new blood count.     Plan:  1. As above.     Frank Cheng MD on 4/25/2022 at 1:07 PM.

## 2022-04-25 ENCOUNTER — OFFICE VISIT (OUTPATIENT)
Dept: ONCOLOGY | Facility: CLINIC | Age: 82
End: 2022-04-25

## 2022-04-25 ENCOUNTER — LAB (OUTPATIENT)
Dept: LAB | Facility: HOSPITAL | Age: 82
End: 2022-04-25

## 2022-04-25 VITALS
OXYGEN SATURATION: 99 % | BODY MASS INDEX: 23.71 KG/M2 | TEMPERATURE: 97.1 F | RESPIRATION RATE: 18 BRPM | DIASTOLIC BLOOD PRESSURE: 60 MMHG | WEIGHT: 125.6 LBS | HEIGHT: 61 IN | HEART RATE: 71 BPM | SYSTOLIC BLOOD PRESSURE: 112 MMHG

## 2022-04-25 DIAGNOSIS — C34.11 MALIGNANT NEOPLASM OF UPPER LOBE OF RIGHT LUNG: Primary | ICD-10-CM

## 2022-04-25 DIAGNOSIS — D50.8 OTHER IRON DEFICIENCY ANEMIAS: Primary | ICD-10-CM

## 2022-04-25 LAB
ALBUMIN SERPL-MCNC: 3.4 G/DL (ref 3.5–5.2)
ALBUMIN/GLOB SERPL: 0.8 G/DL
ALP SERPL-CCNC: 72 U/L (ref 39–117)
ALT SERPL W P-5'-P-CCNC: 8 U/L (ref 1–33)
ANION GAP SERPL CALCULATED.3IONS-SCNC: 11 MMOL/L (ref 5–15)
AST SERPL-CCNC: 12 U/L (ref 1–32)
BASOPHILS # BLD AUTO: 0.02 10*3/MM3 (ref 0–0.2)
BASOPHILS NFR BLD AUTO: 0.2 % (ref 0–1.5)
BILIRUB SERPL-MCNC: 0.3 MG/DL (ref 0–1.2)
BUN SERPL-MCNC: 26 MG/DL (ref 8–23)
BUN/CREAT SERPL: 23 (ref 7–25)
CALCIUM SPEC-SCNC: 9.4 MG/DL (ref 8.6–10.5)
CHLORIDE SERPL-SCNC: 102 MMOL/L (ref 98–107)
CO2 SERPL-SCNC: 23 MMOL/L (ref 22–29)
CREAT SERPL-MCNC: 1.13 MG/DL (ref 0.57–1)
CRP SERPL-MCNC: 6.36 MG/DL (ref 0–0.5)
DEPRECATED RDW RBC AUTO: 55.6 FL (ref 37–54)
EGFRCR SERPLBLD CKD-EPI 2021: 48.7 ML/MIN/1.73
EOSINOPHIL # BLD AUTO: 0.22 10*3/MM3 (ref 0–0.4)
EOSINOPHIL NFR BLD AUTO: 2.4 % (ref 0.3–6.2)
ERYTHROCYTE [DISTWIDTH] IN BLOOD BY AUTOMATED COUNT: 19.2 % (ref 12.3–15.4)
FERRITIN SERPL-MCNC: 886 NG/ML (ref 13–150)
FOLATE SERPL-MCNC: 5.12 NG/ML (ref 4.78–24.2)
GLOBULIN UR ELPH-MCNC: 4.5 GM/DL
GLUCOSE SERPL-MCNC: 104 MG/DL (ref 65–99)
HCT VFR BLD AUTO: 32.2 % (ref 34–46.6)
HGB BLD-MCNC: 9.5 G/DL (ref 12–15.9)
HOLD SPECIMEN: NORMAL
IRON 24H UR-MRATE: 25 MCG/DL (ref 37–145)
IRON SATN MFR SERPL: 10 % (ref 20–50)
LYMPHOCYTES # BLD AUTO: 0.87 10*3/MM3 (ref 0.7–3.1)
LYMPHOCYTES NFR BLD AUTO: 9.6 % (ref 19.6–45.3)
MCH RBC QN AUTO: 23.9 PG (ref 26.6–33)
MCHC RBC AUTO-ENTMCNC: 29.5 G/DL (ref 31.5–35.7)
MCV RBC AUTO: 81.1 FL (ref 79–97)
MONOCYTES # BLD AUTO: 0.71 10*3/MM3 (ref 0.1–0.9)
MONOCYTES NFR BLD AUTO: 7.8 % (ref 5–12)
NEUTROPHILS NFR BLD AUTO: 7.24 10*3/MM3 (ref 1.7–7)
NEUTROPHILS NFR BLD AUTO: 80 % (ref 42.7–76)
PLATELET # BLD AUTO: 360 10*3/MM3 (ref 140–450)
PMV BLD AUTO: 8.3 FL (ref 6–12)
POTASSIUM SERPL-SCNC: 5.1 MMOL/L (ref 3.5–5.2)
PROT SERPL-MCNC: 7.9 G/DL (ref 6–8.5)
RBC # BLD AUTO: 3.97 10*6/MM3 (ref 3.77–5.28)
SODIUM SERPL-SCNC: 136 MMOL/L (ref 136–145)
TIBC SERPL-MCNC: 258 MCG/DL (ref 298–536)
TRANSFERRIN SERPL-MCNC: 173 MG/DL (ref 200–360)
WBC NRBC COR # BLD: 9.06 10*3/MM3 (ref 3.4–10.8)

## 2022-04-25 PROCEDURE — 82746 ASSAY OF FOLIC ACID SERUM: CPT | Performed by: INTERNAL MEDICINE

## 2022-04-25 PROCEDURE — 82728 ASSAY OF FERRITIN: CPT | Performed by: INTERNAL MEDICINE

## 2022-04-25 PROCEDURE — 83540 ASSAY OF IRON: CPT | Performed by: INTERNAL MEDICINE

## 2022-04-25 PROCEDURE — 99213 OFFICE O/P EST LOW 20 MIN: CPT | Performed by: INTERNAL MEDICINE

## 2022-04-25 PROCEDURE — 86140 C-REACTIVE PROTEIN: CPT | Performed by: INTERNAL MEDICINE

## 2022-04-25 PROCEDURE — 84466 ASSAY OF TRANSFERRIN: CPT | Performed by: INTERNAL MEDICINE

## 2022-04-25 PROCEDURE — 36415 COLL VENOUS BLD VENIPUNCTURE: CPT

## 2022-04-25 PROCEDURE — 80053 COMPREHEN METABOLIC PANEL: CPT

## 2022-04-25 PROCEDURE — 85025 COMPLETE CBC W/AUTO DIFF WBC: CPT

## 2022-05-09 ENCOUNTER — OFFICE VISIT (OUTPATIENT)
Dept: FAMILY MEDICINE CLINIC | Facility: CLINIC | Age: 82
End: 2022-05-09

## 2022-05-09 ENCOUNTER — TELEPHONE (OUTPATIENT)
Dept: FAMILY MEDICINE CLINIC | Facility: CLINIC | Age: 82
End: 2022-05-09

## 2022-05-09 VITALS
HEART RATE: 73 BPM | SYSTOLIC BLOOD PRESSURE: 136 MMHG | HEIGHT: 61 IN | OXYGEN SATURATION: 96 % | TEMPERATURE: 97.7 F | DIASTOLIC BLOOD PRESSURE: 72 MMHG | WEIGHT: 124 LBS | BODY MASS INDEX: 23.41 KG/M2 | RESPIRATION RATE: 16 BRPM

## 2022-05-09 DIAGNOSIS — J06.9 ACUTE URI: Primary | ICD-10-CM

## 2022-05-09 PROCEDURE — U0004 COV-19 TEST NON-CDC HGH THRU: HCPCS | Performed by: FAMILY MEDICINE

## 2022-05-09 PROCEDURE — 99213 OFFICE O/P EST LOW 20 MIN: CPT | Performed by: FAMILY MEDICINE

## 2022-05-09 PROCEDURE — C9803 HOPD COVID-19 SPEC COLLECT: HCPCS | Performed by: FAMILY MEDICINE

## 2022-05-09 PROCEDURE — U0005 INFEC AGEN DETEC AMPLI PROBE: HCPCS | Performed by: FAMILY MEDICINE

## 2022-05-09 RX ORDER — AMOXICILLIN 875 MG/1
875 TABLET, COATED ORAL 2 TIMES DAILY
Qty: 14 TABLET | Refills: 0 | Status: SHIPPED | OUTPATIENT
Start: 2022-05-09 | End: 2022-06-15

## 2022-05-09 NOTE — TELEPHONE ENCOUNTER
Caller: Chrystal Ruiz    Relationship: Self    Best call back number: [phone]    What is the best time to reach you: TOMORROW AFTER 3:30 PM    Who are you requesting to speak with (clinical staff, provider,  specific staff member):      What was the call regarding: PATIENT WOULD LIKE TO SPEAK TO  ABOUT UPCOMING SURGERY.    PATIENT STATES SHE WAS TOLD BY HER CARDIOLOGIST THAT SHE SHOULD HAVE A  WATCHMAN PROCEDURE.    PATIENT IS REQUESTING  APPROVAL OF THIS PROCEDURE.    PLEASE CALL PATIENT BACK TO [facility] THIS.    Do you require a callback: YES

## 2022-05-09 NOTE — PROGRESS NOTES
Subjective   Chief Complaint   Patient presents with   • Earache     Right   • sinus pressure     Chrystal Ruiz is a 81 y.o. female.     Patient Care Team:  Etelvina Ulloa MD as PCP - General  Etelvina Ulloa MD as PCP - Family Medicine  Mukund Farias MD as Consulting Physician (Pulmonary Disease)  Alan Wynn DPM as Consulting Physician (Podiatry)  Demetris Farrell MD as Surgeon (General Surgery)  Etelvina Mcconnell MD as Surgeon (Thoracic Surgery)  Michael Whitten MD as Consulting Physician (Radiation Oncology)  Frank Cheng MD as Consulting Physician (Hematology and Oncology)    She is coming in today as she has not been feeling well for about 1 week.  She has been having some congestion and pressure in her sinuses and also some pressure in her ears.  Over the last few days she also has noted some tension and pain in the back of her neck and upper back.  No fever, chest pains, or difficulty breathing are being reported.  She is not aware of being exposed to anybody who is sick.  She currently takes Tylenol at bedtime for pain.       The following portions of the patient's history were reviewed and updated as appropriate: allergies, current medications, past family history, past medical history, past social history, past surgical history and problem list.  Past Medical History:   Diagnosis Date   • A-fib (HCC)    • Anemia    • Arthritis    • Asthma    • CHF (congestive heart failure) ([facility])    • Hypertension      Past Surgical History:   Procedure Laterality Date   • CATARACT EXTRACTION     • CHOLECYSTECTOMY     • HYSTERECTOMY  1987    Endometriosis   • OOPHORECTOMY     • TONSILLECTOMY       The patient has a family history of  Family History   Problem Relation Age of Onset   • Breast cancer Mother 85   • Stroke Mother    • Heart disease Mother    • Endometrial cancer Mother 70   • Heart failure Father    • Heart failure Sister    • COPD Sister    • Heart disease Sister      Social History  "    Socioeconomic History   • Marital status:    Tobacco Use   • Smoking status: Never Smoker   • Smokeless tobacco: Never Used   Vaping Use   • Vaping Use: Never used   Substance and Sexual Activity   • Alcohol use: No   • Drug use: Never   • Sexual activity: Not Currently       Review of Systems   Constitutional: Negative for activity change, appetite change, chills and fever.   HENT: Positive for congestion, postnasal drip and sinus pressure. Negative for ear pain, sore throat and swollen glands.    Respiratory: Negative for cough, choking, chest tightness, shortness of breath, wheezing and stridor.    Cardiovascular: Negative for chest pain.   Skin: Negative for dry skin and rash.   Neurological: Positive for headache.     Visit Vitals  /72 (BP Location: Left arm, Patient Position: Sitting, Cuff Size: Adult)   Pulse 73   Temp 97.7 °F (36.5 °C)   Resp 16   Ht 154.9 cm (60.98\")   Wt 56.2 kg (124 lb)   SpO2 96%   BMI 23.44 kg/m²       Current Outpatient Medications:   •  metoprolol succinate XL (TOPROL-XL) 50 MG 24 hr tablet, Take 1 tablet by mouth Daily., Disp: 30 tablet, Rfl: 5  •  acetaminophen (TYLENOL) 500 MG tablet, Take 500 mg by mouth Every 6 (Six) Hours As Needed for Mild Pain ., Disp: , Rfl:   •  amoxicillin (AMOXIL) 875 MG tablet, Take 1 tablet by mouth 2 (Two) Times a Day., Disp: 14 tablet, Rfl: 0  •  Breo Ellipta 200-25 MCG/INH inhaler, Inhale 1 puff Daily., Disp: 1 each, Rfl: 2  •  Cholecalciferol (VITAMIN D3) 2000 units capsule, Take 2,000 Units by mouth Daily., Disp: , Rfl:   •  Cyanocobalamin (VITAMIN B12) 1000 MCG tablet controlled-release, Take 2,500 mcg by mouth Daily., Disp: , Rfl:   •  dilTIAZem XR (DILACOR XR) 120 MG 24 hr capsule, Take 1 capsule by mouth Daily., Disp: 90 capsule, Rfl: 1  •  furosemide (LASIX) 40 MG tablet, Take 1 tablet by mouth Daily As Needed (edema). (Patient taking differently: Take 40 mg by mouth Daily.), Disp: 90 tablet, Rfl: 2  •  Premarin 0.625 MG/GM " vaginal cream, Insert  into the vagina 2 (Two) Times a Week. (Patient taking differently: Insert  into the vagina As Needed.), Disp: 30 g, Rfl: 3  •  spironolactone (ALDACTONE) 25 MG tablet, Take 1 tablet by mouth 2 (Two) Times a Day. (Patient taking differently: Take 25 mg by mouth Daily.), Disp: 180 tablet, Rfl: 1  •  triamcinolone (KENALOG) 0.1 % cream, Apply  topically to the appropriate area as directed 2 (Two) Times a Day., Disp: 80 g, Rfl: 2    Objective   Physical Exam  Vitals and nursing note reviewed.   Constitutional:       General: She is not in acute distress.     Appearance: She is well-developed.   HENT:      Head: Normocephalic and atraumatic.      Comments: Some congestion noted.     Right Ear: External ear normal.      Left Ear: External ear normal.      Mouth/Throat:      Pharynx: No oropharyngeal exudate.   Eyes:      Conjunctiva/sclera: Conjunctivae normal.      Pupils: Pupils are equal, round, and reactive to light.   Cardiovascular:      Rate and Rhythm: Normal rate and regular rhythm.      Heart sounds: Normal heart sounds.   Pulmonary:      Effort: Pulmonary effort is normal. No respiratory distress.      Breath sounds: Normal breath sounds. No wheezing or rales.   Musculoskeletal:      Cervical back: Normal range of motion and neck supple.   Skin:     General: Skin is warm and dry.      Findings: No rash.         Assessment/Plan   Diagnoses and all orders for this visit:    1. Acute URI (Primary)  -     amoxicillin (AMOXIL) 875 MG tablet; Take 1 tablet by mouth 2 (Two) Times a Day.  Dispense: 14 tablet; Refill: 0  -     COVID-19,APTIMA PANTHER([place]),[facility], NP/OP SWAB IN UTM/VTM/SALINE TRANSPORT MEDIA,24 HR TAT - Swab, Nasopharynx; Future  -     COVID-19,APTIMA PANTHER([place]),[facility], NP/OP SWAB IN UTM/VTM/SALINE TRANSPORT MEDIA,24 HR TAT - Swab, Nasopharynx      I will be starting her on amoxicillin.  I also tested her for COVID-19.  She may continue Tylenol over-the-counter for  the pain and discomfort.  I advised for her that she can actually take Tylenol up to twice a day as needed.  She will need to continue to monitor her symptoms and contact us back if any questions or concerns.          Return if symptoms worsen or fail to improve, for Recheck.    Requested Prescriptions     Signed Prescriptions Disp Refills   • amoxicillin (AMOXIL) 875 MG tablet 14 tablet 0     Sig: Take 1 tablet by mouth 2 (Two) Times a Day.

## 2022-05-10 LAB — SARS-COV-2 ORF1AB RESP QL NAA+PROBE: NOT DETECTED

## 2022-05-11 ENCOUNTER — HOSPITAL ENCOUNTER (OUTPATIENT)
Dept: RADIATION ONCOLOGY | Facility: HOSPITAL | Age: 82
Setting detail: RADIATION/ONCOLOGY SERIES
End: 2022-05-11

## 2022-05-11 NOTE — PROGRESS NOTES
RADIATION ONCOLOGY FOLLOW-UP NOTE    NAME: Chrystal Ruiz  YOB: 1940  MRN #: [id number]  DATE OF SERVICE: 5/13/2022  PRIMARY CARE PROVIDER: Etelvina Ulloa MD    DIAGNOSIS:    1. Bronchogenic cancer of right lung (HCC)      REASON FOR VISIT:  1M Post XRT F/U    RADIATION TREATMENT COURSE:  6000 cGy in 15 fractions, completed 04/12/2022.    HISTORY OF PRESENT ILLNESS: The patient is a 81 y.o. year old female who was last seen in our office on 04/12/2022 upon completion of radiation therapy treatment.    She is currently followed by medical oncologist, Dr. Cheng, and was last seen in his office on   04/25/2022. The plan was to have blood work completed again due to microcytic anemia. She is scheduled to follow up with Dr. Colorado on 05/18/2022.    CT Abdomen w/o Contrast, completed 04/20/2022. The impressions:  1. No evidence of metastatic disease in the abdomen.  2. Cholecystectomy.  3. Lumbar levoscoliosis with advance degenerative disc and endplate changes.    She has had recent PCP visit for a sinus infection.    The following portions of the patient's history were reviewed and updated as appropriate: allergies, current medications, past family history, past medical history, past social history, past surgical history and problem list. Reviewed with the patient and remain unchanged.    PAST MEDICAL HISTORY:  she has a past medical history of A-fib (HCC), Anemia, Arthritis, Asthma, CHF (congestive heart failure) (HCC), and Hypertension.    MEDICATIONS:    Current Outpatient Medications:   •  acetaminophen (TYLENOL) 500 MG tablet, Take 500 mg by mouth Every 6 (Six) Hours As Needed for Mild Pain ., Disp: , Rfl:   •  amoxicillin (AMOXIL) 875 MG tablet, Take 1 tablet by mouth 2 (Two) Times a Day., Disp: 14 tablet, Rfl: 0  •  Breo Ellipta 200-25 MCG/INH inhaler, Inhale 1 puff Daily., Disp: 1 each, Rfl: 2  •  Cholecalciferol (VITAMIN D3) 2000 units capsule, Take 2,000 Units by mouth Daily., Disp: , Rfl:    •  Cyanocobalamin (VITAMIN B12) 1000 MCG tablet controlled-release, Take 2,500 mcg by mouth Daily., Disp: , Rfl:   •  dilTIAZem XR (DILACOR XR) 120 MG 24 hr capsule, Take 1 capsule by mouth Daily., Disp: 90 capsule, Rfl: 1  •  furosemide (LASIX) 40 MG tablet, Take 1 tablet by mouth Daily As Needed (edema). (Patient taking differently: Take 40 mg by mouth Daily.), Disp: 90 tablet, Rfl: 2  •  metoprolol succinate XL (TOPROL-XL) 50 MG 24 hr tablet, Take 1 tablet by mouth Daily., Disp: 30 tablet, Rfl: 5  •  Premarin 0.625 MG/GM vaginal cream, Insert  into the vagina 2 (Two) Times a Week. (Patient taking differently: Insert  into the vagina As Needed.), Disp: 30 g, Rfl: 3  •  spironolactone (ALDACTONE) 25 MG tablet, Take 1 tablet by mouth 2 (Two) Times a Day. (Patient taking differently: Take 25 mg by mouth Daily.), Disp: 180 tablet, Rfl: 1  •  triamcinolone (KENALOG) 0.1 % cream, Apply  topically to the appropriate area as directed 2 (Two) Times a Day., Disp: 80 g, Rfl: 2    ALLERGIES:    Allergies   Allergen Reactions   • Ciprofloxacin Unknown (See Comments)   • Sulfa Antibiotics Unknown (See Comments)   • Cyprodenate Unknown - High Severity   • Iodine Unknown - High Severity       PAST SURGICAL HISTORY:  she has a past surgical history that includes Oophorectomy; Cholecystectomy; Tonsillectomy; Hysterectomy (1987); and Cataract extraction.    PREVIOUS RADIOTHERAPY OR CHEMOTHERAPY:  xrt    FAMILY HISTORY:  herfamily history includes Breast cancer (age of onset: 85) in her mother; COPD in her sister; Endometrial cancer (age of onset: 70) in her mother; Heart disease in her mother and sister; Heart failure in her father and sister; Stroke in her mother.    SOCIAL HISTORY:  she reports that she has never smoked. She has never used smokeless tobacco. She reports that she does not drink alcohol and does not use drugs.    PAIN AND PAIN MANAGEMENT:  Pain from sinuses improved since on Abx, but still with mid upper neck dull  "pain; no range of motion issues or R arm/shoulder issues.  Vitals:    05/13/22 1103   BP: 128/71   Pulse: 65   Resp: 18   Temp: 96.9 °F (36.1 °C)   TempSrc: Temporal   SpO2: 100%   Weight: 56.6 kg (124 lb 12.8 oz)   Height: 154.9 cm (61\")   PainSc:   6   PainLoc: Head       NUTRITIONAL STATUS:   no issues    KPS:  80    REVIEW OF SYSTEMS:   General: No fevers, chills, weight change, or drenching night sweats. Skin: No rashes or jaundice.  HEENT: No change in vision or hearing, no headaches.  Neck: No dysphagia or masses.  Heme/Lymph: No easy bruising or bleeding.  Respiratory System: No shortness of breath or cough.  Cardiovascular: No chest pain, palpitations, or dyspnea on exertion.  - Pacemaker. GI: No nausea, vomiting, diarrhea, melena, or hematochezia.  : No dysuria or hematuria.  Endocrine: No heat or cold intolerance. Musculoskeletal: No myalgias or arthralgias.  Neuro: No weakness, numbness, syncope, or seizures. Psych: No mood changes or depression. Ext: Denies swelling.        Objective   VITAL SIGNS:  Vitals:    05/13/22 1103   BP: 128/71   Pulse: 65   Resp: 18   Temp: 96.9 °F (36.1 °C)   TempSrc: Temporal   SpO2: 100%   Weight: 56.6 kg (124 lb 12.8 oz)   Height: 154.9 cm (61\")   PainSc:   6   PainLoc: Head       PHYSICAL EXAM:  GENERAL: In no apparent distress, sitting comfortably in room.    HEENT: Normocephalic, atraumatic. Pupils are equal, round, reactive to light. Sclera anicteric. Conjunctiva not injected. Oropharynx without erythema, ulcerations or thrush.   NECK: Supple with no masses.  LYMPHATIC: No cervical, supraclavicular or axillary adenopathy appreciated bilaterally.   CARDIOVASCULAR: S1 & S2 detected; no murmurs, rubs or gallops.  CHEST: Clear to auscultation bilaterally; no wheezes, crackles or rubs. Work of breathing normal.  ABDOMEN: Bowel sounds present. Abdomen is soft, nontender, nondistended.   MUSCULOSKELETAL: No tenderness to palpation along the spine or scapulae. Normal range " of motion.  EXTREMITIES: No clubbing, cyanosis, edema.  SKIN: No erythema, rashes, ulcerations noted.   NEUROLOGIC: Cranial nerves II-XII grossly intact bilaterally. No focal neurologic deficits.  PSYCHIATRIC:  Alert, aware, and appropriate.      PERTINENT IMAGING/PATHOLOGY/LABS (Medical Decision Making):     COORDINATION OF CARE: A copy of this note is sent to the referring provider.    PATHOLOGY (Reviewed):     IMAGING (Reviewed):     LABS (Reviewed):  HEMATOLOGY:  WBC   Date Value Ref Range Status   04/25/2022 9.06 3.40 - 10.80 10*3/mm3 Final     RBC   Date Value Ref Range Status   04/25/2022 3.97 3.77 - 5.28 10*6/mm3 Final     Hemoglobin   Date Value Ref Range Status   04/25/2022 9.5 (L) 12.0 - 15.9 g/dL Final     Hematocrit   Date Value Ref Range Status   04/25/2022 32.2 (L) 34.0 - 46.6 % Final     Platelets   Date Value Ref Range Status   04/25/2022 360 140 - 450 10*3/mm3 Final     CHEMISTRY:  Lab Results   Component Value Date    GLUCOSE 104 (H) 04/25/2022    BUN 26 (H) 04/25/2022    CREATININE 1.13 (H) 04/25/2022    EGFRIFNONA 84 02/01/2022    BCR 23.0 04/25/2022    K 5.1 04/25/2022    CO2 23.0 04/25/2022    CALCIUM 9.4 04/25/2022    ALBUMIN 3.40 (L) 04/25/2022    LABIL2 1.2 05/29/2019    AST 12 04/25/2022    ALT 8 04/25/2022       Assessment & Plan   ASSESSMENT AND PLAN:    1. Bronchogenic cancer of right lung (HCC)       - Doing ok post SBRT  -No acute or early/late toxicities.  -Follows closely with Dr. Cheng.    Ordering repeat CT chest for 2 months with f/u here following.    This assessment comes from my review of the imaging, pathology, physician notes and other pertinent information as mentioned.            CC: MD Etelvina Villalobos MD Angela Crone, MD John A Cox, MD  5/13/2022  12:58 PM EDT

## 2022-05-12 ENCOUNTER — TELEPHONE (OUTPATIENT)
Dept: RADIATION ONCOLOGY | Facility: HOSPITAL | Age: 82
End: 2022-05-12

## 2022-05-12 NOTE — PROGRESS NOTES
HEMATOLOGY ONCOLOGY OUTPATIENT FOLLOW UP       Patient name: Chrystal Ruiz  : 1940  MRN: [id number]  Primary Care Physician: Etelvina Ulloa MD  Referring Physician: Etelvina Ulloa MD  Reason For Consult:     Chief Complaint   Patient presents with   • Follow-up     Other iron deficiency anemia, Malignant neoplasm of upper lobe of right lung (HCC)     HPI:   History of Present Illness:  Chrystal Ruiz is 81 y.o. female who presented to our office on 22 for consultation regarding anemia and thrombocytosis.     3/9/2022: Ms. Ruiz was referred for the investigation of anemia and thrombocytosis that was identified in the process of treating congestive heart failure and a right lung tumor. She came in a wheel chair complaining of severe fatigue and difficulties functioning because of this weakness. She admitted to hematochezia that had been associated to constipation that had been going on for some time, at least a few weeks. She had been afebrile. No pain. The laboratory exams reported microcytic anemia and thrombocytosis that had been present since the end of . A decision was made to transfuse her with one unit of red cells. Tests for iron deficiency were sent. She was scheduled to see me in one week.     3/16/2022. Ms. Ruiz was back in the office for follow up. She was feeling much better after the transfusion. She was still weak but not as much and her affect was also better. The laboratory exams did not clearly suggest iron deficiency but she persisted with microcytic anemia and had a history of gastrointestinal bleeding, such that iron deficiency was strongly considered the cause of the symptoms and laboratory manifestations. She had taken oral iron with multiple side effects and no clear improvement. Plans for intravenous iron were made.     3/22/2022: Admitted with evidence of decompensation of the congestive heart failure. She was treated with diuretics with  prompt symptomatic relief. She was given one dose of intravenous iron. She was discharged feeling much better.     4/4/2022: Back in the office for follow up. She had been receiving iron intravenously without complications. She reported unintended weight loss. The exam was not different than before. The laboratory exams revealed progressive improvement of the blood count with increase in the hemoglobin and the mean corpuscular volume and resolution of the leukocytosis and thrombocytosis. A decision was made to obtain a scan of the abdomen to investigate the history of blood loss in the stools and the unintended weight loss.     4/25/2022: Ms. Ruiz was back in the office for follow up. She was feeling much better and had returned to most of her normal activities. She arrived walking and reported that she had been walking more and more. She still had some fatigue but not the weakness she had before. She also had better appetite. She complained, however, that some of the medication she was receiving had resulted in changes in her taste perception. She had no more dyspnea than before and she had been free of chest pain. She had been offered the Watchman procedure, since she was not able to take anticoagulation safely. She denies abdominal pain or diarrhea. No dysuria. No edema. No skin rash.     5/18/2022: Back in the office to review results. She was feeling very well. Had regained her energy and was very active. The exam did not reveal new changes. Her hemoglobin had continued to improve, but she still had anemia. A decision was made to continue to observe without intervention.     Subjective:  • 5/18/2022: In the office for follow up. Does not offer any complaints and in fact reports that she is feeling very well. On direct questioning she admitted to head ache and pain in the posterior neck especially when looking up. She had a good appetite and had been eating well. Denied chest pain and had been free of cough or  dyspnea. No dysphagia. No abdominal pain or diarrhea and no  Dysuria. No edema. No skin rash.     The following portions of the patient's history were reviewed and updated as appropriate: allergies, current medications, past family history, past medical history, past social history, past surgical history and problem list.    Past Medical History:   Diagnosis Date   • A-fib (HCC)    • Anemia    • Arthritis    • Asthma    • CHF (congestive heart failure) (HCC)    • Hypertension      Past Surgical History:   Procedure Laterality Date   • CATARACT EXTRACTION     • CHOLECYSTECTOMY     • HYSTERECTOMY  1987    Endometriosis   • OOPHORECTOMY     • TONSILLECTOMY         Current Outpatient Medications:   •  acetaminophen (TYLENOL) 500 MG tablet, Take 500 mg by mouth Every 6 (Six) Hours As Needed for Mild Pain ., Disp: , Rfl:   •  amoxicillin (AMOXIL) 875 MG tablet, Take 1 tablet by mouth 2 (Two) Times a Day., Disp: 14 tablet, Rfl: 0  •  Breo Ellipta 200-25 MCG/INH inhaler, Inhale 1 puff Daily., Disp: 1 each, Rfl: 2  •  Cholecalciferol (VITAMIN D3) 2000 units capsule, Take 2,000 Units by mouth Daily., Disp: , Rfl:   •  Cyanocobalamin (VITAMIN B12) 1000 MCG tablet controlled-release, Take 2,500 mcg by mouth Daily., Disp: , Rfl:   •  dilTIAZem XR (DILACOR XR) 120 MG 24 hr capsule, Take 1 capsule by mouth Daily., Disp: 90 capsule, Rfl: 1  •  furosemide (LASIX) 40 MG tablet, Take 1 tablet by mouth Daily As Needed (edema). (Patient taking differently: Take 40 mg by mouth Daily.), Disp: 90 tablet, Rfl: 2  •  metoprolol succinate XL (TOPROL-XL) 50 MG 24 hr tablet, Take 1 tablet by mouth Daily., Disp: 30 tablet, Rfl: 5  •  Premarin 0.625 MG/GM vaginal cream, Insert  into the vagina 2 (Two) Times a Week. (Patient taking differently: Insert  into the vagina As Needed.), Disp: 30 g, Rfl: 3  •  spironolactone (ALDACTONE) 25 MG tablet, Take 1 tablet by mouth 2 (Two) Times a Day. (Patient taking differently: Take 25 mg by mouth Daily.),  Disp: 180 tablet, Rfl: 1  •  triamcinolone (KENALOG) 0.1 % cream, Apply  topically to the appropriate area as directed 2 (Two) Times a Day., Disp: 80 g, Rfl: 2    Allergies   Allergen Reactions   • Ciprofloxacin Unknown (See Comments)   • Sulfa Antibiotics Unknown (See Comments)   • Cyprodenate Unknown - High Severity   • Iodine Unknown - High Severity     Family History   Problem Relation Age of Onset   • Breast cancer Mother 85   • Stroke Mother    • Heart disease Mother    • Endometrial cancer Mother 70   • Heart failure Father    • Heart failure Sister    • COPD Sister    • Heart disease Sister      Cancer-related family history includes Breast cancer (age of onset: 85) in her mother; Endometrial cancer (age of onset: 70) in her mother.    Social History     Tobacco Use   • Smoking status: Never Smoker   • Smokeless tobacco: Never Used   Vaping Use   • Vaping Use: Never used   Substance Use Topics   • Alcohol use: No   • Drug use: Never     Social History     Social History Narrative   • Not on file      ROS:     Review of Systems   Constitutional: Negative for activity change, appetite change, chills, diaphoresis, fatigue, fever and unexpected weight change.   HENT: Negative for congestion, dental problem, drooling, ear discharge, ear pain, facial swelling, hearing loss, mouth sores, nosebleeds, postnasal drip, rhinorrhea, sinus pressure, sinus pain, sneezing, sore throat, tinnitus, trouble swallowing and voice change.    Eyes: Negative for photophobia, pain, discharge, redness, itching and visual disturbance.   Respiratory: Negative for apnea, cough, choking, chest tightness, shortness of breath, wheezing and stridor.    Cardiovascular: Negative for chest pain, palpitations and leg swelling.   Gastrointestinal: Negative for abdominal distention, abdominal pain, anal bleeding, blood in stool, constipation, diarrhea, nausea, rectal pain and vomiting.   Endocrine: Negative for cold intolerance, heat intolerance,  "polydipsia and polyuria.   Genitourinary: Negative for decreased urine volume, difficulty urinating, dysuria, flank pain, frequency, genital sores, hematuria and urgency.   Musculoskeletal: Negative for arthralgias, back pain, gait problem, joint swelling, myalgias, neck pain and neck stiffness.   Skin: Negative for color change, pallor and rash.   Neurological: Negative for dizziness, tremors, seizures, syncope, facial asymmetry, speech difficulty, weakness, light-headedness, numbness and headaches.   Hematological: Negative for adenopathy. Does not bruise/bleed easily.   Psychiatric/Behavioral: Negative for agitation, behavioral problems, confusion, decreased concentration, hallucinations, self-injury, sleep disturbance and suicidal ideas. The patient is not nervous/anxious.      Objective:    Vitals:    05/18/22 1019   Resp: 18   Temp: 96.6 °F (35.9 °C)   Weight: 56.2 kg (124 lb)   Height: 154.9 cm (61\")   PainSc: 0-No pain     Body mass index is 23.43 kg/m².  ECOG  (3) Capable of limited self-care, confined to bed or chair > 50% of waking hours    Physical Exam:     Physical Exam  Constitutional:       General: She is not in acute distress.     Appearance: Normal appearance. She is not ill-appearing, toxic-appearing or diaphoretic.   HENT:      Head: Normocephalic and atraumatic.      Right Ear: External ear normal.      Left Ear: External ear normal.      Nose: Nose normal.      Mouth/Throat:      Mouth: Mucous membranes are moist.      Pharynx: Oropharynx is clear. No oropharyngeal exudate or posterior oropharyngeal erythema.   Eyes:      General: No scleral icterus.        Right eye: No discharge.         Left eye: No discharge.      Conjunctiva/sclera: Conjunctivae normal.      Pupils: Pupils are equal, round, and reactive to light.   Cardiovascular:      Rate and Rhythm: Normal rate and regular rhythm.      Pulses: Normal pulses.      Heart sounds: No murmur heard.    No friction rub. No gallop. "   Pulmonary:      Effort: No respiratory distress.      Breath sounds: No stridor. No wheezing, rhonchi or rales.   Abdominal:      General: Abdomen is flat. Bowel sounds are normal. There is no distension.      Palpations: Abdomen is soft. There is no mass.      Tenderness: There is no abdominal tenderness. There is no right CVA tenderness, left CVA tenderness, guarding or rebound.      Hernia: No hernia is present.   Musculoskeletal:         General: No swelling, tenderness, deformity or signs of injury.      Cervical back: No rigidity.      Right lower leg: No edema.      Left lower leg: No edema.   Lymphadenopathy:      Cervical: No cervical adenopathy.   Skin:     Coloration: Skin is not jaundiced.      Findings: No bruising, lesion or rash.   Neurological:      General: No focal deficit present.      Mental Status: She is alert and oriented to person, place, and time.      Cranial Nerves: No cranial nerve deficit.      Motor: No weakness.      Gait: Gait normal.   Psychiatric:         Mood and Affect: Mood normal.         Behavior: Behavior normal.         Thought Content: Thought content normal.         Judgment: Judgment normal.     CM Cheng MD performed the physical exam on 5/18/2022    Lab Results - Last 18 Months   Lab Units 05/18/22  1050 04/25/22  1138 04/04/22  1132   WBC 10*3/mm3 8.93 9.06 9.86   HEMOGLOBIN g/dL 9.8* 9.5* 9.3*   HEMATOCRIT % 32.8* 32.2* 31.2*   PLATELETS 10*3/mm3 382 360 473*   MCV fL 81.0 81.1 79.4     Lab Results - Last 18 Months   Lab Units 04/25/22  1249 03/23/22  0923 03/22/22  0854 03/21/22  0828 03/09/22  1305   SODIUM mmol/L 136 135* 138 138 136   POTASSIUM mmol/L 5.1 5.3* 4.4 4.3 3.6   CHLORIDE mmol/L 102 101 103 103 101   CO2 mmol/L 23.0 24.0 25.0 24.0 25.0   BUN mg/dL 26* 18 16 16 19   CREATININE mg/dL 1.13* 1.11* 0.99 0.92 1.09*   CALCIUM mg/dL 9.4 8.6 8.7 9.5 8.6   BILIRUBIN mg/dL 0.3  --   --  0.4 0.7   ALK PHOS U/L 72  --   --  106 101   ALT (SGPT) U/L 8  --    --  8 8   AST (SGOT) U/L 12  --   --  11 11   GLUCOSE mg/dL 104* 155* 106* 106* 103*     Lab Results   Component Value Date    GLUCOSE 104 (H) 04/25/2022    BUN 26 (H) 04/25/2022    CREATININE 1.13 (H) 04/25/2022    EGFRIFNONA 84 02/01/2022    BCR 23.0 04/25/2022    K 5.1 04/25/2022    CO2 23.0 04/25/2022    CALCIUM 9.4 04/25/2022    ALBUMIN 3.40 (L) 04/25/2022    LABIL2 1.2 05/29/2019    AST 12 04/25/2022    ALT 8 04/25/2022     Lab Results - Last 18 Months   Lab Units 11/05/21  0924   INR  1.03   APTT seconds 28.2     Lab Results   Component Value Date    PTT 28.2 11/05/2021    INR 1.03 11/05/2021     Assessment & Plan     Assessment:  1. Microcytic anemia: Continues to improve after the administration of iron. She persists with anemia, however, and I doubt she will correct completely. I suspect her chronic renal disease has a large role to play in this. I have asked her to continue to follow and I will see her in one month.   2. Gastrointestinal bleeding. Continued bleeding also plays a role in the anemia. At this point she is completely asymptomatic.   3. Malabsorption.   4. She will see me in one month. No change in therapy for now.     Plan:  1. As above.     Frank Cheng MD on 5/18/2022 at 13:14 PM.

## 2022-05-13 ENCOUNTER — OFFICE VISIT (OUTPATIENT)
Dept: RADIATION ONCOLOGY | Facility: HOSPITAL | Age: 82
End: 2022-05-13

## 2022-05-13 VITALS
TEMPERATURE: 96.9 F | OXYGEN SATURATION: 100 % | SYSTOLIC BLOOD PRESSURE: 128 MMHG | DIASTOLIC BLOOD PRESSURE: 71 MMHG | WEIGHT: 124.8 LBS | HEIGHT: 61 IN | HEART RATE: 65 BPM | RESPIRATION RATE: 18 BRPM | BODY MASS INDEX: 23.56 KG/M2

## 2022-05-13 DIAGNOSIS — C34.91 BRONCHOGENIC CANCER OF RIGHT LUNG: Primary | ICD-10-CM

## 2022-05-13 PROCEDURE — G0463 HOSPITAL OUTPT CLINIC VISIT: HCPCS

## 2022-05-13 PROCEDURE — 99212-NC PR NO CHARGE CBC OFFICE OUTPATIENT VISIT 10 MINUTES: Performed by: RADIOLOGY

## 2022-05-18 ENCOUNTER — OFFICE VISIT (OUTPATIENT)
Dept: ONCOLOGY | Facility: CLINIC | Age: 82
End: 2022-05-18

## 2022-05-18 ENCOUNTER — APPOINTMENT (OUTPATIENT)
Dept: LAB | Facility: HOSPITAL | Age: 82
End: 2022-05-18

## 2022-05-18 VITALS — HEIGHT: 61 IN | RESPIRATION RATE: 18 BRPM | BODY MASS INDEX: 23.41 KG/M2 | TEMPERATURE: 96.6 F | WEIGHT: 124 LBS

## 2022-05-18 DIAGNOSIS — D50.8 OTHER IRON DEFICIENCY ANEMIAS: Primary | ICD-10-CM

## 2022-05-18 LAB
BASOPHILS # BLD AUTO: 0.01 10*3/MM3 (ref 0–0.2)
BASOPHILS NFR BLD AUTO: 0.1 % (ref 0–1.5)
DEPRECATED RDW RBC AUTO: 51.1 FL (ref 37–54)
EOSINOPHIL # BLD AUTO: 0.08 10*3/MM3 (ref 0–0.4)
EOSINOPHIL NFR BLD AUTO: 0.9 % (ref 0.3–6.2)
ERYTHROCYTE [DISTWIDTH] IN BLOOD BY AUTOMATED COUNT: 17.6 % (ref 12.3–15.4)
HCT VFR BLD AUTO: 32.8 % (ref 34–46.6)
HGB BLD-MCNC: 9.8 G/DL (ref 12–15.9)
LYMPHOCYTES # BLD AUTO: 1.09 10*3/MM3 (ref 0.7–3.1)
LYMPHOCYTES NFR BLD AUTO: 12.2 % (ref 19.6–45.3)
MCH RBC QN AUTO: 24.2 PG (ref 26.6–33)
MCHC RBC AUTO-ENTMCNC: 29.9 G/DL (ref 31.5–35.7)
MCV RBC AUTO: 81 FL (ref 79–97)
MONOCYTES # BLD AUTO: 0.77 10*3/MM3 (ref 0.1–0.9)
MONOCYTES NFR BLD AUTO: 8.6 % (ref 5–12)
NEUTROPHILS NFR BLD AUTO: 6.98 10*3/MM3 (ref 1.7–7)
NEUTROPHILS NFR BLD AUTO: 78.2 % (ref 42.7–76)
PLATELET # BLD AUTO: 382 10*3/MM3 (ref 140–450)
PMV BLD AUTO: 8.2 FL (ref 6–12)
RBC # BLD AUTO: 4.05 10*6/MM3 (ref 3.77–5.28)
WBC NRBC COR # BLD: 8.93 10*3/MM3 (ref 3.4–10.8)

## 2022-05-18 PROCEDURE — 85025 COMPLETE CBC W/AUTO DIFF WBC: CPT | Performed by: INTERNAL MEDICINE

## 2022-05-18 PROCEDURE — 99213 OFFICE O/P EST LOW 20 MIN: CPT | Performed by: INTERNAL MEDICINE

## 2022-05-18 PROCEDURE — 36415 COLL VENOUS BLD VENIPUNCTURE: CPT | Performed by: INTERNAL MEDICINE

## 2022-05-20 ENCOUNTER — TELEPHONE (OUTPATIENT)
Dept: CARDIOLOGY | Facility: CLINIC | Age: 82
End: 2022-05-20

## 2022-05-20 ENCOUNTER — TELEPHONE (OUTPATIENT)
Dept: FAMILY MEDICINE CLINIC | Facility: CLINIC | Age: 82
End: 2022-05-20

## 2022-05-20 ENCOUNTER — OFFICE VISIT (OUTPATIENT)
Dept: CARDIOLOGY | Facility: CLINIC | Age: 82
End: 2022-05-20

## 2022-05-20 ENCOUNTER — PREP FOR SURGERY (OUTPATIENT)
Dept: OTHER | Facility: HOSPITAL | Age: 82
End: 2022-05-20

## 2022-05-20 ENCOUNTER — OFFICE VISIT (OUTPATIENT)
Dept: FAMILY MEDICINE CLINIC | Facility: CLINIC | Age: 82
End: 2022-05-20

## 2022-05-20 VITALS
DIASTOLIC BLOOD PRESSURE: 69 MMHG | SYSTOLIC BLOOD PRESSURE: 111 MMHG | HEART RATE: 69 BPM | OXYGEN SATURATION: 98 % | WEIGHT: 124 LBS | HEIGHT: 61 IN | BODY MASS INDEX: 23.41 KG/M2

## 2022-05-20 VITALS
DIASTOLIC BLOOD PRESSURE: 68 MMHG | BODY MASS INDEX: 23.43 KG/M2 | OXYGEN SATURATION: 96 % | WEIGHT: 124 LBS | SYSTOLIC BLOOD PRESSURE: 115 MMHG | TEMPERATURE: 97.6 F | HEART RATE: 83 BPM

## 2022-05-20 DIAGNOSIS — D50.0 IRON DEFICIENCY ANEMIA DUE TO CHRONIC BLOOD LOSS: ICD-10-CM

## 2022-05-20 DIAGNOSIS — I10 PRIMARY HYPERTENSION: ICD-10-CM

## 2022-05-20 DIAGNOSIS — R60.9 EDEMA, UNSPECIFIED TYPE: ICD-10-CM

## 2022-05-20 DIAGNOSIS — R55 SYNCOPE, UNSPECIFIED SYNCOPE TYPE: Primary | ICD-10-CM

## 2022-05-20 DIAGNOSIS — I50.31 ACUTE DIASTOLIC CHF (CONGESTIVE HEART FAILURE): ICD-10-CM

## 2022-05-20 DIAGNOSIS — Z88.8 ALLERGY TO IODINE: ICD-10-CM

## 2022-05-20 DIAGNOSIS — R55 SYNCOPE AND COLLAPSE: ICD-10-CM

## 2022-05-20 DIAGNOSIS — I48.0 PAROXYSMAL ATRIAL FIBRILLATION: Primary | ICD-10-CM

## 2022-05-20 PROCEDURE — 99205 OFFICE O/P NEW HI 60 MIN: CPT | Performed by: INTERNAL MEDICINE

## 2022-05-20 PROCEDURE — 99214 OFFICE O/P EST MOD 30 MIN: CPT | Performed by: FAMILY MEDICINE

## 2022-05-20 PROCEDURE — 93000 ELECTROCARDIOGRAM COMPLETE: CPT | Performed by: INTERNAL MEDICINE

## 2022-05-20 RX ORDER — SODIUM CHLORIDE 0.9 % (FLUSH) 0.9 %
3 SYRINGE (ML) INJECTION EVERY 12 HOURS SCHEDULED
Status: CANCELLED | OUTPATIENT
Start: 2022-05-20

## 2022-05-20 RX ORDER — PREDNISONE 10 MG/1
20 TABLET ORAL EVERY 6 HOURS
Status: SHIPPED | OUTPATIENT
Start: 2022-05-20 | End: 2022-05-21

## 2022-05-20 RX ORDER — SODIUM CHLORIDE 0.9 % (FLUSH) 0.9 %
3-10 SYRINGE (ML) INJECTION AS NEEDED
Status: CANCELLED | OUTPATIENT
Start: 2022-05-20

## 2022-05-20 RX ORDER — SODIUM CHLORIDE 9 MG/ML
80 INJECTION, SOLUTION INTRAVENOUS CONTINUOUS
Status: CANCELLED | OUTPATIENT
Start: 2022-05-20

## 2022-05-20 RX ORDER — SPIRONOLACTONE 25 MG/1
25 TABLET ORAL EVERY OTHER DAY
Qty: 45 TABLET | Refills: 1 | Status: SHIPPED | OUTPATIENT
Start: 2022-05-20 | End: 2022-12-02

## 2022-05-20 NOTE — TELEPHONE ENCOUNTER
PT CAME IIN STATING THAT THE DIRECTIONS CHANGED ON HER SPIRONOLACTONE SO THE PHARMACY WON'T FILL IT. SHE SAID SHE'S SUPPOSED TO BE TAKING 1 EVERY OTHER DAY INSTEAD OF 2 A DAY. SHE IS NOW OUT OF THIS MEDICINE.    WAS HERE AND SAW DR. CESPEDES ON 5/9/2022 BECAUSE SHE THOUGHT SHE HAD A SINUS INFECTION. SHE ALSO HAS A PAIN IN HER NECK AND HEAD. SHE IS TAKING TYLENOL FOR IT. SHE THEN PASSED OUT TODAY AT HOME. SHE GOT HOT AND CLAMMY THEN PASSED OUT. SHE SAW HER CARDIOLOGIST TODAY AND HE TOLD HER TOLD MAKE YOU AWARE OF THIS AND THAT SHE SHOULD GET A BRAIN SCAN.

## 2022-05-20 NOTE — PROGRESS NOTES
CC--- anemia and paroxysmal atrial fibrillation    Sub    Delightful 81-year-old patient with multiple comorbidities was sent for evaluation for watchman implantation.  She has history of paroxysmal atrial fibrillation and also has history of anemia needing transfusion.  She had a history of vasovagal syncope according to her description several years ago.  She was in her usual health and suddenly had transient syncope with mild bruising of her right upper extremity this morning without any premonitory symptoms with quick recovery highly suspicious for cardiac arrhythmia causing her syncope.  She has known history of hypertension and diastolic heart failure and she is on diuretics for leg edema.  She also had a prior treatment for venous system in the left lower extremity according to her.  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  Because of significant anemia and needing blood transfusions patient's anticoagulation has been stopped and was recommended by multiple specialists to consider watchman implantation to protect her from stroke and avoid long-term anticoagulation  Has bled score greater than 3  [id number] score --- diastolic heart failure, hypertension, female sex, age--4  Essential hypertension well-controlled  Hyperlipidemia under treatment  No history of any CAD  Normal stress test in the last 1 year  Preserved LVEF        Past Medical History:   Diagnosis Date   • A-fib (HCC)    • Anemia    • Arthritis    • Asthma    • CHF (congestive heart failure) (HCC)    • Hypertension      Past Surgical History:   Procedure Laterality Date   • CATARACT EXTRACTION     • CHOLECYSTECTOMY     • HYSTERECTOMY  1987    Endometriosis   • OOPHORECTOMY     • TONSILLECTOMY       Family History   Problem Relation Age of Onset   • Breast cancer Mother 85   • Stroke Mother    • Heart disease Mother    •  Endometrial cancer Mother 70   • Heart failure Father    • Heart failure Sister    • COPD Sister    • Heart disease Sister      Social History     Tobacco Use   • Smoking status: Never Smoker   • Smokeless tobacco: Never Used   Vaping Use   • Vaping Use: Never used   Substance Use Topics   • Alcohol use: No   • Drug use: Never     Review of Systems   General:  positive for fatigue and tiredness  Eyes: No redness  Cardiovascular: No chest pain, positive for  palpitations  Respiratory:   positive for class 2 shortness of breath  Gastrointestinal: No nausea or vomiting  Genitourinary: no hematuria or dysuria  Musculoskeletal: No arthralgia or myalgia  Skin: No rash  Neurologic: No numbness, tingling  Hematologic/Lymphatic: No recent  bleeding      Physical Exam  VITALS REVIEWED    General:      well developed, well nourished, in no acute distress.    Head:      normocephalic and atraumatic.    Eyes:      PERRL/EOM intact, conjunctivae and sclerae clear without nystagmus.    Neck:      no masses, thyromegaly,  trachea central with normal respiratory effort   Lungs:      clear bilaterally to auscultation.    Heart:      Sinus rhythm without any murmurs or gallops  Msk:      Positive for mild kyphosis     Pulses:      pulses normal in all 4 extremities.    Extremities:       no cyanosis or clubbing--trace left pedal edema and trace right pedal edema.    Neurologic:      no focal deficits.   alert oriented x3  Skin:      intact without lesions or rashes.    Psych:      alert and cooperative; normal mood and affect; normal attention span and concentration.            Assessment and plan    Prior cardiac testing, cardiology notes, hematology oncology notes reviewed  Most recent labs hemoglobin of 9.8, potassium of 5.1 and creatinine of 1.13, iron level is low at 25 and prior TSH normal  Paroxysmal atrial fibrillation currently in sinus rhythm  History of spontaneous syncope this morning and low normal blood pressure recorded  in the office.  Patient to stop Cardizem and continue beta-blocker.  Plenty of hydration educated.  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  Microcytic anemia with iron deficiency and malabsorption status post iron infusions and transfusions.  Class III chronic diastolic heart failure symptoms  Essential hypertension    High [id number] score    Patient educated about alternative options like watchman and extensive discussion was done with the patient and family about pros and cons, risks and benefits and outcomes.  Patient has decided to get her watchman done and orders have been placed for MELISA along with watchman.    If the patient has recurrent transient or full-fledged syncope despite stopping Cardizem, prolonged monitoring may be needed to exclude intermittent arrhythmia like tachyarrhythmia// bradycardia arrhythmia.      ECG 12 Lead    Date/Time: 5/20/2022 1:52 PM  Performed by: Sage Garcia MD  Authorized by: Sage Garcia MD   Comparison: compared with previous ECG   Similar to previous ECG  Rhythm: sinus rhythm  Rate: normal  Conduction: conduction normal  QRS axis: right              Electronically signed by Sage Garcia MD, 05/20/22, 1:52 PM EDT.

## 2022-05-20 NOTE — TELEPHONE ENCOUNTER
Spoke with pt regarding Iodine allergy. Pt is aware of prednisone  called in for 4 pm and 10 pm the night prior to the procedure. Pt verbalized understanding and will call with any other questions.

## 2022-05-24 ENCOUNTER — TELEPHONE (OUTPATIENT)
Dept: FAMILY MEDICINE CLINIC | Facility: CLINIC | Age: 82
End: 2022-05-24

## 2022-05-24 DIAGNOSIS — R55 SYNCOPE, UNSPECIFIED SYNCOPE TYPE: Primary | ICD-10-CM

## 2022-05-24 DIAGNOSIS — R91.1 LUNG NODULE: ICD-10-CM

## 2022-05-24 PROBLEM — R60.9 EDEMA: Status: ACTIVE | Noted: 2022-05-24

## 2022-05-24 NOTE — TELEPHONE ENCOUNTER
"[facility] sent this message about the MRI order: 5/24/2022 THIS ORDER IS INCORRECT, WE NEED AN ORDER TO BE \" MRI BRAIN WITH AND WITHOUT\" WE CANT DO ONLY WITH BECAUSE NOTHING TO COMPARE TOO. PLEASE ADVISE      Thank you  "

## 2022-06-05 PROBLEM — R55 SYNCOPE: Status: ACTIVE | Noted: 2022-06-05

## 2022-06-13 NOTE — PROGRESS NOTES
HEMATOLOGY ONCOLOGY OUTPATIENT FOLLOW UP       Patient name: Chrystal Ruiz  : 1940  MRN: [id number]  Primary Care Physician: Etelvina Ulloa MD  Referring Physician: Etelvina Ulloa MD  Reason For Consult:     No chief complaint on file.    HPI:   History of Present Illness:  Chrystal Ruiz is 81 y.o. female who presented to our office on 22 for consultation regarding anemia and thrombocytosis.     3/9/2022: Ms. Ruiz was referred for the investigation of anemia and thrombocytosis that was identified in the process of treating congestive heart failure and a right lung tumor. She came in a wheel chair complaining of severe fatigue and difficulties functioning because of this weakness. She admitted to hematochezia that had been associated to constipation that had been going on for some time, at least a few weeks. She had been afebrile. No pain. The laboratory exams reported microcytic anemia and thrombocytosis that had been present since the end of . A decision was made to transfuse her with one unit of red cells. Tests for iron deficiency were sent. She was scheduled to see me in one week.     3/16/2022. Ms. Ruiz was back in the office for follow up. She was feeling much better after the transfusion. She was still weak but not as much and her affect was also better. The laboratory exams did not clearly suggest iron deficiency but she persisted with microcytic anemia and had a history of gastrointestinal bleeding, such that iron deficiency was strongly considered the cause of the symptoms and laboratory manifestations. She had taken oral iron with multiple side effects and no clear improvement. Plans for intravenous iron were made.     3/22/2022: Admitted with evidence of decompensation of the congestive heart failure. She was treated with diuretics with prompt symptomatic relief. She was given one dose of intravenous iron. She was discharged feeling much better.      4/4/2022: Back in the office for follow up. She had been receiving iron intravenously without complications. She reported unintended weight loss. The exam was not different than before. The laboratory exams revealed progressive improvement of the blood count with increase in the hemoglobin and the mean corpuscular volume and resolution of the leukocytosis and thrombocytosis. A decision was made to obtain a scan of the abdomen to investigate the history of blood loss in the stools and the unintended weight loss.     4/25/2022: Ms. Ruiz was back in the office for follow up. She was feeling much better and had returned to most of her normal activities. She arrived walking and reported that she had been walking more and more. She still had some fatigue but not the weakness she had before. She also had better appetite. She complained, however, that some of the medication she was receiving had resulted in changes in her taste perception. She had no more dyspnea than before and she had been free of chest pain. She had been offered the Watchman procedure, since she was not able to take anticoagulation safely. She denies abdominal pain or diarrhea. No dysuria. No edema. No skin rash.     5/18/2022: Back in the office to review results. She was feeling very well. Had regained her energy and was very active. The exam did not reveal new changes. Her hemoglobin had continued to improve, but she still had anemia. A decision was made to continue to observe without intervention.     6/15/2022: Back in the office for follow-up.  She had partial correction of her anemia.  She was scheduled to have a watchman procedure early in July.  The physical exam was unremarkable.  A decision was made to continue to observe and she was scheduled to return in 2 months.    Subjective:  • 6/15/2022: Reports today that she is a different person.  Energetic and able to do the things that she did before.  Driving and without having to use a  wheelchair at all.  Eating well and maintaining a stable weight.  Good appetite.  No chest pains or cough.  No dysphagia.  Remains free of abdominal pain and has had no changes in bowel habits.  Again denies melena or hematochezia.  No peripheral edema.    The following portions of the patient's history were reviewed and updated as appropriate: allergies, current medications, past family history, past medical history, past social history, past surgical history and problem list.    Past Medical History:   Diagnosis Date   • A-fib (HCC)    • Anemia    • Arthritis    • Asthma    • CHF (congestive heart failure) (HCC)    • Hypertension      Past Surgical History:   Procedure Laterality Date   • CATARACT EXTRACTION     • CHOLECYSTECTOMY     • HYSTERECTOMY  1987    Endometriosis   • OOPHORECTOMY     • TONSILLECTOMY         Current Outpatient Medications:   •  acetaminophen (TYLENOL) 500 MG tablet, Take 500 mg by mouth Every 6 (Six) Hours As Needed for Mild Pain ., Disp: , Rfl:   •  amoxicillin (AMOXIL) 875 MG tablet, Take 1 tablet by mouth 2 (Two) Times a Day., Disp: 14 tablet, Rfl: 0  •  Breo Ellipta 200-25 MCG/INH inhaler, Inhale 1 puff Daily., Disp: 1 each, Rfl: 2  •  Cholecalciferol (VITAMIN D3) 2000 units capsule, Take 2,000 Units by mouth Daily., Disp: , Rfl:   •  Cyanocobalamin (VITAMIN B12) 1000 MCG tablet controlled-release, Take 2,500 mcg by mouth Daily., Disp: , Rfl:   •  furosemide (LASIX) 40 MG tablet, Take 1 tablet by mouth Daily As Needed (edema). (Patient taking differently: Take 40 mg by mouth Daily.), Disp: 90 tablet, Rfl: 2  •  metoprolol succinate XL (TOPROL-XL) 50 MG 24 hr tablet, Take 1 tablet by mouth Daily., Disp: 30 tablet, Rfl: 5  •  Premarin 0.625 MG/GM vaginal cream, Insert  into the vagina 2 (Two) Times a Week. (Patient taking differently: Insert  into the vagina As Needed.), Disp: 30 g, Rfl: 3  •  spironolactone (ALDACTONE) 25 MG tablet, Take 1 tablet by mouth Every Other Day., Disp: 45  tablet, Rfl: 1  •  triamcinolone (KENALOG) 0.1 % cream, Apply  topically to the appropriate area as directed 2 (Two) Times a Day., Disp: 80 g, Rfl: 2    Allergies   Allergen Reactions   • Ciprofloxacin Unknown (See Comments)   • Sulfa Antibiotics Unknown (See Comments)   • Cyprodenate Unknown - High Severity   • Iodine Unknown - High Severity     Family History   Problem Relation Age of Onset   • Breast cancer Mother 85   • Stroke Mother    • Heart disease Mother    • Endometrial cancer Mother 70   • Heart failure Father    • Heart failure Sister    • COPD Sister    • Heart disease Sister      Cancer-related family history includes Breast cancer (age of onset: 85) in her mother; Endometrial cancer (age of onset: 70) in her mother.    Social History     Tobacco Use   • Smoking status: Never Smoker   • Smokeless tobacco: Never Used   Vaping Use   • Vaping Use: Never used   Substance Use Topics   • Alcohol use: No   • Drug use: Never     Social History     Social History Narrative   • Not on file      ROS:     Review of Systems   Constitutional: Negative for activity change, appetite change, chills, diaphoresis, fatigue, fever and unexpected weight change.   HENT: Negative for congestion, dental problem, drooling, ear discharge, ear pain, facial swelling, hearing loss, mouth sores, nosebleeds, postnasal drip, rhinorrhea, sinus pressure, sinus pain, sneezing, sore throat, tinnitus, trouble swallowing and voice change.    Eyes: Negative for photophobia, pain, discharge, redness, itching and visual disturbance.   Respiratory: Negative for apnea, cough, choking, chest tightness, shortness of breath, wheezing and stridor.    Cardiovascular: Negative for chest pain, palpitations and leg swelling.   Gastrointestinal: Negative for abdominal distention, abdominal pain, anal bleeding, blood in stool, constipation, diarrhea, nausea, rectal pain and vomiting.   Endocrine: Negative for cold intolerance, heat intolerance, polydipsia  and polyuria.   Genitourinary: Negative for decreased urine volume, difficulty urinating, dysuria, flank pain, frequency, genital sores, hematuria and urgency.   Musculoskeletal: Negative for arthralgias, back pain, gait problem, joint swelling, myalgias, neck pain and neck stiffness.   Skin: Negative for color change, pallor and rash.   Neurological: Negative for dizziness, tremors, seizures, syncope, facial asymmetry, speech difficulty, weakness, light-headedness, numbness and headaches.   Hematological: Negative for adenopathy. Does not bruise/bleed easily.   Psychiatric/Behavioral: Negative for agitation, behavioral problems, confusion, decreased concentration, hallucinations, self-injury, sleep disturbance and suicidal ideas. The patient is not nervous/anxious.      Objective:    There were no vitals filed for this visit.  There is no height or weight on file to calculate BMI.  ECOG  (3) Capable of limited self-care, confined to bed or chair > 50% of waking hours    Physical Exam:     Physical Exam  Constitutional:       General: She is not in acute distress.     Appearance: Normal appearance. She is not ill-appearing, toxic-appearing or diaphoretic.   HENT:      Head: Normocephalic and atraumatic.      Right Ear: External ear normal.      Left Ear: External ear normal.      Nose: Nose normal.      Mouth/Throat:      Mouth: Mucous membranes are moist.      Pharynx: Oropharynx is clear. No oropharyngeal exudate or posterior oropharyngeal erythema.   Eyes:      General: No scleral icterus.        Right eye: No discharge.         Left eye: No discharge.      Conjunctiva/sclera: Conjunctivae normal.      Pupils: Pupils are equal, round, and reactive to light.   Cardiovascular:      Rate and Rhythm: Normal rate and regular rhythm.      Pulses: Normal pulses.      Heart sounds: No murmur heard.    No friction rub. No gallop.   Pulmonary:      Effort: No respiratory distress.      Breath sounds: No stridor. No  wheezing, rhonchi or rales.   Abdominal:      General: Abdomen is flat. Bowel sounds are normal. There is no distension.      Palpations: Abdomen is soft. There is no mass.      Tenderness: There is no abdominal tenderness. There is no right CVA tenderness, left CVA tenderness, guarding or rebound.      Hernia: No hernia is present.   Musculoskeletal:         General: No swelling, tenderness, deformity or signs of injury.      Cervical back: No rigidity.      Right lower leg: No edema.      Left lower leg: No edema.   Lymphadenopathy:      Cervical: No cervical adenopathy.   Skin:     Coloration: Skin is not jaundiced.      Findings: No bruising, lesion or rash.   Neurological:      General: No focal deficit present.      Mental Status: She is alert and oriented to person, place, and time.      Cranial Nerves: No cranial nerve deficit.      Motor: No weakness.      Gait: Gait normal.   Psychiatric:         Mood and Affect: Mood normal.         Behavior: Behavior normal.         Thought Content: Thought content normal.         Judgment: Judgment normal.     CM Cheng MD performed the physical exam on 6/15/2022 as documented above    Lab Results - Last 18 Months   Lab Units 05/18/22  1050 04/25/22  1138 04/04/22  1132   WBC 10*3/mm3 8.93 9.06 9.86   HEMOGLOBIN g/dL 9.8* 9.5* 9.3*   HEMATOCRIT % 32.8* 32.2* 31.2*   PLATELETS 10*3/mm3 382 360 473*   MCV fL 81.0 81.1 79.4     Lab Results - Last 18 Months   Lab Units 04/25/22  1249 03/23/22  0923 03/22/22  0854 03/21/22  0828 03/09/22  1305   SODIUM mmol/L 136 135* 138 138 136   POTASSIUM mmol/L 5.1 5.3* 4.4 4.3 3.6   CHLORIDE mmol/L 102 101 103 103 101   CO2 mmol/L 23.0 24.0 25.0 24.0 25.0   BUN mg/dL 26* 18 16 16 19   CREATININE mg/dL 1.13* 1.11* 0.99 0.92 1.09*   CALCIUM mg/dL 9.4 8.6 8.7 9.5 8.6   BILIRUBIN mg/dL 0.3  --   --  0.4 0.7   ALK PHOS U/L 72  --   --  106 101   ALT (SGPT) U/L 8  --   --  8 8   AST (SGOT) U/L 12  --   --  11 11   GLUCOSE mg/dL 104*  155* 106* 106* 103*     Lab Results   Component Value Date    GLUCOSE 104 (H) 04/25/2022    BUN 26 (H) 04/25/2022    CREATININE 1.13 (H) 04/25/2022    EGFRIFNONA 84 02/01/2022    BCR 23.0 04/25/2022    K 5.1 04/25/2022    CO2 23.0 04/25/2022    CALCIUM 9.4 04/25/2022    ALBUMIN 3.40 (L) 04/25/2022    LABIL2 1.2 05/29/2019    AST 12 04/25/2022    ALT 8 04/25/2022     Lab Results - Last 18 Months   Lab Units 11/05/21  0924   INR  1.03   APTT seconds 28.2     Lab Results   Component Value Date    PTT 28.2 11/05/2021    INR 1.03 11/05/2021     Assessment & Plan     Assessment:  1. Iron deficiency anemia: Partially resolved with the administration of intravenous iron.  At this point no additional intervention.  My impression is that she continues to have gastrointestinal bleeding.  Discussed with her.  2. Gastrointestinal bleeding.  Persists.  On this basis I have suggested to resume oral iron.  Discussed with her the importance of taking it on an empty stomach and avoiding dairy and black tea.  I suggested that instead of daily, given that she probably has had at least partial replenishment of her iron stores, she may be able to take it 2 or 3 times a week thereby diminishing the side effects of the replacement but hopefully allowing for no recurrence of the anemia.  Gave instructions and sent a prescription.  3. I have asked her to return to see me in approximately 2 months.    Plan:  1. As above    Frank Cheng MD on 6/15/2022 at 1332

## 2022-06-14 ENCOUNTER — TELEPHONE (OUTPATIENT)
Dept: CARDIOLOGY | Facility: CLINIC | Age: 82
End: 2022-06-14

## 2022-06-14 NOTE — TELEPHONE ENCOUNTER
----- Message from Sage DECKER MD sent at 6/14/2022  8:35 AM EDT -----  Yes    Dr ANTHONY  ----- Message -----  From: Camila Goodwin MA  Sent: 6/13/2022   9:54 AM EDT  To: Sage DECKER MD    Pt is scheduled to have a Cortizone injection on 6/20/22. She would like to know if that is ok to have before her Watchman implantation on 7/7/22?

## 2022-06-15 ENCOUNTER — OFFICE VISIT (OUTPATIENT)
Dept: ONCOLOGY | Facility: CLINIC | Age: 82
End: 2022-06-15

## 2022-06-15 ENCOUNTER — LAB (OUTPATIENT)
Dept: LAB | Facility: HOSPITAL | Age: 82
End: 2022-06-15

## 2022-06-15 ENCOUNTER — HOSPITAL ENCOUNTER (OUTPATIENT)
Dept: PET IMAGING | Facility: HOSPITAL | Age: 82
Discharge: HOME | End: 2022-06-15
Admitting: RADIOLOGY

## 2022-06-15 VITALS
HEART RATE: 69 BPM | OXYGEN SATURATION: 100 % | BODY MASS INDEX: 23.41 KG/M2 | SYSTOLIC BLOOD PRESSURE: 134 MMHG | RESPIRATION RATE: 18 BRPM | HEIGHT: 61 IN | WEIGHT: 124 LBS | TEMPERATURE: 97.3 F | DIASTOLIC BLOOD PRESSURE: 71 MMHG

## 2022-06-15 DIAGNOSIS — C34.11 MALIGNANT NEOPLASM OF UPPER LOBE OF RIGHT LUNG: Primary | ICD-10-CM

## 2022-06-15 DIAGNOSIS — C34.91 BRONCHOGENIC CANCER OF RIGHT LUNG: ICD-10-CM

## 2022-06-15 DIAGNOSIS — D50.8 OTHER IRON DEFICIENCY ANEMIAS: Primary | ICD-10-CM

## 2022-06-15 DIAGNOSIS — D50.8 OTHER IRON DEFICIENCY ANEMIAS: ICD-10-CM

## 2022-06-15 LAB
BASOPHILS # BLD AUTO: 0.01 10*3/MM3 (ref 0–0.2)
BASOPHILS NFR BLD AUTO: 0.1 % (ref 0–1.5)
DEPRECATED RDW RBC AUTO: 45.8 FL (ref 37–54)
EOSINOPHIL # BLD AUTO: 0.08 10*3/MM3 (ref 0–0.4)
EOSINOPHIL NFR BLD AUTO: 0.9 % (ref 0.3–6.2)
ERYTHROCYTE [DISTWIDTH] IN BLOOD BY AUTOMATED COUNT: 15.8 % (ref 12.3–15.4)
HCT VFR BLD AUTO: 35.6 % (ref 34–46.6)
HGB BLD-MCNC: 10.9 G/DL (ref 12–15.9)
HOLD SPECIMEN: NORMAL
HOLD SPECIMEN: NORMAL
LYMPHOCYTES # BLD AUTO: 1.05 10*3/MM3 (ref 0.7–3.1)
LYMPHOCYTES NFR BLD AUTO: 12.4 % (ref 19.6–45.3)
MCH RBC QN AUTO: 24.5 PG (ref 26.6–33)
MCHC RBC AUTO-ENTMCNC: 30.6 G/DL (ref 31.5–35.7)
MCV RBC AUTO: 80 FL (ref 79–97)
MONOCYTES # BLD AUTO: 0.75 10*3/MM3 (ref 0.1–0.9)
MONOCYTES NFR BLD AUTO: 8.8 % (ref 5–12)
NEUTROPHILS NFR BLD AUTO: 6.61 10*3/MM3 (ref 1.7–7)
NEUTROPHILS NFR BLD AUTO: 77.8 % (ref 42.7–76)
PLATELET # BLD AUTO: 345 10*3/MM3 (ref 140–450)
PMV BLD AUTO: 8.7 FL (ref 6–12)
RBC # BLD AUTO: 4.45 10*6/MM3 (ref 3.77–5.28)
WBC NRBC COR # BLD: 8.5 10*3/MM3 (ref 3.4–10.8)

## 2022-06-15 PROCEDURE — 99214 OFFICE O/P EST MOD 30 MIN: CPT | Performed by: INTERNAL MEDICINE

## 2022-06-15 PROCEDURE — 71250 CT THORAX DX C-: CPT

## 2022-06-15 PROCEDURE — 85025 COMPLETE CBC W/AUTO DIFF WBC: CPT

## 2022-06-15 PROCEDURE — 36415 COLL VENOUS BLD VENIPUNCTURE: CPT

## 2022-06-15 RX ORDER — FERROUS SULFATE 325(65) MG
325 TABLET ORAL EVERY OTHER DAY
Qty: 15 TABLET | Refills: 11 | Status: SHIPPED | OUTPATIENT
Start: 2022-06-15

## 2022-06-20 ENCOUNTER — TELEPHONE (OUTPATIENT)
Dept: FAMILY MEDICINE CLINIC | Facility: CLINIC | Age: 82
End: 2022-06-20

## 2022-06-20 NOTE — TELEPHONE ENCOUNTER
Caller: Chrystal Ruiz    Relationship: Self    Best call back number: [phone]    What is the best time to reach you: ANY    Who are you requesting to speak with (clinical staff, provider,  specific staff member): DR. DALLAS OR HER ASSISTANT    What was the call regarding: PATIENT STATES THAT DR. HAHN'S NURSE PRACTITIONER WAS SUPPOSED TO GIVE HER INJECTIONS IN HER KNEES TODAY, BUT THEY GAVE HER A CALL STATED THAT SHE IS SICK AND WOULD POSSIBLY BE GONE FOR 10 DAYS. PATIENT WOULD LIKE TO KNOW IF DR. DALLAS CAN REFER HER TO AN ORTHOPEDIC DOCTOR, OR SOMEONE ELSE WHO CAN DO THE CORTISONE INJECTIONS IN HER KNEES.     PATIENT HAS TO BE ON BLOOD THINNER AFTER HER WATCHMAN PROCEDURE ON July 7 AND THIS IS WHY SHE NEEDS HER CORTISONE INJECTIONS ASAP.     Do you require a callback: YES     PLEASE ADVISE PATIENT

## 2022-06-20 NOTE — TELEPHONE ENCOUNTER
I do not think she would have to be off of blood thinners to have a steroid knee injection but this would be up to the orthopedic office.

## 2022-06-20 NOTE — TELEPHONE ENCOUNTER
I do not know of any office that could get her in sooner.  She can call Dr. Oneill's office and see if they have anyone else who can see her there.

## 2022-06-20 NOTE — TELEPHONE ENCOUNTER
I will let her know I did ask her to call that office and ask them what are their protocols but she insist I ask you I will let her know to ask them as well and what you stated.

## 2022-06-20 NOTE — TELEPHONE ENCOUNTER
Patient was verbally notified and stated she is worried about her injection and being on her blood thinner I told her I believe she would have to be off that blood thinner before they could give her the injection but she is asking for your advise on that, she is more then likely not going to be able to have that Cortisone injection for 10 days.

## 2022-06-23 ENCOUNTER — OFFICE VISIT (OUTPATIENT)
Dept: SURGERY | Facility: CLINIC | Age: 82
End: 2022-06-23

## 2022-06-23 VITALS
WEIGHT: 124 LBS | OXYGEN SATURATION: 96 % | HEART RATE: 68 BPM | BODY MASS INDEX: 23.41 KG/M2 | HEIGHT: 61 IN | SYSTOLIC BLOOD PRESSURE: 138 MMHG | TEMPERATURE: 98.7 F | DIASTOLIC BLOOD PRESSURE: 67 MMHG

## 2022-06-23 DIAGNOSIS — R91.8 LUNG NODULES: ICD-10-CM

## 2022-06-23 DIAGNOSIS — C34.91 BRONCHOGENIC CANCER OF RIGHT LUNG: Primary | ICD-10-CM

## 2022-06-23 PROCEDURE — 99214 OFFICE O/P EST MOD 30 MIN: CPT | Performed by: THORACIC SURGERY (CARDIOTHORACIC VASCULAR SURGERY)

## 2022-06-23 RX ORDER — PREDNISONE 20 MG/1
TABLET ORAL
Qty: 2 TABLET | Refills: 0 | Status: ON HOLD | OUTPATIENT
Start: 2022-06-23 | End: 2022-07-07

## 2022-06-27 ENCOUNTER — HOSPITAL ENCOUNTER (OUTPATIENT)
Dept: MRI IMAGING | Facility: HOSPITAL | Age: 82
Discharge: HOME | End: 2022-06-27
Admitting: FAMILY MEDICINE

## 2022-06-27 DIAGNOSIS — R55 SYNCOPE, UNSPECIFIED SYNCOPE TYPE: ICD-10-CM

## 2022-06-27 DIAGNOSIS — R91.1 LUNG NODULE: ICD-10-CM

## 2022-06-27 LAB
CREAT BLDA-MCNC: 1.1 MG/DL (ref 0.6–1.3)
EGFRCR SERPLBLD CKD-EPI 2021: 50.3 ML/MIN/1.73

## 2022-06-27 PROCEDURE — A9579 GAD-BASE MR CONTRAST NOS,1ML: HCPCS | Performed by: FAMILY MEDICINE

## 2022-06-27 PROCEDURE — 25010000002 GADOTERIDOL PER 1 ML: Performed by: FAMILY MEDICINE

## 2022-06-27 PROCEDURE — 70553 MRI BRAIN STEM W/O & W/DYE: CPT

## 2022-06-27 PROCEDURE — 82565 ASSAY OF CREATININE: CPT

## 2022-06-27 RX ADMIN — GADOTERIDOL 11 ML: 279.3 INJECTION, SOLUTION INTRAVENOUS at 13:40

## 2022-06-29 ENCOUNTER — OFFICE VISIT (OUTPATIENT)
Dept: ORTHOPEDIC SURGERY | Facility: CLINIC | Age: 82
End: 2022-06-29

## 2022-06-29 VITALS
HEIGHT: 61 IN | HEART RATE: 71 BPM | WEIGHT: 121 LBS | BODY MASS INDEX: 22.84 KG/M2 | DIASTOLIC BLOOD PRESSURE: 60 MMHG | SYSTOLIC BLOOD PRESSURE: 146 MMHG

## 2022-06-29 DIAGNOSIS — M17.11 PRIMARY OSTEOARTHRITIS OF RIGHT KNEE: Primary | ICD-10-CM

## 2022-06-29 DIAGNOSIS — G89.29 CHRONIC PAIN OF LEFT KNEE: ICD-10-CM

## 2022-06-29 DIAGNOSIS — M25.562 CHRONIC PAIN OF LEFT KNEE: ICD-10-CM

## 2022-06-29 PROCEDURE — 20610 DRAIN/INJ JOINT/BURSA W/O US: CPT | Performed by: PHYSICIAN ASSISTANT

## 2022-06-29 PROCEDURE — 99214 OFFICE O/P EST MOD 30 MIN: CPT | Performed by: PHYSICIAN ASSISTANT

## 2022-06-29 RX ORDER — TRIAMCINOLONE ACETONIDE 40 MG/ML
80 INJECTION, SUSPENSION INTRA-ARTICULAR; INTRAMUSCULAR
Status: COMPLETED | OUTPATIENT
Start: 2022-06-29 | End: 2022-06-29

## 2022-06-29 RX ORDER — DIPHENHYDRAMINE HCL 50 MG/1
50 CAPSULE ORAL ONCE
COMMUNITY
Start: 2022-06-24 | End: 2022-07-14

## 2022-06-29 RX ORDER — LIDOCAINE HYDROCHLORIDE 10 MG/ML
2 INJECTION, SOLUTION INFILTRATION; PERINEURAL
Status: COMPLETED | OUTPATIENT
Start: 2022-06-29 | End: 2022-06-29

## 2022-06-29 RX ADMIN — LIDOCAINE HYDROCHLORIDE 2 ML: 10 INJECTION, SOLUTION INFILTRATION; PERINEURAL at 14:34

## 2022-06-29 RX ADMIN — TRIAMCINOLONE ACETONIDE 80 MG: 40 INJECTION, SUSPENSION INTRA-ARTICULAR; INTRAMUSCULAR at 14:34

## 2022-06-29 NOTE — PROGRESS NOTES
ORTHOPEDIC VISIT    Referring Provider: No ref. provider found  Primary Care Provider: Etelvina Ulloa MD         Subjective:  Chief Complaint:  Chief Complaint   Patient presents with   • Left Knee - Follow-up     Req injection   • Right Knee - Follow-up     Req injection       HPI:  Chrystal Ruiz is a 81 y.o. female who presents for her initial visit for left knee pain ongoing for at least 3 months.  She denies any specific injury.  She describes a dull, achy pain, mainly located over the medial and anterior aspects of the knee.  She denies any radiation, numbness, or tingling.  She denies any mechanical symptoms or instability.  Her pain does increase with weightbearing.  She is unable to take oral anti-inflammatories due to bleeding.  She denies any previous history of surgery or injections in the left knee.  She also presents in follow-up for her right knee pain with a known history of right knee DJD.  She is requesting a repeat steroid injection.  It has been greater than 3 months since her last injection, which did help with her discomfort.    Past Medical History:   Diagnosis Date   • A-fib (HCC)    • Anemia    • Arthritis    • Asthma    • CHF (congestive heart failure) (HCC)    • Hypertension        Past Surgical History:   Procedure Laterality Date   • CATARACT EXTRACTION     • CHOLECYSTECTOMY     • HYSTERECTOMY  1987    Endometriosis   • OOPHORECTOMY     • TONSILLECTOMY         Family History   Problem Relation Age of Onset   • Breast cancer Mother 85   • Stroke Mother    • Heart disease Mother    • Endometrial cancer Mother 70   • Heart failure Father    • Heart failure Sister    • COPD Sister    • Heart disease Sister        Social History     Occupational History   • Not on file   Tobacco Use   • Smoking status: Never Smoker   • Smokeless tobacco: Never Used   Vaping Use   • Vaping Use: Never used   Substance and Sexual Activity   • Alcohol use: No   • Drug use: Never   • Sexual activity: Not  Currently        Medications:    Current Outpatient Medications:   •  acetaminophen (TYLENOL) 500 MG tablet, Take 500 mg by mouth Every 6 (Six) Hours As Needed for Mild Pain ., Disp: , Rfl:   •  Banophen 50 MG capsule, TAKE 1 CAPSULE BY MOUTH AT 10PM THE NIGHT PRIOR TO PROCEDURE, Disp: , Rfl:   •  Breo Ellipta 200-25 MCG/INH inhaler, Inhale 1 puff Daily., Disp: 1 each, Rfl: 2  •  Cholecalciferol (VITAMIN D3) 2000 units capsule, Take 2,000 Units by mouth Daily., Disp: , Rfl:   •  Cyanocobalamin (VITAMIN B12) 1000 MCG tablet controlled-release, Take 2,500 mcg by mouth Daily., Disp: , Rfl:   •  ferrous sulfate 325 (65 FE) MG tablet, Take 1 tablet by mouth Every Other Day., Disp: 15 tablet, Rfl: 11  •  furosemide (LASIX) 40 MG tablet, Take 1 tablet by mouth Daily As Needed (edema). (Patient taking differently: Take 40 mg by mouth Daily. Patient now taking 40 mg every other day), Disp: 90 tablet, Rfl: 2  •  metoprolol succinate XL (TOPROL-XL) 50 MG 24 hr tablet, Take 1 tablet by mouth Daily., Disp: 30 tablet, Rfl: 5  •  predniSONE (DELTASONE) 20 MG tablet, Pt is to take 20 mg tab at 4 pm and 20 mg at 10 pm the day prior to procedure., Disp: 2 tablet, Rfl: 0  •  Premarin 0.625 MG/GM vaginal cream, Insert  into the vagina 2 (Two) Times a Week. (Patient taking differently: Insert  into the vagina As Needed.), Disp: 30 g, Rfl: 3  •  spironolactone (ALDACTONE) 25 MG tablet, Take 1 tablet by mouth Every Other Day., Disp: 45 tablet, Rfl: 1  •  triamcinolone (KENALOG) 0.1 % cream, Apply  topically to the appropriate area as directed 2 (Two) Times a Day., Disp: 80 g, Rfl: 2    Allergies:  Allergies   Allergen Reactions   • Ciprofloxacin Unknown (See Comments)   • Sulfa Antibiotics Unknown (See Comments)   • Cyprodenate Unknown - High Severity   • Iodine Unknown - High Severity         Review of Systems:  Gen -no fever, chills , sweats, headache   Eyes - no irritation or discharge   ENT -  no ear pain , runny nose , sore throat ,  "difficulty swallowing   Resp - no cough , congestion , excessive expectoration   CVS - no chest pain , palpitations.   Abd - no pain , nausea , vomiting , diarrhea   Skin - no rash , lesions.   Neuro - no dizziness    Please see HPI for any other pertinent positives.  All other systems were reviewed and are negative.       Objective   Objective:    /60 (BP Location: Left arm, Patient Position: Sitting, Cuff Size: Adult)   Pulse 71   Ht 154.9 cm (61\")   Wt 54.9 kg (121 lb)   BMI 22.86 kg/m²     Physical Examination:  Alert, oriented, well-nourished, well-developed individual in no acute distress, ambulating unassisted  Left lower extremity shows no erythema, rashes, or open skin lesions. There is a mild amount of swelling. It is grossly well aligned, and the patient is neurovascularly intact distally. The knee is stable to varus and valgus stress, there is no patellar maltracking noted, and plantar and dorsiflexion is 5/5.  There is crepitus noted.  There is mild tenderness to palpation over the medial joint line and with range of motion, which is about 0-130.  Positive Kimberly's.  Right lower extremity shows no erythema, rashes, or open skin lesions. There is a mild amount of swelling. It is grossly well aligned, and the patient is neurovascularly intact distally. The knee is stable to varus and valgus stress, there is no patellar maltracking or crepitus noted, and plantar and dorsiflexion is 5/5. There is no tenderness to palpation or with range of motion.           Imaging:  xrays obtained today  bilateral Knee X-Ray    Date of exam: 6/29/2022    Indication: Bilateral knee pain    AP, Lateral, Cowley views    Findings:Right: Shows minimal to mild tricompartmental DJD, worse in the patellofemoral compartment.  No fractures or dislocations are appreciated and chondrocalcinosis present.  Left: No significant osseous abnormalities appreciated.  Possible chondrocalcinosis.  No fractures or dislocations are " appreciated.    within normal limits joint spaces    Hardware appropriately positioned not applicable    yes prior studies available for comparison.    This patient's x-ray report was graded according to the Kellgren and Nima classification.  This took into account the joint space narrowing, osteophyte formation, sclerosis of the distal femur/proximal tibia along with deformity of those bones.  The findings were indicative of K L grade 1.    X-RAY was ordered and reviewed by NAHED Riddle            Assessment:  1. Primary osteoarthritis of right knee    2. Chronic pain of left knee                 Plan:  I have recommended an MRI of the left knee for further investigation, however the patient has deferred at this time.  We will continue conservative measures at this time.  She should notify me if her symptoms do not improve on the left knee so that we can proceed with the MRI.  Bilateral intra-articular steroid injections today, risks and benefits were discussed and postinjection instructions were given.  I have also given her some home exercises to begin.  She may follow-up as needed.  Natural history and expected course discussed. Questions answered.  Educational materials distributed.  Rest, ice, compression, and elevation (RICE) therapy.  OTC analgesics as needed.  Arthrocentesis. See procedure note.  MRI.  cortisone injections  viscosupplementation  physical therapy  bracing  activtiy modification  assistive devices  - Large Joint Arthrocentesis: bilateral knee on 6/29/2022 2:34 PM  Indications: pain  Details: 25 G needle, anterolateral approach  Medications (Right): 2 mL lidocaine 1 %; 80 mg triamcinolone acetonide 40 MG/ML  Medications (Left): 2 mL lidocaine 1 %; 80 mg triamcinolone acetonide 40 MG/ML  Outcome: tolerated well, no immediate complications  Procedure, treatment alternatives, risks and benefits explained, specific risks discussed. Consent was given by the patient. Immediately prior to  "procedure a time out was called to verify the correct patient, procedure, equipment, support staff and site/side marked as required. Patient was prepped and draped in the usual sterile fashion.                    NAHED Riddle  06/29/22  16:22 EDT    \"Please note that portions of this note were completed with a voice recognition program\".   "

## 2022-07-05 ENCOUNTER — LAB (OUTPATIENT)
Dept: LAB | Facility: HOSPITAL | Age: 82
End: 2022-07-05

## 2022-07-05 DIAGNOSIS — D50.0 IRON DEFICIENCY ANEMIA DUE TO CHRONIC BLOOD LOSS: ICD-10-CM

## 2022-07-05 DIAGNOSIS — I48.0 PAROXYSMAL ATRIAL FIBRILLATION: ICD-10-CM

## 2022-07-05 DIAGNOSIS — I10 PRIMARY HYPERTENSION: ICD-10-CM

## 2022-07-05 DIAGNOSIS — R60.9 EDEMA, UNSPECIFIED TYPE: ICD-10-CM

## 2022-07-05 LAB — SARS-COV-2 ORF1AB RESP QL NAA+PROBE: NOT DETECTED

## 2022-07-05 PROCEDURE — U0005 INFEC AGEN DETEC AMPLI PROBE: HCPCS

## 2022-07-05 PROCEDURE — U0004 COV-19 TEST NON-CDC HGH THRU: HCPCS

## 2022-07-05 PROCEDURE — C9803 HOPD COVID-19 SPEC COLLECT: HCPCS

## 2022-07-06 ENCOUNTER — ANESTHESIA EVENT (OUTPATIENT)
Dept: CARDIOLOGY | Facility: HOSPITAL | Age: 82
End: 2022-07-06

## 2022-07-06 RX ORDER — SODIUM CHLORIDE 9 MG/ML
9 INJECTION, SOLUTION INTRAVENOUS CONTINUOUS PRN
Status: CANCELLED | OUTPATIENT
Start: 2022-07-06

## 2022-07-06 RX ORDER — SODIUM CHLORIDE 0.9 % (FLUSH) 0.9 %
10 SYRINGE (ML) INJECTION EVERY 12 HOURS SCHEDULED
Status: CANCELLED | OUTPATIENT
Start: 2022-07-06

## 2022-07-06 RX ORDER — MIDAZOLAM HYDROCHLORIDE 1 MG/ML
0.5 INJECTION INTRAMUSCULAR; INTRAVENOUS
Status: CANCELLED | OUTPATIENT
Start: 2022-07-06

## 2022-07-06 RX ORDER — SODIUM CHLORIDE 0.9 % (FLUSH) 0.9 %
10 SYRINGE (ML) INJECTION AS NEEDED
Status: CANCELLED | OUTPATIENT
Start: 2022-07-06

## 2022-07-07 ENCOUNTER — HOSPITAL ENCOUNTER (INPATIENT)
Facility: HOSPITAL | Age: 82
LOS: 4 days | Discharge: HOME | End: 2022-07-11
Attending: INTERNAL MEDICINE | Admitting: INTERNAL MEDICINE

## 2022-07-07 ENCOUNTER — ANESTHESIA (OUTPATIENT)
Dept: CARDIOLOGY | Facility: HOSPITAL | Age: 82
End: 2022-07-07

## 2022-07-07 ENCOUNTER — HOSPITAL ENCOUNTER (OUTPATIENT)
Dept: CARDIOLOGY | Facility: HOSPITAL | Age: 82
Discharge: HOME | End: 2022-07-07

## 2022-07-07 ENCOUNTER — LAB (OUTPATIENT)
Dept: LAB | Facility: HOSPITAL | Age: 82
End: 2022-07-07

## 2022-07-07 ENCOUNTER — APPOINTMENT (OUTPATIENT)
Dept: CARDIOLOGY | Facility: HOSPITAL | Age: 82
End: 2022-07-07

## 2022-07-07 DIAGNOSIS — I10 PRIMARY HYPERTENSION: ICD-10-CM

## 2022-07-07 DIAGNOSIS — D50.0 IRON DEFICIENCY ANEMIA DUE TO CHRONIC BLOOD LOSS: ICD-10-CM

## 2022-07-07 DIAGNOSIS — I48.0 PAROXYSMAL ATRIAL FIBRILLATION: ICD-10-CM

## 2022-07-07 DIAGNOSIS — R60.9 EDEMA, UNSPECIFIED TYPE: ICD-10-CM

## 2022-07-07 DIAGNOSIS — I72.9 PSEUDOANEURYSM FOLLOWING PROCEDURE: Primary | ICD-10-CM

## 2022-07-07 DIAGNOSIS — T81.718A PSEUDOANEURYSM FOLLOWING PROCEDURE: Primary | ICD-10-CM

## 2022-07-07 LAB
ACT BLD: 103 SECONDS (ref 89–137)
ACT BLD: 219 SECONDS (ref 89–137)
ACT BLD: 312 SECONDS (ref 89–137)
ACT BLD: 439 SECONDS (ref 89–137)
ALBUMIN SERPL-MCNC: 3.9 G/DL (ref 3.5–5.2)
ALBUMIN/GLOB SERPL: 1.3 G/DL
ALP SERPL-CCNC: 76 U/L (ref 39–117)
ALT SERPL W P-5'-P-CCNC: 19 U/L (ref 1–33)
ANION GAP SERPL CALCULATED.3IONS-SCNC: 12 MMOL/L (ref 5–15)
ANION GAP SERPL CALCULATED.3IONS-SCNC: 14 MMOL/L (ref 5–15)
AST SERPL-CCNC: 13 U/L (ref 1–32)
BASOPHILS # BLD AUTO: 0 10*3/MM3 (ref 0–0.2)
BASOPHILS # BLD AUTO: 0 10*3/MM3 (ref 0–0.2)
BASOPHILS NFR BLD AUTO: 0.1 % (ref 0–1.5)
BASOPHILS NFR BLD AUTO: 0.1 % (ref 0–1.5)
BH CV RIGHT GROIN PSA PROCEDURE SCRIPTING LRR: 1
BH CV RIGHT GROIN PSA PROCEDURE SCRIPTING LRR: 1
BH CV VAS R PSA NECK WIDTH: 0.2 CM
BH CV VAS R PSEUDOANEURYSM LONG: 2.61 CM
BH CV VAS R PSEUDOANEURYSM TRV: 2.34 CM
BILIRUB SERPL-MCNC: 0.4 MG/DL (ref 0–1.2)
BUN SERPL-MCNC: 37 MG/DL (ref 8–23)
BUN SERPL-MCNC: 42 MG/DL (ref 8–23)
BUN/CREAT SERPL: 38.1 (ref 7–25)
BUN/CREAT SERPL: 40.4 (ref 7–25)
CALCIUM SPEC-SCNC: 8.5 MG/DL (ref 8.6–10.5)
CALCIUM SPEC-SCNC: 9.1 MG/DL (ref 8.6–10.5)
CHLORIDE SERPL-SCNC: 107 MMOL/L (ref 98–107)
CHLORIDE SERPL-SCNC: 108 MMOL/L (ref 98–107)
CO2 SERPL-SCNC: 19 MMOL/L (ref 22–29)
CO2 SERPL-SCNC: 20 MMOL/L (ref 22–29)
CREAT SERPL-MCNC: 0.97 MG/DL (ref 0.57–1)
CREAT SERPL-MCNC: 1.04 MG/DL (ref 0.57–1)
DEPRECATED RDW RBC AUTO: 42.9 FL (ref 37–54)
DEPRECATED RDW RBC AUTO: 45.5 FL (ref 37–54)
EGFRCR SERPLBLD CKD-EPI 2021: 53.8 ML/MIN/1.73
EGFRCR SERPLBLD CKD-EPI 2021: 58.5 ML/MIN/1.73
EOSINOPHIL # BLD AUTO: 0 10*3/MM3 (ref 0–0.4)
EOSINOPHIL # BLD AUTO: 0 10*3/MM3 (ref 0–0.4)
EOSINOPHIL NFR BLD AUTO: 0 % (ref 0.3–6.2)
EOSINOPHIL NFR BLD AUTO: 0 % (ref 0.3–6.2)
ERYTHROCYTE [DISTWIDTH] IN BLOOD BY AUTOMATED COUNT: 16 % (ref 12.3–15.4)
ERYTHROCYTE [DISTWIDTH] IN BLOOD BY AUTOMATED COUNT: 16.6 % (ref 12.3–15.4)
GLOBULIN UR ELPH-MCNC: 2.9 GM/DL
GLUCOSE SERPL-MCNC: 130 MG/DL (ref 65–99)
GLUCOSE SERPL-MCNC: 188 MG/DL (ref 65–99)
HCT VFR BLD AUTO: 32.2 % (ref 34–46.6)
HCT VFR BLD AUTO: 35.6 % (ref 34–46.6)
HGB BLD-MCNC: 11.2 G/DL (ref 12–15.9)
HGB BLD-MCNC: 9.9 G/DL (ref 12–15.9)
LYMPHOCYTES # BLD AUTO: 0.8 10*3/MM3 (ref 0.7–3.1)
LYMPHOCYTES # BLD AUTO: 0.9 10*3/MM3 (ref 0.7–3.1)
LYMPHOCYTES NFR BLD AUTO: 5.7 % (ref 19.6–45.3)
LYMPHOCYTES NFR BLD AUTO: 9.9 % (ref 19.6–45.3)
Lab: 1
MAGNESIUM SERPL-MCNC: 2.3 MG/DL (ref 1.6–2.4)
MAXIMAL PREDICTED HEART RATE: 138 BPM
MAXIMAL PREDICTED HEART RATE: 138 BPM
MCH RBC QN AUTO: 23.3 PG (ref 26.6–33)
MCH RBC QN AUTO: 23.7 PG (ref 26.6–33)
MCHC RBC AUTO-ENTMCNC: 30.7 G/DL (ref 31.5–35.7)
MCHC RBC AUTO-ENTMCNC: 31.4 G/DL (ref 31.5–35.7)
MCV RBC AUTO: 75.4 FL (ref 79–97)
MCV RBC AUTO: 76 FL (ref 79–97)
MONOCYTES # BLD AUTO: 0.3 10*3/MM3 (ref 0.1–0.9)
MONOCYTES # BLD AUTO: 1.1 10*3/MM3 (ref 0.1–0.9)
MONOCYTES NFR BLD AUTO: 4 % (ref 5–12)
MONOCYTES NFR BLD AUTO: 6.8 % (ref 5–12)
NEUTROPHILS NFR BLD AUTO: 14.5 10*3/MM3 (ref 1.7–7)
NEUTROPHILS NFR BLD AUTO: 7 10*3/MM3 (ref 1.7–7)
NEUTROPHILS NFR BLD AUTO: 86 % (ref 42.7–76)
NEUTROPHILS NFR BLD AUTO: 87.4 % (ref 42.7–76)
NRBC BLD AUTO-RTO: 0 /100 WBC (ref 0–0.2)
NRBC BLD AUTO-RTO: 0.2 /100 WBC (ref 0–0.2)
PLATELET # BLD AUTO: 270 10*3/MM3 (ref 140–450)
PLATELET # BLD AUTO: 338 10*3/MM3 (ref 140–450)
PMV BLD AUTO: 7.6 FL (ref 6–12)
PMV BLD AUTO: 7.7 FL (ref 6–12)
POTASSIUM SERPL-SCNC: 4.5 MMOL/L (ref 3.5–5.2)
POTASSIUM SERPL-SCNC: 4.8 MMOL/L (ref 3.5–5.2)
PROT SERPL-MCNC: 6.8 G/DL (ref 6–8.5)
PROX SFA PSV RIGHT: 117 CM/SEC
R PSEUDOANEURYSM LONG WIDTH: 1.45 CM
R PSEUDOANEURYSM TRANSVERSE WIDTH: 1.25 CM
RBC # BLD AUTO: 4.24 10*6/MM3 (ref 3.77–5.28)
RBC # BLD AUTO: 4.72 10*6/MM3 (ref 3.77–5.28)
RIGHT GROIN CFA SYS: 174 CM/SEC
RIGHT GROIN CFA SYS: 228 CM/SEC
SODIUM SERPL-SCNC: 139 MMOL/L (ref 136–145)
SODIUM SERPL-SCNC: 141 MMOL/L (ref 136–145)
STRESS TARGET HR: 117 BPM
STRESS TARGET HR: 117 BPM
WBC NRBC COR # BLD: 16.6 10*3/MM3 (ref 3.4–10.8)
WBC NRBC COR # BLD: 8.1 10*3/MM3 (ref 3.4–10.8)

## 2022-07-07 PROCEDURE — 33340 PERQ CLSR TCAT L ATR APNDGE: CPT | Performed by: INTERNAL MEDICINE

## 2022-07-07 PROCEDURE — 25010000002 DEXAMETHASONE SODIUM PHOSPHATE 20 MG/5ML SOLUTION: Performed by: ANESTHESIOLOGIST ASSISTANT

## 2022-07-07 PROCEDURE — 25010000002 HEPARIN (PORCINE) PER 1000 UNITS: Performed by: ANESTHESIOLOGIST ASSISTANT

## 2022-07-07 PROCEDURE — 3E05317 INTRODUCTION OF OTHER THROMBOLYTIC INTO PERIPHERAL ARTERY, PERCUTANEOUS APPROACH: ICD-10-PCS | Performed by: SURGERY

## 2022-07-07 PROCEDURE — 76942 ECHO GUIDE FOR BIOPSY: CPT

## 2022-07-07 PROCEDURE — C1894 INTRO/SHEATH, NON-LASER: HCPCS | Performed by: INTERNAL MEDICINE

## 2022-07-07 PROCEDURE — 93005 ELECTROCARDIOGRAM TRACING: CPT | Performed by: INTERNAL MEDICINE

## 2022-07-07 PROCEDURE — 36415 COLL VENOUS BLD VENIPUNCTURE: CPT

## 2022-07-07 PROCEDURE — 85025 COMPLETE CBC W/AUTO DIFF WBC: CPT

## 2022-07-07 PROCEDURE — 85025 COMPLETE CBC W/AUTO DIFF WBC: CPT | Performed by: SURGERY

## 2022-07-07 PROCEDURE — C1889 IMPLANT/INSERT DEVICE, NOC: HCPCS | Performed by: INTERNAL MEDICINE

## 2022-07-07 PROCEDURE — 25010000002 PROPOFOL 10 MG/ML EMULSION: Performed by: ANESTHESIOLOGIST ASSISTANT

## 2022-07-07 PROCEDURE — 25010000002 FENTANYL CITRATE (PF) 100 MCG/2ML SOLUTION: Performed by: ANESTHESIOLOGIST ASSISTANT

## 2022-07-07 PROCEDURE — 36002 PSEUDOANEURYSM INJECTION TRT: CPT

## 2022-07-07 PROCEDURE — 93926 LOWER EXTREMITY STUDY: CPT

## 2022-07-07 PROCEDURE — 85347 COAGULATION TIME ACTIVATED: CPT

## 2022-07-07 PROCEDURE — C1893 INTRO/SHEATH, FIXED,NON-PEEL: HCPCS | Performed by: INTERNAL MEDICINE

## 2022-07-07 PROCEDURE — 93355 ECHO TRANSESOPHAGEAL (TEE): CPT

## 2022-07-07 PROCEDURE — 93325 DOPPLER ECHO COLOR FLOW MAPG: CPT | Performed by: INTERNAL MEDICINE

## 2022-07-07 PROCEDURE — 25010000002 ONDANSETRON PER 1 MG: Performed by: ANESTHESIOLOGIST ASSISTANT

## 2022-07-07 PROCEDURE — 83735 ASSAY OF MAGNESIUM: CPT

## 2022-07-07 PROCEDURE — 25010000002 ATROPINE PER 0.01 MG: Performed by: INTERNAL MEDICINE

## 2022-07-07 PROCEDURE — 0 IOPAMIDOL PER 1 ML: Performed by: INTERNAL MEDICINE

## 2022-07-07 PROCEDURE — B24BZZ4 ULTRASONOGRAPHY OF HEART WITH AORTA, TRANSESOPHAGEAL: ICD-10-PCS | Performed by: INTERNAL MEDICINE

## 2022-07-07 PROCEDURE — 93325 DOPPLER ECHO COLOR FLOW MAPG: CPT

## 2022-07-07 PROCEDURE — 25010000002 MORPHINE PER 10 MG: Performed by: INTERNAL MEDICINE

## 2022-07-07 PROCEDURE — 93312 ECHO TRANSESOPHAGEAL: CPT | Performed by: INTERNAL MEDICINE

## 2022-07-07 PROCEDURE — 02L73DK OCCLUSION OF LEFT ATRIAL APPENDAGE WITH INTRALUMINAL DEVICE, PERCUTANEOUS APPROACH: ICD-10-PCS | Performed by: INTERNAL MEDICINE

## 2022-07-07 PROCEDURE — 80053 COMPREHEN METABOLIC PANEL: CPT

## 2022-07-07 PROCEDURE — 25010000002 ONDANSETRON PER 1 MG: Performed by: INTERNAL MEDICINE

## 2022-07-07 PROCEDURE — 25010000002 DIPHENHYDRAMINE PER 50 MG: Performed by: ANESTHESIOLOGIST ASSISTANT

## 2022-07-07 PROCEDURE — C1769 GUIDE WIRE: HCPCS | Performed by: INTERNAL MEDICINE

## 2022-07-07 PROCEDURE — 25010000002 FENTANYL CITRATE (PF) 50 MCG/ML SOLUTION: Performed by: ANESTHESIOLOGIST ASSISTANT

## 2022-07-07 PROCEDURE — 25010000002 PROTAMINE SULFATE PER 10 MG: Performed by: ANESTHESIOLOGIST ASSISTANT

## 2022-07-07 DEVICE — CAP WATCHMAN FLX PROC: Type: IMPLANTABLE DEVICE | Status: FUNCTIONAL

## 2022-07-07 DEVICE — CAP SYS WATCHMAN TRUSEAL ACC PROC: Type: IMPLANTABLE DEVICE | Status: FUNCTIONAL

## 2022-07-07 DEVICE — LEFT ATRIAL APPENDAGE CLOSURE DEVICE WITH DELIVERY SYSTEM
Type: IMPLANTABLE DEVICE | Site: HEART | Status: FUNCTIONAL
Brand: WATCHMAN FLX™

## 2022-07-07 RX ORDER — MORPHINE SULFATE 4 MG/ML
4 INJECTION, SOLUTION INTRAMUSCULAR; INTRAVENOUS
Status: DISCONTINUED | OUTPATIENT
Start: 2022-07-07 | End: 2022-07-07 | Stop reason: HOSPADM

## 2022-07-07 RX ORDER — MEPERIDINE HYDROCHLORIDE 25 MG/ML
12.5 INJECTION INTRAMUSCULAR; INTRAVENOUS; SUBCUTANEOUS
Status: DISCONTINUED | OUTPATIENT
Start: 2022-07-07 | End: 2022-07-07 | Stop reason: HOSPADM

## 2022-07-07 RX ORDER — ROCURONIUM BROMIDE 10 MG/ML
INJECTION, SOLUTION INTRAVENOUS AS NEEDED
Status: DISCONTINUED | OUTPATIENT
Start: 2022-07-07 | End: 2022-07-07 | Stop reason: SURG

## 2022-07-07 RX ORDER — FUROSEMIDE 40 MG/1
40 TABLET ORAL EVERY OTHER DAY
COMMUNITY

## 2022-07-07 RX ORDER — NALOXONE HCL 0.4 MG/ML
0.4 VIAL (ML) INJECTION
Status: DISCONTINUED | OUTPATIENT
Start: 2022-07-07 | End: 2022-07-11 | Stop reason: HOSPADM

## 2022-07-07 RX ORDER — SPIRONOLACTONE 25 MG/1
25 TABLET ORAL EVERY OTHER DAY
Status: DISCONTINUED | OUTPATIENT
Start: 2022-07-07 | End: 2022-07-08

## 2022-07-07 RX ORDER — FLUMAZENIL 0.1 MG/ML
0.2 INJECTION INTRAVENOUS AS NEEDED
Status: DISCONTINUED | OUTPATIENT
Start: 2022-07-07 | End: 2022-07-07 | Stop reason: HOSPADM

## 2022-07-07 RX ORDER — HYDRALAZINE HYDROCHLORIDE 20 MG/ML
5 INJECTION INTRAMUSCULAR; INTRAVENOUS
Status: DISCONTINUED | OUTPATIENT
Start: 2022-07-07 | End: 2022-07-07 | Stop reason: HOSPADM

## 2022-07-07 RX ORDER — HYDROCODONE BITARTRATE AND ACETAMINOPHEN 5; 325 MG/1; MG/1
1 TABLET ORAL EVERY 4 HOURS PRN
Status: DISCONTINUED | OUTPATIENT
Start: 2022-07-07 | End: 2022-07-11 | Stop reason: HOSPADM

## 2022-07-07 RX ORDER — IPRATROPIUM BROMIDE AND ALBUTEROL SULFATE 2.5; .5 MG/3ML; MG/3ML
3 SOLUTION RESPIRATORY (INHALATION) ONCE AS NEEDED
Status: DISCONTINUED | OUTPATIENT
Start: 2022-07-07 | End: 2022-07-07 | Stop reason: HOSPADM

## 2022-07-07 RX ORDER — DEXAMETHASONE SODIUM PHOSPHATE 4 MG/ML
INJECTION, SOLUTION INTRA-ARTICULAR; INTRALESIONAL; INTRAMUSCULAR; INTRAVENOUS; SOFT TISSUE AS NEEDED
Status: DISCONTINUED | OUTPATIENT
Start: 2022-07-07 | End: 2022-07-07 | Stop reason: SURG

## 2022-07-07 RX ORDER — ACETAMINOPHEN 325 MG/1
650 TABLET ORAL EVERY 4 HOURS PRN
Status: DISCONTINUED | OUTPATIENT
Start: 2022-07-07 | End: 2022-07-11 | Stop reason: HOSPADM

## 2022-07-07 RX ORDER — SODIUM CHLORIDE 0.9 % (FLUSH) 0.9 %
10 SYRINGE (ML) INJECTION AS NEEDED
Status: DISCONTINUED | OUTPATIENT
Start: 2022-07-07 | End: 2022-07-08 | Stop reason: HOSPADM

## 2022-07-07 RX ORDER — FUROSEMIDE 40 MG/1
40 TABLET ORAL EVERY OTHER DAY
Status: DISCONTINUED | OUTPATIENT
Start: 2022-07-08 | End: 2022-07-11 | Stop reason: HOSPADM

## 2022-07-07 RX ORDER — SODIUM CHLORIDE 9 MG/ML
80 INJECTION, SOLUTION INTRAVENOUS CONTINUOUS
Status: DISCONTINUED | OUTPATIENT
Start: 2022-07-07 | End: 2022-07-08

## 2022-07-07 RX ORDER — HEPARIN SODIUM 1000 [USP'U]/ML
INJECTION, SOLUTION INTRAVENOUS; SUBCUTANEOUS AS NEEDED
Status: DISCONTINUED | OUTPATIENT
Start: 2022-07-07 | End: 2022-07-07 | Stop reason: SURG

## 2022-07-07 RX ORDER — SODIUM CHLORIDE 0.9 % (FLUSH) 0.9 %
3-10 SYRINGE (ML) INJECTION AS NEEDED
Status: DISCONTINUED | OUTPATIENT
Start: 2022-07-07 | End: 2022-07-07 | Stop reason: HOSPADM

## 2022-07-07 RX ORDER — NALOXONE HCL 0.4 MG/ML
0.4 VIAL (ML) INJECTION AS NEEDED
Status: DISCONTINUED | OUTPATIENT
Start: 2022-07-07 | End: 2022-07-07 | Stop reason: HOSPADM

## 2022-07-07 RX ORDER — SODIUM CHLORIDE 0.9 % (FLUSH) 0.9 %
3 SYRINGE (ML) INJECTION EVERY 12 HOURS SCHEDULED
Status: DISCONTINUED | OUTPATIENT
Start: 2022-07-07 | End: 2022-07-07 | Stop reason: HOSPADM

## 2022-07-07 RX ORDER — TRIAMCINOLONE ACETONIDE 1 MG/G
CREAM TOPICAL 2 TIMES DAILY
Status: DISCONTINUED | OUTPATIENT
Start: 2022-07-07 | End: 2022-07-11 | Stop reason: HOSPADM

## 2022-07-07 RX ORDER — ONDANSETRON 2 MG/ML
4 INJECTION INTRAMUSCULAR; INTRAVENOUS EVERY 6 HOURS PRN
Status: DISCONTINUED | OUTPATIENT
Start: 2022-07-07 | End: 2022-07-08 | Stop reason: SDUPTHER

## 2022-07-07 RX ORDER — ATROPINE SULFATE 1 MG/ML
0.5 INJECTION, SOLUTION INTRAMUSCULAR; INTRAVENOUS; SUBCUTANEOUS ONCE
Status: COMPLETED | OUTPATIENT
Start: 2022-07-07 | End: 2022-07-07

## 2022-07-07 RX ORDER — DILTIAZEM HYDROCHLORIDE 120 MG/1
120 CAPSULE, COATED, EXTENDED RELEASE ORAL DAILY
COMMUNITY
End: 2022-07-14

## 2022-07-07 RX ORDER — MORPHINE SULFATE 4 MG/ML
2 INJECTION, SOLUTION INTRAMUSCULAR; INTRAVENOUS
Status: DISCONTINUED | OUTPATIENT
Start: 2022-07-07 | End: 2022-07-07 | Stop reason: HOSPADM

## 2022-07-07 RX ORDER — MORPHINE SULFATE 4 MG/ML
2 INJECTION, SOLUTION INTRAMUSCULAR; INTRAVENOUS ONCE AS NEEDED
Status: CANCELLED | OUTPATIENT
Start: 2022-07-07

## 2022-07-07 RX ORDER — MIDAZOLAM HYDROCHLORIDE 1 MG/ML
0.5 INJECTION INTRAMUSCULAR; INTRAVENOUS
Status: DISCONTINUED | OUTPATIENT
Start: 2022-07-07 | End: 2022-07-08 | Stop reason: HOSPADM

## 2022-07-07 RX ORDER — ONDANSETRON 2 MG/ML
INJECTION INTRAMUSCULAR; INTRAVENOUS AS NEEDED
Status: DISCONTINUED | OUTPATIENT
Start: 2022-07-07 | End: 2022-07-07 | Stop reason: SURG

## 2022-07-07 RX ORDER — ACETAMINOPHEN 650 MG/1
650 SUPPOSITORY RECTAL EVERY 4 HOURS PRN
Status: DISCONTINUED | OUTPATIENT
Start: 2022-07-07 | End: 2022-07-11 | Stop reason: HOSPADM

## 2022-07-07 RX ORDER — FERROUS SULFATE TAB EC 324 MG (65 MG FE EQUIVALENT) 324 (65 FE) MG
324 TABLET DELAYED RESPONSE ORAL
Status: DISCONTINUED | OUTPATIENT
Start: 2022-07-08 | End: 2022-07-11 | Stop reason: HOSPADM

## 2022-07-07 RX ORDER — HYDROCODONE BITARTRATE AND ACETAMINOPHEN 5; 325 MG/1; MG/1
1 TABLET ORAL ONCE AS NEEDED
Status: DISCONTINUED | OUTPATIENT
Start: 2022-07-07 | End: 2022-07-07 | Stop reason: HOSPADM

## 2022-07-07 RX ORDER — SODIUM CHLORIDE 0.9 % (FLUSH) 0.9 %
10 SYRINGE (ML) INJECTION EVERY 12 HOURS SCHEDULED
Status: DISCONTINUED | OUTPATIENT
Start: 2022-07-07 | End: 2022-07-08 | Stop reason: HOSPADM

## 2022-07-07 RX ORDER — FENTANYL CITRATE 50 UG/ML
50 INJECTION, SOLUTION INTRAMUSCULAR; INTRAVENOUS
Status: DISCONTINUED | OUTPATIENT
Start: 2022-07-07 | End: 2022-07-07 | Stop reason: HOSPADM

## 2022-07-07 RX ORDER — PROTAMINE SULFATE 10 MG/ML
INJECTION, SOLUTION INTRAVENOUS AS NEEDED
Status: DISCONTINUED | OUTPATIENT
Start: 2022-07-07 | End: 2022-07-07 | Stop reason: SURG

## 2022-07-07 RX ORDER — PROPOFOL 10 MG/ML
VIAL (ML) INTRAVENOUS AS NEEDED
Status: DISCONTINUED | OUTPATIENT
Start: 2022-07-07 | End: 2022-07-07 | Stop reason: SURG

## 2022-07-07 RX ORDER — FENTANYL CITRATE 50 UG/ML
25 INJECTION, SOLUTION INTRAMUSCULAR; INTRAVENOUS
Status: DISCONTINUED | OUTPATIENT
Start: 2022-07-07 | End: 2022-07-07 | Stop reason: HOSPADM

## 2022-07-07 RX ORDER — DIPHENHYDRAMINE HYDROCHLORIDE 50 MG/ML
INJECTION INTRAMUSCULAR; INTRAVENOUS AS NEEDED
Status: DISCONTINUED | OUTPATIENT
Start: 2022-07-07 | End: 2022-07-07 | Stop reason: SURG

## 2022-07-07 RX ORDER — MORPHINE SULFATE 4 MG/ML
2 INJECTION, SOLUTION INTRAMUSCULAR; INTRAVENOUS ONCE
Status: COMPLETED | OUTPATIENT
Start: 2022-07-07 | End: 2022-07-07

## 2022-07-07 RX ORDER — MORPHINE SULFATE 4 MG/ML
2 INJECTION, SOLUTION INTRAMUSCULAR; INTRAVENOUS EVERY 4 HOURS PRN
Status: DISCONTINUED | OUTPATIENT
Start: 2022-07-07 | End: 2022-07-11 | Stop reason: HOSPADM

## 2022-07-07 RX ORDER — LIDOCAINE HYDROCHLORIDE 10 MG/ML
INJECTION, SOLUTION INTRAVENOUS AS NEEDED
Status: DISCONTINUED | OUTPATIENT
Start: 2022-07-07 | End: 2022-07-07 | Stop reason: SURG

## 2022-07-07 RX ORDER — DILTIAZEM HYDROCHLORIDE 120 MG/1
120 CAPSULE, COATED, EXTENDED RELEASE ORAL DAILY
Status: DISCONTINUED | OUTPATIENT
Start: 2022-07-08 | End: 2022-07-07

## 2022-07-07 RX ORDER — SODIUM CHLORIDE 9 MG/ML
9 INJECTION, SOLUTION INTRAVENOUS CONTINUOUS PRN
Status: DISCONTINUED | OUTPATIENT
Start: 2022-07-07 | End: 2022-07-08 | Stop reason: HOSPADM

## 2022-07-07 RX ORDER — FENTANYL CITRATE 50 UG/ML
INJECTION, SOLUTION INTRAMUSCULAR; INTRAVENOUS AS NEEDED
Status: DISCONTINUED | OUTPATIENT
Start: 2022-07-07 | End: 2022-07-07 | Stop reason: SURG

## 2022-07-07 RX ORDER — LABETALOL HYDROCHLORIDE 5 MG/ML
5 INJECTION, SOLUTION INTRAVENOUS
Status: DISCONTINUED | OUTPATIENT
Start: 2022-07-07 | End: 2022-07-07 | Stop reason: HOSPADM

## 2022-07-07 RX ORDER — FAMOTIDINE 10 MG/ML
INJECTION, SOLUTION INTRAVENOUS AS NEEDED
Status: DISCONTINUED | OUTPATIENT
Start: 2022-07-07 | End: 2022-07-07 | Stop reason: SURG

## 2022-07-07 RX ORDER — ONDANSETRON 2 MG/ML
4 INJECTION INTRAMUSCULAR; INTRAVENOUS ONCE AS NEEDED
Status: DISCONTINUED | OUTPATIENT
Start: 2022-07-07 | End: 2022-07-07 | Stop reason: HOSPADM

## 2022-07-07 RX ORDER — METOPROLOL SUCCINATE 50 MG/1
50 TABLET, EXTENDED RELEASE ORAL DAILY
Status: DISCONTINUED | OUTPATIENT
Start: 2022-07-07 | End: 2022-07-11 | Stop reason: HOSPADM

## 2022-07-07 RX ORDER — ONDANSETRON 2 MG/ML
4 INJECTION INTRAMUSCULAR; INTRAVENOUS EVERY 6 HOURS PRN
Status: CANCELLED | OUTPATIENT
Start: 2022-07-07

## 2022-07-07 RX ADMIN — THROMBIN, TOPICAL (BOVINE) 5000 UNITS: KIT at 19:36

## 2022-07-07 RX ADMIN — FAMOTIDINE 20 MG: 10 INJECTION INTRAVENOUS at 10:59

## 2022-07-07 RX ADMIN — HYDROCODONE BITARTRATE AND ACETAMINOPHEN 1 TABLET: 5; 325 TABLET ORAL at 14:53

## 2022-07-07 RX ADMIN — SODIUM CHLORIDE: 9 INJECTION, SOLUTION INTRAVENOUS at 10:32

## 2022-07-07 RX ADMIN — FENTANYL CITRATE 25 MCG: 50 INJECTION, SOLUTION INTRAMUSCULAR; INTRAVENOUS at 11:08

## 2022-07-07 RX ADMIN — FENTANYL CITRATE 25 MCG: 50 INJECTION, SOLUTION INTRAMUSCULAR; INTRAVENOUS at 11:57

## 2022-07-07 RX ADMIN — PROPOFOL 100 MG: 10 INJECTION, EMULSION INTRAVENOUS at 10:38

## 2022-07-07 RX ADMIN — ROCURONIUM BROMIDE 10 MG: 10 INJECTION INTRAVENOUS at 11:28

## 2022-07-07 RX ADMIN — FENTANYL CITRATE 25 MCG: 50 INJECTION, SOLUTION INTRAMUSCULAR; INTRAVENOUS at 12:56

## 2022-07-07 RX ADMIN — ONDANSETRON 4 MG: 2 INJECTION INTRAMUSCULAR; INTRAVENOUS at 18:25

## 2022-07-07 RX ADMIN — SODIUM CHLORIDE: 9 INJECTION, SOLUTION INTRAVENOUS at 11:18

## 2022-07-07 RX ADMIN — FENTANYL CITRATE 25 MCG: 50 INJECTION, SOLUTION INTRAMUSCULAR; INTRAVENOUS at 11:28

## 2022-07-07 RX ADMIN — HEPARIN SODIUM 2000 UNITS: 1000 INJECTION INTRAVENOUS; SUBCUTANEOUS at 11:36

## 2022-07-07 RX ADMIN — HEPARIN SODIUM 9000 UNITS: 1000 INJECTION INTRAVENOUS; SUBCUTANEOUS at 11:12

## 2022-07-07 RX ADMIN — FENTANYL CITRATE 25 MCG: 50 INJECTION, SOLUTION INTRAMUSCULAR; INTRAVENOUS at 10:36

## 2022-07-07 RX ADMIN — ATROPINE SULFATE 0.5 MG: 1 INJECTION, SOLUTION INTRAMUSCULAR; INTRAVENOUS; SUBCUTANEOUS at 18:30

## 2022-07-07 RX ADMIN — LIDOCAINE HYDROCHLORIDE 50 MG: 10 INJECTION, SOLUTION INTRAVENOUS at 10:38

## 2022-07-07 RX ADMIN — PROTAMINE SULFATE 30 MG: 10 INJECTION, SOLUTION INTRAVENOUS at 12:00

## 2022-07-07 RX ADMIN — HEPARIN SODIUM 5000 UNITS: 1000 INJECTION INTRAVENOUS; SUBCUTANEOUS at 11:22

## 2022-07-07 RX ADMIN — PROTAMINE SULFATE 70 MG: 10 INJECTION, SOLUTION INTRAVENOUS at 11:58

## 2022-07-07 RX ADMIN — ONDANSETRON 4 MG: 2 INJECTION INTRAMUSCULAR; INTRAVENOUS at 11:58

## 2022-07-07 RX ADMIN — DIPHENHYDRAMINE HYDROCHLORIDE 25 MG: 50 INJECTION, SOLUTION INTRAMUSCULAR; INTRAVENOUS at 10:59

## 2022-07-07 RX ADMIN — MORPHINE SULFATE 2 MG: 4 INJECTION INTRAVENOUS at 17:45

## 2022-07-07 RX ADMIN — ROCURONIUM BROMIDE 40 MG: 10 INJECTION INTRAVENOUS at 10:38

## 2022-07-07 RX ADMIN — SUGAMMADEX 200 MG: 100 INJECTION, SOLUTION INTRAVENOUS at 12:01

## 2022-07-07 RX ADMIN — SODIUM CHLORIDE 80 ML/HR: 9 INJECTION, SOLUTION INTRAVENOUS at 14:54

## 2022-07-07 RX ADMIN — DEXAMETHASONE SODIUM PHOSPHATE 4 MG: 4 INJECTION, SOLUTION INTRAMUSCULAR; INTRAVENOUS at 10:46

## 2022-07-07 NOTE — CONSULTS
Patient Name: Chrystal Ruiz Account #: [id number]    MRN: [id number] Admission Date: 7/7/2022      Consulting Service: Vascular Surgery Date of Evaluation: July 7, 2022    Requesting Provider: Dr. RAN Garcia MD    CHIEF COMPLAINT: Right femoral pseudoaneurysm  HPI: Chrystal Ruiz is a 81 y.o. female is being seen for a consultation and evaluation/management of right femoral pseudoaneurysm occurring acutely after venous access for watchman device.  14 Chinese sheath was used and the patient was anticoagulated at the time of removal but is since been reversed to the point of no current anticoagulation.  Initial pulsatile bleeding was noted after attempts to place FemoStop.  This was removed pressure was held and with reversal of anticoagulant bleeding ceased but duplex does show a large multilobulated pseudoaneurysm coming off of the distal common femoral proximal SFA level arteries.  Possibility of AV fistula also raised but no definitive visualization was possible due to the hematoma in the area.  Patient is neurologically intact.    PAST MEDICAL HISTORY:   Past Medical History:   Diagnosis Date   • A-fib (HCC)    • Anemia    • Arthritis    • Asthma    • CHF (congestive heart failure) (HCC)    • Hypertension       PAST SURGICAL HISTORY:   Past Surgical History:   Procedure Laterality Date   • CATARACT EXTRACTION     • CHOLECYSTECTOMY     • HYSTERECTOMY  1987    Endometriosis   • OOPHORECTOMY     • TONSILLECTOMY        FAMILY HISTORY:   Family History   Problem Relation Age of Onset   • Breast cancer Mother 85   • Stroke Mother    • Heart disease Mother    • Endometrial cancer Mother 70   • Heart failure Father    • Heart failure Sister    • COPD Sister    • Heart disease Sister       SOCIAL HISTORY:   Social History     Tobacco Use   • Smoking status: Never Smoker   • Smokeless tobacco: Never Used   Vaping Use   • Vaping Use: Never used   Substance Use Topics   • Alcohol use: No   • Drug use: Never       MEDICATIONS:   No current facility-administered medications on file prior to encounter.     Current Outpatient Medications on File Prior to Encounter   Medication Sig Dispense Refill   • acetaminophen (TYLENOL) 500 MG tablet Take 500 mg by mouth Every 6 (Six) Hours As Needed for Mild Pain .     • Breo Ellipta 200-25 MCG/INH inhaler Inhale 1 puff Daily. 1 each 2   • Cholecalciferol (VITAMIN D3) 2000 units capsule Take 2,000 Units by mouth Daily.     • Cyanocobalamin (VITAMIN B12) 1000 MCG tablet controlled-release Take 2,500 mcg by mouth Daily.     • dilTIAZem CD (CARDIZEM CD) 120 MG 24 hr capsule Take 120 mg by mouth Daily.     • metoprolol succinate XL (TOPROL-XL) 50 MG 24 hr tablet Take 1 tablet by mouth Daily. 30 tablet 5   • spironolactone (ALDACTONE) 25 MG tablet Take 1 tablet by mouth Every Other Day. 45 tablet 1   • triamcinolone (KENALOG) 0.1 % cream Apply  topically to the appropriate area as directed 2 (Two) Times a Day. 80 g 2   • furosemide (LASIX) 40 MG tablet Take 40 mg by mouth Every Other Day.     • [DISCONTINUED] furosemide (LASIX) 40 MG tablet Take 1 tablet by mouth Daily As Needed (edema). (Patient taking differently: Take 40 mg by mouth Daily. Patient now taking 40 mg every other day) 90 tablet 2   • [DISCONTINUED] Premarin 0.625 MG/GM vaginal cream Insert  into the vagina 2 (Two) Times a Week. (Patient taking differently: No sig reported) 30 g 3             ALLERGIES: Ciprofloxacin, Sulfa antibiotics, Cyprodenate, and Iodine   COMPLETE REVIEW OF SYSTEMS:     ENT ROS: negative  Cardiovascular ROS: positive for -bradycardia  Respiratory ROS: no cough, shortness of breath, or wheezing  Gastrointestinal ROS: no abdominal pain, change in bowel habits, or black or bloody stools  Neurological ROS: no TIA or stroke symptoms  Genito-Urinary ROS: no dysuria, trouble voiding, or hematuria  Musculoskeletal ROS: positive for - swelling in right groin  Dermatological ROS: negative  Psychological ROS:  "negative      PHYSICAL EXAM:   Patient Vitals for the past 24 hrs:   BP Temp Temp src Pulse Resp SpO2 Height Weight   07/07/22 1630 131/57 -- -- 79 -- 100 % -- --   07/07/22 1615 114/63 -- -- 67 -- 100 % -- --   07/07/22 1600 124/55 -- -- 57 -- 98 % -- --   07/07/22 1545 136/57 -- -- 66 -- 97 % -- --   07/07/22 1530 131/59 -- -- 53 -- 100 % -- --   07/07/22 1515 137/61 -- -- 56 -- 97 % -- --   07/07/22 1500 138/59 -- -- (!) 47 -- 99 % -- --   07/07/22 1445 132/57 -- -- (!) 49 -- 98 % -- --   07/07/22 1430 142/57 -- -- (!) 48 -- 99 % -- --   07/07/22 1420 -- -- -- 50 -- 97 % -- --   07/07/22 1415 134/52 -- -- (!) 49 -- 98 % -- --   07/07/22 1410 -- -- -- (!) 47 -- 97 % -- --   07/07/22 1345 130/50 -- -- (!) 44 -- 95 % -- --   07/07/22 1340 136/56 97 °F (36.1 °C) Tympanic 50 14 93 % -- --   07/07/22 1335 129/55 -- -- (!) 41 15 93 % -- --   07/07/22 1330 130/56 -- -- (!) 47 17 92 % -- --   07/07/22 1325 132/56 -- -- (!) 43 15 92 % -- --   07/07/22 1320 136/55 -- -- (!) 41 18 93 % -- --   07/07/22 1315 135/56 -- -- (!) 45 18 93 % -- --   07/07/22 1310 138/55 -- -- (!) 44 20 95 % -- --   07/07/22 1305 134/56 -- -- (!) 41 21 93 % -- --   07/07/22 1300 139/57 -- -- (!) 37 22 94 % -- --   07/07/22 1255 139/61 -- -- (!) 40 21 95 % -- --   07/07/22 1250 151/61 -- -- (!) 46 21 98 % -- --   07/07/22 1245 142/53 -- -- (!) 38 20 100 % -- --   07/07/22 1240 141/55 -- -- (!) 41 20 98 % -- --   07/07/22 1235 140/55 -- -- (!) 45 24 98 % -- --   07/07/22 1220 143/55 -- -- (!) 47 19 98 % -- --   07/07/22 1218 137/56 97.7 °F (36.5 °C) Tympanic 58 15 100 % -- --   07/07/22 1006 151/68 98.2 °F (36.8 °C) Oral 50 20 100 % 154.9 cm (61\") 54.8 kg (120 lb 13 oz)        General appearance: oriented to person, place, and time, in mild to moderate distress and ill-appearing.  Neurological exam reveals alert, oriented, normal speech, no focal findings or movement disorder noted.  ENT exam reveals - ENT exam normal, no neck nodes or sinus " tenderness.  CVS exam: normal rate, regular rhythm, normal S1, S2, no murmurs, rubs, clicks or gallops.  Chest: clear to auscultation, no wheezes, rales or rhonchi, symmetric air entry.  Abdominal exam: soft, nontender, nondistended, no masses or organomegaly.  Examination of the feet reveals warm, good capillary refill.  All pulses palpable  Right groin with moderate hematoma but no significant bleeding after removal of FemoStop      LABS:      Results Review:       I reviewed the patient's new clinical results.  Results from last 7 days   Lab Units 07/07/22  0908   WBC 10*3/mm3 8.10   HEMOGLOBIN g/dL 11.2*   PLATELETS 10*3/mm3 338     Results from last 7 days   Lab Units 07/07/22  0908   SODIUM mmol/L 139   POTASSIUM mmol/L 4.8   CHLORIDE mmol/L 107   CO2 mmol/L 20.0*   BUN mg/dL 42*   CREATININE mg/dL 1.04*   GLUCOSE mg/dL 130*   Estimated Creatinine Clearance: 36.1 mL/min (A) (by C-G formula based on SCr of 1.04 mg/dL (H)).  Results from last 7 days   Lab Units 07/07/22  0908   CALCIUM mg/dL 9.1   ALBUMIN g/dL 3.90   MAGNESIUM mg/dL 2.3           The following radiologic or non-invasive studies have been reviewed by me: Duplex scan reviewed with images reviewed    Active Hospital Problems    Diagnosis  POA   • Paroxysmal atrial fibrillation (HCC) [I48.0]  Yes   • Edema [R60.9]  Unknown   • Anemia [D64.9]  Unknown   • A-fib (HCC) [I48.91]  Unknown   • Hypertension [I10]  Unknown      Resolved Hospital Problems   No resolved problems to display.         ASSESSMENT/PLAN: 81 y.o. female with acute right femoral pseudoaneurysm.  Concerns for fistula raised but no obvious fistula visible.  Unclear as to the source of the bleed since this was a venous stick however pseudoaneurysm was large pulsatile and concerning for expansion overnight.  Because of this I have recommended urgent thrombin injection.  Patient understands risk benefits complications and agrees.      I discussed the plan with the patient and she is  agreeable to the plan of care at this point. Thank you for this consult.     Jeremy Guzman MD   07/07/22

## 2022-07-07 NOTE — ANESTHESIA POSTPROCEDURE EVALUATION
Patient: Chrystal Ruiz    Procedure Summary     Date: 07/07/22 Room / Location: [facility] CATH LAB 3 / [facility] CATH INVASIVE LOCATION    Anesthesia Start: 1033 Anesthesia Stop: 1219    Procedures:       Atrial Appendage Occlusion watchman team aware $ (Right )      Atrial Appendage Occlusion (N/A ) Diagnosis:       Paroxysmal atrial fibrillation (HCC)      Edema, unspecified type      Primary hypertension      Iron deficiency anemia due to chronic blood loss      (Post watchman device implantation without complications)    Providers: Sage Garcia MD; Fide Mullen MD Provider: Hemalatha Chung MD    Anesthesia Type: general ASA Status: 4          Anesthesia Type: general    Vitals  Vitals Value Taken Time   /50 07/07/22 1345   Temp 97 °F (36.1 °C) 07/07/22 1340   Pulse 44 07/07/22 1345   Resp 14 07/07/22 1340   SpO2 95 % 07/07/22 1345           Post Anesthesia Care and Evaluation    Patient location during evaluation: PACU  Patient participation: complete - patient participated  Level of consciousness: awake  Pain management: adequate    Airway patency: patent  Anesthetic complications: No anesthetic complications  PONV Status: controlled  Cardiovascular status: acceptable  Respiratory status: acceptable  Hydration status: acceptable

## 2022-07-07 NOTE — ANESTHESIA PREPROCEDURE EVALUATION
Anesthesia Evaluation     Patient summary reviewed and Nursing notes reviewed   no history of anesthetic complications:  NPO Solid Status: > 8 hours  NPO Liquid Status: > 8 hours           Airway   Mallampati: II  TM distance: >3 FB  Neck ROM: full  No difficulty expected  Dental      Pulmonary     breath sounds clear to auscultation  (+) lung cancer, asthma,shortness of breath, recent URI,   Cardiovascular     ECG reviewed  Rhythm: regular  Rate: normal    (+) hypertension, dysrhythmias Atrial Fib, CHF , hyperlipidemia,       Neuro/Psych  (+) syncope,    GI/Hepatic/Renal/Endo    (+)  GI bleeding ,     Musculoskeletal     (+) neck pain,   Abdominal     Abdomen: soft.   Substance History - negative use     OB/GYN negative ob/gyn ROS         Other      history of cancer                    Anesthesia Plan    ASA 4     general     intravenous induction     Anesthetic plan, risks, benefits, and alternatives have been provided, discussed and informed consent has been obtained with: patient.    Plan discussed with CAA.        CODE STATUS:

## 2022-07-07 NOTE — ANESTHESIA PROCEDURE NOTES
Airway  Urgency: elective    Date/Time: 7/7/2022 10:40 AM  Airway not difficult    General Information and Staff    Patient location during procedure: OR  Anesthesiologist: Hemalatha Chung MD  CRNA/MANDY: [place] De Guzman CAA    Indications and Patient Condition  Indications for airway management: airway protection    Preoxygenated: yes  Mask difficulty assessment: 1 - vent by mask    Final Airway Details  Final airway type: endotracheal airway      Successful airway: ETT  Cuffed: yes   Successful intubation technique: direct laryngoscopy  Endotracheal tube insertion site: oral  Blade: Gideon  Blade size: 3  ETT size (mm): 7.0  Cormack-Lehane Classification: grade IIa - partial view of glottis  Placement verified by: chest auscultation and capnometry   Measured from: teeth  ETT/EBT  to teeth (cm): 19  Number of attempts at approach: 1  Assessment: lips, teeth, and gum same as pre-op and atraumatic intubation

## 2022-07-07 NOTE — H&P
CC--- anemia and paroxysmal atrial fibrillation     Sub     Delightful 81-year-old patient with multiple comorbidities was sent for evaluation for watchman implantation.  She has history of paroxysmal atrial fibrillation and also has history of anemia needing transfusion.  She had a history of vasovagal syncope according to her description several years ago.  She was in her usual health and suddenly had transient syncope with mild bruising of her right upper extremity this morning without any premonitory symptoms with quick recovery highly suspicious for cardiac arrhythmia causing her syncope.  She has known history of hypertension and diastolic heart failure and she is on diuretics for leg edema.  She also had a prior treatment for venous system in the left lower extremity according to her.  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  Because of significant anemia and needing blood transfusions patient's anticoagulation has been stopped and was recommended by multiple specialists to consider watchman implantation to protect her from stroke and avoid long-term anticoagulation  Has bled score greater than 3  [id number] score --- diastolic heart failure, hypertension, female sex, age--4  Essential hypertension well-controlled  Hyperlipidemia under treatment  No history of any CAD  Normal stress test in the last 1 year  Preserved LVEF                Past Medical History:   Diagnosis Date   • A-fib (HCC)     • Anemia     • Arthritis     • Asthma     • CHF (congestive heart failure) (HCC)     • Hypertension              Past Surgical History:   Procedure Laterality Date   • CATARACT EXTRACTION       • CHOLECYSTECTOMY       • HYSTERECTOMY   1987     Endometriosis   • OOPHORECTOMY       • TONSILLECTOMY                Family History   Problem Relation Age of Onset   • Breast cancer Mother 85   •  Stroke Mother     • Heart disease Mother     • Endometrial cancer Mother 70   • Heart failure Father     • Heart failure Sister     • COPD Sister     • Heart disease Sister        Social History           Tobacco Use   • Smoking status: Never Smoker   • Smokeless tobacco: Never Used   Vaping Use   • Vaping Use: Never used   Substance Use Topics   • Alcohol use: No   • Drug use: Never      Review of Systems   General:  positive for fatigue and tiredness  Eyes: No redness  Cardiovascular: No chest pain, positive for  palpitations  Respiratory:   positive for class 2 shortness of breath  Gastrointestinal: No nausea or vomiting  Genitourinary: no hematuria or dysuria  Musculoskeletal: No arthralgia or myalgia  Skin: No rash  Neurologic: No numbness, tingling  Hematologic/Lymphatic: No recent  bleeding        Physical Exam  VITALS REVIEWED     General:      well developed, well nourished, in no acute distress.    Head:      normocephalic and atraumatic.    Eyes:      PERRL/EOM intact, conjunctivae and sclerae clear without nystagmus.    Neck:      no masses, thyromegaly,  trachea central with normal respiratory effort   Lungs:      clear bilaterally to auscultation.    Heart:      Sinus rhythm without any murmurs or gallops  Msk:      Positive for mild kyphosis     Pulses:      pulses normal in all 4 extremities.    Extremities:       no cyanosis or clubbing--trace left pedal edema and trace right pedal edema.    Neurologic:      no focal deficits.   alert oriented x3  Skin:      intact without lesions or rashes.    Psych:      alert and cooperative; normal mood and affect; normal attention span and concentration.                Assessment and plan     Prior cardiac testing, cardiology notes, hematology oncology notes reviewed  Most recent labs hemoglobin of 9.8, potassium of 5.1 and creatinine of 1.13, iron level is low at 25 and prior TSH normal  Paroxysmal atrial fibrillation currently in sinus rhythm  History of  spontaneous syncope this morning and low normal blood pressure recorded in the office.  Patient to stop Cardizem and continue beta-blocker.  Plenty of hydration educated.  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  Microcytic anemia with iron deficiency and malabsorption status post iron infusions and transfusions.  Class III chronic diastolic heart failure symptoms  Essential hypertension     High [id number] score     Patient educated about alternative options like watchman and extensive discussion was done with the patient and family about pros and cons, risks and benefits and outcomes.  Patient has decided to get her watchman done and orders have been placed for MELISA along with watchman.     If the patient has recurrent transient or full-fledged syncope despite stopping Cardizem, prolonged monitoring may be needed to exclude intermittent arrhythmia like tachyarrhythmia// bradycardia arrhythmia.     Electronically signed by Sage Garcia MD, 07/07/22, 9:44 AM EDT.

## 2022-07-08 ENCOUNTER — APPOINTMENT (OUTPATIENT)
Dept: CARDIOLOGY | Facility: HOSPITAL | Age: 82
End: 2022-07-08

## 2022-07-08 ENCOUNTER — ANESTHESIA EVENT (OUTPATIENT)
Dept: PERIOP | Facility: HOSPITAL | Age: 82
End: 2022-07-08

## 2022-07-08 ENCOUNTER — ANESTHESIA (OUTPATIENT)
Dept: PERIOP | Facility: HOSPITAL | Age: 82
End: 2022-07-08

## 2022-07-08 PROBLEM — T81.718A PSEUDOANEURYSM FOLLOWING PROCEDURE: Status: ACTIVE | Noted: 2022-05-24

## 2022-07-08 PROBLEM — I72.9 PSEUDOANEURYSM FOLLOWING PROCEDURE (HCC): Status: ACTIVE | Noted: 2022-05-24

## 2022-07-08 LAB
ABO GROUP BLD: NORMAL
ANION GAP SERPL CALCULATED.3IONS-SCNC: 11 MMOL/L (ref 5–15)
BH CV RIGHT GROIN PSA PROCEDURE SCRIPTING LRR: 1
BH CV VAS R PSA NECK WIDTH: 0.4 CM
BH CV VAS R PSEUDOANEURYSM LONG: 2.4 CM
BH CV VAS R PSEUDOANEURYSM TRV: 1.6 CM
BLD GP AB SCN SERPL QL: NEGATIVE
BUN SERPL-MCNC: 34 MG/DL (ref 8–23)
BUN/CREAT SERPL: 37.4 (ref 7–25)
CALCIUM SPEC-SCNC: 8.2 MG/DL (ref 8.6–10.5)
CHLORIDE SERPL-SCNC: 109 MMOL/L (ref 98–107)
CO2 SERPL-SCNC: 20 MMOL/L (ref 22–29)
CREAT SERPL-MCNC: 0.91 MG/DL (ref 0.57–1)
DEPRECATED RDW RBC AUTO: 43.8 FL (ref 37–54)
EGFRCR SERPLBLD CKD-EPI 2021: 63.1 ML/MIN/1.73
ERYTHROCYTE [DISTWIDTH] IN BLOOD BY AUTOMATED COUNT: 16.4 % (ref 12.3–15.4)
GLUCOSE SERPL-MCNC: 126 MG/DL (ref 65–99)
HCT VFR BLD AUTO: 28.4 % (ref 34–46.6)
HGB BLD-MCNC: 9.3 G/DL (ref 12–15.9)
Lab: 1
MAXIMAL PREDICTED HEART RATE: 138 BPM
MCH RBC QN AUTO: 24.9 PG (ref 26.6–33)
MCHC RBC AUTO-ENTMCNC: 32.7 G/DL (ref 31.5–35.7)
MCV RBC AUTO: 76.1 FL (ref 79–97)
PLATELET # BLD AUTO: 257 10*3/MM3 (ref 140–450)
PMV BLD AUTO: 7.7 FL (ref 6–12)
POTASSIUM SERPL-SCNC: 4.7 MMOL/L (ref 3.5–5.2)
PROX PFA PSV RIGHT: 71 CM/SEC
PROX SFA PSV RIGHT: 136 CM/SEC
R PSEUDOANEURYSM LONG WIDTH: 1.4 CM
R PSEUDOANEURYSM TRANSVERSE WIDTH: 2.4 CM
RBC # BLD AUTO: 3.73 10*6/MM3 (ref 3.77–5.28)
RH BLD: POSITIVE
RIGHT GROIN CFA SYS: 273 CM/SEC
SODIUM SERPL-SCNC: 140 MMOL/L (ref 136–145)
STRESS TARGET HR: 117 BPM
T&S EXPIRATION DATE: NORMAL
WBC NRBC COR # BLD: 16.7 10*3/MM3 (ref 3.4–10.8)

## 2022-07-08 PROCEDURE — 25010000002 DEXAMETHASONE PER 1 MG: Performed by: ANESTHESIOLOGY

## 2022-07-08 PROCEDURE — 25010000002 CEFAZOLIN PER 500 MG: Performed by: SURGERY

## 2022-07-08 PROCEDURE — 86901 BLOOD TYPING SEROLOGIC RH(D): CPT | Performed by: SURGERY

## 2022-07-08 PROCEDURE — 86850 RBC ANTIBODY SCREEN: CPT | Performed by: SURGERY

## 2022-07-08 PROCEDURE — 25010000002 HEPARIN (PORCINE) PER 1000 UNITS: Performed by: ANESTHESIOLOGY

## 2022-07-08 PROCEDURE — C1768 GRAFT, VASCULAR: HCPCS | Performed by: SURGERY

## 2022-07-08 PROCEDURE — 99233 SBSQ HOSP IP/OBS HIGH 50: CPT | Performed by: INTERNAL MEDICINE

## 2022-07-08 PROCEDURE — 99232 SBSQ HOSP IP/OBS MODERATE 35: CPT | Performed by: INTERNAL MEDICINE

## 2022-07-08 PROCEDURE — 93926 LOWER EXTREMITY STUDY: CPT

## 2022-07-08 PROCEDURE — 80048 BASIC METABOLIC PNL TOTAL CA: CPT | Performed by: INTERNAL MEDICINE

## 2022-07-08 PROCEDURE — 86900 BLOOD TYPING SEROLOGIC ABO: CPT | Performed by: SURGERY

## 2022-07-08 PROCEDURE — 25010000002 PROPOFOL 200 MG/20ML EMULSION: Performed by: ANESTHESIOLOGY

## 2022-07-08 PROCEDURE — 25010000002 FENTANYL CITRATE (PF) 100 MCG/2ML SOLUTION: Performed by: ANESTHESIOLOGY

## 2022-07-08 PROCEDURE — 25010000002 VANCOMYCIN 1 G RECONSTITUTED SOLUTION: Performed by: SURGERY

## 2022-07-08 PROCEDURE — 25010000002 HYDROMORPHONE PER 4 MG: Performed by: ANESTHESIOLOGY

## 2022-07-08 PROCEDURE — 04QK0ZZ REPAIR RIGHT FEMORAL ARTERY, OPEN APPROACH: ICD-10-PCS | Performed by: SURGERY

## 2022-07-08 PROCEDURE — 25010000002 ONDANSETRON PER 1 MG: Performed by: ANESTHESIOLOGY

## 2022-07-08 PROCEDURE — 25010000002 HEPARIN (PORCINE) 1000-0.9 UT/500ML-% SOLUTION: Performed by: ANESTHESIOLOGY

## 2022-07-08 PROCEDURE — 25010000002 HEPARIN (PORCINE) PER 1000 UNITS: Performed by: SURGERY

## 2022-07-08 PROCEDURE — 25010000002 PROTAMINE SULFATE PER 10 MG: Performed by: ANESTHESIOLOGY

## 2022-07-08 PROCEDURE — 85027 COMPLETE CBC AUTOMATED: CPT | Performed by: INTERNAL MEDICINE

## 2022-07-08 DEVICE — LIGACLIP MCA MULTIPLE CLIP APPLIERS, 20 SMALL CLIPS
Type: IMPLANTABLE DEVICE | Site: GROIN | Status: FUNCTIONAL
Brand: LIGACLIP

## 2022-07-08 DEVICE — VASCU-GUARD PERIPHERAL VASCULAR PATCH IS PREPARED FROM BOVINE PERICARDIUM WHICH IS CROSS-LINKED WITH GLUTARALDEHYDE. THE PERICARDIUM IS PROCURED FROM CATTLE ORIGINATING IN THE UNITED STATES. VASCU-GUARD PERIPHERAL VASCULAR PATCH IS CHEMICALLY STERILIZED USING ETHANOL AND PROPYLENE OXIDE. VASCU-GUARD PERIPHERAL VASCULAR PATCH HAS BEEN TREATED WITH 1 MOLAR SODIUM HYDROXIDE FOR A MINIMUM OF 60 MINUTES AT 20 - 25°C.  VASCU-GUARD PERIPHERAL VASCULAR PATCH IS PACKAGED IN A CONTAINER FILLED WITH STERILE, NON-PYROGENIC WATER CONTAINING PROPYLENE OXIDE. THE CONTENTS OF THE UNOPENED, UNDAMAGED CONTAINER ARE STERILE.
Type: IMPLANTABLE DEVICE | Site: ARTERY FEMORAL | Status: FUNCTIONAL
Brand: VASCU-GUARD PERIPHERAL VASCULAR PATCH

## 2022-07-08 RX ORDER — ONDANSETRON 2 MG/ML
4 INJECTION INTRAMUSCULAR; INTRAVENOUS ONCE AS NEEDED
Status: DISCONTINUED | OUTPATIENT
Start: 2022-07-08 | End: 2022-07-08 | Stop reason: HOSPADM

## 2022-07-08 RX ORDER — SPIRONOLACTONE 25 MG/1
25 TABLET ORAL EVERY OTHER DAY
Status: DISCONTINUED | OUTPATIENT
Start: 2022-07-08 | End: 2022-07-11 | Stop reason: HOSPADM

## 2022-07-08 RX ORDER — SODIUM CHLORIDE, SODIUM LACTATE, POTASSIUM CHLORIDE, CALCIUM CHLORIDE 600; 310; 30; 20 MG/100ML; MG/100ML; MG/100ML; MG/100ML
50 INJECTION, SOLUTION INTRAVENOUS CONTINUOUS
Status: DISCONTINUED | OUTPATIENT
Start: 2022-07-08 | End: 2022-07-11 | Stop reason: HOSPADM

## 2022-07-08 RX ORDER — PROTAMINE SULFATE 10 MG/ML
INJECTION, SOLUTION INTRAVENOUS AS NEEDED
Status: DISCONTINUED | OUTPATIENT
Start: 2022-07-08 | End: 2022-07-08 | Stop reason: SURG

## 2022-07-08 RX ORDER — DIPHENHYDRAMINE HYDROCHLORIDE 50 MG/ML
12.5 INJECTION INTRAMUSCULAR; INTRAVENOUS
Status: DISCONTINUED | OUTPATIENT
Start: 2022-07-08 | End: 2022-07-08 | Stop reason: HOSPADM

## 2022-07-08 RX ORDER — OXYCODONE HYDROCHLORIDE 5 MG/1
10 TABLET ORAL EVERY 4 HOURS PRN
Status: DISCONTINUED | OUTPATIENT
Start: 2022-07-08 | End: 2022-07-08 | Stop reason: HOSPADM

## 2022-07-08 RX ORDER — SODIUM CHLORIDE, SODIUM LACTATE, POTASSIUM CHLORIDE, CALCIUM CHLORIDE 600; 310; 30; 20 MG/100ML; MG/100ML; MG/100ML; MG/100ML
INJECTION, SOLUTION INTRAVENOUS CONTINUOUS PRN
Status: DISCONTINUED | OUTPATIENT
Start: 2022-07-08 | End: 2022-07-08 | Stop reason: SURG

## 2022-07-08 RX ORDER — ONDANSETRON 2 MG/ML
4 INJECTION INTRAMUSCULAR; INTRAVENOUS EVERY 6 HOURS PRN
Status: DISCONTINUED | OUTPATIENT
Start: 2022-07-08 | End: 2022-07-11 | Stop reason: HOSPADM

## 2022-07-08 RX ORDER — SODIUM CHLORIDE, SODIUM LACTATE, POTASSIUM CHLORIDE, CALCIUM CHLORIDE 600; 310; 30; 20 MG/100ML; MG/100ML; MG/100ML; MG/100ML
75 INJECTION, SOLUTION INTRAVENOUS CONTINUOUS
Status: DISCONTINUED | OUTPATIENT
Start: 2022-07-08 | End: 2022-07-08

## 2022-07-08 RX ORDER — ONDANSETRON 2 MG/ML
INJECTION INTRAMUSCULAR; INTRAVENOUS AS NEEDED
Status: DISCONTINUED | OUTPATIENT
Start: 2022-07-08 | End: 2022-07-08 | Stop reason: SURG

## 2022-07-08 RX ORDER — PROMETHAZINE HYDROCHLORIDE 25 MG/1
25 TABLET ORAL ONCE AS NEEDED
Status: DISCONTINUED | OUTPATIENT
Start: 2022-07-08 | End: 2022-07-08 | Stop reason: HOSPADM

## 2022-07-08 RX ORDER — HEPARIN SODIUM 200 [USP'U]/100ML
INJECTION, SOLUTION INTRAVENOUS
Status: COMPLETED | OUTPATIENT
Start: 2022-07-08 | End: 2022-07-08

## 2022-07-08 RX ORDER — PHENYLEPHRINE HCL IN 0.9% NACL 1 MG/10 ML
SYRINGE (ML) INTRAVENOUS AS NEEDED
Status: DISCONTINUED | OUTPATIENT
Start: 2022-07-08 | End: 2022-07-08 | Stop reason: SURG

## 2022-07-08 RX ORDER — HYDROMORPHONE HCL 110MG/55ML
PATIENT CONTROLLED ANALGESIA SYRINGE INTRAVENOUS AS NEEDED
Status: DISCONTINUED | OUTPATIENT
Start: 2022-07-08 | End: 2022-07-08 | Stop reason: SURG

## 2022-07-08 RX ORDER — LABETALOL HYDROCHLORIDE 5 MG/ML
5 INJECTION, SOLUTION INTRAVENOUS
Status: DISCONTINUED | OUTPATIENT
Start: 2022-07-08 | End: 2022-07-08 | Stop reason: HOSPADM

## 2022-07-08 RX ORDER — NALOXONE HCL 0.4 MG/ML
0.4 VIAL (ML) INJECTION AS NEEDED
Status: DISCONTINUED | OUTPATIENT
Start: 2022-07-08 | End: 2022-07-08 | Stop reason: HOSPADM

## 2022-07-08 RX ORDER — PROPOFOL 10 MG/ML
INJECTION, EMULSION INTRAVENOUS AS NEEDED
Status: DISCONTINUED | OUTPATIENT
Start: 2022-07-08 | End: 2022-07-08 | Stop reason: SURG

## 2022-07-08 RX ORDER — DEXAMETHASONE SODIUM PHOSPHATE 4 MG/ML
INJECTION, SOLUTION INTRA-ARTICULAR; INTRALESIONAL; INTRAMUSCULAR; INTRAVENOUS; SOFT TISSUE AS NEEDED
Status: DISCONTINUED | OUTPATIENT
Start: 2022-07-08 | End: 2022-07-08 | Stop reason: SURG

## 2022-07-08 RX ORDER — ROCURONIUM BROMIDE 10 MG/ML
INJECTION, SOLUTION INTRAVENOUS AS NEEDED
Status: DISCONTINUED | OUTPATIENT
Start: 2022-07-08 | End: 2022-07-08 | Stop reason: SURG

## 2022-07-08 RX ORDER — ACETAMINOPHEN 500 MG
1000 TABLET ORAL ONCE AS NEEDED
Status: DISCONTINUED | OUTPATIENT
Start: 2022-07-08 | End: 2022-07-08 | Stop reason: HOSPADM

## 2022-07-08 RX ORDER — FENTANYL CITRATE 50 UG/ML
INJECTION, SOLUTION INTRAMUSCULAR; INTRAVENOUS AS NEEDED
Status: DISCONTINUED | OUTPATIENT
Start: 2022-07-08 | End: 2022-07-08 | Stop reason: SURG

## 2022-07-08 RX ORDER — LIDOCAINE HYDROCHLORIDE 20 MG/ML
INJECTION, SOLUTION EPIDURAL; INFILTRATION; INTRACAUDAL; PERINEURAL AS NEEDED
Status: DISCONTINUED | OUTPATIENT
Start: 2022-07-08 | End: 2022-07-08 | Stop reason: SURG

## 2022-07-08 RX ORDER — IPRATROPIUM BROMIDE AND ALBUTEROL SULFATE 2.5; .5 MG/3ML; MG/3ML
3 SOLUTION RESPIRATORY (INHALATION) ONCE AS NEEDED
Status: DISCONTINUED | OUTPATIENT
Start: 2022-07-08 | End: 2022-07-08 | Stop reason: HOSPADM

## 2022-07-08 RX ORDER — HEPARIN SODIUM 1000 [USP'U]/ML
INJECTION, SOLUTION INTRAVENOUS; SUBCUTANEOUS AS NEEDED
Status: DISCONTINUED | OUTPATIENT
Start: 2022-07-08 | End: 2022-07-08 | Stop reason: SURG

## 2022-07-08 RX ORDER — ACETAMINOPHEN 650 MG/1
650 SUPPOSITORY RECTAL ONCE AS NEEDED
Status: DISCONTINUED | OUTPATIENT
Start: 2022-07-08 | End: 2022-07-08 | Stop reason: HOSPADM

## 2022-07-08 RX ORDER — FLUMAZENIL 0.1 MG/ML
0.1 INJECTION INTRAVENOUS AS NEEDED
Status: DISCONTINUED | OUTPATIENT
Start: 2022-07-08 | End: 2022-07-08 | Stop reason: HOSPADM

## 2022-07-08 RX ORDER — ONDANSETRON 4 MG/1
4 TABLET, FILM COATED ORAL EVERY 6 HOURS PRN
Status: DISCONTINUED | OUTPATIENT
Start: 2022-07-08 | End: 2022-07-11 | Stop reason: HOSPADM

## 2022-07-08 RX ORDER — GLYCOPYRROLATE 0.2 MG/ML
INJECTION INTRAMUSCULAR; INTRAVENOUS AS NEEDED
Status: DISCONTINUED | OUTPATIENT
Start: 2022-07-08 | End: 2022-07-08 | Stop reason: SURG

## 2022-07-08 RX ORDER — OXYCODONE HYDROCHLORIDE 5 MG/1
5 TABLET ORAL ONCE AS NEEDED
Status: DISCONTINUED | OUTPATIENT
Start: 2022-07-08 | End: 2022-07-08 | Stop reason: HOSPADM

## 2022-07-08 RX ORDER — PROMETHAZINE HYDROCHLORIDE 25 MG/1
25 SUPPOSITORY RECTAL ONCE AS NEEDED
Status: DISCONTINUED | OUTPATIENT
Start: 2022-07-08 | End: 2022-07-08 | Stop reason: HOSPADM

## 2022-07-08 RX ADMIN — HYDROMORPHONE HYDROCHLORIDE 1 MG: 2 INJECTION, SOLUTION INTRAMUSCULAR; INTRAVENOUS; SUBCUTANEOUS at 13:41

## 2022-07-08 RX ADMIN — Medication 200 MCG: at 13:06

## 2022-07-08 RX ADMIN — PROPOFOL 50 MG: 10 INJECTION, EMULSION INTRAVENOUS at 13:20

## 2022-07-08 RX ADMIN — Medication 200 MCG: at 14:27

## 2022-07-08 RX ADMIN — ROCURONIUM BROMIDE 20 MG: 50 INJECTION, SOLUTION INTRAVENOUS at 12:58

## 2022-07-08 RX ADMIN — HEPARIN SODIUM 1000 UNITS: 200 INJECTION, SOLUTION INTRAVENOUS at 13:25

## 2022-07-08 RX ADMIN — PROPOFOL 120 MG: 10 INJECTION, EMULSION INTRAVENOUS at 12:58

## 2022-07-08 RX ADMIN — Medication 200 MCG: at 14:08

## 2022-07-08 RX ADMIN — GLYCOPYRROLATE 0.2 MG: 0.2 INJECTION INTRAMUSCULAR; INTRAVENOUS at 13:14

## 2022-07-08 RX ADMIN — Medication 200 MCG: at 14:18

## 2022-07-08 RX ADMIN — CEFAZOLIN 2 G: 2 INJECTION, POWDER, FOR SOLUTION INTRAMUSCULAR; INTRAVENOUS at 13:07

## 2022-07-08 RX ADMIN — Medication 200 MCG: at 14:39

## 2022-07-08 RX ADMIN — Medication 200 MCG: at 13:25

## 2022-07-08 RX ADMIN — Medication 200 MCG: at 13:58

## 2022-07-08 RX ADMIN — ONDANSETRON 4 MG: 2 INJECTION INTRAMUSCULAR; INTRAVENOUS at 14:09

## 2022-07-08 RX ADMIN — FENTANYL CITRATE 100 MCG: 50 INJECTION, SOLUTION INTRAMUSCULAR; INTRAVENOUS at 12:57

## 2022-07-08 RX ADMIN — HEPARIN SODIUM 2500 UNITS: 1000 INJECTION INTRAVENOUS; SUBCUTANEOUS at 13:54

## 2022-07-08 RX ADMIN — Medication 200 MCG: at 13:46

## 2022-07-08 RX ADMIN — Medication 200 MCG: at 13:13

## 2022-07-08 RX ADMIN — PROTAMINE SULFATE 40 MG: 10 INJECTION, SOLUTION INTRAVENOUS at 14:09

## 2022-07-08 RX ADMIN — DEXAMETHASONE SODIUM PHOSPHATE 4 MG: 4 INJECTION, SOLUTION INTRAMUSCULAR; INTRAVENOUS at 13:33

## 2022-07-08 RX ADMIN — LIDOCAINE HYDROCHLORIDE 100 MG: 20 INJECTION, SOLUTION EPIDURAL; INFILTRATION; INTRACAUDAL; PERINEURAL at 12:58

## 2022-07-08 RX ADMIN — CEFAZOLIN 2 G: 2 INJECTION, POWDER, FOR SOLUTION INTRAMUSCULAR; INTRAVENOUS at 22:00

## 2022-07-08 RX ADMIN — HEPARIN SODIUM 5000 UNITS: 1000 INJECTION INTRAVENOUS; SUBCUTANEOUS at 13:30

## 2022-07-08 RX ADMIN — SODIUM CHLORIDE, POTASSIUM CHLORIDE, SODIUM LACTATE AND CALCIUM CHLORIDE 50 ML/HR: 600; 310; 30; 20 INJECTION, SOLUTION INTRAVENOUS at 18:07

## 2022-07-08 RX ADMIN — SODIUM CHLORIDE, SODIUM LACTATE, POTASSIUM CHLORIDE, AND CALCIUM CHLORIDE: .6; .31; .03; .02 INJECTION, SOLUTION INTRAVENOUS at 12:52

## 2022-07-08 NOTE — CASE MANAGEMENT/SOCIAL WORK
Discharge Planning Assessment   [facility]     Patient Name: Chrystal Ruiz  MRN: [id number]  Today's Date: 7/8/2022    Admit Date: 7/7/2022     Discharge Needs Assessment     Row Name 07/08/22 0815       Living Environment    People in Home alone    Current Living Arrangements condominium    Primary Care Provided by self    Provides Primary Care For no one    Family Caregiver if Needed other (see comments)  Pt reports she has multiple neighbors,[employer] family,and inlaws that are able to help her if needed.Son lives in [place], and currently has Covid.Pt reports her sister-in-law will stay with her at dc if needed    Quality of Family Relationships supportive;helpful    Able to Return to Prior Arrangements yes       Resource/Environmental Concerns    Resource/Environmental Concerns none    Transportation Concerns none       Transition Planning    Patient/Family Anticipates Transition to home    Patient/Family Anticipated Services at Transition none    Transportation Anticipated family or friend will provide  Pt states her sister in law will pick her up at dc       Discharge Needs Assessment    Equipment Currently Used at Home other (see comments)  has walker at home,but doesn't use it    Concerns to be Addressed denies needs/concerns at this time    Anticipated Changes Related to Illness none    Equipment Needed After Discharge none    Current Discharge Risk lives alone               Discharge Plan     Row Name 07/08/22 0821       Plan    Plan Home    Patient/Family in Agreement with Plan yes    Plan Comments Pt reports she lives alone,and is independent with all ADLS. Reports she has supportive neighbors,[employer] family,and inlaws if help is needed at dc.She states her sister-in-law will pick her up at dc and stay with her if needed.Her son lives in [place], and had planned to be here,but he and family have Covid.Pt confirms PCP and pharmacy.Denies problems obtaining food/meds/utilities.She reports she has had [facility] in the past, and wants them again if home health needed at dc.Pt states she doesn't anticipate needing any services at dc. Pt developed post procedure pseudoaneurysm. Pt to have repeat ultrasound this morning.              Continued Care and Services - Admitted Since 7/7/2022    Coordination has not been started for this encounter.       Expected Discharge Date and Time     Expected Discharge Date Expected Discharge Time    Jul 9, 2022          Demographic Summary     Row Name 07/08/22 0815       General Information    Admission Type inpatient    Arrived From home    Required Notices Provided Important Message from Medicare    Referral Source admission list    Reason for Consult discharge planning    Preferred Language English       Contact Information    Permission Granted to Share Info With                Functional Status     Row Name 07/08/22 0815       Functional Status    Usual Activity Tolerance good    Current Activity Tolerance moderate       Functional Status, IADL    Medications independent    Meal Preparation independent    Housekeeping independent    Laundry independent    Shopping independent                   Patient Forms     Row Name 07/08/22 0824       Patient Forms    Important Message from Medicare (IMM) Delivered    Delivered to Patient    Method of delivery In person  IMM given and explained to patient who verbalized understanding. Verbal consent obtained from patient due to she is on bedrest and lying flat in bed.                Nataliya Peñaloza RN, [facility]  Office: [phone]  Fax: [phone]  [email or web address]      I met with patient in room wearing PPE: mask and goggles.     Maintained distance greater than six feet and spent </=15 minutes in the room    Nataliya Peñaloza RN

## 2022-07-08 NOTE — PLAN OF CARE
Goal Outcome Evaluation:  Plan of Care Reviewed With: patient        Progress: improving  Outcome Evaluation: patient remained on bedrest during entire evening. Right groin site is bruised, tender, and minimal drainage. 5lb sandbag remains in place. Will have repeat duplex done this morning to assess pseudoaneursym. Pt maintained sinus jordi with rate in the 50's. BP stable. Will continue to monitor.

## 2022-07-08 NOTE — PLAN OF CARE
Goal Outcome Evaluation:  New admit from [facility] remains on bedrest. Groin site is bruised but no hematoma.

## 2022-07-08 NOTE — PROGRESS NOTES
[facility]iology [facility]      Patient Care Team:  Etelvina Ulloa MD as PCP - General  Etelvina Ulloa MD as PCP - Family Medicine  Mukund Farias MD as Consulting Physician (Pulmonary Disease)  Alan Wynn DPM as Consulting Physician (Podiatry)  Etelvina Mcconnell MD as Surgeon (Thoracic Surgery)  Michael Whitten MD as Consulting Physician (Radiation Oncology)  Frank Cheng MD as Consulting Physician (Hematology and Oncology)  Jeremy Mccall MD as Consulting Physician (Cardiology)        Cardiology assessment and plan    Right groin pseudoaneurysm plans for open surgical repair  Status post watchman left atrial appendage closure device with assessable implantation of the device  Right groin pseudoaneurysm  Right groin hematoma  Failed ultrasound-guided thrombin injection repair  Has bled score greater than 3  [id number] score --- diastolic heart failure, hypertension, female sex, age--4  Essential hypertension well-controlled  Hyperlipidemia under treatment  No history of any CAD  Normal stress test in the last 1 year  Preserved LVEF  Need for oral anticoagulation was patient is cleared by vascular surgery from pseudoaneurysm standpoint    Discussed with patient at bedside and discussed thromboembolus  Plans to proceed with open repair of the right groin pseudoaneurysm    T-max is 98.8 pulse is 62 respirations are 15 blood pressure is 119/45 sats are 98%  Sodium is 140 potassium is 4.7 creatinine is 0.9 hemoglobin is 9.3 white cell count is 16,000      Chief Complaint: Right groin pain and swelling    Subjective no cardiac symptoms  Interval History: Hemodynamically stable    History taken from: patient    Review of Systems:  Review of Systems   Constitutional: Negative for chills, decreased appetite and malaise/fatigue.   HENT: Negative for congestion and nosebleeds.    Eyes: Negative for blurred vision and double vision.   Cardiovascular: Positive for dyspnea on exertion, irregular heartbeat and leg  "swelling. Negative for chest pain.   Respiratory: Negative for cough and shortness of breath.    Hematologic/Lymphatic: Negative for adenopathy. Does not bruise/bleed easily.   Skin: Negative for rash.   Gastrointestinal: Negative for bloating, abdominal pain, hematemesis and hematochezia.   Genitourinary: Negative for flank pain.   Neurological: Negative for dizziness and focal weakness.   Psychiatric/Behavioral: The patient is nervous/anxious.        Objective    Vital Signs  Visit Vitals  /45 (BP Location: Left arm, Patient Position: Lying)   Pulse 62   Temp 98.8 °F (37.1 °C) (Oral)   Resp 15   Ht 154.9 cm (61\")   Wt 54.8 kg (120 lb 13 oz)   SpO2 98%   BMI 22.83 kg/m²     Oxygen Therapy  SpO2: 98 %  Pulse Oximetry Type: Intermittent  Device (Oxygen Therapy): room air  Device (Oxygen Therapy): room air  Flow (L/min): 2  Flowsheet Rows    Flowsheet Row First Filed Value   Admission Height 154.9 cm (61\") Documented at 07/07/2022 1006   Admission Weight 54.8 kg (120 lb 13 oz) Documented at 07/07/2022 1006        Intake & Output (last 3 days)       07/05 0701  07/06 0700 07/06 0701  07/07 0700 07/07 0701  07/08 0700 07/08 0701  07/09 0700    P.O.   360     I.V. (mL/kg)   500 (9.1)     IV Piggyback    100    Total Intake(mL/kg)   860 (15.7) 100 (1.8)    Urine (mL/kg/hr)   800     Total Output   800     Net   +60 +100                Lines, Drains & Airways     Active LDAs     Name Placement date Placement time Site Days    Peripheral IV 07/07/22 1012 Anterior;Left Forearm 07/07/22  1012  Forearm  1    Peripheral IV 07/08/22 1155 Anterior;Left Forearm 07/08/22  1155  Forearm  less than 1    Urethral Catheter Non-latex 16 Fr. 07/07/22  2000  placed by previous shift  -- less than 1    ETT  07/08/22  1300  created via procedure documentation  -- less than 1    Arterial Line 07/08/22 Left Radial 07/08/22  1327  created via procedure documentation  Radial  less than 1                Physical Exam:  Constitutional:      "  Appearance: Well-developed.   Eyes:      Conjunctiva/sclera: Conjunctivae normal.      Pupils: Pupils are equal, round, and reactive to light.   HENT:      Head: Normocephalic and atraumatic.   Neck:      Thyroid: No thyromegaly.   Pulmonary:      Effort: Pulmonary effort is normal.      Breath sounds: Normal breath sounds.   Cardiovascular:      Abnormal PMI. Normal rate. Regularly irregular rhythm.      S4 Gallop.   Pulses:     Intact distal pulses.   Edema:     Peripheral edema absent.   Abdominal:      General: Bowel sounds are normal.      Palpations: Abdomen is soft.   Musculoskeletal:      Cervical back: Normal range of motion and neck supple. Skin:     General: Skin is warm.   Neurological:      Mental Status: Alert and oriented to person, place, and time.       Right groin hematoma and tenderness  Results Review:     I reviewed the patient's new clinical results.    Lab Results (last 24 hours)     Procedure Component Value Units Date/Time    Basic Metabolic Panel [[id number]]  (Abnormal) Collected: 07/08/22 0511    Specimen: Blood Updated: 07/08/22 0544     Glucose 126 mg/dL      BUN 34 mg/dL      Creatinine 0.91 mg/dL      Sodium 140 mmol/L      Potassium 4.7 mmol/L      Comment: Slight hemolysis detected by analyzer. Results may be affected.        Chloride 109 mmol/L      CO2 20.0 mmol/L      Calcium 8.2 mg/dL      BUN/Creatinine Ratio 37.4     Anion Gap 11.0 mmol/L      eGFR 63.1 mL/min/1.73      Comment: National Kidney Foundation and American Society of Nephrology (ASN) Task Force recommended calculation based on the Chronic Kidney Disease Epidemiology Collaboration (CKD-EPI) equation refit without adjustment for race.       Narrative:      GFR Normal >60  Chronic Kidney Disease <60  Kidney Failure <15      CBC (No Diff) [[id number]]  (Abnormal) Collected: 07/08/22 0511    Specimen: Blood Updated: 07/08/22 0534     WBC 16.70 10*3/mm3      RBC 3.73 10*6/mm3      Hemoglobin 9.3 g/dL      Hematocrit 28.4  %      MCV 76.1 fL      MCH 24.9 pg      MCHC 32.7 g/dL      RDW 16.4 %      RDW-SD 43.8 fl      MPV 7.7 fL      Platelets 257 10*3/mm3     Basic Metabolic Panel [[id number]]  (Abnormal) Collected: 07/07/22 2159    Specimen: Blood Updated: 07/07/22 2239     Glucose 188 mg/dL      BUN 37 mg/dL      Creatinine 0.97 mg/dL      Sodium 141 mmol/L      Potassium 4.5 mmol/L      Chloride 108 mmol/L      CO2 19.0 mmol/L      Calcium 8.5 mg/dL      BUN/Creatinine Ratio 38.1     Anion Gap 14.0 mmol/L      eGFR 58.5 mL/min/1.73      Comment: National Kidney Foundation and American Society of Nephrology (ASN) Task Force recommended calculation based on the Chronic Kidney Disease Epidemiology Collaboration (CKD-EPI) equation refit without adjustment for race.       Narrative:      GFR Normal >60  Chronic Kidney Disease <60  Kidney Failure <15      CBC & Differential [[id number]]  (Abnormal) Collected: 07/07/22 2159    Specimen: Blood Updated: 07/07/22 2228    Narrative:      The following orders were created for panel order CBC & Differential.  Procedure                               Abnormality         Status                     ---------                               -----------         ------                     CBC Auto Differential[[id number]]        Abnormal            Final result                 Please view results for these tests on the individual orders.    CBC Auto Differential [[id number]]  (Abnormal) Collected: 07/07/22 2159    Specimen: Blood Updated: 07/07/22 2228     WBC 16.60 10*3/mm3      RBC 4.24 10*6/mm3      Hemoglobin 9.9 g/dL      Hematocrit 32.2 %      MCV 76.0 fL      MCH 23.3 pg      MCHC 30.7 g/dL      RDW 16.6 %      RDW-SD 45.5 fl      MPV 7.7 fL      Platelets 270 10*3/mm3      Neutrophil % 87.4 %      Lymphocyte % 5.7 %      Monocyte % 6.8 %      Eosinophil % 0.0 %      Basophil % 0.1 %      Neutrophils, Absolute 14.50 10*3/mm3      Lymphocytes, Absolute 0.90 10*3/mm3      Monocytes, Absolute 1.10  10*3/mm3      Eosinophils, Absolute 0.00 10*3/mm3      Basophils, Absolute 0.00 10*3/mm3      nRBC 0.0 /100 WBC     POC Activated Clotting Time [[id number]]  (Abnormal) Collected: 07/07/22 1150    Specimen: Arterial Blood Updated: 07/07/22 1523     Activated Clotting Time  439 Seconds      Comment: Serial Number: [id number]:  [id number]       POC Activated Clotting Time [[id number]]  (Abnormal) Collected: 07/07/22 1129    Specimen: Arterial Blood Updated: 07/07/22 1523     Activated Clotting Time  312 Seconds      Comment: Serial Number: [id number]:  [id number]       POC Activated Clotting Time [[id number]]  (Abnormal) Collected: 07/07/22 1118    Specimen: Arterial Blood Updated: 07/07/22 1523     Activated Clotting Time  219 Seconds      Comment: Serial Number: [id number]:  [id number]       POC Activated Clotting Time [[id number]]  (Normal) Collected: 07/07/22 1107    Specimen: Venous Blood Updated: 07/07/22 1523     Activated Clotting Time  103 Seconds      Comment: Serial Number: [id number]:  [id number]           Results for orders placed during the hospital encounter of 01/24/22    Adult Transthoracic Echo Complete W/ Cont if Necessary Per Protocol    Interpretation Summary  · Estimated right ventricular systolic pressure from tricuspid regurgitation is normal (<35 mmHg).  · Moderate pulmonic valve regurgitation is present.  · Left ventricular ejection fraction appears to be 61 - 65%.  · Left ventricular diastolic function is consistent with (grade II w/high LAP) pseudonormalization.        Medication Review:   I have reviewed the patient's current medication list  Scheduled Meds:ferrous sulfate, 324 mg, Oral, Daily With Breakfast  furosemide, 40 mg, Oral, Every Other Day  metoprolol succinate XL, 50 mg, Oral, Daily  sodium chloride, 10 mL, Intravenous, Q12H  [MAR Hold] spironolactone, 25 mg, Oral, Every Other Day  triamcinolone, , Topical, BID      Continuous Infusions:lactated ringers, 75 mL/hr  sodium chloride, 9  mL/hr      PRN Meds:.•  acetaminophen **OR** acetaminophen  •  HYDROcodone-acetaminophen  •  midazolam  •  Morphine **AND** naloxone  •  ondansetron  •  sodium chloride  •  sodium chloride  •  BH OR ANTIBIOTIC IRRIGATION BUILDER  •  BH OR OTHER MIXTURE BUILDER    ECG/EMG Results (last 24 hours)     Procedure Component Value Units Date/Time    ECG 12 Lead [[id number]] Collected: 07/07/22 1815     Updated: 07/07/22 1815     QT Interval 432 ms     Narrative:      HEART RATE= 62  bpm  RR Interval= 964  ms  GA Interval= 155  ms  P Horizontal Axis= 9  deg  P Front Axis= 64  deg  QRSD Interval= 96  ms  QT Interval= 432  ms  QRS Axis= 84  deg  T Wave Axis= 65  deg  - NORMAL ECG -  Sinus rhythm  When compared with ECG of 23-Mar-2022 3:20:22,  Significant rate decrease  Electronically Signed By:   Date and Time of Study: 2022-07-07 18:15:11    EP/CRM Study [[id number]] Resulted: 07/07/22 1227     Updated: 07/07/22 1820    Narrative:      Procedure indication  Paroxysmal atrial fibrillation   iron deficiency anemia  High chads Vasc score  Patient recommended watchman device implantation to avoid long-term   anticoagulation    Sedation by anesthesia, patient placed under general anesthesia by   anesthetic team.  Blood loss less than 30 cc  Benadryl  and Pepcid before procedure    MELISA performed by Dr. Arzola    Interventional cardiology Dr. Mullen  Interventional electrophysiology Dr. Garcia    Procedures performed  #1 cannulation of left atrial appendage with left atrial appendage   angiography  #2 placement of watchman device in the left atrial appendage    Procedure note  After obtaining valid consent patient was draped in sterile fashion and   patient was placed under general anesthesia by anesthetic team .  The   right groin was infiltrated local anesthesia and the common femoral vein   was easily cannulated with placement of a 14 [place] venous sheath.  After placement of the long sheath a preface sheath with a [place]  needle   was used for transseptal puncture and by Dr. Meléndez  After completion of transseptal puncture, the preface sheath was exchanged   to a double curve sheath for watchman device implantation--over a spiral   wire left in the left atrium   A pigtail catheter was then placed into the sheath and directed towards   the left atrial appendage  Heparin was given the transseptal access to keep the ACT above 300  Once left atrial appendage  was cannulated,  left atrial appendage   angiography was performed after measuring left atrial pressure. A 27 mm   watchman flex device was taken manufactured by [employer] and lot   number is [id number] and reference number is [id number]  The device was placed in the sheath and advanced to the appendage and a   flex ball was formed as per protocol and the device was delivered and   shoulder was noticed measuring up to 0.9.  The device was recaptured x2   and delivered slightly more posteriorly with excellent apposition and   complete seal.  There was no leak.  Tug test was performed and patient   passed all the 4 criteria for PASS criteria  By anticlockwise motion the device was delivered and the long sheath was   then taken out and the short sheath was removed and a figure-of-eight   suture was attempted which was not successful and a FemoStop applied for   bleeding control because of ACT being more than 400 and heparin was   reversed with protamine and there is no immediate groin hematoma.        Plan    Admit to PCU as per protocol and start Eliquis 2.5 mg twice daily tomorrow    Electronically signed by Sage Garcia MD, 07/07/22, 12:33 PM EDT.      EP/CRM Study [[id number]] Resulted: 07/07/22 1817     Updated: 07/07/22 1822    Narrative:      Procedure indication  Paroxysmal atrial fibrillation   iron deficiency anemia  High chads Vasc score  Patient recommended watchman device implantation to avoid long-term   anticoagulation      Sedation by  anesthesia  Complications none  Blood loss less than 15 cc    MELISA performed by Dr. Arzola    Interventional cardiology Dr. Mullen  Interventional electrophysiology Dr. Garcia    Procedures performed  #1  Transseptal puncture under fluoroscopic and MELISA guidance    Procedure note  After obtaining valid consent patient was draped in sterile fashion and   patient was placed under general anesthesia by anesthetic team and was   given cefazolin before the proced ure.   After placement of the long sheath a preface sheath with a [place] needle   was used for transseptal puncture under fluoroscopic and MELISA guidance   without any complications.  After completion of transseptal puncture, the preface sheath was exchanged   to a double curve sheath for watchman device implantation--over an [employer]   wire placed in the left infrapulmonary vein  A pigtail catheter was then placed into the sheath and directed towards   the left atrial appendage  Heparin was given the transseptal access to keep the ACT above 300  Rest of the procedure including left atrial appendage angiogram and device   deployment of the left atrial appendage closure device was done by Dr. Garcia.           Imaging Results (Last 24 Hours)     ** No results found for the last 24 hours. **          Assessment & Plan       Pseudoaneurysm following procedure (HCC)    Hypertension    A-fib (HCC)    Anemia    Edema    Paroxysmal atrial fibrillation (HCC)        Fide Mullen MD  07/08/22  13:41 EDT

## 2022-07-08 NOTE — PROGRESS NOTES
Vascular Surgery    Repeat duplex scan this morning demonstrates residual or recurrent post catheterization right femoral pseudoaneurysm.,  Given concerned about potential use of 14 [place] sheath, I do not recommend an attempt at repeat pseudoaneurysm injection.  Instead I recommend operative repair.

## 2022-07-08 NOTE — ANESTHESIA PREPROCEDURE EVALUATION
Anesthesia Evaluation     Patient summary reviewed and Nursing notes reviewed   no history of anesthetic complications:  NPO Solid Status: > 8 hours  NPO Liquid Status: > 8 hours           Airway   Dental      Pulmonary    (+) lung cancer, asthma,shortness of breath, recent URI,   Cardiovascular     ECG reviewed  Patient on routine beta blocker    (+) hypertension, valvular problems/murmurs TI, dysrhythmias Paroxysmal Atrial Fib, Tachycardia, CHF , hyperlipidemia,       Neuro/Psych  (+) syncope, tremors,    GI/Hepatic/Renal/Endo    (+)  GI bleeding ,     Musculoskeletal     (+) back pain, neck pain,   Abdominal    Substance History      OB/GYN          Other   arthritis, blood dyscrasia anemia,   history of cancer    ROS/Med Hx Other: PI, pseudoaneurysm, hypocalcemia, hyperglycemia, alopecia, cellulitis, vasculitis, low vit D, edema, knee and calf pain, malabsorption, low iron, dermatitis    Echocardiogram Findings    Left Ventricle Calculated left ventricular EF = 63% Left ventricular ejection fraction appears to be 61 - 65%.   Normal left ventricular cavity size and wall thickness noted. All left ventricular wall segments contract normally. Left ventricular diastolic function is consistent with (grade II w/high LAP) pseudonormalization.  Right Ventricle Normal right ventricular cavity size, wall thickness, systolic function and septal motion noted.  Left Atrium Normal left atrial size and volume noted.  Right Atrium Normal right atrial cavity size noted. The inferior vena cava is normally sized. Normal IVC inspiratory collapse of greater than 50% noted.  Aortic Valve The aortic valve is structurally normal with no regurgitation or stenosis present. The aortic valve appears trileaflet.  Mitral Valve The mitral valve is structurally normal with no significant stenosis present. Trace mitral valve regurgitation is present.  Tricuspid Valve The tricuspid valve is structurally normal with no stenosis present. Mild  tricuspid valve regurgitation is present. Estimated right ventricular systolic pressure from tricuspid regurgitation is normal (<35 mmHg).  Pulmonic Valve The pulmonic valve is structurally normal with no significant stenosis present. There is moderate pulmonic valve regurgitation present.  Greater Vessels No dilation of the aortic root is present. No dilation of the sinuses of Valsalva is present.  Pericardium The pericardium is normal. There is no evidence of pericardial effusion. .    Stress  Lexiscan Cardiolite test is negative for myocardial ischemia.     Gated SPECT images revealed normal left ventricular size and contractility with ejection fraction of 74%.      PSH  OOPHORECTOMY CHOLECYSTECTOMY  TONSILLECTOMY HYSTERECTOMY  CATARACT EXTRACTION                 Anesthesia Plan    ASA 4     general     (Patient identified; pre-operative vital signs, all relevant labs/studies, complete medical/surgical/anesthetic history, full medication list, full allergy list, and NPO status obtained/reviewed; physical assessment performed; anesthetic options, side effects, potential complications, risks, and benefits discussed; questions answered; written anesthesia consent obtained; patient cleared for procedure; anesthesia machine and equipment checked and functioning)  intravenous induction     Anesthetic plan, risks, benefits, and alternatives have been provided, discussed and informed consent has been obtained with: patient.        CODE STATUS:    Code Status (Patient has no pulse and is not breathing): CPR (Attempt to Resuscitate)  Medical Interventions (Patient has pulse or is breathing): Full Support

## 2022-07-08 NOTE — OP NOTE
Operative Note  Location: [facility]  Date of Admission:  7/7/2022  OR Date: 7/8/2022    Pre-op Diagnosis:  1.  Recurrent post catheterization right femoral pseudoaneurysm with possible post catheterization AV fistula  2.  Atrial fibrillation    Post-op Diagnosis:  1.  Recurrent post catheterization right femoral pseudoaneurysm with possible post catheterization AV fistula  2.  Atrial fibrillation    Procedure:   Repair of post catheterization right femoral pseudoaneurysm and arteriovenous fistula    Surgeon: Alfonzo Hooks MD    Assistant: Sahara Olson RN    Anesthesia: General    Staff:   Cell Saver : Debo Florez; Maverick Mejia  Circulator: Marilyn Payne RN  Scrub Person: Ramona Ornelas; Katharine Mcallister  Assistant: Sahara Olson    Estimated Blood Loss: 100 mL    Specimen: None    Complications: None    Findings: Large through and through puncture of proximal superficial femoral artery just beyond the common femoral artery bifurcation, with puncture into profunda femoris vein causing anterior pseudoaneurysm and posterior AV fistula.  Proximal exarterectomy of proximal superficial femoral artery managed with external bovine pericardial wrap.    Implants:   Implant Name Type Inv. Item Serial No.  Lot No. LRB No. Used Action   CLIPAPPLR M/ ENDO LIGACLIP 9 3/8IN SM - [id number] Implant CLIPAPPLR M/ ENDO LIGACLIP 9 3/8IN SM  [employer]  [employer] J [id number] Right 1 Implanted   PTCH VASCUGUARD 0.8X8CM - [id number] Implant PTCH VASCUGUARD 0.8X8CM  [facility] [id number] Right 2 Implanted     Indications: 81-year-old woman with atrial fibrillation, who yesterday underwent placement of a watchman device via right groin approach.  Was noted to have significant bleeding following her procedure, and duplex demonstrated post catheterization pseudoaneurysm with AV fistula.  Underwent thrombin injection repair yesterday but today identified with recurrent pseudoaneurysm.  She submits now for  operative repair       Procedure:  The patient was given Kefzol IV.  She was brought to the operating room and positioned supine on the operating table.  After the induction of general endotracheal anesthesia, a radial arterial line was placed by the anesthesia team.  The dressing was taken down from the right groin.  There was already skin tear extending obliquely across the groin, and the single puncture line was intact and not bleeding.  I washed the lower abdomen, groin and proximal thigh carefully with Hibiclens.  The area was then prepared with ChloraPrep and draped in a sterile fashion.  An oblique right inguinal incision was made just proximal to the puncture site, and the subcutaneous tissue was dissected.  Because of blood staining, it was very difficult to determine anatomy.  I felt a thrill rather than a pulse associated with the CFA, and so the artery was somewhat more difficult to localize.  Ultimately I identified and controlled the common femoral artery proximally with a vessel loop without losing any blood.  The patient was heparinized.  Dissection continued somewhat medially but more laterally on the artery, as I was aware there was going to be an AV fistula and I did not want to get into it.  Ultimately we identified pulsatile bleeding from the anterior common femoral artery, which was controlled with my finger with minimal blood loss.  With careful dissection distally, I was able to identify the superficial femoral artery and obtain vessel loop control.  At that point the common and superficial femoral arteries were clamped.  There is still was bleeding, and I identified a profunda femoris artery laterally and the artery was clamped.  There was then dark venous blood coming from the anterior common femoral puncture, and identified a second profunda femoris artery medially, which was attached to the profunda femoris vein, which was then actively bleeding.  The artery was clamped across the  puncture site and lateral venorrhaphy was performed of the puncture in the profunda femoris vein with good result.  I then inspected the superficial femoral artery.  There appeared to be a 5 mm defect both in the anterior and posterior aspects, which were aligned with the defect and the vein that I had just repaired.  It was evident to me that there was an anterior and then posterior SFA puncture into the profunda femoris vein, which resulted in an anterior pseudoaneurysm and a posterior AV fistula.  I repaired the anterior and posterior SFA defects transversely with 5-0 Prolene sutures.  Flushing was performed and antegrade flow was restored first to the profunda femoris artery.  Then antegrade flow was restored to the SFA.  Continuous-wave Doppler interrogation demonstrated excellent, multiphasic signals in the common, superficial and deep femoral arteries, and spontaneous, phasic, nonpulsatile flow in the common femoral vein proximally.  Protamine 30 mg was administered IV.  Inspection of the proximal SFA revealed that I had performed exarterectomy and trying to dissected the distal common femoral artery over a distance of about 2 cm.  I managed this by taking a bovine pericardial patch and wrapping it around the artery in sequential ringlike fashion, and running a 5-0 Prolene horizontal mattress suture line anteriorly to fix the patches around the artery.  I anchored the patches proximally and distally to the adventitia so that the patches would not move.  Wound hemostasis was seen to be complete.  The wound was irrigated with 1 L of Kefzol-containing saline irrigation.  Protein rich plasma was instilled deeply into the wound.  The femoral sheath was closed anteriorly over the vessels, and the subcutaneous tissue and skin were closed in layers with Vicryl sutures.  Dermal adhesive was applied.  At its conclusion the patient had tolerated the procedure well, and without apparent complications.  Sponge and needle  counts were correct.  An easily palpable dorsalis pedis artery pulse was palpated following the procedure.  The patient was taken to the recovery area in stable condition.    Alfonzo Hooks MD     Date: 7/8/2022  Time: 15:05 EDT

## 2022-07-08 NOTE — ANESTHESIA PROCEDURE NOTES
Airway  Urgency: elective    Date/Time: 7/8/2022 1:00 PM  Airway not difficult    General Information and Staff    Patient location during procedure: OR  Anesthesiologist: Andreas Kimbrough MD    Indications and Patient Condition  Indications for airway management: airway protection    Preoxygenated: yes  MILS not maintained throughout  Mask difficulty assessment: 1 - vent by mask    Final Airway Details  Final airway type: endotracheal airway      Successful airway: ETT  Cuffed: yes   Successful intubation technique: direct laryngoscopy  Facilitating devices/methods: anterior pressure/BURP  Endotracheal tube insertion site: oral  Blade: Gideon  Blade size: 3  ETT size (mm): 7.5  Cormack-Lehane Classification: grade IIa - partial view of glottis  Placement verified by: capnometry and palpation of cuff   Measured from: lips  ETT/EBT  to lips (cm): 22  Number of attempts at approach: 1  Assessment: lips, teeth, and gum same as pre-op and atraumatic intubation    Additional Comments  ASA monitors applied; preoxygenated with 100% FiO2 via anesthesia face mask; induction of general anesthesia; bag-mask ventilation; patient's position optimized; laryngoscopy; thyroid cartilage pressure; cuffed ETT lubricated with lidocaine jelly and placed into the trachea; cuff inflated to seal with minimally occlusive airway cuff pressure; ETT connected to anesthesia circuit; atraumatic/dentition in preoperative condition; ETT secured in place; correct placement in the trachea confirmed by bilateral chest rise, tube condensation, and return of EtCO2 > 30 mmHg x3

## 2022-07-08 NOTE — PROGRESS NOTES
Name: Chrystal Ruiz ADMIT: 2022   : 1940  PCP: Etelvina Ulloa MD    MRN: [id number] LOS: 1 days   AGE/SEX: 81 y.o. female  ROOM: [address] [place] [address] [place]    [place], Subsequent Hospital Care    81 y.o. female who developed post catheterization right femoral pseudoaneurysm after attempted venous catheterization for placement of 14 [place] access to deliver Watchman device.  Underwent ultrasound-guided thrombin injection repair of post catheterization femoral pseudoaneurysm yesterday.  Lying in bed with sandbag on groin.  No Pacific complaints.    Scheduled Medications:   ferrous sulfate, 324 mg, Oral, Daily With Breakfast  furosemide, 40 mg, Oral, Every Other Day  metoprolol succinate XL, 50 mg, Oral, Daily  sodium chloride, 10 mL, Intravenous, Q12H  spironolactone, 25 mg, Oral, Every Other Day  triamcinolone, , Topical, BID    Active Infusions:  sodium chloride, 80 mL/hr, Last Rate: Stopped (22 0000)  sodium chloride, 9 mL/hr    Vital Signs  Vital Signs Patient Vitals for the past 24 hrs:   BP Temp Temp src Pulse Resp SpO2 Height Weight   22 0600 110/44 -- -- 57 -- 97 % -- --   22 0530 98/41 -- -- 57 -- 96 % -- --   22 0500 118/44 -- -- 59 -- 97 % -- --   22 0430 99/46 -- -- 52 -- 98 % -- --   22 0400 101/47 -- -- 55 -- 98 % -- --   22 0330 99/41 -- -- 55 -- 98 % -- --   22 0300 107/52 -- -- 57 -- 98 % -- --   22 0230 103/44 -- -- 53 -- 97 % -- --   22 0200 105/50 -- -- 53 -- 98 % -- --   22 0130 111/48 -- -- 59 -- 98 % -- --   22 0100 110/48 -- -- 54 -- 98 % -- --   22 0030 120/42 -- -- 53 -- 98 % -- --   22 0000 107/46 -- -- 56 15 97 % -- --   22 2330 111/44 -- -- 57 -- 98 % -- --   22 2300 112/46 -- -- 64 -- 98 % -- --   22 2200 130/58 -- -- 65 -- -- -- --   22 115/64 -- -- 69 -- -- -- --   22 2100 146/62 -- -- 77 -- -- -- --   22 -- -- -- 67 -- -- -- --   22 2030  107/56 -- -- 70 -- -- -- --   07/07/22 2015 117/63 -- -- 73 -- 100 % -- --   07/07/22 2000 115/60 -- -- 86 -- 100 % -- --   07/07/22 1945 117/49 -- -- 73 -- 100 % -- --   07/07/22 1930 117/59 -- -- 76 -- 100 % -- --   07/07/22 1915 117/98 -- -- 75 -- 100 % -- --   07/07/22 1900 122/100 -- -- 84 -- 100 % -- --   07/07/22 1845 137/61 -- -- 78 -- 99 % -- --   07/07/22 1830 106/93 -- -- 58 -- 96 % -- --   07/07/22 1815 132/64 -- -- 100 -- 99 % -- --   07/07/22 1800 117/51 -- -- 108 -- 97 % -- --   07/07/22 1745 109/53 -- -- 101 -- 98 % -- --   07/07/22 1730 123/65 -- -- 76 -- 100 % -- --   07/07/22 1715 121/67 -- -- 67 -- 99 % -- --   07/07/22 1700 133/62 -- -- 71 -- 97 % -- --   07/07/22 1645 116/56 -- -- 72 -- 100 % -- --   07/07/22 1630 131/57 -- -- 79 -- 100 % -- --   07/07/22 1615 114/63 -- -- 67 -- 100 % -- --   07/07/22 1600 124/55 -- -- 57 -- 98 % -- --   07/07/22 1545 136/57 -- -- 66 -- 97 % -- --   07/07/22 1530 131/59 -- -- 53 -- 100 % -- --   07/07/22 1515 137/61 -- -- 56 -- 97 % -- --   07/07/22 1500 138/59 -- -- (!) 47 -- 99 % -- --   07/07/22 1445 132/57 -- -- (!) 49 -- 98 % -- --   07/07/22 1430 142/57 -- -- (!) 48 -- 99 % -- --   07/07/22 1420 -- -- -- 50 -- 97 % -- --   07/07/22 1415 134/52 -- -- (!) 49 -- 98 % -- --   07/07/22 1410 -- -- -- (!) 47 -- 97 % -- --   07/07/22 1345 130/50 -- -- (!) 44 -- 95 % -- --   07/07/22 1340 136/56 97 °F (36.1 °C) Tympanic 50 14 93 % -- --   07/07/22 1335 129/55 -- -- (!) 41 15 93 % -- --   07/07/22 1330 130/56 -- -- (!) 47 17 92 % -- --   07/07/22 1325 132/56 -- -- (!) 43 15 92 % -- --   07/07/22 1320 136/55 -- -- (!) 41 18 93 % -- --   07/07/22 1315 135/56 -- -- (!) 45 18 93 % -- --   07/07/22 1310 138/55 -- -- (!) 44 20 95 % -- --   07/07/22 1305 134/56 -- -- (!) 41 21 93 % -- --   07/07/22 1300 139/57 -- -- (!) 37 22 94 % -- --   07/07/22 1255 139/61 -- -- (!) 40 21 95 % -- --   07/07/22 1250 151/61 -- -- (!) 46 21 98 % -- --   07/07/22 1245 142/53 -- -- (!) 38  "20 100 % -- --   07/07/22 1240 141/55 -- -- (!) 41 20 98 % -- --   07/07/22 1235 140/55 -- -- (!) 45 24 98 % -- --   07/07/22 1220 143/55 -- -- (!) 47 19 98 % -- --   07/07/22 1218 137/56 97.7 °F (36.5 °C) Tympanic 58 15 100 % -- --   07/07/22 1006 151/68 98.2 °F (36.8 °C) Oral 50 20 100 % 154.9 cm (61\") 54.8 kg (120 lb 13 oz)     I/O:  I/O last 3 completed shifts:  In: 860 [P.O.:360; I.V.:500]  Out: 800 [Urine:800]    Physical Exam: Awake, alert, oriented.  Ecchymosis without significant hematoma in right groin.  Groin is soft and nontender.  Easily palpable dorsalis pedis artery pulses.    CBC    Results from last 7 days   Lab Units 07/08/22  0511 07/07/22 2159 07/07/22  0908   WBC 10*3/mm3 16.70* 16.60* 8.10   HEMOGLOBIN g/dL 9.3* 9.9* 11.2*   PLATELETS 10*3/mm3 257 270 338     BMP   Results from last 7 days   Lab Units 07/08/22  0511 07/07/22 2159 07/07/22  0908   SODIUM mmol/L 140 141 139   POTASSIUM mmol/L 4.7 4.5 4.8   CHLORIDE mmol/L 109* 108* 107   CO2 mmol/L 20.0* 19.0* 20.0*   BUN mg/dL 34* 37* 42*   CREATININE mg/dL 0.91 0.97 1.04*   GLUCOSE mg/dL 126* 188* 130*   MAGNESIUM mg/dL  --   --  2.3     Cr Clearance Estimated Creatinine Clearance: 41.2 mL/min (by C-G formula based on SCr of 0.91 mg/dL).  Coag     Assessment & Plan   Assessment & Plan    Hypertension    A-fib (HCC)    Anemia    Edema    Paroxysmal atrial fibrillation (HCC)    81 y.o. female who developed post catheterization right femoral pseudoaneurysm at some point after intended venous access for delivery of a watchman device.  The size of the arterial puncture is unclear, as the procedure note describes easy cannulation of the common femoral vein, and describes no problems.  For repeat duplex scan today.  If negative for residual or recurrent pseudoaneurysm, would mobilize gently (out of bed to chair, bathroom privileges), but hold on anticoagulation another 24 hours.    Alfonzo Hooks MD  07/08/22  07:26 EDT    Please call my office " with any question: [phone]    Active Hospital Problems    Diagnosis  POA   • Paroxysmal atrial fibrillation (HCC) [I48.0]  Yes   • Edema [R60.9]  Unknown   • Anemia [D64.9]  Unknown   • A-fib (HCC) [I48.91]  Unknown   • Hypertension [I10]  Unknown      Resolved Hospital Problems   No resolved problems to display.

## 2022-07-08 NOTE — ANESTHESIA PROCEDURE NOTES
Arterial Line      Patient reassessed immediately prior to procedure    Patient location during procedure: OR   Line placed for hemodynamic monitoring and ABGs/Labs/ISTAT.  Performed By   Anesthesiologist: Andreas Kimbrough MD  Preanesthetic Checklist  Completed: patient identified, IV checked, site marked, risks and benefits discussed, surgical consent, monitors and equipment checked, pre-op evaluation and timeout performed  Arterial Line Prep   Sterile Tech: cap, gloves and mask  Prep: ChloraPrep  Patient monitoring: blood pressure monitoring, continuous pulse oximetry and EKG  Arterial Line Procedure   Laterality:left  Location:  radial artery  Catheter size: 20 G   Guidance: landmark technique and palpation technique  Number of attempts: 1  Successful placement: yes  Heparin (Porcine) in NaCl 1000-0.9 UT/500ML-% for arterial line pressure bag, 1,000 Units  Post Assessment   Dressing Type: occlusive dressing applied, secured with tape and wrist guard applied.   Complications no  Circ/Move/Sens Assessment: unchanged.   Patient Tolerance: patient tolerated the procedure well with no apparent complications  Additional Notes  Pre-procedure:  Patient identified; pre-procedure vital signs, all relevant labs/studies, complete medical/surgical/anesthetic history, full medication list, full allergy list, and NPO status obtained/reviewed; physical assessment performed; anesthetic options, side effects, potential complications, risks, and benefits discussed; questions answered; written anesthesia procedure consent obtained; patient cleared for procedure; IV access in situ    Procedure:  Arterial line placed after induction of general anesthesia; ASA monitor placed; patient positioned; hand hygiene performed; sterile technique maintained throughout the procedure; sterile prep and drape applied; insertion site determined by anatomical landmarks and palpation; needle placed into the skin and advanced into the target artery  with correct placement confirmed by return of arterial blood; wire slide into the target artery; arterial catheter threaded into the target artery over the needle/wire; needle/wire removed; correct catheter placement confirmed by transducing systemic arterial blood pressure; catheter secured with occlusive dressing and tape    Post-procedure:  Arterial catheter placed successfully; no apparent complications; vital signs stable throughout; minimal estimated blood loss

## 2022-07-08 NOTE — OP NOTE
Operative Note  Date of Admission:  7/7/2022  OR Date: 7/7/2022    Pre-op Diagnosis:   Acute right femoral pseudoaneurysm with expansion, possible AV fistula    Post-Op Diagnosis Codes:     * Paroxysmal atrial fibrillation (HCC) [I48.0]     * Edema, unspecified type [R60.9]     * Primary hypertension [I10]     * Iron deficiency anemia due to chronic blood loss [D50.0]    Procedure:   1) duplex directed femoral pseudoaneurysm thrombin injection    Surgeon: Jimbo Guzman MD    Assistant: None    Anesthesia: General    Staff:   Circulator: Jasen Henning  Scrjana Person: Estefania Metcalf  Documenter: Marina Reaves RN; Laz Hackett RN  Invasive Nurse: Marilee Pond RN    Estimated Blood Loss: minimal    Specimens:   Order Name Source Comment Collection Info Order Time   BASIC METABOLIC PANEL    7/7/2022  7:16 PM     Release to patient   Immediate             Complications: None    Findings: Complete thrombosis of pseudoaneurysm and neck.  Good flow in the common femoral artery and superficial femoral artery at completion    Indications:    The patient is an 81 y.o. female seen for evaluation acute right femoral pseudoaneurysm with expansion.  Patient is undergoing urgent thrombin injection to control the situation with and prevent the need for major operation.  Patient family understand agree with procedure.       Procedure:    The patient was prepped and draped sterilely.  Using 1% lidocaine we localized the skin and then placed a echogenically needle directly into the pseudoaneurysm sac under duplex direction.  Injection of 200 units of thrombin allowed complete thrombosis of the pseudoaneurysm and proximal portion of the neck.  Duplex confirmed good flow to the lower extremity and at completion the patient had good pulses in the foot.    Radiographic Findings:  Duplex direction with visualization of the pseudoaneurysm and needle entering it.  Visualization of thrombin injection with thrombosis of pseudoaneurysm  noted.  Confirmation of excellent flow and residual arteries remains.      Active Hospital Problems    Diagnosis  POA   • Paroxysmal atrial fibrillation (HCC) [I48.0]  Yes   • Edema [R60.9]  Unknown   • Anemia [D64.9]  Unknown   • A-fib (HCC) [I48.91]  Unknown   • Hypertension [I10]  Unknown      Resolved Hospital Problems   No resolved problems to display.      Jeremy Guzman MD     Date: 7/7/2022  Time: 20:01 EDT

## 2022-07-08 NOTE — ANESTHESIA POSTPROCEDURE EVALUATION
Patient: Chrystal Ruiz    Procedure Summary     Date: 07/08/22 Room / Location: [facility] OR 07 / [facility] MAIN OR    Anesthesia Start: 1252 Anesthesia Stop: 1454    Procedure: REPAIR OF POST CATHETERIZATION RIGHT FEMORAL PSEUDOANEURYSM AND ARTERIOVENOUS FISTUAL (Right Groin) Diagnosis:       Pseudoaneurysm following procedure (HCC)      (Pseudoaneurysm following procedure (HCC) [T81.718A, I72.9])    Surgeons: Alfonzo Hooks MD Provider: Andreas Kimbrough MD    Anesthesia Type: general ASA Status: 4          Anesthesia Type: general    Vitals  Vitals Value Taken Time   /56 07/08/22 1454   Temp     Pulse 66 07/08/22 1454   Resp     SpO2 100 % 07/08/22 1454   Vitals shown include unvalidated device data.        Post Anesthesia Care and Evaluation    Patient location during evaluation: PACU  Patient participation: complete - patient participated  Level of consciousness: awake  Pain scale: See nurse's notes for pain score.  Pain management: adequate    Airway patency: patent  Anesthetic complications: No anesthetic complications  PONV Status: none  Cardiovascular status: acceptable  Respiratory status: acceptable and spontaneous ventilation  Hydration status: acceptable    Comments: Patient seen and examined postoperatively; vital signs stable; SpO2 greater than or equal to 90%; cardiopulmonary status stable; nausea/vomiting adequately controlled; pain adequately controlled; no apparent anesthesia complications; patient discharged from anesthesia care when discharge criteria were met

## 2022-07-08 NOTE — PROGRESS NOTES
Vascular Surgery    Called patient's son Kashif Zaldivar at [phone].  Described the nature of the problem, progress to date, and plan for surgery.  All questions answered.

## 2022-07-08 NOTE — PROGRESS NOTES
CC--- atrial fibrillation anemia and hypertension    Sub  Post watchman device implantation  Significant groin bleeding post sheath removal with ACT more than 400.  FemoStop needed to be placed.  Despite placing FemoStop for a few hours patient continued to have bleeding.  Suspected pseudoaneurysm and a vascular ultrasound was ordered.  Vascular ultrasound revealed a pseudoaneurysm and was injected with recurrence of the pseudoaneurysm needing an open repair today.  She is seen postprocedure including post open repair doing well and cheerful without any symptoms.      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   • Hypertension      Past Surgical History:   Procedure Laterality Date   • CATARACT EXTRACTION     • CHOLECYSTECTOMY     • HYSTERECTOMY  1987    Endometriosis   • OOPHORECTOMY     • TONSILLECTOMY       Family History   Problem Relation Age of Onset   • Breast cancer Mother 85   • Stroke Mother    • Heart disease Mother    • Endometrial cancer Mother 70   • Heart failure Father    • Heart failure Sister    • COPD Sister    • Heart disease Sister      Social History     Tobacco Use   • Smoking status: Never Smoker   • Smokeless tobacco: Never Used   Vaping Use   • Vaping Use: Never used   Substance Use Topics   • Alcohol use: No   • Drug use: Never       Allergies:  Ciprofloxacin, Sulfa antibiotics, Cyprodenate, and Iodine    Review of Systems   General:  positive for fatigue and tiredness  Eyes: No redness  Cardiovascular: No chest pain, no palpitations  Respiratory:   no shortness of breath  Gastrointestinal: No nausea or vomiting, bleeding  Genitourinary: no hematuria or dysuria  Musculoskeletal: No arthralgia or myalgia  Skin: No rash  Neurologic: No numbness, tingling, syncope  Hematologic/Lymphatic: No abnormal bleeding      Physical Exam  VITALS  REVIEWED  Blood pressure 115/51 pulse rate was 58 patient is afebrile respiration 12 times a minute  General:      well developed, well nourished, in no acute distress.    Head:      normocephalic and atraumatic.    Eyes:      PERRL/EOM intact, conjunctivae and sclerae clear without nystagmus.    Neck:      no masses, thyromegaly,  trachea central with normal respiratory effort   Lungs:      clear bilaterally to auscultation.    Heart:       Sinus rhythm without any murmurs or gallop  Right groin bruising noted without any hematoma  Pulses:      pulses normal in all 4 extremities.    Extremities:       no cyanosis or clubbing--trace left pedal edema and trace right pedal edema.    Neurologic:      no focal deficits.   alert oriented x3  Skin:      intact without lesions or rashes.    Psych:      alert and cooperative; normal mood and affect; normal attention span and concentration.          CBC    Results from last 7 days   Lab Units 07/08/22  0511 07/07/22  2159 07/07/22  0908   WBC 10*3/mm3 16.70* 16.60* 8.10   HEMOGLOBIN g/dL 9.3* 9.9* 11.2*   PLATELETS 10*3/mm3 257 270 338     BMP   Results from last 7 days   Lab Units 07/08/22  0511 07/07/22  2159 07/07/22  0908   SODIUM mmol/L 140 141 139   POTASSIUM mmol/L 4.7 4.5 4.8   CHLORIDE mmol/L 109* 108* 107   CO2 mmol/L 20.0* 19.0* 20.0*   BUN mg/dL 34* 37* 42*   CREATININE mg/dL 0.91 0.97 1.04*   GLUCOSE mg/dL 126* 188* 130*   MAGNESIUM mg/dL  --   --  2.3       Assessment plan    Post watchman implantation  Post watchman implantation pseudoaneurysm and AV fistula needing open repair today  Patient clinically doing well  Paroxysmal atrial fibrillation sinus rhythm  Severe vagal reaction last evening when FemoStop was placed without recurrence  Mild hypertension controlled    Patient can be potentially be discharged in the morning  Eliquis can be resumed at 2.5 mg twice daily once okay with vascular surgery  Follow-up in office in 1 to 2 weeks  Discussed with  vascular surgery personally and discussed with the patient and the family.      Electronically signed by Sage Garcia MD, 07/08/22, 5:29 PM EDT.

## 2022-07-09 LAB
ANION GAP SERPL CALCULATED.3IONS-SCNC: 8 MMOL/L (ref 5–15)
BUN SERPL-MCNC: 28 MG/DL (ref 8–23)
BUN/CREAT SERPL: 29.5 (ref 7–25)
CALCIUM SPEC-SCNC: 8 MG/DL (ref 8.6–10.5)
CHLORIDE SERPL-SCNC: 110 MMOL/L (ref 98–107)
CO2 SERPL-SCNC: 23 MMOL/L (ref 22–29)
CREAT SERPL-MCNC: 0.95 MG/DL (ref 0.57–1)
DEPRECATED RDW RBC AUTO: 44.6 FL (ref 37–54)
EGFRCR SERPLBLD CKD-EPI 2021: 59.9 ML/MIN/1.73
ERYTHROCYTE [DISTWIDTH] IN BLOOD BY AUTOMATED COUNT: 16.6 % (ref 12.3–15.4)
GLUCOSE SERPL-MCNC: 134 MG/DL (ref 65–99)
HCT VFR BLD AUTO: 27.2 % (ref 34–46.6)
HGB BLD-MCNC: 8.6 G/DL (ref 12–15.9)
MCH RBC QN AUTO: 23.8 PG (ref 26.6–33)
MCHC RBC AUTO-ENTMCNC: 31.6 G/DL (ref 31.5–35.7)
MCV RBC AUTO: 75.4 FL (ref 79–97)
PLATELET # BLD AUTO: 218 10*3/MM3 (ref 140–450)
PMV BLD AUTO: 7.6 FL (ref 6–12)
POTASSIUM SERPL-SCNC: 4.3 MMOL/L (ref 3.5–5.2)
RBC # BLD AUTO: 3.61 10*6/MM3 (ref 3.77–5.28)
SODIUM SERPL-SCNC: 141 MMOL/L (ref 136–145)
WBC NRBC COR # BLD: 12 10*3/MM3 (ref 3.4–10.8)

## 2022-07-09 PROCEDURE — 99232 SBSQ HOSP IP/OBS MODERATE 35: CPT | Performed by: INTERNAL MEDICINE

## 2022-07-09 PROCEDURE — 85027 COMPLETE CBC AUTOMATED: CPT | Performed by: SURGERY

## 2022-07-09 PROCEDURE — 80048 BASIC METABOLIC PNL TOTAL CA: CPT | Performed by: SURGERY

## 2022-07-09 PROCEDURE — 25010000002 CEFAZOLIN PER 500 MG: Performed by: SURGERY

## 2022-07-09 RX ADMIN — FERROUS SULFATE TAB EC 324 MG (65 MG FE EQUIVALENT) 324 MG: 324 (65 FE) TABLET DELAYED RESPONSE at 08:48

## 2022-07-09 RX ADMIN — METOPROLOL SUCCINATE 50 MG: 50 TABLET, EXTENDED RELEASE ORAL at 08:48

## 2022-07-09 RX ADMIN — CEFAZOLIN 2 G: 2 INJECTION, POWDER, FOR SOLUTION INTRAMUSCULAR; INTRAVENOUS at 05:54

## 2022-07-09 RX ADMIN — APIXABAN 2.5 MG: 2.5 TABLET, FILM COATED ORAL at 20:16

## 2022-07-09 RX ADMIN — APIXABAN 2.5 MG: 2.5 TABLET, FILM COATED ORAL at 08:48

## 2022-07-09 NOTE — PROGRESS NOTES
Name: Chrystal Ruiz ADMIT: 2022   : 1940  PCP: Etelvina Ulloa MD    MRN: [id number] LOS: 2 days   AGE/SEX: 81 y.o. female  ROOM:  [facility]      CC: POD#1 s/p right femoral pseudoaneurysm repair  Interval History: Patient has been up walking but was profoundly weak and found it quite difficult.  Subjective   Subjective     Review of Systems  Objective   Objective     Vitals:   Temp:  [98 °F (36.7 °C)-99 °F (37.2 °C)] 99 °F (37.2 °C)  Heart Rate:  [48-68] 55  Resp:  [15-19] 16  BP: (109-189)/() 129/42    I/O this shift:  In: 240 [P.O.:240]  Out: 350 [Urine:350]    Scheduled Meds:     apixaban, 2.5 mg, Oral, Q12H  ferrous sulfate, 324 mg, Oral, Daily With Breakfast  furosemide, 40 mg, Oral, Every Other Day  metoprolol succinate XL, 50 mg, Oral, Daily  spironolactone, 25 mg, Oral, Every Other Day  triamcinolone, , Topical, BID      IV Meds:   lactated ringers, 50 mL/hr, Last Rate: 50 mL/hr (22 180)        Physical Exam  Vascular: Groin with ecchymosis and moderate swelling.  Palpable pulse.  No erythema.    Data Reviewed:  CBC    Results from last 7 days   Lab Units 22  0513 22  0511 22  0908   WBC 10*3/mm3 12.00* 16.70* 16.60* 8.10   HEMOGLOBIN g/dL 8.6* 9.3* 9.9* 11.2*   PLATELETS 10*3/mm3 218 257 270 338     BMP   Results from last 7 days   Lab Units 22  0513 22  0511 22  0908   SODIUM mmol/L 141 140 141 139   POTASSIUM mmol/L 4.3 4.7 4.5 4.8   CHLORIDE mmol/L 110* 109* 108* 107   CO2 mmol/L 23.0 20.0* 19.0* 20.0*   BUN mg/dL 28* 34* 37* 42*   CREATININE mg/dL 0.95 0.91 0.97 1.04*   GLUCOSE mg/dL 134* 126* 188* 130*   MAGNESIUM mg/dL  --   --   --  2.3     Coag       Most notable findings include: Hemoglobin 8.6 from 9.3.  White blood cell count down to 12 from 16.7.  Platelet count okay.    Active Hospital Problems:   Active Hospital Problems    Diagnosis  POA   • **Pseudoaneurysm following procedure (HCC)  [T81.718A, I72.9]  No   • Paroxysmal atrial fibrillation (HCC) [I48.0]  Yes   • Edema [R60.9]  Unknown   • Anemia [D64.9]  Unknown   • A-fib (HCC) [I48.91]  Yes   • Hypertension [I10]  Yes      Resolved Hospital Problems   No resolved problems to display.      Assessment & Plan   Billin, Post Op Global  Assessment / Plan     POD #1 s/p open right femoral pseudoaneurysm repair: This was intended to be a purely venous access but instead a 14 [place] sheath was placed through and through the superficial femoral artery and the profunda femoris vein creating a large defect pseudoaneurysm and arteriovenous fistula placement.  This was apparent after she failed thrombin injection and required open repair.  Hemoglobin 8.6 from 9.3.  White blood cell count down to 12 from 16.7.  Overall, she seems to be doing reasonably well from my standpoint but she feels weak and tired.  Blood pressure reasonable and hemoglobin still greater than 8 so would not transfuse at this time unless another indication develops.  We talked about the risk of wound infection although currently I think it looks pretty good.  Palpable pulse.  Given her weakness and anemia I would expect that she will need to be here for another day or 2 before she can comfortably take care of herself at home.  We will plan follow-up with Dr. Hooks in a couple of weeks in the office for wound check.  Happy to check her wound again tomorrow.    Mj Johns MD  22  14:00 EDT  Office Number [phone]

## 2022-07-09 NOTE — PLAN OF CARE
Goal Outcome Evaluation:           Progress: improving  Outcome Evaluation: pt unsteady when walking. R femoral site soft,

## 2022-07-09 NOTE — PROGRESS NOTES
Referring Provider: Sage Garcia MD    Reason for follow-up:  Status post watchman procedure.  Right femoral pseudoaneurysm.  Status post open repair.    Patient Care Team:  Etelvina Ulloa MD as PCP - General  Etelvina Ulloa MD as PCP - Family Medicine  Mukund Farias MD as Consulting Physician (Pulmonary Disease)  Alan Wynn DPM as Consulting Physician (Podiatry)  Etelvina Mcconnell MD as Surgeon (Thoracic Surgery)  Michael Whitten MD as Consulting Physician (Radiation Oncology)  Frank Cheng MD as Consulting Physician (Hematology and Oncology)  Jeremy Mccall MD as Consulting Physician (Cardiology)    Subjective .      ROS    The patient has been without any chest discomfort ,shortness of breath, palpitations, dizziness or syncope.  Denies having any headache ,abdominal pain ,nausea, vomiting , diarrhea constipation, loss of weight or loss of appetite.  Denies having any excessive bruising ,hematuria or blood in the stool.    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   • Hypertension        Past Surgical History:   Procedure Laterality Date   • CATARACT EXTRACTION     • CHOLECYSTECTOMY     • HYSTERECTOMY  1987    Endometriosis   • OOPHORECTOMY     • TONSILLECTOMY         Family History   Problem Relation Age of Onset   • Breast cancer Mother 85   • Stroke Mother    • Heart disease Mother    • Endometrial cancer Mother 70   • Heart failure Father    • Heart failure Sister    • COPD Sister    • Heart disease Sister        Social History     Tobacco Use   • Smoking status: Never Smoker   • Smokeless tobacco: Never Used   Vaping Use   • Vaping Use: Never used   Substance Use Topics   • Alcohol use: No   • Drug use: Never        Medications Prior to Admission   Medication Sig  Dispense Refill Last Dose   • acetaminophen (TYLENOL) 500 MG tablet Take 500 mg by mouth Every 6 (Six) Hours As Needed for Mild Pain .   Past Week at Unknown time   • Banophen 50 MG capsule Take 50 mg by mouth 1 (One) Time. Take at 10pm on 7/6/2022 prior to procedure   7/6/2022 at 2200   • Breo Ellipta 200-25 MCG/INH inhaler Inhale 1 puff Daily. 1 each 2 7/6/2022 at 0900   • Cholecalciferol (VITAMIN D3) 2000 units capsule Take 2,000 Units by mouth Daily.   7/6/2022 at 0900   • Cyanocobalamin (VITAMIN B12) 1000 MCG tablet controlled-release Take 2,500 mcg by mouth Daily.   7/6/2022 at 0900   • dilTIAZem CD (CARDIZEM CD) 120 MG 24 hr capsule Take 120 mg by mouth Daily.      • ferrous sulfate 325 (65 FE) MG tablet Take 1 tablet by mouth Every Other Day. 15 tablet 11 7/6/2022 at 0830   • metoprolol succinate XL (TOPROL-XL) 50 MG 24 hr tablet Take 1 tablet by mouth Daily. 30 tablet 5 7/6/2022 at 2230   • spironolactone (ALDACTONE) 25 MG tablet Take 1 tablet by mouth Every Other Day. 45 tablet 1    • triamcinolone (KENALOG) 0.1 % cream Apply  topically to the appropriate area as directed 2 (Two) Times a Day. 80 g 2    • furosemide (LASIX) 40 MG tablet Take 40 mg by mouth Every Other Day.   7/5/2022       Allergies  Ciprofloxacin, Sulfa antibiotics, Cyprodenate, and Iodine    Scheduled Meds:apixaban, 2.5 mg, Oral, Q12H  ferrous sulfate, 324 mg, Oral, Daily With Breakfast  furosemide, 40 mg, Oral, Every Other Day  metoprolol succinate XL, 50 mg, Oral, Daily  spironolactone, 25 mg, Oral, Every Other Day  triamcinolone, , Topical, BID      Continuous Infusions:lactated ringers, 50 mL/hr, Last Rate: 50 mL/hr (07/08/22 1807)      PRN Meds:.•  acetaminophen **OR** acetaminophen  •  HYDROcodone-acetaminophen  •  Morphine **AND** naloxone  •  ondansetron **OR** ondansetron    Objective     VITAL SIGNS  Vitals:    07/08/22 2200 07/09/22 0224 07/09/22 0228 07/09/22 0500   BP: 135/50 121/47 121/47 124/52   BP Location:  Left arm   "Left arm   Patient Position:  Lying  Lying   Pulse: 56 68 62 66   Resp:  16  16   Temp:  98.4 °F (36.9 °C)  98.4 °F (36.9 °C)   TempSrc:  Oral  Oral   SpO2: 100% 99% 100% 98%   Weight:       Height:           Flowsheet Rows    Flowsheet Row First Filed Value   Admission Height 154.9 cm (61\") Documented at 07/07/2022 1006   Admission Weight 54.8 kg (120 lb 13 oz) Documented at 07/07/2022 1006            Intake/Output Summary (Last 24 hours) at 7/9/2022 0947  Last data filed at 7/9/2022 0920  Gross per 24 hour   Intake 1290 ml   Output 1450 ml   Net -160 ml        TELEMETRY: Sinus rhythm    Physical Exam:  The patient is alert, oriented and in no distress.  Vital signs as noted above.  Head and neck revealed no carotid bruits or jugular venous distention.  No thyromegaly or lymphadenopathy is present  Lungs clear.  No wheezing.  Breath sounds are normal bilaterally.  Heart normal first and second heart sounds.  No murmur. No precordial rub is present.  No gallop is present.  Abdomen soft and nontender.  No organomegaly is present.  Extremities with good peripheral pulses without any pedal edema.  Skin warm and dry.  Right groin site looks soft without hematoma.  Mild tenderness.  Musculoskeletal system is grossly normal  CNS grossly normal      Results Review:   I reviewed the patient's new clinical results.  Lab Results (last 24 hours)     Procedure Component Value Units Date/Time    Basic Metabolic Panel [[id number]]  (Abnormal) Collected: 07/09/22 0513    Specimen: Blood Updated: 07/09/22 0603     Glucose 134 mg/dL      BUN 28 mg/dL      Creatinine 0.95 mg/dL      Sodium 141 mmol/L      Potassium 4.3 mmol/L      Chloride 110 mmol/L      CO2 23.0 mmol/L      Calcium 8.0 mg/dL      BUN/Creatinine Ratio 29.5     Anion Gap 8.0 mmol/L      eGFR 59.9 mL/min/1.73      Comment: National Kidney Foundation and American Society of Nephrology (ASN) Task Force recommended calculation based on the Chronic Kidney Disease " Epidemiology Collaboration (CKD-EPI) equation refit without adjustment for race.       Narrative:      GFR Normal >60  Chronic Kidney Disease <60  Kidney Failure <15      CBC (No Diff) [[id number]]  (Abnormal) Collected: 07/09/22 0513    Specimen: Blood Updated: 07/09/22 0538     WBC 12.00 10*3/mm3      RBC 3.61 10*6/mm3      Hemoglobin 8.6 g/dL      Hematocrit 27.2 %      MCV 75.4 fL      MCH 23.8 pg      MCHC 31.6 g/dL      RDW 16.6 %      RDW-SD 44.6 fl      MPV 7.6 fL      Platelets 218 10*3/mm3           Imaging Results (Last 24 Hours)     ** No results found for the last 24 hours. **      LAB RESULTS (LAST 7 DAYS)    CBC  Results from last 7 days   Lab Units 07/09/22 0513 07/08/22 0511 07/07/22 2159 07/07/22 0908   WBC 10*3/mm3 12.00* 16.70* 16.60* 8.10   RBC 10*6/mm3 3.61* 3.73* 4.24 4.72   HEMOGLOBIN g/dL 8.6* 9.3* 9.9* 11.2*   HEMATOCRIT % 27.2* 28.4* 32.2* 35.6   MCV fL 75.4* 76.1* 76.0* 75.4*   PLATELETS 10*3/mm3 218 257 270 338       BMP  Results from last 7 days   Lab Units 07/09/22 0513 07/08/22 0511 07/07/22 2159 07/07/22 0908   SODIUM mmol/L 141 140 141 139   POTASSIUM mmol/L 4.3 4.7 4.5 4.8   CHLORIDE mmol/L 110* 109* 108* 107   CO2 mmol/L 23.0 20.0* 19.0* 20.0*   BUN mg/dL 28* 34* 37* 42*   CREATININE mg/dL 0.95 0.91 0.97 1.04*   GLUCOSE mg/dL 134* 126* 188* 130*   MAGNESIUM mg/dL  --   --   --  2.3       CMP   Results from last 7 days   Lab Units 07/09/22 0513 07/08/22 0511 07/07/22 2159 07/07/22  0908   SODIUM mmol/L 141 140 141 139   POTASSIUM mmol/L 4.3 4.7 4.5 4.8   CHLORIDE mmol/L 110* 109* 108* 107   CO2 mmol/L 23.0 20.0* 19.0* 20.0*   BUN mg/dL 28* 34* 37* 42*   CREATININE mg/dL 0.95 0.91 0.97 1.04*   GLUCOSE mg/dL 134* 126* 188* 130*   ALBUMIN g/dL  --   --   --  3.90   BILIRUBIN mg/dL  --   --   --  0.4   ALK PHOS U/L  --   --   --  76   AST (SGOT) U/L  --   --   --  13   ALT (SGPT) U/L  --   --   --  19         BNP        TROPONIN        CoAg        Creatinine  Clearance  Estimated Creatinine Clearance: 39.5 mL/min (by C-G formula based on SCr of 0.95 mg/dL).    ABG        Radiology  No radiology results for the last day        EKG          I personally viewed and interpreted the patient's EKG/Telemetry data:    ECHOCARDIOGRAM:    Results for orders placed during the hospital encounter of 01/24/22    Adult Transthoracic Echo Complete W/ Cont if Necessary Per Protocol    Interpretation Summary  · Estimated right ventricular systolic pressure from tricuspid regurgitation is normal (<35 mmHg).  · Moderate pulmonic valve regurgitation is present.  · Left ventricular ejection fraction appears to be 61 - 65%.  · Left ventricular diastolic function is consistent with (grade II w/high LAP) pseudonormalization.          STRESS MYOVIEW:    Cardiolite (Tc-99m Sestamibi) stress test    CARDIAC CATHETERIZATION:            OTHER:         Assessment & Plan     Principal Problem:    Pseudoaneurysm following procedure (HCC)  Active Problems:    Hypertension    A-fib (HCC)    Anemia    Edema    Paroxysmal atrial fibrillation (HCC)    Following was last office note.    ]]]]]]]]]]]]]]]]]]]]]  Impression  ===========  -Shortness of breath  Elevated proBNP.  Possible diastolic congestive heart failure.  Echocardiogram normal ejection fraction 1/24/2022  Stress Cardiolite test normal-1/28/2022     -Paroxysmal atrial fibrillation.  Patient is maintaining sinus rhythm.  EKG showed sinus rhythm.     -Hypertension dyslipidemia     -Hypomagnesemia     -Anemia-iron deficiency  Hemoglobin 9 g.     -Pulmonary nodule with hypermetabolic nodule in the right upper lung.  Status post CT-guided biopsy of right upper lobe nodule 11/5/2021-benign.  Patient is scheduled for VATS procedure 2/4/2022     -Status post cholecystectomy hysterectomy oophorectomy tonsillectomy     -Allergic to ciprofloxacin and sulfa iodine cyprodenate     -Non-smoker  ============  Plan  =========  Shortness of breath  Possible  diastolic congestive heart failure.  Recent echocardiogram and stress Cardiolite test-as above.  Continue spironolactone and Lasix.Paroxysmal atrial fibrillation.  Patient in sinus rhythm.  Patient is on Cardizem.  Start metoprolol.     Anticoagulation  Patient was on Eliquis.  Patient had anemia requiring PRBC transfusion.  I had a lengthy discussion recently with patient and patient's family at bedside regarding the options.  Since patient is having low blood count best option would be to discontinue Eliquis although cardioembolic risk is increased.  This was discussed extensively with patient and patient's family.  I have discussed with him about watchman procedure since patient is having anemia requiring PRBC transfusion.  Patient is reluctant to pursue this at this time and wants to think about it.     Lung nodule  Oncology radiation therapy following.  Apparently starting radiation today.     Anemia-hemoglobin 9.0   8.7-3/23/2022     Medications were reviewed and updated.  Current medications include Lasix 40 mg a day spironolactone 25 mg a day metoprolol XL 25 mg a day diltiazem  mg a day.     Follow-up in the office in 2 weeks.     Further plan will depend on patient's progress.  ]]]]]]]]]]]]]]]]]]]]]      Patient recently had watchman procedure.  Patient had femoral artery pseudoaneurysm and AV fistula needing open repair.  Patient is in sinus rhythm.  Right groin site is healing well with mild tenderness.  No hematoma is present.  Patient is back on Eliquis.    Hypertension-well-controlled    Dyslipidemia-on therapy.  Normal stress test in the last 1 year  Preserved LVEF    Further plan will depend on patient's progress.      Macho Dumont MD  07/09/22  09:47 EDT

## 2022-07-10 PROCEDURE — 99232 SBSQ HOSP IP/OBS MODERATE 35: CPT | Performed by: INTERNAL MEDICINE

## 2022-07-10 RX ORDER — POLYETHYLENE GLYCOL 3350 17 G/17G
17 POWDER, FOR SOLUTION ORAL DAILY
Status: DISCONTINUED | OUTPATIENT
Start: 2022-07-10 | End: 2022-07-11 | Stop reason: HOSPADM

## 2022-07-10 RX ADMIN — SPIRONOLACTONE 25 MG: 25 TABLET ORAL at 08:04

## 2022-07-10 RX ADMIN — POLYETHYLENE GLYCOL 3350 17 G: 17 POWDER, FOR SOLUTION ORAL at 16:19

## 2022-07-10 RX ADMIN — METOPROLOL SUCCINATE 50 MG: 50 TABLET, EXTENDED RELEASE ORAL at 08:04

## 2022-07-10 RX ADMIN — APIXABAN 2.5 MG: 2.5 TABLET, FILM COATED ORAL at 21:26

## 2022-07-10 RX ADMIN — FERROUS SULFATE TAB EC 324 MG (65 MG FE EQUIVALENT) 324 MG: 324 (65 FE) TABLET DELAYED RESPONSE at 08:04

## 2022-07-10 RX ADMIN — APIXABAN 2.5 MG: 2.5 TABLET, FILM COATED ORAL at 08:04

## 2022-07-10 RX ADMIN — FUROSEMIDE 40 MG: 40 TABLET ORAL at 08:04

## 2022-07-10 NOTE — PLAN OF CARE
Goal Outcome Evaluation:           Progress: improving  Outcome Evaluation: pt seems to be gaining strength. Is a 1 assist and ambulates without sob and on room air

## 2022-07-10 NOTE — PROGRESS NOTES
Name: Chrystal Ruiz ADMIT: 2022   : 1940  PCP: Etelvina Ulloa MD    MRN: [id number] LOS: 3 days   AGE/SEX: 81 y.o. female  ROOM:  [facility]      CC: POD#2 s/p right femoral pseudoaneurysm repair  Interval History: Patient feels better sitting up in a chair today.  Subjective   Subjective     Review of Systems  Objective   Objective     Vitals:   Temp:  [97.5 °F (36.4 °C)-99.1 °F (37.3 °C)] 98.9 °F (37.2 °C)  Heart Rate:  [73-83] 83  Resp:  [16] 16  BP: (111-124)/(49-59) 124/59    I/O this shift:  In: 480 [P.O.:480]  Out: 1350 [Urine:1350]    Scheduled Meds:     apixaban, 2.5 mg, Oral, Q12H  ferrous sulfate, 324 mg, Oral, Daily With Breakfast  furosemide, 40 mg, Oral, Every Other Day  metoprolol succinate XL, 50 mg, Oral, Daily  polyethylene glycol, 17 g, Oral, Daily  spironolactone, 25 mg, Oral, Every Other Day  triamcinolone, , Topical, BID      IV Meds:   lactated ringers, 50 mL/hr, Last Rate: 50 mL/hr (22 180)        Physical Exam  Groin with some ecchymosis but otherwise surgical site looks excellent.    Data Reviewed:  CBC    Results from last 7 days   Lab Units 22  0522  0522  0908   WBC 10*3/mm3 12.00* 16.70* 16.60* 8.10   HEMOGLOBIN g/dL 8.6* 9.3* 9.9* 11.2*   PLATELETS 10*3/mm3 218 257 270 338     BMP   Results from last 7 days   Lab Units 22  0513 22  0511 22  0908   SODIUM mmol/L 141 140 141 139   POTASSIUM mmol/L 4.3 4.7 4.5 4.8   CHLORIDE mmol/L 110* 109* 108* 107   CO2 mmol/L 23.0 20.0* 19.0* 20.0*   BUN mg/dL 28* 34* 37* 42*   CREATININE mg/dL 0.95 0.91 0.97 1.04*   GLUCOSE mg/dL 134* 126* 188* 130*   MAGNESIUM mg/dL  --   --   --  2.3     Coag       Most notable findings include: Hemoglobin 8.6 from 9.3 yesterday.  White blood cell count down to 12 from 16.7.  Platelet count okay.  No new labs from today.    Active Hospital Problems:   Active Hospital Problems    Diagnosis  POA   • **Pseudoaneurysm  following procedure (HCC) [T81.718A, I72.9]  No   • Paroxysmal atrial fibrillation (HCC) [I48.0]  Yes   • Edema [R60.9]  Unknown   • Anemia [D64.9]  Unknown   • A-fib (HCC) [I48.91]  Yes   • Hypertension [I10]  Yes      Resolved Hospital Problems   No resolved problems to display.      Assessment & Plan   Billin, Post Op Global  Assessment / Plan     POD #2 s/p open right femoral pseudoaneurysm repair: This was intended to be a purely venous access but instead a 14 [place] sheath was placed through and through the superficial femoral artery and the profunda femoris vein creating a large defect pseudoaneurysm and arteriovenous fistula placement.  This was apparent after she failed thrombin injection and required open repair.  Hemoglobin 8.6 from 9.3 yesterday.  None today..  White blood cell count down to 12 from 16.7.    Overall, she seems to be doing reasonably well from my standpoint but she feels weak and tired.  Blood pressure reasonable and hemoglobin still greater than 8 so would not transfuse at this time unless another indication develops.  We talked about the risk of wound infection although currently I think it looks pretty good.  Palpable pulse.  On Eliquis 2.5 mg for 45 days per watchman protocol.    Will order follow-up CBC for tomorrow and if greater than 8 and otherwise doing well would be okay for discharge from a vascular standpoint.  We will see again upon request this admission and schedule follow-up with Dr. Hooks in a couple weeks.    Mj Johns MD  07/10/22  15:27 EDT  Office Number [phone]

## 2022-07-10 NOTE — DISCHARGE INSTRUCTIONS
[facility]  Etienne Raymond, Jessee Guzman Scherrer, Thomas, Lidia  [address], [address]  [phone]    Lower Extremity Surgery    Please refer to the following instructions for your post-procedure care.  Your surgeon and/or nurse will discuss any changes with you.    Activity  Avoid lifting more than five to 10 pounds (one or two gallons of milk) until after your first post-operative visit. You are encouraged to walk as much as you can. You can slowly return to normal activities during the month after your surgery. Avoid strenuous activity and heavy lifting until your doctor tells you it's OK. Avoid activities such as vacuuming, shoveling snow or swinging a golf club.    Do not drive until your doctor gives the OK and you are no longer taking prescription pain medications. It is also normal to have difficulty with sleep habits, eating, and bowel movements after surgery. These will go away with time.    Bathing/Showering  You may shower after you go home. Do not remove the bandages unless they begin to peel off. Do not soak in a bathtub, hot tub, or swim until the incision heals completely and the staples are removed.    Incision Care  Clean your incision with mild soap and water. Pat the area dry with a clean towel. You do not need a bandage unless otherwise instructed. Do not apply any ointments  or creams to your incision. If you have open wounds you will be instructed on how  to care for them or a visiting nurse will be prescribed for you. If you have staples or sutures along your incision they will be removed at your post-op appointment.    Diet  Resume your normal diet. There are no special food restrictions following this procedure. A low fat/low cholesterol diet is recommended for all patients with  vascular disease.    Medications  Resume taking all of your medications unless your doctor or nurse practitioner tells you not to. If your incision is causing pain, you may take  over-the-counter pain relievers such as acetaminophen (Tylenol®).    If you were prescribed a stronger pain medication, please be aware these medications can cause nausea and constipation. Prevent nausea by taking the medication with a snack or meal. Avoid constipation by drinking plenty of fluids and eating foods with a high amount of fiber, such as fruits, vegetables, and grains. Do not take Tylenol® if you are taking prescription pain medications.    Please call us immediately for any of the following conditions   Severe or worsening pain in your legs or feet while at rest or while walking   Increased pain, redness, warmth, or drainage (pus) from your incision site(s)   Fever of 101 degrees or higher   The swelling in your leg with the bypass suddenly worsens and becomes more painful than when you were in the hospital   If you have been instructed to feel your graft pulse then you should do so every day. If you can no longer feel this pulse, call the office immediately. Not all patients are given this instruction.    Leg swelling is common after leg surgery. The swelling should improve over a few months following surgery. To improve the swelling, you may elevate your legs above the level of your heart while you are sitting or resting. Your surgeon or nurse practitioner may ask you to apply an ACE wrap or wear compression (COLLETTE) stockings to help to reduce swelling.    Reduce your risk of vascular disease  Stop smoking if you smoke!  Manage your cholesterol  Maintain a desired weight  Control your diabetes  Keep your blood pressure down

## 2022-07-10 NOTE — PROGRESS NOTES
Referring Provider: Sage Garcia MD    Reason for follow-up:  Status post watchman procedure.  Right femoral pseudoaneurysm.  Status post open repair.    Patient Care Team:  Etelvina Ulloa MD as PCP - General  Etelvina Ulloa MD as PCP - Family Medicine  Mukund Farias MD as Consulting Physician (Pulmonary Disease)  Alan Wynn DPM as Consulting Physician (Podiatry)  Etelvina Mcconnell MD as Surgeon (Thoracic Surgery)  Michael Whitten MD as Consulting Physician (Radiation Oncology)  Frank Cheng MD as Consulting Physician (Hematology and Oncology)  Jeremy Mccall MD as Consulting Physician (Cardiology)    Subjective .      ROS    The patient has been without any chest discomfort ,shortness of breath, palpitations, dizziness or syncope.  Denies having any headache ,abdominal pain ,nausea, vomiting , diarrhea constipation, loss of weight or loss of appetite.  Denies having any excessive bruising ,hematuria or blood in the stool.    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   • Hypertension        Past Surgical History:   Procedure Laterality Date   • CATARACT EXTRACTION     • CHOLECYSTECTOMY     • HYSTERECTOMY  1987    Endometriosis   • OOPHORECTOMY     • TONSILLECTOMY         Family History   Problem Relation Age of Onset   • Breast cancer Mother 85   • Stroke Mother    • Heart disease Mother    • Endometrial cancer Mother 70   • Heart failure Father    • Heart failure Sister    • COPD Sister    • Heart disease Sister        Social History     Tobacco Use   • Smoking status: Never Smoker   • Smokeless tobacco: Never Used   Vaping Use   • Vaping Use: Never used   Substance Use Topics   • Alcohol use: No   • Drug use: Never        Medications Prior to Admission   Medication Sig  Dispense Refill Last Dose   • acetaminophen (TYLENOL) 500 MG tablet Take 500 mg by mouth Every 6 (Six) Hours As Needed for Mild Pain .   Past Week at Unknown time   • Banophen 50 MG capsule Take 50 mg by mouth 1 (One) Time. Take at 10pm on 7/6/2022 prior to procedure   7/6/2022 at 2200   • Breo Ellipta 200-25 MCG/INH inhaler Inhale 1 puff Daily. 1 each 2 7/6/2022 at 0900   • Cholecalciferol (VITAMIN D3) 2000 units capsule Take 2,000 Units by mouth Daily.   7/6/2022 at 0900   • Cyanocobalamin (VITAMIN B12) 1000 MCG tablet controlled-release Take 2,500 mcg by mouth Daily.   7/6/2022 at 0900   • dilTIAZem CD (CARDIZEM CD) 120 MG 24 hr capsule Take 120 mg by mouth Daily.      • ferrous sulfate 325 (65 FE) MG tablet Take 1 tablet by mouth Every Other Day. 15 tablet 11 7/6/2022 at 0830   • metoprolol succinate XL (TOPROL-XL) 50 MG 24 hr tablet Take 1 tablet by mouth Daily. 30 tablet 5 7/6/2022 at 2230   • spironolactone (ALDACTONE) 25 MG tablet Take 1 tablet by mouth Every Other Day. 45 tablet 1    • triamcinolone (KENALOG) 0.1 % cream Apply  topically to the appropriate area as directed 2 (Two) Times a Day. 80 g 2    • furosemide (LASIX) 40 MG tablet Take 40 mg by mouth Every Other Day.   7/5/2022       Allergies  Ciprofloxacin, Sulfa antibiotics, Cyprodenate, and Iodine    Scheduled Meds:apixaban, 2.5 mg, Oral, Q12H  ferrous sulfate, 324 mg, Oral, Daily With Breakfast  furosemide, 40 mg, Oral, Every Other Day  metoprolol succinate XL, 50 mg, Oral, Daily  spironolactone, 25 mg, Oral, Every Other Day  triamcinolone, , Topical, BID      Continuous Infusions:lactated ringers, 50 mL/hr, Last Rate: 50 mL/hr (07/08/22 1807)      PRN Meds:.•  acetaminophen **OR** acetaminophen  •  HYDROcodone-acetaminophen  •  Morphine **AND** naloxone  •  ondansetron **OR** ondansetron    Objective     VITAL SIGNS  Vitals:    07/09/22 1200 07/09/22 1452 07/09/22 1641 07/10/22 0257   BP: 129/42 130/53 111/51 121/49   BP Location: Right arm  "Right arm Right arm Left arm   Patient Position: Sitting Lying Lying Lying   Pulse: 55 81 76 83   Resp: 16 20 16 16   Temp: 99 °F (37.2 °C) 99.5 °F (37.5 °C) 99.1 °F (37.3 °C) 97.5 °F (36.4 °C)   TempSrc: Oral Oral Oral Oral   SpO2: 100% 100% 99% 98%   Weight:       Height:           Flowsheet Rows    Flowsheet Row First Filed Value   Admission Height 154.9 cm (61\") Documented at 07/07/2022 1006   Admission Weight 54.8 kg (120 lb 13 oz) Documented at 07/07/2022 1006            Intake/Output Summary (Last 24 hours) at 7/10/2022 0802  Last data filed at 7/10/2022 0628  Gross per 24 hour   Intake 840 ml   Output 1850 ml   Net -1010 ml        TELEMETRY: Sinus rhythm    Physical Exam:  The patient is alert, oriented and in no distress.  Vital signs as noted above.  Head and neck revealed no carotid bruits or jugular venous distention.  No thyromegaly or lymphadenopathy is present  Lungs clear.  No wheezing.  Breath sounds are normal bilaterally.  Heart normal first and second heart sounds.  No murmur. No precordial rub is present.  No gallop is present.  Abdomen soft and nontender.  No organomegaly is present.  Extremities with good peripheral pulses without any pedal edema.  Skin warm and dry.  Right groin site looks soft without hematoma.  Mild tenderness.  Musculoskeletal system is grossly normal  CNS grossly normal      Results Review:   I reviewed the patient's new clinical results.  Lab Results (last 24 hours)     ** No results found for the last 24 hours. **          Imaging Results (Last 24 Hours)     ** No results found for the last 24 hours. **      LAB RESULTS (LAST 7 DAYS)    CBC  Results from last 7 days   Lab Units 07/09/22  0513 07/08/22  0511 07/07/22  2159 07/07/22  0908   WBC 10*3/mm3 12.00* 16.70* 16.60* 8.10   RBC 10*6/mm3 3.61* 3.73* 4.24 4.72   HEMOGLOBIN g/dL 8.6* 9.3* 9.9* 11.2*   HEMATOCRIT % 27.2* 28.4* 32.2* 35.6   MCV fL 75.4* 76.1* 76.0* 75.4*   PLATELETS 10*3/mm3 218 257 270 338 "       BMP  Results from last 7 days   Lab Units 07/09/22  0513 07/08/22  0511 07/07/22 2159 07/07/22  0908   SODIUM mmol/L 141 140 141 139   POTASSIUM mmol/L 4.3 4.7 4.5 4.8   CHLORIDE mmol/L 110* 109* 108* 107   CO2 mmol/L 23.0 20.0* 19.0* 20.0*   BUN mg/dL 28* 34* 37* 42*   CREATININE mg/dL 0.95 0.91 0.97 1.04*   GLUCOSE mg/dL 134* 126* 188* 130*   MAGNESIUM mg/dL  --   --   --  2.3       CMP   Results from last 7 days   Lab Units 07/09/22 0513 07/08/22 0511 07/07/22 2159 07/07/22  0908   SODIUM mmol/L 141 140 141 139   POTASSIUM mmol/L 4.3 4.7 4.5 4.8   CHLORIDE mmol/L 110* 109* 108* 107   CO2 mmol/L 23.0 20.0* 19.0* 20.0*   BUN mg/dL 28* 34* 37* 42*   CREATININE mg/dL 0.95 0.91 0.97 1.04*   GLUCOSE mg/dL 134* 126* 188* 130*   ALBUMIN g/dL  --   --   --  3.90   BILIRUBIN mg/dL  --   --   --  0.4   ALK PHOS U/L  --   --   --  76   AST (SGOT) U/L  --   --   --  13   ALT (SGPT) U/L  --   --   --  19         BNP        TROPONIN        CoAg        Creatinine Clearance  Estimated Creatinine Clearance: 39.5 mL/min (by C-G formula based on SCr of 0.95 mg/dL).    ABG        Radiology  No radiology results for the last day        EKG          I personally viewed and interpreted the patient's EKG/Telemetry data:    ECHOCARDIOGRAM:    Results for orders placed during the hospital encounter of 01/24/22    Adult Transthoracic Echo Complete W/ Cont if Necessary Per Protocol    Interpretation Summary  · Estimated right ventricular systolic pressure from tricuspid regurgitation is normal (<35 mmHg).  · Moderate pulmonic valve regurgitation is present.  · Left ventricular ejection fraction appears to be 61 - 65%.  · Left ventricular diastolic function is consistent with (grade II w/high LAP) pseudonormalization.          STRESS MYOVIEW:    Cardiolite (Tc-99m Sestamibi) stress test    CARDIAC CATHETERIZATION:            OTHER:         Assessment & Plan     Principal Problem:    Pseudoaneurysm following procedure (HCC)  Active  Problems:    Hypertension    A-fib (HCC)    Anemia    Edema    Paroxysmal atrial fibrillation (HCC)    Following was last office note.    ]]]]]]]]]]]]]]]]]]]]]  Impression  ===========  -Shortness of breath  Elevated proBNP.  Possible diastolic congestive heart failure.  Echocardiogram normal ejection fraction 1/24/2022  Stress Cardiolite test normal-1/28/2022     -Paroxysmal atrial fibrillation.  Patient is maintaining sinus rhythm.  EKG showed sinus rhythm.     -Hypertension dyslipidemia     -Hypomagnesemia     -Anemia-iron deficiency  Hemoglobin 9 g.     -Pulmonary nodule with hypermetabolic nodule in the right upper lung.  Status post CT-guided biopsy of right upper lobe nodule 11/5/2021-benign.  Patient is scheduled for VATS procedure 2/4/2022     -Status post cholecystectomy hysterectomy oophorectomy tonsillectomy     -Allergic to ciprofloxacin and sulfa iodine cyprodenate     -Non-smoker  ============  Plan  =========  Shortness of breath  Possible diastolic congestive heart failure.  Recent echocardiogram and stress Cardiolite test-as above.  Continue spironolactone and Lasix.Paroxysmal atrial fibrillation.  Patient in sinus rhythm.  Patient is on Cardizem.  Start metoprolol.     Anticoagulation  Patient was on Eliquis.  Patient had anemia requiring PRBC transfusion.  I had a lengthy discussion recently with patient and patient's family at bedside regarding the options.  Since patient is having low blood count best option would be to discontinue Eliquis although cardioembolic risk is increased.  This was discussed extensively with patient and patient's family.  I have discussed with him about watchman procedure since patient is having anemia requiring PRBC transfusion.  Patient is reluctant to pursue this at this time and wants to think about it.     Lung nodule  Oncology radiation therapy following.  Apparently starting radiation today.     Anemia-hemoglobin 9.0   8.7-3/23/2022     Medications were reviewed  and updated.  Current medications include Lasix 40 mg a day spironolactone 25 mg a day metoprolol XL 25 mg a day diltiazem  mg a day.     Follow-up in the office in 2 weeks.     Further plan will depend on patient's progress.  ]]]]]]]]]]]]]]]]]]]]]      Patient recently had watchman procedure.  Patient had femoral artery pseudoaneurysm and AV fistula needing open repair.  Patient is in sinus rhythm.  Right groin site is healing well with mild tenderness.  No hematoma is present.  Patient is back on Eliquis.    Hypertension-well-controlled    Dyslipidemia-on therapy.  Normal stress test in the last 1 year  Preserved LVEF    Vascular surgery note appreciated.  Possible discharge tomorrow per Dr. Garcia    Further plan will depend on patient's progress.      Macho Dumont MD  07/10/22  08:02 EDT

## 2022-07-10 NOTE — PLAN OF CARE
Goal Outcome Evaluation:         Pt was up in chair twice, normal sinus rhythm, no complaint of pain. Vitals stable.

## 2022-07-11 ENCOUNTER — READMISSION MANAGEMENT (OUTPATIENT)
Dept: CALL CENTER | Facility: HOSPITAL | Age: 82
End: 2022-07-11

## 2022-07-11 VITALS
SYSTOLIC BLOOD PRESSURE: 129 MMHG | BODY MASS INDEX: 22.64 KG/M2 | HEIGHT: 61 IN | OXYGEN SATURATION: 100 % | WEIGHT: 119.93 LBS | HEART RATE: 82 BPM | TEMPERATURE: 98.7 F | RESPIRATION RATE: 20 BRPM | DIASTOLIC BLOOD PRESSURE: 73 MMHG

## 2022-07-11 LAB
ANISOCYTOSIS BLD QL: ABNORMAL
DEPRECATED RDW RBC AUTO: 45.1 FL (ref 37–54)
EOSINOPHIL # BLD MANUAL: 0.35 10*3/MM3 (ref 0–0.4)
EOSINOPHIL NFR BLD MANUAL: 3 % (ref 0.3–6.2)
ERYTHROCYTE [DISTWIDTH] IN BLOOD BY AUTOMATED COUNT: 16.9 % (ref 12.3–15.4)
HCT VFR BLD AUTO: 25.7 % (ref 34–46.6)
HGB BLD-MCNC: 8.2 G/DL (ref 12–15.9)
LYMPHOCYTES # BLD MANUAL: 1.4 10*3/MM3 (ref 0.7–3.1)
LYMPHOCYTES NFR BLD MANUAL: 9 % (ref 5–12)
MCH RBC QN AUTO: 23.9 PG (ref 26.6–33)
MCHC RBC AUTO-ENTMCNC: 31.8 G/DL (ref 31.5–35.7)
MCV RBC AUTO: 75 FL (ref 79–97)
MICROCYTES BLD QL: ABNORMAL
MONOCYTES # BLD: 1.05 10*3/MM3 (ref 0.1–0.9)
NEUTROPHILS # BLD AUTO: 8.89 10*3/MM3 (ref 1.7–7)
NEUTROPHILS NFR BLD MANUAL: 75 % (ref 42.7–76)
NEUTS BAND NFR BLD MANUAL: 1 % (ref 0–5)
PLAT MORPH BLD: NORMAL
PLATELET # BLD AUTO: 193 10*3/MM3 (ref 140–450)
PMV BLD AUTO: 7.5 FL (ref 6–12)
RBC # BLD AUTO: 3.43 10*6/MM3 (ref 3.77–5.28)
SCAN SLIDE: NORMAL
VARIANT LYMPHS NFR BLD MANUAL: 12 % (ref 19.6–45.3)
WBC MORPH BLD: NORMAL
WBC NRBC COR # BLD: 11.7 10*3/MM3 (ref 3.4–10.8)

## 2022-07-11 PROCEDURE — 85007 BL SMEAR W/DIFF WBC COUNT: CPT | Performed by: SURGERY

## 2022-07-11 PROCEDURE — 99238 HOSP IP/OBS DSCHRG MGMT 30/<: CPT | Performed by: INTERNAL MEDICINE

## 2022-07-11 PROCEDURE — 85025 COMPLETE CBC W/AUTO DIFF WBC: CPT | Performed by: SURGERY

## 2022-07-11 RX ORDER — BISACODYL 10 MG
10 SUPPOSITORY, RECTAL RECTAL DAILY PRN
Status: DISCONTINUED | OUTPATIENT
Start: 2022-07-11 | End: 2022-07-11 | Stop reason: HOSPADM

## 2022-07-11 RX ADMIN — SODIUM CHLORIDE, POTASSIUM CHLORIDE, SODIUM LACTATE AND CALCIUM CHLORIDE 50 ML/HR: 600; 310; 30; 20 INJECTION, SOLUTION INTRAVENOUS at 03:15

## 2022-07-11 RX ADMIN — FERROUS SULFATE TAB EC 324 MG (65 MG FE EQUIVALENT) 324 MG: 324 (65 FE) TABLET DELAYED RESPONSE at 09:12

## 2022-07-11 RX ADMIN — BISACODYL 10 MG: 10 SUPPOSITORY RECTAL at 12:10

## 2022-07-11 RX ADMIN — METOPROLOL SUCCINATE 50 MG: 50 TABLET, EXTENDED RELEASE ORAL at 09:12

## 2022-07-11 RX ADMIN — APIXABAN 2.5 MG: 2.5 TABLET, FILM COATED ORAL at 09:12

## 2022-07-11 RX ADMIN — POLYETHYLENE GLYCOL 3350 17 G: 17 POWDER, FOR SOLUTION ORAL at 09:11

## 2022-07-11 NOTE — CASE MANAGEMENT/SOCIAL WORK
Continued Stay Note  OLIMPIA Ames     Patient Name: Chrystal Ruiz  MRN: [id number]  Today's Date: 7/11/2022    Admit Date: 7/7/2022     Discharge Plan     Row Name 07/11/22 1248       Plan    Plan Home at discharge.    Plan Comments Discharge home. No other needs at this time. D/C barriers: needs to have BM.              Met with patient in room wearing PPE: mask.    Maintained distance greater than six feet and spent less than 15 minutes in the room.          Ana Newby RN

## 2022-07-11 NOTE — PLAN OF CARE
Goal Outcome Evaluation:      Pt appears stronger today, able to walk to bathroom with one assist. Pt is concerned about not having had a BM for the last three days, complains of abdominal cramps, polyethylene glycol given to no effect on day shift. Up in chair most of day. Vitals stable

## 2022-07-11 NOTE — CASE MANAGEMENT/SOCIAL WORK
Case Management Discharge Note      Final Note: Home         Transportation Services  Private: Car    Final Discharge Disposition Code: 01 - home or self-care

## 2022-07-11 NOTE — DISCHARGE INSTR - ACTIVITY
Do not lift anything that is heavier than 5 lb (2.3 kg), or the limit that you are told, until your health care provider says that it is safe.  No driving for 4-5 days

## 2022-07-11 NOTE — DISCHARGE SUMMARY
Date of Admission: 7/7/2022    Date of Discharge:  7/11/2022    Length of stay:  LOS: 4 days     Admission Diagnosis  Atrial fibrillation  Hypertension  Hyperlipidemia  Patient was admitted for placement of watchman left atrial appendage closure device    Discharge diagnoses  Right groin pseudoaneurysm plans for open surgical repair  Status post watchman left atrial appendage closure device with assessable implantation of the device  Right groin pseudoaneurysm  Right groin hematoma  Failed ultrasound-guided thrombin injection repair  Has bled score greater than 3  [id number] score --- diastolic heart failure, hypertension, female sex, age--4  Essential hypertension well-controlled  Hyperlipidemia under treatment  No history of any CAD  Normal stress test in the last 1 year  Preserved LVEF    Presenting Problem/History of Present Illness  Active Hospital Problems    Diagnosis  POA   • **Pseudoaneurysm following procedure (HCC) [T81.718A, I72.9]  No   • Paroxysmal atrial fibrillation (HCC) [I48.0]  Yes   • Edema [R60.9]  Yes   • Anemia [D64.9]  Yes   • A-fib (HCC) [I48.91]  Yes   • Hypertension [I10]  Yes      Resolved Hospital Problems   No resolved problems to display.          Hospital Course  Patient is a 81 y.o. female presented with patient underwent a successful deployment of the watchman left atrial appendage closure device.  Postprocedure patient developed a pseudoaneurysm of the right groin requiring thrombin injection eventually requiring a open surgical repair    Procedures done  Successful placement of watchman left atrial appendage closure device  Open  Repair of post catheterization right femoral pseudoaneurysm and arteriovenous fistula repair of pseudoaneurysm of the right groin  Patient is currently clinically stable cleared by vascular surgery for discharge  Patient is tolerating low-dose anticoagulation therapy  Discussed with patient at bedside    Past Medical History:     Past Medical History:    Diagnosis Date   • A-fib (HCC)    • Anemia    • Arthritis    • Asthma    • CHF (congestive heart failure) ([facility])    • Femoral artery pseudo-aneurysm, right ([facility]) 07/07/2022   • Hypertension        Past Surgical History:     Past Surgical History:   Procedure Laterality Date   • CATARACT EXTRACTION     • CHOLECYSTECTOMY     • HYSTERECTOMY  1987    Endometriosis   • OOPHORECTOMY     • TONSILLECTOMY         Social History:   Social History     Socioeconomic History   • Marital status:    Tobacco Use   • Smoking status: Never Smoker   • Smokeless tobacco: Never Used   Vaping Use   • Vaping Use: Never used   Substance and Sexual Activity   • Alcohol use: No   • Drug use: Never   • Sexual activity: Not Currently       Procedures Performed       Consults:   Consulting Physician(s)  Chat With All Active Members    Provider Relationship Specialty    Andreas Lewis MD  Consulting Physician Vascular Surgery    Sage Garcia MD  Consulting Physician Cardiac Electrophysiology    Jeremy Guzman MD  Consulting Physician Vascular Surgery    Alfonzo Hooks MD  Consulting Physician Vascular Surgery    Fide Mullen MD Consulting Physician Cardiology          Pertinent Test Results:     Lab Results (last 72 hours)     Procedure Component Value Units Date/Time    Manual Differential [[id number]]  (Abnormal) Collected: 07/11/22 0353    Specimen: Blood Updated: 07/11/22 0623     Neutrophil % 75.0 %      Lymphocyte % 12.0 %      Monocyte % 9.0 %      Eosinophil % 3.0 %      Bands %  1.0 %      Neutrophils Absolute 8.89 10*3/mm3      Lymphocytes Absolute 1.40 10*3/mm3      Monocytes Absolute 1.05 10*3/mm3      Eosinophils Absolute 0.35 10*3/mm3      Anisocytosis Slight/1+     Microcytes Slight/1+     WBC Morphology Normal     Platelet Morphology Normal    CBC & Differential [[id number]]  (Abnormal) Collected: 07/11/22 0353    Specimen: Blood Updated: 07/11/22 0623    Narrative:      The following  orders were created for panel order CBC & Differential.  Procedure                               Abnormality         Status                     ---------                               -----------         ------                     CBC Auto Differential[[id number]]        Abnormal            Final result               Scan Slide[[id number]]                                       Final result                 Please view results for these tests on the individual orders.    Scan Slide [[id number]] Collected: 07/11/22 0353    Specimen: Blood Updated: 07/11/22 0623     Scan Slide --     Comment: See Manual Differential Results       CBC Auto Differential [[id number]]  (Abnormal) Collected: 07/11/22 0353    Specimen: Blood Updated: 07/11/22 0623     WBC 11.70 10*3/mm3      RBC 3.43 10*6/mm3      Hemoglobin 8.2 g/dL      Hematocrit 25.7 %      MCV 75.0 fL      MCH 23.9 pg      MCHC 31.8 g/dL      RDW 16.9 %      RDW-SD 45.1 fl      MPV 7.5 fL      Platelets 193 10*3/mm3     Narrative:      The previously reported component NRBC is no longer being reported. Previous result was 0.0 /100 WBC (Reference Range: 0.0-0.2 /100 WBC) on 7/11/2022 at 0412 EDT.    Basic Metabolic Panel [[id number]]  (Abnormal) Collected: 07/09/22 0513    Specimen: Blood Updated: 07/09/22 0603     Glucose 134 mg/dL      BUN 28 mg/dL      Creatinine 0.95 mg/dL      Sodium 141 mmol/L      Potassium 4.3 mmol/L      Chloride 110 mmol/L      CO2 23.0 mmol/L      Calcium 8.0 mg/dL      BUN/Creatinine Ratio 29.5     Anion Gap 8.0 mmol/L      eGFR 59.9 mL/min/1.73      Comment: National Kidney Foundation and American Society of Nephrology (ASN) Task Force recommended calculation based on the Chronic Kidney Disease Epidemiology Collaboration (CKD-EPI) equation refit without adjustment for race.       Narrative:      GFR Normal >60  Chronic Kidney Disease <60  Kidney Failure <15      CBC (No Diff) [[id number]]  (Abnormal) Collected: 07/09/22 0513    Specimen: Blood  "Updated: 07/09/22 0538     WBC 12.00 10*3/mm3      RBC 3.61 10*6/mm3      Hemoglobin 8.6 g/dL      Hematocrit 27.2 %      MCV 75.4 fL      MCH 23.8 pg      MCHC 31.6 g/dL      RDW 16.6 %      RDW-SD 44.6 fl      MPV 7.6 fL      Platelets 218 10*3/mm3           Results for orders placed during the hospital encounter of 01/24/22    Adult Transthoracic Echo Complete W/ Cont if Necessary Per Protocol    Interpretation Summary  · Estimated right ventricular systolic pressure from tricuspid regurgitation is normal (<35 mmHg).  · Moderate pulmonic valve regurgitation is present.  · Left ventricular ejection fraction appears to be 61 - 65%.  · Left ventricular diastolic function is consistent with (grade II w/high LAP) pseudonormalization.      Imaging Results (All)     None            Condition on Discharge: Stable    Vital Signs  Visit Vitals  /73 (BP Location: Right arm, Patient Position: Lying)   Pulse 82   Temp 98.7 °F (37.1 °C) (Oral)   Resp 20   Ht 154.9 cm (61\")   Wt 54.4 kg (119 lb 14.9 oz)   SpO2 100%   BMI 22.66 kg/m²       Physical Exam:    Constitutional:       Appearance: Well-developed.   Eyes:      Conjunctiva/sclera: Conjunctivae normal.      Pupils: Pupils are equal, round, and reactive to light.   HENT:      Head: Normocephalic and atraumatic.   Neck:      Thyroid: No thyromegaly.   Pulmonary:      Effort: Pulmonary effort is normal.      Breath sounds: Normal breath sounds.   Cardiovascular:      Normal rate. Regular rhythm.   Pulses:     Intact distal pulses.   Edema:     Peripheral edema absent.   Abdominal:      General: Bowel sounds are normal.      Palpations: Abdomen is soft.   Musculoskeletal:      Cervical back: Normal range of motion and neck supple. Skin:     General: Skin is warm.   Neurological:      Mental Status: Alert and oriented to person, place, and time.       Right groin site looks good with stable hematoma unchanged from before with no new findings no signs of " infection    Discharge Disposition  Home or Self Care    Discharge Medications     Discharge Medications      New Medications      Instructions Start Date   apixaban 2.5 MG tablet tablet  Commonly known as: ELIQUIS   2.5 mg, Oral, Every 12 Hours Scheduled         Continue These Medications      Instructions Start Date   acetaminophen 500 MG tablet  Commonly known as: TYLENOL   500 mg, Oral, Every 6 Hours PRN      Banophen 50 MG capsule  Generic drug: diphenhydrAMINE   50 mg, Oral, Once, Take at 10pm on 7/6/2022 prior to procedure      Breo Ellipta 200-25 MCG/INH inhaler  Generic drug: Fluticasone Furoate-Vilanterol   1 puff, Inhalation, Daily - RT      dilTIAZem  MG 24 hr capsule  Commonly known as: CARDIZEM CD   120 mg, Oral, Daily      ferrous sulfate 325 (65 FE) MG tablet   325 mg, Oral, Every Other Day      furosemide 40 MG tablet  Commonly known as: LASIX   40 mg, Oral, Every Other Day      metoprolol succinate XL 50 MG 24 hr tablet  Commonly known as: TOPROL-XL   50 mg, Oral, Daily      spironolactone 25 MG tablet  Commonly known as: ALDACTONE   25 mg, Oral, Every Other Day      triamcinolone 0.1 % cream  Commonly known as: KENALOG   Topical, 2 Times Daily      Vitamin B12 1000 MCG tablet controlled-release   2,500 mcg, Oral, Daily      Vitamin D3 50 MCG (2000 UT) capsule   2,000 Units, Oral, Daily             Discharge Diet: Cardiac diet    Activity at Discharge: Post watchman precautions and post vascular surgery precautions    Follow-up Appointments  Future Appointments   Date Time Provider Department Center   7/13/2022 11:00 AM Michael Whitten MD [facility] None   7/14/2022  2:15 PM Sage Garcia MD [facility]   7/27/2022  3:45 PM Jeremy Mccall MD [facility] NA P [facility] NA   8/17/2022 10:40 AM LAB MD [facility] NA [facility]   8/17/2022 10:45 AM Frank Cheng MD [facility] NA [facility]   9/13/2022  3:30 PM Macho Dumont MD [facility] NA         Test Results Pending at  Discharge       Risk for Readmission (LACE) Score: 13 (7/11/2022  6:01 AM)          Fide Mullen MD  07/11/22  14:01 EDT

## 2022-07-11 NOTE — OUTREACH NOTE
Prep Survey    Flowsheet Row Responses   [facility] facility patient discharged from? [facility]   Is LACE score < 7 ? No   Emergency Room discharge w/ pulse ox? No   Eligibility [facility]   Date of Admission 07/07/22   Date of Discharge 07/11/22   Discharge Disposition Home or Self Care   Discharge diagnosis PSEUDO ANEURYSM REPAIR, EXTREMITY  Status post watchman left atrial appendage closure device with assessable implantation of the device   Does the patient have one of the following disease processes/diagnoses(primary or secondary)? General Surgery   Does the patient have Home health ordered? No   Is there a DME ordered? No   Prep survey completed? Yes          KRISTA MCGEE - Registered Nurse

## 2022-07-12 ENCOUNTER — TRANSITIONAL CARE MANAGEMENT TELEPHONE ENCOUNTER (OUTPATIENT)
Dept: CALL CENTER | Facility: HOSPITAL | Age: 82
End: 2022-07-12

## 2022-07-12 ENCOUNTER — TELEPHONE (OUTPATIENT)
Dept: RADIATION ONCOLOGY | Facility: HOSPITAL | Age: 82
End: 2022-07-12

## 2022-07-12 LAB
MAXIMAL PREDICTED HEART RATE: 138 BPM
STRESS TARGET HR: 117 BPM

## 2022-07-12 NOTE — OUTREACH NOTE
Call Center TCM Note    Flowsheet Row Responses   [facility] [place] by [facility] patient discharged from? [facility]   Does the patient have one of the following disease processes/diagnoses(primary or secondary)? General Surgery   TCM attempt successful? Yes   Call start time 1607   Call end time 1613   Discharge diagnosis PSEUDO ANEURYSM REPAIR, EXTREMITY  Status post watchman left atrial appendage closure device with assessable implantation of the device   Does the patient have all medications related to this admission filled (includes all antibiotics, pain medications, etc.) Yes   Is the patient taking all medications as directed (includes completed medication regime)? Yes   Does the patient have a follow up appointment scheduled with their surgeon? Yes   Has the patient kept scheduled appointments due by today? N/A   Comments f/u with oncologist tomorrow (7/13),  having incision checked tomorrow (7/13)   Has home health visited the patient within 72 hours of discharge? N/A   Psychosocial issues? No   Did the patient receive a copy of their discharge instructions? Yes   What is the patient's perception of their health status since discharge? Improving   Nursing interventions Nurse provided patient education   Is the patient /caregiver able to teach back basic post-op care? Lifting as instructed by MD in discharge instructions, Keep incision areas clean,dry and protected, No tub bath, swimming, or hot tub until instructed by MD   Is the patient/caregiver able to teach back signs and symptoms of incisional infection? Fever, Increased drainage or bleeding, Increased redness, swelling or pain at the incisonal site, Incisional warmth, Pus or odor from incision   Is the patient/caregiver able to teach back steps to recovery at home? Set small, achievable goals for return to baseline health, Rest and rebuild strength, gradually increase activity   Is the patient/caregiver able to teach back the hierarchy of who to call/visit for  symptoms/problems? PCP, Specialist, Home health nurse, Urgent Care, ED, 911 Yes   TCM call completed? Yes   Wrap up additional comments Doing well, does have some concerns about her incision and worried her blood count may be low but she is seeing several doctors tomorrow.          Carina Augustin RN    7/12/2022, 16:13 EDT

## 2022-07-12 NOTE — OUTREACH NOTE
Call Center TCM Note    Flowsheet Row Responses   [facility] facility patient discharged from? [facility]   Does the patient have one of the following disease processes/diagnoses(primary or secondary)? General Surgery   TCM attempt successful? No   Unsuccessful attempts Attempt 1          Carina Augustin RN    7/12/2022, 15:00 EDT

## 2022-07-13 ENCOUNTER — TELEPHONE (OUTPATIENT)
Dept: ONCOLOGY | Facility: CLINIC | Age: 82
End: 2022-07-13

## 2022-07-13 ENCOUNTER — OFFICE VISIT (OUTPATIENT)
Dept: VASCULAR SURGERY | Facility: HOSPITAL | Age: 82
End: 2022-07-13

## 2022-07-13 ENCOUNTER — OFFICE VISIT (OUTPATIENT)
Dept: RADIATION ONCOLOGY | Facility: HOSPITAL | Age: 82
End: 2022-07-13

## 2022-07-13 ENCOUNTER — LAB (OUTPATIENT)
Dept: LAB | Facility: HOSPITAL | Age: 82
End: 2022-07-13

## 2022-07-13 VITALS
OXYGEN SATURATION: 97 % | DIASTOLIC BLOOD PRESSURE: 68 MMHG | HEIGHT: 61 IN | RESPIRATION RATE: 20 BRPM | BODY MASS INDEX: 23.71 KG/M2 | WEIGHT: 125.6 LBS | TEMPERATURE: 97.1 F | SYSTOLIC BLOOD PRESSURE: 117 MMHG | HEART RATE: 79 BPM

## 2022-07-13 DIAGNOSIS — C34.91 BRONCHOGENIC CANCER OF RIGHT LUNG: Primary | ICD-10-CM

## 2022-07-13 DIAGNOSIS — C34.11 MALIGNANT NEOPLASM OF UPPER LOBE OF RIGHT LUNG: Primary | ICD-10-CM

## 2022-07-13 LAB
BASOPHILS # BLD AUTO: 0.04 10*3/MM3 (ref 0–0.2)
BASOPHILS NFR BLD AUTO: 0.3 % (ref 0–1.5)
DEPRECATED RDW RBC AUTO: 48.3 FL (ref 37–54)
EOSINOPHIL # BLD AUTO: 0.12 10*3/MM3 (ref 0–0.4)
EOSINOPHIL NFR BLD AUTO: 0.8 % (ref 0.3–6.2)
ERYTHROCYTE [DISTWIDTH] IN BLOOD BY AUTOMATED COUNT: 17 % (ref 12.3–15.4)
HCT VFR BLD AUTO: 28.9 % (ref 34–46.6)
HGB BLD-MCNC: 9.1 G/DL (ref 12–15.9)
LYMPHOCYTES # BLD AUTO: 1.55 10*3/MM3 (ref 0.7–3.1)
LYMPHOCYTES NFR BLD AUTO: 10.8 % (ref 19.6–45.3)
MCH RBC QN AUTO: 25.7 PG (ref 26.6–33)
MCHC RBC AUTO-ENTMCNC: 31.5 G/DL (ref 31.5–35.7)
MCV RBC AUTO: 81.6 FL (ref 79–97)
MONOCYTES # BLD AUTO: 1.23 10*3/MM3 (ref 0.1–0.9)
MONOCYTES NFR BLD AUTO: 8.6 % (ref 5–12)
NEUTROPHILS NFR BLD AUTO: 11.44 10*3/MM3 (ref 1.7–7)
NEUTROPHILS NFR BLD AUTO: 79.5 % (ref 42.7–76)
PLATELET # BLD AUTO: 279 10*3/MM3 (ref 140–450)
PMV BLD AUTO: 9 FL (ref 6–12)
QT INTERVAL: 432 MS
RBC # BLD AUTO: 3.54 10*6/MM3 (ref 3.77–5.28)
WBC NRBC COR # BLD: 14.38 10*3/MM3 (ref 3.4–10.8)

## 2022-07-13 PROCEDURE — 85025 COMPLETE CBC W/AUTO DIFF WBC: CPT

## 2022-07-13 PROCEDURE — 99212 OFFICE O/P EST SF 10 MIN: CPT | Performed by: RADIOLOGY

## 2022-07-13 PROCEDURE — 36415 COLL VENOUS BLD VENIPUNCTURE: CPT

## 2022-07-13 PROCEDURE — G0463 HOSPITAL OUTPT CLINIC VISIT: HCPCS

## 2022-07-13 NOTE — TELEPHONE ENCOUNTER
Received a phone call from Kendra in radiation oncology. Kendra stated Mrs. Ruiz is currently in their office and would like to see Dr. Cheng or the NP to have her HGB checked due to recently being release from the hospital. Patient stated when she was release from the hospital, they informed her that her HGB was dropping therefore she would like it to be checked. I informed Kendra that I will ask Dr. Cheng then let her know, she confirmed.     After speaking with Dr. Cheng, I contacted Kendra. I informed Kendra that per Dr. Cheng, I ordered a CBC and I am putting her on the schedule right now. Kendra stated she will send her upstairs to have her blood drawn. I confirmed.

## 2022-07-14 ENCOUNTER — OFFICE VISIT (OUTPATIENT)
Dept: CARDIOLOGY | Facility: CLINIC | Age: 82
End: 2022-07-14

## 2022-07-14 VITALS
BODY MASS INDEX: 23.75 KG/M2 | HEART RATE: 84 BPM | DIASTOLIC BLOOD PRESSURE: 70 MMHG | RESPIRATION RATE: 18 BRPM | SYSTOLIC BLOOD PRESSURE: 122 MMHG | WEIGHT: 125.8 LBS | HEIGHT: 61 IN

## 2022-07-14 DIAGNOSIS — R55 SYNCOPE AND COLLAPSE: ICD-10-CM

## 2022-07-14 DIAGNOSIS — I48.0 PAROXYSMAL ATRIAL FIBRILLATION: Primary | ICD-10-CM

## 2022-07-14 DIAGNOSIS — Z79.01 CHRONIC ANTICOAGULATION: ICD-10-CM

## 2022-07-14 DIAGNOSIS — I10 PRIMARY HYPERTENSION: ICD-10-CM

## 2022-07-14 PROCEDURE — 99214 OFFICE O/P EST MOD 30 MIN: CPT | Performed by: INTERNAL MEDICINE

## 2022-07-15 NOTE — PROGRESS NOTES
Cardiology Clinic Note  Jeremy Mccall MD, PhD    Subjective:     Encounter Date:07/14/2022      Patient ID: Chrystal Ruiz is a 81 y.o. female.    Chief Complaint:  Chief Complaint   Patient presents with   • Follow-up       HPI:      I the pleasure to see this 81-year-old female previously established with another cardiologist but otherwise looking for second opinion.  She was recently hospitalized with findings of diastolic dysfunction grade 2 with mild volume overload and diastolic CHF exacerbation also with new onset atrial fibrillation.  She has had chronic anemia and was ultimately laced on anticoagulation but had worsening anemia and this was stopped.  She had a discussion with other doctors related to watchman procedure but elected to think about this further.  We did discuss that further contemplation with no anticoagulation still has not uncovered stroke risk that we did discuss and should be recognized although we are not pushing for procedures but just to recognize risk with anticoagulation would be bleeding and with off anticoagulation would be relative to stroke and 81 years old with [id number] score of at  3-4 at 8-10 %/year.  She is presently on Cardizem but says this is completely taken away her taste.  She is under work-up with chest radiation for a pulmonary or lung nodule suspicious for neoplasm.  She has had 30 pounds of weight loss unintentionally.  We discussed that this is likely not secondary to her Lasix or fluid pills as this would represent over 15 L of fluid off.  She says she is eating at least 3 meals a day.  We did discuss that we would leave this work-up to oncology as well as primary care and thoracic surgery.  We had a lengthy discussion today on atrial fibrillation of paroxysmal nature, hypertension, weight and volume status, diastolic CHF each individually.  With respect to her A. fib she is concerned about blood thinners but also concerned about stroke and we did present again  watchman procedure which would need anticoagulation for 40 days and then cessation indefinitely.  She would need a transesophageal echo as she has been off anticoagulation for at least 6 weeks which she understands.  We discussed diastolic CHF with volume control essentially and heart rate control which is not optimal at 90s today only on low-dose beta-blocker.  We discussed Lasix of which she has no peripheral edema and she may be able to go to every other day Lasix dosing.    She presents back 1 to 2 weeks after watchman procedure complicated by pseudoaneurysm in the right groin which opened repair was required by vascular surgery.  Incisions are clean and dry.  She has no chest pain or shortness of breath and she continues on anticoagulation presently.  She is on Eliquis low-dose 2.5 twice a day.  No bleeding reported.  She saw some pain in the right groin otherwise she is on metoprolol Aldactone little bit of Lasix doing well status post watchman procedure and she is scheduled stop anticoagulation after transesophageal echo at the end of this month     Review of systems otherwise negative x14 point review of systems except as mentioned above next        Historical data copied forward from previous encounters in EMR including the history, exam, and assessment/plan has been reviewed and is unchanged unless noted otherwise.     Cardiac medicines reviewed with risk, benefits, and necessity of each discussed.     Risk and benefit of cardiac testing reviewed including death heart attack stroke pain bleeding infection need for vascular /cardiovascular surgery were discussed and the patient      Objective:         Objective          Vitals reviewed below, 122/70 heart rate is 84 weight 125     Physical Exam  Regular rate and rhythm with no rubs murmurs gallops today  No heave no lift  No peripheral edema next no carotid bruits or JVD  Normal radial pulses  Normal cap refill  Soft nontender nondistended  Clear to  "auscultation bilaterally  Groin stable but tender  Assessment:         Assessment          Diagnoses and all orders for this visit:     1. Paroxysmal atrial fibrillation (HCC) (Primary)  -     Ambulatory Referral to Cardiac Electrophysiology        Has bled score greater than 3  [id number] score greater than 3  Watchman recently implanted  Transesophageal echo with EP at the end of this month  Likely can stop anticoagulation August 20 of this year      Plan:         Paroxysmal A. fib  Continue metoprolol extended release to 50 daily   Cardizem to 120 daily  Eliquis 2.5 twice a day  Status post watchman, stop anticoagulation August 20  Avoid NSAIDs including aspirin     Essential hypertension well-controlled  Hyperlipidemia continue present medicines  No history of any CAD  Normal stress test in the last 1 year  Preserved LVEF    Right pseudoaneurysm, procedural complication, status post open repair by vascular surgery, routine healing should be allowed, keep the site clean and dry     Follow-up 3 months     Jeremy Mccall MD, PhD     The pleasure to be involved in this patient's cardiovascular care.  Please call with any questions or concerns  Jeremy Mccall MD, PhD      Historical data copied forward from previous encounters in EMR including the history, exam, and assessment/plan has been reviewed and is unchanged unless noted otherwise.    Cardiac medicines reviewed with risk, benefits, and necessity of each discussed.    Risk and benefit of cardiac testing reviewed including death heart attack stroke pain bleeding infection need for vascular /cardiovascular surgery were discussed and the patient     Objective:         /70 (BP Location: Left arm, Patient Position: Sitting)   Pulse 84   Resp 18   Ht 154.9 cm (61\")   Wt 57.1 kg (125 lb 12.8 oz)   BMI 23.77 kg/m²     Physical Exam    Assessment:         There are no diagnoses linked to this encounter.       Plan:              The pleasure to be involved " in this patient's cardiovascular care.  Please call with any questions or concerns  Jeremy Mccall MD, PhD    Most recent EKG as reviewed and interpreted by me:  Procedures     Most recent echo as reviewed and interpreted by me:  Results for orders placed during the hospital encounter of 07/07/22    Adult Transesophageal Echo (MELISA) W/ Cont if Necessary Per Protocol    Interpretation Summary  · Left ventricular systolic function is normal.  · Left ventricular ejection fraction is 55 to 60%  · Left ventricular wall thickness is consistent with mild concentric hypertrophy.  · Successful deployment of watchman flex device in left atrial appendage with adequate occlusion      Most recent stress test as reviewed and interpreted by me:  Results for orders placed during the hospital encounter of 01/24/22    Stress Test With Myocardial Perfusion One Day    Interpretation Summary  Indications  Shortness of breath    This study was performed under my direct supervision.    Resting ECG  Sinus rhythm.    The patient was injected with Lexiscan intravenously while constantly monitoring electrocardiogram and vital signs.  Patient did not have any chest discomfort ST abnormalities or ectopy with injection of Lexiscan.    Cardiolite was used as an imaging agent.    Cardiolite images showed uniform distribution of radionuclide without any evidence for myocardial ischemia.    Gated SPECT images revealed normal left ventricle size and contractility with ejection fraction of 74%.    Impression  ========  Lexiscan Cardiolite test is negative for myocardial ischemia.    Gated SPECT images revealed normal left ventricular size and contractility with ejection fraction of 74%.      Most recent cardiac catheterization as reviewed interpreted by me:  No results found for this or any previous visit.    The following portions of the patient's history were reviewed and updated as appropriate: allergies, current medications, past family history,  past medical history, past social history, past surgical history and problem list.      ROS:  14 point review of systems negative except as mentioned above    Current Outpatient Medications:   •  acetaminophen (TYLENOL) 500 MG tablet, Take 500 mg by mouth Every 6 (Six) Hours As Needed for Mild Pain ., Disp: , Rfl:   •  apixaban (ELIQUIS) 2.5 MG tablet tablet, Take 1 tablet by mouth Every 12 (Twelve) Hours. Indications: Atrial Fibrillation, Disp: 60 tablet, Rfl: 1  •  Breo Ellipta 200-25 MCG/INH inhaler, Inhale 1 puff Daily., Disp: 1 each, Rfl: 2  •  Cholecalciferol (VITAMIN D3) 2000 units capsule, Take 2,000 Units by mouth Daily., Disp: , Rfl:   •  Cyanocobalamin (VITAMIN B12) 1000 MCG tablet controlled-release, Take 2,500 mcg by mouth Daily., Disp: , Rfl:   •  ferrous sulfate 325 (65 FE) MG tablet, Take 1 tablet by mouth Every Other Day., Disp: 15 tablet, Rfl: 11  •  furosemide (LASIX) 40 MG tablet, Take 40 mg by mouth Every Other Day., Disp: , Rfl:   •  metoprolol succinate XL (TOPROL-XL) 50 MG 24 hr tablet, Take 1 tablet by mouth Daily., Disp: 30 tablet, Rfl: 5  •  spironolactone (ALDACTONE) 25 MG tablet, Take 1 tablet by mouth Every Other Day., Disp: 45 tablet, Rfl: 1  •  triamcinolone (KENALOG) 0.1 % cream, Apply  topically to the appropriate area as directed 2 (Two) Times a Day., Disp: 80 g, Rfl: 2    Problem List:  Patient Active Problem List   Diagnosis   • Grief reaction   • Alopecia   • Arthritis   • Back pain   • Encounter for general adult medical examination without abnormal findings   • Hypertension   • Lung nodule   • Vitamin D deficiency   • Shortness of breath   • Postmenopausal status   • Nevus, non-neoplastic   • Medicare annual wellness visit, subsequent   • Fam hx-ischem heart disease   • FH: thyroid condition   • Cellulitis of right anterior lower leg   • Vasculitis of skin   • Pedal edema   • Bilateral calf pain   • Neck pain   • Immunization reaction   • Hyperlipidemia   • Tremor   • Bright  red rectal bleeding   • Acute non-recurrent maxillary sinusitis   • Acute pain of right knee   • Chronic pain of right knee   • Need for vaccination   • Lung nodules   • Tachycardia   • Essential (primary) hypertension    • Dermatitis   • Acute diastolic CHF (congestive heart failure) (HCC)   • A-fib (HCC)   • Leukopenia   • Anemia   • Primary osteoarthritis of right knee   • Bronchogenic cancer of right lung (HCC)   • Iron deficiency   • Malabsorption due to intolerance, not elsewhere classified   • Dyspnea   • Other iron deficiency anemias   • Acute URI   • Edema   • Syncope   • Paroxysmal atrial fibrillation (HCC)   • Pseudoaneurysm following procedure (HCC)     Past Medical History:  Past Medical History:   Diagnosis Date   • A-fib (HCC)    • Anemia    • Arthritis    • Asthma    • CHF (congestive heart failure) (HCC)    • Femoral artery pseudo-aneurysm, 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   • Hypertension      Past Surgical History:  Past Surgical History:   Procedure Laterality Date   • ATRIAL APPENDAGE EXCLUSION LEFT WITH TRANSESOPHAGEAL ECHOCARDIOGRAM Right 7/7/2022    Procedure: Atrial Appendage Occlusion watchman team aware $;  Surgeon: Sage Garcia MD;  Location: [facility] CATH INVASIVE LOCATION;  Service: Cardiovascular;  Laterality: Right;   • ATRIAL APPENDAGE EXCLUSION LEFT WITH TRANSESOPHAGEAL ECHOCARDIOGRAM N/A 7/7/2022    Procedure: Atrial Appendage Occlusion;  Surgeon: Fide Mullen MD;  Location: [facility] CATH INVASIVE LOCATION;  Service: Cardiovascular;  Laterality: N/A;   • CATARACT EXTRACTION     • CHOLECYSTECTOMY     • HYSTERECTOMY  1987    Endometriosis   • OOPHORECTOMY     • PSEUDO ANEURYSM REPAIR, EXTREMITY Right 7/8/2022    Procedure: REPAIR OF POST CATHETERIZATION RIGHT FEMORAL PSEUDOANEURYSM AND ARTERIOVENOUS FISTUAL;  Surgeon: Alfonzo Hooks MD;  Location: [facility] MAIN OR;  Service: Vascular;  Laterality: Right;   • TONSILLECTOMY       Social History:  Social History      Socioeconomic History   • Marital status:    Tobacco Use   • Smoking status: Never Smoker   • Smokeless tobacco: Never Used   Vaping Use   • Vaping Use: Never used   Substance and Sexual Activity   • Alcohol use: No   • Drug use: Never   • Sexual activity: Not Currently     Allergies:  Allergies   Allergen Reactions   • Ciprofloxacin Unknown (See Comments)   • Sulfa Antibiotics Unknown (See Comments)   • Cyprodenate Unknown - High Severity   • Iodine Unknown - High Severity     Immunizations:  Immunization History   Administered Date(s) Administered   • COVID-19 (MODERNA) 1st, 2nd, 3rd Dose Only 01/20/2021, 02/17/2021, 08/26/2021   • COVID-19 (MODERNA) BOOSTER 01/20/2021, 02/17/2021, 08/26/2021   • FLUAD TRI 65YR+ 10/25/2013, 11/13/2014, 09/30/2015, 09/25/2019   • Fluad Quad 65+ 09/08/2020   • Fluzone High Dose =>65 Years ([employer] ONLY) 10/05/2016, 10/17/2017, 09/25/2019   • Fluzone High-Dose 65+yrs 11/15/2021   • H1N1 All Forms 01/29/2010   • Hepatitis A 04/24/2018, 10/24/2018   • Influenza Quad Vaccine (Inpatient) 09/08/2020   • Pneumococcal Conjugate 13-Valent (PCV13) 02/24/2016   • Shingrix 07/01/2019, 10/09/2019            In-Office Procedure(s):  No orders to display        ASCVD RIsk Score::  The ASCVD Risk score (Hubert KATIE Jr., et al., 2013) failed to calculate for the following reasons:    The 2013 ASCVD risk score is only valid for ages 40 to 79    The patient has a prior MI or stroke diagnosis    Imaging:    Results for orders placed in visit on 06/29/22    XR Knee 3 View Bilateral    Narrative  bilateral Knee X-Ray    Date of exam: 6/29/2022    Indication: Bilateral knee pain    AP, Lateral, [place] views    Findings:Right: Shows minimal to mild tricompartmental DJD, worse in the patellofemoral compartment.  No fractures or dislocations are appreciated and chondrocalcinosis present.  Left: No significant osseous abnormalities appreciated.  Possible chondrocalcinosis.  No fractures or  dislocations are appreciated.    within normal limits joint spaces    Hardware appropriately positioned not applicable    yes prior studies available for comparison.    This patient's x-ray report was graded according to the Kellgren and Nima classification.  This took into account the joint space narrowing, osteophyte formation, sclerosis of the distal femur/proximal tibia along with deformity of those bones.  The findings were indicative of K L grade 1.    X-RAY was ordered and reviewed by NAHED Riddle       Results for orders placed during the hospital encounter of 06/15/22    CT Chest Without Contrast Diagnostic    Narrative  CT CHEST WO CONTRAST DIAGNOSTIC-    Date of Exam: 6/15/2022 12:55 PM    Indication: Right lung cancer, restaging and surveillance post-XRT;  C34.91-Malignant neoplasm of unspecified part of right bronchus or lung.    Comparison: Chest CT 3/2/2022    Technique: Serial and axial CT images of the chest were obtained.  Reconstructions in the coronal and sagittal planes were performed.  Automated exposure control and iterative reconstruction methods were  used.  Radiation audit for number of CT and nuclear cardiology exams  performed in the last year:  0.    FINDINGS:    Mediastinum: Normal sized lymph nodes, no evidence of adenopathy. No  evidence of acute process of the mediastinal structures on noncontrast  evaluation. No pericardial effusion.    Lungs/Pleura: Numerous pulmonary nodules including pleural-based nodule  like areas in the lung apices bilaterally are unchanged. This includes a  2.6 x 1.4 cm irregular nodule in the right lung apex. No new pulmonary  nodules.    No pneumonia or pulmonary edema. No pneumothorax or pleural effusion    Soft Tissues/Bones: No acute or aggressive osseous findings. No acute  abnormality of the superficial soft tissues. Normal size limits, no  evidence of adenopathy    Upper abdomen: No evidence of acute process in the visualized  upper  abdomen.    Impression  Stable chest CT. This includes an unchanged 2.6 x 1.4 cm nodule in the  right lung apex. Unchanged subcentimeter nodules elsewhere.    No evidence of metastatic disease in the chest outside of the lungs.          Electronically Signed By-Patrice Dale On:6/15/2022 2:37 PM  This report was finalized on [id number] by  Patrice Dale, .      Results for orders placed during the hospital encounter of 06/15/22    CT Chest Without Contrast Diagnostic    Narrative  CT CHEST WO CONTRAST DIAGNOSTIC-    Date of Exam: 6/15/2022 12:55 PM    Indication: Right lung cancer, restaging and surveillance post-XRT;  C34.91-Malignant neoplasm of unspecified part of right bronchus or lung.    Comparison: Chest CT 3/2/2022    Technique: Serial and axial CT images of the chest were obtained.  Reconstructions in the coronal and sagittal planes were performed.  Automated exposure control and iterative reconstruction methods were  used.  Radiation audit for number of CT and nuclear cardiology exams  performed in the last year:  0.    FINDINGS:    Mediastinum: Normal sized lymph nodes, no evidence of adenopathy. No  evidence of acute process of the mediastinal structures on noncontrast  evaluation. No pericardial effusion.    Lungs/Pleura: Numerous pulmonary nodules including pleural-based nodule  like areas in the lung apices bilaterally are unchanged. This includes a  2.6 x 1.4 cm irregular nodule in the right lung apex. No new pulmonary  nodules.    No pneumonia or pulmonary edema. No pneumothorax or pleural effusion    Soft Tissues/Bones: No acute or aggressive osseous findings. No acute  abnormality of the superficial soft tissues. Normal size limits, no  evidence of adenopathy    Upper abdomen: No evidence of acute process in the visualized upper  abdomen.    Impression  Stable chest CT. This includes an unchanged 2.6 x 1.4 cm nodule in the  right lung apex. Unchanged subcentimeter nodules elsewhere.    No  evidence of metastatic disease in the chest outside of the lungs.          Electronically Signed By-Patrice Dale On:6/15/2022 2:37 PM  This report was finalized on [id number] by  Patrice Dale, .      Lab Review:   Telephone on 07/13/2022   Component Date Value   • WBC 07/13/2022 14.38 (A)   • RBC 07/13/2022 3.54 (A)   • Hemoglobin 07/13/2022 9.1 (A)   • Hematocrit 07/13/2022 28.9 (A)   • MCV 07/13/2022 81.6    • MCH 07/13/2022 25.7 (A)   • MCHC 07/13/2022 31.5    • RDW 07/13/2022 17.0 (A)   • RDW-SD 07/13/2022 48.3    • MPV 07/13/2022 9.0    • Platelets 07/13/2022 279    • Neutrophil % 07/13/2022 79.5 (A)   • Lymphocyte % 07/13/2022 10.8 (A)   • Monocyte % 07/13/2022 8.6    • Eosinophil % 07/13/2022 0.8    • Basophil % 07/13/2022 0.3    • Neutrophils, Absolute 07/13/2022 11.44 (A)   • Lymphocytes, Absolute 07/13/2022 1.55    • Monocytes, Absolute 07/13/2022 1.23 (A)   • Eosinophils, Absolute 07/13/2022 0.12    • Basophils, Absolute 07/13/2022 0.04    Admission on 07/07/2022, Discharged on 07/11/2022   Component Date Value   • Activated Clotting Time  07/07/2022 103    • Activated Clotting Time  07/07/2022 219 (A)   • Activated Clotting Time  07/07/2022 312 (A)   • Activated Clotting Time  07/07/2022 439 (A)   • Target HR (85%) 07/07/2022 117    • Max. Pred. HR (100%) 07/07/2022 138    • BH CV VAS R PSEUDOANEURY* 07/07/2022 2.61    • BH CV VAS R PSEUDOANEURY* 07/07/2022 2.34    • BH CV VAS R PSA NECK WID* 07/07/2022 0.2    • R PSEUDOANEURYSM LONG WI* 07/07/2022 1.45    • R PSEUDOANEURYSM TRANSVE* 07/07/2022 1.25    • BH CV RIGHT SFA PSA SCRI* 07/07/2022 1    • BH CV RIGHT GROIN PSA DC* 07/07/2022 1.00    • Right groin CFA sys 07/07/2022 228    • QT Interval 07/07/2022 432    • Target HR (85%) 07/07/2022 117    • Max. Pred. HR (100%) 07/07/2022 138    • PROX SFA PSV RIGHT 07/07/2022 117    • BH CV RIGHT GROIN PSA DC* 07/07/2022 1.00    • Right groin CFA sys 07/07/2022 174    • Glucose 07/07/2022 188 (A)   • BUN  07/07/2022 37 (A)   • Creatinine 07/07/2022 0.97    • Sodium 07/07/2022 141    • Potassium 07/07/2022 4.5    • Chloride 07/07/2022 108 (A)   • CO2 07/07/2022 19.0 (A)   • Calcium 07/07/2022 8.5 (A)   • BUN/Creatinine Ratio 07/07/2022 38.1 (A)   • Anion Gap 07/07/2022 14.0    • eGFR 07/07/2022 58.5 (A)   • WBC 07/07/2022 16.60 (A)   • RBC 07/07/2022 4.24    • Hemoglobin 07/07/2022 9.9 (A)   • Hematocrit 07/07/2022 32.2 (A)   • MCV 07/07/2022 76.0 (A)   • MCH 07/07/2022 23.3 (A)   • MCHC 07/07/2022 30.7 (A)   • RDW 07/07/2022 16.6 (A)   • RDW-SD 07/07/2022 45.5    • MPV 07/07/2022 7.7    • Platelets 07/07/2022 270    • Neutrophil % 07/07/2022 87.4 (A)   • Lymphocyte % 07/07/2022 5.7 (A)   • Monocyte % 07/07/2022 6.8    • Eosinophil % 07/07/2022 0.0 (A)   • Basophil % 07/07/2022 0.1    • Neutrophils, Absolute 07/07/2022 14.50 (A)   • Lymphocytes, Absolute 07/07/2022 0.90    • Monocytes, Absolute 07/07/2022 1.10 (A)   • Eosinophils, Absolute 07/07/2022 0.00    • Basophils, Absolute 07/07/2022 0.00    • nRBC 07/07/2022 0.0    • Target HR (85%) 07/08/2022 117    • Max. Pred. HR (100%) 07/08/2022 138    • BH CV VAS R PSEUDOANEURY* 07/08/2022 2.4    • BH CV VAS R PSEUDOANEURY* 07/08/2022 1.6    • BH CV VAS R PSA NECK WID* 07/08/2022 0.4    • R PSEUDOANEURYSM LONG WI* 07/08/2022 1.4    • R PSEUDOANEURYSM TRANSVE* 07/08/2022 2.4    • PROX SFA PSV RIGHT 07/08/2022 136    • PROX PFA PSV RIGHT 07/08/2022 71    • BH CV RIGHT SFA PSA SCRI* 07/08/2022 1    • BH CV RIGHT GROIN PSA DE* 07/08/2022 1.00    • Right groin CFA sys 07/08/2022 273    • WBC 07/08/2022 16.70 (A)   • RBC 07/08/2022 3.73 (A)   • Hemoglobin 07/08/2022 9.3 (A)   • Hematocrit 07/08/2022 28.4 (A)   • MCV 07/08/2022 76.1 (A)   • MCH 07/08/2022 24.9 (A)   • MCHC 07/08/2022 32.7    • RDW 07/08/2022 16.4 (A)   • RDW-SD 07/08/2022 43.8    • MPV 07/08/2022 7.7    • Platelets 07/08/2022 257    • Glucose 07/08/2022 126 (A)   • BUN 07/08/2022 34 (A)   • Creatinine  07/08/2022 0.91    • Sodium 07/08/2022 140    • Potassium 07/08/2022 4.7    • Chloride 07/08/2022 109 (A)   • CO2 07/08/2022 20.0 (A)   • Calcium 07/08/2022 8.2 (A)   • BUN/Creatinine Ratio 07/08/2022 37.4 (A)   • Anion Gap 07/08/2022 11.0    • eGFR 07/08/2022 63.1    • ABO Type 07/08/2022 O    • RH type 07/08/2022 Positive    • Antibody Screen 07/08/2022 Negative    • T&S Expiration Date 07/08/2022 7/11/2022 11:59:59 PM    • Glucose 07/09/2022 134 (A)   • BUN 07/09/2022 28 (A)   • Creatinine 07/09/2022 0.95    • Sodium 07/09/2022 141    • Potassium 07/09/2022 4.3    • Chloride 07/09/2022 110 (A)   • CO2 07/09/2022 23.0    • Calcium 07/09/2022 8.0 (A)   • BUN/Creatinine Ratio 07/09/2022 29.5 (A)   • Anion Gap 07/09/2022 8.0    • eGFR 07/09/2022 59.9 (A)   • WBC 07/09/2022 12.00 (A)   • RBC 07/09/2022 3.61 (A)   • Hemoglobin 07/09/2022 8.6 (A)   • Hematocrit 07/09/2022 27.2 (A)   • MCV 07/09/2022 75.4 (A)   • MCH 07/09/2022 23.8 (A)   • MCHC 07/09/2022 31.6    • RDW 07/09/2022 16.6 (A)   • RDW-SD 07/09/2022 44.6    • MPV 07/09/2022 7.6    • Platelets 07/09/2022 218    • WBC 07/11/2022 11.70 (A)   • RBC 07/11/2022 3.43 (A)   • Hemoglobin 07/11/2022 8.2 (A)   • Hematocrit 07/11/2022 25.7 (A)   • MCV 07/11/2022 75.0 (A)   • MCH 07/11/2022 23.9 (A)   • MCHC 07/11/2022 31.8    • RDW 07/11/2022 16.9 (A)   • RDW-SD 07/11/2022 45.1    • MPV 07/11/2022 7.5    • Platelets 07/11/2022 193    • Scan Slide 07/11/2022     • Neutrophil % 07/11/2022 75.0    • Lymphocyte % 07/11/2022 12.0 (A)   • Monocyte % 07/11/2022 9.0    • Eosinophil % 07/11/2022 3.0    • Bands %  07/11/2022 1.0    • Neutrophils Absolute 07/11/2022 8.89 (A)   • Lymphocytes Absolute 07/11/2022 1.40    • Monocytes Absolute 07/11/2022 1.05 (A)   • Eosinophils Absolute 07/11/2022 0.35    • Anisocytosis 07/11/2022 Slight/1+    • Microcytes 07/11/2022 Slight/1+    • WBC Morphology 07/11/2022 Normal    • Platelet Morphology 07/11/2022 Normal    Hospital Outpatient Visit  on 07/07/2022   Component Date Value   • Target HR (85%) 07/07/2022 117    • Max. Pred. HR (100%) 07/07/2022 138    Lab on 07/07/2022   Component Date Value   • Glucose 07/07/2022 130 (A)   • BUN 07/07/2022 42 (A)   • Creatinine 07/07/2022 1.04 (A)   • Sodium 07/07/2022 139    • Potassium 07/07/2022 4.8    • Chloride 07/07/2022 107    • CO2 07/07/2022 20.0 (A)   • Calcium 07/07/2022 9.1    • Total Protein 07/07/2022 6.8    • Albumin 07/07/2022 3.90    • ALT (SGPT) 07/07/2022 19    • AST (SGOT) 07/07/2022 13    • Alkaline Phosphatase 07/07/2022 76    • Total Bilirubin 07/07/2022 0.4    • Globulin 07/07/2022 2.9    • A/G Ratio 07/07/2022 1.3    • BUN/Creatinine Ratio 07/07/2022 40.4 (A)   • Anion Gap 07/07/2022 12.0    • eGFR 07/07/2022 53.8 (A)   • Magnesium 07/07/2022 2.3    • WBC 07/07/2022 8.10    • RBC 07/07/2022 4.72    • Hemoglobin 07/07/2022 11.2 (A)   • Hematocrit 07/07/2022 35.6    • MCV 07/07/2022 75.4 (A)   • MCH 07/07/2022 23.7 (A)   • MCHC 07/07/2022 31.4 (A)   • RDW 07/07/2022 16.0 (A)   • RDW-SD 07/07/2022 42.9    • MPV 07/07/2022 7.6    • Platelets 07/07/2022 338    • Neutrophil % 07/07/2022 86.0 (A)   • Lymphocyte % 07/07/2022 9.9 (A)   • Monocyte % 07/07/2022 4.0 (A)   • Eosinophil % 07/07/2022 0.0 (A)   • Basophil % 07/07/2022 0.1    • Neutrophils, Absolute 07/07/2022 7.00    • Lymphocytes, Absolute 07/07/2022 0.80    • Monocytes, Absolute 07/07/2022 0.30    • Eosinophils, Absolute 07/07/2022 0.00    • Basophils, Absolute 07/07/2022 0.00    • nRBC 07/07/2022 0.2    Lab on 07/05/2022   Component Date Value   • COVID19 07/05/2022 Not Detected    Hospital Outpatient Visit on 06/27/2022   Component Date Value   • Creatinine 06/27/2022 1.10    • eGFR 06/27/2022 50.3 (A)   Lab on 06/15/2022   Component Date Value   • WBC 06/15/2022 8.50    • RBC 06/15/2022 4.45    • Hemoglobin 06/15/2022 10.9 (A)   • Hematocrit 06/15/2022 35.6    • MCV 06/15/2022 80.0    • MCH 06/15/2022 24.5 (A)   • MCHC 06/15/2022 30.6  (A)   • RDW 06/15/2022 15.8 (A)   • RDW-SD 06/15/2022 45.8    • MPV 06/15/2022 8.7    • Platelets 06/15/2022 345    • Neutrophil % 06/15/2022 77.8 (A)   • Lymphocyte % 06/15/2022 12.4 (A)   • Monocyte % 06/15/2022 8.8    • Eosinophil % 06/15/2022 0.9    • Basophil % 06/15/2022 0.1    • Neutrophils, Absolute 06/15/2022 6.61    • Lymphocytes, Absolute 06/15/2022 1.05    • Monocytes, Absolute 06/15/2022 0.75    • Eosinophils, Absolute 06/15/2022 0.08    • Basophils, Absolute 06/15/2022 0.01    • Extra Tube 06/15/2022 Hold for add-ons.    • Extra Tube 06/15/2022 Hold for add-ons.    Office Visit on 05/18/2022   Component Date Value   • WBC 05/18/2022 8.93    • RBC 05/18/2022 4.05    • Hemoglobin 05/18/2022 9.8 (A)   • Hematocrit 05/18/2022 32.8 (A)   • MCV 05/18/2022 81.0    • MCH 05/18/2022 24.2 (A)   • MCHC 05/18/2022 29.9 (A)   • RDW 05/18/2022 17.6 (A)   • RDW-SD 05/18/2022 51.1    • MPV 05/18/2022 8.2    • Platelets 05/18/2022 382    • Neutrophil % 05/18/2022 78.2 (A)   • Lymphocyte % 05/18/2022 12.2 (A)   • Monocyte % 05/18/2022 8.6    • Eosinophil % 05/18/2022 0.9    • Basophil % 05/18/2022 0.1    • Neutrophils, Absolute 05/18/2022 6.98    • Lymphocytes, Absolute 05/18/2022 1.09    • Monocytes, Absolute 05/18/2022 0.77    • Eosinophils, Absolute 05/18/2022 0.08    • Basophils, Absolute 05/18/2022 0.01    Office Visit on 05/09/2022   Component Date Value   • COVID19 05/09/2022 Not Detected    Lab on 04/25/2022   Component Date Value   • Glucose 04/25/2022 104 (A)   • BUN 04/25/2022 26 (A)   • Creatinine 04/25/2022 1.13 (A)   • Sodium 04/25/2022 136    • Potassium 04/25/2022 5.1    • Chloride 04/25/2022 102    • CO2 04/25/2022 23.0    • Calcium 04/25/2022 9.4    • Total Protein 04/25/2022 7.9    • Albumin 04/25/2022 3.40 (A)   • ALT (SGPT) 04/25/2022 8    • AST (SGOT) 04/25/2022 12    • Alkaline Phosphatase 04/25/2022 72    • Total Bilirubin 04/25/2022 0.3    • Globulin 04/25/2022 4.5    • A/G Ratio 04/25/2022 0.8     • BUN/Creatinine Ratio 04/25/2022 23.0    • Anion Gap 04/25/2022 11.0    • eGFR 04/25/2022 48.7 (A)   • WBC 04/25/2022 9.06    • RBC 04/25/2022 3.97    • Hemoglobin 04/25/2022 9.5 (A)   • Hematocrit 04/25/2022 32.2 (A)   • MCV 04/25/2022 81.1    • MCH 04/25/2022 23.9 (A)   • MCHC 04/25/2022 29.5 (A)   • RDW 04/25/2022 19.2 (A)   • RDW-SD 04/25/2022 55.6 (A)   • MPV 04/25/2022 8.3    • Platelets 04/25/2022 360    • Neutrophil % 04/25/2022 80.0 (A)   • Lymphocyte % 04/25/2022 9.6 (A)   • Monocyte % 04/25/2022 7.8    • Eosinophil % 04/25/2022 2.4    • Basophil % 04/25/2022 0.2    • Neutrophils, Absolute 04/25/2022 7.24 (A)   • Lymphocytes, Absolute 04/25/2022 0.87    • Monocytes, Absolute 04/25/2022 0.71    • Eosinophils, Absolute 04/25/2022 0.22    • Basophils, Absolute 04/25/2022 0.02    • Extra Tube 04/25/2022 Hold for add-ons.    There may be more visits with results that are not included.     Recent labs reviewed and interpreted for clinical significance and application            Level of Care:           Jeremy Mccall MD  07/15/22  .

## 2022-07-19 ENCOUNTER — APPOINTMENT (OUTPATIENT)
Dept: GENERAL RADIOLOGY | Facility: HOSPITAL | Age: 82
End: 2022-07-19

## 2022-07-19 ENCOUNTER — APPOINTMENT (OUTPATIENT)
Dept: CT IMAGING | Facility: HOSPITAL | Age: 82
End: 2022-07-19

## 2022-07-19 ENCOUNTER — HOSPITAL ENCOUNTER (OUTPATIENT)
Facility: HOSPITAL | Age: 82
Setting detail: OBSERVATION
LOS: 1 days | Discharge: NURSING, REHAB OR LONG-TERM CARE | End: 2022-07-21
Attending: EMERGENCY MEDICINE | Admitting: INTERNAL MEDICINE

## 2022-07-19 DIAGNOSIS — L03.115 CELLULITIS OF RIGHT LEG: Primary | ICD-10-CM

## 2022-07-19 LAB
ALBUMIN SERPL-MCNC: 3.9 G/DL (ref 3.5–5.2)
ALBUMIN/GLOB SERPL: 1.3 G/DL
ALP SERPL-CCNC: 81 U/L (ref 39–117)
ALT SERPL W P-5'-P-CCNC: 11 U/L (ref 1–33)
ANION GAP SERPL CALCULATED.3IONS-SCNC: 10 MMOL/L (ref 5–15)
AST SERPL-CCNC: 12 U/L (ref 1–32)
BASOPHILS # BLD AUTO: 0.1 10*3/MM3 (ref 0–0.2)
BASOPHILS NFR BLD AUTO: 0.8 % (ref 0–1.5)
BILIRUB SERPL-MCNC: 0.5 MG/DL (ref 0–1.2)
BUN SERPL-MCNC: 30 MG/DL (ref 8–23)
BUN/CREAT SERPL: 30.9 (ref 7–25)
CALCIUM SPEC-SCNC: 9.3 MG/DL (ref 8.6–10.5)
CHLORIDE SERPL-SCNC: 108 MMOL/L (ref 98–107)
CO2 SERPL-SCNC: 24 MMOL/L (ref 22–29)
CREAT SERPL-MCNC: 0.97 MG/DL (ref 0.57–1)
D-LACTATE SERPL-SCNC: 0.4 MMOL/L (ref 0.5–2)
DEPRECATED RDW RBC AUTO: 52.9 FL (ref 37–54)
EGFRCR SERPLBLD CKD-EPI 2021: 58.5 ML/MIN/1.73
EOSINOPHIL # BLD AUTO: 0.1 10*3/MM3 (ref 0–0.4)
EOSINOPHIL NFR BLD AUTO: 1.4 % (ref 0.3–6.2)
ERYTHROCYTE [DISTWIDTH] IN BLOOD BY AUTOMATED COUNT: 19.1 % (ref 12.3–15.4)
GLOBULIN UR ELPH-MCNC: 3.1 GM/DL
GLUCOSE SERPL-MCNC: 89 MG/DL (ref 65–99)
HCT VFR BLD AUTO: 29.6 % (ref 34–46.6)
HGB BLD-MCNC: 9.5 G/DL (ref 12–15.9)
HOLD SPECIMEN: NORMAL
HOLD SPECIMEN: NORMAL
LYMPHOCYTES # BLD AUTO: 1.3 10*3/MM3 (ref 0.7–3.1)
LYMPHOCYTES NFR BLD AUTO: 14.5 % (ref 19.6–45.3)
MCH RBC QN AUTO: 25 PG (ref 26.6–33)
MCHC RBC AUTO-ENTMCNC: 32.1 G/DL (ref 31.5–35.7)
MCV RBC AUTO: 77.9 FL (ref 79–97)
MONOCYTES # BLD AUTO: 0.6 10*3/MM3 (ref 0.1–0.9)
MONOCYTES NFR BLD AUTO: 7.2 % (ref 5–12)
NEUTROPHILS NFR BLD AUTO: 6.6 10*3/MM3 (ref 1.7–7)
NEUTROPHILS NFR BLD AUTO: 76.1 % (ref 42.7–76)
NRBC BLD AUTO-RTO: 0 /100 WBC (ref 0–0.2)
PLATELET # BLD AUTO: 313 10*3/MM3 (ref 140–450)
PMV BLD AUTO: 7 FL (ref 6–12)
POTASSIUM SERPL-SCNC: 4.2 MMOL/L (ref 3.5–5.2)
PROT SERPL-MCNC: 7 G/DL (ref 6–8.5)
RBC # BLD AUTO: 3.8 10*6/MM3 (ref 3.77–5.28)
SODIUM SERPL-SCNC: 142 MMOL/L (ref 136–145)
WBC NRBC COR # BLD: 8.7 10*3/MM3 (ref 3.4–10.8)
WHOLE BLOOD HOLD COAG: NORMAL

## 2022-07-19 PROCEDURE — 85025 COMPLETE CBC W/AUTO DIFF WBC: CPT | Performed by: EMERGENCY MEDICINE

## 2022-07-19 PROCEDURE — 96365 THER/PROPH/DIAG IV INF INIT: CPT

## 2022-07-19 PROCEDURE — 99285 EMERGENCY DEPT VISIT HI MDM: CPT

## 2022-07-19 PROCEDURE — 99219 PR INITIAL OBSERVATION CARE/DAY 50 MINUTES: CPT | Performed by: HOSPITALIST

## 2022-07-19 PROCEDURE — 25010000002 CEFTRIAXONE PER 250 MG: Performed by: EMERGENCY MEDICINE

## 2022-07-19 PROCEDURE — 73590 X-RAY EXAM OF LOWER LEG: CPT

## 2022-07-19 PROCEDURE — 83605 ASSAY OF LACTIC ACID: CPT

## 2022-07-19 PROCEDURE — 73700 CT LOWER EXTREMITY W/O DYE: CPT

## 2022-07-19 PROCEDURE — 87040 BLOOD CULTURE FOR BACTERIA: CPT | Performed by: EMERGENCY MEDICINE

## 2022-07-19 PROCEDURE — 80053 COMPREHEN METABOLIC PANEL: CPT | Performed by: EMERGENCY MEDICINE

## 2022-07-19 RX ORDER — CHOLECALCIFEROL (VITAMIN D3) 125 MCG
5 CAPSULE ORAL NIGHTLY PRN
Status: DISCONTINUED | OUTPATIENT
Start: 2022-07-19 | End: 2022-07-21 | Stop reason: HOSPADM

## 2022-07-19 RX ORDER — ACETAMINOPHEN 160 MG/5ML
650 SOLUTION ORAL EVERY 4 HOURS PRN
Status: DISCONTINUED | OUTPATIENT
Start: 2022-07-19 | End: 2022-07-21 | Stop reason: HOSPADM

## 2022-07-19 RX ORDER — ONDANSETRON 2 MG/ML
4 INJECTION INTRAMUSCULAR; INTRAVENOUS EVERY 6 HOURS PRN
Status: DISCONTINUED | OUTPATIENT
Start: 2022-07-19 | End: 2022-07-21 | Stop reason: HOSPADM

## 2022-07-19 RX ORDER — SODIUM CHLORIDE 0.9 % (FLUSH) 0.9 %
10 SYRINGE (ML) INJECTION EVERY 12 HOURS SCHEDULED
Status: DISCONTINUED | OUTPATIENT
Start: 2022-07-19 | End: 2022-07-21 | Stop reason: HOSPADM

## 2022-07-19 RX ORDER — SODIUM CHLORIDE 0.9 % (FLUSH) 0.9 %
10 SYRINGE (ML) INJECTION AS NEEDED
Status: DISCONTINUED | OUTPATIENT
Start: 2022-07-19 | End: 2022-07-21 | Stop reason: HOSPADM

## 2022-07-19 RX ORDER — ACETAMINOPHEN 325 MG/1
650 TABLET ORAL EVERY 4 HOURS PRN
Status: DISCONTINUED | OUTPATIENT
Start: 2022-07-19 | End: 2022-07-21 | Stop reason: HOSPADM

## 2022-07-19 RX ORDER — HYDROCODONE BITARTRATE AND ACETAMINOPHEN 10; 325 MG/1; MG/1
1 TABLET ORAL EVERY 4 HOURS PRN
Status: DISCONTINUED | OUTPATIENT
Start: 2022-07-19 | End: 2022-07-21 | Stop reason: HOSPADM

## 2022-07-19 RX ORDER — NITROGLYCERIN 0.4 MG/1
0.4 TABLET SUBLINGUAL
Status: DISCONTINUED | OUTPATIENT
Start: 2022-07-19 | End: 2022-07-21 | Stop reason: HOSPADM

## 2022-07-19 RX ORDER — ALUMINA, MAGNESIA, AND SIMETHICONE 2400; 2400; 240 MG/30ML; MG/30ML; MG/30ML
15 SUSPENSION ORAL EVERY 6 HOURS PRN
Status: DISCONTINUED | OUTPATIENT
Start: 2022-07-19 | End: 2022-07-21 | Stop reason: HOSPADM

## 2022-07-19 RX ORDER — ONDANSETRON 4 MG/1
4 TABLET, FILM COATED ORAL EVERY 6 HOURS PRN
Status: DISCONTINUED | OUTPATIENT
Start: 2022-07-19 | End: 2022-07-21 | Stop reason: HOSPADM

## 2022-07-19 RX ORDER — ACETAMINOPHEN 650 MG/1
650 SUPPOSITORY RECTAL EVERY 4 HOURS PRN
Status: DISCONTINUED | OUTPATIENT
Start: 2022-07-19 | End: 2022-07-21 | Stop reason: HOSPADM

## 2022-07-19 RX ORDER — HYDROCODONE BITARTRATE AND ACETAMINOPHEN 5; 325 MG/1; MG/1
1 TABLET ORAL EVERY 4 HOURS PRN
Status: DISCONTINUED | OUTPATIENT
Start: 2022-07-19 | End: 2022-07-21 | Stop reason: HOSPADM

## 2022-07-19 RX ADMIN — CEFTRIAXONE 1 G: 1 INJECTION, POWDER, FOR SOLUTION INTRAMUSCULAR; INTRAVENOUS at 21:46

## 2022-07-20 ENCOUNTER — APPOINTMENT (OUTPATIENT)
Dept: CARDIOLOGY | Facility: HOSPITAL | Age: 82
End: 2022-07-20

## 2022-07-20 ENCOUNTER — READMISSION MANAGEMENT (OUTPATIENT)
Dept: CALL CENTER | Facility: HOSPITAL | Age: 82
End: 2022-07-20

## 2022-07-20 LAB
ANION GAP SERPL CALCULATED.3IONS-SCNC: 10 MMOL/L (ref 5–15)
BASOPHILS # BLD AUTO: 0 10*3/MM3 (ref 0–0.2)
BASOPHILS NFR BLD AUTO: 0.5 % (ref 0–1.5)
BH CV LOW VAS RIGHT LESSER SAPH VESSEL: 1
BH CV LOWER VASCULAR LEFT COMMON FEMORAL AUGMENT: NORMAL
BH CV LOWER VASCULAR LEFT COMMON FEMORAL COMPETENT: NORMAL
BH CV LOWER VASCULAR LEFT COMMON FEMORAL COMPRESS: NORMAL
BH CV LOWER VASCULAR LEFT COMMON FEMORAL PHASIC: NORMAL
BH CV LOWER VASCULAR LEFT COMMON FEMORAL SPONT: NORMAL
BH CV LOWER VASCULAR RIGHT COMMON FEMORAL AUGMENT: NORMAL
BH CV LOWER VASCULAR RIGHT COMMON FEMORAL COMPETENT: NORMAL
BH CV LOWER VASCULAR RIGHT COMMON FEMORAL PHASIC: NORMAL
BH CV LOWER VASCULAR RIGHT COMMON FEMORAL SPONT: NORMAL
BH CV LOWER VASCULAR RIGHT DISTAL FEMORAL COMPRESS: NORMAL
BH CV LOWER VASCULAR RIGHT GASTRONEMIUS COMPRESS: NORMAL
BH CV LOWER VASCULAR RIGHT GREATER SAPH AK COMPRESS: NORMAL
BH CV LOWER VASCULAR RIGHT GREATER SAPH BK COMPRESS: NORMAL
BH CV LOWER VASCULAR RIGHT LESSER SAPH COMPRESS: NORMAL
BH CV LOWER VASCULAR RIGHT LESSER SAPH THROMBUS: NORMAL
BH CV LOWER VASCULAR RIGHT MID FEMORAL AUGMENT: NORMAL
BH CV LOWER VASCULAR RIGHT MID FEMORAL COMPETENT: NORMAL
BH CV LOWER VASCULAR RIGHT MID FEMORAL COMPRESS: NORMAL
BH CV LOWER VASCULAR RIGHT MID FEMORAL PHASIC: NORMAL
BH CV LOWER VASCULAR RIGHT MID FEMORAL SPONT: NORMAL
BH CV LOWER VASCULAR RIGHT PERONEAL COMPRESS: NORMAL
BH CV LOWER VASCULAR RIGHT POPLITEAL AUGMENT: NORMAL
BH CV LOWER VASCULAR RIGHT POPLITEAL COMPETENT: NORMAL
BH CV LOWER VASCULAR RIGHT POPLITEAL COMPRESS: NORMAL
BH CV LOWER VASCULAR RIGHT POPLITEAL PHASIC: NORMAL
BH CV LOWER VASCULAR RIGHT POPLITEAL SPONT: NORMAL
BH CV LOWER VASCULAR RIGHT POSTERIOR TIBIAL COMPRESS: NORMAL
BH CV LOWER VASCULAR RIGHT PROXIMAL FEMORAL COMPRESS: NORMAL
BUN SERPL-MCNC: 23 MG/DL (ref 8–23)
BUN/CREAT SERPL: 29.5 (ref 7–25)
CALCIUM SPEC-SCNC: 8.4 MG/DL (ref 8.6–10.5)
CHLORIDE SERPL-SCNC: 108 MMOL/L (ref 98–107)
CO2 SERPL-SCNC: 22 MMOL/L (ref 22–29)
CREAT SERPL-MCNC: 0.78 MG/DL (ref 0.57–1)
DEPRECATED RDW RBC AUTO: 52.1 FL (ref 37–54)
EGFRCR SERPLBLD CKD-EPI 2021: 75.9 ML/MIN/1.73
EOSINOPHIL # BLD AUTO: 0.1 10*3/MM3 (ref 0–0.4)
EOSINOPHIL NFR BLD AUTO: 2.1 % (ref 0.3–6.2)
ERYTHROCYTE [DISTWIDTH] IN BLOOD BY AUTOMATED COUNT: 18.9 % (ref 12.3–15.4)
GLUCOSE SERPL-MCNC: 119 MG/DL (ref 65–99)
HCT VFR BLD AUTO: 26.5 % (ref 34–46.6)
HGB BLD-MCNC: 8.4 G/DL (ref 12–15.9)
LYMPHOCYTES # BLD AUTO: 1.2 10*3/MM3 (ref 0.7–3.1)
LYMPHOCYTES NFR BLD AUTO: 17.4 % (ref 19.6–45.3)
MAXIMAL PREDICTED HEART RATE: 138 BPM
MCH RBC QN AUTO: 24.7 PG (ref 26.6–33)
MCHC RBC AUTO-ENTMCNC: 31.5 G/DL (ref 31.5–35.7)
MCV RBC AUTO: 78.2 FL (ref 79–97)
MONOCYTES # BLD AUTO: 0.5 10*3/MM3 (ref 0.1–0.9)
MONOCYTES NFR BLD AUTO: 6.4 % (ref 5–12)
NEUTROPHILS NFR BLD AUTO: 5.2 10*3/MM3 (ref 1.7–7)
NEUTROPHILS NFR BLD AUTO: 73.6 % (ref 42.7–76)
NRBC BLD AUTO-RTO: 0 /100 WBC (ref 0–0.2)
PLATELET # BLD AUTO: 265 10*3/MM3 (ref 140–450)
PMV BLD AUTO: 7 FL (ref 6–12)
POTASSIUM SERPL-SCNC: 4 MMOL/L (ref 3.5–5.2)
RBC # BLD AUTO: 3.38 10*6/MM3 (ref 3.77–5.28)
SODIUM SERPL-SCNC: 140 MMOL/L (ref 136–145)
STRESS TARGET HR: 117 BPM
WBC NRBC COR # BLD: 7.1 10*3/MM3 (ref 3.4–10.8)

## 2022-07-20 PROCEDURE — G0378 HOSPITAL OBSERVATION PER HR: HCPCS

## 2022-07-20 PROCEDURE — 97162 PT EVAL MOD COMPLEX 30 MIN: CPT

## 2022-07-20 PROCEDURE — 94640 AIRWAY INHALATION TREATMENT: CPT

## 2022-07-20 PROCEDURE — 25010000002 VANCOMYCIN 1 G RECONSTITUTED SOLUTION 1 EACH VIAL: Performed by: HOSPITALIST

## 2022-07-20 PROCEDURE — 94799 UNLISTED PULMONARY SVC/PX: CPT

## 2022-07-20 PROCEDURE — 99225 PR SBSQ OBSERVATION CARE/DAY 25 MINUTES: CPT | Performed by: INTERNAL MEDICINE

## 2022-07-20 PROCEDURE — 80048 BASIC METABOLIC PNL TOTAL CA: CPT | Performed by: HOSPITALIST

## 2022-07-20 PROCEDURE — 99214 OFFICE O/P EST MOD 30 MIN: CPT | Performed by: INTERNAL MEDICINE

## 2022-07-20 PROCEDURE — 96367 TX/PROPH/DG ADDL SEQ IV INF: CPT

## 2022-07-20 PROCEDURE — 85025 COMPLETE CBC W/AUTO DIFF WBC: CPT | Performed by: HOSPITALIST

## 2022-07-20 PROCEDURE — 93971 EXTREMITY STUDY: CPT

## 2022-07-20 PROCEDURE — 25010000002 VANCOMYCIN 1 G RECONSTITUTED SOLUTION 1 EACH VIAL: Performed by: INTERNAL MEDICINE

## 2022-07-20 PROCEDURE — 36415 COLL VENOUS BLD VENIPUNCTURE: CPT | Performed by: HOSPITALIST

## 2022-07-20 PROCEDURE — 25010000002 CEFTRIAXONE PER 250 MG: Performed by: HOSPITALIST

## 2022-07-20 RX ORDER — FERROUS SULFATE TAB EC 324 MG (65 MG FE EQUIVALENT) 324 (65 FE) MG
324 TABLET DELAYED RESPONSE ORAL
Refills: 11 | Status: DISCONTINUED | OUTPATIENT
Start: 2022-07-20 | End: 2022-07-21 | Stop reason: HOSPADM

## 2022-07-20 RX ORDER — BUDESONIDE AND FORMOTEROL FUMARATE DIHYDRATE 160; 4.5 UG/1; UG/1
2 AEROSOL RESPIRATORY (INHALATION)
Refills: 2 | Status: DISCONTINUED | OUTPATIENT
Start: 2022-07-20 | End: 2022-07-21 | Stop reason: HOSPADM

## 2022-07-20 RX ORDER — SPIRONOLACTONE 25 MG/1
25 TABLET ORAL EVERY OTHER DAY
Status: DISCONTINUED | OUTPATIENT
Start: 2022-07-20 | End: 2022-07-21 | Stop reason: HOSPADM

## 2022-07-20 RX ORDER — METOPROLOL SUCCINATE 50 MG/1
50 TABLET, EXTENDED RELEASE ORAL DAILY
Status: DISCONTINUED | OUTPATIENT
Start: 2022-07-20 | End: 2022-07-21 | Stop reason: HOSPADM

## 2022-07-20 RX ORDER — FUROSEMIDE 40 MG/1
40 TABLET ORAL EVERY OTHER DAY
Status: DISCONTINUED | OUTPATIENT
Start: 2022-07-20 | End: 2022-07-21 | Stop reason: HOSPADM

## 2022-07-20 RX ADMIN — Medication 10 ML: at 20:13

## 2022-07-20 RX ADMIN — ACETAMINOPHEN 650 MG: 325 TABLET, FILM COATED ORAL at 00:22

## 2022-07-20 RX ADMIN — METOPROLOL SUCCINATE 50 MG: 50 TABLET, EXTENDED RELEASE ORAL at 10:20

## 2022-07-20 RX ADMIN — ACETAMINOPHEN 650 MG: 325 TABLET, FILM COATED ORAL at 20:13

## 2022-07-20 RX ADMIN — Medication 10 ML: at 00:13

## 2022-07-20 RX ADMIN — APIXABAN 2.5 MG: 2.5 TABLET, FILM COATED ORAL at 16:39

## 2022-07-20 RX ADMIN — VANCOMYCIN HYDROCHLORIDE 1000 MG: 1 INJECTION, POWDER, LYOPHILIZED, FOR SOLUTION INTRAVENOUS at 00:01

## 2022-07-20 RX ADMIN — BUDESONIDE AND FORMOTEROL FUMARATE DIHYDRATE 2 PUFF: 160; 4.5 AEROSOL RESPIRATORY (INHALATION) at 18:40

## 2022-07-20 RX ADMIN — BUDESONIDE AND FORMOTEROL FUMARATE DIHYDRATE 2 PUFF: 160; 4.5 AEROSOL RESPIRATORY (INHALATION) at 08:15

## 2022-07-20 RX ADMIN — CEFTRIAXONE 1 G: 1 INJECTION, POWDER, FOR SOLUTION INTRAMUSCULAR; INTRAVENOUS at 20:05

## 2022-07-20 RX ADMIN — VANCOMYCIN HYDROCHLORIDE 1000 MG: 1 INJECTION, POWDER, LYOPHILIZED, FOR SOLUTION INTRAVENOUS at 20:41

## 2022-07-20 RX ADMIN — Medication 10 ML: at 10:20

## 2022-07-20 NOTE — ED PROVIDER NOTES
Subjective   History of Present Illness  81-year-old female presents with right leg pain.  She is developed redness warmth.  She had watchman procedure earlier this month developed AV fistula and pseudoaneurysm.  She had significant bleeding following the procedure.  She had thrombin injection repair but had recurrent pseudoaneurysm and required operative repair.  She has had no fever.  She has pain if she tries to ambulate.  She has had no cough shortness of breath.  No fever.  She states symptoms started 3 days ago and have become progressively more severe.  She describes moderate severe pain constant in nature.  Review of Systems   Constitutional: Negative for fever and unexpected weight change.   HENT: Negative for congestion and sore throat.    Eyes: Negative for pain (.SPdispo).   Respiratory: Positive for cough. Negative for shortness of breath.    Cardiovascular: Negative for chest pain and leg swelling.   Gastrointestinal: Negative for abdominal pain, diarrhea and vomiting.   Genitourinary: Negative for dysuria and urgency.   Musculoskeletal: Negative for back pain.        Right lower leg and foot pain   Skin: Positive for color change. Negative for rash.   Neurological: Negative for dizziness, weakness and headaches.   Hematological: Bruises/bleeds easily.   Psychiatric/Behavioral: Negative for confusion.       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   • Hypertension        Allergies   Allergen Reactions   • Ciprofloxacin Unknown (See Comments)   • Sulfa Antibiotics Unknown (See Comments)   • Cyprodenate Unknown - High Severity   • Iodine Unknown - High Severity       Past Surgical History:   Procedure Laterality Date   • ATRIAL APPENDAGE EXCLUSION LEFT WITH TRANSESOPHAGEAL ECHOCARDIOGRAM Right 7/7/2022    Procedure: Atrial Appendage Occlusion watchman  team aware $;  Surgeon: Sage Garcia MD;  Location: [facility] CATH INVASIVE LOCATION;  Service: Cardiovascular;  Laterality: Right;   • ATRIAL APPENDAGE EXCLUSION LEFT WITH TRANSESOPHAGEAL ECHOCARDIOGRAM N/A 7/7/2022    Procedure: Atrial Appendage Occlusion;  Surgeon: Fide Mullen MD;  Location: [facility] CATH INVASIVE LOCATION;  Service: Cardiovascular;  Laterality: N/A;   • CATARACT EXTRACTION     • CHOLECYSTECTOMY     • HYSTERECTOMY  1987    Endometriosis   • OOPHORECTOMY     • PSEUDO ANEURYSM REPAIR, EXTREMITY Right 7/8/2022    Procedure: REPAIR OF POST CATHETERIZATION RIGHT FEMORAL PSEUDOANEURYSM AND ARTERIOVENOUS FISTUAL;  Surgeon: Alfonzo Hooks MD;  Location: [facility] MAIN OR;  Service: Vascular;  Laterality: Right;   • TONSILLECTOMY         Family History   Problem Relation Age of Onset   • Breast cancer Mother 85   • Stroke Mother    • Heart disease Mother    • Endometrial cancer Mother 70   • Heart failure Father    • Heart failure Sister    • COPD Sister    • Heart disease Sister        Social History     Socioeconomic History   • Marital status:    Tobacco Use   • Smoking status: Never Smoker   • Smokeless tobacco: Never Used   Vaping Use   • Vaping Use: Never used   Substance and Sexual Activity   • Alcohol use: No   • Drug use: Never   • Sexual activity: Not Currently     Prior to Admission medications    Medication Sig Start Date End Date Taking? Authorizing Provider   acetaminophen (TYLENOL) 500 MG tablet Take 500 mg by mouth Every 6 (Six) Hours As Needed for Mild Pain .    Provider, MD Deborah   apixaban (ELIQUIS) 2.5 MG tablet tablet Take 1 tablet by mouth Every 12 (Twelve) Hours. Indications: Atrial Fibrillation 7/11/22   Fide Mullen MD   Breo Ellipta 200-25 MCG/INH inhaler Inhale 1 puff Daily. 4/2/21   Etelvina Ulloa MD   Cholecalciferol (VITAMIN D3) 2000 units capsule Take 2,000 Units by mouth Daily. 2/20/19   ProviderDeborah MD    Cyanocobalamin (VITAMIN B12) 1000 MCG tablet controlled-release Take 2,500 mcg by mouth Daily. 2/20/19   Deborah King MD   ferrous sulfate 325 (65 FE) MG tablet Take 1 tablet by mouth Every Other Day. 6/15/22   Frank Cheng MD   furosemide (LASIX) 40 MG tablet Take 40 mg by mouth Every Other Day.    Deborah King MD   metoprolol succinate XL (TOPROL-XL) 50 MG 24 hr tablet Take 1 tablet by mouth Daily. 4/21/22   Jeremy Mccall MD   spironolactone (ALDACTONE) 25 MG tablet Take 1 tablet by mouth Every Other Day. 5/20/22   Etelvina Ulloa MD   triamcinolone (KENALOG) 0.1 % cream Apply  topically to the appropriate area as directed 2 (Two) Times a Day. 1/21/22   Etelvina Ulloa MD             Objective   Physical Exam  81-year-old female awake alert.  Generally well-developed well-nourished.  Pupils equal round to light.  Neck supple.  Chest clear equal breath sounds cardiovascular regular rate and rhythm abdomen soft nontender.  Examination of right leg reveals postoperative incisions to right groin that appear to be healing without complication.  She has bruising down thigh to lower leg.  She has erythema warmth and tenderness to lower leg and foot.  She has strong palpable dorsalis pedis pulse.    Procedures           ED Course      Results for orders placed or performed during the hospital encounter of 07/19/22   Comprehensive Metabolic Panel    Specimen: Blood   Result Value Ref Range    Glucose 89 65 - 99 mg/dL    BUN 30 (H) 8 - 23 mg/dL    Creatinine 0.97 0.57 - 1.00 mg/dL    Sodium 142 136 - 145 mmol/L    Potassium 4.2 3.5 - 5.2 mmol/L    Chloride 108 (H) 98 - 107 mmol/L    CO2 24.0 22.0 - 29.0 mmol/L    Calcium 9.3 8.6 - 10.5 mg/dL    Total Protein 7.0 6.0 - 8.5 g/dL    Albumin 3.90 3.50 - 5.20 g/dL    ALT (SGPT) 11 1 - 33 U/L    AST (SGOT) 12 1 - 32 U/L    Alkaline Phosphatase 81 39 - 117 U/L    Total Bilirubin 0.5 0.0 - 1.2 mg/dL    Globulin 3.1 gm/dL    A/G Ratio 1.3  "g/dL    BUN/Creatinine Ratio 30.9 (H) 7.0 - 25.0    Anion Gap 10.0 5.0 - 15.0 mmol/L    eGFR 58.5 (L) >60.0 mL/min/1.73   CBC Auto Differential    Specimen: Blood   Result Value Ref Range    WBC 8.70 3.40 - 10.80 10*3/mm3    RBC 3.80 3.77 - 5.28 10*6/mm3    Hemoglobin 9.5 (L) 12.0 - 15.9 g/dL    Hematocrit 29.6 (L) 34.0 - 46.6 %    MCV 77.9 (L) 79.0 - 97.0 fL    MCH 25.0 (L) 26.6 - 33.0 pg    MCHC 32.1 31.5 - 35.7 g/dL    RDW 19.1 (H) 12.3 - 15.4 %    RDW-SD 52.9 37.0 - 54.0 fl    MPV 7.0 6.0 - 12.0 fL    Platelets 313 140 - 450 10*3/mm3    Neutrophil % 76.1 (H) 42.7 - 76.0 %    Lymphocyte % 14.5 (L) 19.6 - 45.3 %    Monocyte % 7.2 5.0 - 12.0 %    Eosinophil % 1.4 0.3 - 6.2 %    Basophil % 0.8 0.0 - 1.5 %    Neutrophils, Absolute 6.60 1.70 - 7.00 10*3/mm3    Lymphocytes, Absolute 1.30 0.70 - 3.10 10*3/mm3    Monocytes, Absolute 0.60 0.10 - 0.90 10*3/mm3    Eosinophils, Absolute 0.10 0.00 - 0.40 10*3/mm3    Basophils, Absolute 0.10 0.00 - 0.20 10*3/mm3    nRBC 0.0 0.0 - 0.2 /100 WBC   POC Lactate    Specimen: Blood   Result Value Ref Range    Lactate 0.4 (L) 0.5 - 2.0 mmol/L   Green Top (Gel)   Result Value Ref Range    Extra Tube Hold for add-ons.    Gold Top - SST   Result Value Ref Range    Extra Tube Hold for add-ons.    Light Blue Top   Result Value Ref Range    Extra Tube Hold for add-ons.      No radiology results for the last day  Medications   sodium chloride 0.9 % flush 10 mL (has no administration in time range)   cefTRIAXone (ROCEPHIN) 1 g in sodium chloride 0.9 % 100 mL IVPB (0 g Intravenous Stopped 7/19/22 2226)     /66   Pulse 79   Temp 98.6 °F (37 °C) (Oral)   Resp 18   Ht 154.9 cm (61\")   Wt 56.1 kg (123 lb 10.9 oz)   SpO2 97%   BMI 23.37 kg/m²                                        MDM  Chart review: Patient had watchman procedure earlier this month complicated by pseudoaneurysm and AV fistula requiring operative repair.  Comorbidity: As per past history  Differential: Cellulitis " vascular abnormality, embolus,  My EKG interpretation:   Lab: White count 8.7 with hemoglobin 9.5 platelet count 313 76 segs no bands lactic acid normal 0.4 comprehensive metabolic panel BUN 30 creatinine 0.97.  Radiology:  Discussion/treatment:  Patient IV placed.  Was given Rocephin.  She initially was adamant that she was going home but after she was here realized that she was having too much difficulty bearing weight and lives alone.  Patient will admitted for IV antibiotics.  Patient was discussed with Dr. Anand.  Patient was evaluated using appropriate PPE      Final diagnoses:   Cellulitis of right leg       ED Disposition  ED Disposition     ED Disposition   Decision to Admit    Condition   --    Comment   Level of Care: Med/Surg [1]   Admitting Physician: MARLEEN ANAND [[id number]]   Attending Physician: CHETAN BRAXTON [[id number]]               No follow-up provider specified.       Medication List      No changes were made to your prescriptions during this visit.          Demetris Shelton MD  07/19/22 0849

## 2022-07-20 NOTE — PROGRESS NOTES
[facility] Medicine Services Daily Progress Note    Patient Name: Chrystal Ruiz  : 1940  MRN: [id number]  Primary Care Physician:  Etelvina Ulloa MD  Date of admission: 2022      Subjective      Chief Complaint: Right lower extremity pain    Patient seen and examined this morning.  Still having right leg pain mainly just above the ankle on the right with some erythema noted there.  States has been going on for past 3 days now.  Right groin pain also there but is about the same as before.    Pertinent positives as noted in HPI/subjective.  All other systems were reviewed and are negative.      Objective      Vitals:   Temp:  [98.6 °F (37 °C)] 98.6 °F (37 °C)  Heart Rate:  [62-79] 75  Resp:  [16-18] 18  BP: (130-159)/(65-72) 130/65    Physical Exam:    General: Awake, alert, elderly female, lying in bed, NAD  Eyes: PERRL, EOMI, conjunctive are clear  Cardiovascular: Regular rate and rhythm, no murmurs  Respiratory: Clear to auscultation bilaterally, no wheezing or rales, unlabored breathing  Abdomen: Soft, nontender, positive bowel sounds, no guarding  Neurologic: A&O, CN grossly intact, moves all extremities spontaneously  Musculoskeletal: Normal range of motion, no deformities  Skin: Warm, right lower extremity groin incision intact, ecchymosis noted down the right lower extremity with some generalized tenderness but more just above the ankle.  Erythema and warmth noted just above the ankle on the right without any active drainage or wound noted.  Palpable pulses distally.         Result Review    Result Review:  I have personally reviewed the results from the time of this admission to 2022 09:54 EDT and agree with these findings:  [x]  Laboratory  [x]  Microbiology  [x]  Radiology  [x]  EKG/Telemetry   [x]  Cardiology/Vascular   []  Pathology  [x]  Old records  []  Other:          Assessment & Plan      Brief Patient Summary:  Chrystal Ruiz is a 81 y.o. female  presenting to the emergency room with right calf pain swelling redness as well as right groin swelling.  Patient with recent vascular surgery 7/8/2022 for repair of right femoral pseudoaneurysm and AV fistula primarily groin.  Patient has expected postoperative swelling hematoma on exam with no signs of infection in the groin.  Patient does have some bruising down the thigh and calf as expected from the pseudoaneurysm or bleed in the right groin after the heart catheterization watchman procedure.  Patient with tenderness and redness of the calf and admitted by medicine for antibiotics.  Patient had CT scan of the lower extremity without contrast which shows fluid collection in the groin as expected.  We will go and check lower extremity venous duplex scan right lower extremity.  Patient is on Eliquis for atrial fibrillation history status post atrial appendage exclusion 7/7/2022.      budesonide-formoterol, 2 puff, Inhalation, BID - RT  cefTRIAXone, 1 g, Intravenous, Q24H  ferrous sulfate, 324 mg, Oral, Daily With Breakfast  furosemide, 40 mg, Oral, Every Other Day  metoprolol succinate XL, 50 mg, Oral, Daily  sodium chloride, 10 mL, Intravenous, Q12H  spironolactone, 25 mg, Oral, Every Other Day  [START ON 7/21/2022] vancomycin, 15 mg/kg, Intravenous, Q24H       Pharmacy to dose vancomycin,          Active Hospital Problems:  Active Hospital Problems    Diagnosis    • Cellulitis of right leg      Plan:   Right lower extremity cellulitis  - Empiric IV antibiotics with ceftriaxone and vancomycin  -No active drainage to culture  - Follow blood cultures  - Imaging reports noted  - DP pulse on the right side is easily palpable     Right lower extremity hematoma  Acute on chronic anemia  - Trend hemoglobin  - Holding anticoagulation until cleared by vascular surgery, see below  - Right lower extremity CT ordered to evaluate for hematoma (patient is allergic to contrast)  - Vascular surgery following, venous Dopplers  ordered  -Resume Eliquis once okay with vascular surgery     Atrial fibrillation  - Recent watchman device placed on 7/7  - She is supposed to be taking Eliquis for 45 days following the procedure, however will hold for now due to hematoma pending vascular surgery clearance  - Cardiology and vascular surgery consulted     Heart failure with preserved ejection fraction, not in an exacerbation  - Continue home meds except as above, monitor     DVT prophylaxis:  Mechanical DVT prophylaxis orders are present.    CODE STATUS:    Code Status (Patient has no pulse and is not breathing): CPR (Attempt to Resuscitate)  Medical Interventions (Patient has pulse or is breathing): Full Support      Disposition: Pending improvement    Electronically signed by Nick Berry DO, 07/20/22, 09:54 EDT.  [facility] Hospitalist Team

## 2022-07-20 NOTE — OUTREACH NOTE
General Surgery Week 2 Survey    Flowsheet Row Responses   [facility] patient discharged from? [facility]   Does the patient have one of the following disease processes/diagnoses(primary or secondary)? General Surgery   Week 2 attempt successful? No   Revoke Readmitted          ARJUN MCKEON - Registered Nurse

## 2022-07-20 NOTE — PROGRESS NOTES
I have reviewed images tracings and report of the right lower extremity venous duplex scan.  There is no deep or superficial vein thrombus.  The right groin has a hematoma after open repair of the right femoral artery pseudoaneurysm and fistula ligation with normal venous flows.  It is okay to restart the Eliquis anticoagulation.  Continue antibiotics for right leg cellulitis.  Plans per medicine.

## 2022-07-20 NOTE — DISCHARGE PLACEMENT REQUEST
"Krista Ruiz (81 y.o. Female)             Date of Birth   1940    Social Security Number       [address] [place] [address]    Home Phone   [phone]    MRN   [id number]       Caodaism   Nazarene    Marital Status                               Admission Date   7/19/22    Admission Type   Emergency    Admitting Provider   Nick Berry DO    Attending Provider   Nick Berry DO    Department, Room/Bed   [facility] EMERGENCY DEPARTMENT, 25/25       Discharge Date       Discharge Disposition       Discharge Destination                               Attending Provider: Nick Berry DO    Allergies: Ciprofloxacin, Sulfa Antibiotics, Cyprodenate, Iodine    Isolation: None   Infection: COVID Screen (preop/placement) (07/08/22)   Code Status: CPR   Advance Care Planning Activity    Ht: 154.9 cm (61\")   Wt: 56.1 kg (123 lb 10.9 oz)    Admission Cmt: None   Principal Problem: None                Active Insurance as of 7/19/2022     Primary Coverage     Payor Plan Insurance Group Employer/Plan Group    MEDICARE MEDICARE A & B      Payor Plan Address Payor Plan Phone Number Payor Plan Fax Number Effective Dates    [address] [phone]  10/1/2004 - None Entered    [facility] [id number]       Subscriber Name Subscriber Birth Date Member ID       KRISTA RUIZ 1940 [id number]           Secondary Coverage     Payor Plan Insurance Group Employer/Plan Group    [facility]      Payor Plan Address Payor Plan Phone Number Payor Plan Fax Number Effective Dates    [facility] [phone]  1/1/2019 - None Entered    [address]       [facility] [address]       Subscriber Name Subscriber Birth Date Member ID       KRISTA RUIZ 1940 [id number]                 Emergency Contacts      (Rel.) Home Phone Work Phone Mobile Phone    Kashif Ruiz (Son) -- -- [phone]    ELIEL RUIZ (Relative) [phone] -- " [phone]    Elo Ruiz (Relative) -- -- [phone]    Amber Campoverde (Sister) -- -- [phone]

## 2022-07-20 NOTE — CONSULTS
Name: Chrystal Ruiz ADMIT: 2022   : 1940  PCP: Etelvina Ulloa MD    MRN: [id number] LOS: 1 days   AGE/SEX: 81 y.o. female  ROOM:      Consults  Andreas Lewis MD    Location: [facility]     LOS: 1 day   Patient Care Team:  Etelvina Ulloa MD as PCP - General  Etelvina Ulloa MD as PCP - Family Medicine  Mukund Farias MD as Consulting Physician (Pulmonary Disease)  Alan Wynn DPM as Consulting Physician (Podiatry)  Etelvina Mcconnell MD as Surgeon (Thoracic Surgery)  Michael Whitten MD as Consulting Physician (Radiation Oncology)  Frank Cheng MD as Consulting Physician (Hematology and Oncology)  Jeremy Mccall MD as Consulting Physician (Cardiology)    Subjective     History of Present Illness  81 y.o. female presenting to the emergency room with right calf pain swelling redness as well as right groin swelling.  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  Patient has expected postoperative swelling hematoma on exam with no signs of infection in the groin.  Patient does have some bruising down the thigh and calf as expected from the pseudoaneurysm or bleed in the right groin after the heart catheterization watchman procedure.  Patient with tenderness and redness of the calf and admitted by medicine for antibiotics.  Patient had CT scan of the lower extremity without contrast which shows fluid collection in the groin as expected.  We will go and check lower extremity venous duplex scan right lower extremity.  Patient is on Eliquis for atrial fibrillation history status post atrial appendage exclusion 2022.      Review of Systems   Musculoskeletal: Positive for arthralgias.   Hematological: Bruises/bleeds easily.   All other systems reviewed and are negative.      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   • Hypertension        Past Surgical History:   Procedure Laterality Date   • ATRIAL APPENDAGE EXCLUSION LEFT WITH TRANSESOPHAGEAL ECHOCARDIOGRAM Right 7/7/2022    Procedure: Atrial Appendage Occlusion watchman team aware $;  Surgeon: Sage Garcia MD;  Location: [facility] CATH INVASIVE LOCATION;  Service: Cardiovascular;  Laterality: Right;   • ATRIAL APPENDAGE EXCLUSION LEFT WITH TRANSESOPHAGEAL ECHOCARDIOGRAM N/A 7/7/2022    Procedure: Atrial Appendage Occlusion;  Surgeon: Fide Mullen MD;  Location: [facility] CATH INVASIVE LOCATION;  Service: Cardiovascular;  Laterality: N/A;   • CATARACT EXTRACTION     • CHOLECYSTECTOMY     • HYSTERECTOMY  1987    Endometriosis   • OOPHORECTOMY     • PSEUDO ANEURYSM REPAIR, EXTREMITY Right 7/8/2022    Procedure: REPAIR OF POST CATHETERIZATION RIGHT FEMORAL PSEUDOANEURYSM AND ARTERIOVENOUS FISTUAL;  Surgeon: Alfonzo Hooks MD;  Location: [facility] MAIN OR;  Service: Vascular;  Laterality: Right;   • TONSILLECTOMY         Family History   Problem Relation Age of Onset   • Breast cancer Mother 85   • Stroke Mother    • Heart disease Mother    • Endometrial cancer Mother 70   • Heart failure Father    • Heart failure Sister    • COPD Sister    • Heart disease Sister        Social History     Tobacco Use   • Smoking status: Never Smoker   • Smokeless tobacco: Never Used   Vaping Use   • Vaping Use: Never used   Substance Use Topics   • Alcohol use: No   • Drug use: Never       Allergies: Ciprofloxacin, Sulfa antibiotics, Cyprodenate, and Iodine    (Not in a hospital admission)    budesonide-formoterol, 2 puff, Inhalation, BID - RT  cefTRIAXone, 1 g, Intravenous, Q24H  ferrous sulfate, 324 mg, Oral, Daily With Breakfast  furosemide, 40 mg, Oral, Every Other Day  metoprolol succinate XL, 50 mg, Oral, Daily  sodium chloride, 10 mL, Intravenous, Q12H  spironolactone, 25 mg, Oral, Every Other  Day  [START ON 7/21/2022] vancomycin, 15 mg/kg, Intravenous, Q24H      Pharmacy to dose vancomycin,       •  acetaminophen **OR** acetaminophen **OR** acetaminophen  •  aluminum-magnesium hydroxide-simethicone  •  HYDROcodone-acetaminophen  •  HYDROcodone-acetaminophen  •  melatonin  •  nitroglycerin  •  ondansetron **OR** ondansetron  •  Pharmacy to dose vancomycin  •  sodium chloride  •  sodium chloride      Objective     Physical Exam  Eyes:      Extraocular Movements: Extraocular movements intact.      Pupils: Pupils are equal, round, and reactive to light.   Cardiovascular:      Rate and Rhythm: Normal rate and regular rhythm. Occasional extrasystoles are present.     Pulses: Normal pulses.           Radial pulses are 2+ on the right side and 2+ on the left side.        Femoral pulses are 2+ on the right side and 2+ on the left side.       Dorsalis pedis pulses are 2+ on the right side and 2+ on the left side.   Pulmonary:      Effort: Pulmonary effort is normal.      Breath sounds: Normal breath sounds.   Abdominal:      General: Abdomen is flat. Bowel sounds are normal.      Palpations: Abdomen is soft.   Musculoskeletal:         General: Swelling (Right groin after recent surgery as expected) and tenderness (Right anterior calf and area of redness cellulitis) present.      Cervical back: Neck supple.      Right lower leg: Edema present.   Skin:     General: Skin is warm.      Capillary Refill: Capillary refill takes less than 2 seconds.   Neurological:      General: No focal deficit present.      Mental Status: She is alert and oriented to person, place, and time.      Cranial Nerves: No cranial nerve deficit.      Sensory: No sensory deficit.      Motor: No weakness.   Psychiatric:         Mood and Affect: Mood normal.         Behavior: Behavior normal.         Thought Content: Thought content normal.         Judgment: Judgment normal.         Results from last 7 days   Lab Units 07/20/22  4011  225 22  1155   WBC 10*3/mm3 7.10 8.70 14.38*   HEMOGLOBIN g/dL 8.4* 9.5* 9.1*   PLATELETS 10*3/mm3 265 313 279     Results from last 7 days   Lab Units 22  0512 22  2105   SODIUM mmol/L 140 142   POTASSIUM mmol/L 4.0 4.2   CHLORIDE mmol/L 108* 108*   CO2 mmol/L 22.0 24.0   BUN mg/dL 23 30*   CREATININE mg/dL 0.78 0.97   GLUCOSE mg/dL 119* 89   Estimated Creatinine Clearance: 49.2 mL/min (by C-G formula based on SCr of 0.78 mg/dL).      Imaging Studies:    Coding  Active Hospital Problems    Diagnosis  POA   • Cellulitis of right leg [L03.115]  Yes      Resolved Hospital Problems   No resolved problems to display.     Problem Points:  4:  Patient has a new problem, with additional work-up planned  Total problem points:4 or more    Data Points:  1:  I have reviewed or order clinical lab test  1:  I have reviewed or order radiology test (except heart catheterization or echo)  2:  I have personally and independently review of image, tracing, or specimen  1:  I have personally decided to obtain of records  2:  I have reviewed and summation of old records and/or discussed the patients care with another health care provider  Total data points:4 or more    Risk Points:  High:  Cardiovascular imaging with risk factors    MDM Prob point Data point Risk   SF 1 1 Minimal   Low 2 2 Low   Mod 3 3 Moderate   High 4 4 High     Code MDM History Exam Time   70691 SF/Low Detailed Detailed 30   34018 Mod Comprehensive Comprehensive 50   33497 High Comprehensive Comprehensive 70     Detailed history:  4 elements HPI or status of 3 chronic problems; 2-9 system ROS  Comprehensive:  4 elements HPI or status of 3 chronic problems;  10 system ROS    Detailed Exam:  12 findings from any organ system  Comprehensive Exam:  2 findings from each of 9 systems.     [place]    Assessment & Plan       Cellulitis of right leg        81 y.o. female presented to the emergency room with right lower extremity calf pain  cellulitis admitted by medicine for IV antibiotics.  Right groin incision healed nicely with no signs infection and expected swelling.  Will check right lower extremity venous duplex scan.  Patient is on Eliquis for cardiac reasons.    I discussed the patients findings and my recommendations with patient and nursing staff.  Please call my office with any question: [phone]    Andreas Lewis MD  07/20/22  07:31 EDT

## 2022-07-20 NOTE — THERAPY EVALUATION
Patient Name: Chrystal Ruiz  : 1940    MRN: [id number]                              Today's Date: 2022       Admit Date: 2022    Visit Dx:     ICD-10-CM ICD-9-CM   1. Cellulitis of right leg  L03.115 682.6     Patient Active Problem List   Diagnosis    Grief reaction    Alopecia    Arthritis    Back pain    Encounter for general adult medical examination without abnormal findings    Hypertension    Lung nodule    Vitamin D deficiency    Shortness of breath    Postmenopausal status    Nevus, non-neoplastic    Medicare annual wellness visit, subsequent    Fam hx-ischem heart disease    FH: thyroid condition    Cellulitis of right anterior lower leg    Vasculitis of skin    Pedal edema    Bilateral calf pain    Neck pain    Immunization reaction    Hyperlipidemia    Tremor    Bright red rectal bleeding    Acute non-recurrent maxillary sinusitis    Acute pain of right knee    Chronic pain of right knee    Need for vaccination    Lung nodules    Tachycardia    Essential (primary) hypertension     Dermatitis    Acute diastolic CHF (congestive heart failure) (HCC)    A-fib (HCC)    Leukopenia    Anemia    Primary osteoarthritis of right knee    Bronchogenic cancer of right lung (HCC)    Iron deficiency    Malabsorption due to intolerance, not elsewhere classified    Dyspnea    Other iron deficiency anemias    Acute URI    Edema    Syncope    Paroxysmal atrial fibrillation (HCC)    Pseudoaneurysm following procedure (HCC)    Cellulitis of right leg     Past Medical History:   Diagnosis Date    A-fib (HCC)     Anemia     Arthritis     Asthma     CHF (congestive heart failure) (HCC)     Femoral artery pseudo-aneurysm, right (HCC) 2022    Hypertension      Past Surgical History:   Procedure Laterality Date    ATRIAL APPENDAGE EXCLUSION LEFT WITH TRANSESOPHAGEAL ECHOCARDIOGRAM Right 2022    Procedure: Atrial Appendage Occlusion watchman team aware $;  Surgeon: Sage Garcia MD;  Location:   [facility] CATH INVASIVE LOCATION;  Service: Cardiovascular;  Laterality: Right;    ATRIAL APPENDAGE EXCLUSION LEFT WITH TRANSESOPHAGEAL ECHOCARDIOGRAM N/A 7/7/2022    Procedure: Atrial Appendage Occlusion;  Surgeon: Fide Mullen MD;  Location: [facility] CATH INVASIVE LOCATION;  Service: Cardiovascular;  Laterality: N/A;    CATARACT EXTRACTION      CHOLECYSTECTOMY      HYSTERECTOMY  1987    Endometriosis    OOPHORECTOMY      PSEUDO ANEURYSM REPAIR, EXTREMITY Right 7/8/2022    Procedure: REPAIR OF POST CATHETERIZATION RIGHT FEMORAL PSEUDOANEURYSM AND ARTERIOVENOUS FISTUAL;  Surgeon: Alfonzo Hooks MD;  Location: [facility] MAIN OR;  Service: Vascular;  Laterality: Right;    TONSILLECTOMY        General Information       Row Name 07/20/22 [facility]          Physical Therapy Time and Intention    Document Type evaluation  -CM (r) AE (t) CM (c)     Mode of Treatment physical therapy  -CM (r) AE (t) CM (c)       Row Name 07/20/22 [facility]5          General Information    Patient Profile Reviewed yes  -CM (r) AE (t) CM (c)     Prior Level of Function independent:;ADL's;home management;driving;community mobility;all household mobility;shopping  -CM (r) AE (t) CM (c)     Existing Precautions/Restrictions fall  -CM (r) AE (t) CM (c)     Barriers to Rehab previous functional deficit  -CM (r) AE (t) CM (c)       Row Name 07/20/22 1355          Living Environment    People in Home alone  -CM (r) AE (t) CM (c)       Row Name 07/20/22 1355          Home Main Entrance    Number of Stairs, Main Entrance none  -CM (r) AE (t) CM (c)       Row Name 07/20/22 1355          Stairs Within Home, Primary    Number of Stairs, Within Home, Primary none  -CM (r) AE (t) CM (c)       Row Name 07/20/22 1350          Cognition    Orientation Status (Cognition) oriented x 4  -CM (r) AE (t) CM (c)       Row Name 07/20/22 1357          Safety Issues, Functional Mobility    Safety Issues Affecting Function (Mobility) insight into deficits/self-awareness   Unable to weight bear through RLE d/t severe pain  -CM (r) AE (t) CM (c)     Impairments Affecting Function (Mobility) balance;endurance/activity tolerance;strength;sensation/sensory awareness;range of motion (ROM)  -CM (r) AE (t) CM (c)               User Key  (r) = Recorded By, (t) = Taken By, (c) = Cosigned By      Initials Name Provider Type    Alyson Alexander, PT Physical Therapist    Bhavna Hamilton, PT Student PT Student                   Mobility       Row Name 07/20/22 1359          Bed Mobility    Bed Mobility bed mobility (all) activities  -CM (r) AE (t) CM (c)     All Activities, [place] (Bed Mobility) modified independence  -CM (r) AE (t) CM (c)     Assistive Device (Bed Mobility) bed rails;head of bed elevated  -CM (r) AE (t) CM (c)     Comment, (Bed Mobility) Pt requires verbal cues for hand placement and PLB w/ exertion  -CM (r) AE (t) CM (c)       Row Name 07/20/22 1359          Transfers    Comment, (Transfers) Requires BUE support; unable to stand completely upright d/t severe RLE pain.  -CM (r) AE (t) CM (c)       Row Name 07/20/22 1359          Sit-Stand Transfer    Sit-Stand [place] (Transfers) minimum assist (75% patient effort)  -CM (r) AE (t) CM (c)     Assistive Device (Sit-Stand Transfers) --  Has quad cane and rolling walker at home; uses quad cane for home mobility s/p surgery  -CM (r) AE (t) CM (c)       Row Name 07/20/22 1359          Gait/Stairs (Locomotion)    [place] Level (Gait) unable to assess  Not safe d/t pain, weakness, and high falls risk  -CM (r) AE (t) CM (c)               User Key  (r) = Recorded By, (t) = Taken By, (c) = Cosigned By      Initials Name Provider Type    Alyson Alexander, PT Physical Therapist    Bhavna Hamilton, PT Student PT Student                   Obj/Interventions       Row Name 07/20/22 1402          Range of Motion Comprehensive    General Range of Motion bilateral upper extremity ROM WFL;bilateral lower extremity ROM WFL   -CM (r) AE (t) CM (c)       Row Name 07/20/22 1402          Strength Comprehensive (MMT)    General Manual Muscle Testing (MMT) Assessment upper extremity strength deficits identified;lower extremity strength deficits identified  -CM (r) AE (t) CM (c)     Comment, General Manual Muscle Testing (MMT) Assessment Unable to perform formal RLE MMT d/t pain  -CM (r) AE (t) CM (c)       Row Name 07/20/22 1402          Balance    Balance Assessment sitting static balance;sitting dynamic balance;sit to stand dynamic balance;standing static balance  -CM (r) AE (t) CM (c)     Static Sitting Balance modified independence  -CM (r) AE (t) CM (c)     Dynamic Sitting Balance modified independence  -CM (r) AE (t) CM (c)     Position, Sitting Balance sitting edge of bed  -CM (r) AE (t) CM (c)     Sit to Stand Dynamic Balance minimal assist  Requires external BUE support  -CM (r) AE (t) CM (c)     Static Standing Balance minimal assist;contact guard  -CM (r) AE (t) CM (c)     Dynamic Standing Balance minimal assist;contact guard  -CM (r) AE (t) CM (c)     Comment, Balance Unable to formally assess standing balance d/t limited tolerance w/ WB through RLE  -CM (r) AE (t) CM (c)       Row Name 07/20/22 1402          Sensory Assessment (Somatosensory)    Sensory Assessment (Somatosensory) sensation intact  -CM (r) AE (t) CM (c)       Row Name 07/20/22 1402          Upper Extremity (Manual Muscle Testing)    Comment, MMT: Upper Extremity BUE 4/5  -CM (r) AE (t) CM (c)       Row Name 07/20/22 1402          Lower Extremity (Manual Muscle Testing)    Comment, MMT: Lower Extremity RLE: at least 3/5; LLE: 3+/5  -CM (r) AE (t) CM (c)               User Key  (r) = Recorded By, (t) = Taken By, (c) = Cosigned By      Initials Name Provider Type    Alyson Alexander, PT Physical Therapist    Bhavna Hamilton, PT Student PT Student                   Goals/Plan       Row Name 07/20/22 1410          Bed Mobility Goal 1 (PT)    Activity/Assistive  Device (Bed Mobility Goal 1, PT) bed mobility activities, all  -CM (r) AE (t) CM (c)     [place] Level/Cues Needed (Bed Mobility Goal 1, PT) independent  -CM (r) AE (t) CM (c)     Time Frame (Bed Mobility Goal 1, PT) 2 weeks  -CM (r) AE (t) CM (c)       Row Name 07/20/22 1410          Transfer Goal 1 (PT)    Activity/Assistive Device (Transfer Goal 1, PT) transfers, all  -CM (r) AE (t) CM (c)     [place] Level/Cues Needed (Transfer Goal 1, PT) modified independence  -CM (r) AE (t) CM (c)     Time Frame (Transfer Goal 1, PT) 2 weeks  -CM (r) AE (t) CM (c)       Row Name 07/20/22 1410          Gait Training Goal 1 (PT)    Activity/Assistive Device (Gait Training Goal 1, PT) gait (walking locomotion);decrease fall risk  -CM (r) AE (t) CM (c)     [place] Level (Gait Training Goal 1, PT) contact guard required  -CM (r) AE (t) CM (c)     Distance (Gait Training Goal 1, PT) 100  -CM (r) AE (t) CM (c)     Time Frame (Gait Training Goal 1, PT) 2 weeks  -CM (r) AE (t) CM (c)       Row Name 07/20/22 1410          Therapy Assessment/Plan (PT)    Planned Therapy Interventions (PT) balance training;bed mobility training;gait training;home exercise program;joint mobilization;neuromuscular re-education;patient/family education;ROM (range of motion);strengthening;transfer training  -CM (r) AE (t) CM (c)               User Key  (r) = Recorded By, (t) = Taken By, (c) = Cosigned By      Initials Name Provider Type    Alyson Alexander, PT Physical Therapist    Bhavna Hamilton, PT Student PT Student                   Clinical Impression       Row Name 07/20/22 1400          Pain    Pretreatment Pain Rating 10/10  -CM (r) AE (t) CM (c)     Posttreatment Pain Rating 10/10  -CM (r) AE (t) CM (c)     Pain Location - Side/Orientation Right  -CM (r) AE (t) CM (c)     Pain Location lower  -CM (r) AE (t) CM (c)     Pain Location - extremity  -CM (r) AE (t) CM (c)     Pain Intervention(s) Repositioned;Emotional  support;Therapeutic presence  -CM (r) AE (t) CM (c)       Row Name 07/20/22 1400          Plan of Care Review    Plan of Care Reviewed With patient  -CM (r) AE (t) CM (c)     Progress no change  -CM (r) AE (t) CM (c)     Outcome Evaluation Pt is an 82 y/o F presented to [facility] on 7/19/22 w/ RLE pain and cellulitis. 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 Underwent operative repair on 7/8. Duplex findings (-) for DVT. PMH includes atrial fibrillation, anemic, CHF, and HTN. At baseline pt lives alone in single-story condo w/ 0 PETROS and reports independence without an AD. Pt reports she has been using a quad cane for home mobility since surgery on 7/7. At this date pt  Mod-Indep for bed mobility and Min-A to stand. In standing pt demonstrates BLE flexion and is unable to stand upright without physical assist d/t severe RLE pain. Min-A for return to sitting EOB. Ambulation not safe to attempt at this time d/t pain, weakness, and high risk for falls. Recommending acute IP rehab at d/c for safe return to PLOF. Will follow.  -CM (r) AE (t) CM (c)       Row Name 07/20/22 1403          Therapy Assessment/Plan (PT)    Rehab Potential (PT) good, to achieve stated therapy goals  -CM (r) AE (t) CM (c)     Criteria for Skilled Interventions Met (PT) yes  -CM (r) AE (t) CM (c)     Therapy Frequency (PT) 5 times/wk  -CM (r) AE (t) CM (c)       Row Name 07/20/22 1403          Vital Signs    O2 Delivery Pre Treatment room air  -CM (r) AE (t) CM (c)     O2 Delivery Intra Treatment room air  -CM (r) AE (t) CM (c)     O2 Delivery Post Treatment room air  -CM (r) AE (t) CM (c)       Row Name 07/20/22 140          Positioning and Restraints    Pre-Treatment Position in bed  -CM (r) AE (t) CM (c)     Post Treatment Position bed  -CM (r) AE (t) CM (c)     In Bed notified nsg;supine;call light within reach;encouraged to call for assist;exit alarm on  -CM (r) AE (t) CM (c)                User Key  (r) = Recorded By, (t) = Taken By, (c) = Cosigned By      Initials Name Provider Type    CM Alyson Thurston, PT Physical Therapist    Bhavna Hamilton, PT Student PT Student                   Outcome Measures       Row Name 07/20/22 1412 07/20/22 0800       How much help from another person do you currently need...    Turning from your back to your side while in flat bed without using bedrails? 4  -CM (r) AE (t) CM (c) 4  -RB    Moving from lying on back to sitting on the side of a flat bed without bedrails? 3  -CM (r) AE (t) CM (c) 4  -RB    Moving to and from a bed to a chair (including a wheelchair)? 2  -CM (r) AE (t) CM (c) 3  -RB    Standing up from a chair using your arms (e.g., wheelchair, bedside chair)? 2  -CM (r) AE (t) CM (c) 3  -RB    Climbing 3-5 steps with a railing? 2  -CM (r) AE (t) CM (c) 3  -RB    To walk in hospital room? 2  -CM (r) AE (t) CM (c) 3  -RB    AM-PAC 6 Clicks Score (PT) 15  -CM (r) AE (t) 20  -RB    Highest level of mobility 4 --> Transferred to chair/commode  -CM (r) AE (t) 6 --> Walked 10 steps or more  -RB      Row Name 07/20/22 1412          Functional Assessment    Outcome Measure Options AM-PAC 6 Clicks Basic Mobility (PT)  -CM (r) AE (t) CM (c)               User Key  (r) = Recorded By, (t) = Taken By, (c) = Cosigned By      Initials Name Provider Type    Alyson Alexander, PT Physical Therapist    Bryan Marte, RN Registered Nurse    Bhavna Hamilton, PT Student PT Student                                 Physical Therapy Education                     Title: PT OT SLP Therapies (Done)       Topic: Physical Therapy (Done)       Point: Mobility training (Done)       Learning Progress Summary             Patient Acceptance, E, VU by AE at 7/20/2022 1413                         Point: Home exercise program (Done)       Learning Progress Summary             Patient Acceptance, E, VU by AE at 7/20/2022 1413                         Point: Body mechanics (Done)        Learning Progress Summary             Patient Acceptance, E, VU by AE at 7/20/2022 1413                         Point: Precautions (Done)       Learning Progress Summary             Patient Acceptance, E, VU by AE at 7/20/2022 1413                                         User Key       Initials Effective Dates Name Provider Type Discipline    AE 05/12/22 -  Bhavna Hanson, PT Student PT Student PT                  PT Recommendation and Plan  Planned Therapy Interventions (PT): balance training, bed mobility training, gait training, home exercise program, joint mobilization, neuromuscular re-education, patient/family education, ROM (range of motion), strengthening, transfer training  Plan of Care Reviewed With: patient  Progress: no change  Outcome Evaluation: Pt is an 80 y/o F presented to [facility] on 7/19/22 w/ RLE pain and cellulitis. 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 Underwent operative repair on 7/8. Duplex findings (-) for DVT. PMH includes atrial fibrillation, anemic, CHF, and HTN. At baseline pt lives alone in single-story [facility] w/ 0 [facility] and reports independence without an AD. Pt reports she has been using a quad cane for home mobility since surgery on 7/7. At this date pt  Mod-Indep for bed mobility and Min-A to stand. In standing pt demonstrates BLE flexion and is unable to stand upright without physical assist d/t severe RLE pain. Min-A for return to sitting EOB. Ambulation not safe to attempt at this time d/t pain, weakness, and high risk for falls. Recommending acute IP rehab at d/c for safe return to PLOF. Will follow.     Time Calculation:    PT Charges       Row Name 07/20/22 1414             Time Calculation    Start Time 1246  -CM (r) AE (t) CM (c)      Stop Time 1314  -CM (r) AE (t) CM (c)      Time Calculation (min) 28 min  -CM (r) AE (t)      PT Received On 07/20/22  -CM (r) AE (t) CM (c)      PT - Next Appointment 07/21/22  -CM  (r) AE (t) CM (c)      PT Goal Re-Cert Due Date 08/03/22  -CM (r) AE (t) CM (c)                User Key  (r) = Recorded By, (t) = Taken By, (c) = Cosigned By      Initials Name Provider Type    Alyson Alexander, PT Physical Therapist    Bhavna Hamilton, PT Student PT Student                  Therapy Charges for Today       Code Description Service Date Service Provider Modifiers Qty    87081757566 HC PT EVAL MOD COMPLEXITY 4 7/20/2022 Bhavna Hanson, PT Student GP 1            PT G-Codes  Outcome Measure Options: AM-PAC 6 Clicks Basic Mobility (PT)  AM-PAC 6 Clicks Score (PT): 15    Bhavna Hanson PT Student  7/20/2022

## 2022-07-20 NOTE — H&P
[facility] Medicine Services      Patient Name: Chrystal Ruiz  : 1940  MRN: [id number]  Primary Care Physician:  Etelvina Ulloa MD  Date of admission: 2022      Subjective      Chief Complaint: Right lower extremity pain    History of Present Illness: Chrystal Ruiz is a 81 y.o. female who presented to [facility] on 2022 complaining of right lower extremity pain.  On , she had a watchman device placed and had a complication requiring surgical intervention for a pseudoaneurysm the next day.  She says that over the last several days, she has experienced a worsening redness and pain in her right lower extremity below the knee.  She denies any fevers, chest pain or shortness of breath.  She is currently anticoagulated with Eliquis, which is required for 45 days following the procedure.  She reports worsening bruising in the right lower extremity.    In the emergency department, vital signs were stable and within normal limits.  Labs were significant for hemoglobin of 9.5, which is around recent baseline.  She will be admitted for treatment of right lower extremity cellulitis.    Review of Systems   Constitutional: Negative.   HENT: Negative.    Eyes: Negative.    Cardiovascular: Negative.    Respiratory: Negative.    Endocrine: Negative.    Hematologic/Lymphatic: Positive for bleeding problem. Bruises/bleeds easily.   Skin: Positive for rash.   Gastrointestinal: Negative.    Genitourinary: Negative.    Neurological: Negative.    Psychiatric/Behavioral: Negative.         Personal History     Past Medical History:   Diagnosis Date   • A-fib ([facility])    • Anemia    • Arthritis    • Asthma    • CHF (congestive heart failure) ([facility])    • Femoral artery pseudo-aneurysm, right ([facility]) 2022   • Hypertension        Past Surgical History:   Procedure Laterality Date   • ATRIAL APPENDAGE EXCLUSION LEFT WITH TRANSESOPHAGEAL ECHOCARDIOGRAM Right 2022    Procedure:  Atrial Appendage Occlusion watchman team aware $;  Surgeon: Sage Garcia MD;  Location: [facility] CATH INVASIVE LOCATION;  Service: Cardiovascular;  Laterality: Right;   • ATRIAL APPENDAGE EXCLUSION LEFT WITH TRANSESOPHAGEAL ECHOCARDIOGRAM N/A 7/7/2022    Procedure: Atrial Appendage Occlusion;  Surgeon: Fide Mlulen MD;  Location: [facility] CATH INVASIVE LOCATION;  Service: Cardiovascular;  Laterality: N/A;   • CATARACT EXTRACTION     • CHOLECYSTECTOMY     • HYSTERECTOMY  1987    Endometriosis   • OOPHORECTOMY     • PSEUDO ANEURYSM REPAIR, EXTREMITY Right 7/8/2022    Procedure: REPAIR OF POST CATHETERIZATION RIGHT FEMORAL PSEUDOANEURYSM AND ARTERIOVENOUS FISTUAL;  Surgeon: Alfonzo Hooks MD;  Location: [facility] MAIN OR;  Service: Vascular;  Laterality: Right;   • TONSILLECTOMY         Family History: family history includes Breast cancer (age of onset: 85) in her mother; COPD in her sister; Endometrial cancer (age of onset: 70) in her mother; Heart disease in her mother and sister; Heart failure in her father and sister; Stroke in her mother. Otherwise pertinent FHx was reviewed and not pertinent to current issue.    Social History:  reports that she has never smoked. She has never used smokeless tobacco. She reports that she does not drink alcohol and does not use drugs.    Home Medications:  Prior to Admission Medications     Prescriptions Last Dose Informant Patient Reported? Taking?    acetaminophen (TYLENOL) 500 MG tablet   Yes No    Take 500 mg by mouth Every 6 (Six) Hours As Needed for Mild Pain .    apixaban (ELIQUIS) 2.5 MG tablet tablet   No No    Take 1 tablet by mouth Every 12 (Twelve) Hours. Indications: Atrial Fibrillation    Breo Ellipta 200-25 MCG/INH inhaler   No No    Inhale 1 puff Daily.    Cholecalciferol (VITAMIN D3) 2000 units capsule   Yes No    Take 2,000 Units by mouth Daily.    Cyanocobalamin (VITAMIN B12) 1000 MCG tablet controlled-release   Yes No    Take 2,500 mcg by  mouth Daily.    ferrous sulfate 325 (65 FE) MG tablet   No No    Take 1 tablet by mouth Every Other Day.    furosemide (LASIX) 40 MG tablet   Yes No    Take 40 mg by mouth Every Other Day.    metoprolol succinate XL (TOPROL-XL) 50 MG 24 hr tablet   No No    Take 1 tablet by mouth Daily.    spironolactone (ALDACTONE) 25 MG tablet   No No    Take 1 tablet by mouth Every Other Day.    triamcinolone (KENALOG) 0.1 % cream   No No    Apply  topically to the appropriate area as directed 2 (Two) Times a Day.            Allergies:  Allergies   Allergen Reactions   • Ciprofloxacin Unknown (See Comments)   • Sulfa Antibiotics Unknown (See Comments)   • Cyprodenate Unknown - High Severity   • Iodine Unknown - High Severity       Objective      Vitals:   Temp:  [98.6 °F (37 °C)] 98.6 °F (37 °C)  Heart Rate:  [67-79] 72  Resp:  [16-18] 18  BP: (138-159)/(65-72) 146/69    Physical Exam  Constitutional:       Appearance: Normal appearance.   HENT:      Head: Normocephalic and atraumatic.   Eyes:      Extraocular Movements: Extraocular movements intact.      Pupils: Pupils are equal, round, and reactive to light.   Cardiovascular:      Rate and Rhythm: Normal rate and regular rhythm.   Pulmonary:      Effort: Pulmonary effort is normal. No respiratory distress.      Breath sounds: Normal breath sounds.   Abdominal:      General: Abdomen is flat. Bowel sounds are normal. There is no distension.      Palpations: Abdomen is soft.   Musculoskeletal:      Right lower leg: No edema.      Left lower leg: No edema.   Skin:     Findings: Bruising and erythema present.   Neurological:      General: No focal deficit present.      Mental Status: She is alert and oriented to person, place, and time.   Psychiatric:         Mood and Affect: Mood normal.         Behavior: Behavior normal.          Assessment & Plan    Right lower extremity cellulitis  - Empiric IV antibiotics  - Follow blood cultures  - X-ray ordered to evaluate for  osteomyelitis  - DP pulse on the right side is easily palpable    Right lower extremity hematoma  Acute on chronic anemia  - Trend hemoglobin  - Holding anticoagulation, see below  - Right lower extremity CT ordered to evaluate for hematoma (patient is allergic to contrast)  - Vascular surgery consult    Atrial fibrillation  - Recent watchman device placed on 7/7  - She is supposed to be taking Eliquis for 45 days following the procedure, however will hold overnight due to concern for hematoma  - Cardiology and vascular surgery consult    Heart failure with preserved ejection fraction, not in an exacerbation  - Resume home medications    DVT prophylaxis:  Mechanical DVT prophylaxis orders are present.    CODE STATUS:       Admission Status:  I believe this patient meets inpatient status.    Signature: Electronically signed by Orlando Velasquez MD, 07/19/22, 11:07 PM EDT.

## 2022-07-20 NOTE — CASE MANAGEMENT/SOCIAL WORK
Discharge Planning Assessment   [facility]     Patient Name: Chrystal Ruiz  MRN: [id number]  Today's Date: 7/20/2022    Admit Date: 7/19/2022     Discharge Needs Assessment     Row Name 07/20/22 1054       Living Environment    People in Home alone    Current Living Arrangements home    Primary Care Provided by self    Provides Primary Care For no one    Able to Return to Prior Arrangements yes       Resource/Environmental Concerns    Resource/Environmental Concerns none    Transportation Concerns none       Transition Planning    Patient/Family Anticipates Transition to inpatient rehabilitation facility    Patient/Family Anticipated Services at Transition none    Transportation Anticipated family or friend will provide       Discharge Needs Assessment    Readmission Within the Last 30 Days other (see comments)  Possible post op comp    Equipment Currently Used at Home cane, quad tip    Concerns to be Addressed discharge planning    Anticipated Changes Related to Illness none    Discharge Facility/Level of Care Needs nursing facility, skilled    Provided Post Acute Provider List? Yes    Post Acute Provider List Inpatient Rehab;Nursing Home    Provided Post Acute Provider Quality & Resource List? Yes    Post Acute Provider Quality and Resource List Inpatient Rehab;Nursing Home    Delivered To Patient    Method of Delivery In person               Discharge Plan     Row Name 07/20/22 2723       Plan    Plan Referral to [facility], Pending acceptance, No precert or PASRR required, watch for IV ATB    Patient/Family in Agreement with Plan yes    Plan Comments Met with PAtient at bedside Lives at home alone. Family will provide transportation. Having trouble post op at home with weakness in leg and requested referral to [facility], Referral sent to [facility]. PCP and Pharmacy verified, able to afford medications. d/c barriers: Vascular following, POssible IV ATB need.              Continued Care and Services - Admitted  Since 7/19/2022     Destination     Service Provider Request Status Selected Services Address Phone Fax Patient Preferred    [facility]  Pending - Request Sent N/A [address] [facility] [place] [address] [phone] --              Expected Discharge Date and Time     Expected Discharge Date Expected Discharge Time    Jul 22, 2022          Demographic Summary     Row Name 07/20/22 1051       General Information    Admission Type inpatient    Arrived From emergency department    Required Notices Provided Important Message from Medicare    Referral Source admission list    Reason for Consult discharge planning    Preferred Language English               Functional Status     Row Name 07/20/22 1053       Functional Status    Usual Activity Tolerance good    Current Activity Tolerance fair       Functional Status, IADL    Medications independent    Meal Preparation independent    Housekeeping independent    Laundry independent    Shopping independent       Mental Status    General Appearance WDL WDL       Mental Status Summary    Recent Changes in Mental Status/Cognitive Functioning no changes                      Patient Forms     Row Name 07/20/22 1052       Patient Forms    Important Message from Medicare (IMM) --  7/20 IMM per reg              Case Management Readmission Assessment Note    Case Management Readmission Assessment (all recorded)     Readmission Interview     Row Name 07/20/22 1055             Readmission Indications    Is this hospitalization related to the prior hospital diagnosis? Yes      What was the reason you were admitted? Cellulitis      Row Name 07/20/22 1055             Recommendation for rehospitalization    Did you speak with your physician prior to coming to the hospital Yes      Did you seek care elsewhere prior to coming to the hospital? No      Row Name 07/20/22 1055             Follow up appointment    Do you have a PCP? Yes      Did you have an  appointment with PCP/specialist after hospitalization within 7 days? No  on July 29      Did you keep your appointment? Yes      Row Name 07/20/22 1055             Medications    Did you have newly prescribed medications at discharge? Yes      Did you understand the reasons for your medications at discharge and how to take them? Yes      Did you understand the side effects of your medications? Yes      Are you taking all of you prescribed medications? Yes      Row Name 07/20/22 1055             Discharge Instructions    Did you understand your discharge instructions? Yes      Did your family/caregiver hear your instructions? No      Were you told to eat a special diet? No      Were you given a number of someone to call if you had questions or concerns? Yes      Row Name 07/20/22 1055             Index discharge location/services    Where did you go upon discharge? Home      Do you have supportive family or friends in the home? No      Row Name 07/20/22 1055             Discharge Readiness    On a scale of 1-5 (5 being well prepared), how ready were you for discharge 3      Recommendation based on interview Other (comment)  Referral made to [facility] rehab                Met with patient at bedside wearing mask and goggles, Spent less than 15 minutes in room at greater than 6 feet distance.       Cynthia Grace RN

## 2022-07-20 NOTE — PROGRESS NOTES
"Pharmacy Antimicrobial Dosing Service    Subjective:  Chrystal Ruiz is a 81 y.o.female admitted with RLE pain. Pharmacy has been consulted to dose Vancomycin for possible SSTI.      Assessment/Plan    1. Day #1 Vancomycin: Goal trough 10-20 mcg/mL for uncomplicated SSTI. 1000mg (~18mg/kg ABW) IV x1 dose followed by 750mg (~13mg/kg ABW) IV q24h.  Random level ordered for 7/21 at 1200.    2. Day #2 Ceftriaxone: 1gm IV q24h    Will continue to monitor drug levels, renal function, culture and sensitivities, and patient clinical status.       Objective:  Relevant clinical data and objective history reviewed:  154.9 cm (61\")   56.1 kg (123 lb 10.9 oz)   Ideal body weight: 47.8 kg (105 lb 6.1 oz)  Adjusted ideal body weight: 51.1 kg (112 lb 11.2 oz)  Body mass index is 23.37 kg/m².        Results from last 7 days   Lab Units 07/19/22  2105   CREATININE mg/dL 0.97     Estimated Creatinine Clearance: 39.6 mL/min (by C-G formula based on SCr of 0.97 mg/dL).  No intake/output data recorded.    Results from last 7 days   Lab Units 07/19/22  2105 07/13/22  1155   WBC 10*3/mm3 8.70 14.38*     Temperature    07/19/22 1602   Temp: 98.6 °F (37 °C)     Baseline culture/source/susceptibility:  Microbiology Results (last 10 days)       ** No results found for the last 240 hours. **            Anti-Infectives (From admission, onward)      Ordered     Dose/Rate Route Frequency Start Stop    07/19/22 2312  vancomycin 750 mg in sodium chloride 0.9 % 250 mL IVPB        Ordering Provider: Orlando Velasquez MD    15 mg/kg × 56.1 kg Intravenous Every 24 Hours 07/21/22 0000 07/26/22 2359 07/19/22 2303  cefTRIAXone (ROCEPHIN) 1 g in sodium chloride 0.9 % 100 mL IVPB        Ordering Provider: Orlando Velasquez MD    1 g  200 mL/hr over 30 Minutes Intravenous Every 24 Hours 07/20/22 2100 07/27/22 2059 07/19/22 2311  vancomycin (VANCOCIN) 1,000 mg in sodium chloride 0.9 % 250 mL IVPB        Ordering Provider: Orlando Velasquez MD    1,000 mg " Intravenous Once 07/20/22 0000 07/20/22 0001    07/19/22 2303  Pharmacy to dose vancomycin        Ordering Provider: Orlando Velasquez MD     Does not apply Continuous PRN 07/19/22 2303 07/26/22 2302 07/19/22 2048  cefTRIAXone (ROCEPHIN) 1 g in sodium chloride 0.9 % 100 mL IVPB        Ordering Provider: Demetris Shelton MD    1 g  200 mL/hr over 30 Minutes Intravenous Once 07/19/22 2050 07/19/22 2226            Leland Mitchell  07/20/22 01:50 EDT

## 2022-07-20 NOTE — CONSULTS
CARDIOLOGY CONSULT NOTE      Referring Provider: Dr. Berry    Reason for Consultation: RLE pain    Attending: Nick Berry DO    Chief complaint    RLE pain    Subjective .     History of present illness:  Chrystal Ruiz is a 81 y.o. female who presents with RLE pain.     Patient was recently admitted at [facility] from July 7 through July 11, 2022.  Patient had a watchman left atrial appendage closure device implanted.  Post procedure she developed a pseudoaneurysm in the right groin.  She had consultation by vascular surgery.  She underwent ultrasound-guided thrombin injection which was not successful and ultimately had to undergo surgery with repair of a pseudoaneurysm and AV fistula.  I saw her in clinic ultimately with a lot of bruising in her right lower extremity as well as below the knee.  Advised her to get the ER if this persisted or worsened.  She has been followed by vascular surgery as well postoperatively.  She ultimately presented back to the ER with worsening pain right lower extremity with redness and swelling.  Patient is known to have Paroxysmal atrial fibrillation.  Evaluation in the emergency room showed sinus rhythm by EKG.  Vascular surgery has been consulted and patient is scheduled for duplex ultrasound of the right groin.  She has been started on antibiotics for presumed right lower extremity cellulitis.    She denies any chest pain or dyspnea or feelings of her heart racing.  She reports compliance with medical therapy.    Otherwise review of systems negative x14 point review of systems except was mentioned above  Historical data copied forward from previous encounters in EMR is unchanged    Review of Systems   Constitutional: Negative for decreased appetite and diaphoresis.   HENT: Negative for congestion, hearing loss and nosebleeds.    Cardiovascular: Negative for chest pain, claudication, dyspnea on exertion, irregular heartbeat, leg swelling, near-syncope, orthopnea,  palpitations, paroxysmal nocturnal dyspnea and syncope.   Respiratory: Negative for cough, shortness of breath and sleep disturbances due to breathing.    Endocrine: Negative for polyuria.   Hematologic/Lymphatic: Does not bruise/bleed easily.   Skin: Negative for itching and rash.   Musculoskeletal: Positive for arthritis and muscle weakness. Negative for back pain and myalgias.   Gastrointestinal: Negative for abdominal pain, change in bowel habit and nausea.   Genitourinary: Negative for dysuria, flank pain, frequency and hesitancy.   Neurological: Negative for dizziness, tremors and weakness.   Psychiatric/Behavioral: Negative for altered mental status. The patient does not have insomnia.        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   • Hypertension        Past Surgical History:   Procedure Laterality Date   • ATRIAL APPENDAGE EXCLUSION LEFT WITH TRANSESOPHAGEAL ECHOCARDIOGRAM Right 7/7/2022    Procedure: Atrial Appendage Occlusion watchman team aware $;  Surgeon: Sage Garcia MD;  Location: [facility] CATH INVASIVE LOCATION;  Service: Cardiovascular;  Laterality: Right;   • ATRIAL APPENDAGE EXCLUSION LEFT WITH TRANSESOPHAGEAL ECHOCARDIOGRAM N/A 7/7/2022    Procedure: Atrial Appendage Occlusion;  Surgeon: Fide Mullen MD;  Location: [facility] CATH INVASIVE LOCATION;  Service: Cardiovascular;  Laterality: N/A;   • CATARACT EXTRACTION     • CHOLECYSTECTOMY     • HYSTERECTOMY  1987    Endometriosis   • OOPHORECTOMY     • PSEUDO ANEURYSM REPAIR, EXTREMITY Right 7/8/2022    Procedure: REPAIR OF POST CATHETERIZATION RIGHT FEMORAL PSEUDOANEURYSM AND ARTERIOVENOUS FISTUAL;  Surgeon: Alfonzo Hooks MD;  Location: [facility] MAIN OR;  Service: Vascular;  Laterality: Right;   • TONSILLECTOMY         Family History   Problem Relation Age of  "Onset   • Breast cancer Mother 85   • Stroke Mother    • Heart disease Mother    • Endometrial cancer Mother 70   • Heart failure Father    • Heart failure Sister    • COPD Sister    • Heart disease Sister        Social History     Tobacco Use   • Smoking status: Never Smoker   • Smokeless tobacco: Never Used   Vaping Use   • Vaping Use: Never used   Substance Use Topics   • Alcohol use: No   • Drug use: Never        (Not in a hospital admission)        Ciprofloxacin, Sulfa antibiotics, Cyprodenate, and Iodine    Scheduled Meds:apixaban, 2.5 mg, Oral, Q12H  budesonide-formoterol, 2 puff, Inhalation, BID - RT  cefTRIAXone, 1 g, Intravenous, Q24H  ferrous sulfate, 324 mg, Oral, Daily With Breakfast  furosemide, 40 mg, Oral, Every Other Day  metoprolol succinate XL, 50 mg, Oral, Daily  sodium chloride, 10 mL, Intravenous, Q12H  spironolactone, 25 mg, Oral, Every Other Day  [START ON 7/21/2022] vancomycin, 15 mg/kg, Intravenous, Q24H      Continuous Infusions:Pharmacy to dose vancomycin,       PRN Meds:.•  acetaminophen **OR** acetaminophen **OR** acetaminophen  •  aluminum-magnesium hydroxide-simethicone  •  HYDROcodone-acetaminophen  •  HYDROcodone-acetaminophen  •  melatonin  •  nitroglycerin  •  ondansetron **OR** ondansetron  •  Pharmacy to dose vancomycin  •  sodium chloride  •  sodium chloride    Objective     VITAL SIGNS  Vitals:    07/20/22 0245 07/20/22 0443 07/20/22 0638 07/20/22 0815   BP:       BP Location:       Patient Position:       Pulse: 67 65 62 75   Resp:       Temp:       TempSrc:       SpO2: 97% 95% 96% 97%   Weight:       Height:           Flowsheet Rows    Flowsheet Row First Filed Value   Admission Height 154.9 cm (61\") Documented at 07/19/2022 1602   Admission Weight 56.1 kg (123 lb 10.9 oz) Documented at 07/19/2022 1602          Body mass index is 23.37 kg/m².     TELEMETRY: SR    Physical Exam:  Vitals reviewed.   Constitutional:       General: Not in acute distress.     Appearance: Normal " appearance. Well-developed.   Eyes:      Pupils: Pupils are equal, round, and reactive to light.   HENT:      Head: Normocephalic and atraumatic.   Neck:      Vascular: No JVD.   Pulmonary:      Effort: Pulmonary effort is normal.      Breath sounds: Normal breath sounds.   Cardiovascular:      Normal rate. Regular rhythm.      Comments: Diffuse bruising in the right lower extremity.  Redness in the right   Pulses:     Intact distal pulses.   Edema:     Peripheral edema absent.   Abdominal:      General: There is no distension.      Palpations: Abdomen is soft.      Tenderness: There is no abdominal tenderness.   Musculoskeletal: Normal range of motion.      Cervical back: Normal range of motion and neck supple. Skin:     General: Skin is warm and dry.   Neurological:      Mental Status: Alert and oriented to person, place, and time.          Results Review:   I reviewed the patient's new clinical results.    CBC    Results from last 7 days   Lab Units 07/20/22  0512 07/19/22  2105   WBC 10*3/mm3 7.10 8.70   HEMOGLOBIN g/dL 8.4* 9.5*   PLATELETS 10*3/mm3 265 313     BMP   Results from last 7 days   Lab Units 07/20/22  0512 07/19/22  2105   SODIUM mmol/L 140 142   POTASSIUM mmol/L 4.0 4.2   CHLORIDE mmol/L 108* 108*   CO2 mmol/L 22.0 24.0   BUN mg/dL 23 30*   CREATININE mg/dL 0.78 0.97   GLUCOSE mg/dL 119* 89     Cr Clearance Estimated Creatinine Clearance: 49.2 mL/min (by C-G formula based on SCr of 0.78 mg/dL).  Coag     HbA1C   Lab Results   Component Value Date    HGBA1C 6.3 (H) 01/25/2022     Blood Glucose No results found for: POCGLU  Infection     CMP   Results from last 7 days   Lab Units 07/20/22  0512 07/19/22  2105   SODIUM mmol/L 140 142   POTASSIUM mmol/L 4.0 4.2   CHLORIDE mmol/L 108* 108*   CO2 mmol/L 22.0 24.0   BUN mg/dL 23 30*   CREATININE mg/dL 0.78 0.97   GLUCOSE mg/dL 119* 89   ALBUMIN g/dL  --  3.90   BILIRUBIN mg/dL  --  0.5   ALK PHOS U/L  --  81   AST (SGOT) U/L  --  12   ALT (SGPT) U/L  --   11     ABG      UA      BRENT  No results found for: POCMETH, POCAMPHET, POCBARBITUR, POCBENZO, POCCOCAINE, POCOPIATES, POCOXYCODO, POCPHENCYC, POCPROPOXY, POCTHC, POCTRICYC  Lysis Labs   Results from last 7 days   Lab Units 07/20/22  0512 07/19/22  2105   HEMOGLOBIN g/dL 8.4* 9.5*   PLATELETS 10*3/mm3 265 313   CREATININE mg/dL 0.78 0.97     Radiology(recent) XR Tibia Fibula 2 View Right    Result Date: 7/20/2022  1. Generalized subcutaneous edema can be seen with venous stasis or cellulitis. Slot 67 Electronically signed by:  Russ Potts M.D.  7/19/2022 10:12 PM    CT Lower Extremity Right Without Contrast    Result Date: 7/20/2022  1. Examination is limited given the lack of IV contrast. 2. Multiple surgical clips in the right inguinal region from recent surgery. 3. 4 cm collection of fluid tracking in the right anterior compartment musculature of the right thigh. It could be a hematoma or seroma. This study really cannot evaluate for pseudoaneurysm or active bleeding given the lack of IV contrast. 4. Subcutaneous edema of the anterior right calf extending circumferentially around the ankle that could be dependent edema or diffuse bruising. Electronically signed by:  Lon Grimaldo M.D.  7/19/2022 10:29 PM            Imaging Results (Last 24 Hours)     Procedure Component Value Units Date/Time    CT Lower Extremity Right Without Contrast [[id number]] Collected: 07/20/22 0025     Updated: 07/20/22 0030    Narrative:      EXAMINATION: CT scan right lower extremity without IV contrast.    INDICATION: Recent watchman device placement with bruising of the right lower extremity.    PROCEDURE: Axial, coronal and sagittal reconstructions were obtained.  CT dose lowering techniques were used, to include: automated exposure control, adjustment for patient size, and or use of iterative reconstruction.    COMPARISON: None    FINDINGS:    Examination of the soft tissues is very limited given the lack of intravenous  contrast. There are a number of surgical clips within the right inguinal region related to recent surgery. There is some fluid tracking in the right inguinal region in the   anterior compartment musculature that measures about 4 cm across that could be a hematoma or seroma. There is no soft tissue gas. There is also some separate subcutaneous edema and skin thickening of the anterior right calf extending circumferentially   around the ankle that could be superficial bruising or dependent edema.    The bones are normal without fracture or dislocation. A limited examination of the soft tissues of the lower pelvis demonstrates no abnormalities..      Impression:      1. Examination is limited given the lack of IV contrast.  2. Multiple surgical clips in the right inguinal region from recent surgery.  3. 4 cm collection of fluid tracking in the right anterior compartment musculature of the right thigh. It could be a hematoma or seroma. This study really cannot evaluate for pseudoaneurysm or active bleeding given the lack of IV contrast.  4. Subcutaneous edema of the anterior right calf extending circumferentially around the ankle that could be dependent edema or diffuse bruising.    Electronically signed by:  Lon Grimaldo M.D.    7/19/2022 10:29 PM    XR Tibia Fibula 2 View Right [[id number]] Collected: 07/20/22 0011     Updated: 07/20/22 0014    Narrative:      EXAM: Right tibia and fibula radiographs    DATE: July 19, 2022    HISTORY: Right leg pain, swelling    COMPARISON: June 29, 2022    TECHNIQUE: 4 radiographs are obtained of the right tibia and fibula    FINDINGS:    The osseous structures are demineralized. There are mild-to-moderate tricompartmental degenerative changes at the level of the right knee. There is chondrocalcinosis. There is no acute fracture or dislocation. There is no osseous destruction. There is no   evidence of subcutaneous emphysema. There is generalized mottling of the subcutaneous  fat.      Impression:      1. Generalized subcutaneous edema can be seen with venous stasis or cellulitis.        Slot 67    Electronically signed by:  Russ Potts M.D.    7/19/2022 10:12 PM          EKG          I personally viewed and interpreted the patient's EKG/Telemetry data:    ECHOCARDIOGRAM:  7/7/2022    Interpretation Summary    · Left ventricular systolic function is normal.  · Left ventricular ejection fraction is 55 to 60%  · Left ventricular wall thickness is consistent with mild concentric hypertrophy.  · Successful deployment of watchman flex device in left atrial appendage with adequate occlus          STRESS MYOVIEW:  1/2022  Indications  Shortness of breath     This study was performed under my direct supervision.     Resting ECG  Sinus rhythm.     The patient was injected with Lexiscan intravenously while constantly monitoring electrocardiogram and vital signs.  Patient did not have any chest discomfort ST abnormalities or ectopy with injection of Lexiscan.     Cardiolite was used as an imaging agent.     Cardiolite images showed uniform distribution of radionuclide without any evidence for myocardial ischemia.     Gated SPECT images revealed normal left ventricle size and contractility with ejection fraction of 74%.     Impression  ========  Lexiscan Cardiolite test is negative for myocardial ischemia.     Gated SPECT images revealed normal left ventricular size and contractility with ejection fraction of 74%.        CARDIAC CATHETERIZATION:    OTHER:         Assessment & Plan       Cellulitis of right leg      Assessment:    RLE cellulitis  S/p Watchman implant  Pseudoaneurysm with AV fistula post procedure      S/p thrombin injection-failed     S/p repair of Right femoral pseudoaneurysm and AV fistula  Paroxysmal Atrial Fibrillation  HTN  Dyslipidemia    Plan:  Vascular surgery has been consulted  Duplex u/s pending    Continue current Rx and supportive care from CV standpoint  Will defer  for recommendations from present problem to vascular surgery with respect to therapeutic, intervention, medications, anticoagulation, compression or otherwise.  From CV standpoint despite recent Watchman may recommend holding anticoagulation for a week    I discussed the patients findings and my recommendations with patient and RN    Jeremy Mccall MD, PhD

## 2022-07-20 NOTE — CASE MANAGEMENT/SOCIAL WORK
Continued Stay Note  OL[facility] Ames     Patient Name: Chrystal Ruiz  MRN: [id number]  Today's Date: 7/20/2022    Admit Date: 7/19/2022     Discharge Plan     Row Name 07/20/22 1142       Plan    Plan [facility] Accepted, No precert or PASRR required, Watch for IV ATB    Plan Comments Per liagilberto Olivier Patient accepted at [facility] anticipate discharge tomorrow. d/c barriers: Blood Cultures pending, IV ATB                                         Phone communication or documentation only - no physical contact with patient or family.        Cynthia Grace RN

## 2022-07-20 NOTE — PLAN OF CARE
Goal Outcome Evaluation:    Outcome Evaluation: Pt is an 80 y/o F presented to [facility] on 7/19/22 w/ RLE pain and cellulitis. 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 Underwent operative repair on 7/8. Duplex findings (-) for DVT. PMH includes atrial fibrillation, anemic, CHF, and HTN. [facility] and reports independence without an AD. Pt reports she has been using a quad cane since her surgery on 7/7. At this date pt  Mod-Indep for bed mobility and Min-A to stand. In standing pt demonstrates BLE flexion and is unable to stand upright without physical assist d/t severe RLE pain. Min-A for return to sitting EOB. Ambulation not safe to attempt at this time d/t pain, weakness, and high risk for falls. Recommending acute IP rehab at d/c for safe return to PLOF. Will follow.

## 2022-07-21 VITALS
DIASTOLIC BLOOD PRESSURE: 72 MMHG | WEIGHT: 130.73 LBS | HEART RATE: 66 BPM | BODY MASS INDEX: 24.68 KG/M2 | SYSTOLIC BLOOD PRESSURE: 146 MMHG | OXYGEN SATURATION: 98 % | RESPIRATION RATE: 16 BRPM | HEIGHT: 61 IN | TEMPERATURE: 97.9 F

## 2022-07-21 LAB
ANION GAP SERPL CALCULATED.3IONS-SCNC: 8 MMOL/L (ref 5–15)
BASOPHILS # BLD AUTO: 0 10*3/MM3 (ref 0–0.2)
BASOPHILS NFR BLD AUTO: 0.5 % (ref 0–1.5)
BUN SERPL-MCNC: 23 MG/DL (ref 8–23)
BUN/CREAT SERPL: 27.4 (ref 7–25)
CALCIUM SPEC-SCNC: 8.2 MG/DL (ref 8.6–10.5)
CHLORIDE SERPL-SCNC: 108 MMOL/L (ref 98–107)
CO2 SERPL-SCNC: 24 MMOL/L (ref 22–29)
CREAT SERPL-MCNC: 0.84 MG/DL (ref 0.57–1)
DEPRECATED RDW RBC AUTO: 52.1 FL (ref 37–54)
EGFRCR SERPLBLD CKD-EPI 2021: 69.5 ML/MIN/1.73
EOSINOPHIL # BLD AUTO: 0.2 10*3/MM3 (ref 0–0.4)
EOSINOPHIL NFR BLD AUTO: 2.5 % (ref 0.3–6.2)
ERYTHROCYTE [DISTWIDTH] IN BLOOD BY AUTOMATED COUNT: 19 % (ref 12.3–15.4)
GLUCOSE SERPL-MCNC: 120 MG/DL (ref 65–99)
HCT VFR BLD AUTO: 27.1 % (ref 34–46.6)
HGB BLD-MCNC: 8.6 G/DL (ref 12–15.9)
LYMPHOCYTES # BLD AUTO: 1.2 10*3/MM3 (ref 0.7–3.1)
LYMPHOCYTES NFR BLD AUTO: 15 % (ref 19.6–45.3)
MCH RBC QN AUTO: 24.7 PG (ref 26.6–33)
MCHC RBC AUTO-ENTMCNC: 31.8 G/DL (ref 31.5–35.7)
MCV RBC AUTO: 77.6 FL (ref 79–97)
MONOCYTES # BLD AUTO: 0.5 10*3/MM3 (ref 0.1–0.9)
MONOCYTES NFR BLD AUTO: 6.3 % (ref 5–12)
NEUTROPHILS NFR BLD AUTO: 5.9 10*3/MM3 (ref 1.7–7)
NEUTROPHILS NFR BLD AUTO: 75.7 % (ref 42.7–76)
NRBC BLD AUTO-RTO: 0 /100 WBC (ref 0–0.2)
PLATELET # BLD AUTO: 268 10*3/MM3 (ref 140–450)
PMV BLD AUTO: 7 FL (ref 6–12)
POTASSIUM SERPL-SCNC: 4.8 MMOL/L (ref 3.5–5.2)
RBC # BLD AUTO: 3.49 10*6/MM3 (ref 3.77–5.28)
SODIUM SERPL-SCNC: 140 MMOL/L (ref 136–145)
VANCOMYCIN SERPL-MCNC: 16.9 MCG/ML (ref 5–40)
WBC NRBC COR # BLD: 7.8 10*3/MM3 (ref 3.4–10.8)

## 2022-07-21 PROCEDURE — 94799 UNLISTED PULMONARY SVC/PX: CPT

## 2022-07-21 PROCEDURE — 36415 COLL VENOUS BLD VENIPUNCTURE: CPT | Performed by: HOSPITALIST

## 2022-07-21 PROCEDURE — 80048 BASIC METABOLIC PNL TOTAL CA: CPT | Performed by: HOSPITALIST

## 2022-07-21 PROCEDURE — 85025 COMPLETE CBC W/AUTO DIFF WBC: CPT | Performed by: HOSPITALIST

## 2022-07-21 PROCEDURE — G0378 HOSPITAL OBSERVATION PER HR: HCPCS

## 2022-07-21 PROCEDURE — 99217 PR OBSERVATION CARE DISCHARGE MANAGEMENT: CPT | Performed by: INTERNAL MEDICINE

## 2022-07-21 PROCEDURE — 94761 N-INVAS EAR/PLS OXIMETRY MLT: CPT

## 2022-07-21 PROCEDURE — 99214 OFFICE O/P EST MOD 30 MIN: CPT | Performed by: INTERNAL MEDICINE

## 2022-07-21 PROCEDURE — 80202 ASSAY OF VANCOMYCIN: CPT | Performed by: INTERNAL MEDICINE

## 2022-07-21 PROCEDURE — 94664 DEMO&/EVAL PT USE INHALER: CPT

## 2022-07-21 RX ORDER — CEPHALEXIN 500 MG/1
500 CAPSULE ORAL EVERY 8 HOURS SCHEDULED
Status: DISCONTINUED | OUTPATIENT
Start: 2022-07-21 | End: 2022-07-21 | Stop reason: HOSPADM

## 2022-07-21 RX ORDER — ASPIRIN 81 MG/1
81 TABLET, CHEWABLE ORAL DAILY
Start: 2022-07-21 | End: 2022-08-24

## 2022-07-21 RX ORDER — CEPHALEXIN 500 MG/1
500 CAPSULE ORAL EVERY 8 HOURS SCHEDULED
Qty: 18 CAPSULE | Refills: 0
Start: 2022-07-21 | End: 2022-07-27

## 2022-07-21 RX ORDER — ASPIRIN 81 MG/1
81 TABLET, CHEWABLE ORAL DAILY
Status: DISCONTINUED | OUTPATIENT
Start: 2022-07-21 | End: 2022-07-21 | Stop reason: HOSPADM

## 2022-07-21 RX ORDER — ASPIRIN 81 MG/1
81 TABLET, CHEWABLE ORAL DAILY
Qty: 30 TABLET | Refills: 0 | Status: SHIPPED | OUTPATIENT
Start: 2022-07-21 | End: 2022-08-17 | Stop reason: SDUPTHER

## 2022-07-21 RX ORDER — ACETAMINOPHEN 325 MG/1
650 TABLET ORAL EVERY 4 HOURS PRN
Start: 2022-07-21 | End: 2022-08-17 | Stop reason: SDUPTHER

## 2022-07-21 RX ADMIN — Medication 10 ML: at 08:55

## 2022-07-21 RX ADMIN — METOPROLOL SUCCINATE 50 MG: 50 TABLET, EXTENDED RELEASE ORAL at 08:55

## 2022-07-21 RX ADMIN — BUDESONIDE AND FORMOTEROL FUMARATE DIHYDRATE 2 PUFF: 160; 4.5 AEROSOL RESPIRATORY (INHALATION) at 10:04

## 2022-07-21 RX ADMIN — FERROUS SULFATE TAB EC 324 MG (65 MG FE EQUIVALENT) 324 MG: 324 (65 FE) TABLET DELAYED RESPONSE at 08:55

## 2022-07-21 RX ADMIN — ACETAMINOPHEN 650 MG: 325 TABLET, FILM COATED ORAL at 03:25

## 2022-07-21 RX ADMIN — APIXABAN 2.5 MG: 2.5 TABLET, FILM COATED ORAL at 08:55

## 2022-07-21 NOTE — CASE MANAGEMENT/SOCIAL WORK
Case Management Discharge Note      Final Note: [facility] rehab.      Selected Continued Care - Discharged on 7/21/2022 Admission date: 7/19/2022 - Discharge disposition: Rehab Facility or Unit (DC - External)    Destination Coordination complete.    Service Provider Selected Services Address Phone Fax Patient Preferred    [facility]ly [facility]  Inpatient Rehabilitation [address] [place] [address] [place] [address] [phone] --           Transportation Services  Private: Car    Final Discharge Disposition Code: 62 - inpatient rehab facility

## 2022-07-21 NOTE — CONSULTS
CARDIOLOGY progress note      Referring Provider: Dr. Berry    Reason for Consultation: RLE pain    Attending: Nick Berry DO    Chief complaint    RLE pain    Subjective .     History of present illness:  Chrystal Ruiz is a 81 y.o. female who presents with RLE pain.     Patient was recently admitted at [facility] from July 7 through July 11, 2022.  Patient had a watchman left atrial appendage closure device implanted.  Post procedure she developed a pseudoaneurysm in the right groin.  She had consultation by vascular surgery.  She underwent ultrasound-guided thrombin injection which was not successful and ultimately had to undergo surgery with repair of a pseudoaneurysm and AV fistula.  I saw her in clinic ultimately with a lot of bruising in her right lower extremity as well as below the knee.  Advised her to get the ER if this persisted or worsened.  She has been followed by vascular surgery as well postoperatively.  She ultimately presented back to the ER with worsening pain right lower extremity with redness and swelling.  Patient is known to have Paroxysmal atrial fibrillation.  Evaluation in the emergency room showed sinus rhythm by EKG.  Vascular surgery has been consulted and patient is scheduled for duplex ultrasound of the right groin.  She has been started on antibiotics for presumed right lower extremity cellulitis.    She denies any chest pain or dyspnea or feelings of her heart racing.  She reports compliance with medical therapy.  ======================================================================  Vascular surgery note reviewed, no plan for intervention, stable hematoma, they recommend treatment of cellulitis  No chest pain or shortness of breath  Hemoglobin slightly lower  No fevers  Blood pressures and heart rates are adequate  They are okay with anticoagulation      Otherwise review of systems negative x14 point review of systems except was mentioned above  Historical data copied  forward from previous encounters in EMR is unchanged    Review of Systems   Constitutional: Negative for decreased appetite and diaphoresis.   HENT: Negative for congestion, hearing loss and nosebleeds.    Cardiovascular: Negative for chest pain, claudication, dyspnea on exertion, irregular heartbeat, leg swelling, near-syncope, orthopnea, palpitations, paroxysmal nocturnal dyspnea and syncope.   Respiratory: Negative for cough, shortness of breath and sleep disturbances due to breathing.    Endocrine: Negative for polyuria.   Hematologic/Lymphatic: Does not bruise/bleed easily.   Skin: Negative for itching and rash.   Musculoskeletal: Positive for arthritis and muscle weakness. Negative for back pain and myalgias.   Gastrointestinal: Negative for abdominal pain, change in bowel habit and nausea.   Genitourinary: Negative for dysuria, flank pain, frequency and hesitancy.   Neurological: Negative for dizziness, tremors and weakness.   Psychiatric/Behavioral: Negative for altered mental status. The patient does not have insomnia.        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   • Hypertension        Past Surgical History:   Procedure Laterality Date   • ATRIAL APPENDAGE EXCLUSION LEFT WITH TRANSESOPHAGEAL ECHOCARDIOGRAM Right 7/7/2022    Procedure: Atrial Appendage Occlusion watchman team aware $;  Surgeon: Sage Garcia MD;  Location: [facility] CATH INVASIVE LOCATION;  Service: Cardiovascular;  Laterality: Right;   • ATRIAL APPENDAGE EXCLUSION LEFT WITH TRANSESOPHAGEAL ECHOCARDIOGRAM N/A 7/7/2022    Procedure: Atrial Appendage Occlusion;  Surgeon: Fide Mullen MD;  Location: [facility] CATH INVASIVE LOCATION;  Service: Cardiovascular;  Laterality: N/A;   • CATARACT EXTRACTION     • CHOLECYSTECTOMY     • HYSTERECTOMY  1987    Endometriosis   •  OOPHORECTOMY     • PSEUDO ANEURYSM REPAIR, EXTREMITY Right 7/8/2022    Procedure: REPAIR OF POST CATHETERIZATION RIGHT FEMORAL PSEUDOANEURYSM AND ARTERIOVENOUS FISTUAL;  Surgeon: Alfonzo Hooks MD;  Location: [facility] MAIN OR;  Service: Vascular;  Laterality: Right;   • TONSILLECTOMY         Family History   Problem Relation Age of Onset   • Breast cancer Mother 85   • Stroke Mother    • Heart disease Mother    • Endometrial cancer Mother 70   • Heart failure Father    • Heart failure Sister    • COPD Sister    • Heart disease Sister        Social History     Tobacco Use   • Smoking status: Never Smoker   • Smokeless tobacco: Never Used   Vaping Use   • Vaping Use: Never used   Substance Use Topics   • Alcohol use: No   • Drug use: Never        Medications Prior to Admission   Medication Sig Dispense Refill Last Dose   • acetaminophen (TYLENOL) 500 MG tablet Take 500 mg by mouth Every 6 (Six) Hours As Needed for Mild Pain .   7/19/2022 at Unknown time   • apixaban (ELIQUIS) 2.5 MG tablet tablet Take 1 tablet by mouth Every 12 (Twelve) Hours. Indications: Atrial Fibrillation 60 tablet 1 7/19/2022 at Unknown time   • Breo Ellipta 200-25 MCG/INH inhaler Inhale 1 puff Daily. 1 each 2 7/19/2022 at Unknown time   • Cholecalciferol (VITAMIN D3) 2000 units capsule Take 2,000 Units by mouth Daily.   7/19/2022 at Unknown time   • Cyanocobalamin (VITAMIN B12) 1000 MCG tablet controlled-release Take 2,500 mcg by mouth Daily.   7/19/2022 at Unknown time   • ferrous sulfate 325 (65 FE) MG tablet Take 1 tablet by mouth Every Other Day. 15 tablet 11 Past Week at Unknown time   • furosemide (LASIX) 40 MG tablet Take 40 mg by mouth Every Other Day.   Past Week at Unknown time   • metoprolol succinate XL (TOPROL-XL) 50 MG 24 hr tablet Take 1 tablet by mouth Daily. 30 tablet 5 7/19/2022 at Unknown time   • spironolactone (ALDACTONE) 25 MG tablet Take 1 tablet by mouth Every Other Day. 45 tablet 1 7/19/2022 at Unknown time   •  "triamcinolone (KENALOG) 0.1 % cream Apply  topically to the appropriate area as directed 2 (Two) Times a Day. 80 g 2 Past Week at Unknown time         Ciprofloxacin, Sulfa antibiotics, Cyprodenate, and Iodine    Scheduled Meds:apixaban, 2.5 mg, Oral, Q12H  budesonide-formoterol, 2 puff, Inhalation, BID - RT  cephalexin, 500 mg, Oral, Q8H  ferrous sulfate, 324 mg, Oral, Daily With Breakfast  furosemide, 40 mg, Oral, Every Other Day  metoprolol succinate XL, 50 mg, Oral, Daily  sodium chloride, 10 mL, Intravenous, Q12H  spironolactone, 25 mg, Oral, Every Other Day      Continuous Infusions:   PRN Meds:.•  acetaminophen **OR** acetaminophen **OR** acetaminophen  •  aluminum-magnesium hydroxide-simethicone  •  HYDROcodone-acetaminophen  •  HYDROcodone-acetaminophen  •  melatonin  •  nitroglycerin  •  ondansetron **OR** ondansetron  •  sodium chloride  •  sodium chloride    Objective     VITAL SIGNS  Vitals:    07/20/22 1940 07/21/22 0325 07/21/22 1004 07/21/22 1006   BP: 134/70 146/72     BP Location: Right arm Right arm     Patient Position: Lying Lying     Pulse: 75 67 67 66   Resp: 18 18 16 16   Temp: 98.5 °F (36.9 °C) 97.9 °F (36.6 °C)     TempSrc: Oral Oral     SpO2: 97% 95% 98% 98%   Weight:  59.3 kg (130 lb 11.7 oz)     Height:           Flowsheet Rows    Flowsheet Row First Filed Value   Admission Height 154.9 cm (61\") Documented at 07/19/2022 1602   Admission Weight 56.1 kg (123 lb 10.9 oz) Documented at 07/19/2022 1602          Body mass index is 24.7 kg/m².     TELEMETRY: SR    Physical Exam:  Vitals reviewed.   Constitutional:       General: Not in acute distress.     Appearance: Normal appearance. Well-developed.   Eyes:      Pupils: Pupils are equal, round, and reactive to light.   HENT:      Head: Normocephalic and atraumatic.   Neck:      Vascular: No JVD.   Pulmonary:      Effort: Pulmonary effort is normal.      Breath sounds: Normal breath sounds.   Cardiovascular:      Normal rate. Regular rhythm.    "   Comments: Diffuse bruising in the right lower extremity.  Redness in the right   Pulses:     Intact distal pulses.   Edema:     Peripheral edema absent.   Abdominal:      General: There is no distension.      Palpations: Abdomen is soft.      Tenderness: There is no abdominal tenderness.   Musculoskeletal: Normal range of motion.      Cervical back: Normal range of motion and neck supple. Skin:     General: Skin is warm and dry.   Neurological:      Mental Status: Alert and oriented to person, place, and time.          Results Review:   I reviewed the patient's new clinical results.    CBC    Results from last 7 days   Lab Units 07/21/22  0156 07/20/22 0512 07/19/22 2105   WBC 10*3/mm3 7.80 7.10 8.70   HEMOGLOBIN g/dL 8.6* 8.4* 9.5*   PLATELETS 10*3/mm3 268 265 313     BMP   Results from last 7 days   Lab Units 07/21/22  0156 07/20/22 0512 07/19/22 2105   SODIUM mmol/L 140 140 142   POTASSIUM mmol/L 4.8 4.0 4.2   CHLORIDE mmol/L 108* 108* 108*   CO2 mmol/L 24.0 22.0 24.0   BUN mg/dL 23 23 30*   CREATININE mg/dL 0.84 0.78 0.97   GLUCOSE mg/dL 120* 119* 89     Cr Clearance Estimated Creatinine Clearance: 42.7 mL/min (by C-G formula based on SCr of 0.84 mg/dL).  [facility] [place]     HbA1C   Lab Results   Component Value Date    HGBA1C 6.3 (H) 01/25/2022     Blood Glucose No results found for: POCGLU  Infection   Results from last 7 days   Lab Units 07/19/22  2146 07/19/22 2105   BLOODCX  No growth at 24 hours No growth at 24 hours     CMP   Results from last 7 days   Lab Units 07/21/22  0156 07/20/22 0512 07/19/22 2105   SODIUM mmol/L 140 140 142   POTASSIUM mmol/L 4.8 4.0 4.2   CHLORIDE mmol/L 108* 108* 108*   CO2 mmol/L 24.0 22.0 24.0   BUN mg/dL 23 23 30*   CREATININE mg/dL 0.84 0.78 0.97   GLUCOSE mg/dL 120* 119* 89   ALBUMIN g/dL  --   --  3.90   BILIRUBIN mg/dL  --   --  0.5   ALK PHOS U/L  --   --  81   AST (SGOT) U/L  --   --  12   ALT (SGPT) U/L  --   --  11     ABG      UA      BRENT  No results found for:  POCMETH, POCAMPHET, POCBARBITUR, POCBENZO, POCCOCAINE, POCOPIATES, POCOXYCODO, POCPHENCYC, POCPROPOXY, POCTHC, POCTRICYC  Lysis Labs   Results from last 7 days   Lab Units 07/21/22  0156 07/20/22  0512 07/19/22  2105   HEMOGLOBIN g/dL 8.6* 8.4* 9.5*   PLATELETS 10*3/mm3 268 265 313   CREATININE mg/dL 0.84 0.78 0.97     Radiology(recent) XR Tibia Fibula 2 View Right    Result Date: 7/20/2022  1. Generalized subcutaneous edema can be seen with venous stasis or cellulitis. Slot 67 Electronically signed by:  Russ Potts M.D.  7/19/2022 10:12 PM    CT Lower Extremity Right Without Contrast    Result Date: 7/20/2022  1. Examination is limited given the lack of IV contrast. 2. Multiple surgical clips in the right inguinal region from recent surgery. 3. 4 cm collection of fluid tracking in the right anterior compartment musculature of the right thigh. It could be a hematoma or seroma. This study really cannot evaluate for pseudoaneurysm or active bleeding given the lack of IV contrast. 4. Subcutaneous edema of the anterior right calf extending circumferentially around the ankle that could be dependent edema or diffuse bruising. Electronically signed by:  Lon Grimaldo M.D.  7/19/2022 10:29 PM            Imaging Results (Last 24 Hours)     ** No results found for the last 24 hours. **          EKG          I personally viewed and interpreted the patient's EKG/Telemetry data:    ECHOCARDIOGRAM:  7/7/2022    Interpretation Summary    · Left ventricular systolic function is normal.  · Left ventricular ejection fraction is 55 to 60%  · Left ventricular wall thickness is consistent with mild concentric hypertrophy.  · Successful deployment of watchman flex device in left atrial appendage with adequate occlus          STRESS MYOVIEW:  1/2022  Indications  Shortness of breath     This study was performed under my direct supervision.     Resting ECG  Sinus rhythm.     The patient was injected with Lexiscan intravenously  while constantly monitoring electrocardiogram and vital signs.  Patient did not have any chest discomfort ST abnormalities or ectopy with injection of Lexiscan.     Cardiolite was used as an imaging agent.     Cardiolite images showed uniform distribution of radionuclide without any evidence for myocardial ischemia.     Gated SPECT images revealed normal left ventricle size and contractility with ejection fraction of 74%.     Impression  ========  Lexiscan Cardiolite test is negative for myocardial ischemia.     Gated SPECT images revealed normal left ventricular size and contractility with ejection fraction of 74%.        CARDIAC CATHETERIZATION:    OTHER:         Assessment & Plan       Cellulitis of right leg    Hypertension    A-fib ([facility])      Assessment:    RLE cellulitis  S/p Watchman implant  Pseudoaneurysm with AV fistula post procedure      S/p thrombin injection-failed     S/p repair of Right femoral pseudoaneurysm and AV fistula  Paroxysmal Atrial Fibrillation  HTN  Dyslipidemia    Plan:  Vascular surgery has been consulted, note reviewed, okay for anticoagulation  From CV standpoint can hold anticoagulation for 48 hours then restart   Duplex stable hematoma  Recommend treatment for cellulitis    No other CV complaints    Continue current Rx and supportive care from CV standpoint  Will defer for recommendations from present problem to vascular surgery with respect to therapeutic, intervention, medications, anticoagulation, compression or otherwise.  From CV standpoint despite recent Watchman may recommend holding anticoagulation for a week    I discussed the patients findings and my recommendations with patient and RN    Jeremy Mccall MD, PhD

## 2022-07-21 NOTE — CASE MANAGEMENT/SOCIAL WORK
Continued Stay Note  OLIMPIA Ames     Patient Name: Chrystal Ruiz  MRN: [id number]  Today's Date: 7/21/2022    Admit Date: 7/19/2022     Discharge Plan     Row Name 07/21/22 1234       Plan    Plan DC plan: Accepted to [facility] rehab. Bed available 7/21. No precert or PASRR required.    Plan Comments DC orders noted. CM verified with liagilberto Olivier that pt is good to come today 7/21, around noon. Met with pt at bedside and notified her of plans of discharge. Discussed transportation and pt states her son is in [place], but has friends visiting at this time who would be able to provide transport. Discussed [facility]ON letter and let pt review. Returned to room and obtained signed copy.               Patient Forms     Row Name 07/21/22 1233       Patient Forms    Important Message from Medicare ([facility]) Delivered  AREVALO 7/21    Delivered to Patient    Method of delivery In person              Met with patient in room wearing PPE: mask, face shield/goggles.      Maintained distance greater than six feet and spent less than 15 minutes in the room.      Megan Naegele, RN      Office Phone: [phone]  Office Cell: [phone]

## 2022-07-21 NOTE — DISCHARGE SUMMARY
[facility] Medicine Services  DISCHARGE SUMMARY    Patient Name: Chrystal Ruiz  : 1940  MRN: [id number]    Date of Admission: 2022  Date of Discharge: 2022  Primary Care Physician: Etelvina Ulloa MD      Presenting Problem:   Cellulitis of right leg [L03.115]    Active and Resolved Hospital Problems:  Active Hospital Problems    Diagnosis POA   • **Cellulitis of right leg [L03.115] Yes   • A-fib (HCC) [I48.91] Yes   • Hypertension [I10] Yes      Resolved Hospital Problems   No resolved problems to display.         Hospital Course     Hospital Course:  Chrystal Ruiz is a 81 y.o. female  presenting to the emergency room with right calf pain swelling redness as well as right groin swelling.  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  Patient has expected postoperative swelling hematoma on exam with no signs of infection in the groin.  Patient does have some bruising down the thigh and calf as expected from the pseudoaneurysm or bleed in the right groin after the heart catheterization watchman procedure.  Patient with tenderness and redness of the calf and admitted by medicine for antibiotics.  Patient had CT scan of the lower extremity without contrast which shows fluid collection in the groin as expected.  We will go and check lower extremity venous duplex scan right lower extremity.  Patient is on Eliquis for atrial fibrillation history status post atrial appendage exclusion 2022.  Venous duplex negative for any DVT, stable findings noted.  Okay to resume Eliquis per vascular surgery and it was resumed.  Her right lower extremity cellulitis is also significantly proved, cultures remain negative.  Switch to Keflex on discharge.  PT/OT recommending rehab.  Patient is clinically improved and stable to discharge to rehab with follow-up with PCP as an outpatient.    A/P:    Right lower  extremity cellulitis  - Initially on empiric IV antibiotics with ceftriaxone and vancomycin  -No active drainage to culture  - Blood cultures NGTD  - Imaging reports noted  - DP pulse on the right side is easily palpable   -switch to Keflex on discharge     Right lower extremity hematoma  Acute on chronic anemia  - Hemoglobin stable shows no DVT, stable findings otherwise okay to resume Eliquis per vascular surgery  - Right lower extremity CT ordered to evaluate for hematoma (patient is allergic to contrast)  - Vascular surgery following, venous Dopplers ordered  -Resume Eliquis once okay with vascular surgery     Atrial fibrillation  - Recent watchman device placed on 7/7  - She is supposed to be taking Eliquis for 45 days following the procedure, however will hold for now due to hematoma pending vascular surgery clearance  - Cardiology and vascular surgery  followed, continue outpatient follow-up     Heart failure with preserved ejection fraction, not in an exacerbation  - Continue home meds except as above, monitor, improved        DISCHARGE Follow Up Recommendations for labs and diagnostics:   follow-up with PCP    Reasons For Change In Medications and Indications for New Medications:  Keflex added for cellulitis.    Day of Discharge     Vital Signs:  Temp:  [97.9 °F (36.6 °C)-98.5 °F (36.9 °C)] 97.9 °F (36.6 °C)  Heart Rate:  [67-75] 67  Resp:  [18] 18  BP: (134-146)/(70-72) 146/72    Physical Exam:  General: Awake, alert, elderly female, lying in bed, NAD  Eyes: PERRL, EOMI, conjunctive are clear  Cardiovascular: Regular rate and rhythm, no murmurs  Respiratory: Clear to auscultation bilaterally, no wheezing or rales, unlabored breathing  Abdomen: Soft, nontender, positive bowel sounds, no guarding  Neurologic: A&O, CN grossly intact, moves all extremities spontaneously  Musculoskeletal: Normal range of motion, no deformities  Skin: Warm, right lower extremity groin incision intact, ecchymosis noted down the  right lower extremity with some generalized tenderness but more just above the ankle which has significantly improved .  Erythema and warmth noted just above the ankle on the right without any active drainage or wound noted.  Palpable pulses distally.        Pertinent  and/or Most Recent Results     LAB RESULTS:      Lab 07/21/22  0156 07/20/22 0512 07/19/22 2105   WBC 7.80 7.10 8.70   HEMOGLOBIN 8.6* 8.4* 9.5*   HEMATOCRIT 27.1* 26.5* 29.6*   PLATELETS 268 265 313   NEUTROS ABS 5.90 5.20 6.60   LYMPHS ABS 1.20 1.20 1.30   MONOS ABS 0.50 0.50 0.60   EOS ABS 0.20 0.10 0.10   MCV 77.6* 78.2* 77.9*   LACTATE  --   --  0.4*         Lab 07/21/22  0156 07/20/22 0512 07/19/22 2105   SODIUM 140 140 142   POTASSIUM 4.8 4.0 4.2   CHLORIDE 108* 108* 108*   CO2 24.0 22.0 24.0   ANION GAP 8.0 10.0 10.0   BUN 23 23 30*   CREATININE 0.84 0.78 0.97   EGFR 69.5 75.9 58.5*   GLUCOSE 120* 119* 89   CALCIUM 8.2* 8.4* 9.3         Lab 07/19/22 2105   TOTAL PROTEIN 7.0   ALBUMIN 3.90   GLOBULIN 3.1   ALT (SGPT) 11   AST (SGOT) 12   BILIRUBIN 0.5   ALK PHOS 81                     Brief Urine Lab Results  (Last result in the past 365 days)      Color   Clarity   Blood   Leuk Est   Nitrite   Protein   CREAT   Urine HCG        01/24/22 1657 Orange  Comment: Any Substance that causes an abnormal urine color can alter the accuracy of the chemical reactions.   Clear   Small (1+)   Trace   Negative   Trace               Microbiology Results (last 10 days)     Procedure Component Value - Date/Time    Blood Culture - Blood, Arm, Left [[id number]]  (Normal) Collected: 07/19/22 2146    Lab Status: Preliminary result Specimen: Blood from Arm, Left Updated: 07/20/22 2202     Blood Culture No growth at 24 hours    Blood Culture - Blood, Arm, Left [[id number]]  (Normal) Collected: 07/19/22 2105    Lab Status: Preliminary result Specimen: Blood from Arm, Left Updated: 07/20/22 2118     Blood Culture No growth at 24 hours          XR Tibia Fibula 2 View  Right    Result Date: 7/20/2022  Impression: 1. Generalized subcutaneous edema can be seen with venous stasis or cellulitis. Slot 67 Electronically signed by:  Russ Potts M.D.  7/19/2022 10:12 PM    MRI Brain With & Without Contrast    Result Date: 6/27/2022  Impression: 1.No evidence of intracranial metastasis. 2.No acute intracranial process identified. 3.Findings suggestive of mild chronic small vessel ischemic disease. 4.Bilateral mastoid effusions.  Electronically Signed By-Alfonzo Bedoya MD On:6/27/2022 4:00 PM This report was finalized on [id number] by  Alfonzo Bedoya MD.    CT Lower Extremity Right Without Contrast    Result Date: 7/20/2022  Impression: 1. Examination is limited given the lack of IV contrast. 2. Multiple surgical clips in the right inguinal region from recent surgery. 3. 4 cm collection of fluid tracking in the right anterior compartment musculature of the right thigh. It could be a hematoma or seroma. This study really cannot evaluate for pseudoaneurysm or active bleeding given the lack of IV contrast. 4. Subcutaneous edema of the anterior right calf extending circumferentially around the ankle that could be dependent edema or diffuse bruising. Electronically signed by:  Lon Grimaldo M.D.  7/19/2022 10:29 PM      Results for orders placed during the hospital encounter of 07/19/22    Duplex Venous Lower Extremity - Right CAR    Interpretation Summary  · Chronic right lower extremity superficial thrombophlebitis noted in the small saphenous.  · All other right sided veins appeared normal.  · Fluid noted right groin with no pseudoaneurysm after open repair.      Results for orders placed during the hospital encounter of 07/19/22    Duplex Venous Lower Extremity - Right CAR    Interpretation Summary  · Chronic right lower extremity superficial thrombophlebitis noted in the small saphenous.  · All other right sided veins appeared normal.  · Fluid noted right groin with no  pseudoaneurysm after open repair.      Results for orders placed during the hospital encounter of 07/07/22    Adult Transesophageal Echo (MELISA) W/ Cont if Necessary Per Protocol    Interpretation Summary  · Left ventricular systolic function is normal.  · Left ventricular ejection fraction is 55 to 60%  · Left ventricular wall thickness is consistent with mild concentric hypertrophy.  · Successful deployment of watchman flex device in left atrial appendage with adequate occlusion      Labs Pending at Discharge:  Pending Labs     Order Current Status    Blood Culture - Blood, Arm, Left Preliminary result    Blood Culture - Blood, Arm, Left Preliminary result          Procedures Performed           Consults:   Consults     Date and Time Order Name Status Description    7/19/2022 11:09 PM Inpatient Cardiology Consult Completed     7/19/2022 11:04 PM Inpatient Vascular Surgery Consult Completed     7/19/2022 10:33 PM Hospitalist (on-call MD unless specified)      7/7/2022  5:59 PM Inpatient Vascular Surgery Consult Completed             Discharge Details        Discharge Medications      New Medications      Instructions Start Date   cephalexin 500 MG capsule  Commonly known as: KEFLEX   500 mg, Oral, Every 8 Hours Scheduled         Changes to Medications      Instructions Start Date   acetaminophen 500 MG tablet  Commonly known as: TYLENOL  What changed: Another medication with the same name was added. Make sure you understand how and when to take each.   500 mg, Oral, Every 6 Hours PRN      acetaminophen 325 MG tablet  Commonly known as: TYLENOL  What changed: You were already taking a medication with the same name, and this prescription was added. Make sure you understand how and when to take each.   650 mg, Oral, Every 4 Hours PRN         Continue These Medications      Instructions Start Date   apixaban 2.5 MG tablet tablet  Commonly known as: ELIQUIS   2.5 mg, Oral, Every 12 Hours Scheduled      Vicki Carter 200-25  MCG/INH inhaler  Generic drug: Fluticasone Furoate-Vilanterol   1 puff, Inhalation, Daily - RT      ferrous sulfate 325 (65 FE) MG tablet   325 mg, Oral, Every Other Day      furosemide 40 MG tablet  Commonly known as: LASIX   40 mg, Oral, Every Other Day      metoprolol succinate XL 50 MG 24 hr tablet  Commonly known as: TOPROL-XL   50 mg, Oral, Daily      spironolactone 25 MG tablet  Commonly known as: ALDACTONE   25 mg, Oral, Every Other Day      triamcinolone 0.1 % cream  Commonly known as: KENALOG   Topical, 2 Times Daily      Vitamin B12 1000 MCG tablet controlled-release   2,500 mcg, Oral, Daily      Vitamin D3 50 MCG (2000 UT) capsule   2,000 Units, Oral, Daily             Allergies   Allergen Reactions   • Ciprofloxacin Unknown (See Comments)   • Sulfa Antibiotics Unknown (See Comments)   • Cyprodenate Unknown - High Severity   • Iodine Unknown - High Severity         Discharge Disposition:  Rehab Facility or Unit (DC - External)    Diet:  Hospital:  Diet Order   Procedures   • Diet Cardiac; Healthy Heart         Discharge Activity:         CODE STATUS:  Code Status and Medical Interventions:   Ordered at: 07/20/22 0955     Code Status (Patient has no pulse and is not breathing):    CPR (Attempt to Resuscitate)     Medical Interventions (Patient has pulse or is breathing):    Full Support         Future Appointments   Date Time Provider Department Center   7/29/2022 11:15 AM Sage Garcia MD [facility]   8/17/2022 10:40 AM LAB MD [facility] LAB [facility]   8/17/2022 10:45 AM Frank Cheng MD [facility] NA [facility]   8/29/2022  1:45 PM Jeremy Mccall MD [facility] NA P [facility]   11/15/2022  1:20 PM Michael Whitten MD [facility] RO [facility] None           Time spent on Discharge including face to face service:  25 minutes    Signature:    Electronically signed by Nick Berry DO, 07/21/22, 9:48 AM EDT.

## 2022-07-21 NOTE — PROGRESS NOTES
Name: Chrystal Ruiz ADMIT: 2022   : 1940  PCP: Etelvina Ulloa MD    MRN: [id number] LOS: 1 days   AGE/SEX: 81 y.o. female  ROOM:  [place] [address]/1     CC: Weakness and leg pain  Interval History: Feeling better and hopeful to go to rehab  Subjective   Subjective     Review of Systems  Objective   Objective     Vitals:   Temp:  [97.9 °F (36.6 °C)-98.5 °F (36.9 °C)] 97.9 °F (36.6 °C)  Heart Rate:  [67-75] 67  Resp:  [18] 18  BP: (134-146)/(70-72) 146/72    No intake/output data recorded.    Scheduled Meds:     apixaban, 2.5 mg, Oral, Q12H  budesonide-formoterol, 2 puff, Inhalation, BID - RT  cefTRIAXone, 1 g, Intravenous, Q24H  ferrous sulfate, 324 mg, Oral, Daily With Breakfast  furosemide, 40 mg, Oral, Every Other Day  metoprolol succinate XL, 50 mg, Oral, Daily  sodium chloride, 10 mL, Intravenous, Q12H  spironolactone, 25 mg, Oral, Every Other Day  vancomycin, 1,000 mg, Intravenous, Q24H      IV Meds:   Pharmacy to dose vancomycin,         Physical Exam  Vascular: Right groin incision looks great.  Very small hematoma but nothing concerning.  Minimal anterior calf erythema is improving per patient.  Due to pulses are palpable.    Data Reviewed:  CBC    Results from last 7 days   Lab Units 22  0156 22  0512 22  2105   WBC 10*3/mm3 7.80 7.10 8.70   HEMOGLOBIN g/dL 8.6* 8.4* 9.5*   PLATELETS 10*3/mm3 268 265 313     BMP   Results from last 7 days   Lab Units 22  0156 22  0512 22  2105   SODIUM mmol/L 140 140 142   POTASSIUM mmol/L 4.8 4.0 4.2   CHLORIDE mmol/L 108* 108* 108*   CO2 mmol/L 24.0 22.0 24.0   BUN mg/dL 23 23 30*   CREATININE mg/dL 0.84 0.78 0.97   GLUCOSE mg/dL 120* 119* 89     Infection   Results from last 7 days   Lab Units 226 22  2105   BLOODCX  No growth at 24 hours No growth at 24 hours     Radiology(recent) XR Tibia Fibula 2 View Right    Result Date: 2022  1. Generalized subcutaneous edema can be seen with venous stasis or  cellulitis. Slot 67 Electronically signed by:  Russ Potts M.D.  2022 10:12 PM    CT Lower Extremity Right Without Contrast    Result Date: 2022  1. Examination is limited given the lack of IV contrast. 2. Multiple surgical clips in the right inguinal region from recent surgery. 3. 4 cm collection of fluid tracking in the right anterior compartment musculature of the right thigh. It could be a hematoma or seroma. This study really cannot evaluate for pseudoaneurysm or active bleeding given the lack of IV contrast. 4. Subcutaneous edema of the anterior right calf extending circumferentially around the ankle that could be dependent edema or diffuse bruising. Electronically signed by:  Lon Grimaldo M.D.  2022 10:29 PM      Most notable findings include: Blood cultures negative    Active Hospital Problems:   Active Hospital Problems    Diagnosis  POA   • Cellulitis of right leg [L03.115]  Yes      Resolved Hospital Problems   No resolved problems to display.      Assessment & Plan   Billin, Post Op Global  Assessment / Plan     Status post right femoral pseudoaneurysm repair: White blood cell count remains normal.  Patient is afebrile.  Hemoglobin stable at 8.6.  Her incision looks great.  No evidence of surgical site infection.  She really has extensive ecchymosis of the thigh but this is to be expected.  She has minimal erythema of the anterior lower leg.  She says this is markedly improved.    Edema: Patient does not have any acute superficial or deep venous thrombosis.  Just some chronic thrombotic changes of the short saphenous vein.    Will sign off.  Nothing to offer from a vascular standpoint.  She feels like her redness and tenderness has improved with antibiotics.  Follow-up as previously scheduled.    Mj Johns MD  22  07:41 EDT  Office Number [phone]

## 2022-07-24 LAB
BACTERIA SPEC AEROBE CULT: NORMAL
BACTERIA SPEC AEROBE CULT: NORMAL

## 2022-07-29 ENCOUNTER — PREP FOR SURGERY (OUTPATIENT)
Dept: OTHER | Facility: HOSPITAL | Age: 82
End: 2022-07-29

## 2022-07-29 ENCOUNTER — OFFICE VISIT (OUTPATIENT)
Dept: CARDIOLOGY | Facility: CLINIC | Age: 82
End: 2022-07-29

## 2022-07-29 VITALS
HEIGHT: 61 IN | SYSTOLIC BLOOD PRESSURE: 146 MMHG | WEIGHT: 122 LBS | OXYGEN SATURATION: 99 % | DIASTOLIC BLOOD PRESSURE: 71 MMHG | BODY MASS INDEX: 23.03 KG/M2 | HEART RATE: 67 BPM

## 2022-07-29 DIAGNOSIS — D50.0 IRON DEFICIENCY ANEMIA DUE TO CHRONIC BLOOD LOSS: ICD-10-CM

## 2022-07-29 DIAGNOSIS — I48.0 PAROXYSMAL ATRIAL FIBRILLATION: Primary | ICD-10-CM

## 2022-07-29 DIAGNOSIS — I10 PRIMARY HYPERTENSION: ICD-10-CM

## 2022-07-29 DIAGNOSIS — Z95.818 PRESENCE OF WATCHMAN LEFT ATRIAL APPENDAGE CLOSURE DEVICE: ICD-10-CM

## 2022-07-29 PROCEDURE — 93000 ELECTROCARDIOGRAM COMPLETE: CPT | Performed by: INTERNAL MEDICINE

## 2022-07-29 PROCEDURE — 99214 OFFICE O/P EST MOD 30 MIN: CPT | Performed by: INTERNAL MEDICINE

## 2022-07-29 RX ORDER — SODIUM CHLORIDE 9 MG/ML
80 INJECTION, SOLUTION INTRAVENOUS CONTINUOUS
Status: CANCELLED | OUTPATIENT
Start: 2022-07-29

## 2022-07-29 NOTE — PROGRESS NOTES
CC--- anemia and paroxysmal atrial fibrillation    Sub    Delightful 81-year-old patient with multiple comorbidities underwent watchman device implantation complicated by pseudoaneurysm needing open repair.  She also had cellulitis after that needing antibiotics with resolution.  She was in rehab and got discharged yesterday and feels much better and comes in for follow-up.    Patient has history of paroxysmal atrial fibrillation and also has history of anemia needing transfusion.  She had a history of vasovagal syncope according to her description several years ago.  She has known history of hypertension and diastolic heart failure and she is on diuretics for leg edema.  She also had a prior treatment for venous system in the left lower extremity according to her.  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    Has bled score greater than 3  [id number] score --- diastolic heart failure, hypertension, female sex, age--4      Previous history from previous note attached below for reference  Essential hypertension well-controlled  Hyperlipidemia under treatment  No history of any CAD  Normal stress test in the last 1 year  Preserved LVEF        Past Medical History:   Diagnosis Date   • A-fib (HCC)    • Anemia    • Arthritis    • Asthma    • CHF (congestive heart failure) (HCC)    • Hypertension      Past Surgical History:   Procedure Laterality Date   • CATARACT EXTRACTION     • CHOLECYSTECTOMY     • HYSTERECTOMY  1987    Endometriosis   • OOPHORECTOMY     • TONSILLECTOMY       Family History   Problem Relation Age of Onset   • Breast cancer Mother 85   • Stroke Mother    • Heart disease Mother    • Endometrial cancer Mother 70   • Heart failure Father    • Heart failure Sister    • COPD Sister    • Heart disease Sister      Social History     Tobacco Use   • Smoking status: Never Smoker   •  Smokeless tobacco: Never Used   Vaping Use   • Vaping Use: Never used   Substance Use Topics   • Alcohol use: No   • Drug use: Never     Review of Systems   General:  positive for fatigue and tiredness  Eyes: No redness  Cardiovascular: No chest pain, positive for  palpitations  Respiratory:   positive for class 2 shortness of breath        Physical Exam  VITALS REVIEWED    General:      well developed, well nourished, in no acute distress.    Head:      normocephalic and atraumatic.    Eyes:      PERRL/EOM intact   Neck:      no masses, thyromegaly,  trachea central   Lungs:      clear bilaterally to auscultation.    Heart:      Sinus rhythm without any murmurs or gallops  Msk:      Positive for mild kyphosis     Pulses:      pulses normal in all 4 extremities.    Extremities:       no cyanosis or clubbing-right groin exam with a small bruit and resolving small hematoma.  Lower extremity redness has resolved.  Neurologic:      no focal deficits.   alert oriented x3            Assessment and plan    Post watchman device implantation complicated by pseudoaneurysm and AV fistula needing open repair  Feels much better after rehab  Mild cellulitis of the right lower extremity resolved with antibiotics  Schedule patient for 45-day MELISA as per protocol  Continue Eliquis in the interim  Right upper lobe lung nodule status post radiation followed by oncology.  Microcytic anemia with iron deficiency and malabsorption status post iron infusions and transfusions.  Class III chronic diastolic heart failure symptoms  Essential hypertension  History of vasovagal syncope  Check CBC    ECG 12 Lead    Date/Time: 7/29/2022 1:42 PM  Performed by: Sage Garcia MD  Authorized by: Sage Garcia MD   Comparison: compared with previous ECG   Similar to previous ECG  Rhythm: sinus rhythm  Rate: normal  Conduction: conduction normal  QRS axis: normal            Electronically signed by Sage Garcia MD, 07/29/22, 1:42 PM  EDT.

## 2022-08-02 ENCOUNTER — LAB (OUTPATIENT)
Dept: LAB | Facility: HOSPITAL | Age: 82
End: 2022-08-02

## 2022-08-02 DIAGNOSIS — I48.0 PAROXYSMAL ATRIAL FIBRILLATION: ICD-10-CM

## 2022-08-02 LAB
BASOPHILS # BLD AUTO: 0.02 10*3/MM3 (ref 0–0.2)
BASOPHILS NFR BLD AUTO: 0.3 % (ref 0–1.5)
DEPRECATED RDW RBC AUTO: 56.2 FL (ref 37–54)
EOSINOPHIL # BLD AUTO: 0.12 10*3/MM3 (ref 0–0.4)
EOSINOPHIL NFR BLD AUTO: 1.6 % (ref 0.3–6.2)
ERYTHROCYTE [DISTWIDTH] IN BLOOD BY AUTOMATED COUNT: 17.9 % (ref 12.3–15.4)
HCT VFR BLD AUTO: 37.2 % (ref 34–46.6)
HGB BLD-MCNC: 11.4 G/DL (ref 12–15.9)
LYMPHOCYTES # BLD AUTO: 1.33 10*3/MM3 (ref 0.7–3.1)
LYMPHOCYTES NFR BLD AUTO: 18.2 % (ref 19.6–45.3)
MCH RBC QN AUTO: 26.5 PG (ref 26.6–33)
MCHC RBC AUTO-ENTMCNC: 30.6 G/DL (ref 31.5–35.7)
MCV RBC AUTO: 86.5 FL (ref 79–97)
MONOCYTES # BLD AUTO: 0.67 10*3/MM3 (ref 0.1–0.9)
MONOCYTES NFR BLD AUTO: 9.2 % (ref 5–12)
NEUTROPHILS NFR BLD AUTO: 5.16 10*3/MM3 (ref 1.7–7)
NEUTROPHILS NFR BLD AUTO: 70.7 % (ref 42.7–76)
PLATELET # BLD AUTO: 302 10*3/MM3 (ref 140–450)
PMV BLD AUTO: 9.2 FL (ref 6–12)
RBC # BLD AUTO: 4.3 10*6/MM3 (ref 3.77–5.28)
WBC NRBC COR # BLD: 7.3 10*3/MM3 (ref 3.4–10.8)

## 2022-08-02 PROCEDURE — 85025 COMPLETE CBC W/AUTO DIFF WBC: CPT

## 2022-08-02 PROCEDURE — 36415 COLL VENOUS BLD VENIPUNCTURE: CPT

## 2022-08-09 ENCOUNTER — APPOINTMENT (OUTPATIENT)
Dept: VASCULAR SURGERY | Facility: HOSPITAL | Age: 82
End: 2022-08-09

## 2022-08-09 PROCEDURE — G0463 HOSPITAL OUTPT CLINIC VISIT: HCPCS

## 2022-08-16 NOTE — PROGRESS NOTES
HEMATOLOGY ONCOLOGY OUTPATIENT FOLLOW UP       Patient name: Chrystal Ruiz  : 1940  MRN: [id number]  Primary Care Physician: Etelvina Ulloa MD  Referring Physician: Etelvina Ulloa MD  Reason For Consult:     No chief complaint on file.    HPI:   History of Present Illness:  Chrystal Ruiz is 81 y.o. female who presented to our office on 22 for consultation regarding anemia and thrombocytosis.     3/9/2022: Ms. Ruiz was referred for the investigation of anemia and thrombocytosis that was identified in the process of treating congestive heart failure and a right lung tumor. She came in a wheel chair complaining of severe fatigue and difficulties functioning because of this weakness. She admitted to hematochezia that had been associated to constipation that had been going on for some time, at least a few weeks. She had been afebrile. No pain. The laboratory exams reported microcytic anemia and thrombocytosis that had been present since the end of . A decision was made to transfuse her with one unit of red cells. Tests for iron deficiency were sent. She was scheduled to see me in one week.     3/16/2022. Ms. Ruiz was back in the office for follow up. She was feeling much better after the transfusion. She was still weak but not as much and her affect was also better. The laboratory exams did not clearly suggest iron deficiency but she persisted with microcytic anemia and had a history of gastrointestinal bleeding, such that iron deficiency was strongly considered the cause of the symptoms and laboratory manifestations. She had taken oral iron with multiple side effects and no clear improvement. Plans for intravenous iron were made.     3/22/2022: Admitted with evidence of decompensation of the congestive heart failure. She was treated with diuretics with prompt symptomatic relief. She was given one dose of intravenous iron. She was discharged feeling much better.      4/4/2022: Back in the office for follow up. She had been receiving iron intravenously without complications. She reported unintended weight loss. The exam was not different than before. The laboratory exams revealed progressive improvement of the blood count with increase in the hemoglobin and the mean corpuscular volume and resolution of the leukocytosis and thrombocytosis. A decision was made to obtain a scan of the abdomen to investigate the history of blood loss in the stools and the unintended weight loss.     4/25/2022: Ms. Ruiz was back in the office for follow up. She was feeling much better and had returned to most of her normal activities. She arrived walking and reported that she had been walking more and more. She still had some fatigue but not the weakness she had before. She also had better appetite. She complained, however, that some of the medication she was receiving had resulted in changes in her taste perception. She had no more dyspnea than before and she had been free of chest pain. She had been offered the Watchman procedure, since she was not able to take anticoagulation safely. She denies abdominal pain or diarrhea. No dysuria. No edema. No skin rash.     5/18/2022: Back in the office to review results. She was feeling very well. Had regained her energy and was very active. The exam did not reveal new changes. Her hemoglobin had continued to improve, but she still had anemia. A decision was made to continue to observe without intervention.     6/15/2022: Back in the office for follow-up.  She had partial correction of her anemia.  She was scheduled to have a watchman procedure early in July.  The physical exam was unremarkable.  A decision was made to continue to observe and she was scheduled to return in 2 months.    8/17/2022: In the office for follow-up.  Compliant with iron.  Underwent the watchman procedure as planned.  Had some minor complications.  This was followed by an episode  of cellulitis that required admission to the hospital.  At this time asymptomatic.  Hemoglobin back to normal range.  A decision was made to follow in 4 months.    Subjective:  • 8/17/2022: Back in the office for follow-up.  Feels well.  Has returned to his normal activities.  Eating well and no nausea or vomiting.  No chest pains or cough.  No abdominal pain or diarrhea and no dysuria.  No skin rash.    The following portions of the patient's history were reviewed and updated as appropriate: allergies, current medications, past family history, past medical history, past social history, past surgical history and problem list.    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   • Hypertension      Past Surgical History:   Procedure Laterality Date   • ATRIAL APPENDAGE EXCLUSION LEFT WITH TRANSESOPHAGEAL ECHOCARDIOGRAM Right 7/7/2022    Procedure: Atrial Appendage Occlusion watchman team aware $;  Surgeon: Sage Garcia MD;  Location: [facility] CATH INVASIVE LOCATION;  Service: Cardiovascular;  Laterality: Right;   • ATRIAL APPENDAGE EXCLUSION LEFT WITH TRANSESOPHAGEAL ECHOCARDIOGRAM N/A 7/7/2022    Procedure: Atrial Appendage Occlusion;  Surgeon: Fide Mullen MD;  Location: [facility] CATH INVASIVE LOCATION;  Service: Cardiovascular;  Laterality: N/A;   • CATARACT EXTRACTION     • CHOLECYSTECTOMY     • HYSTERECTOMY  1987    Endometriosis   • OOPHORECTOMY     • PSEUDO ANEURYSM REPAIR, EXTREMITY Right 7/8/2022    Procedure: REPAIR OF POST CATHETERIZATION RIGHT FEMORAL PSEUDOANEURYSM AND ARTERIOVENOUS FISTUAL;  Surgeon: Alfonzo Hooks MD;  Location: [facility] MAIN OR;  Service: Vascular;  Laterality: Right;   • TONSILLECTOMY         Current Outpatient Medications:   •  acetaminophen (TYLENOL) 325 MG tablet, Take 2 tablets by mouth Every 4 (Four) Hours As  Needed for Mild Pain  or Fever., Disp: , Rfl:   •  acetaminophen (TYLENOL) 500 MG tablet, Take 500 mg by mouth Every 6 (Six) Hours As Needed for Mild Pain ., Disp: , Rfl:   •  apixaban (ELIQUIS) 2.5 MG tablet tablet, Take 1 tablet by mouth Every 12 (Twelve) Hours. Indications: Atrial Fibrillation, Disp: 60 tablet, Rfl: 1  •  aspirin 81 MG chewable tablet, Chew 1 tablet Daily., Disp: , Rfl:   •  aspirin 81 MG chewable tablet, Chew 1 tablet Daily., Disp: 30 tablet, Rfl: 0  •  Breo Ellipta 200-25 MCG/INH inhaler, Inhale 1 puff Daily., Disp: 1 each, Rfl: 2  •  Cholecalciferol (VITAMIN D3) 2000 units capsule, Take 2,000 Units by mouth Daily., Disp: , Rfl:   •  Cyanocobalamin (VITAMIN B12) 1000 MCG tablet controlled-release, Take 2,500 mcg by mouth Daily., Disp: , Rfl:   •  ferrous sulfate 325 (65 FE) MG tablet, Take 1 tablet by mouth Every Other Day., Disp: 15 tablet, Rfl: 11  •  furosemide (LASIX) 40 MG tablet, Take 40 mg by mouth Every Other Day., Disp: , Rfl:   •  metoprolol succinate XL (TOPROL-XL) 50 MG 24 hr tablet, Take 1 tablet by mouth Daily., Disp: 30 tablet, Rfl: 5  •  spironolactone (ALDACTONE) 25 MG tablet, Take 1 tablet by mouth Every Other Day., Disp: 45 tablet, Rfl: 1  •  triamcinolone (KENALOG) 0.1 % cream, Apply  topically to the appropriate area as directed 2 (Two) Times a Day., Disp: 80 g, Rfl: 2    Allergies   Allergen Reactions   • Ciprofloxacin Unknown (See Comments)   • Sulfa Antibiotics Unknown (See Comments)   • Cyprodenate Unknown - High Severity   • Iodine Unknown - High Severity     Family History   Problem Relation Age of Onset   • Breast cancer Mother 85   • Stroke Mother    • Heart disease Mother    • Endometrial cancer Mother 70   • Heart failure Father    • Heart failure Sister    • COPD Sister    • Heart disease Sister      Cancer-related family history includes Breast cancer (age of onset: 85) in her mother; Endometrial cancer (age of onset: 70) in her mother.    Social History      Tobacco Use   • Smoking status: Never Smoker   • Smokeless tobacco: Never Used   Vaping Use   • Vaping Use: Never used   Substance Use Topics   • Alcohol use: No   • Drug use: Never     Social History     Social History Narrative   • Not on file      ROS:     Review of Systems   Constitutional: Negative for activity change, appetite change, chills, diaphoresis, fatigue, fever and unexpected weight change.   HENT: Negative for congestion, dental problem, drooling, ear discharge, ear pain, facial swelling, hearing loss, mouth sores, nosebleeds, postnasal drip, rhinorrhea, sinus pressure, sinus pain, sneezing, sore throat, tinnitus, trouble swallowing and voice change.    Eyes: Negative for photophobia, pain, discharge, redness, itching and visual disturbance.   Respiratory: Negative for apnea, cough, choking, chest tightness, shortness of breath, wheezing and stridor.    Cardiovascular: Negative for chest pain, palpitations and leg swelling.   Gastrointestinal: Negative for abdominal distention, abdominal pain, anal bleeding, blood in stool, constipation, diarrhea, nausea, rectal pain and vomiting.   Endocrine: Negative for cold intolerance, heat intolerance, polydipsia and polyuria.   Genitourinary: Negative for decreased urine volume, difficulty urinating, dysuria, flank pain, frequency, genital sores, hematuria and urgency.   Musculoskeletal: Negative for arthralgias, back pain, gait problem, joint swelling, myalgias, neck pain and neck stiffness.   Skin: Negative for color change, pallor and rash.   Neurological: Negative for dizziness, tremors, seizures, syncope, facial asymmetry, speech difficulty, weakness, light-headedness, numbness and headaches.   Hematological: Negative for adenopathy. Does not bruise/bleed easily.   Psychiatric/Behavioral: Negative for agitation, behavioral problems, confusion, decreased concentration, hallucinations, self-injury, sleep disturbance and suicidal ideas. The patient is  not nervous/anxious.      Objective:    There were no vitals filed for this visit.  There is no height or weight on file to calculate BMI.  ECOG  (3) Capable of limited self-care, confined to bed or chair > 50% of waking hours    Physical Exam:     Physical Exam  Constitutional:       General: She is not in acute distress.     Appearance: Normal appearance. She is not ill-appearing, toxic-appearing or diaphoretic.   HENT:      Head: Normocephalic and atraumatic.      Right Ear: External ear normal.      Left Ear: External ear normal.      Nose: Nose normal.      Mouth/Throat:      Mouth: Mucous membranes are moist.      Pharynx: Oropharynx is clear. No oropharyngeal exudate or posterior oropharyngeal erythema.   Eyes:      General: No scleral icterus.        Right eye: No discharge.         Left eye: No discharge.      Conjunctiva/sclera: Conjunctivae normal.      Pupils: Pupils are equal, round, and reactive to light.   Cardiovascular:      Rate and Rhythm: Normal rate and regular rhythm.      Pulses: Normal pulses.      Heart sounds: No murmur heard.    No friction rub. No gallop.   Pulmonary:      Effort: No respiratory distress.      Breath sounds: No stridor. No wheezing, rhonchi or rales.   Abdominal:      General: Abdomen is flat. Bowel sounds are normal. There is no distension.      Palpations: Abdomen is soft. There is no mass.      Tenderness: There is no abdominal tenderness. There is no right CVA tenderness, left CVA tenderness, guarding or rebound.      Hernia: No hernia is present.   Musculoskeletal:         General: No swelling, tenderness, deformity or signs of injury.      Cervical back: No rigidity.      Right lower leg: No edema.      Left lower leg: No edema.   Lymphadenopathy:      Cervical: No cervical adenopathy.   Skin:     Coloration: Skin is not jaundiced.      Findings: No bruising, lesion or rash.   Neurological:      General: No focal deficit present.      Mental Status: She is alert and  oriented to person, place, and time.      Cranial Nerves: No cranial nerve deficit.      Motor: No weakness.      Gait: Gait normal.   Psychiatric:         Mood and Affect: Mood normal.         Behavior: Behavior normal.         Thought Content: Thought content normal.         Judgment: Judgment normal.     CM Cheng MD performed the physical exam on 8/17/2022 as documented above    Lab Results - Last 18 Months   Lab Units 08/02/22  1240 07/21/22  0156 07/20/22  0512   WBC 10*3/mm3 7.30 7.80 7.10   HEMOGLOBIN g/dL 11.4* 8.6* 8.4*   HEMATOCRIT % 37.2 27.1* 26.5*   PLATELETS 10*3/mm3 302 268 265   MCV fL 86.5 77.6* 78.2*     Lab Results - Last 18 Months   Lab Units 07/21/22  0156 07/20/22  0512 07/19/22  2105 07/07/22  2159 07/07/22  0908 06/27/22  1312 04/25/22  1249   SODIUM mmol/L 140 140 142   < > 139  --  136   POTASSIUM mmol/L 4.8 4.0 4.2   < > 4.8  --  5.1   CHLORIDE mmol/L 108* 108* 108*   < > 107  --  102   CO2 mmol/L 24.0 22.0 24.0   < > 20.0*  --  23.0   BUN mg/dL 23 23 30*   < > 42*  --  26*   CREATININE mg/dL 0.84 0.78 0.97   < > 1.04*   < > 1.13*   CALCIUM mg/dL 8.2* 8.4* 9.3   < > 9.1  --  9.4   BILIRUBIN mg/dL  --   --  0.5  --  0.4  --  0.3   ALK PHOS U/L  --   --  81  --  76  --  72   ALT (SGPT) U/L  --   --  11  --  19  --  8   AST (SGOT) U/L  --   --  12  --  13  --  12   GLUCOSE mg/dL 120* 119* 89   < > 130*  --  104*    < > = values in this interval not displayed.     Lab Results   Component Value Date    GLUCOSE 120 (H) 07/21/2022    BUN 23 07/21/2022    CREATININE 0.84 07/21/2022    EGFRIFNONA 84 02/01/2022    BCR 27.4 (H) 07/21/2022    K 4.8 07/21/2022    CO2 24.0 07/21/2022    CALCIUM 8.2 (L) 07/21/2022    ALBUMIN 3.90 07/19/2022    LABIL2 1.2 05/29/2019    AST 12 07/19/2022    ALT 11 07/19/2022     Lab Results - Last 18 Months   Lab Units 11/05/21  0924   INR  1.03   APTT seconds 28.2     Lab Results   Component Value Date    PTT 28.2 11/05/2021    INR 1.03 11/05/2021     Assessment  & Plan     Assessment:  1. Iron deficiency anemia: Resolved for now.  Discussed with her and reviewed the laboratory exams.  Explained the findings and the importance of continued iron replacement particularly in the setting of continued anticoagulant use.  She likely continues to bleed from the digestive tract.  2. Gastrointestinal bleeding.  Continue oral iron  3. She will return to see me in approximately 4 months.    Plan:  1. As above    Frank Cheng MD on 8/17/2022 at 11:24 AM

## 2022-08-17 ENCOUNTER — OFFICE VISIT (OUTPATIENT)
Dept: ONCOLOGY | Facility: CLINIC | Age: 82
End: 2022-08-17

## 2022-08-17 ENCOUNTER — LAB (OUTPATIENT)
Dept: LAB | Facility: HOSPITAL | Age: 82
End: 2022-08-17

## 2022-08-17 VITALS
HEART RATE: 56 BPM | WEIGHT: 120.2 LBS | TEMPERATURE: 96.9 F | SYSTOLIC BLOOD PRESSURE: 122 MMHG | OXYGEN SATURATION: 99 % | HEIGHT: 61 IN | BODY MASS INDEX: 22.69 KG/M2 | DIASTOLIC BLOOD PRESSURE: 67 MMHG

## 2022-08-17 DIAGNOSIS — C34.11 MALIGNANT NEOPLASM OF UPPER LOBE OF RIGHT LUNG: Primary | ICD-10-CM

## 2022-08-17 DIAGNOSIS — I48.0 PAROXYSMAL ATRIAL FIBRILLATION: Primary | ICD-10-CM

## 2022-08-17 DIAGNOSIS — D50.8 OTHER IRON DEFICIENCY ANEMIAS: ICD-10-CM

## 2022-08-17 LAB
ALBUMIN SERPL-MCNC: 4.4 G/DL (ref 3.5–5.2)
ALBUMIN/GLOB SERPL: 1.6 G/DL
ALP SERPL-CCNC: 67 U/L (ref 39–117)
ALT SERPL W P-5'-P-CCNC: 8 U/L (ref 1–33)
ANION GAP SERPL CALCULATED.3IONS-SCNC: 12 MMOL/L (ref 5–15)
AST SERPL-CCNC: 17 U/L (ref 1–32)
BASOPHILS # BLD AUTO: 0.02 10*3/MM3 (ref 0–0.2)
BASOPHILS NFR BLD AUTO: 0.2 % (ref 0–1.5)
BILIRUB SERPL-MCNC: 0.6 MG/DL (ref 0–1.2)
BUN SERPL-MCNC: 32 MG/DL (ref 8–23)
BUN/CREAT SERPL: 30.2 (ref 7–25)
CALCIUM SPEC-SCNC: 9.8 MG/DL (ref 8.6–10.5)
CHLORIDE SERPL-SCNC: 104 MMOL/L (ref 98–107)
CO2 SERPL-SCNC: 22 MMOL/L (ref 22–29)
CREAT SERPL-MCNC: 1.06 MG/DL (ref 0.57–1)
DEPRECATED RDW RBC AUTO: 51.4 FL (ref 37–54)
EGFRCR SERPLBLD CKD-EPI 2021: 52.6 ML/MIN/1.73
EOSINOPHIL # BLD AUTO: 0.17 10*3/MM3 (ref 0–0.4)
EOSINOPHIL NFR BLD AUTO: 1.9 % (ref 0.3–6.2)
ERYTHROCYTE [DISTWIDTH] IN BLOOD BY AUTOMATED COUNT: 16.3 % (ref 12.3–15.4)
GLOBULIN UR ELPH-MCNC: 2.7 GM/DL
GLUCOSE SERPL-MCNC: 104 MG/DL (ref 65–99)
HCT VFR BLD AUTO: 39.4 % (ref 34–46.6)
HGB BLD-MCNC: 12.1 G/DL (ref 12–15.9)
HOLD SPECIMEN: NORMAL
LYMPHOCYTES # BLD AUTO: 1.41 10*3/MM3 (ref 0.7–3.1)
LYMPHOCYTES NFR BLD AUTO: 15.6 % (ref 19.6–45.3)
MCH RBC QN AUTO: 26.5 PG (ref 26.6–33)
MCHC RBC AUTO-ENTMCNC: 30.7 G/DL (ref 31.5–35.7)
MCV RBC AUTO: 86.4 FL (ref 79–97)
MONOCYTES # BLD AUTO: 0.73 10*3/MM3 (ref 0.1–0.9)
MONOCYTES NFR BLD AUTO: 8.1 % (ref 5–12)
NEUTROPHILS NFR BLD AUTO: 6.73 10*3/MM3 (ref 1.7–7)
NEUTROPHILS NFR BLD AUTO: 74.2 % (ref 42.7–76)
PLATELET # BLD AUTO: 221 10*3/MM3 (ref 140–450)
PMV BLD AUTO: 10.1 FL (ref 6–12)
POTASSIUM SERPL-SCNC: 4.6 MMOL/L (ref 3.5–5.2)
PROT SERPL-MCNC: 7.1 G/DL (ref 6–8.5)
RBC # BLD AUTO: 4.56 10*6/MM3 (ref 3.77–5.28)
SODIUM SERPL-SCNC: 138 MMOL/L (ref 136–145)
WBC NRBC COR # BLD: 9.06 10*3/MM3 (ref 3.4–10.8)

## 2022-08-17 PROCEDURE — 80053 COMPREHEN METABOLIC PANEL: CPT

## 2022-08-17 PROCEDURE — 99213 OFFICE O/P EST LOW 20 MIN: CPT | Performed by: INTERNAL MEDICINE

## 2022-08-17 PROCEDURE — 85025 COMPLETE CBC W/AUTO DIFF WBC: CPT

## 2022-08-17 PROCEDURE — 36415 COLL VENOUS BLD VENIPUNCTURE: CPT

## 2022-08-22 ENCOUNTER — LAB (OUTPATIENT)
Dept: LAB | Facility: HOSPITAL | Age: 82
End: 2022-08-22

## 2022-08-22 DIAGNOSIS — I48.0 PAROXYSMAL ATRIAL FIBRILLATION: ICD-10-CM

## 2022-08-22 DIAGNOSIS — Z95.818 PRESENCE OF WATCHMAN LEFT ATRIAL APPENDAGE CLOSURE DEVICE: ICD-10-CM

## 2022-08-22 LAB — SARS-COV-2 ORF1AB RESP QL NAA+PROBE: NOT DETECTED

## 2022-08-22 PROCEDURE — U0005 INFEC AGEN DETEC AMPLI PROBE: HCPCS

## 2022-08-22 PROCEDURE — U0004 COV-19 TEST NON-CDC HGH THRU: HCPCS

## 2022-08-23 ENCOUNTER — ANESTHESIA EVENT (OUTPATIENT)
Dept: CARDIOLOGY | Facility: HOSPITAL | Age: 82
End: 2022-08-23

## 2022-08-24 ENCOUNTER — HOSPITAL ENCOUNTER (OUTPATIENT)
Dept: CARDIOLOGY | Facility: HOSPITAL | Age: 82
Discharge: HOME | End: 2022-08-24

## 2022-08-24 ENCOUNTER — ANESTHESIA (OUTPATIENT)
Dept: CARDIOLOGY | Facility: HOSPITAL | Age: 82
End: 2022-08-24

## 2022-08-24 VITALS — OXYGEN SATURATION: 100 % | SYSTOLIC BLOOD PRESSURE: 108 MMHG | DIASTOLIC BLOOD PRESSURE: 52 MMHG

## 2022-08-24 VITALS
HEART RATE: 50 BPM | RESPIRATION RATE: 24 BRPM | HEIGHT: 61 IN | TEMPERATURE: 98.3 F | WEIGHT: 124.34 LBS | OXYGEN SATURATION: 99 % | BODY MASS INDEX: 23.48 KG/M2 | DIASTOLIC BLOOD PRESSURE: 61 MMHG | SYSTOLIC BLOOD PRESSURE: 149 MMHG

## 2022-08-24 DIAGNOSIS — I48.0 PAROXYSMAL ATRIAL FIBRILLATION: ICD-10-CM

## 2022-08-24 DIAGNOSIS — Z95.818 PRESENCE OF WATCHMAN LEFT ATRIAL APPENDAGE CLOSURE DEVICE: ICD-10-CM

## 2022-08-24 PROBLEM — J06.9 ACUTE URI: Status: RESOLVED | Noted: 2022-05-09 | Resolved: 2022-08-24

## 2022-08-24 PROBLEM — T81.718A PSEUDOANEURYSM FOLLOWING PROCEDURE: Status: RESOLVED | Noted: 2022-05-24 | Resolved: 2022-08-24

## 2022-08-24 PROBLEM — I50.31 ACUTE DIASTOLIC CHF (CONGESTIVE HEART FAILURE): Status: RESOLVED | Noted: 2022-01-24 | Resolved: 2022-08-24

## 2022-08-24 PROBLEM — R60.9 EDEMA: Status: RESOLVED | Noted: 2022-05-24 | Resolved: 2022-08-24

## 2022-08-24 PROBLEM — L03.115 CELLULITIS OF RIGHT LEG: Status: RESOLVED | Noted: 2022-07-19 | Resolved: 2022-08-24

## 2022-08-24 PROBLEM — I72.9 PSEUDOANEURYSM FOLLOWING PROCEDURE: Status: RESOLVED | Noted: 2022-05-24 | Resolved: 2022-08-24

## 2022-08-24 LAB
BH CV ECHO MEAS - EF(MOD-BP): 60 %
MAXIMAL PREDICTED HEART RATE: 138 BPM
STRESS TARGET HR: 117 BPM

## 2022-08-24 PROCEDURE — 93312 ECHO TRANSESOPHAGEAL: CPT | Performed by: INTERNAL MEDICINE

## 2022-08-24 PROCEDURE — 93325 DOPPLER ECHO COLOR FLOW MAPG: CPT | Performed by: INTERNAL MEDICINE

## 2022-08-24 PROCEDURE — 93325 DOPPLER ECHO COLOR FLOW MAPG: CPT

## 2022-08-24 PROCEDURE — 93312 ECHO TRANSESOPHAGEAL: CPT

## 2022-08-24 PROCEDURE — 25010000002 PROPOFOL 10 MG/ML EMULSION: Performed by: NURSE ANESTHETIST, CERTIFIED REGISTERED

## 2022-08-24 PROCEDURE — 93320 DOPPLER ECHO COMPLETE: CPT | Performed by: INTERNAL MEDICINE

## 2022-08-24 PROCEDURE — 93320 DOPPLER ECHO COMPLETE: CPT

## 2022-08-24 RX ORDER — SODIUM CHLORIDE 9 MG/ML
80 INJECTION, SOLUTION INTRAVENOUS CONTINUOUS
Status: DISCONTINUED | OUTPATIENT
Start: 2022-08-24 | End: 2022-08-25 | Stop reason: HOSPADM

## 2022-08-24 RX ORDER — PROPOFOL 10 MG/ML
VIAL (ML) INTRAVENOUS AS NEEDED
Status: DISCONTINUED | OUTPATIENT
Start: 2022-08-24 | End: 2022-08-24 | Stop reason: SURG

## 2022-08-24 RX ORDER — ASPIRIN 81 MG/1
81 TABLET ORAL DAILY
Qty: 90 TABLET | Refills: 3 | Status: SHIPPED | OUTPATIENT
Start: 2022-08-24 | End: 2023-02-27

## 2022-08-24 RX ORDER — LIDOCAINE HYDROCHLORIDE 10 MG/ML
INJECTION, SOLUTION EPIDURAL; INFILTRATION; INTRACAUDAL; PERINEURAL AS NEEDED
Status: DISCONTINUED | OUTPATIENT
Start: 2022-08-24 | End: 2022-08-24 | Stop reason: SURG

## 2022-08-24 RX ORDER — SODIUM CHLORIDE 0.9 % (FLUSH) 0.9 %
10 SYRINGE (ML) INJECTION EVERY 12 HOURS SCHEDULED
Status: DISCONTINUED | OUTPATIENT
Start: 2022-08-24 | End: 2022-08-25 | Stop reason: HOSPADM

## 2022-08-24 RX ORDER — TRIAMCINOLONE ACETONIDE 1 MG/G
1 CREAM TOPICAL 2 TIMES DAILY PRN
COMMUNITY
End: 2022-09-07 | Stop reason: SDUPTHER

## 2022-08-24 RX ORDER — SODIUM CHLORIDE 9 MG/ML
9 INJECTION, SOLUTION INTRAVENOUS CONTINUOUS PRN
Status: DISCONTINUED | OUTPATIENT
Start: 2022-08-24 | End: 2022-08-25 | Stop reason: HOSPADM

## 2022-08-24 RX ORDER — SODIUM CHLORIDE 0.9 % (FLUSH) 0.9 %
10 SYRINGE (ML) INJECTION AS NEEDED
Status: DISCONTINUED | OUTPATIENT
Start: 2022-08-24 | End: 2022-08-25 | Stop reason: HOSPADM

## 2022-08-24 RX ADMIN — PROPOFOL 25 MG: 10 INJECTION, EMULSION INTRAVENOUS at 12:54

## 2022-08-24 RX ADMIN — SODIUM CHLORIDE 80 ML/HR: 9 INJECTION, SOLUTION INTRAVENOUS at 10:31

## 2022-08-24 RX ADMIN — PROPOFOL 25 MG: 10 INJECTION, EMULSION INTRAVENOUS at 12:55

## 2022-08-24 RX ADMIN — PROPOFOL 25 MG: 10 INJECTION, EMULSION INTRAVENOUS at 12:58

## 2022-08-24 RX ADMIN — PROPOFOL 25 MG: 10 INJECTION, EMULSION INTRAVENOUS at 12:56

## 2022-08-24 RX ADMIN — LIDOCAINE HYDROCHLORIDE 50 MG: 10 INJECTION, SOLUTION EPIDURAL; INFILTRATION; INTRACAUDAL; PERINEURAL at 12:55

## 2022-08-24 RX ADMIN — Medication 10 ML: at 10:32

## 2022-08-24 RX ADMIN — SODIUM CHLORIDE: 0.9 INJECTION, SOLUTION INTRAVENOUS at 12:51

## 2022-08-24 NOTE — ANESTHESIA PREPROCEDURE EVALUATION
Anesthesia Evaluation     Patient summary reviewed and Nursing notes reviewed   no history of anesthetic complications:  NPO Solid Status: > 8 hours  NPO Liquid Status: > 8 hours           Airway   Mallampati: I  TM distance: >3 FB  Neck ROM: full  No difficulty expected  Dental - normal exam     Pulmonary - normal exam   (+) lung cancer, asthma,shortness of breath, recent URI,   Cardiovascular - normal exam    ECG reviewed  Patient on routine beta blocker    (+) hypertension, valvular problems/murmurs TI, dysrhythmias Paroxysmal Atrial Fib, Tachycardia, CHF , hyperlipidemia,       Neuro/Psych  (+) syncope, tremors,    GI/Hepatic/Renal/Endo    (+)  GI bleeding ,     Musculoskeletal     (+) back pain, neck pain,   Abdominal  - normal exam   Substance History      OB/GYN          Other   arthritis, blood dyscrasia anemia,   history of cancer    ROS/Med Hx Other: PI, pseudoaneurysm, hypocalcemia, hyperglycemia, alopecia, cellulitis, vasculitis, low vit D, edema, knee and calf pain, malabsorption, low iron, dermatitis    Echocardiogram Findings    Left Ventricle Calculated left ventricular EF = 63% Left ventricular ejection fraction appears to be 61 - 65%.   Normal left ventricular cavity size and wall thickness noted. All left ventricular wall segments contract normally. Left ventricular diastolic function is consistent with (grade II w/high LAP) pseudonormalization.  Right Ventricle Normal right ventricular cavity size, wall thickness, systolic function and septal motion noted.  Left Atrium Normal left atrial size and volume noted.  Right Atrium Normal right atrial cavity size noted. The inferior vena cava is normally sized. Normal IVC inspiratory collapse of greater than 50% noted.  Aortic Valve The aortic valve is structurally normal with no regurgitation or stenosis present. The aortic valve appears trileaflet.  Mitral Valve The mitral valve is structurally normal with no significant stenosis present. Trace mitral  valve regurgitation is present.  Tricuspid Valve The tricuspid valve is structurally normal with no stenosis present. Mild tricuspid valve regurgitation is present. Estimated right ventricular systolic pressure from tricuspid regurgitation is normal (<35 mmHg).  Pulmonic Valve The pulmonic valve is structurally normal with no significant stenosis present. There is moderate pulmonic valve regurgitation present.  Greater Vessels No dilation of the aortic root is present. No dilation of the sinuses of Valsalva is present.  Pericardium The pericardium is normal. There is no evidence of pericardial effusion. .    Stress  Lexiscan Cardiolite test is negative for myocardial ischemia.     Gated SPECT images revealed normal left ventricular size and contractility with ejection fraction of 74%.      PSH  OOPHORECTOMY CHOLECYSTECTOMY  TONSILLECTOMY HYSTERECTOMY  CATARACT EXTRACTION                     Anesthesia Plan    ASA 4     MAC     (Patient identified; pre-operative vital signs, all relevant labs/studies, complete medical/surgical/anesthetic history, full medication list, full allergy list, and NPO status obtained/reviewed; physical assessment performed; anesthetic options, side effects, potential complications, risks, and benefits discussed; questions answered; written anesthesia consent obtained; patient cleared for procedure; anesthesia machine and equipment checked and functioning)  intravenous induction     Anesthetic plan, risks, benefits, and alternatives have been provided, discussed and informed consent has been obtained with: patient.    Plan discussed with CRNA.        CODE STATUS:

## 2022-08-24 NOTE — DISCHARGE INSTR - APPOINTMENTS
Per Dr. Garcia:   Cut your metoprolol pills in half (or simply take a 25mg pill from your previous prescription if you still have it) so you are only taking 25mg until your appointment with Dr. Mccall.   Keep track of your heartrate and blood pressure and symptoms.

## 2022-08-24 NOTE — ANESTHESIA POSTPROCEDURE EVALUATION
Patient: Chrystal Ruiz    Procedure Summary     Date: 08/24/22 Room / Location: [facility]    Anesthesia Start: 1251 Anesthesia Stop: 1300    Procedure: ADULT TRANSESOPHAGEAL ECHO (MELISA) W/ CONT IF NECESSARY PER PROTOCOL Diagnosis:       Paroxysmal atrial fibrillation (HCC)      Presence of Watchman left atrial appendage closure device      (Guidance for Cardiac Intervention)      (Arrhythmia)    Scheduled Providers: Sage Garcia MD Provider: Seth Boogie MD    Anesthesia Type: MAC ASA Status: 4          Anesthesia Type: MAC    Vitals  Vitals Value Taken Time   /61 08/24/22 1401   Temp     Pulse 50 08/24/22 1410   Resp 24 08/24/22 1302   SpO2 99 % 08/24/22 1410           Post Anesthesia Care and Evaluation    Patient location during evaluation: PACU  Patient participation: complete - patient participated  Level of consciousness: awake  Pain scale: See nurse's notes for pain score.  Pain management: adequate    Airway patency: patent  Anesthetic complications: No anesthetic complications  PONV Status: none  Cardiovascular status: acceptable  Respiratory status: acceptable  Hydration status: acceptable    Comments: Patient seen and examined postoperatively; vital signs stable; SpO2 greater than or equal to 90%; cardiopulmonary status stable; nausea/vomiting adequately controlled; pain adequately controlled; no apparent anesthesia complications; patient discharged from anesthesia care when discharge criteria were met

## 2022-08-24 NOTE — DISCHARGE INSTR - ACTIVITY
MELISA DC Instructions    The medication, which was used to put the patient to sleep, will be acting in your body for the next twenty-four (24) hours, so you might feel a little sleepy; this feeling will slowly wear off.  Because the medicine is still in your system for the next twenty-four (24) hours, the adult patient SHOULD NOT:    Drive a car, operate machinery, or power tools  Drink any alcoholic drinks (not even beer)  Make any important decisions such as to sign important papers    We strongly suggest that a responsible adult be with the patient the rest of the day.  It may be better to start with liquids such as soft drinks, then soups, and graduate up to solid foods.    Any problems with:    EXCESSIVE MUCOUS  SPITTING UP BLOOD  SORE THROAT AT MORE THAN 72 HOURS    CALL THE [facility] -[phone].

## 2022-08-29 ENCOUNTER — OFFICE VISIT (OUTPATIENT)
Dept: CARDIOLOGY | Facility: CLINIC | Age: 82
End: 2022-08-29

## 2022-08-29 VITALS
BODY MASS INDEX: 23.41 KG/M2 | HEIGHT: 61 IN | WEIGHT: 124 LBS | SYSTOLIC BLOOD PRESSURE: 126 MMHG | OXYGEN SATURATION: 99 % | HEART RATE: 73 BPM | DIASTOLIC BLOOD PRESSURE: 78 MMHG

## 2022-08-29 DIAGNOSIS — Z79.01 CHRONIC ANTICOAGULATION: ICD-10-CM

## 2022-08-29 DIAGNOSIS — R55 SYNCOPE AND COLLAPSE: ICD-10-CM

## 2022-08-29 DIAGNOSIS — I48.0 PAROXYSMAL ATRIAL FIBRILLATION: Primary | ICD-10-CM

## 2022-08-29 DIAGNOSIS — I10 PRIMARY HYPERTENSION: ICD-10-CM

## 2022-08-29 DIAGNOSIS — Z95.818 PRESENCE OF WATCHMAN LEFT ATRIAL APPENDAGE CLOSURE DEVICE: ICD-10-CM

## 2022-08-29 DIAGNOSIS — R60.9 EDEMA, UNSPECIFIED TYPE: ICD-10-CM

## 2022-08-29 PROBLEM — E78.2 MIXED HYPERLIPIDEMIA: Status: ACTIVE | Noted: 2021-03-17

## 2022-08-29 PROCEDURE — 99214 OFFICE O/P EST MOD 30 MIN: CPT | Performed by: INTERNAL MEDICINE

## 2022-08-29 NOTE — PROGRESS NOTES
Cardiology Clinic Note  Jeremy Mccall MD, PhD    Subjective:     Encounter Date:08/29/2022      Patient ID: Chrystal Ruiz is a 81 y.o. female.    Chief Complaint:  Chief Complaint   Patient presents with   • Atrial Fibrillation   • Hyperlipidemia   • Follow-up   • Congestive Heart Failure   • Heart Murmur       HPI:       I the pleasure to see this 81-year-old female previously established with another cardiologist but otherwise looking for second opinion.  She was recently hospitalized with findings of diastolic dysfunction grade 2 with mild volume overload and diastolic CHF exacerbation also with new onset atrial fibrillation.  She has had chronic anemia and was ultimately laced on anticoagulation but had worsening anemia and this was stopped.  She had a discussion with other doctors related to watchman procedure but elected to think about this further.  We did discuss that further contemplation with no anticoagulation still has not uncovered stroke risk that we did discuss and should be recognized although we are not pushing for procedures but just to recognize risk with anticoagulation would be bleeding and with off anticoagulation would be relative to stroke and 81 years old with [id number] score of at  3-4 at 8-10 %/year.  She is presently on Cardizem but says this is completely taken away her taste.  She is under work-up with chest radiation for a pulmonary or lung nodule suspicious for neoplasm.  She has had 30 pounds of weight loss unintentionally.  We discussed that this is likely not secondary to her Lasix or fluid pills as this would represent over 15 L of fluid off.  She says she is eating at least 3 meals a day.  We did discuss that we would leave this work-up to oncology as well as primary care and thoracic surgery.  We had a lengthy discussion today on atrial fibrillation of paroxysmal nature, hypertension, weight and volume status, diastolic CHF each individually.  With respect to her A. fib she  is concerned about blood thinners but also concerned about stroke and we did present again watchman procedure which would need anticoagulation for 40 days and then cessation indefinitely.  She would need a transesophageal echo as she has been off anticoagulation for at least 6 weeks which she understands.  We discussed diastolic CHF with volume control essentially and heart rate control which is not optimal at 90s today only on low-dose beta-blocker.  We discussed Lasix of which she has no peripheral edema and she may be able to go to every other day Lasix dosing.     She presents back 8 weeks after watchman procedure complicated by pseudoaneurysm in the right groin which opened repair was required by vascular surgery.  She has cellulitis thereafter on antibiotics.  Today, she is otherwise doing very very well.  She has no chest pain or shortness of breath and she continues on anticoagulation presently.    She is off anticoagulation with no residual shunt around the device. No bleeding reported.    Her heart rate has been little low when she decreased her metoprolol to 25 daily which is fine.  She has a normal EF of 60% with some mild right-sided enlargement but otherwise mild developed valvular regurgitation as documented.  She has no volume overload, labs recently stable creatinine 1.1.  She is hopefully on the mend she says    Review of systems otherwise negative x14 point review of systems except as mentioned above next        Historical data copied forward from previous encounters in EMR including the history, exam, and assessment/plan has been reviewed and is unchanged unless noted otherwise.     Cardiac medicines reviewed with risk, benefits, and necessity of each discussed.     Risk and benefit of cardiac testing reviewed including death heart attack stroke pain bleeding infection need for vascular /cardiovascular surgery were discussed and the patient      Objective:         Objective          Unchanged  "from prior  Vitals reviewed below       Physical Exam  Regular rate and rhythm with no rubs murmurs gallops today  No heave no lift  No peripheral edema next no carotid bruits or JVD  Normal radial pulses  Normal cap refill  Soft nontender nondistended  Clear to auscultation bilaterally  Groin stable but tender  Assessment:         Assessment          Diagnoses and all orders for this visit:     1. Paroxysmal atrial fibrillation (HCC) (Primary)  Watchman procedure  Off anticoagulation  Metoprolol XL decreased to 25 daily        Has bled score greater than 3  [id number] score greater than 3  Watchman recently implanted          Plan:         Paroxysmal A. fib  Continue metoprolol extended release to 50 daily  Off Cardizem  Off Eliquis  Decrease metoprolol to 25   status post watchman  Aspirin low-dose 81 daily     Essential hypertension well-controlled  Hyperlipidemia continue present medicines  No history of any CAD  Normal stress test in the last 1 year  Preserved LVEF     Right pseudoaneurysm, procedural complication, status post open repair by vascular surgery, routine healing should be allowed, keep the site clean and dry, follow-up vascular surgery     Follow-up 6 months     Jeremy Mccall MD, PhD     The pleasure to be involved in this patient's cardiovascular care.  Please call with any questions or concerns  Jeremy Mccall MD, PhD        Historical data copied forward from previous encounters in EMR including the history, exam, and assessment/plan has been reviewed and is unchanged unless noted otherwise.    Cardiac medicines reviewed with risk, benefits, and necessity of each discussed.    Risk and benefit of cardiac testing reviewed including death heart attack stroke pain bleeding infection need for vascular /cardiovascular surgery were discussed and the patient     Objective:         /78 (BP Location: Left arm, Patient Position: Sitting, Cuff Size: Adult)   Pulse 73   Ht 154.9 cm (60.98\")   Wt " 56.2 kg (124 lb)   SpO2 99%   BMI 23.44 kg/m²     Physical Exam    Assessment:         There are no diagnoses linked to this encounter.       Plan:              The pleasure to be involved in this patient's cardiovascular care.  Please call with any questions or concerns  Jeremy Mccall MD, PhD    Most recent EKG as reviewed and interpreted by me:  Procedures     Most recent echo as reviewed and interpreted by me:  Results for orders placed during the hospital encounter of 08/24/22    Adult Transesophageal Echo (MELISA) W/ Cont if Necessary Per Protocol    Interpretation Summary  · Left ventricular wall thickness is consistent with mild concentric hypertrophy.  · The right ventricular cavity is mildly dilated.  · The right atrial cavity is mild to moderately dilated.  · There is calcification of the aortic valve mainly affecting the non-coronary, left coronary and right coronary cusp(s).  · Estimated right ventricular systolic pressure from tricuspid regurgitation is normal (<35 mmHg).    Procedure indication--atrial fibrillation post watchman device implantation    conscious sedation administered by anesthesia    consent obtained before procedure  Timeout before procedure      Procedure note  after obtaining a valid consent patient was sedated by Anesthesia and a MELISA probe was easily placed into esophagus with multiplane imaging with 2D, color and Doppler followed by bubble study with agitated saline without any complications    MELISA  Findings    The watchman device is well-seated in the appendage with complete occlusion and seal of the appendage without any thrombus or leak  LV systolic function is normal with EF of 60%  No PFO  Mild MR and TR without pulmonary hypertension  Moderate left atrial enlargement with mild LVH    Plan  Stop anticoagulation  Start aspirin    Procedure done  Trans-esophageal echocardiography      Electronically signed by Sage Garcia MD, 08/24/22, 5:41 PM EDT.      Most recent stress  test as reviewed and interpreted by me:  Results for orders placed during the hospital encounter of 01/24/22    Stress Test With Myocardial Perfusion One Day    Interpretation Summary  Indications  Shortness of breath    This study was performed under my direct supervision.    Resting ECG  Sinus rhythm.    The patient was injected with Lexiscan intravenously while constantly monitoring electrocardiogram and vital signs.  Patient did not have any chest discomfort ST abnormalities or ectopy with injection of Lexiscan.    Cardiolite was used as an imaging agent.    Cardiolite images showed uniform distribution of radionuclide without any evidence for myocardial ischemia.    Gated SPECT images revealed normal left ventricle size and contractility with ejection fraction of 74%.    Impression  ========  Lexiscan Cardiolite test is negative for myocardial ischemia.    Gated SPECT images revealed normal left ventricular size and contractility with ejection fraction of 74%.      Most recent cardiac catheterization as reviewed interpreted by me:  No results found for this or any previous visit.    The following portions of the patient's history were reviewed and updated as appropriate: allergies, current medications, past family history, past medical history, past social history, past surgical history and problem list.      ROS:  14 point review of systems negative except as mentioned above    Current Outpatient Medications:   •  acetaminophen (TYLENOL) 500 MG tablet, Take 500 mg by mouth Every 6 (Six) Hours As Needed for Mild Pain ., Disp: , Rfl:   •  aspirin (aspirin) 81 MG EC tablet, Take 1 tablet by mouth Daily., Disp: 90 tablet, Rfl: 3  •  Breo Ellipta 200-25 MCG/INH inhaler, Inhale 1 puff Daily., Disp: 1 each, Rfl: 2  •  Cholecalciferol (VITAMIN D3) 2000 units capsule, Take 2,000 Units by mouth Daily., Disp: , Rfl:   •  Cyanocobalamin (VITAMIN B12) 1000 MCG tablet controlled-release, Take 2,500 mcg by mouth Daily.,  Disp: , Rfl:   •  ferrous sulfate 325 (65 FE) MG tablet, Take 1 tablet by mouth Every Other Day., Disp: 15 tablet, Rfl: 11  •  furosemide (LASIX) 40 MG tablet, Take 40 mg by mouth Every Other Day., Disp: , Rfl:   •  metoprolol succinate XL (TOPROL-XL) 50 MG 24 hr tablet, Take 1 tablet by mouth Daily. (Patient taking differently: Take 25 mg by mouth Daily.), Disp: 30 tablet, Rfl: 5  •  spironolactone (ALDACTONE) 25 MG tablet, Take 1 tablet by mouth Every Other Day., Disp: 45 tablet, Rfl: 1  •  triamcinolone (KENALOG) 0.1 % cream, Apply 1 application topically to the appropriate area as directed 2 (Two) Times a Day As Needed for Irritation., Disp: , Rfl:     Problem List:  Patient Active Problem List   Diagnosis   • Grief reaction   • Alopecia   • Arthritis   • Back pain   • Encounter for general adult medical examination without abnormal findings   • Hypertension   • Lung nodule   • Vitamin D deficiency   • Shortness of breath   • Postmenopausal status   • Nevus, non-neoplastic   • Medicare annual wellness visit, subsequent   • Fam hx-ischem heart disease   • FH: thyroid condition   • Cellulitis of right anterior lower leg   • Vasculitis of skin   • Pedal edema   • Bilateral calf pain   • Neck pain   • Immunization reaction   • Mixed hyperlipidemia   • Tremor   • Bright red rectal bleeding   • Acute non-recurrent maxillary sinusitis   • Acute pain of right knee   • Chronic pain of right knee   • Need for vaccination   • Lung nodules   • Tachycardia   • Essential (primary) hypertension    • Dermatitis   • A-fib (HCC)   • Leukopenia   • Anemia   • Primary osteoarthritis of right knee   • Bronchogenic cancer of right lung (HCC)   • Iron deficiency   • Malabsorption due to intolerance, not elsewhere classified   • Dyspnea   • Other iron deficiency anemias   • Syncope   • Paroxysmal atrial fibrillation (HCC)     Past Medical History:  Past Medical History:   Diagnosis Date   • A-fib (HCC)    • Anemia    • Arthritis    •  Asthma    • Cancer (HCC)     lung nodule; 15 radiation treatment   • CHF (congestive heart failure) (HCC)    • Femoral artery pseudo-aneurysm, 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    with Watchman procedure; surgically fixed the next day   • Heart murmur    • Hypertension      Past Surgical History:  Past Surgical History:   Procedure Laterality Date   • ATRIAL APPENDAGE EXCLUSION LEFT WITH TRANSESOPHAGEAL ECHOCARDIOGRAM Right 7/7/2022    Procedure: Atrial Appendage Occlusion watchman team aware $;  Surgeon: Sage Garcia MD;  Location: [facility] CATH INVASIVE LOCATION;  Service: Cardiovascular;  Laterality: Right;   • ATRIAL APPENDAGE EXCLUSION LEFT WITH TRANSESOPHAGEAL ECHOCARDIOGRAM N/A 7/7/2022    Procedure: Atrial Appendage Occlusion;  Surgeon: Fide Mullen MD;  Location: [facility] CATH INVASIVE LOCATION;  Service: Cardiovascular;  Laterality: N/A;   • CATARACT EXTRACTION     • CHOLECYSTECTOMY     • HYSTERECTOMY  1987    Endometriosis   • OOPHORECTOMY     • PSEUDO ANEURYSM REPAIR, EXTREMITY Right 7/8/2022    Procedure: REPAIR OF POST CATHETERIZATION RIGHT FEMORAL PSEUDOANEURYSM AND ARTERIOVENOUS FISTUAL;  Surgeon: Alfonzo Hooks MD;  Location: [facility] MAIN OR;  Service: Vascular;  Laterality: Right;   • TONSILLECTOMY       Social History:  Social History     Socioeconomic History   • Marital status:    Tobacco Use   • Smoking status: Never Smoker   • Smokeless tobacco: Never Used   Vaping Use   • Vaping Use: Never used   Substance and Sexual Activity   • Alcohol use: No   • Drug use: Never   • Sexual activity: Not Currently     Allergies:  Allergies   Allergen Reactions   • Ciprofloxacin Unknown (See Comments)   • Sulfa Antibiotics Unknown (See Comments)   • Cyprodenate Unknown - High Severity   • Iodine Unknown - High Severity     Immunizations:  Immunization History   Administered Date(s) Administered   • COVID-19 (MODERNA) 1st, 2nd, 3rd Dose Only 01/20/2021, 02/17/2021, 08/26/2021   • COVID-19  (MODERNA) BOOSTER 01/20/2021, 02/17/2021, 08/26/2021   • FLUAD TRI 65YR+ 10/25/2013, 11/13/2014, 09/30/2015, 09/25/2019   • Fluad Quad 65+ 09/08/2020   • Fluzone High Dose =>65 Years ([employer] ONLY) 10/05/2016, 10/17/2017, 09/25/2019   • Fluzone High-Dose 65+yrs 11/15/2021   • H1N1 All Forms 01/29/2010   • Hepatitis A 04/24/2018, 10/24/2018   • Influenza Quad Vaccine (Inpatient) 09/08/2020   • Pneumococcal Conjugate 13-Valent (PCV13) 02/24/2016   • Shingrix 07/01/2019, 10/09/2019            In-Office Procedure(s):  No orders to display        ASCVD RIsk Score::  The ASCVD Risk score (Hubert WILSON Jr., et al., 2013) failed to calculate for the following reasons:    The 2013 ASCVD risk score is only valid for ages 40 to 79    The patient has a prior MI or stroke diagnosis    Imaging:    Results for orders placed during the hospital encounter of 07/19/22    XR Tibia Fibula 2 View Right    Narrative  EXAM: Right tibia and fibula radiographs    DATE: July 19, 2022    HISTORY: Right leg pain, swelling    COMPARISON: June 29, 2022    TECHNIQUE: 4 radiographs are obtained of the right tibia and fibula    FINDINGS:    The osseous structures are demineralized. There are mild-to-moderate tricompartmental degenerative changes at the level of the right knee. There is chondrocalcinosis. There is no acute fracture or dislocation. There is no osseous destruction. There is no  evidence of subcutaneous emphysema. There is generalized mottling of the subcutaneous fat.    Impression  1. Generalized subcutaneous edema can be seen with venous stasis or cellulitis.        Slot 67    Electronically signed by:  Russ Potts M.D.  7/19/2022 10:12 PM       Results for orders placed during the hospital encounter of 07/19/22    CT Lower Extremity Right Without Contrast    Narrative  EXAMINATION: CT scan right lower extremity without IV contrast.    INDICATION: Recent watchman device placement with bruising of the right lower  extremity.    PROCEDURE: Axial, coronal and sagittal reconstructions were obtained.  CT dose lowering techniques were used, to include: automated exposure control, adjustment for patient size, and or use of iterative reconstruction.    COMPARISON: None    FINDINGS:    Examination of the soft tissues is very limited given the lack of intravenous contrast. There are a number of surgical clips within the right inguinal region related to recent surgery. There is some fluid tracking in the right inguinal region in the  anterior compartment musculature that measures about 4 cm across that could be a hematoma or seroma. There is no soft tissue gas. There is also some separate subcutaneous edema and skin thickening of the anterior right calf extending circumferentially  around the ankle that could be superficial bruising or dependent edema.    The bones are normal without fracture or dislocation. A limited examination of the soft tissues of the lower pelvis demonstrates no abnormalities..    Impression  1. Examination is limited given the lack of IV contrast.  2. Multiple surgical clips in the right inguinal region from recent surgery.  3. 4 cm collection of fluid tracking in the right anterior compartment musculature of the right thigh. It could be a hematoma or seroma. This study really cannot evaluate for pseudoaneurysm or active bleeding given the lack of IV contrast.  4. Subcutaneous edema of the anterior right calf extending circumferentially around the ankle that could be dependent edema or diffuse bruising.    Electronically signed by:  Lon Grimaldo M.D.  7/19/2022 10:29 PM      Results for orders placed during the hospital encounter of 07/19/22    CT Lower Extremity Right Without Contrast    Narrative  EXAMINATION: CT scan right lower extremity without IV contrast.    INDICATION: Recent watchman device placement with bruising of the right lower extremity.    PROCEDURE: Axial, coronal and sagittal  reconstructions were obtained.  CT dose lowering techniques were used, to include: automated exposure control, adjustment for patient size, and or use of iterative reconstruction.    COMPARISON: None    FINDINGS:    Examination of the soft tissues is very limited given the lack of intravenous contrast. There are a number of surgical clips within the right inguinal region related to recent surgery. There is some fluid tracking in the right inguinal region in the  anterior compartment musculature that measures about 4 cm across that could be a hematoma or seroma. There is no soft tissue gas. There is also some separate subcutaneous edema and skin thickening of the anterior right calf extending circumferentially  around the ankle that could be superficial bruising or dependent edema.    The bones are normal without fracture or dislocation. A limited examination of the soft tissues of the lower pelvis demonstrates no abnormalities..    Impression  1. Examination is limited given the lack of IV contrast.  2. Multiple surgical clips in the right inguinal region from recent surgery.  3. 4 cm collection of fluid tracking in the right anterior compartment musculature of the right thigh. It could be a hematoma or seroma. This study really cannot evaluate for pseudoaneurysm or active bleeding given the lack of IV contrast.  4. Subcutaneous edema of the anterior right calf extending circumferentially around the ankle that could be dependent edema or diffuse bruising.    Electronically signed by:  Lon Grimaldo M.D.  7/19/2022 10:29 PM      Lab Review:   Hospital Outpatient Visit on 08/24/2022   Component Date Value   • Target HR (85%) 08/24/2022 117    • Max. Pred. HR (100%) 08/24/2022 138    • EF(MOD-bp) 08/24/2022 60    Lab on 08/22/2022   Component Date Value   • COVID19 08/22/2022 Not Detected    Lab on 08/17/2022   Component Date Value   • Glucose 08/17/2022 104 (A)   • BUN 08/17/2022 32 (A)   • Creatinine 08/17/2022  1.06 (A)   • Sodium 08/17/2022 138    • Potassium 08/17/2022 4.6    • Chloride 08/17/2022 104    • CO2 08/17/2022 22.0    • Calcium 08/17/2022 9.8    • Total Protein 08/17/2022 7.1    • Albumin 08/17/2022 4.40    • ALT (SGPT) 08/17/2022 8    • AST (SGOT) 08/17/2022 17    • Alkaline Phosphatase 08/17/2022 67    • Total Bilirubin 08/17/2022 0.6    • Globulin 08/17/2022 2.7    • A/G Ratio 08/17/2022 1.6    • BUN/Creatinine Ratio 08/17/2022 30.2 (A)   • Anion Gap 08/17/2022 12.0    • eGFR 08/17/2022 52.6 (A)   • WBC 08/17/2022 9.06    • RBC 08/17/2022 4.56    • Hemoglobin 08/17/2022 12.1    • Hematocrit 08/17/2022 39.4    • MCV 08/17/2022 86.4    • MCH 08/17/2022 26.5 (A)   • MCHC 08/17/2022 30.7 (A)   • RDW 08/17/2022 16.3 (A)   • RDW-SD 08/17/2022 51.4    • MPV 08/17/2022 10.1    • Platelets 08/17/2022 221    • Neutrophil % 08/17/2022 74.2    • Lymphocyte % 08/17/2022 15.6 (A)   • Monocyte % 08/17/2022 8.1    • Eosinophil % 08/17/2022 1.9    • Basophil % 08/17/2022 0.2    • Neutrophils, Absolute 08/17/2022 6.73    • Lymphocytes, Absolute 08/17/2022 1.41    • Monocytes, Absolute 08/17/2022 0.73    • Eosinophils, Absolute 08/17/2022 0.17    • Basophils, Absolute 08/17/2022 0.02    • Extra Tube 08/17/2022 Hold for add-ons.    Lab on 08/02/2022   Component Date Value   • WBC 08/02/2022 7.30    • RBC 08/02/2022 4.30    • Hemoglobin 08/02/2022 11.4 (A)   • Hematocrit 08/02/2022 37.2    • MCV 08/02/2022 86.5    • MCH 08/02/2022 26.5 (A)   • MCHC 08/02/2022 30.6 (A)   • RDW 08/02/2022 17.9 (A)   • RDW-SD 08/02/2022 56.2 (A)   • MPV 08/02/2022 9.2    • Platelets 08/02/2022 302    • Neutrophil % 08/02/2022 70.7    • Lymphocyte % 08/02/2022 18.2 (A)   • Monocyte % 08/02/2022 9.2    • Eosinophil % 08/02/2022 1.6    • Basophil % 08/02/2022 0.3    • Neutrophils, Absolute 08/02/2022 5.16    • Lymphocytes, Absolute 08/02/2022 1.33    • Monocytes, Absolute 08/02/2022 0.67    • Eosinophils, Absolute 08/02/2022 0.12    • Basophils,  Absolute 08/02/2022 0.02    Admission on 07/19/2022, Discharged on 07/21/2022   Component Date Value   • Glucose 07/19/2022 89    • BUN 07/19/2022 30 (A)   • Creatinine 07/19/2022 0.97    • Sodium 07/19/2022 142    • Potassium 07/19/2022 4.2    • Chloride 07/19/2022 108 (A)   • CO2 07/19/2022 24.0    • Calcium 07/19/2022 9.3    • Total Protein 07/19/2022 7.0    • Albumin 07/19/2022 3.90    • ALT (SGPT) 07/19/2022 11    • AST (SGOT) 07/19/2022 12    • Alkaline Phosphatase 07/19/2022 81    • Total Bilirubin 07/19/2022 0.5    • Globulin 07/19/2022 3.1    • A/G Ratio 07/19/2022 1.3    • BUN/Creatinine Ratio 07/19/2022 30.9 (A)   • Anion Gap 07/19/2022 10.0    • eGFR 07/19/2022 58.5 (A)   • Blood Culture 07/19/2022 No growth at 5 days    • Blood Culture 07/19/2022 No growth at 5 days    • Extra Tube 07/19/2022 Hold for add-ons.    • Extra Tube 07/19/2022 Hold for add-ons.    • Extra Tube 07/19/2022 Hold for add-ons.    • WBC 07/19/2022 8.70    • RBC 07/19/2022 3.80    • Hemoglobin 07/19/2022 9.5 (A)   • Hematocrit 07/19/2022 29.6 (A)   • MCV 07/19/2022 77.9 (A)   • MCH 07/19/2022 25.0 (A)   • MCHC 07/19/2022 32.1    • RDW 07/19/2022 19.1 (A)   • RDW-SD 07/19/2022 52.9    • MPV 07/19/2022 7.0    • Platelets 07/19/2022 313    • Neutrophil % 07/19/2022 76.1 (A)   • Lymphocyte % 07/19/2022 14.5 (A)   • Monocyte % 07/19/2022 7.2    • Eosinophil % 07/19/2022 1.4    • Basophil % 07/19/2022 0.8    • Neutrophils, Absolute 07/19/2022 6.60    • Lymphocytes, Absolute 07/19/2022 1.30    • Monocytes, Absolute 07/19/2022 0.60    • Eosinophils, Absolute 07/19/2022 0.10    • Basophils, Absolute 07/19/2022 0.10    • nRBC 07/19/2022 0.0    • Lactate 07/19/2022 0.4 (A)   • Glucose 07/20/2022 119 (A)   • BUN 07/20/2022 23    • Creatinine 07/20/2022 0.78    • Sodium 07/20/2022 140    • Potassium 07/20/2022 4.0    • Chloride 07/20/2022 108 (A)   • CO2 07/20/2022 22.0    • Calcium 07/20/2022 8.4 (A)   • BUN/Creatinine Ratio 07/20/2022 29.5 (A)    • Anion Gap 07/20/2022 10.0    • eGFR 07/20/2022 75.9    • WBC 07/20/2022 7.10    • RBC 07/20/2022 3.38 (A)   • Hemoglobin 07/20/2022 8.4 (A)   • Hematocrit 07/20/2022 26.5 (A)   • MCV 07/20/2022 78.2 (A)   • MCH 07/20/2022 24.7 (A)   • MCHC 07/20/2022 31.5    • RDW 07/20/2022 18.9 (A)   • RDW-SD 07/20/2022 52.1    • MPV 07/20/2022 7.0    • Platelets 07/20/2022 265    • Neutrophil % 07/20/2022 73.6    • Lymphocyte % 07/20/2022 17.4 (A)   • Monocyte % 07/20/2022 6.4    • Eosinophil % 07/20/2022 2.1    • Basophil % 07/20/2022 0.5    • Neutrophils, Absolute 07/20/2022 5.20    • Lymphocytes, Absolute 07/20/2022 1.20    • Monocytes, Absolute 07/20/2022 0.50    • Eosinophils, Absolute 07/20/2022 0.10    • Basophils, Absolute 07/20/2022 0.00    • nRBC 07/20/2022 0.0    • Target HR (85%) 07/20/2022 117    • Max. Pred. HR (100%) 07/20/2022 138    • Right Lesser Saph Vessel 07/20/2022 1    • Right Common Femoral Spo* 07/20/2022 Y    • Right Common Femoral Pha* 07/20/2022 Y    • Right Common Femoral Aug* 07/20/2022 Y    • Right Common Femoral Com* 07/20/2022 Y    • Right Proximal Femoral C* 07/20/2022 C    • Right Mid Femoral Spont 07/20/2022 Y    • Right Mid Femoral Phasic 07/20/2022 Y    • Right Mid Femoral Augment 07/20/2022 Y    • Right Mid Femoral Compet* 07/20/2022 Y    • Right Mid Femoral Compre* 07/20/2022 C    • Right Distal Femoral Com* 07/20/2022 C    • Right Popliteal Spont 07/20/2022 Y    • Right Popliteal Phasic 07/20/2022 Y    • Right Popliteal Augment 07/20/2022 Y    • Right Popliteal Competent 07/20/2022 Y    • Right Popliteal Compress 07/20/2022 C    • Right Posterior Tibial C* 07/20/2022 C    • Right Peroneal Compress 07/20/2022 C    • Right Gastronemius Compr* 07/20/2022 C    • Right Greater Saph AK Co* 07/20/2022 C    • Right Greater Saph BK Co* 07/20/2022 C    • Right Lesser Saph Compre* 07/20/2022 P    • Right Lesser Saph Thromb* 07/20/2022 C    • Left Common Femoral Spont 07/20/2022 Y    • Left Common  Femoral Phas* 07/20/2022 Y    • Left Common Femoral Augm* 07/20/2022 Y    • Left Common Femoral Comp* 07/20/2022 Y    • Left Common Femoral Comp* 07/20/2022 C    • Glucose 07/21/2022 120 (A)   • BUN 07/21/2022 23    • Creatinine 07/21/2022 0.84    • Sodium 07/21/2022 140    • Potassium 07/21/2022 4.8    • Chloride 07/21/2022 108 (A)   • CO2 07/21/2022 24.0    • Calcium 07/21/2022 8.2 (A)   • BUN/Creatinine Ratio 07/21/2022 27.4 (A)   • Anion Gap 07/21/2022 8.0    • eGFR 07/21/2022 69.5    • Vancomycin Random 07/21/2022 16.90    • WBC 07/21/2022 7.80    • RBC 07/21/2022 3.49 (A)   • Hemoglobin 07/21/2022 8.6 (A)   • Hematocrit 07/21/2022 27.1 (A)   • MCV 07/21/2022 77.6 (A)   • MCH 07/21/2022 24.7 (A)   • MCHC 07/21/2022 31.8    • RDW 07/21/2022 19.0 (A)   • RDW-SD 07/21/2022 52.1    • MPV 07/21/2022 7.0    • Platelets 07/21/2022 268    • Neutrophil % 07/21/2022 75.7    • Lymphocyte % 07/21/2022 15.0 (A)   • Monocyte % 07/21/2022 6.3    • Eosinophil % 07/21/2022 2.5    • Basophil % 07/21/2022 0.5    • Neutrophils, Absolute 07/21/2022 5.90    • Lymphocytes, Absolute 07/21/2022 1.20    • Monocytes, Absolute 07/21/2022 0.50    • Eosinophils, Absolute 07/21/2022 0.20    • Basophils, Absolute 07/21/2022 0.00    • nRBC 07/21/2022 0.0    Telephone on 07/13/2022   Component Date Value   • WBC 07/13/2022 14.38 (A)   • RBC 07/13/2022 3.54 (A)   • Hemoglobin 07/13/2022 9.1 (A)   • Hematocrit 07/13/2022 28.9 (A)   • MCV 07/13/2022 81.6    • MCH 07/13/2022 25.7 (A)   • MCHC 07/13/2022 31.5    • RDW 07/13/2022 17.0 (A)   • RDW-SD 07/13/2022 48.3    • MPV 07/13/2022 9.0    • Platelets 07/13/2022 279    • Neutrophil % 07/13/2022 79.5 (A)   • Lymphocyte % 07/13/2022 10.8 (A)   • Monocyte % 07/13/2022 8.6    • Eosinophil % 07/13/2022 0.8    • Basophil % 07/13/2022 0.3    • Neutrophils, Absolute 07/13/2022 11.44 (A)   • Lymphocytes, Absolute 07/13/2022 1.55    • Monocytes, Absolute 07/13/2022 1.23 (A)   • Eosinophils, Absolute  07/13/2022 0.12    • Basophils, Absolute 07/13/2022 0.04    Admission on 07/07/2022, Discharged on 07/11/2022   Component Date Value   • Activated Clotting Time  07/07/2022 103    • Activated Clotting Time  07/07/2022 219 (A)   • Activated Clotting Time  07/07/2022 312 (A)   • Activated Clotting Time  07/07/2022 439 (A)   • Target HR (85%) 07/07/2022 117    • Max. Pred. HR (100%) 07/07/2022 138    • BH CV VAS R PSEUDOANEURY* 07/07/2022 2.61    • BH CV VAS R PSEUDOANEURY* 07/07/2022 2.34    • BH CV VAS R PSA NECK WID* 07/07/2022 0.2    • R PSEUDOANEURYSM LONG WI* 07/07/2022 1.45    • R PSEUDOANEURYSM TRANSVE* 07/07/2022 1.25    • BH CV RIGHT SFA PSA SCRI* 07/07/2022 1    • BH CV RIGHT GROIN PSA IL* 07/07/2022 1.00    • Right groin CFA sys 07/07/2022 228    • QT Interval 07/07/2022 432    • Target HR (85%) 07/07/2022 117    • Max. Pred. HR (100%) 07/07/2022 138    • PROX SFA PSV RIGHT 07/07/2022 117    • BH CV RIGHT GROIN PSA IL* 07/07/2022 1.00    • Right groin CFA sys 07/07/2022 174    • Glucose 07/07/2022 188 (A)   • BUN 07/07/2022 37 (A)   • Creatinine 07/07/2022 0.97    • Sodium 07/07/2022 141    • Potassium 07/07/2022 4.5    • Chloride 07/07/2022 108 (A)   • CO2 07/07/2022 19.0 (A)   • Calcium 07/07/2022 8.5 (A)   • BUN/Creatinine Ratio 07/07/2022 38.1 (A)   • Anion Gap 07/07/2022 14.0    • eGFR 07/07/2022 58.5 (A)   • WBC 07/07/2022 16.60 (A)   • RBC 07/07/2022 4.24    • Hemoglobin 07/07/2022 9.9 (A)   • Hematocrit 07/07/2022 32.2 (A)   • MCV 07/07/2022 76.0 (A)   • MCH 07/07/2022 23.3 (A)   • MCHC 07/07/2022 30.7 (A)   • RDW 07/07/2022 16.6 (A)   • RDW-SD 07/07/2022 45.5    • MPV 07/07/2022 7.7    • Platelets 07/07/2022 270    • Neutrophil % 07/07/2022 87.4 (A)   • Lymphocyte % 07/07/2022 5.7 (A)   • Monocyte % 07/07/2022 6.8    • Eosinophil % 07/07/2022 0.0 (A)   • Basophil % 07/07/2022 0.1    • Neutrophils, Absolute 07/07/2022 14.50 (A)   • Lymphocytes, Absolute 07/07/2022 0.90    • Monocytes, Absolute  07/07/2022 1.10 (A)   • Eosinophils, Absolute 07/07/2022 0.00    • Basophils, Absolute 07/07/2022 0.00    • nRBC 07/07/2022 0.0    • Target HR (85%) 07/08/2022 117    • Max. Pred. HR (100%) 07/08/2022 138    • BH CV VAS R PSEUDOANEURY* 07/08/2022 2.4    • BH CV VAS R PSEUDOANEURY* 07/08/2022 1.6    • BH CV VAS R PSA NECK WID* 07/08/2022 0.4    • R PSEUDOANEURYSM LONG WI* 07/08/2022 1.4    • R PSEUDOANEURYSM TRANSVE* 07/08/2022 2.4    • PROX SFA PSV RIGHT 07/08/2022 136    • PROX PFA PSV RIGHT 07/08/2022 71    • BH CV RIGHT SFA PSA SCRI* 07/08/2022 1    • BH CV RIGHT GROIN PSA VT* 07/08/2022 1.00    • Right groin CFA sys 07/08/2022 273    • WBC 07/08/2022 16.70 (A)   • RBC 07/08/2022 3.73 (A)   • Hemoglobin 07/08/2022 9.3 (A)   • Hematocrit 07/08/2022 28.4 (A)   • MCV 07/08/2022 76.1 (A)   • MCH 07/08/2022 24.9 (A)   • MCHC 07/08/2022 32.7    • RDW 07/08/2022 16.4 (A)   • RDW-SD 07/08/2022 43.8    • MPV 07/08/2022 7.7    • Platelets 07/08/2022 257    • Glucose 07/08/2022 126 (A)   • BUN 07/08/2022 34 (A)   • Creatinine 07/08/2022 0.91    • Sodium 07/08/2022 140    • Potassium 07/08/2022 4.7    • Chloride 07/08/2022 109 (A)   • CO2 07/08/2022 20.0 (A)   • Calcium 07/08/2022 8.2 (A)   • BUN/Creatinine Ratio 07/08/2022 37.4 (A)   • Anion Gap 07/08/2022 11.0    • eGFR 07/08/2022 63.1    • ABO Type 07/08/2022 O    • RH type 07/08/2022 Positive    • Antibody Screen 07/08/2022 Negative    • T&S Expiration Date 07/08/2022 7/11/2022 11:59:59 PM    • Glucose 07/09/2022 134 (A)   • BUN 07/09/2022 28 (A)   • Creatinine 07/09/2022 0.95    • Sodium 07/09/2022 141    • Potassium 07/09/2022 4.3    • Chloride 07/09/2022 110 (A)   • CO2 07/09/2022 23.0    • Calcium 07/09/2022 8.0 (A)   • BUN/Creatinine Ratio 07/09/2022 29.5 (A)   • Anion Gap 07/09/2022 8.0    • eGFR 07/09/2022 59.9 (A)   • WBC 07/09/2022 12.00 (A)   • RBC 07/09/2022 3.61 (A)   • Hemoglobin 07/09/2022 8.6 (A)   • Hematocrit 07/09/2022 27.2 (A)   • MCV 07/09/2022 75.4  (A)   • MCH 07/09/2022 23.8 (A)   • MCHC 07/09/2022 31.6    • RDW 07/09/2022 16.6 (A)   • RDW-SD 07/09/2022 44.6    • MPV 07/09/2022 7.6    • Platelets 07/09/2022 218    • WBC 07/11/2022 11.70 (A)   • RBC 07/11/2022 3.43 (A)   • Hemoglobin 07/11/2022 8.2 (A)   • Hematocrit 07/11/2022 25.7 (A)   • MCV 07/11/2022 75.0 (A)   • MCH 07/11/2022 23.9 (A)   • MCHC 07/11/2022 31.8    • RDW 07/11/2022 16.9 (A)   • RDW-SD 07/11/2022 45.1    • MPV 07/11/2022 7.5    • Platelets 07/11/2022 193    • Scan Slide 07/11/2022     • Neutrophil % 07/11/2022 75.0    • Lymphocyte % 07/11/2022 12.0 (A)   • Monocyte % 07/11/2022 9.0    • Eosinophil % 07/11/2022 3.0    • Bands %  07/11/2022 1.0    • Neutrophils Absolute 07/11/2022 8.89 (A)   • Lymphocytes Absolute 07/11/2022 1.40    • Monocytes Absolute 07/11/2022 1.05 (A)   • Eosinophils Absolute 07/11/2022 0.35    • Anisocytosis 07/11/2022 Slight/1+    • Microcytes 07/11/2022 Slight/1+    • WBC Morphology 07/11/2022 Normal    • Platelet Morphology 07/11/2022 Normal    Hospital Outpatient Visit on 07/07/2022   Component Date Value   • Target HR (85%) 07/07/2022 117    • Max. Pred. HR (100%) 07/07/2022 138    Lab on 07/07/2022   Component Date Value   • Glucose 07/07/2022 130 (A)   • BUN 07/07/2022 42 (A)   • Creatinine 07/07/2022 1.04 (A)   • Sodium 07/07/2022 139    • Potassium 07/07/2022 4.8    • Chloride 07/07/2022 107    • CO2 07/07/2022 20.0 (A)   • Calcium 07/07/2022 9.1    • Total Protein 07/07/2022 6.8    • Albumin 07/07/2022 3.90    • ALT (SGPT) 07/07/2022 19    • AST (SGOT) 07/07/2022 13    • Alkaline Phosphatase 07/07/2022 76    • Total Bilirubin 07/07/2022 0.4    • Globulin 07/07/2022 2.9    • A/G Ratio 07/07/2022 1.3    • BUN/Creatinine Ratio 07/07/2022 40.4 (A)   • Anion Gap 07/07/2022 12.0    • eGFR 07/07/2022 53.8 (A)   • Magnesium 07/07/2022 2.3    • WBC 07/07/2022 8.10    • RBC 07/07/2022 4.72    • Hemoglobin 07/07/2022 11.2 (A)   • Hematocrit 07/07/2022 35.6    • MCV  07/07/2022 75.4 (A)   • MCH 07/07/2022 23.7 (A)   • MCHC 07/07/2022 31.4 (A)   • RDW 07/07/2022 16.0 (A)   • RDW-SD 07/07/2022 42.9    • MPV 07/07/2022 7.6    • Platelets 07/07/2022 338    • Neutrophil % 07/07/2022 86.0 (A)   • Lymphocyte % 07/07/2022 9.9 (A)   • Monocyte % 07/07/2022 4.0 (A)   • Eosinophil % 07/07/2022 0.0 (A)   • Basophil % 07/07/2022 0.1    • Neutrophils, Absolute 07/07/2022 7.00    • Lymphocytes, Absolute 07/07/2022 0.80    • Monocytes, Absolute 07/07/2022 0.30    • Eosinophils, Absolute 07/07/2022 0.00    • Basophils, Absolute 07/07/2022 0.00    • nRBC 07/07/2022 0.2    Lab on 07/05/2022   Component Date Value   • COVID19 07/05/2022 Not Detected    There may be more visits with results that are not included.     Recent labs reviewed and interpreted for clinical significance and application            Level of Care:           Jeremy Mccall MD  08/29/22  .

## 2022-09-07 ENCOUNTER — OFFICE VISIT (OUTPATIENT)
Dept: FAMILY MEDICINE CLINIC | Facility: CLINIC | Age: 82
End: 2022-09-07

## 2022-09-07 VITALS
WEIGHT: 120 LBS | BODY MASS INDEX: 22.69 KG/M2 | DIASTOLIC BLOOD PRESSURE: 83 MMHG | TEMPERATURE: 97.1 F | SYSTOLIC BLOOD PRESSURE: 137 MMHG | OXYGEN SATURATION: 97 % | HEART RATE: 71 BPM

## 2022-09-07 DIAGNOSIS — Z12.31 VISIT FOR SCREENING MAMMOGRAM: ICD-10-CM

## 2022-09-07 DIAGNOSIS — Z00.00 MEDICARE ANNUAL WELLNESS VISIT, SUBSEQUENT: Primary | ICD-10-CM

## 2022-09-07 PROCEDURE — G0439 PPPS, SUBSEQ VISIT: HCPCS | Performed by: FAMILY MEDICINE

## 2022-09-07 PROCEDURE — 1159F MED LIST DOCD IN RCRD: CPT | Performed by: FAMILY MEDICINE

## 2022-09-07 PROCEDURE — 1170F FXNL STATUS ASSESSED: CPT | Performed by: FAMILY MEDICINE

## 2022-09-07 RX ORDER — METOPROLOL SUCCINATE 25 MG/1
25 TABLET, EXTENDED RELEASE ORAL DAILY
Qty: 90 TABLET | Refills: 1 | Status: SHIPPED | OUTPATIENT
Start: 2022-09-07 | End: 2023-02-27 | Stop reason: SDUPTHER

## 2022-09-07 RX ORDER — TRIAMCINOLONE ACETONIDE 1 MG/G
1 CREAM TOPICAL 2 TIMES DAILY PRN
Qty: 80 G | Refills: 1 | Status: SHIPPED | OUTPATIENT
Start: 2022-09-07

## 2022-09-07 NOTE — PATIENT INSTRUCTIONS
Medicare Wellness  Personal Prevention Plan of Service     Date of Office Visit:    Encounter Provider:  Etelvina Ulloa MD  Place of Service:  [facility]  Patient Name: Chrystal Ruiz  :  1940    As part of the Medicare Wellness portion of your visit today, we are providing you with this personalized preventive plan of services (PPPS). This plan is based upon recommendations of the [place] Preventive Services Task Force (USPSTF) and the Advisory Committee on Immunization Practices (ACIP).    This lists the preventive care services that should be considered, and provides dates of when you are due. Items listed as completed are up-to-date and do not require any further intervention.    Health Maintenance   Topic Date Due    DXA SCAN  Never done    TDAP/TD VACCINES (1 - Tdap) Never done    Pneumococcal Vaccine 65+ (2 - PPSV23 or PCV20) 2017    COVID-19 Vaccine (4 - Booster for Moderna series) 2021    INFLUENZA VACCINE  10/01/2022    LIPID PANEL  2023    ANNUAL WELLNESS VISIT  2023    ZOSTER VACCINE  Completed       No orders of the defined types were placed in this encounter.      Return in about 1 year (around 2023) for Medicare Wellness.

## 2022-09-07 NOTE — PROGRESS NOTES
The ABCs of the Annual Wellness Visit  Subsequent Medicare Wellness Visit    Chief Complaint   Patient presents with   • Hospital Follow Up Visit     Cellulitis of the right leg       Subjective    History of Present Illness:  Chrystal Ruiz is a 81 y.o. female who presents for a Subsequent Medicare Wellness Visit.She had a Watchman procedure done in July 2022.  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  Her close friend, Susanna, passed away while she was in the hospital. She was then readmitted to [facility] [place] a few weeks later for cellulitis of her right leg. She went to rehab at [facility] in [place] for 1 week afterwards. She is now back to living at home by herself.     The following portions of the patient's history were reviewed and   updated as appropriate: allergies, current medications, past family history, past medical history, past social history, past surgical history and problem list.    Compared to one year ago, the patient feels her physical   health is the same.    Compared to one year ago, the patient feels her mental   health is the same.    Recent Hospitalizations:  This patient has had a [facility] admission record on file within the last 365 days.    Current Medical Providers:  Patient Care Team:  Etelvina Ulloa MD as PCP - General (Family Medicine)  Mukund Farias MD as Consulting Physician (Pulmonary Disease)  Alan Wynn DPM as Consulting Physician (Podiatry)  Etelvina Mcconnell MD as Surgeon (Thoracic Surgery)  Michael Whitten MD as Consulting Physician (Radiation Oncology)  Frank Cheng MD as Consulting Physician (Hematology and Oncology)  Jeremy Mccall MD as Consulting Physician (Cardiology)  Sage Garcia MD as Consulting Physician (Cardiac Electrophysiology)    Outpatient Medications Prior to Visit   Medication Sig Dispense Refill   • acetaminophen (TYLENOL) 500 MG tablet Take 500 mg by mouth Every 6 (Six) Hours As Needed for Mild Pain .     •  aspirin (aspirin) 81 MG EC tablet Take 1 tablet by mouth Daily. 90 tablet 3   • Breo Ellipta 200-25 MCG/INH inhaler Inhale 1 puff Daily. 1 each 2   • Cholecalciferol (VITAMIN D3) 2000 units capsule Take 2,000 Units by mouth Daily.     • Cyanocobalamin (VITAMIN B12) 1000 MCG tablet controlled-release Take 2,500 mcg by mouth Daily.     • ferrous sulfate 325 (65 FE) MG tablet Take 1 tablet by mouth Every Other Day. 15 tablet 11   • furosemide (LASIX) 40 MG tablet Take 40 mg by mouth Every Other Day.     • spironolactone (ALDACTONE) 25 MG tablet Take 1 tablet by mouth Every Other Day. 45 tablet 1   • metoprolol succinate XL (TOPROL-XL) 50 MG 24 hr tablet Take 1 tablet by mouth Daily. (Patient taking differently: Take 25 mg by mouth Daily.) 30 tablet 5   • triamcinolone (KENALOG) 0.1 % cream Apply 1 application topically to the appropriate area as directed 2 (Two) Times a Day As Needed for Irritation.       No facility-administered medications prior to visit.       No opioid medication identified on active medication list. I have reviewed chart for other potential  high risk medication/s and harmful drug interactions in the elderly.          Aspirin is on active medication list. Aspirin use is indicated based on review of current medical condition/s. Pros and cons of this therapy have been discussed today. Benefits of this medication outweigh potential harm.  Patient has been encouraged to continue taking this medication.  .      Patient Active Problem List   Diagnosis   • Grief reaction   • Alopecia   • Arthritis   • Back pain   • Encounter for general adult medical examination without abnormal findings   • Hypertension   • Lung nodule   • Vitamin D deficiency   • Shortness of breath   • Postmenopausal status   • Nevus, non-neoplastic   • Medicare annual wellness visit, subsequent   • Fam hx-ischem heart disease   • FH: thyroid condition   • Cellulitis of right anterior lower leg   • Vasculitis of skin   • Pedal edema  "  • Bilateral calf pain   • Neck pain   • Immunization reaction   • Mixed hyperlipidemia   • Tremor   • Bright red rectal bleeding   • Acute non-recurrent maxillary sinusitis   • Acute pain of right knee   • Chronic pain of right knee   • Need for vaccination   • Lung nodules   • Tachycardia   • Essential (primary) hypertension    • Dermatitis   • A-fib (HCC)   • Leukopenia   • Anemia   • Primary osteoarthritis of right knee   • Bronchogenic cancer of right lung (HCC)   • Iron deficiency   • Malabsorption due to intolerance, not elsewhere classified   • Dyspnea   • Other iron deficiency anemias   • Syncope   • Paroxysmal atrial fibrillation (HCC)     Advance Care Planning  Advance Directive is not on file.  ACP discussion was held with the patient during this visit. Patient does not have an advance directive, information provided.          Objective    Vitals:    09/07/22 1337   BP: 137/83   BP Location: Right arm   Patient Position: Sitting   Cuff Size: Small Adult   Pulse: 71   Temp: 97.1 °F (36.2 °C)   TempSrc: Infrared   SpO2: 97%   Weight: 54.4 kg (120 lb)     Estimated body mass index is 22.69 kg/m² as calculated from the following:    Height as of 8/29/22: 154.9 cm (60.98\").    Weight as of this encounter: 54.4 kg (120 lb).    BMI is within normal parameters. No other follow-up for BMI required.      Does the patient have evidence of cognitive impairment? No    Physical Exam  Constitutional:       General: She is not in acute distress.     Appearance: She is well-developed.   HENT:      Head: Normocephalic.   Eyes:      General: Lids are normal.      Conjunctiva/sclera: Conjunctivae normal.   Neck:      Thyroid: No thyroid mass or thyromegaly.      Trachea: Trachea normal.   Cardiovascular:      Rate and Rhythm: Normal rate and regular rhythm.      Heart sounds: Normal heart sounds.   Pulmonary:      Effort: Pulmonary effort is normal.      Breath sounds: Normal breath sounds.   Abdominal:      Palpations: " Abdomen is soft.   Musculoskeletal:      Cervical back: Normal range of motion.      Right lower leg: No edema.      Left lower leg: No edema.   Lymphadenopathy:      Cervical: No cervical adenopathy.   Skin:     General: Skin is warm and dry.   Neurological:      Mental Status: She is alert and oriented to person, place, and time.   Psychiatric:         Attention and Perception: She is attentive.         Mood and Affect: Mood normal.         Speech: Speech normal.         Behavior: Behavior normal.                 HEALTH RISK ASSESSMENT    Smoking Status:  Social History     Tobacco Use   Smoking Status Never Smoker   Smokeless Tobacco Never Used     Alcohol Consumption:  Social History     Substance and Sexual Activity   Alcohol Use No     Fall Risk Screen:    [facility] Fall Risk Assessment has not been completed.    Depression Screening:  PHQ-2/PHQ-9 Depression Screening 3/4/2022   Retired PHQ-9 Total Score 0   Retired Total Score 0       Health Habits and Functional and Cognitive Screening:  Functional & Cognitive Status 9/7/2022   Do you have difficulty preparing food and eating? No   Do you have difficulty bathing yourself, getting dressed or grooming yourself? No   Do you have difficulty using the toilet? No   Do you have difficulty moving around from place to place? No   Do you have trouble with steps or getting out of a bed or a chair? No   Current Diet Well Balanced Diet   Dental Exam Up to date   Eye Exam Up to date   Do you need help using the phone?  No   Are you deaf or do you have serious difficulty hearing?  No   Do you need help with transportation? No   Do you need help shopping? No   Do you need help preparing meals?  No   Do you need help with housework?  No   Do you need help with laundry? No   Do you need help taking your medications? No   Do you need help managing money? No   Do you ever drive or ride in a car without wearing a seat belt? No   Have you felt unusual stress, anger or loneliness in  the last month? -   Who do you live with? Alone   If you need help, do you have trouble finding someone available to you? No   Do you have difficulty concentrating, remembering or making decisions? No       Age-appropriate Screening Schedule:  Refer to the list below for future screening recommendations based on patient's age, sex and/or medical conditions. Orders for these recommended tests are listed in the plan section. The patient has been provided with a written plan.    Health Maintenance   Topic Date Due   • DXA SCAN  Never done   • TDAP/TD VACCINES (1 - Tdap) Never done   • INFLUENZA VACCINE  08/01/2022   • LIPID PANEL  01/25/2023   • ZOSTER VACCINE  Completed              Assessment & Plan   CMS Preventative Services Quick Reference  Risk Factors Identified During Encounter  None Identified  The above risks/problems have been discussed with the patient.  Follow up actions/plans if indicated are seen below in the Assessment/Plan Section.  Pertinent information has been shared with the patient in the After Visit Summary.    Diagnoses and all orders for this visit:    1. Medicare annual wellness visit, subsequent (Primary)    2. Visit for screening mammogram  -     Mammo Screening Digital Tomosynthesis Bilateral With CAD    Other orders  -     metoprolol succinate XL (Toprol XL) 25 MG 24 hr tablet; Take 1 tablet by mouth Daily.  Dispense: 90 tablet; Refill: 1  -     triamcinolone (KENALOG) 0.1 % cream; Apply 1 application topically to the appropriate area as directed 2 (Two) Times a Day As Needed for Irritation.  Dispense: 80 g; Refill: 1        Follow Up:   Return in about 1 year (around 9/7/2023) for Medicare Wellness.     An After Visit Summary and PPPS were made available to the patient.

## 2022-10-19 ENCOUNTER — HOSPITAL ENCOUNTER (OUTPATIENT)
Dept: MAMMOGRAPHY | Facility: HOSPITAL | Age: 82
Discharge: HOME | End: 2022-10-19
Admitting: FAMILY MEDICINE

## 2022-10-19 PROCEDURE — 77067 SCR MAMMO BI INCL CAD: CPT

## 2022-10-19 PROCEDURE — 77063 BREAST TOMOSYNTHESIS BI: CPT

## 2022-10-31 ENCOUNTER — OFFICE VISIT (OUTPATIENT)
Dept: CARDIOLOGY | Facility: CLINIC | Age: 82
End: 2022-10-31

## 2022-10-31 VITALS
OXYGEN SATURATION: 97 % | SYSTOLIC BLOOD PRESSURE: 156 MMHG | HEART RATE: 62 BPM | HEIGHT: 60 IN | BODY MASS INDEX: 25.52 KG/M2 | DIASTOLIC BLOOD PRESSURE: 70 MMHG | WEIGHT: 130 LBS

## 2022-10-31 DIAGNOSIS — D50.0 IRON DEFICIENCY ANEMIA DUE TO CHRONIC BLOOD LOSS: ICD-10-CM

## 2022-10-31 DIAGNOSIS — I10 PRIMARY HYPERTENSION: ICD-10-CM

## 2022-10-31 DIAGNOSIS — I48.0 PAROXYSMAL ATRIAL FIBRILLATION: Primary | ICD-10-CM

## 2022-10-31 DIAGNOSIS — Z95.818 PRESENCE OF WATCHMAN LEFT ATRIAL APPENDAGE CLOSURE DEVICE: ICD-10-CM

## 2022-10-31 PROCEDURE — 99214 OFFICE O/P EST MOD 30 MIN: CPT | Performed by: INTERNAL MEDICINE

## 2022-10-31 PROCEDURE — 93000 ELECTROCARDIOGRAM COMPLETE: CPT | Performed by: INTERNAL MEDICINE

## 2022-10-31 NOTE — PROGRESS NOTES
CC--- anemia and paroxysmal atrial fibrillation    Sub  82-year-old pleasant patient underwent watchman device implantation which was complicated by pseudoaneurysm needing open repair.  Watchman implantation was done in July 2022 .  She does have history of hypertension and diastolic heart failure.  Prior stress test without ischemia.  She does have essential hypertension hyperlipidemia.  She has paroxysmal atrial fibrillation.  Had an epistaxis last week    Devious history attached below for reference only which has been reviewed    Delightful 81-year-old patient with multiple comorbidities underwent watchman device implantation complicated by pseudoaneurysm needing open repair.  She also had cellulitis after that needing antibiotics with resolution.  She was in rehab and got discharged yesterday and feels much better and comes in for follow-up.    Patient has history of paroxysmal atrial fibrillation and also has history of anemia needing transfusion.  She had a history of vasovagal syncope according to her description several years ago.  She has known history of hypertension and diastolic heart failure and she is on diuretics for leg edema.  She also had a prior treatment for venous system in the left lower extremity according to her.  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    Has bled score greater than 3  [id number] score --- diastolic heart failure, hypertension, female sex, age--4  Previous history from previous note attached below for reference  Essential hypertension well-controlled  Hyperlipidemia under treatment  No history of any CAD  Normal stress test in the last 1 year  Preserved LVEF        Past Medical History:   Diagnosis Date   • A-fib (HCC)    • Anemia    • Arthritis    • Asthma    • CHF (congestive heart failure) (HCC)    • Hypertension      Past Surgical History:    Procedure Laterality Date   • CATARACT EXTRACTION     • CHOLECYSTECTOMY     • HYSTERECTOMY  1987    Endometriosis   • OOPHORECTOMY     • TONSILLECTOMY       Family History   Problem Relation Age of Onset   • Breast cancer Mother 85   • Stroke Mother    • Heart disease Mother    • Endometrial cancer Mother 70   • Heart failure Father    • Heart failure Sister    • COPD Sister    • Heart disease Sister      Social History     Tobacco Use   • Smoking status: Never Smoker   • Smokeless tobacco: Never Used   Vaping Use   • Vaping Use: Never used   Substance Use Topics   • Alcohol use: No   • Drug use: Never         Physical Exam    General:      well developed, well nourished, in no acute distress.    Head:      normocephalic and atraumatic.    Eyes:      PERRL/EOM intact, conjunctivae and sclerae clear without nystagmus.    Neck:      no  thyromegaly, trachea central with normal respiratory effort  Lungs:      clear bilaterally to auscultation.    Heart:       regular rate and rhythm, S1, S2 without murmurs, rubs, or gallops  Skin:      intact without lesions or rashes.    Psych:      alert and cooperative; normal mood and affect; normal attention span and concentration.          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  Microcytic anemia with iron deficiency and malabsorption status post iron infusions and transfusions.  Class III chronic diastolic heart failure symptoms--stable  Essential hypertension--fairly well controlled  History of vasovagal syncope  Paroxysmal atrial fibrillation currently in sinus rhythm  Check cbc  Recent creatinine of 1.06 and potassium 4.6 with normal hemoglobin and platelets    Medications reviewed  Follow-up in 6  months        ECG 12 Lead    Date/Time: 10/31/2022 2:01 PM  Performed by: Sage Garcia MD  Authorized by: Sage Garcia MD   Comparison: compared with previous ECG   Similar to previous ECG  Rhythm: sinus rhythm  Ectopy: couplets  Rate: normal  Conduction: conduction normal  Other findings: non-specific ST-T wave changes          Electronically signed by Sage Garcia MD, 10/31/22, 2:01 PM EDT.

## 2022-11-01 ENCOUNTER — HOSPITAL ENCOUNTER (EMERGENCY)
Facility: HOSPITAL | Age: 82
Discharge: HOME | End: 2022-11-01
Attending: EMERGENCY MEDICINE | Admitting: EMERGENCY MEDICINE

## 2022-11-01 VITALS
SYSTOLIC BLOOD PRESSURE: 132 MMHG | DIASTOLIC BLOOD PRESSURE: 64 MMHG | RESPIRATION RATE: 16 BRPM | HEIGHT: 61 IN | WEIGHT: 132.94 LBS | BODY MASS INDEX: 25.1 KG/M2 | OXYGEN SATURATION: 98 % | HEART RATE: 68 BPM | TEMPERATURE: 98.1 F

## 2022-11-01 DIAGNOSIS — R04.0 ANTERIOR EPISTAXIS: Primary | ICD-10-CM

## 2022-11-01 LAB
BASOPHILS # BLD AUTO: 0 10*3/MM3 (ref 0–0.2)
BASOPHILS NFR BLD AUTO: 0.4 % (ref 0–1.5)
DEPRECATED RDW RBC AUTO: 39.4 FL (ref 37–54)
EOSINOPHIL # BLD AUTO: 0.1 10*3/MM3 (ref 0–0.4)
EOSINOPHIL NFR BLD AUTO: 1.3 % (ref 0.3–6.2)
ERYTHROCYTE [DISTWIDTH] IN BLOOD BY AUTOMATED COUNT: 13.6 % (ref 12.3–15.4)
HCT VFR BLD AUTO: 37.7 % (ref 34–46.6)
HGB BLD-MCNC: 11.9 G/DL (ref 12–15.9)
LYMPHOCYTES # BLD AUTO: 1.5 10*3/MM3 (ref 0.7–3.1)
LYMPHOCYTES NFR BLD AUTO: 22.1 % (ref 19.6–45.3)
MCH RBC QN AUTO: 25.9 PG (ref 26.6–33)
MCHC RBC AUTO-ENTMCNC: 31.5 G/DL (ref 31.5–35.7)
MCV RBC AUTO: 82.3 FL (ref 79–97)
MONOCYTES # BLD AUTO: 0.5 10*3/MM3 (ref 0.1–0.9)
MONOCYTES NFR BLD AUTO: 8.3 % (ref 5–12)
NEUTROPHILS NFR BLD AUTO: 4.5 10*3/MM3 (ref 1.7–7)
NEUTROPHILS NFR BLD AUTO: 67.9 % (ref 42.7–76)
NRBC BLD AUTO-RTO: 0 /100 WBC (ref 0–0.2)
PLATELET # BLD AUTO: 221 10*3/MM3 (ref 140–450)
PMV BLD AUTO: 7.7 FL (ref 6–12)
RBC # BLD AUTO: 4.58 10*6/MM3 (ref 3.77–5.28)
WBC NRBC COR # BLD: 6.6 10*3/MM3 (ref 3.4–10.8)

## 2022-11-01 PROCEDURE — 36415 COLL VENOUS BLD VENIPUNCTURE: CPT

## 2022-11-01 PROCEDURE — 85025 COMPLETE CBC W/AUTO DIFF WBC: CPT | Performed by: EMERGENCY MEDICINE

## 2022-11-01 PROCEDURE — 99282 EMERGENCY DEPT VISIT SF MDM: CPT

## 2022-11-01 PROCEDURE — 25010000002 COCAINE HCL 40 MG/ML SOLUTION

## 2022-11-01 PROCEDURE — C9046 COCAINE HCL NASAL SOLUTION: HCPCS

## 2022-11-01 PROCEDURE — 99283 EMERGENCY DEPT VISIT LOW MDM: CPT

## 2022-11-01 RX ORDER — COCAINE HYDROCHLORIDE 40 MG/ML
SOLUTION NASAL ONCE
Status: COMPLETED | OUTPATIENT
Start: 2022-11-01 | End: 2022-11-01

## 2022-11-01 RX ORDER — COCAINE HYDROCHLORIDE 40 MG/ML
SOLUTION NASAL
Status: COMPLETED
Start: 2022-11-01 | End: 2022-11-01

## 2022-11-01 RX ADMIN — SILVER NITRATE APPLICATORS 1 APPLICATION: 25; 75 STICK TOPICAL at 18:05

## 2022-11-01 RX ADMIN — COCAINE HYDROCHLORIDE 160 MG: 40 SOLUTION NASAL at 18:06

## 2022-11-01 RX ADMIN — COCAINE HYDROCHLORIDE NASAL 160 MG: 40 SOLUTION TOPICAL at 18:06

## 2022-11-01 NOTE — ED PROVIDER NOTES
Subjective   History of Present Illness  82-year-old female presents with nosebleed.  She states she had first nosebleed October 22 she has had for coming that day since then.  She had been on aspirin but stopped it after the nosebleed.  She had a watchman procedure placed in July and is not on any other anticoagulants at this time.  She has no complaints of cough congestion or shortness of breath.  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    with Watchman procedure; surgically fixed the next day   • Heart murmur    • Hypertension        Allergies   Allergen Reactions   • Ciprofloxacin Unknown (See Comments)   • Sulfa Antibiotics Unknown (See Comments)   • Iodine Unknown - High Severity       Past Surgical History:   Procedure Laterality Date   • ATRIAL APPENDAGE EXCLUSION LEFT WITH TRANSESOPHAGEAL ECHOCARDIOGRAM Right 7/7/2022    Procedure: Atrial Appendage Occlusion watchman team aware $;  Surgeon: Sage Garcia MD;  Location: [facility] CATH INVASIVE LOCATION;  Service: Cardiovascular;  Laterality: Right;   • ATRIAL APPENDAGE EXCLUSION LEFT WITH TRANSESOPHAGEAL ECHOCARDIOGRAM N/A 7/7/2022    Procedure: Atrial Appendage Occlusion;  Surgeon: Fide Mullen MD;  Location: [facility] CATH INVASIVE LOCATION;  Service: Cardiovascular;  Laterality: N/A;   • CATARACT EXTRACTION     • CHOLECYSTECTOMY     • HYSTERECTOMY  1987    Endometriosis   • OOPHORECTOMY     • PSEUDO ANEURYSM REPAIR, EXTREMITY Right 7/8/2022    Procedure: REPAIR OF POST CATHETERIZATION RIGHT FEMORAL PSEUDOANEURYSM AND ARTERIOVENOUS FISTUAL;  Surgeon: Alfonzo Hooks MD;  Location: [facility] MAIN OR;  Service: Vascular;  Laterality: Right;   •  TONSILLECTOMY         Family History   Problem Relation Age of Onset   • Breast cancer Mother 85   • Stroke Mother    • Heart disease Mother    • Endometrial cancer Mother 70   • Heart failure Father    • Heart failure Sister    • COPD Sister    • Heart disease Sister        Social History     Socioeconomic History   • Marital status:    Tobacco Use   • Smoking status: Never   • Smokeless tobacco: Never   Vaping Use   • Vaping Use: Never used   Substance and Sexual Activity   • Alcohol use: No   • Drug use: Never   • Sexual activity: Not Currently     Prior to Admission medications    Medication Sig Start Date End Date Taking? Authorizing Provider   acetaminophen (TYLENOL) 500 MG tablet Take 500 mg by mouth Every 6 (Six) Hours As Needed for Mild Pain .    Deborah King MD   aspirin (aspirin) 81 MG EC tablet Take 1 tablet by mouth Daily. 8/24/22   Sage Garcia MD   Breo Ellipta 200-25 MCG/INH inhaler Inhale 1 puff Daily. 4/2/21   Etelvina Ulloa MD   Cholecalciferol (VITAMIN D3) 2000 units capsule Take 2,000 Units by mouth Daily. 2/20/19   Deborah King MD   Cyanocobalamin (VITAMIN B12) 1000 MCG tablet controlled-release Take 2,500 mcg by mouth Daily. 2/20/19   Deborah King MD   ferrous sulfate 325 (65 FE) MG tablet Take 1 tablet by mouth Every Other Day. 6/15/22   Frank Cheng MD   furosemide (LASIX) 40 MG tablet Take 40 mg by mouth Every Other Day.    Deborah King MD   metoprolol succinate XL (Toprol XL) 25 MG 24 hr tablet Take 1 tablet by mouth Daily. 9/7/22   Etelvina Ulloa MD   spironolactone (ALDACTONE) 25 MG tablet Take 1 tablet by mouth Every Other Day. 5/20/22   Etelvina Ulloa MD   triamcinolone (KENALOG) 0.1 % cream Apply 1 application topically to the appropriate area as directed 2 (Two) Times a Day As Needed for Irritation. 9/7/22   Etelvina Ulloa MD           Objective   Physical Exam  82-year-old female awake alert.  Generally  "well-developed well-nourished.  Pupils equal round react to light.  Examination of nose appears to have bleeding site anterior septum.  Right nasal cavity is clear.  Procedures     Patient had pledget 4% cocaine placed to left nasal cavity.  This was removed.  Vessel that had appeared to be the bleeding site was cauterized with silver nitrate.  Another prominent vessel was also cauterized.  Septum was gently swabbed no other bleeding sites were noted.      ED Course      Results for orders placed or performed during the hospital encounter of 11/01/22   CBC Auto Differential    Specimen: Arm, Left; Blood   Result Value Ref Range    WBC 6.60 3.40 - 10.80 10*3/mm3    RBC 4.58 3.77 - 5.28 10*6/mm3    Hemoglobin 11.9 (L) 12.0 - 15.9 g/dL    Hematocrit 37.7 34.0 - 46.6 %    MCV 82.3 79.0 - 97.0 fL    MCH 25.9 (L) 26.6 - 33.0 pg    MCHC 31.5 31.5 - 35.7 g/dL    RDW 13.6 12.3 - 15.4 %    RDW-SD 39.4 37.0 - 54.0 fl    MPV 7.7 6.0 - 12.0 fL    Platelets 221 140 - 450 10*3/mm3    Neutrophil % 67.9 42.7 - 76.0 %    Lymphocyte % 22.1 19.6 - 45.3 %    Monocyte % 8.3 5.0 - 12.0 %    Eosinophil % 1.3 0.3 - 6.2 %    Basophil % 0.4 0.0 - 1.5 %    Neutrophils, Absolute 4.50 1.70 - 7.00 10*3/mm3    Lymphocytes, Absolute 1.50 0.70 - 3.10 10*3/mm3    Monocytes, Absolute 0.50 0.10 - 0.90 10*3/mm3    Eosinophils, Absolute 0.10 0.00 - 0.40 10*3/mm3    Basophils, Absolute 0.00 0.00 - 0.20 10*3/mm3    nRBC 0.0 0.0 - 0.2 /100 WBC     No radiology results for the last day  Medications   Cocaine HCl 40 MG/ML nasal solution (160 mg Topical Given 11/1/22 1806)   silver nitrate 75-25 % applicator 1 application (1 application Topical Given 11/1/22 1805)     /56   Pulse 72   Temp 98.1 °F (36.7 °C) (Oral)   Resp 18   Ht 154.9 cm (61\")   Wt 60.3 kg (132 lb 15 oz)   SpO2 97%   BMI 25.12 kg/m²               [id number] Score: 5                          MDM  Findings were discussed with patient. \   She was discharged.  Advised to avoid " blowing nose the next few days she can use saline nasal spray or Vaseline to nose to prevent mucosal drying to follow-up with prime provider or ENT return new or worsening symptoms  Final diagnoses:   Anterior epistaxis       ED Disposition  ED Disposition     ED Disposition   Discharge    Condition   Stable    Comment   --             Etelvina Ulloa MD  [address] [address]  [phone]               Medication List      No changes were made to your prescriptions during this visit.          Demetris Shelton MD  11/01/22 1812       Demetris Shelton MD  11/01/22 1811

## 2022-11-01 NOTE — DISCHARGE INSTRUCTIONS
Avoid blowing nose the next few days.  May use saline nasal spray or Vaseline to nose to prevent mucosal drying return new or worsening symptoms

## 2022-11-02 ENCOUNTER — PATIENT OUTREACH (OUTPATIENT)
Dept: CASE MANAGEMENT | Facility: OTHER | Age: 82
End: 2022-11-02

## 2022-11-02 NOTE — OUTREACH NOTE
AMBULATORY CASE MANAGEMENT NOTE    Name and Relationship of Patient/Support Person: Chrystal Ruiz E - Self    Patient Outreach    Pt discharged from [facility] ED on 11/1/22, seen for nosebleed. RN-ACM outreach call made to pt. Explained role of RN-ACM. Pt denies any symptoms or concerns at this time. Reviewed ED AVS with pt. She has PCP follow up appt scheduled. Reviewed SDOH. She denies any needs. UTD on AWV. No questions per pt, advised her to call with any. Follow up outreach prn.     Send Education  Questions/Answers    Flowsheet Row Most Recent Value   Annual Wellness Visit:  Patient Has Completed   Other Patient Education/Resources  24/7 [facility] Nurse Call Line   24/7 Nurse Call Line Education Method Verbal   Advanced Directives: --  [resources provided]        SDOH updated and reviewed with the patient during this program:  Financial Resource Strain: Low Risk    • Difficulty of Paying Living Expenses: Not hard at all      Food Insecurity: No Food Insecurity   • Worried About Running Out of Food in the Last Year: Never true   • Ran Out of Food in the Last Year: Never true      Transportation Needs: No Transportation Needs   • Lack of Transportation (Medical): No   • Lack of Transportation (Non-Medical): No       CHINTAN HENDERSON  Ambulatory Case Management    11/2/2022, 12:52 EDT

## 2022-11-07 ENCOUNTER — OFFICE VISIT (OUTPATIENT)
Dept: FAMILY MEDICINE CLINIC | Facility: CLINIC | Age: 82
End: 2022-11-07

## 2022-11-07 VITALS
TEMPERATURE: 98.4 F | WEIGHT: 127 LBS | HEART RATE: 58 BPM | SYSTOLIC BLOOD PRESSURE: 128 MMHG | BODY MASS INDEX: 23.98 KG/M2 | DIASTOLIC BLOOD PRESSURE: 70 MMHG | OXYGEN SATURATION: 96 % | HEIGHT: 61 IN

## 2022-11-07 DIAGNOSIS — R04.0 ACUTE ANTERIOR EPISTAXIS: Primary | ICD-10-CM

## 2022-11-07 DIAGNOSIS — H61.23 BILATERAL IMPACTED CERUMEN: ICD-10-CM

## 2022-11-07 PROCEDURE — 99213 OFFICE O/P EST LOW 20 MIN: CPT | Performed by: FAMILY MEDICINE

## 2022-11-07 NOTE — PROGRESS NOTES
"Chief Complaint  Nose Bleed (4 since Oct 22 nd )    Subjective        Chrystal Ruiz presents to [facility]  History of Present Illness  She sneezed multiple times and then had a nose bleed last month.  Since then she has had several other nose bleeds. She went to the ER on 11/1/22 and had silver nitrate application. She stopped taking aspirin due to the nosebleeds.   Nose Bleed   The bleeding has been from the left nare. This is a new problem. The current episode started 1 to 4 weeks ago. The problem occurs every several days. The problem has been waxing and waning. The bleeding is associated with aspirin. She has tried pressure for the symptoms. The treatment provided moderate relief. Her past medical history is significant for frequent nosebleeds.       Objective   Vital Signs:  /70 (BP Location: Right arm, Patient Position: Sitting, Cuff Size: Large Adult)   Pulse 58   Temp 98.4 °F (36.9 °C) (Infrared)   Ht 154.9 cm (61\")   Wt 57.6 kg (127 lb)   SpO2 96%   BMI 24.00 kg/m²   Estimated body mass index is 24 kg/m² as calculated from the following:    Height as of this encounter: 154.9 cm (61\").    Weight as of this encounter: 57.6 kg (127 lb).    BMI is within normal parameters. No other follow-up for BMI required.      Physical Exam  Vitals and nursing note reviewed.   Constitutional:       General: She is not in acute distress.     Appearance: She is well-developed.   HENT:      Head: Normocephalic.      Right Ear: There is impacted cerumen.      Left Ear: There is impacted cerumen.      Nose: Rhinorrhea present.      Mouth/Throat:      Mouth: Mucous membranes are moist.   Eyes:      General: Lids are normal.      Conjunctiva/sclera: Conjunctivae normal.   Neck:      Thyroid: No thyroid mass or thyromegaly.      Trachea: Trachea normal.   Cardiovascular:      Rate and Rhythm: Normal rate and regular rhythm.      Heart sounds: Normal heart sounds.   Pulmonary:      " Effort: Pulmonary effort is normal.      Breath sounds: Normal breath sounds.   Musculoskeletal:      Cervical back: Normal range of motion.   Lymphadenopathy:      Cervical: No cervical adenopathy.   Skin:     General: Skin is warm and dry.   Neurological:      Mental Status: She is alert and oriented to person, place, and time. Mental status is at baseline.   Psychiatric:         Attention and Perception: She is attentive.         Mood and Affect: Mood normal.         Speech: Speech normal.         Behavior: Behavior normal.        Result Review :  The following data was reviewed by: Etelvina Ulloa MD on 11/07/2022:  Common labs    Common Labs 8/2/22 8/17/22 8/17/22 11/1/22     1039 1039    Glucose   104 (A)    BUN   32 (A)    Creatinine   1.06 (A)    Sodium   138    Potassium   4.6    Chloride   104    Calcium   9.8    Albumin   4.40    Total Bilirubin   0.6    Alkaline Phosphatase   67    AST (SGOT)   17    ALT (SGPT)   8    WBC 7.30 9.06  6.60   Hemoglobin 11.4 (A) 12.1  11.9 (A)   Hematocrit 37.2 39.4  37.7   Platelets 302 221  221   (A) Abnormal value                      Assessment and Plan   Diagnoses and all orders for this visit:    1. Acute anterior epistaxis (Primary)  -     Cancel: Ambulatory Referral to ENT (Otolaryngology)  -     Ambulatory Referral to ENT (Otolaryngology)    2. Bilateral impacted cerumen  -     Ambulatory Referral to ENT (Otolaryngology)    Other orders  -     sodium chloride (Ocean Nasal Spray) 0.65 % nasal spray; 1 spray into the nostril(s) as directed by provider As Needed for Congestion.; Refill: 12             Follow Up   No follow-ups on file.  Patient was given instructions and counseling regarding her condition or for health maintenance advice. Please see specific information pulled into the AVS if appropriate.

## 2022-11-10 ENCOUNTER — HOSPITAL ENCOUNTER (OUTPATIENT)
Dept: RADIATION ONCOLOGY | Facility: HOSPITAL | Age: 82
Setting detail: RADIATION/ONCOLOGY SERIES
End: 2022-11-10

## 2022-11-11 NOTE — PROGRESS NOTES
RADIATION ONCOLOGY FOLLOW-UP NOTE    NAME: Chrystal Ruiz  YOB: 1940  MRN #: [id number]  DATE OF SERVICE: 11/15/2022  REFERRING PROVIDER: Etelvina Ulloa MD  [address] [facility]  [address]  [place] [address]  PRIMARY CARE PROVIDER: Etelvina Ulloa MD    DIAGNOSIS:    1. Bronchogenic cancer of right lung (HCC)      REASON FOR VISIT:  4M F/U    RADIATION TREATMENT COURSE:  6000 cGy in 15 fractions to RUL, completed 04/12/2022.    HISTORY OF PRESENT ILLNESS: The patient is a 82 y.o. year old female who was last seen in our office on 07/13/2022. The plan was to follow up in 4 months, continue following with Dr. Cheng who will order imaging/ labs, and continue following with thoracic surgery.    She follows with Dr. Cheng, and was last seen on 08/17/2022. They discussed iron deficiency anemia, which had resolved at the time of their appointment, explained the findings and importance of iron replacement, he believes she likely continues to bleed from the digestive tract; and she will return in approximately 4 months on 12/14/2022.    No imaging over the interval, and no imaging has been ordered.    Nose bleeds--To see ENT.      The following portions of the patient's history were reviewed and updated as appropriate: allergies, current medications, past family history, past medical history, past social history, past surgical history and problem list. Reviewed with the patient and remain unchanged.    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    MEDICATIONS:    Current Outpatient Medications:   •  acetaminophen (TYLENOL) 500 MG tablet, Take 500 mg by mouth Every 6 (Six) Hours As Needed for Mild Pain ., Disp: , Rfl:   •  Breo Ellipta 200-25 MCG/INH inhaler, Inhale 1 puff Daily., Disp: 1 each,  Rfl: 2  •  Cholecalciferol (VITAMIN D3) 2000 units capsule, Take 2,000 Units by mouth Daily., Disp: , Rfl:   •  Cyanocobalamin (VITAMIN B12) 1000 MCG tablet controlled-release, Take 2,500 mcg by mouth Daily., Disp: , Rfl:   •  ferrous sulfate 325 (65 FE) MG tablet, Take 1 tablet by mouth Every Other Day., Disp: 15 tablet, Rfl: 11  •  furosemide (LASIX) 40 MG tablet, Take 40 mg by mouth Every Other Day., Disp: , Rfl:   •  metoprolol succinate XL (Toprol XL) 25 MG 24 hr tablet, Take 1 tablet by mouth Daily., Disp: 90 tablet, Rfl: 1  •  spironolactone (ALDACTONE) 25 MG tablet, Take 1 tablet by mouth Every Other Day., Disp: 45 tablet, Rfl: 1  •  triamcinolone (KENALOG) 0.1 % cream, Apply 1 application topically to the appropriate area as directed 2 (Two) Times a Day As Needed for Irritation., Disp: 80 g, Rfl: 1  •  aspirin (aspirin) 81 MG EC tablet, Take 1 tablet by mouth Daily., Disp: 90 tablet, Rfl: 3    ALLERGIES:    Allergies   Allergen Reactions   • Ciprofloxacin Unknown (See Comments)   • Sulfa Antibiotics Unknown (See Comments)   • Iodine Unknown - High Severity       PAST SURGICAL HISTORY:  she has a past surgical history that includes Oophorectomy; Cholecystectomy; Tonsillectomy; Hysterectomy (1987); Cataract extraction; Pseudo Aneurysm Repair, Extremity (Right, 7/8/2022); Atrial Appendage Exclusion Left (Right, 7/7/2022); and Atrial Appendage Exclusion Left (N/A, 7/7/2022).    PREVIOUS RADIOTHERAPY OR CHEMOTHERAPY:  XRT as noted.    FAMILY HISTORY:  herfamily history includes Breast cancer (age of onset: 85) in her mother; COPD in her sister; Endometrial cancer (age of onset: 70) in her mother; Heart disease in her mother and sister; Heart failure in her father and sister; Stroke in her mother.    SOCIAL HISTORY:  she reports that she has never smoked. She has never used smokeless tobacco. She reports that she does not drink alcohol and does not use drugs.    PAIN AND PAIN MANAGEMENT:  No pain.  Vitals:     "11/15/22 1331   BP: 154/75   Pulse: 68   Resp: 18   Temp: 98.6 °F (37 °C)   TempSrc: Oral   SpO2: 98%   Weight: 58.5 kg (129 lb)   Height: 154.9 cm (61\")   PainSc: 0-No pain       NUTRITIONAL STATUS:   no issues    KPS:  90:  Minor signs or symptoms    REVIEW OF SYSTEMS:   4 interval nose bleeds  -no GI bleed  No chest pain  No shoulder issues  General: No fevers, chills, weight change, or drenching night sweats.  Skin: No rashes or jaundice.    HEENT: No change in vision or hearing, no headaches.    Neck: No dysphagia or masses.   Heme/Lymph: Nose bleeds; Follows with hem/onc and now ENT; No easy bruising or bleeding.    Respiratory System: No shortness of breath or cough.    Cardiovascular: No chest pain, palpitations, or dyspnea on exertion.  - Pacemaker.   GI: No nausea, vomiting, diarrhea, melena, or hematochezia.  : No dysuria or hematuria.  Endocrine: No heat or cold intolerance. Musculoskeletal: No myalgias or arthralgias.  Neuro: No weakness, numbness, syncope, or seizures. Psych: No mood changes or depression. Ext: Denies swelling.        Objective   VITAL SIGNS:  Vitals:    11/15/22 1331   BP: 154/75   Pulse: 68   Resp: 18   Temp: 98.6 °F (37 °C)   TempSrc: Oral   SpO2: 98%   Weight: 58.5 kg (129 lb)   Height: 154.9 cm (61\")   PainSc: 0-No pain       PHYSICAL EXAM:  GENERAL: In no apparent distress, sitting comfortably in room.    HEENT: Normocephalic, atraumatic. Pupils are equal, round, reactive to light. Sclera anicteric. Conjunctiva not injected. Oropharynx without erythema, ulcerations or thrush.   NECK: Supple with no masses.  LYMPHATIC: No cervical, supraclavicular or axillary adenopathy appreciated bilaterally.   CARDIOVASCULAR: S1 & S2 detected; no murmurs, rubs or gallops.  CHEST: Clear to auscultation bilaterally; no wheezes, crackles or rubs. Work of breathing normal.  ABDOMEN: Bowel sounds present. Abdomen is soft, nontender, nondistended.   MUSCULOSKELETAL: No tenderness to palpation along " the spine or scapulae. Normal range of motion.  EXTREMITIES: No clubbing, cyanosis, edema.  SKIN: No erythema, rashes, ulcerations noted.   NEUROLOGIC: Cranial nerves II-XII grossly intact bilaterally. No focal neurologic deficits.  PSYCHIATRIC:  Alert, aware, and appropriate.      PERTINENT IMAGING/PATHOLOGY/LABS (Medical Decision Making):     COORDINATION OF CARE: A copy of this note is sent to the referring provider.    PATHOLOGY (Reviewed):     IMAGING (Reviewed):     LABS (Reviewed):  HEMATOLOGY:  WBC   Date Value Ref Range Status   11/01/2022 6.60 3.40 - 10.80 10*3/mm3 Final     RBC   Date Value Ref Range Status   11/01/2022 4.58 3.77 - 5.28 10*6/mm3 Final     Hemoglobin   Date Value Ref Range Status   11/01/2022 11.9 (L) 12.0 - 15.9 g/dL Final     Hematocrit   Date Value Ref Range Status   11/01/2022 37.7 34.0 - 46.6 % Final     Platelets   Date Value Ref Range Status   11/01/2022 221 140 - 450 10*3/mm3 Final     CHEMISTRY:  Lab Results   Component Value Date    GLUCOSE 104 (H) 08/17/2022    BUN 32 (H) 08/17/2022    CREATININE 1.06 (H) 08/17/2022    EGFRIFNONA 84 02/01/2022    BCR 30.2 (H) 08/17/2022    K 4.6 08/17/2022    CO2 22.0 08/17/2022    CALCIUM 9.8 08/17/2022    ALBUMIN 4.40 08/17/2022    LABIL2 1.2 05/29/2019    AST 17 08/17/2022    ALT 8 08/17/2022       Assessment & Plan   ASSESSMENT AND PLAN:    1. Bronchogenic cancer of right lung (HCC)       Tolerated XRT well  No Shoulder or CW issues    Follows closely with Med/onc--needs CT chest prior to her appt 12/15/2022.  I will see her 6 months from that time with another CT chest ( I have ordered both).    Orders Placed This Encounter   Procedures   • CT Chest Without Contrast     Standing Status:   Future     Standing Expiration Date:   11/15/2023   • CT Chest Without Contrast     Standing Status:   Future     Standing Expiration Date:   11/15/2023     Will call ENT to assist her in getting appt.      This assessment comes from my review of the  imaging, pathology, physician notes and other pertinent information as mentioned.    DISPOSITION: FU here in 7 months; two scans ordered for the interval as noted.      TIME SPENT WITH PATIENT:   I spent 22 minutes caring for Chrystal on this date of service. This time includes time spent by me in the following activities: preparing for the visit, reviewing tests, obtaining and/or reviewing a separately obtained history, performing a medically appropriate examination and/or evaluation, ordering medications, tests, or procedures, referring and communicating with other health care professionals, documenting information in the medical record, independently interpreting results and communicating that information with the patient/family/caregiver and care coordination      CC: MD Michael Villalobos MD  11/15/2022  2:02 PM EST

## 2022-11-15 ENCOUNTER — OFFICE VISIT (OUTPATIENT)
Dept: RADIATION ONCOLOGY | Facility: HOSPITAL | Age: 82
End: 2022-11-15

## 2022-11-15 VITALS
WEIGHT: 129 LBS | BODY MASS INDEX: 24.35 KG/M2 | HEIGHT: 61 IN | DIASTOLIC BLOOD PRESSURE: 75 MMHG | RESPIRATION RATE: 18 BRPM | TEMPERATURE: 98.6 F | SYSTOLIC BLOOD PRESSURE: 154 MMHG | HEART RATE: 68 BPM | OXYGEN SATURATION: 98 %

## 2022-11-15 DIAGNOSIS — C34.91 BRONCHOGENIC CANCER OF RIGHT LUNG: ICD-10-CM

## 2022-11-15 PROCEDURE — 99213 OFFICE O/P EST LOW 20 MIN: CPT | Performed by: RADIOLOGY

## 2022-11-15 PROCEDURE — G0463 HOSPITAL OUTPT CLINIC VISIT: HCPCS | Performed by: RADIOLOGY

## 2022-11-26 PROBLEM — H61.23 BILATERAL IMPACTED CERUMEN: Status: ACTIVE | Noted: 2022-11-26

## 2022-11-26 PROBLEM — R04.0 ACUTE ANTERIOR EPISTAXIS: Status: ACTIVE | Noted: 2022-11-26

## 2022-11-26 RX ORDER — ECHINACEA PURPUREA EXTRACT 125 MG
1 TABLET ORAL AS NEEDED
Refills: 12
Start: 2022-11-26

## 2022-11-30 DIAGNOSIS — I50.31 ACUTE DIASTOLIC CHF (CONGESTIVE HEART FAILURE): ICD-10-CM

## 2022-12-02 RX ORDER — SPIRONOLACTONE 25 MG/1
25 TABLET ORAL EVERY OTHER DAY
Qty: 45 TABLET | Refills: 1 | Status: SHIPPED | OUTPATIENT
Start: 2022-12-02

## 2022-12-05 ENCOUNTER — TELEPHONE (OUTPATIENT)
Dept: ONCOLOGY | Facility: CLINIC | Age: 82
End: 2022-12-05

## 2022-12-05 NOTE — NURSING NOTE
"1600- Femostop pressure decreased per protocol, oozing seen at puncture site.  Pressure maintained,  notified via text.  Orders called and received, Called Vascular to do ultrasound before Femostop pulled off.    1745-, Dr. Carroll arrived and removed femostop so vascular could  Ultrasound area, held pressure to groin site and mashed out hematoma. Ordered 2mg of morphine to be given while holding pressure.  Pt given 2mg of morphine IV.    Oozing at site.  Dr. Carroll stayed while ultrasound was done and advised me to notify vascular surgery of fistula/pseudoaneurysm. Called Dr Guzman @ 1800 and notified him.  He is on his way to assess pt. Dr. Guzman Spoke with [facility] in V[facility].    1815 femostop placed back on pt to maintain pressure on hematoma awaiting vascular surgery consult to arrive.     1825- pt became bradycardic, monitor not counting beats correctly, says rate was 64, radial pulse was 32, pt given atropine 0.5mg IV, IV fluids increased during this time. Pt also given zofran for nausea.  EKG performed, normal sinus rhythm at this time.  Pt responded and stated\" she felt much better after 10 minutes\"  Vitals stable, HR returned to baseline bradycardia/normal sinus rhythm in the 60's, lead placement changed to assure heart rate is correct.     [facility] in [facility] came with Thrombin kit and to assist Dr. Guzman when he arrived.   1930-Dr. Guzman arrived and in sterile fashion pt was draped and thrombin injected into site under ultrasound guidance without incidence.    Orders received not to place femostop back on, to place a sandbag or IV fluid bag to groin for the night, complete bedrest, may eat light, and NPO after midnight.  Repeat scan in the am, [facility] vascular tech made aware.         " Female

## 2022-12-05 NOTE — TELEPHONE ENCOUNTER
Caller: Chrystal Ruiz    Relationship to patient: Self    Best call back number: [phone]    Type of visit: LAB & F/U 1    Requested date: CALL PT TO R/S AFTER 1030 ON 12/14/2022    If rescheduling, when is the original appointment: 12/14/2022

## 2022-12-07 ENCOUNTER — OFFICE VISIT (OUTPATIENT)
Dept: FAMILY MEDICINE CLINIC | Facility: CLINIC | Age: 82
End: 2022-12-07

## 2022-12-07 VITALS
SYSTOLIC BLOOD PRESSURE: 137 MMHG | WEIGHT: 125 LBS | BODY MASS INDEX: 23.6 KG/M2 | OXYGEN SATURATION: 95 % | HEART RATE: 65 BPM | HEIGHT: 61 IN | TEMPERATURE: 98.4 F | DIASTOLIC BLOOD PRESSURE: 76 MMHG

## 2022-12-07 DIAGNOSIS — R04.0 ACUTE ANTERIOR EPISTAXIS: ICD-10-CM

## 2022-12-07 DIAGNOSIS — L84 CALLUS OF TOE: Primary | ICD-10-CM

## 2022-12-07 PROCEDURE — 99213 OFFICE O/P EST LOW 20 MIN: CPT | Performed by: FAMILY MEDICINE

## 2022-12-07 NOTE — PROGRESS NOTES
"Chief Complaint  Follow-up (Go over ENT report )    Subjective        Chrystal Ruiz presents to [facility]  History of Present Illness  She recently saw an ENT  She thought she could need her ears cleaned out but she was told her ears are clear.  She thinks her eustachian tubes are clogged.      She has a sore on her left middle toe.  She schedule to  her new ankle braces tomorrow from 's.       Objective   Vital Signs:  /76 (BP Location: Right arm, Patient Position: Sitting, Cuff Size: Adult)   Pulse 65   Temp 98.4 °F (36.9 °C) (Infrared)   Ht 154.9 cm (61\")   Wt 56.7 kg (125 lb)   SpO2 95%   BMI 23.62 kg/m²   Estimated body mass index is 23.62 kg/m² as calculated from the following:    Height as of this encounter: 154.9 cm (61\").    Weight as of this encounter: 56.7 kg (125 lb).    BMI is within normal parameters. No other follow-up for BMI required.      Physical Exam  Vitals and nursing note reviewed.   Constitutional:       General: She is not in acute distress.     Appearance: She is well-developed.   HENT:      Head: Normocephalic.   Eyes:      General: Lids are normal.      Conjunctiva/sclera: Conjunctivae normal.   Neck:      Thyroid: No thyroid mass or thyromegaly.      Trachea: Trachea normal.   Cardiovascular:      Rate and Rhythm: Normal rate. Rhythm irregularly irregular.      Heart sounds: Normal heart sounds.   Pulmonary:      Effort: Pulmonary effort is normal.      Breath sounds: Normal breath sounds.   Abdominal:      Palpations: Abdomen is soft.   Musculoskeletal:      Cervical back: Normal range of motion.   Feet:      Left foot:      Skin integrity: Callus present.      Comments: LEFT 3RD TOE TIP  Lymphadenopathy:      Cervical: No cervical adenopathy.   Skin:     General: Skin is warm and dry.   Neurological:      Mental Status: She is alert and oriented to person, place, and time.   Psychiatric:         Attention and Perception: She " is attentive.         Mood and Affect: Mood normal.         Speech: Speech normal.         Behavior: Behavior normal.        Result Review :  The following data was reviewed by: Etelvina Ulloa MD on 12/07/2022:  Common labs    Common Labs 8/17/22 8/17/22 11/1/22 12/14/22    1039 1039     Glucose  104 (A)     BUN  32 (A)     Creatinine  1.06 (A)     Sodium  138     Potassium  4.6     Chloride  104     Calcium  9.8     Albumin  4.40     Total Bilirubin  0.6     Alkaline Phosphatase  67     AST (SGOT)  17     ALT (SGPT)  8     WBC 9.06  6.60 7.43   Hemoglobin 12.1  11.9 (A) 12.9   Hematocrit 39.4  37.7 40.6   Platelets 221  221 276   (A) Abnormal value                      Assessment and Plan   Diagnoses and all orders for this visit:    1. Callus of toe (Primary)  Assessment & Plan:  She will call her podiatrist for an appointment. Watch for any signs for infection.         2. Acute anterior epistaxis  Assessment & Plan:  She has already seen ENT.  She will follow up with them as needed. Her last nosebleed was about 3 weeks ago.   She will continue to avoid NSAIDs.               Follow Up   No follow-ups on file.  Patient was given instructions and counseling regarding her condition or for health maintenance advice. Please see specific information pulled into the AVS if appropriate.

## 2022-12-07 NOTE — ASSESSMENT & PLAN NOTE
She has already seen ENT.  She will follow up with them as needed. Her last nosebleed was about 3 weeks ago.   She will continue to avoid NSAIDs.

## 2022-12-12 ENCOUNTER — HOSPITAL ENCOUNTER (OUTPATIENT)
Dept: PET IMAGING | Facility: HOSPITAL | Age: 82
Discharge: HOME | End: 2022-12-12
Admitting: RADIOLOGY

## 2022-12-12 DIAGNOSIS — C34.91 BRONCHOGENIC CANCER OF RIGHT LUNG: ICD-10-CM

## 2022-12-12 PROCEDURE — 71250 CT THORAX DX C-: CPT

## 2022-12-12 NOTE — PROGRESS NOTES
HEMATOLOGY ONCOLOGY OUTPATIENT FOLLOW UP       Patient name: Chrystal Ruiz  : 1940  MRN: [id number]  Primary Care Physician: Etelvina Ulloa MD  Referring Physician: Etelvina Ulloa MD  Reason For Consult:     Chief Complaint   Patient presents with   • Follow-up     Malignant neoplasm of upper lobe of right lung , iron deficiency anemia     HPI:   History of Present Illness:  Chrystal Ruiz is 82 y.o. female who presented to our office on 22 for consultation regarding anemia and thrombocytosis.     3/9/2022: Ms. Ruiz was referred for the investigation of anemia and thrombocytosis that was identified in the process of treating congestive heart failure and a right lung tumor. She came in a wheel chair complaining of severe fatigue and difficulties functioning because of this weakness. She admitted to hematochezia that had been associated to constipation that had been going on for some time, at least a few weeks. She had been afebrile. No pain. The laboratory exams reported microcytic anemia and thrombocytosis that had been present since the end of . A decision was made to transfuse her with one unit of red cells. Tests for iron deficiency were sent. She was scheduled to see me in one week.     3/16/2022. Ms. Ruiz was back in the office for follow up. She was feeling much better after the transfusion. She was still weak but not as much and her affect was also better. The laboratory exams did not clearly suggest iron deficiency but she persisted with microcytic anemia and had a history of gastrointestinal bleeding, such that iron deficiency was strongly considered the cause of the symptoms and laboratory manifestations. She had taken oral iron with multiple side effects and no clear improvement. Plans for intravenous iron were made.     3/22/2022: Admitted with evidence of decompensation of the congestive heart failure. She was treated with diuretics with prompt  symptomatic relief. She was given one dose of intravenous iron. She was discharged feeling much better.     4/4/2022: Back in the office for follow up. She had been receiving iron intravenously without complications. She reported unintended weight loss. The exam was not different than before. The laboratory exams revealed progressive improvement of the blood count with increase in the hemoglobin and the mean corpuscular volume and resolution of the leukocytosis and thrombocytosis. A decision was made to obtain a scan of the abdomen to investigate the history of blood loss in the stools and the unintended weight loss.     4/25/2022: Ms. Ruiz was back in the office for follow up. She was feeling much better and had returned to most of her normal activities. She arrived walking and reported that she had been walking more and more. She still had some fatigue but not the weakness she had before. She also had better appetite. She complained, however, that some of the medication she was receiving had resulted in changes in her taste perception. She had no more dyspnea than before and she had been free of chest pain. She had been offered the Watchman procedure, since she was not able to take anticoagulation safely. She denies abdominal pain or diarrhea. No dysuria. No edema. No skin rash.     5/18/2022: Back in the office to review results. She was feeling very well. Had regained her energy and was very active. The exam did not reveal new changes. Her hemoglobin had continued to improve, but she still had anemia. A decision was made to continue to observe without intervention.     6/15/2022: Back in the office for follow-up.  She had partial correction of her anemia.  She was scheduled to have a watchman procedure early in July.  The physical exam was unremarkable.  A decision was made to continue to observe and she was scheduled to return in 2 months.    8/17/2022: In the office for follow-up.  Compliant with iron.   Underwent the watchman procedure as planned.  Had some minor complications.  This was followed by an episode of cellulitis that required admission to the hospital.  At this time asymptomatic.  Hemoglobin back to normal range.  A decision was made to follow in 4 months.    12/14/2022: Feeling well.  As active as before.  Functional and self-sufficient.  Eating well and no weight loss.  Afebrile.  On exam no abnormalities to document.  Laboratory exams perfectly within the normal range.  I asked her to return 6 months later and to continue the iron until I see her again.    Subjective:  • 12/14/2022: Feeling well.  Active and without new symptoms.  Eating well and without weight loss.  No chest pains or cough.  No dysphagia.  No abdominal pain or diarrhea and no dysuria.  No skin rash.    The following portions of the patient's history were reviewed and updated as appropriate: allergies, current medications, past family history, past medical history, past social history, past surgical history and problem list.    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    with Watchman procedure; surgically fixed the next day   • Heart murmur    • Hypertension      Past Surgical History:   Procedure Laterality Date   • ATRIAL APPENDAGE EXCLUSION LEFT WITH TRANSESOPHAGEAL ECHOCARDIOGRAM Right 7/7/2022    Procedure: Atrial Appendage Occlusion watchman team aware $;  Surgeon: Sage Garcia MD;  Location: [facility] CATH INVASIVE LOCATION;  Service: Cardiovascular;  Laterality: Right;   • ATRIAL APPENDAGE EXCLUSION LEFT WITH TRANSESOPHAGEAL ECHOCARDIOGRAM N/A 7/7/2022    Procedure: Atrial Appendage Occlusion;  Surgeon: Fide Mullen MD;  Location: [facility] CATH INVASIVE LOCATION;  Service:  Cardiovascular;  Laterality: N/A;   • CATARACT EXTRACTION     • CHOLECYSTECTOMY     • HYSTERECTOMY  1987    Endometriosis   • OOPHORECTOMY     • PSEUDO ANEURYSM REPAIR, EXTREMITY Right 7/8/2022    Procedure: REPAIR OF POST CATHETERIZATION RIGHT FEMORAL PSEUDOANEURYSM AND ARTERIOVENOUS FISTUAL;  Surgeon: Alfonzo Hooks MD;  Location: [facility] MAIN OR;  Service: Vascular;  Laterality: Right;   • TONSILLECTOMY         Current Outpatient Medications:   •  acetaminophen (TYLENOL) 500 MG tablet, Take 500 mg by mouth Every 6 (Six) Hours As Needed for Mild Pain ., Disp: , Rfl:   •  aspirin (aspirin) 81 MG EC tablet, Take 1 tablet by mouth Daily., Disp: 90 tablet, Rfl: 3  •  Breo Ellipta 200-25 MCG/INH inhaler, Inhale 1 puff Daily., Disp: 1 each, Rfl: 2  •  Cholecalciferol (VITAMIN D3) 2000 units capsule, Take 2,000 Units by mouth Daily., Disp: , Rfl:   •  Cyanocobalamin (VITAMIN B12) 1000 MCG tablet controlled-release, Take 2,500 mcg by mouth Daily., Disp: , Rfl:   •  ferrous sulfate 325 (65 FE) MG tablet, Take 1 tablet by mouth Every Other Day., Disp: 15 tablet, Rfl: 11  •  furosemide (LASIX) 40 MG tablet, Take 40 mg by mouth Every Other Day., Disp: , Rfl:   •  metoprolol succinate XL (Toprol XL) 25 MG 24 hr tablet, Take 1 tablet by mouth Daily., Disp: 90 tablet, Rfl: 1  •  sodium chloride (Ocean Nasal Spray) 0.65 % nasal spray, 1 spray into the nostril(s) as directed by provider As Needed for Congestion., Disp: , Rfl: 12  •  spironolactone (ALDACTONE) 25 MG tablet, TAKE 1 TABLET BY MOUTH EVERY OTHER DAY, Disp: 45 tablet, Rfl: 1  •  triamcinolone (KENALOG) 0.1 % cream, Apply 1 application topically to the appropriate area as directed 2 (Two) Times a Day As Needed for Irritation., Disp: 80 g, Rfl: 1    Allergies   Allergen Reactions   • Ciprofloxacin Unknown (See Comments)   • Sulfa Antibiotics Unknown (See Comments)   • Iodine Unknown - High Severity     Family History   Problem Relation Age of Onset   • Breast  cancer Mother 85   • Stroke Mother    • Heart disease Mother    • Endometrial cancer Mother 70   • Heart failure Father    • Heart failure Sister    • COPD Sister    • Heart disease Sister      Cancer-related family history includes Breast cancer (age of onset: 85) in her mother; Endometrial cancer (age of onset: 70) in her mother.    Social History     Tobacco Use   • Smoking status: Never   • Smokeless tobacco: Never   Vaping Use   • Vaping Use: Never used   Substance Use Topics   • Alcohol use: No   • Drug use: Never     Social History     Social History Narrative   • Not on file      ROS:     Review of Systems   Constitutional: Negative for activity change, appetite change, chills, diaphoresis, fatigue, fever and unexpected weight change.   HENT: Negative for congestion, dental problem, drooling, ear discharge, ear pain, facial swelling, hearing loss, mouth sores, nosebleeds, postnasal drip, rhinorrhea, sinus pressure, sinus pain, sneezing, sore throat, tinnitus, trouble swallowing and voice change.    Eyes: Negative for photophobia, pain, discharge, redness, itching and visual disturbance.   Respiratory: Negative for apnea, cough, choking, chest tightness, shortness of breath, wheezing and stridor.    Cardiovascular: Negative for chest pain, palpitations and leg swelling.   Gastrointestinal: Negative for abdominal distention, abdominal pain, anal bleeding, blood in stool, constipation, diarrhea, nausea, rectal pain and vomiting.   Endocrine: Negative for cold intolerance, heat intolerance, polydipsia and polyuria.   Genitourinary: Negative for decreased urine volume, difficulty urinating, dysuria, flank pain, frequency, genital sores, hematuria and urgency.   Musculoskeletal: Negative for arthralgias, back pain, gait problem, joint swelling, myalgias, neck pain and neck stiffness.   Skin: Negative for color change, pallor and rash.   Neurological: Negative for dizziness, tremors, seizures, syncope, facial  "asymmetry, speech difficulty, weakness, light-headedness, numbness and headaches.   Hematological: Negative for adenopathy. Does not bruise/bleed easily.   Psychiatric/Behavioral: Negative for agitation, behavioral problems, confusion, decreased concentration, hallucinations, self-injury, sleep disturbance and suicidal ideas. The patient is not nervous/anxious.      Objective:    Vitals:    12/14/22 1109   BP: 133/75   Pulse: 66   Temp: 96.6 °F (35.9 °C)   SpO2: 98%   Weight: 57.2 kg (126 lb)  Comment: verbal   Height: 154.9 cm (61\")   PainSc: 0-No pain     Body mass index is 23.81 kg/m².  ECOG  (3) Capable of limited self-care, confined to bed or chair > 50% of waking hours    Physical Exam:     Physical Exam  Constitutional:       General: She is not in acute distress.     Appearance: Normal appearance. She is not ill-appearing, toxic-appearing or diaphoretic.   HENT:      Head: Normocephalic and atraumatic.      Right Ear: External ear normal.      Left Ear: External ear normal.      Nose: Nose normal.      Mouth/Throat:      Mouth: Mucous membranes are moist.      Pharynx: Oropharynx is clear. No oropharyngeal exudate or posterior oropharyngeal erythema.   Eyes:      General: No scleral icterus.        Right eye: No discharge.         Left eye: No discharge.      Conjunctiva/sclera: Conjunctivae normal.      Pupils: Pupils are equal, round, and reactive to light.   Cardiovascular:      Rate and Rhythm: Normal rate and regular rhythm.      Pulses: Normal pulses.      Heart sounds: No murmur heard.    No friction rub. No gallop.   Pulmonary:      Effort: No respiratory distress.      Breath sounds: No stridor. No wheezing, rhonchi or rales.   Abdominal:      General: Abdomen is flat. Bowel sounds are normal. There is no distension.      Palpations: Abdomen is soft. There is no mass.      Tenderness: There is no abdominal tenderness. There is no right CVA tenderness, left CVA tenderness, guarding or rebound.      " Hernia: No hernia is present.   Musculoskeletal:         General: No swelling, tenderness, deformity or signs of injury.      Cervical back: No rigidity.      Right lower leg: No edema.      Left lower leg: No edema.   Lymphadenopathy:      Cervical: No cervical adenopathy.   Skin:     Coloration: Skin is not jaundiced.      Findings: No bruising, lesion or rash.   Neurological:      General: No focal deficit present.      Mental Status: She is alert and oriented to person, place, and time.      Cranial Nerves: No cranial nerve deficit.      Motor: No weakness.      Gait: Gait normal.   Psychiatric:         Mood and Affect: Mood normal.         Behavior: Behavior normal.         Thought Content: Thought content normal.         Judgment: Judgment normal.     CM Cheng MD performed the physical exam on 12/14/2022 as documented above    Lab Results - Last 18 Months   Lab Units 12/14/22  1051 11/01/22  1805 08/17/22  1039   WBC 10*3/mm3 7.43 6.60 9.06   HEMOGLOBIN g/dL 12.9 11.9* 12.1   HEMATOCRIT % 40.6 37.7 39.4   PLATELETS 10*3/mm3 276 221 221   MCV fL 84.1 82.3 86.4     Lab Results - Last 18 Months   Lab Units 08/17/22  1039 07/21/22  0156 07/20/22  0512 07/19/22  2105 07/07/22  2159 07/07/22  0908   SODIUM mmol/L 138 140 140 142   < > 139   POTASSIUM mmol/L 4.6 4.8 4.0 4.2   < > 4.8   CHLORIDE mmol/L 104 108* 108* 108*   < > 107   CO2 mmol/L 22.0 24.0 22.0 24.0   < > 20.0*   BUN mg/dL 32* 23 23 30*   < > 42*   CREATININE mg/dL 1.06* 0.84 0.78 0.97   < > 1.04*   CALCIUM mg/dL 9.8 8.2* 8.4* 9.3   < > 9.1   BILIRUBIN mg/dL 0.6  --   --  0.5  --  0.4   ALK PHOS U/L 67  --   --  81  --  76   ALT (SGPT) U/L 8  --   --  11  --  19   AST (SGOT) U/L 17  --   --  12  --  13   GLUCOSE mg/dL 104* 120* 119* 89   < > 130*    < > = values in this interval not displayed.     Lab Results   Component Value Date    GLUCOSE 104 (H) 08/17/2022    BUN 32 (H) 08/17/2022    CREATININE 1.06 (H) 08/17/2022    EGFRIFNONA 84  02/01/2022    BCR 30.2 (H) 08/17/2022    K 4.6 08/17/2022    CO2 22.0 08/17/2022    CALCIUM 9.8 08/17/2022    ALBUMIN 4.40 08/17/2022    LABIL2 1.2 05/29/2019    AST 17 08/17/2022    ALT 8 08/17/2022     Lab Results - Last 18 Months   Lab Units 11/05/21  0924   INR  1.03   APTT seconds 28.2     Lab Results   Component Value Date    PTT 28.2 11/05/2021    INR 1.03 11/05/2021     Assessment & Plan     Assessment:  1. Iron deficiency anemia: Resolved.  Continue iron until I see again in June 2023.  She will continue with 3 doses per week.  2. Gastrointestinal bleeding.  Would appear to have improved following discontinuation of anticoagulation.  3. Reviewed the blood counts and the chemistry.  Discussed the results with her.  No intervention at this time.  4. She will return to see me in 6 months.    Plan:  1. As above    Frank Cheng MD on 12/14/2022 at 11:42 AM

## 2022-12-14 ENCOUNTER — LAB (OUTPATIENT)
Dept: LAB | Facility: HOSPITAL | Age: 82
End: 2022-12-14

## 2022-12-14 ENCOUNTER — OFFICE VISIT (OUTPATIENT)
Dept: ONCOLOGY | Facility: CLINIC | Age: 82
End: 2022-12-14

## 2022-12-14 VITALS
SYSTOLIC BLOOD PRESSURE: 133 MMHG | HEIGHT: 61 IN | OXYGEN SATURATION: 98 % | BODY MASS INDEX: 23.79 KG/M2 | DIASTOLIC BLOOD PRESSURE: 75 MMHG | HEART RATE: 66 BPM | TEMPERATURE: 96.6 F | WEIGHT: 126 LBS

## 2022-12-14 DIAGNOSIS — C34.11 MALIGNANT NEOPLASM OF UPPER LOBE OF RIGHT LUNG: Primary | ICD-10-CM

## 2022-12-14 DIAGNOSIS — C34.11 MALIGNANT NEOPLASM OF UPPER LOBE OF RIGHT LUNG: ICD-10-CM

## 2022-12-14 DIAGNOSIS — I48.0 PAROXYSMAL ATRIAL FIBRILLATION: Primary | ICD-10-CM

## 2022-12-14 LAB
BASOPHILS # BLD AUTO: 0.01 10*3/MM3 (ref 0–0.2)
BASOPHILS NFR BLD AUTO: 0.1 % (ref 0–1.5)
DEPRECATED RDW RBC AUTO: 42.4 FL (ref 37–54)
EOSINOPHIL # BLD AUTO: 0.11 10*3/MM3 (ref 0–0.4)
EOSINOPHIL NFR BLD AUTO: 1.5 % (ref 0.3–6.2)
ERYTHROCYTE [DISTWIDTH] IN BLOOD BY AUTOMATED COUNT: 14.1 % (ref 12.3–15.4)
HCT VFR BLD AUTO: 40.6 % (ref 34–46.6)
HGB BLD-MCNC: 12.9 G/DL (ref 12–15.9)
HOLD SPECIMEN: NORMAL
HOLD SPECIMEN: NORMAL
LYMPHOCYTES # BLD AUTO: 1.46 10*3/MM3 (ref 0.7–3.1)
LYMPHOCYTES NFR BLD AUTO: 19.7 % (ref 19.6–45.3)
MCH RBC QN AUTO: 26.7 PG (ref 26.6–33)
MCHC RBC AUTO-ENTMCNC: 31.8 G/DL (ref 31.5–35.7)
MCV RBC AUTO: 84.1 FL (ref 79–97)
MONOCYTES # BLD AUTO: 0.59 10*3/MM3 (ref 0.1–0.9)
MONOCYTES NFR BLD AUTO: 7.9 % (ref 5–12)
NEUTROPHILS NFR BLD AUTO: 5.26 10*3/MM3 (ref 1.7–7)
NEUTROPHILS NFR BLD AUTO: 70.8 % (ref 42.7–76)
PLATELET # BLD AUTO: 276 10*3/MM3 (ref 140–450)
PMV BLD AUTO: 9.4 FL (ref 6–12)
RBC # BLD AUTO: 4.83 10*6/MM3 (ref 3.77–5.28)
WBC NRBC COR # BLD: 7.43 10*3/MM3 (ref 3.4–10.8)

## 2022-12-14 PROCEDURE — 99213 OFFICE O/P EST LOW 20 MIN: CPT | Performed by: INTERNAL MEDICINE

## 2022-12-14 PROCEDURE — 36415 COLL VENOUS BLD VENIPUNCTURE: CPT

## 2022-12-14 PROCEDURE — 85025 COMPLETE CBC W/AUTO DIFF WBC: CPT

## 2023-02-21 ENCOUNTER — TELEPHONE (OUTPATIENT)
Dept: ORTHOPEDIC SURGERY | Facility: CLINIC | Age: 83
End: 2023-02-21

## 2023-02-21 NOTE — TELEPHONE ENCOUNTER
Caller: Chrystal Ruiz    Relationship to patient: Self    Best call back number:     Chief complaint: BILAT KNEE PAIN     Type of visit: FUP INJECTION

## 2023-02-22 NOTE — TELEPHONE ENCOUNTER
Caller: Chrystal Ruiz    Relationship to patient: Self    Best call back number: [phone]    Patient is needing: PATIENT WAS RETURNING A MISSED CALL FROM [facility].  I ATTEMPTED TO WARM TRANSFER CALL TO THE OFFICE BUT RECEIVED NO ANSWER. THANK YOU!

## 2023-02-27 ENCOUNTER — OFFICE VISIT (OUTPATIENT)
Dept: CARDIOLOGY | Facility: CLINIC | Age: 83
End: 2023-02-27
Payer: MEDICARE

## 2023-02-27 VITALS
WEIGHT: 130 LBS | HEIGHT: 61 IN | HEART RATE: 60 BPM | SYSTOLIC BLOOD PRESSURE: 121 MMHG | BODY MASS INDEX: 24.55 KG/M2 | RESPIRATION RATE: 18 BRPM | DIASTOLIC BLOOD PRESSURE: 65 MMHG

## 2023-02-27 DIAGNOSIS — I48.0 PAROXYSMAL ATRIAL FIBRILLATION: ICD-10-CM

## 2023-02-27 DIAGNOSIS — I10 HYPERTENSION, UNSPECIFIED TYPE: ICD-10-CM

## 2023-02-27 DIAGNOSIS — E78.2 MIXED HYPERLIPIDEMIA: ICD-10-CM

## 2023-02-27 DIAGNOSIS — Z09 FOLLOW-UP EXAM: Primary | ICD-10-CM

## 2023-02-27 PROCEDURE — 3074F SYST BP LT 130 MM HG: CPT | Performed by: NURSE PRACTITIONER

## 2023-02-27 PROCEDURE — 99214 OFFICE O/P EST MOD 30 MIN: CPT | Performed by: NURSE PRACTITIONER

## 2023-02-27 PROCEDURE — 3078F DIAST BP <80 MM HG: CPT | Performed by: NURSE PRACTITIONER

## 2023-02-27 RX ORDER — METOPROLOL SUCCINATE 25 MG/1
25 TABLET, EXTENDED RELEASE ORAL DAILY
Qty: 90 TABLET | Refills: 3 | Status: SHIPPED | OUTPATIENT
Start: 2023-02-27 | End: 2023-05-28

## 2023-03-01 ENCOUNTER — OFFICE VISIT (OUTPATIENT)
Dept: ORTHOPEDIC SURGERY | Facility: CLINIC | Age: 83
End: 2023-03-01
Payer: MEDICARE

## 2023-03-01 VITALS — HEIGHT: 61 IN | OXYGEN SATURATION: 94 % | HEART RATE: 65 BPM | BODY MASS INDEX: 24.55 KG/M2 | WEIGHT: 130 LBS

## 2023-03-01 DIAGNOSIS — G89.29 CHRONIC PAIN OF BOTH KNEES: Primary | ICD-10-CM

## 2023-03-01 DIAGNOSIS — M25.562 CHRONIC PAIN OF BOTH KNEES: Primary | ICD-10-CM

## 2023-03-01 DIAGNOSIS — M17.0 PRIMARY OSTEOARTHRITIS OF BOTH KNEES: ICD-10-CM

## 2023-03-01 DIAGNOSIS — M25.561 CHRONIC PAIN OF BOTH KNEES: Primary | ICD-10-CM

## 2023-03-01 PROCEDURE — 99213 OFFICE O/P EST LOW 20 MIN: CPT | Performed by: FAMILY MEDICINE

## 2023-03-01 PROCEDURE — 20610 DRAIN/INJ JOINT/BURSA W/O US: CPT | Performed by: FAMILY MEDICINE

## 2023-03-01 RX ORDER — TRIAMCINOLONE ACETONIDE 40 MG/ML
80 INJECTION, SUSPENSION INTRA-ARTICULAR; INTRAMUSCULAR
Status: COMPLETED | OUTPATIENT
Start: 2023-03-01 | End: 2023-03-01

## 2023-03-01 RX ADMIN — TRIAMCINOLONE ACETONIDE 80 MG: 40 INJECTION, SUSPENSION INTRA-ARTICULAR; INTRAMUSCULAR at 14:51

## 2023-03-01 NOTE — PROGRESS NOTES
Primary Care Sports Medicine Office Visit Note     Patient ID: Chrystal Ruiz is a 82 y.o. female.    Chief Complaint:  Chief Complaint   Patient presents with   • Left Knee - Pain   • Right Knee - Pain     HPI:    Ms. Chrystal Ruiz is a 82 y.o. female. The patient presents to the clinic for evaluation of bilateral knee pain.    The patient reports that she had bilateral knee problems approximately 1 year ago. She claims that she saw Dr. Oneill in 12/2021 and he administered injections. She reports that at the time, Dr. Oneill informed her that she was not a suitable candidate for knee arthroplasty. She states that her 83-year-old mother required knee arthroplasty surgery on her bilateral knees. The patient states that she received a corticosteroid injection in 06/2022. She believed she would need hyaluronic acid injections, but the corticosteroid injections were effective for approximately four or five months. She reports that her bilateral knee pain has returned for the last several weeks. She reports that the pain remains in the same location as before. She reports not falling or injuring her knees.      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    with Watchman procedure; surgically fixed the next day   • Heart murmur    • Hypertension        Past Surgical History:   Procedure Laterality Date   • ATRIAL APPENDAGE EXCLUSION LEFT WITH TRANSESOPHAGEAL ECHOCARDIOGRAM Right 7/7/2022    Procedure: Atrial Appendage Occlusion watchman team aware $;  Surgeon: Sage Garcia MD;  Location: [facility] INVASIVE LOCATION;  Service: Cardiovascular;  Laterality: Right;   • ATRIAL APPENDAGE EXCLUSION LEFT WITH TRANSESOPHAGEAL ECHOCARDIOGRAM N/A 7/7/2022    Procedure: Atrial Appendage Occlusion;   "Surgeon: Fide Mullen MD;  Location: [facility] CATH INVASIVE LOCATION;  Service: Cardiovascular;  Laterality: N/A;   • CATARACT EXTRACTION     • CHOLECYSTECTOMY     • HYSTERECTOMY  1987    Endometriosis   • OOPHORECTOMY     • PSEUDO ANEURYSM REPAIR, EXTREMITY Right 7/8/2022    Procedure: REPAIR OF POST CATHETERIZATION RIGHT FEMORAL PSEUDOANEURYSM AND ARTERIOVENOUS FISTUAL;  Surgeon: Alfonzo Hooks MD;  Location: [facility] MAIN OR;  Service: Vascular;  Laterality: Right;   • TONSILLECTOMY         Family History   Problem Relation Age of Onset   • Breast cancer Mother 85   • Stroke Mother    • Heart disease Mother    • Endometrial cancer Mother 70   • Heart failure Father    • Heart failure Sister    • COPD Sister    • Heart disease Sister      Social History     Occupational History   • Not on file   Tobacco Use   • Smoking status: Never   • Smokeless tobacco: Never   Vaping Use   • Vaping Use: Never used   Substance and Sexual Activity   • Alcohol use: No   • Drug use: Never   • Sexual activity: Not Currently      Review of Systems   Constitutional: Negative for activity change and fever.   Musculoskeletal: Positive for arthralgias.   Skin: Negative for color change and rash.   Neurological: Negative for weakness.     Objective:    Pulse 65   Ht 154.9 cm (61\")   Wt 59 kg (130 lb)   SpO2 94%   BMI 24.56 kg/m²     Physical Examination:  Physical Exam  Vitals and nursing note reviewed.   Constitutional:       General: She is not in acute distress.     Appearance: She is well-developed. She is not diaphoretic.   HENT:      Head: Normocephalic and atraumatic.   Eyes:      Conjunctiva/sclera: Conjunctivae normal.   Pulmonary:      Effort: Pulmonary effort is normal. No respiratory distress.   Musculoskeletal:      Right knee: No effusion.      Instability Tests: Medial Kimberly test negative and lateral Kimberly test negative.      Left knee: No effusion.      Instability Tests: Medial Kimberly test " negative and lateral Kimberly test negative.   Skin:     General: Skin is warm.      Capillary Refill: Capillary refill takes less than 2 seconds.   Neurological:      Mental Status: She is alert.       Right Ankle Exam     Range of Motion   The patient has normal right ankle ROM.      Left Ankle Exam     Range of Motion   The patient has normal left ankle ROM.       Right Knee Exam     Muscle Strength   The patient has normal right knee strength.    Tenderness   The patient is experiencing tenderness in the lateral joint line, medial joint line and patella.    Range of Motion   Extension: normal   Flexion: normal     Tests   Kimberly:  Medial - negative Lateral - negative  Varus: negative Valgus: negative  Right knee patellar apprehension test: +patellar grind, +tiesha glynn.    Other   Erythema: absent  Sensation: normal  Pulse: present (distal to the knee, DP and PT palpable)  Swelling: none  Effusion: no effusion present      Left Knee Exam     Muscle Strength   The patient has normal left knee strength.    Tenderness   The patient is experiencing tenderness in the lateral joint line, medial joint line and patella.    Range of Motion   Extension: normal   Flexion: normal     Tests   Kimberly:  Medial - negative Lateral - negative  Varus: negative Valgus: negative  Left knee patellar apprehension test: +patellar grind testing, +tiesha glynn testing.    Other   Erythema: absent  Sensation: normal  Pulse: present (distal to the knee, DP and PT palpable)  Swelling: none  Effusion: no effusion present        Imaging and other tests:  Three-view XR bilateral knees today yields moderate lateral joint space narrowing consistent with mild to moderate osteoarthritic disease of bilateral knees.  No osteophytic or sclerotic activity.    Assessment and Plan:    1. Primary osteoarthritis of bilateral knees  - XR Knee 3 View Bilateral    After discussion of risks and benefits, the patient elected to proceed with  corticosteroid injection to the bilateral knees. The patient tolerated this procedure well without any complaints or problems. I recommended continuation of conservative management as previous, return to clinic in 3-6 months or sooner if symptoms recur.    Transcribed from ambient dictation for George Reyna II, DO by Arina Haskins.  03/01/23   14:10 EST    Patient or patient representative verbalized consent to the visit recording.  I have personally performed the services described in this document as transcribed by the above individual, and it is both accurate and complete.    Disclaimer: Please note that areas of this note were completed with computer voice recognition software.  Quite often unanticipated grammatical, syntax, homophones, and other interpretive errors are inadvertently transcribed by the computer software. Please excuse any errors that have escaped final proofreading.

## 2023-03-01 NOTE — PROGRESS NOTES
Procedure   Large Joint Arthrocentesis  Date/Time: 3/1/2023 2:51 PM  Consent given by: patient  Site marked: site marked  Timeout: Immediately prior to procedure a time out was called to verify the correct patient, procedure, equipment, support staff and site/side marked as required   Supporting Documentation  Indications: pain   Procedure Details  Location: knee - Knee joint: Bilateral knee.  Preparation: Patient was prepped and draped in the usual sterile fashion  Needle size: 25 G  Approach: anteromedial  Medications administered: 80 mg triamcinolone acetonide 40 MG/ML (2cc of 1% lidocaine without epinepherine, and 2cc of 40mg Kenalog to each knee)  Patient tolerance: patient tolerated the procedure well with no immediate complications (Blood loss negligable, pt admits to immediate decrease in pain and improved ROM with gentle ambulation post injection.)

## 2023-03-29 ENCOUNTER — TELEPHONE (OUTPATIENT)
Dept: ORTHOPEDIC SURGERY | Facility: CLINIC | Age: 83
End: 2023-03-29

## 2023-03-29 DIAGNOSIS — M17.12 PRIMARY OSTEOARTHRITIS OF LEFT KNEE: Primary | ICD-10-CM

## 2023-04-03 NOTE — PROGRESS NOTES
Cardiology Office Follow Up Visit      Primary Care Provider:  Etelvina Ulloa MD    Reason for f/u:     6 month follow up      Subjective     CC:    6 month follow up    History of Present Illness       Chrystal Ruiz is a 82 y.o. female who is a patient of Dr. Mccall. Pmh includes HTN, HLD, paroxysmal atrial fibrillation previously on anticoagulation but now s/p watchman procedure, s/p right groin pseudoaneurysm post procedure which was repaired by vascular, diastolic heart failure with LVEF preserved. She underwent stresst esting    Ms. Ruiz presents today for 6 month follow up. She is doing well. She is free from any exertional symptoms. She denies chest pain or shortness of breath.  She denies palpitations. Her blood pressure is controlled today. HR is 60s. She needs refills on medications.            ASSESSMENT/PLAN:      Diagnoses and all orders for this visit:    1. Follow-up exam (Primary)    2. Hypertension, unspecified type    3. Mixed hyperlipidemia    4. Paroxysmal atrial fibrillation  Comments:  normal LVEF, diastolic dysfunction  s/p watchman, off anticoagulation    Other orders  -     metoprolol succinate XL (Toprol XL) 25 MG 24 hr tablet; Take 1 tablet by mouth Daily for 90 days.  Dispense: 90 tablet; Refill: 3        MEDICAL DECISION MAKING:  Ms. Ruiz presents today for routine 6 mo follow up. She is doing well. She has pafib and diastolic dysfunction with preserved LVEF. She is doing well. Volume status stable. No angina or exertional symptoms. No shortness of breath or palpitations. She is off anticoagulation and is s/p watchman. I will refill medications. She is doing well and can see us back in 6 mo or sooner should new issues arise.           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    with  Watchman procedure; surgically fixed the next day   • Heart murmur    • Hypertension        Past Surgical History:   Procedure Laterality Date   • ATRIAL APPENDAGE EXCLUSION LEFT WITH TRANSESOPHAGEAL ECHOCARDIOGRAM Right 7/7/2022    Procedure: Atrial Appendage Occlusion watchman team aware $;  Surgeon: Sage Garcia MD;  Location: [facility] CATH INVASIVE LOCATION;  Service: Cardiovascular;  Laterality: Right;   • ATRIAL APPENDAGE EXCLUSION LEFT WITH TRANSESOPHAGEAL ECHOCARDIOGRAM N/A 7/7/2022    Procedure: Atrial Appendage Occlusion;  Surgeon: Fide Mullen MD;  Location: [facility] CATH INVASIVE LOCATION;  Service: Cardiovascular;  Laterality: N/A;   • CATARACT EXTRACTION     • CHOLECYSTECTOMY     • HYSTERECTOMY  1987    Endometriosis   • OOPHORECTOMY     • PSEUDO ANEURYSM REPAIR, EXTREMITY Right 7/8/2022    Procedure: REPAIR OF POST CATHETERIZATION RIGHT FEMORAL PSEUDOANEURYSM AND ARTERIOVENOUS FISTUAL;  Surgeon: Alfonzo Hooks MD;  Location: [facility] MAIN OR;  Service: Vascular;  Laterality: Right;   • TONSILLECTOMY           Current Outpatient Medications:   •  acetaminophen (TYLENOL) 500 MG tablet, Take 1 tablet by mouth Every 6 (Six) Hours As Needed for Mild Pain., Disp: , Rfl:   •  Breo Ellipta 200-25 MCG/INH inhaler, Inhale 1 puff Daily., Disp: 1 each, Rfl: 2  •  Cholecalciferol (VITAMIN D3) 2000 units capsule, Take 1 capsule by mouth Daily., Disp: , Rfl:   •  Cyanocobalamin (VITAMIN B12) 1000 MCG tablet controlled-release, Take 2,500 mcg by mouth Daily., Disp: , Rfl:   •  ferrous sulfate 325 (65 FE) MG tablet, Take 1 tablet by mouth Every Other Day., Disp: 15 tablet, Rfl: 11  •  furosemide (LASIX) 40 MG tablet, Take 1 tablet by mouth Every Other Day., Disp: , Rfl:   •  metoprolol succinate XL (Toprol XL) 25 MG 24 hr tablet, Take 1 tablet by mouth Daily for 90 days., Disp: 90 tablet, Rfl: 3  •  sodium chloride (Ocean Nasal Spray) 0.65 % nasal spray, 1 spray into the nostril(s) as directed by  "provider As Needed for Congestion., Disp: , Rfl: 12  •  spironolactone (ALDACTONE) 25 MG tablet, TAKE 1 TABLET BY MOUTH EVERY OTHER DAY, Disp: 45 tablet, Rfl: 1  •  triamcinolone (KENALOG) 0.1 % cream, Apply 1 application topically to the appropriate area as directed 2 (Two) Times a Day As Needed for Irritation., Disp: 80 g, Rfl: 1    Social History     Socioeconomic History   • Marital status:    Tobacco Use   • Smoking status: Never   • Smokeless tobacco: Never   Vaping Use   • Vaping Use: Never used   Substance and Sexual Activity   • Alcohol use: No   • Drug use: Never   • Sexual activity: Not Currently       Family History   Problem Relation Age of Onset   • Breast cancer Mother 85   • Stroke Mother    • Heart disease Mother    • Endometrial cancer Mother 70   • Heart failure Father    • Heart failure Sister    • COPD Sister    • Heart disease Sister        The following portions of the patient's history were reviewed and updated as appropriate: allergies, current medications, past family history, past medical history, past social history, past surgical history and problem list.    Review of Systems   Constitutional: Negative for chills, diaphoresis and malaise/fatigue.   Cardiovascular: Negative for chest pain, dyspnea on exertion, irregular heartbeat, leg swelling, near-syncope, orthopnea, palpitations, paroxysmal nocturnal dyspnea and syncope.   Respiratory: Negative for cough, shortness of breath, sleep disturbances due to breathing and sputum production.    Gastrointestinal: Negative for change in bowel habit.   Genitourinary: Negative for urgency.   Neurological: Negative for dizziness and headaches.   Psychiatric/Behavioral: Negative for altered mental status.       Pertinent items are noted in HPI, all other systems reviewed and negative    /65 (BP Location: Left arm, Patient Position: Sitting)   Pulse 60   Resp 18   Ht 154.9 cm (61\")   Wt 59 kg (130 lb)   BMI 24.56 kg/m² .  Objective "     Constitutional:       Appearance: Not in distress.   Neck:      Vascular: JVD normal.   Pulmonary:      Effort: Pulmonary effort is normal.      Breath sounds: Normal breath sounds.   Cardiovascular:      Normal rate. Regular rhythm.   Pulses:     Intact distal pulses.   Edema:     Peripheral edema absent.   Abdominal:      General: Bowel sounds are normal.      Palpations: Abdomen is soft.   Musculoskeletal: Normal range of motion. Skin:     General: Skin is warm and dry.   Neurological:      General: No focal deficit present.      Mental Status: Oriented to person, place and time.           Procedures    EKG ordered by and reviewed by me in office

## 2023-04-05 ENCOUNTER — OFFICE VISIT (OUTPATIENT)
Dept: ORTHOPEDIC SURGERY | Facility: CLINIC | Age: 83
End: 2023-04-05
Payer: MEDICARE

## 2023-04-05 VITALS — WEIGHT: 130 LBS | BODY MASS INDEX: 24.55 KG/M2 | OXYGEN SATURATION: 96 % | HEART RATE: 80 BPM | HEIGHT: 61 IN

## 2023-04-05 DIAGNOSIS — M17.12 PRIMARY OSTEOARTHRITIS OF LEFT KNEE: Primary | ICD-10-CM

## 2023-04-05 PROCEDURE — 20610 DRAIN/INJ JOINT/BURSA W/O US: CPT | Performed by: FAMILY MEDICINE

## 2023-04-05 NOTE — PROGRESS NOTES
Primary Care Sports Medicine Office Visit Note     Patient ID: Chrystal Ruiz is a 82 y.o. female.    Chief Complaint:  Chief Complaint   Patient presents with   • Left Knee - Pain, Follow-up     HPI:    Ms. Chrystal Ruiz is a 82 y.o. female. The patient presents to the clinic for a repeat left knee evaluation for Monovisc injection. She is accompanied by an adult female.     Her corticosteroid injection, administered in 07/2022, was effective, and she did not schedule another corticosteroid injection because it appeared to be effective for such a long time. However, her most recent corticosteroid injection, on 03/01/2023, wore off quicker. Her left knee pain is intermittent. On 04/02/2023, she no longer experienced left knee pain, but on 04/04/2023, she believed she once again experienced left knee pain. She is unwilling to undergo a left knee arthroplasty.      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    with Watchman procedure; surgically fixed the next day   • Heart murmur    • Hypertension        Past Surgical History:   Procedure Laterality Date   • ATRIAL APPENDAGE EXCLUSION LEFT WITH TRANSESOPHAGEAL ECHOCARDIOGRAM Right 7/7/2022    Procedure: Atrial Appendage Occlusion watchman team aware $;  Surgeon: Sage Garcia MD;  Location: [facility] CATH INVASIVE LOCATION;  Service: Cardiovascular;  Laterality: Right;   • ATRIAL APPENDAGE EXCLUSION LEFT WITH TRANSESOPHAGEAL ECHOCARDIOGRAM N/A 7/7/2022    Procedure: Atrial Appendage Occlusion;  Surgeon: Fide Mullen MD;  Location: [facility] CATH INVASIVE LOCATION;  Service: Cardiovascular;  Laterality: N/A;   • CATARACT EXTRACTION     • CHOLECYSTECTOMY     • HYSTERECTOMY  1987    Endometriosis   • OOPHORECTOMY     • PSEUDO ANEURYSM REPAIR, EXTREMITY Right 7/8/2022  "   Procedure: REPAIR OF POST CATHETERIZATION RIGHT FEMORAL PSEUDOANEURYSM AND ARTERIOVENOUS FISTUAL;  Surgeon: Alfonzo Hooks MD;  Location: [facility] MAIN OR;  Service: Vascular;  Laterality: Right;   • TONSILLECTOMY         Family History   Problem Relation Age of Onset   • Breast cancer Mother 85   • Stroke Mother    • Heart disease Mother    • Endometrial cancer Mother 70   • Heart failure Father    • Heart failure Sister    • COPD Sister    • Heart disease Sister      Social History     Occupational History   • Not on file   Tobacco Use   • Smoking status: Never   • Smokeless tobacco: Never   Vaping Use   • Vaping Use: Never used   Substance and Sexual Activity   • Alcohol use: No   • Drug use: Never   • Sexual activity: Not Currently      Review of Systems   Constitutional: Negative for activity change and fever.   Musculoskeletal: Positive for arthralgias.   Skin: Negative for color change and rash.   Neurological: Negative for weakness.     Objective:    Pulse 80   Ht 154.9 cm (61\")   Wt 59 kg (130 lb)   SpO2 96%   BMI 24.56 kg/m²     Physical Examination:  Physical Exam  Vitals and nursing note reviewed.   Constitutional:       General: She is not in acute distress.     Appearance: She is well-developed. She is not diaphoretic.   HENT:      Head: Normocephalic and atraumatic.   Eyes:      Conjunctiva/sclera: Conjunctivae normal.   Pulmonary:      Effort: Pulmonary effort is normal. No respiratory distress.   Musculoskeletal:      Left knee: No effusion.      Instability Tests: Medial Kimberly test negative and lateral Kimberly test negative.   Skin:     General: Skin is warm.      Capillary Refill: Capillary refill takes less than 2 seconds.   Neurological:      Mental Status: She is alert.       Left Ankle Exam     Range of Motion   The patient has normal left ankle ROM.       Left Knee Exam     Muscle Strength   The patient has normal left knee strength.    Tenderness   The patient is " experiencing tenderness in the lateral joint line, medial joint line and patella.    Range of Motion   Extension: normal   Flexion: normal     Tests   Kimberly:  Medial - negative Lateral - negative  Varus: negative Valgus: negative  Left knee patellar apprehension test: +patellar grind testing, +tiesha glynn testing.    Other   Erythema: absent  Sensation: normal  Pulse: present (distal to the knee, DP and PT palpable)  Swelling: none  Effusion: no effusion present        Imaging and other tests:  No new imaging today.    Assessment and Plan:    1. Primary osteoarthritis of the left knee.    After discussion of risks and benefits, the patient elected to proceed with hyaluronic acid injection to the left knee. The patient tolerated this procedure well without any complaints or problems. I recommended continuation of conservative management as previous, return to the clinic in 3 to 6 months or sooner if symptoms recur.    Transcribed from ambient dictation for George Reyna II,  by Arina Haskins.  04/05/23   16:26 EDT    Patient or patient representative verbalized consent to the visit recording.  I have personally performed the services described in this document as transcribed by the above individual, and it is both accurate and complete.    Disclaimer: Please note that areas of this note were completed with computer voice recognition software.  Quite often unanticipated grammatical, syntax, homophones, and other interpretive errors are inadvertently transcribed by the computer software. Please excuse any errors that have escaped final proofreading.

## 2023-04-07 NOTE — PROGRESS NOTES
Procedure   Large Joint Arthrocentesis: L knee  Date/Time: 4/5/2023 3:47 PM  Consent given by: patient  Site marked: site marked  Timeout: Immediately prior to procedure a time out was called to verify the correct patient, procedure, equipment, support staff and site/side marked as required   Supporting Documentation  Indications: pain   Procedure Details  Location: knee - L knee  Preparation: Patient was prepped and draped in the usual sterile fashion  Needle size: 18 G  Approach: anteromedial (25ga needle was used to inject 3cc of 2% lidocaine without epinepherine to anesthetize the tract)  Medications administered: 88 mg Hyaluronan 88 MG/4ML  Patient tolerance: patient tolerated the procedure well with no immediate complications

## 2023-04-11 ENCOUNTER — TELEPHONE (OUTPATIENT)
Dept: ORTHOPEDIC SURGERY | Facility: CLINIC | Age: 83
End: 2023-04-11

## 2023-04-11 NOTE — TELEPHONE ENCOUNTER
Caller: Chrystal Ruiz    Relationship: Self    Best call back number:     What is the best time to reach you: ANY     Who are you requesting to speak with (clinical staff, provider,  specific staff member): CLINICAL    What was the call regarding: HASNT HAD MUCH RELIEF FROM GEL INJECTIONS WANTING TO KNOW WHAT WE CAN DO FOR RELIEF    Do you require a callback: YES

## 2023-04-13 NOTE — TELEPHONE ENCOUNTER
Spoke to patient, still in a lot of pain, I explained to patient that it could take up to two weeks for injection to be effective, she states she is concerned because injection was given in the middle of knee but her issue is on the outer part. I make an appt as patient requested but we will cancel if needed after you respond.

## 2023-04-13 NOTE — TELEPHONE ENCOUNTER
Caller: Chrystal Ruiz    Relationship to patient: Self    Best call back number: [phone]    Patient is needing: PATIENT CALLING TO INFORM THAT SHE IS STILL IN PAIN AND WOULD LIKE SOMEONE TO GIVE HER A CALL BACK

## 2023-04-14 NOTE — TELEPHONE ENCOUNTER
Spoke with patient and she will keep appointment. She was thankful we followed up and will call us if it gets worse before Wed. appt

## 2023-04-19 ENCOUNTER — OFFICE VISIT (OUTPATIENT)
Dept: ORTHOPEDIC SURGERY | Facility: CLINIC | Age: 83
End: 2023-04-19
Payer: MEDICARE

## 2023-04-19 VITALS — HEIGHT: 61 IN | BODY MASS INDEX: 24.55 KG/M2 | OXYGEN SATURATION: 96 % | WEIGHT: 130 LBS

## 2023-04-19 DIAGNOSIS — M17.0 PRIMARY OSTEOARTHRITIS OF BOTH KNEES: Primary | ICD-10-CM

## 2023-04-19 PROCEDURE — 99213 OFFICE O/P EST LOW 20 MIN: CPT | Performed by: FAMILY MEDICINE

## 2023-04-19 NOTE — PROGRESS NOTES
Primary Care Sports Medicine Office Visit Note     Patient ID: Chrystal Ruiz is a 82 y.o. female.    Chief Complaint:  Chief Complaint   Patient presents with   • Left Knee - Follow-up, Pain     Pain 3/10     HPI:    Ms. Chrystal Ruiz is a 82 y.o. female. The patient presents to the clinic today for follow-up evaluation of the left knee after injection.    The patient states that the initial injection she had lasted so long that she thought that was going to take care of it. She reports that her father had bad arthritis inside her mother. She states that her mother had to have her knees replaced eventually. She reports that the first injection she had worked really well for a long time, but the last injection she had only lasted from 03/01/2023 until the middle of 03/2023. She reports that she was here on 04/05/2023, and that is when they did the gel injection. She reports that the pain is not unbearable, and it is not as painful as it was, and it is not consistent, because one day it may be worse than another.    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    with Watchman procedure; surgically fixed the next day   • Heart murmur    • Hypertension        Past Surgical History:   Procedure Laterality Date   • ATRIAL APPENDAGE EXCLUSION LEFT WITH TRANSESOPHAGEAL ECHOCARDIOGRAM Right 7/7/2022    Procedure: Atrial Appendage Occlusion watchman team aware $;  Surgeon: Sage Garcia MD;  Location: [facility] CATH INVASIVE LOCATION;  Service: Cardiovascular;  Laterality: Right;   • ATRIAL APPENDAGE EXCLUSION LEFT WITH TRANSESOPHAGEAL ECHOCARDIOGRAM N/A 7/7/2022    Procedure: Atrial Appendage Occlusion;  Surgeon: Fide Mullen MD;  Location:  [facility] CATH INVASIVE LOCATION;  Service: Cardiovascular;  Laterality: N/A;   •  "CATARACT EXTRACTION     • CHOLECYSTECTOMY     • HYSTERECTOMY  1987    Endometriosis   • OOPHORECTOMY     • PSEUDO ANEURYSM REPAIR, EXTREMITY Right 7/8/2022    Procedure: REPAIR OF POST CATHETERIZATION RIGHT FEMORAL PSEUDOANEURYSM AND ARTERIOVENOUS FISTUAL;  Surgeon: Alfonzo Hooks MD;  Location: [facility] MAIN OR;  Service: Vascular;  Laterality: Right;   • TONSILLECTOMY         Family History   Problem Relation Age of Onset   • Breast cancer Mother 85   • Stroke Mother    • Heart disease Mother    • Endometrial cancer Mother 70   • Heart failure Father    • Heart failure Sister    • COPD Sister    • Heart disease Sister      Social History     Occupational History   • Not on file   Tobacco Use   • Smoking status: Never   • Smokeless tobacco: Never   Vaping Use   • Vaping Use: Never used   Substance and Sexual Activity   • Alcohol use: No   • Drug use: Never   • Sexual activity: Not Currently      Review of Systems   Constitutional: Negative for activity change and fever.   Musculoskeletal: Positive for arthralgias.   Skin: Negative for color change and rash.   Neurological: Negative for weakness.     Objective:    Ht 154.9 cm (61\")   Wt 59 kg (130 lb)   SpO2 96%   BMI 24.56 kg/m²     Physical Examination:  Physical Exam  Vitals and nursing note reviewed.   Constitutional:       General: She is not in acute distress.     Appearance: She is well-developed. She is not diaphoretic.   HENT:      Head: Normocephalic and atraumatic.   Eyes:      Conjunctiva/sclera: Conjunctivae normal.   Pulmonary:      Effort: Pulmonary effort is normal. No respiratory distress.   Musculoskeletal:      Right knee: No effusion.      Instability Tests: Medial Kimberly test negative and lateral Kimberly test negative.   Skin:     General: Skin is warm.      Capillary Refill: Capillary refill takes less than 2 seconds.   Neurological:      Mental Status: She is alert.       Right Ankle Exam     Range of Motion   The patient has " normal right ankle ROM.      Right Knee Exam     Muscle Strength   The patient has normal right knee strength.    Tenderness   The patient is experiencing tenderness in the lateral joint line, medial joint line and patella.    Range of Motion   Extension: normal   Flexion: normal     Tests   Kimberly:  Medial - negative Lateral - negative  Varus: negative Valgus: negative  Right knee patellar apprehension test: +patellar grind, +tiesha glynn.    Other   Erythema: absent  Sensation: normal  Pulse: present (distal to the knee, DP and PT palpable)  Swelling: none  Effusion: no effusion present        Imaging and other tests:      Assessment and Plan:  1. Primary osteoarthritis of the left knee    I discussed pathology and treatment options with the patient. Unfortunately, hyaluronic acid did not help as much as we had hoped. For that reason, she was sent in topical compounded cream to use 3 times daily as needed about the left knee. Otherwise, return to clinic in 2 to 3 months. Consider repeat corticosteroid injection at that time if warranted.    Transcribed from ambient dictation for George Reyna II, DO by Britta Westfall.  04/19/23   17:40 EDT    Patient or patient representative verbalized consent to the visit recording.  I have personally performed the services described in this document as transcribed by the above individual, and it is both accurate and complete.    Disclaimer: Please note that areas of this note were completed with computer voice recognition software.  Quite often unanticipated grammatical, syntax, homophones, and other interpretive errors are inadvertently transcribed by the computer software. Please excuse any errors that have escaped final proofreading.

## 2023-05-01 ENCOUNTER — OFFICE VISIT (OUTPATIENT)
Dept: CARDIOLOGY | Facility: CLINIC | Age: 83
End: 2023-05-01
Payer: MEDICARE

## 2023-05-01 ENCOUNTER — PREP FOR SURGERY (OUTPATIENT)
Dept: OTHER | Facility: HOSPITAL | Age: 83
End: 2023-05-01
Payer: MEDICARE

## 2023-05-01 VITALS
HEIGHT: 61 IN | WEIGHT: 131 LBS | BODY MASS INDEX: 24.73 KG/M2 | HEART RATE: 70 BPM | DIASTOLIC BLOOD PRESSURE: 68 MMHG | SYSTOLIC BLOOD PRESSURE: 140 MMHG | OXYGEN SATURATION: 97 %

## 2023-05-01 DIAGNOSIS — I48.0 PAROXYSMAL ATRIAL FIBRILLATION: Primary | ICD-10-CM

## 2023-05-01 DIAGNOSIS — Z95.818 PRESENCE OF WATCHMAN LEFT ATRIAL APPENDAGE CLOSURE DEVICE: ICD-10-CM

## 2023-05-01 DIAGNOSIS — I10 PRIMARY HYPERTENSION: ICD-10-CM

## 2023-05-01 DIAGNOSIS — D50.0 IRON DEFICIENCY ANEMIA DUE TO CHRONIC BLOOD LOSS: ICD-10-CM

## 2023-05-01 PROCEDURE — 93000 ELECTROCARDIOGRAM COMPLETE: CPT | Performed by: INTERNAL MEDICINE

## 2023-05-01 PROCEDURE — 1159F MED LIST DOCD IN RCRD: CPT | Performed by: INTERNAL MEDICINE

## 2023-05-01 PROCEDURE — 99214 OFFICE O/P EST MOD 30 MIN: CPT | Performed by: INTERNAL MEDICINE

## 2023-05-01 PROCEDURE — 3077F SYST BP >= 140 MM HG: CPT | Performed by: INTERNAL MEDICINE

## 2023-05-01 PROCEDURE — 3078F DIAST BP <80 MM HG: CPT | Performed by: INTERNAL MEDICINE

## 2023-05-01 PROCEDURE — 1160F RVW MEDS BY RX/DR IN RCRD: CPT | Performed by: INTERNAL MEDICINE

## 2023-05-01 RX ORDER — SODIUM CHLORIDE 9 MG/ML
80 INJECTION, SOLUTION INTRAVENOUS CONTINUOUS
OUTPATIENT
Start: 2023-05-01

## 2023-05-01 NOTE — LETTER
May 1, 2023       No Recipients    Patient: Chrystal Ruiz   YOB: 1940   Date of Visit: 5/1/2023       Dear Dr. Patel Recipients:    Thank you for referring Chrystal Ruiz to me for evaluation. Below are the relevant portions of my assessment and plan of care.    If you have questions, please do not hesitate to call me. I look forward to following Chrystal along with you.         Sincerely,        Sage Garcia MD        CC:   No Recipients    Sage Garcia MD  05/01/23 1404  Signed  CC--- anemia and paroxysmal atrial fibrillation    Sub  82-year-old pleasant patient had a watchman implantation done July 2022.  She also has history of hypertension and diastolic heart failure.  A stress test in the past did not show ischemia.  She does have history of essential hypertension, hyperlipidemia.  Post watchman implantation last year complicated by pseudoaneurysm needing open repair.  Epistaxis and off aspirin      Past Medical History:   Diagnosis Date   • A-fib (HCC)    • Anemia    • Arthritis    • Asthma    • CHF (congestive heart failure) ([facility])    • Hypertension      Past Surgical History:   Procedure Laterality Date   • CATARACT EXTRACTION     • CHOLECYSTECTOMY     • HYSTERECTOMY  1987    Endometriosis   • OOPHORECTOMY     • TONSILLECTOMY           Physical Exam    General:      well developed, well nourished, in no acute distress.    Head:      normocephalic and atraumatic.    Eyes:      PERRL/EOM intact, conjunctivae and sclerae clear without nystagmus.    Neck:      no  thyromegaly, trachea central with normal respiratory effort  Lungs:      clear bilaterally to auscultation.    Heart:       regular rate and rhythm, S1, S2 without murmurs, rubs, or gallops  Skin:      intact without lesions or rashes.    Psych:      alert and cooperative; normal mood and affect; normal attention span and concentration.              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  Essential hypertension well-controlled  History of microcytic anemia with iron deficiency and malabsorption status post iron infusion and transfusions  Chronic class III diastolic heart failure stable  Remote history of vasovagal syncope without recurrence  Most recent hemoglobin 12.9 and platelets are normal  Medications reviewed and follow-up appointments made          ECG 12 Lead    Date/Time: 5/1/2023 1:57 PM  Performed by: Sage Garcia MD  Authorized by: Sage Garcia MD   Comparison: compared with previous ECG   Similar to previous ECG  Rhythm: sinus rhythm  Rate: normal  QRS axis: right              Electronically signed by Sage Garcia MD, 05/01/23, 2:01 PM EDT.

## 2023-05-01 NOTE — PROGRESS NOTES
CC--- anemia and paroxysmal atrial fibrillation    Sub  82-year-old pleasant patient had a watchman implantation done July 2022.  She also has history of hypertension and diastolic heart failure.  A stress test in the past did not show ischemia.  She does have history of essential hypertension, hyperlipidemia.  Post watchman implantation last year complicated by pseudoaneurysm needing open repair.  Epistaxis and off aspirin      Past Medical History:   Diagnosis Date   • A-fib (HCC)    • Anemia    • Arthritis    • Asthma    • CHF (congestive heart failure) (HCC)    • Hypertension      Past Surgical History:   Procedure Laterality Date   • CATARACT EXTRACTION     • CHOLECYSTECTOMY     • HYSTERECTOMY  1987    Endometriosis   • OOPHORECTOMY     • TONSILLECTOMY           Physical Exam    General:      well developed, well nourished, in no acute distress.    Head:      normocephalic and atraumatic.    Eyes:      PERRL/EOM intact, conjunctivae and sclerae clear without nystagmus.    Neck:      no  thyromegaly, trachea central with normal respiratory effort  Lungs:      clear bilaterally to auscultation.    Heart:       regular rate and rhythm, S1, S2 without murmurs, rubs, or gallops  Skin:      intact without lesions or rashes.    Psych:      alert and cooperative; normal mood and affect; normal attention span and concentration.              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  Essential hypertension well-controlled  History of microcytic anemia with iron deficiency and malabsorption status post iron infusion and transfusions  Chronic class III diastolic heart failure stable  Remote history of vasovagal syncope without recurrence  Most recent hemoglobin 12.9 and platelets are normal  Medications reviewed and  follow-up appointments made    One year MELISA orders placed      ECG 12 Lead    Date/Time: 5/1/2023 1:57 PM  Performed by: Sage Garcia MD  Authorized by: Sage Garcia MD   Comparison: compared with previous ECG   Similar to previous ECG  Rhythm: sinus rhythm  Rate: normal  QRS axis: right              Electronically signed by Sage Garcia MD, 05/01/23, 2:01 PM EDT.

## 2023-05-12 ENCOUNTER — OFFICE VISIT (OUTPATIENT)
Dept: FAMILY MEDICINE CLINIC | Facility: CLINIC | Age: 83
End: 2023-05-12

## 2023-05-12 VITALS
HEIGHT: 61 IN | BODY MASS INDEX: 24.17 KG/M2 | WEIGHT: 128 LBS | OXYGEN SATURATION: 92 % | TEMPERATURE: 99.3 F | HEART RATE: 88 BPM | DIASTOLIC BLOOD PRESSURE: 73 MMHG | SYSTOLIC BLOOD PRESSURE: 130 MMHG

## 2023-05-12 DIAGNOSIS — J01.00 ACUTE MAXILLARY SINUSITIS, RECURRENCE NOT SPECIFIED: Primary | ICD-10-CM

## 2023-05-12 PROCEDURE — 3078F DIAST BP <80 MM HG: CPT | Performed by: FAMILY MEDICINE

## 2023-05-12 PROCEDURE — 3075F SYST BP GE 130 - 139MM HG: CPT | Performed by: FAMILY MEDICINE

## 2023-05-12 PROCEDURE — 99213 OFFICE O/P EST LOW 20 MIN: CPT | Performed by: FAMILY MEDICINE

## 2023-05-12 RX ORDER — AMOXICILLIN 500 MG/1
500 CAPSULE ORAL 3 TIMES DAILY
Qty: 30 CAPSULE | Refills: 0 | Status: SHIPPED | OUTPATIENT
Start: 2023-05-12 | End: 2023-05-22 | Stop reason: SDUPTHER

## 2023-05-12 NOTE — PROGRESS NOTES
"Chief Complaint  Cough (Started 05-09-23 ) and Sinus Problem (Face pressure around under eyes )    Subjective        Chrystal Ruiz presents to [facility]  Cough  This is a new problem. The current episode started in the past 7 days. The problem has been gradually worsening. The problem occurs every few minutes. The cough is non-productive. Pertinent negatives include no ear pain or shortness of breath.   Sinus Problem  This is a new problem. The current episode started in the past 7 days. The problem has been gradually worsening since onset. The pain is moderate. Associated symptoms include congestion, coughing, a hoarse voice, sinus pressure and sneezing. Pertinent negatives include no ear pain, shortness of breath or swollen glands. Past treatments include nothing. The treatment provided no relief.       Objective   Vital Signs:  /73 (BP Location: Left arm, Patient Position: Sitting, Cuff Size: Adult)   Pulse 88   Temp 99.3 °F (37.4 °C) (Infrared)   Ht 154.9 cm (61\")   Wt 58.1 kg (128 lb)   SpO2 92%   BMI 24.19 kg/m²   Estimated body mass index is 24.19 kg/m² as calculated from the following:    Height as of this encounter: 154.9 cm (61\").    Weight as of this encounter: 58.1 kg (128 lb).       BMI is within normal parameters. No other follow-up for BMI required.      Physical Exam  Constitutional:       General: She is not in acute distress.     Appearance: She is well-developed.   HENT:      Head: Normocephalic.      Right Ear: A middle ear effusion is present.      Left Ear: A middle ear effusion is present.      Mouth/Throat:      Pharynx: Posterior oropharyngeal erythema present.   Eyes:      General: Lids are normal.      Conjunctiva/sclera: Conjunctivae normal.   Neck:      Thyroid: No thyroid mass or thyromegaly.      Trachea: Trachea normal.   Cardiovascular:      Rate and Rhythm: Normal rate and regular rhythm.      Heart sounds: Normal heart sounds. "   Pulmonary:      Effort: Pulmonary effort is normal.      Breath sounds: Rhonchi present.   Abdominal:      Palpations: Abdomen is soft.   Musculoskeletal:      Cervical back: Normal range of motion.   Lymphadenopathy:      Cervical: No cervical adenopathy.   Skin:     General: Skin is warm and dry.   Neurological:      Mental Status: She is alert and oriented to person, place, and time.   Psychiatric:         Attention and Perception: She is attentive.         Mood and Affect: Mood normal.         Speech: Speech normal.         Behavior: Behavior normal.        Result Review :  The following data was reviewed by: Etelvina Ulloa MD on 05/12/2023:  Common labs        8/17/2022    10:39 11/1/2022    18:05 12/14/2022    10:51   Common Labs   Glucose 104       BUN 32       Creatinine 1.06       Sodium 138       Potassium 4.6       Chloride 104       Calcium 9.8       Albumin 4.40       Total Bilirubin 0.6       Alkaline Phosphatase 67       AST (SGOT) 17       ALT (SGPT) 8       WBC 9.06   6.60   7.43     Hemoglobin 12.1   11.9   12.9     Hematocrit 39.4   37.7   40.6     Platelets 221   221   276                    Assessment and Plan   Diagnoses and all orders for this visit:    1. Acute maxillary sinusitis, recurrence not specified (Primary)  -     Discontinue: amoxicillin (AMOXIL) 500 MG capsule; Take 1 capsule by mouth 3 (Three) Times a Day. (Patient not taking: Reported on 5/22/2023)  Dispense: 30 capsule; Refill: 0             Follow Up   No follow-ups on file.  Patient was given instructions and counseling regarding her condition or for health maintenance advice. Please see specific information pulled into the AVS if appropriate.

## 2023-05-22 ENCOUNTER — TELEPHONE (OUTPATIENT)
Dept: FAMILY MEDICINE CLINIC | Facility: CLINIC | Age: 83
End: 2023-05-22

## 2023-05-22 ENCOUNTER — OFFICE VISIT (OUTPATIENT)
Dept: FAMILY MEDICINE CLINIC | Facility: CLINIC | Age: 83
End: 2023-05-22
Payer: MEDICARE

## 2023-05-22 VITALS
HEIGHT: 61 IN | HEART RATE: 81 BPM | TEMPERATURE: 98.7 F | WEIGHT: 130 LBS | OXYGEN SATURATION: 94 % | SYSTOLIC BLOOD PRESSURE: 119 MMHG | BODY MASS INDEX: 24.55 KG/M2 | DIASTOLIC BLOOD PRESSURE: 67 MMHG

## 2023-05-22 DIAGNOSIS — J40 BRONCHITIS: Primary | ICD-10-CM

## 2023-05-22 PROCEDURE — 3078F DIAST BP <80 MM HG: CPT | Performed by: FAMILY MEDICINE

## 2023-05-22 PROCEDURE — 1160F RVW MEDS BY RX/DR IN RCRD: CPT | Performed by: FAMILY MEDICINE

## 2023-05-22 PROCEDURE — 99213 OFFICE O/P EST LOW 20 MIN: CPT | Performed by: FAMILY MEDICINE

## 2023-05-22 PROCEDURE — 3074F SYST BP LT 130 MM HG: CPT | Performed by: FAMILY MEDICINE

## 2023-05-22 PROCEDURE — 1159F MED LIST DOCD IN RCRD: CPT | Performed by: FAMILY MEDICINE

## 2023-05-22 RX ORDER — AMOXICILLIN 500 MG/1
500 CAPSULE ORAL 3 TIMES DAILY
Qty: 15 CAPSULE | Refills: 0 | Status: SHIPPED | OUTPATIENT
Start: 2023-05-22

## 2023-05-22 NOTE — PROGRESS NOTES
"Chief Complaint  Cough (Hears a \"rattle\" in chest )    Subjective        Chrystal Ruiz presents to [facility]  Cough  This is a recurrent problem. The current episode started 1 to 4 weeks ago. The problem has been gradually improving. The cough is Productive of sputum. Associated symptoms include rhinorrhea. Pertinent negatives include no chest pain, chills, fever or wheezing. Nothing aggravates the symptoms. The treatment provided mild relief.     Objective   Vital Signs:  /67 (BP Location: Left arm, Patient Position: Sitting, Cuff Size: Adult)   Pulse 81   Temp 98.7 °F (37.1 °C) (Infrared)   Ht 154.9 cm (61\")   Wt 59 kg (130 lb)   SpO2 94%   BMI 24.56 kg/m²   Estimated body mass index is 24.56 kg/m² as calculated from the following:    Height as of this encounter: 154.9 cm (61\").    Weight as of this encounter: 59 kg (130 lb).       BMI is within normal parameters. No other follow-up for BMI required.      Physical Exam  Vitals and nursing note reviewed.   Constitutional:       General: She is not in acute distress.     Appearance: She is well-developed.   HENT:      Head: Normocephalic.   Eyes:      General: Lids are normal.   Neck:      Thyroid: No thyroid mass or thyromegaly.      Trachea: Trachea normal.   Cardiovascular:      Rate and Rhythm: Normal rate and regular rhythm.      Heart sounds: Normal heart sounds.   Pulmonary:      Effort: Pulmonary effort is normal.      Breath sounds: Rhonchi present.   Musculoskeletal:      Cervical back: Normal range of motion.   Lymphadenopathy:      Cervical: No cervical adenopathy.   Skin:     General: Skin is warm and dry.   Neurological:      Mental Status: She is alert and oriented to person, place, and time.   Psychiatric:         Attention and Perception: She is attentive.         Mood and Affect: Mood normal.         Speech: Speech normal.         Behavior: Behavior normal.      Result Review :  The following data was " reviewed by: Etelvina Ulloa MD on 05/22/2023:  Common labs          8/17/2022    10:39 11/1/2022    18:05 12/14/2022    10:51   Common Labs   Glucose 104      BUN 32      Creatinine 1.06      Sodium 138      Potassium 4.6      Chloride 104      Calcium 9.8      Albumin 4.40      Total Bilirubin 0.6      Alkaline Phosphatase 67      AST (SGOT) 17      ALT (SGPT) 8      WBC 9.06  6.60  7.43    Hemoglobin 12.1  11.9  12.9    Hematocrit 39.4  37.7  40.6    Platelets 221  221  276                   Assessment and Plan   Diagnoses and all orders for this visit:    1. Bronchitis (Primary)  -     amoxicillin (AMOXIL) 500 MG capsule; Take 1 capsule by mouth 3 (Three) Times a Day.  Dispense: 15 capsule; Refill: 0             Follow Up   No follow-ups on file.  Patient was given instructions and counseling regarding her condition or for health maintenance advice. Please see specific information pulled into the AVS if appropriate.

## 2023-05-26 ENCOUNTER — TELEPHONE (OUTPATIENT)
Dept: CARDIOLOGY | Facility: CLINIC | Age: 83
End: 2023-05-26

## 2023-05-26 NOTE — TELEPHONE ENCOUNTER
Caller: Chrystal Ruiz    Relationship: Self    Best call back number: [phone]    What is the best time to reach you: ANY    Who are you requesting to speak with (clinical staff, provider,  specific staff member): CLINICAL     Do you know the name of the person who called: UNKNOWN    What was the call regarding: PATIENT RECEIVED A VOICEMAIL AND WAS UNSURE WHAT IT WAS REGARDING.     Do you require a callback: YES

## 2023-06-14 ENCOUNTER — HOSPITAL ENCOUNTER (OUTPATIENT)
Dept: PET IMAGING | Facility: HOSPITAL | Age: 83
Discharge: HOME | End: 2023-06-14
Admitting: RADIOLOGY
Payer: MEDICARE

## 2023-06-14 DIAGNOSIS — C34.91 BRONCHOGENIC CANCER OF RIGHT LUNG: ICD-10-CM

## 2023-06-14 PROCEDURE — 71250 CT THORAX DX C-: CPT

## 2023-07-05 PROBLEM — Z95.818 PRESENCE OF WATCHMAN LEFT ATRIAL APPENDAGE CLOSURE DEVICE: Status: ACTIVE | Noted: 2023-07-05

## 2023-07-28 ENCOUNTER — OFFICE VISIT (OUTPATIENT)
Dept: FAMILY MEDICINE CLINIC | Facility: CLINIC | Age: 83
End: 2023-07-28
Payer: MEDICARE

## 2023-07-28 VITALS
SYSTOLIC BLOOD PRESSURE: 127 MMHG | DIASTOLIC BLOOD PRESSURE: 72 MMHG | HEIGHT: 61 IN | BODY MASS INDEX: 25.57 KG/M2 | TEMPERATURE: 99.4 F | HEART RATE: 77 BPM | WEIGHT: 135.4 LBS | OXYGEN SATURATION: 95 %

## 2023-07-28 DIAGNOSIS — M19.90 ARTHRITIS: Primary | ICD-10-CM

## 2023-07-28 DIAGNOSIS — E78.2 MIXED HYPERLIPIDEMIA: ICD-10-CM

## 2023-07-28 RX ORDER — FERROUS SULFATE 325(65) MG
325 TABLET ORAL 3 TIMES WEEKLY
Qty: 45 TABLET | Refills: 1 | Status: SHIPPED | OUTPATIENT
Start: 2023-07-28

## 2023-07-28 RX ORDER — BACLOFEN 10 MG/1
10 TABLET ORAL NIGHTLY PRN
Qty: 30 TABLET | Refills: 0 | Status: SHIPPED | OUTPATIENT
Start: 2023-07-28

## 2023-07-28 NOTE — PROGRESS NOTES
"Chief Complaint  Hip Pain (X 1 week constant ), Knee Pain, and Foot Pain    Subjective        Chrystal Ruiz presents to [facility]  History of Present Illness  She reports pain from her hips down to her feet.  Trouble walking.  Trouble getting up out of her bed at night to go to the bathroom.   She visited her son in [place] last week and traveled in a car for several hours and did not take her diuretic so she would not have to stop to go to the restroom.   She takes two Tylenol before bed but it does not help much. She is unable to take NSAIDs due to history of bleeding.   Hip Pain   The incident occurred more than 1 week ago. There was no injury mechanism. The pain is present in the left knee, left ankle, left heel, left foot, left hip, right foot, right heel, right ankle, right knee and right hip. The quality of the pain is described as aching. The pain is moderate. The pain has been Worsening since onset. Pertinent negatives include no numbness or tingling. She reports no foreign bodies present. The symptoms are aggravated by movement and weight bearing.   Knee Pain   Pertinent negatives include no numbness or tingling.   Foot Pain  Pertinent negatives include no numbness.     Objective   Vital Signs:  /72 (BP Location: Left arm, Patient Position: Sitting, Cuff Size: Large Adult)   Pulse 77   Temp 99.4 øF (37.4 øC) (Infrared)   Ht 153.7 cm (60.5\")   Wt 61.4 kg (135 lb 6.4 oz)   SpO2 95%   BMI 26.01 kg/mý   Estimated body mass index is 26.01 kg/mý as calculated from the following:    Height as of this encounter: 153.7 cm (60.5\").    Weight as of this encounter: 61.4 kg (135 lb 6.4 oz).       BMI is >= 25 and <30. (Overweight) The following options were offered after discussion;: exercise counseling/recommendations      Physical Exam  Vitals and nursing note reviewed.   Constitutional:       General: She is not in acute distress.     Appearance: She is well-developed. "   HENT:      Head: Normocephalic.   Eyes:      General: Lids are normal.      Conjunctiva/sclera: Conjunctivae normal.   Neck:      Thyroid: No thyroid mass or thyromegaly.      Trachea: Trachea normal.   Cardiovascular:      Rate and Rhythm: Normal rate and regular rhythm.      Heart sounds: Normal heart sounds.   Pulmonary:      Effort: Pulmonary effort is normal.      Breath sounds: Normal breath sounds.   Abdominal:      Palpations: Abdomen is soft.   Musculoskeletal:         General: Tenderness present.      Cervical back: Normal range of motion.   Lymphadenopathy:      Cervical: No cervical adenopathy.   Skin:     General: Skin is warm and dry.   Neurological:      Mental Status: She is alert and oriented to person, place, and time.   Psychiatric:         Attention and Perception: She is attentive.         Mood and Affect: Mood normal.         Speech: Speech normal.         Behavior: Behavior normal.      Result Review :  The following data was reviewed by: Etelvina Ulloa MD on 07/28/2023:  Common labs          12/14/2022    10:51 6/21/2023    13:30 7/31/2023    14:22   Common Labs   Glucose  103     99  99    BUN  29     29  23    Creatinine  1.24     1.28  0.91    Sodium  141     141  141    Potassium  4.2     4.4  5.1    Chloride  103     103  105    Calcium  9.7     9.7  9.8    Albumin  4.4     4.6  4.7    Total Bilirubin  0.5     0.5  0.6    Alkaline Phosphatase  84     87  85    AST (SGOT)  17     16  17    ALT (SGPT)  12     11  17    WBC 7.43  10.02     Hemoglobin 12.9  12.9     Hematocrit 40.6  41.6     Platelets 276  279     Total Cholesterol   185    Triglycerides   64    HDL Cholesterol   68    LDL Cholesterol    105    Uric Acid   5.1                   Assessment and Plan   Diagnoses and all orders for this visit:    1. Arthritis (Primary)  -     Uric acid; Future    2. Mixed hyperlipidemia  -     Comprehensive Metabolic Panel  -     Lipid Panel    Other orders  -     baclofen (LIORESAL) 10 MG  tablet; Take 1 tablet by mouth At Night As Needed for Muscle Spasms.  Dispense: 30 tablet; Refill: 0  -     ferrous sulfate 325 (65 FE) MG tablet; Take 1 tablet by mouth 3 (Three) Times a Week. Monday, Wednesday and Friday.  Dispense: 45 tablet; Refill: 1             Follow Up   No follow-ups on file.  Patient was given instructions and counseling regarding her condition or for health maintenance advice. Please see specific information pulled into the AVS if appropriate.

## 2023-07-31 ENCOUNTER — LAB (OUTPATIENT)
Dept: FAMILY MEDICINE CLINIC | Facility: CLINIC | Age: 83
End: 2023-07-31
Payer: MEDICARE

## 2023-07-31 DIAGNOSIS — M19.90 ARTHRITIS: ICD-10-CM

## 2023-07-31 PROCEDURE — 80061 LIPID PANEL: CPT | Performed by: FAMILY MEDICINE

## 2023-07-31 PROCEDURE — 80053 COMPREHEN METABOLIC PANEL: CPT | Performed by: FAMILY MEDICINE

## 2023-07-31 PROCEDURE — 36415 COLL VENOUS BLD VENIPUNCTURE: CPT | Performed by: FAMILY MEDICINE

## 2023-07-31 PROCEDURE — 84550 ASSAY OF BLOOD/URIC ACID: CPT | Performed by: FAMILY MEDICINE

## 2023-08-01 LAB
ALBUMIN SERPL-MCNC: 4.7 G/DL (ref 3.5–5.2)
ALBUMIN/GLOB SERPL: 2 G/DL
ALP SERPL-CCNC: 85 U/L (ref 39–117)
ALT SERPL W P-5'-P-CCNC: 17 U/L (ref 1–33)
ANION GAP SERPL CALCULATED.3IONS-SCNC: 9 MMOL/L (ref 5–15)
AST SERPL-CCNC: 17 U/L (ref 1–32)
BILIRUB SERPL-MCNC: 0.6 MG/DL (ref 0–1.2)
BUN SERPL-MCNC: 23 MG/DL (ref 8–23)
BUN/CREAT SERPL: 25.3 (ref 7–25)
CALCIUM SPEC-SCNC: 9.8 MG/DL (ref 8.6–10.5)
CHLORIDE SERPL-SCNC: 105 MMOL/L (ref 98–107)
CHOLEST SERPL-MCNC: 185 MG/DL (ref 0–200)
CO2 SERPL-SCNC: 27 MMOL/L (ref 22–29)
CREAT SERPL-MCNC: 0.91 MG/DL (ref 0.57–1)
EGFRCR SERPLBLD CKD-EPI 2021: 62.7 ML/MIN/1.73
GLOBULIN UR ELPH-MCNC: 2.3 GM/DL
GLUCOSE SERPL-MCNC: 99 MG/DL (ref 65–99)
HDLC SERPL-MCNC: 68 MG/DL (ref 40–60)
LDLC SERPL CALC-MCNC: 105 MG/DL (ref 0–100)
LDLC/HDLC SERPL: 1.53 {RATIO}
POTASSIUM SERPL-SCNC: 5.1 MMOL/L (ref 3.5–5.2)
PROT SERPL-MCNC: 7 G/DL (ref 6–8.5)
SODIUM SERPL-SCNC: 141 MMOL/L (ref 136–145)
TRIGL SERPL-MCNC: 64 MG/DL (ref 0–150)
URATE SERPL-MCNC: 5.1 MG/DL (ref 2.4–5.7)
VLDLC SERPL-MCNC: 12 MG/DL (ref 5–40)

## 2023-08-01 NOTE — PROGRESS NOTES
The patient states that she would rather do something about the pain than take a medication. She asked if she may need an MRI to be certain about the reason for her pain. She stated that if she needs to be referred to an Ortho, she would like to see Dr Oneill, if she needs an arthritis doctor, she would like to see Dr Moise.

## 2023-08-01 NOTE — PROGRESS NOTES
The pt was verbally notified and understood and asked what does she do from here for the pain- she wants to know what is going on and stated she is willing to be referred to whatever you think will help.

## 2023-08-02 ENCOUNTER — OFFICE VISIT (OUTPATIENT)
Dept: ORTHOPEDIC SURGERY | Facility: CLINIC | Age: 83
End: 2023-08-02
Payer: MEDICARE

## 2023-08-02 VITALS — BODY MASS INDEX: 25.9 KG/M2 | WEIGHT: 137.2 LBS | HEIGHT: 61 IN

## 2023-08-02 DIAGNOSIS — M17.0 PRIMARY OSTEOARTHRITIS OF BOTH KNEES: Primary | ICD-10-CM

## 2023-08-02 DIAGNOSIS — M19.90 ARTHRITIS: Primary | ICD-10-CM

## 2023-08-02 PROCEDURE — 99213 OFFICE O/P EST LOW 20 MIN: CPT | Performed by: ORTHOPAEDIC SURGERY

## 2023-08-02 NOTE — PROGRESS NOTES
"Chief Complaint  Consult of the Right Knee and Consult of the Left Knee    Subjective    History of Present Illness      Chrystal Ruiz is a 82 y.o. female who presents to [facility] for follow-up on bilateral knee pain and discomfort.  History of Present Illness the patient states that the intra-articular gel injection was only moderately helpful for her knee symptoms.  She likes the cortisone shot a little bit better because it gives her more relief.  She is currently going to undergo a Watchman procedure and is at very high risk to get a stroke.  She is really not a very good candidate for knee replacement surgery at this point.  She has a history of congestive heart failure and atrial fibrillation.  Recently a right lung nodule has also been diagnosed.  The patient states \"I hurt all over my body, I do not think a knee replacement only with help me \".  At this point I would try the cortisone injection again and then possibly follow-up with a repeat injection of Monovisc if approved by her insurance company.  Pain Location:  BILATERAL knee  Radiation: none  Quality: dull, aching  Intensity/Severity: moderate to severe  Duration:  Several months  Progression of symptoms: no worsening, symptoms stable/unchanged  Onset quality: gradual   Timing: intermittent  Aggravating Factors: going up and down stairs, rising after sitting, squatting  Alleviating Factors: NSAIDs  Previous Episodes: yes  Associated Symptoms: pain, swelling  ADLs Affected: ambulating, recreational activities/sports  Previous Treatment: NSAIDs       Objective   Vital Signs:   Ht 153.7 cm (60.5\")   Wt 62.2 kg (137 lb 3.2 oz)   BMI 26.35 kg/mý     Physical Exam  Physical Exam  Vitals signs and nursing note reviewed.   Constitutional:       Appearance: Normal appearance.   Pulmonary:      Effort: Pulmonary effort is normal.   Skin:     General: Skin is warm and dry.      Capillary Refill: Capillary refill takes less than " 2 seconds.   Neurological:      General: No focal deficit present.      Mental Status: He is alert and oriented to person, place, and time. Mental status is at baseline.   Psychiatric:         Mood and Affect: Mood normal.         Behavior: Behavior normal.         Thought Content: Thought content normal.         Judgment: Judgment normal.     Ortho Exam   Bilateral knee (varus). Patient has crepitus throughout range of motion. Positive patellar grind test. Mild effusion. Lachman is negative. Pivot shift is negative. Anterior and posterior drawer signs are negative. Significant joint line tenderness is noted on the medial aspect of the knee. Patient has a varus orientation of the knee. There is fullness and tenderness in the Popliteal fossa. Mild distention of a Popliteal cyst is noted in this location. Range of motion in flexion is from 0-110 degrees. Neurovascular status is intact.  Dorsalis pedis and posterior tibial artery pulses are palpable. Common peroneal nerve function is well preserved. Patient's gait is cautious and antalgic. Skin and soft tissues are mildly swollen, consistent with synovitis and effusion. The patient has a significant limp with the first few steps after starting the gait cycle. Getting out of a chair takes a lot of effort due to pain on knee flexion.           Result Review :  The following data was reviewed by: Mihai Oneill MD on 08/02/2023:    xrays obtained today    bilateral Knee X-Ray  Indication: Evaluation of pain and discomfort in both knees.  AP, Lateral views  Findings: Moderately advanced osteoarthritis of the knee with narrowing of the medial joint space.  The patellofemoral distance is also somewhat narrowed.  A mild suprapatellar effusion is noted.  no bony lesion  Soft tissues within normal limits  decreased joint spaces  Hardware appropriately positioned not applicable      no prior studies available for comparison.    This patient's x-ray report was graded according to  the Kellgren and Nima classification.  This took into account the joint space narrowing, osteophyte formation, sclerosis of the distal femur/proximal tibia along with deformity of those bones.  The findings were indicative of K L grade 2.    X-RAY was ordered and reviewed by Mihai Oneill MD         Procedures           Assessment   Assessment and Plan    Diagnoses and all orders for this visit:    1. Primary osteoarthritis of both knees (Primary)  -     XR Knee 3 View Bilateral  -     Visco Treatment; Future          Follow Up   Compression/brace to prevent the knee from buckling and giving out.  Injected patient's right knee with a steroid from an anteromedial approach.  Injected patient's left knee with a steroid from an anteromedial approach.  Calcium and vitamin D for bone health.  She is a very poor risk candidate for surgical intervention because of multiple medical issues and therefore my recommendations are to proceed with viscosupplementation injections.  Discussed with her about continuing to use the AFO ankle brace for both her ankles where she has advanced posterior tibial tendon disease.  Rest, ice, compression, and elevation (RICE) therapy  Stretching and strengthening exercises of the quads and the hamstrings.  OTC Alternate Ibuprofen and Tylenol as needed  Follow up in 6-8 week(s)  Patient was given instructions and counseling regarding her condition or for health maintenance advice. Please see specific information pulled into the AVS if appropriate.     Mihai Oneill MD   Date of Encounter: 8/2/2023   Electronically signed by Mihai Oneill MD, 08/02/23, 3:14 PM EDT.     EMR Dragon/Transcription disclaimer:  Much of this encounter note is an electronic transcription/translation of spoken language to printed text. The electronic translation of spoken language may permit erroneous, or at times, nonsensical words or phrases to be inadvertently transcribed; Although I have reviewed the note for such  errors, some may still exist.

## 2023-08-08 PROBLEM — M17.0 PRIMARY OSTEOARTHRITIS OF BOTH KNEES: Status: ACTIVE | Noted: 2023-08-08

## 2023-08-28 ENCOUNTER — OFFICE VISIT (OUTPATIENT)
Dept: CARDIOLOGY | Facility: CLINIC | Age: 83
End: 2023-08-28
Payer: MEDICARE

## 2023-08-28 VITALS
BODY MASS INDEX: 24.55 KG/M2 | DIASTOLIC BLOOD PRESSURE: 75 MMHG | WEIGHT: 130 LBS | HEART RATE: 69 BPM | SYSTOLIC BLOOD PRESSURE: 133 MMHG | RESPIRATION RATE: 18 BRPM | HEIGHT: 61 IN

## 2023-08-28 DIAGNOSIS — D50.0 IRON DEFICIENCY ANEMIA DUE TO CHRONIC BLOOD LOSS: ICD-10-CM

## 2023-08-28 DIAGNOSIS — I48.0 PAROXYSMAL ATRIAL FIBRILLATION: Primary | ICD-10-CM

## 2023-08-28 DIAGNOSIS — I10 PRIMARY HYPERTENSION: ICD-10-CM

## 2023-08-28 DIAGNOSIS — Z09 FOLLOW-UP EXAM: ICD-10-CM

## 2023-08-28 DIAGNOSIS — E78.2 MIXED HYPERLIPIDEMIA: ICD-10-CM

## 2023-08-28 DIAGNOSIS — Z95.818 PRESENCE OF WATCHMAN LEFT ATRIAL APPENDAGE CLOSURE DEVICE: ICD-10-CM

## 2023-08-28 PROCEDURE — 3075F SYST BP GE 130 - 139MM HG: CPT | Performed by: INTERNAL MEDICINE

## 2023-08-28 PROCEDURE — 3078F DIAST BP <80 MM HG: CPT | Performed by: INTERNAL MEDICINE

## 2023-08-28 PROCEDURE — 99214 OFFICE O/P EST MOD 30 MIN: CPT | Performed by: INTERNAL MEDICINE

## 2023-08-28 RX ORDER — METOPROLOL SUCCINATE 25 MG/1
12.5 TABLET, EXTENDED RELEASE ORAL DAILY
Qty: 45 TABLET | Refills: 3 | Status: SHIPPED | OUTPATIENT
Start: 2023-08-28 | End: 2023-09-06 | Stop reason: SDUPTHER

## 2023-08-29 RX ORDER — BACLOFEN 10 MG/1
10 TABLET ORAL NIGHTLY PRN
Qty: 30 TABLET | Refills: 0 | Status: SHIPPED | OUTPATIENT
Start: 2023-08-29

## 2023-09-06 RX ORDER — METOPROLOL SUCCINATE 25 MG/1
25 TABLET, EXTENDED RELEASE ORAL DAILY
Qty: 90 TABLET | Refills: 3 | Status: SHIPPED | OUTPATIENT
Start: 2023-09-06

## 2023-09-13 NOTE — PROGRESS NOTES
Cardiology Clinic Note  Jeremy Mccall MD, PhD    Subjective:     Encounter Date:08/28/2023      Patient ID: Chrystal Ruiz is a 82 y.o. female.    Chief Complaint:  Chief Complaint   Patient presents with    Follow-up       HPI:      I the pleasure to see this 82-year-old female  history of diastolic CHF  atrial fibrillation status post Watchman, [id number] score of at  3-4 at 8-10 %/year.  She is maintained on spironolactone Lasix for volume control.  Metoprolol succinate 12.5 daily blood pressures at goal, weight is stable, heart rates are controlled she has no new CV complaints today.   She has no chest pain or shortness of breath and she continues on anticoagulation presently.    She is off anticoagulation with no residual shunt around the device  Recent transesophageal echo. No bleeding reported.    Her heart rate has been little low when she decreased her metoprolol  12.5 daily. She has a normal EF of 60% with some mild right-sided enlargement but otherwise mild developed valvular regurgitation as documented.  She has no volume overload, labs recently stable creatinine 1.1.      Transesophageal echo July 2023    Left ventricular systolic function is normal. Estimated left ventricular EF = 60%    Left ventricular wall thickness is consistent with mild to moderate concentric hypertrophy.    The right ventricular cavity is borderline dilated.    The left atrial cavity is moderately dilated.    The right atrial cavity is mildly  dilated.    There is mild calcification of the aortic valve.    Abnormal mitral valve structure consistent with dilated annulus.    Estimated right ventricular systolic pressure from tricuspid regurgitation is normal (<35 mmHg).    Review of systems otherwise negative x14 point review of systems except as mentioned above next     Historical data copied forward from previous encounters in EMR including the history, exam, and assessment/plan has been reviewed and is unchanged unless noted  "otherwise.     Cardiac medicines reviewed with risk, benefits, and necessity of each discussed.     Risk and benefit of cardiac testing reviewed including death heart attack stroke pain bleeding infection need for vascular /cardiovascular surgery were discussed and the patient      Objective:         Objective          Unchanged from prior  Vitals reviewed below        Physical Exam  Regular rate and rhythm with no rubs murmurs gallops today  No heave no lift  No peripheral edema next no carotid bruits or JVD  Normal radial pulses  Normal cap refill  Soft nontender nondistended  Clear to auscultation bilaterally  Groin stable but tender  Assessment:         Assessment          Diagnoses and all orders for this visit:     1. Paroxysmal atrial fibrillation (HCC) (Primary)  Watchman procedure  Off anticoagulation  Metoprolol XL decreased to   12.5 daily        Has bled score greater than 3  [id number] score greater than 3  Watchman recently implanted      Paroxysmal A. fib  Off Cardizem  Off Eliquis   Metoprolol succinate 12.5 daily  status post watchman  Aspirin low-dose 81 daily     Essential hypertension well-controlled  Hyperlipidemia continue present medicines  No history of any CAD  Normal stress test in the last 1 year  Preserved LVEF     Right pseudoaneurysm, procedural complication, status post open repair by vascular surgery, routine healing should be allowed, keep the site clean and dry, follow-up vascular surgery p.r.n., well-healed     Follow-up  9 months     Jeremy Mccall MD, PhD    Objective:         /75 (BP Location: Left arm, Patient Position: Sitting)   Pulse 69   Resp 18   Ht 154.9 cm (61\")   Wt 59 kg (130 lb)   BMI 24.56 kg/m²         The pleasure to be involved in this patient's cardiovascular care.  Please call with any questions or concerns  Jeremy Mccall MD, PhD    Most recent EKG as reviewed and interpreted by me:  Procedures     Most recent echo as reviewed and interpreted by " me:  Results for orders placed during the hospital encounter of 07/05/23    Adult Transesophageal Echo (MELISA) W/ Cont if Necessary Per Protocol    Interpretation Summary    Left ventricular systolic function is normal. Estimated left ventricular EF = 60%    Left ventricular wall thickness is consistent with mild to moderate concentric hypertrophy.    The right ventricular cavity is borderline dilated.    The left atrial cavity is moderately dilated.    The right atrial cavity is mildly  dilated.    There is mild calcification of the aortic valve.    Abnormal mitral valve structure consistent with dilated annulus.    Estimated right ventricular systolic pressure from tricuspid regurgitation is normal (<35 mmHg).    Procedure indication  History of atrial fibrillation and Watchman device in situ patient came for MELISA to evaluate watchman 1 year post implantation as per protocol    conscious sedation administered by anesthesia  Timeout before procedure    consent obtained before procedure      Procedure note  after obtaining a valid consent patient was sedated by Anesthesia and a MELISA probe was easily placed into esophagus with multiplane imaging with 2D, color and Doppler followed by bubble study with agitated saline without any complications    MELISA  Findings  The Watchman device is well-seated without any leak or thrombus there is no clot  LV function is normal with EF of 60%  Aortic valve mildly calcified without aortic stenosis  No effusion or shunt        Procedure done  Transesophageal echocardiography      Electronically signed by Sage Garcia MD, 07/05/23, 1:27 PM EDT.      Most recent stress test as reviewed and interpreted by me:  Results for orders placed during the hospital encounter of 01/24/22    Stress Test With Myocardial Perfusion One Day    Interpretation Summary  Indications  Shortness of breath    This study was performed under my direct supervision.    Resting ECG  Sinus rhythm.    The patient  was injected with Lexiscan intravenously while constantly monitoring electrocardiogram and vital signs.  Patient did not have any chest discomfort ST abnormalities or ectopy with injection of Lexiscan.    Cardiolite was used as an imaging agent.    Cardiolite images showed uniform distribution of radionuclide without any evidence for myocardial ischemia.    Gated SPECT images revealed normal left ventricle size and contractility with ejection fraction of 74%.    Impression  ========  Lexiscan Cardiolite test is negative for myocardial ischemia.    Gated SPECT images revealed normal left ventricular size and contractility with ejection fraction of 74%.      Most recent cardiac catheterization as reviewed interpreted by me:  No results found for this or any previous visit.    The following portions of the patient's history were reviewed and updated as appropriate: allergies, current medications, past family history, past medical history, past social history, past surgical history, and problem list.      ROS:  14 point review of systems negative except as mentioned above    Current Outpatient Medications:     Breo Ellipta 200-25 MCG/INH inhaler, Inhale 1 puff Daily., Disp: 1 each, Rfl: 2    Cholecalciferol (VITAMIN D3) 2000 units capsule, Take 1 capsule by mouth Daily., Disp: , Rfl:     Cyanocobalamin (VITAMIN B12) 1000 MCG tablet controlled-release, Take 2,500 mcg by mouth Daily., Disp: , Rfl:     ferrous sulfate 325 (65 FE) MG tablet, Take 1 tablet by mouth 3 (Three) Times a Week. Monday, Wednesday and Friday., Disp: 45 tablet, Rfl: 1    furosemide (LASIX) 40 MG tablet, Take 1 tablet by mouth 3 (Three) Times a Week. Tuesday, Thursday and Saturday., Disp: , Rfl:     spironolactone (ALDACTONE) 25 MG tablet, TAKE 1 TABLET BY MOUTH EVERY OTHER DAY, Disp: 45 tablet, Rfl: 1    triamcinolone (KENALOG) 0.1 % cream, Apply 1 application topically to the appropriate area as directed 2 (Two) Times a Day As Needed for  Irritation., Disp: 80 g, Rfl: 1    baclofen (LIORESAL) 10 MG tablet, TAKE 1 TABLET BY MOUTH AT NIGHT AS NEEDED FOR MUSCLE SPASMS, Disp: 30 tablet, Rfl: 0    metoprolol succinate XL (TOPROL-XL) 25 MG 24 hr tablet, Take 1 tablet by mouth Daily., Disp: 90 tablet, Rfl: 3    Problem List:  Patient Active Problem List   Diagnosis    Grief reaction    Alopecia    Arthritis    Back pain    Encounter for general adult medical examination without abnormal findings    Hypertension    Lung nodule    Vitamin D deficiency    Shortness of breath    Postmenopausal status    Nevus, non-neoplastic    Medicare annual wellness visit, subsequent    Fam hx-ischem heart disease    FH: thyroid condition    Cellulitis of right anterior lower leg    Vasculitis of skin    Pedal edema    Bilateral calf pain    Neck pain    Immunization reaction    Mixed hyperlipidemia    Tremor    Bright red rectal bleeding    Acute maxillary sinusitis    Acute pain of right knee    Chronic pain of right knee    Need for vaccination    Lung nodules    Tachycardia    Essential (primary) hypertension     Dermatitis    A-fib    Leukopenia    Anemia    Primary osteoarthritis of right knee    Bronchogenic cancer of right lung    Iron deficiency    Malabsorption due to intolerance, not elsewhere classified    Dyspnea    Other iron deficiency anemias    Syncope    Paroxysmal atrial fibrillation    Bilateral impacted cerumen    Acute anterior epistaxis    Callus of toe    Bronchitis    Presence of Watchman left atrial appendage closure device    Primary osteoarthritis of both knees     Past Medical History:  Past Medical History:   Diagnosis Date    A-fib     A-fib     Anemia     Arthritis     Asthma     Cancer     lung nodule; 15 radiation treatment    CHF (congestive heart failure)     Femoral artery pseudo-aneurysm, 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    with Watchman procedure; surgically fixed the next day    Heart murmur     Hypertension      Past Surgical History:  Past Surgical  History:   Procedure Laterality Date    ATRIAL APPENDAGE EXCLUSION LEFT WITH TRANSESOPHAGEAL ECHOCARDIOGRAM Right 7/7/2022    Procedure: Atrial Appendage Occlusion watchman team aware $;  Surgeon: Sage Garcia MD;  Location: [facility] CATH INVASIVE LOCATION;  Service: Cardiovascular;  Laterality: Right;    ATRIAL APPENDAGE EXCLUSION LEFT WITH TRANSESOPHAGEAL ECHOCARDIOGRAM N/A 7/7/2022    Procedure: Atrial Appendage Occlusion;  Surgeon: Fide Mullen MD;  Location: [facility] CATH INVASIVE LOCATION;  Service: Cardiovascular;  Laterality: N/A;    CATARACT EXTRACTION      CHOLECYSTECTOMY      HYSTERECTOMY  1987    Endometriosis    OOPHORECTOMY      PSEUDO ANEURYSM REPAIR, EXTREMITY Right 7/8/2022    Procedure: REPAIR OF POST CATHETERIZATION RIGHT FEMORAL PSEUDOANEURYSM AND ARTERIOVENOUS FISTUAL;  Surgeon: Alfonzo Hooks MD;  Location: [facility] MAIN OR;  Service: Vascular;  Laterality: Right;    TONSILLECTOMY       Social History:  Social History     Socioeconomic History    Marital status:    Tobacco Use    Smoking status: Never    Smokeless tobacco: Never   Vaping Use    Vaping Use: Never used   Substance and Sexual Activity    Alcohol use: No    Drug use: Never    Sexual activity: Not Currently     Allergies:  Allergies   Allergen Reactions    Ciprofloxacin Unknown (See Comments)    Sulfa Antibiotics Unknown (See Comments)    Iodine Unknown - High Severity     Immunizations:  Immunization History   Administered Date(s) Administered    COVID-19 (MODERNA) 1st,2nd,3rd Dose Monovalent 01/20/2021, 02/17/2021, 08/26/2021    COVID-19 (MODERNA) Monovalent Original Booster 01/20/2021, 02/17/2021, 08/26/2021    COVID-19 (PFIZER) BIVALENT 12+YRS 09/10/2022    FLUAD TRI 65YR+ 10/25/2013, 11/13/2014, 09/30/2015, 09/25/2019    Fluad Quad 65+ 09/08/2020, 10/08/2022    Fluzone High Dose =>65 Years ([employer] ONLY) 10/05/2016, 10/17/2017, 09/25/2019    Fluzone High-Dose 65+yrs 11/15/2021, 10/08/2022    H1N1 All  Forms 01/29/2010    Hepatitis A 04/24/2018, 10/24/2018    Influenza Quad Vaccine (Inpatient) 09/08/2020    Pneumococcal Conjugate 13-Valent (PCV13) 02/24/2016    Shingrix 07/01/2019, 10/09/2019            In-Office Procedure(s):  No orders to display        ASCVD RIsk Score::  The ASCVD Risk score (Albert DK, et al., 2019) failed to calculate for the following reasons:    The 2019 ASCVD risk score is only valid for ages 40 to 79    The patient has a prior MI or stroke diagnosis    Imaging:    Results for orders placed in visit on 08/02/23    XR Knee 3 View Bilateral    Narrative  bilateral Knee X-Ray  Indication: Evaluation of pain and discomfort in both knees.  AP, Lateral views  Findings: Moderately advanced osteoarthritis of the knee with narrowing of the medial joint space.  The patellofemoral distance is also somewhat narrowed.  A mild suprapatellar effusion is noted.  no bony lesion  Soft tissues within normal limits  decreased joint spaces  Hardware appropriately positioned not applicable      no prior studies available for comparison.    This patient's x-ray report was graded according to the Kellgren and Nima classification.  This took into account the joint space narrowing, osteophyte formation, sclerosis of the distal femur/proximal tibia along with deformity of those bones.  The findings were indicative of K L grade 2.    X-RAY was ordered and reviewed by Mihai Oneill MD       Results for orders placed during the hospital encounter of 06/14/23    CT Chest Without Contrast Diagnostic    Narrative  CT CHEST WO CONTRAST DIAGNOSTIC    Date of Exam: 6/14/2023 3:17 PM EDT    Indication: Right lung cancer, surveillance imaging post therapy.    Comparison: CT chest without contrast 12/12/2022, 6/15/2022, 10/11/2021; PET/CT 11/8/2021; CT guided lung biopsy 11/5/2021.    Technique: Axial CT images were obtained of the chest without contrast administration.  Sagittal and coronal reconstructions were performed.   Automated exposure control and iterative reconstruction methods were used.    Findings:  Largest nodules on 10/11/2021 CT have essentially resolved. Nodule in the right lateral upper lobe that was biopsied on 11/5/2021 has resolved. Previously seen irregular nodular opacity in the right lung apex on 10/11/2021 CT has essentially resolved,  now with stable appearing biapical pleural-parenchymal scarring. Small lung nodules overall appear stable compared to most recent CT from 12/12/2022, measuring 6 mm in the right lower lobe on axial image 69, 7 mm in the right lower lobe on axial image  64, 5 mm in the left upper lobe on axial image 46. No new or enlarging lung nodules are seen.    There is no pleural or pericardial effusion. Heart size is normal. There are no significant coronary artery calcifications. No lymphadenopathy is seen in the chest. Partially included solid organs of the upper abdomen appear unremarkable for noncontrast  CT. No acute or worrisome osseous abnormality is identified.    Impression  Impression:  Overall stable appearance of small lung nodules measuring up to 7 mm compared to most recent 12/12/2022 CT.  Largest lung nodules seen on 10/11/2021 CT have essentially resolved.      Electronically Signed: Katelynn Wing  6/16/2023 9:51 AM EDT  Workstation ID: [id number]      Results for orders placed during the hospital encounter of 06/14/23    CT Chest Without Contrast Diagnostic    Narrative  CT CHEST WO CONTRAST DIAGNOSTIC    Date of Exam: 6/14/2023 3:17 PM EDT    Indication: Right lung cancer, surveillance imaging post therapy.    Comparison: CT chest without contrast 12/12/2022, 6/15/2022, 10/11/2021; PET/CT 11/8/2021; CT guided lung biopsy 11/5/2021.    Technique: Axial CT images were obtained of the chest without contrast administration.  Sagittal and coronal reconstructions were performed.  Automated exposure control and iterative reconstruction methods were used.    Findings:  Largest  nodules on 10/11/2021 CT have essentially resolved. Nodule in the right lateral upper lobe that was biopsied on 11/5/2021 has resolved. Previously seen irregular nodular opacity in the right lung apex on 10/11/2021 CT has essentially resolved,  now with stable appearing biapical pleural-parenchymal scarring. Small lung nodules overall appear stable compared to most recent CT from 12/12/2022, measuring 6 mm in the right lower lobe on axial image 69, 7 mm in the right lower lobe on axial image  64, 5 mm in the left upper lobe on axial image 46. No new or enlarging lung nodules are seen.    There is no pleural or pericardial effusion. Heart size is normal. There are no significant coronary artery calcifications. No lymphadenopathy is seen in the chest. Partially included solid organs of the upper abdomen appear unremarkable for noncontrast  CT. No acute or worrisome osseous abnormality is identified.    Impression  Impression:  Overall stable appearance of small lung nodules measuring up to 7 mm compared to most recent 12/12/2022 CT.  Largest lung nodules seen on 10/11/2021 CT have essentially resolved.      Electronically Signed: Katelynn Wing  6/16/2023 9:51 AM EDT  Workstation ID: [id number]      Lab Review:   Lab on 07/31/2023   Component Date Value    Uric Acid 07/31/2023 5.1    Office Visit on 07/28/2023   Component Date Value    Glucose 07/31/2023 99     BUN 07/31/2023 23     Creatinine 07/31/2023 0.91     Sodium 07/31/2023 141     Potassium 07/31/2023 5.1     Chloride 07/31/2023 105     CO2 07/31/2023 27.0     Calcium 07/31/2023 9.8     Total Protein 07/31/2023 7.0     Albumin 07/31/2023 4.7     ALT (SGPT) 07/31/2023 17     AST (SGOT) 07/31/2023 17     Alkaline Phosphatase 07/31/2023 85     Total Bilirubin 07/31/2023 0.6     Globulin 07/31/2023 2.3     A/G Ratio 07/31/2023 2.0     BUN/Creatinine Ratio 07/31/2023 25.3 (H)     Anion Gap 07/31/2023 9.0     eGFR 07/31/2023 62.7     Total Cholesterol 07/31/2023 185      Triglycerides 07/31/2023 64     HDL Cholesterol 07/31/2023 68 (H)     LDL Cholesterol  07/31/2023 105 (H)     VLDL Cholesterol 07/31/2023 12     LDL/HDL Ratio 07/31/2023 1.53    Hospital Outpatient Visit on 07/05/2023   Component Date Value    Echo EF Estimated 07/05/2023 60.0    Office Visit on 06/21/2023   Component Date Value    Glucose 06/21/2023 99     BUN 06/21/2023 29 (H)     Creatinine 06/21/2023 1.28 (H)     Sodium 06/21/2023 141     Potassium 06/21/2023 4.4     Chloride 06/21/2023 103     CO2 06/21/2023 24.0     Calcium 06/21/2023 9.7     Total Protein 06/21/2023 7.1     Albumin 06/21/2023 4.6     ALT (SGPT) 06/21/2023 11     AST (SGOT) 06/21/2023 16     Alkaline Phosphatase 06/21/2023 87     Total Bilirubin 06/21/2023 0.5     Globulin 06/21/2023 2.5     A/G Ratio 06/21/2023 1.8     BUN/Creatinine Ratio 06/21/2023 22.7     Anion Gap 06/21/2023 14.0     eGFR 06/21/2023 41.7 (L)     Iron 06/21/2023 41     Iron Saturation (TSAT) 06/21/2023 12 (L)     Transferrin 06/21/2023 236     TIBC 06/21/2023 352     Ferritin 06/21/2023 560.40 (H)     Transferrin Receptor 06/21/2023 16.9    Lab on 06/21/2023   Component Date Value    Glucose 06/21/2023 103 (H)     BUN 06/21/2023 29 (H)     Creatinine 06/21/2023 1.24 (H)     Sodium 06/21/2023 141     Potassium 06/21/2023 4.2     Chloride 06/21/2023 103     CO2 06/21/2023 25.0     Calcium 06/21/2023 9.7     Total Protein 06/21/2023 7.6     Albumin 06/21/2023 4.4     ALT (SGPT) 06/21/2023 12     AST (SGOT) 06/21/2023 17     Alkaline Phosphatase 06/21/2023 84     Total Bilirubin 06/21/2023 0.5     Globulin 06/21/2023 3.2     A/G Ratio 06/21/2023 1.4     BUN/Creatinine Ratio 06/21/2023 23.4     Anion Gap 06/21/2023 13.0     eGFR 06/21/2023 43.3 (L)     WBC 06/21/2023 10.02     RBC 06/21/2023 4.99     Hemoglobin 06/21/2023 12.9     Hematocrit 06/21/2023 41.6     MCV 06/21/2023 83.4     MCH 06/21/2023 25.9 (L)     MCHC 06/21/2023 31.0 (L)     RDW 06/21/2023 14.0     RDW-SD  06/21/2023 41.8     MPV 06/21/2023 9.4     Platelets 06/21/2023 279     Neutrophil % 06/21/2023 81.5 (H)     Lymphocyte % 06/21/2023 12.5 (L)     Monocyte % 06/21/2023 5.0     Eosinophil % 06/21/2023 0.9     Basophil % 06/21/2023 0.1     Neutrophils, Absolute 06/21/2023 8.17 (H)     Lymphocytes, Absolute 06/21/2023 1.25     Monocytes, Absolute 06/21/2023 0.50     Eosinophils, Absolute 06/21/2023 0.09     Basophils, Absolute 06/21/2023 0.01     Extra Tube 06/21/2023 Hold for add-ons.      Recent labs reviewed and interpreted for clinical significance and application            Level of Care:           Jeremy Mccall MD  09/13/23  .

## 2023-09-21 ENCOUNTER — OFFICE VISIT (OUTPATIENT)
Dept: FAMILY MEDICINE CLINIC | Facility: CLINIC | Age: 83
End: 2023-09-21
Payer: MEDICARE

## 2023-09-21 ENCOUNTER — TELEPHONE (OUTPATIENT)
Dept: FAMILY MEDICINE CLINIC | Facility: CLINIC | Age: 83
End: 2023-09-21
Payer: MEDICARE

## 2023-09-21 VITALS
DIASTOLIC BLOOD PRESSURE: 72 MMHG | SYSTOLIC BLOOD PRESSURE: 128 MMHG | TEMPERATURE: 97.5 F | BODY MASS INDEX: 25.07 KG/M2 | OXYGEN SATURATION: 95 % | HEIGHT: 61 IN | HEART RATE: 92 BPM | WEIGHT: 132.8 LBS | RESPIRATION RATE: 16 BRPM

## 2023-09-21 DIAGNOSIS — J06.9 UPPER RESPIRATORY TRACT INFECTION DUE TO COVID-19 VIRUS: ICD-10-CM

## 2023-09-21 DIAGNOSIS — U07.1 UPPER RESPIRATORY TRACT INFECTION DUE TO COVID-19 VIRUS: ICD-10-CM

## 2023-09-21 DIAGNOSIS — J06.9 ACUTE URI: Primary | ICD-10-CM

## 2023-09-21 DIAGNOSIS — R05.9 COUGH IN ADULT: ICD-10-CM

## 2023-09-21 LAB
EXPIRATION DATE: ABNORMAL
FLUAV AG UPPER RESP QL IA.RAPID: NOT DETECTED
FLUBV AG UPPER RESP QL IA.RAPID: NOT DETECTED
INTERNAL CONTROL: ABNORMAL
Lab: ABNORMAL
SARS-COV-2 AG UPPER RESP QL IA.RAPID: DETECTED

## 2023-09-21 PROCEDURE — 1159F MED LIST DOCD IN RCRD: CPT | Performed by: FAMILY MEDICINE

## 2023-09-21 PROCEDURE — 87428 SARSCOV & INF VIR A&B AG IA: CPT | Performed by: FAMILY MEDICINE

## 2023-09-21 PROCEDURE — 3074F SYST BP LT 130 MM HG: CPT | Performed by: FAMILY MEDICINE

## 2023-09-21 PROCEDURE — 1160F RVW MEDS BY RX/DR IN RCRD: CPT | Performed by: FAMILY MEDICINE

## 2023-09-21 PROCEDURE — 3078F DIAST BP <80 MM HG: CPT | Performed by: FAMILY MEDICINE

## 2023-09-21 PROCEDURE — 99213 OFFICE O/P EST LOW 20 MIN: CPT | Performed by: FAMILY MEDICINE

## 2023-09-21 RX ORDER — ACETAMINOPHEN 500 MG
500 TABLET ORAL EVERY 6 HOURS PRN
COMMUNITY

## 2023-09-21 RX ORDER — LORATADINE 10 MG/1
10 TABLET ORAL DAILY
COMMUNITY

## 2023-09-21 NOTE — TELEPHONE ENCOUNTER
Caller: Chrystal Ruiz    Relationship to patient: Self    Best call back number: [phone]    Chief complaint: COUGH, CONGESTION    Type of visit: OFFICE, SAME DAY    Requested date: 09/22/23     If rescheduling, when is the original appointment: N/A     Additional notes:PATIENT CALLED AND SAID SHE THINKS SHE IS GETTING A RESPIRATORY INFECTION AND IS AFRAID IT WILL GO INTO PNEUMONIA IF SHE DOESN'T BEGIN TAKING SOMETHING FOR IT SOON    HUB DID NOT SEE AN OPENING WITH DR. DALLAS UNTIL NEXT WEEK, REQUESTED CALLBACK

## 2023-09-21 NOTE — ASSESSMENT & PLAN NOTE
Discussed symptom management, fluids, quarantine for 5 days since onset of Sx and mask mandate for additional 5 days.    Rx Paxlovid take as directed.  Call us back if symptoms do not get better.

## 2023-09-21 NOTE — PROGRESS NOTES
Subjective   Chrystal Ruiz is a 82 y.o. female.     URI   This is a new problem. Episode onset: in the past 3 days. The problem has been waxing and waning. There has been no fever. Associated symptoms include congestion, coughing and rhinorrhea. Pertinent negatives include no chest pain, ear pain, headaches, nausea, sore throat or wheezing. She has tried increased fluids for the symptoms.      The following portions of the patient's history were reviewed and updated as appropriate: past medical history, past social history, past surgical history and problem list.    Review of Systems   Constitutional:  Negative for fever.   HENT:  Positive for congestion, postnasal drip and rhinorrhea. Negative for ear pain and sore throat.    Respiratory:  Positive for cough. Negative for shortness of breath and wheezing.    Cardiovascular:  Negative for chest pain.   Gastrointestinal:  Negative for nausea.     Objective   Physical Exam  Vitals reviewed.   Cardiovascular:      Heart sounds: Normal heart sounds.   Pulmonary:      Effort: Pulmonary effort is normal.      Breath sounds: Normal breath sounds. No wheezing.   Neurological:      Mental Status: She is alert and oriented to person, place, and time.     Vitals:    09/21/23 1458   BP: 128/72   Pulse: 92   Resp: 16   Temp: 97.5 °F (36.4 °C)   SpO2: 95%     Current Outpatient Medications on File Prior to Visit   Medication Sig Dispense Refill    acetaminophen (TYLENOL) 500 MG tablet Take 1 tablet by mouth Every 6 (Six) Hours As Needed for Mild Pain.      baclofen (LIORESAL) 10 MG tablet TAKE 1 TABLET BY MOUTH AT NIGHT AS NEEDED FOR MUSCLE SPASMS 30 tablet 0    Breo Ellipta 200-25 MCG/INH inhaler Inhale 1 puff Daily. 1 each 2    Cholecalciferol (VITAMIN D3) 2000 units capsule Take 1 capsule by mouth Daily.      Cyanocobalamin (VITAMIN B12) 1000 MCG tablet controlled-release Take 2,500 mcg by mouth Daily.      ferrous sulfate 325 (65 FE) MG tablet Take 1 tablet by mouth 3 (Three)  Times a Week. Monday, Wednesday and Friday. 45 tablet 1    furosemide (LASIX) 40 MG tablet Take 1 tablet by mouth 3 (Three) Times a Week. Tuesday, Thursday and Saturday.      loratadine (CLARITIN) 10 MG tablet Take 1 tablet by mouth Daily.      metoprolol succinate XL (TOPROL-XL) 25 MG 24 hr tablet Take 1 tablet by mouth Daily. 90 tablet 3    spironolactone (ALDACTONE) 25 MG tablet TAKE 1 TABLET BY MOUTH EVERY OTHER DAY 45 tablet 1    triamcinolone (KENALOG) 0.1 % cream Apply 1 application topically to the appropriate area as directed 2 (Two) Times a Day As Needed for Irritation. 80 g 1     No current facility-administered medications on file prior to visit.           Assessment & Plan   Problems Addressed this Visit          ENT    Acute URI - Primary    Relevant Orders    POCT SARS-CoV-2 Antigen PRISCILA (Completed)       Pulmonary and Pneumonias    Cough in adult    Relevant Orders    POCT SARS-CoV-2 Antigen PRISCILA (Completed)       Other    Upper respiratory tract infection due to COVID-19 virus     Discussed symptom management, fluids, quarantine for 5 days since onset of Sx and mask mandate for additional 5 days.    Rx Paxlovid take as directed.  Call us back if symptoms do not get better.           Relevant Medications    Nirmatrelvir&Ritonavir 150/100 (PAXLOVID) 10 x 150 MG & 10 x 100MG tablet therapy pack tablet (for renal adjustment)    Other Relevant Orders    POCT SARS-CoV-2 Antigen PRISCILA (Completed)     Diagnoses         Codes Comments    Acute URI    -  Primary ICD-10-CM: J06.9  ICD-9-CM: 465.9     Cough in adult     ICD-10-CM: R05.9  ICD-9-CM: 786.2     Upper respiratory tract infection due to COVID-19 virus     ICD-10-CM: U07.1, J06.9  ICD-9-CM: 465.9, 079.89

## 2023-09-24 ENCOUNTER — APPOINTMENT (OUTPATIENT)
Dept: GENERAL RADIOLOGY | Facility: HOSPITAL | Age: 83
End: 2023-09-24
Payer: MEDICARE

## 2023-09-24 ENCOUNTER — NURSE TRIAGE (OUTPATIENT)
Dept: CALL CENTER | Facility: HOSPITAL | Age: 83
End: 2023-09-24
Payer: MEDICARE

## 2023-09-24 ENCOUNTER — HOSPITAL ENCOUNTER (EMERGENCY)
Facility: HOSPITAL | Age: 83
Discharge: HOME | End: 2023-09-24
Attending: EMERGENCY MEDICINE | Admitting: EMERGENCY MEDICINE
Payer: MEDICARE

## 2023-09-24 VITALS
DIASTOLIC BLOOD PRESSURE: 65 MMHG | RESPIRATION RATE: 18 BRPM | WEIGHT: 132 LBS | HEIGHT: 61 IN | OXYGEN SATURATION: 95 % | BODY MASS INDEX: 24.92 KG/M2 | SYSTOLIC BLOOD PRESSURE: 126 MMHG | HEART RATE: 76 BPM

## 2023-09-24 DIAGNOSIS — R06.00 DYSPNEA, UNSPECIFIED TYPE: ICD-10-CM

## 2023-09-24 DIAGNOSIS — U07.1 COVID-19: Primary | ICD-10-CM

## 2023-09-24 LAB
ALBUMIN SERPL-MCNC: 4.1 G/DL (ref 3.5–5.2)
ALBUMIN/GLOB SERPL: 1.4 G/DL
ALP SERPL-CCNC: 95 U/L (ref 39–117)
ALT SERPL W P-5'-P-CCNC: 35 U/L (ref 1–33)
ANION GAP SERPL CALCULATED.3IONS-SCNC: 16 MMOL/L (ref 5–15)
AST SERPL-CCNC: 25 U/L (ref 1–32)
BASOPHILS # BLD AUTO: 0 10*3/MM3 (ref 0–0.2)
BASOPHILS NFR BLD AUTO: 0.2 % (ref 0–1.5)
BILIRUB SERPL-MCNC: 0.5 MG/DL (ref 0–1.2)
BUN SERPL-MCNC: 44 MG/DL (ref 8–23)
BUN/CREAT SERPL: 27.8 (ref 7–25)
CALCIUM SPEC-SCNC: 9.4 MG/DL (ref 8.6–10.5)
CHLORIDE SERPL-SCNC: 98 MMOL/L (ref 98–107)
CO2 SERPL-SCNC: 25 MMOL/L (ref 22–29)
CREAT SERPL-MCNC: 1.58 MG/DL (ref 0.57–1)
DEPRECATED RDW RBC AUTO: 43.3 FL (ref 37–54)
EGFRCR SERPLBLD CKD-EPI 2021: 32.4 ML/MIN/1.73
EOSINOPHIL # BLD AUTO: 0 10*3/MM3 (ref 0–0.4)
EOSINOPHIL NFR BLD AUTO: 0.1 % (ref 0.3–6.2)
ERYTHROCYTE [DISTWIDTH] IN BLOOD BY AUTOMATED COUNT: 14.7 % (ref 12.3–15.4)
GLOBULIN UR ELPH-MCNC: 3 GM/DL
GLUCOSE SERPL-MCNC: 116 MG/DL (ref 65–99)
HCT VFR BLD AUTO: 42.7 % (ref 34–46.6)
HGB BLD-MCNC: 14.3 G/DL (ref 12–15.9)
LYMPHOCYTES # BLD AUTO: 0.8 10*3/MM3 (ref 0.7–3.1)
LYMPHOCYTES NFR BLD AUTO: 12.1 % (ref 19.6–45.3)
MCH RBC QN AUTO: 26.7 PG (ref 26.6–33)
MCHC RBC AUTO-ENTMCNC: 33.4 G/DL (ref 31.5–35.7)
MCV RBC AUTO: 80 FL (ref 79–97)
MONOCYTES # BLD AUTO: 0.5 10*3/MM3 (ref 0.1–0.9)
MONOCYTES NFR BLD AUTO: 8 % (ref 5–12)
NEUTROPHILS NFR BLD AUTO: 5.4 10*3/MM3 (ref 1.7–7)
NEUTROPHILS NFR BLD AUTO: 79.6 % (ref 42.7–76)
NRBC BLD AUTO-RTO: 0.1 /100 WBC (ref 0–0.2)
NT-PROBNP SERPL-MCNC: 605.4 PG/ML (ref 0–1800)
PLATELET # BLD AUTO: 272 10*3/MM3 (ref 140–450)
PMV BLD AUTO: 8.3 FL (ref 6–12)
POTASSIUM SERPL-SCNC: 4.9 MMOL/L (ref 3.5–5.2)
PROT SERPL-MCNC: 7.1 G/DL (ref 6–8.5)
RBC # BLD AUTO: 5.34 10*6/MM3 (ref 3.77–5.28)
SODIUM SERPL-SCNC: 139 MMOL/L (ref 136–145)
TROPONIN T SERPL HS-MCNC: 39 NG/L
WBC NRBC COR # BLD: 6.8 10*3/MM3 (ref 3.4–10.8)
WHOLE BLOOD HOLD COAG: NORMAL

## 2023-09-24 PROCEDURE — 71045 X-RAY EXAM CHEST 1 VIEW: CPT

## 2023-09-24 PROCEDURE — 80053 COMPREHEN METABOLIC PANEL: CPT | Performed by: PHYSICIAN ASSISTANT

## 2023-09-24 PROCEDURE — 94640 AIRWAY INHALATION TREATMENT: CPT

## 2023-09-24 PROCEDURE — 94799 UNLISTED PULMONARY SVC/PX: CPT

## 2023-09-24 PROCEDURE — 93005 ELECTROCARDIOGRAM TRACING: CPT | Performed by: PHYSICIAN ASSISTANT

## 2023-09-24 PROCEDURE — 25010000002 DEXAMETHASONE PER 1 MG: Performed by: EMERGENCY MEDICINE

## 2023-09-24 PROCEDURE — 99284 EMERGENCY DEPT VISIT MOD MDM: CPT

## 2023-09-24 PROCEDURE — 84484 ASSAY OF TROPONIN QUANT: CPT | Performed by: PHYSICIAN ASSISTANT

## 2023-09-24 PROCEDURE — 96374 THER/PROPH/DIAG INJ IV PUSH: CPT

## 2023-09-24 PROCEDURE — 83880 ASSAY OF NATRIURETIC PEPTIDE: CPT | Performed by: PHYSICIAN ASSISTANT

## 2023-09-24 PROCEDURE — 85025 COMPLETE CBC W/AUTO DIFF WBC: CPT | Performed by: PHYSICIAN ASSISTANT

## 2023-09-24 RX ORDER — DEXAMETHASONE SODIUM PHOSPHATE 4 MG/ML
6 INJECTION, SOLUTION INTRA-ARTICULAR; INTRALESIONAL; INTRAMUSCULAR; INTRAVENOUS; SOFT TISSUE ONCE
Status: COMPLETED | OUTPATIENT
Start: 2023-09-24 | End: 2023-09-24

## 2023-09-24 RX ORDER — ALBUTEROL SULFATE 2.5 MG/3ML
2.5 SOLUTION RESPIRATORY (INHALATION) ONCE
Status: DISCONTINUED | OUTPATIENT
Start: 2023-09-24 | End: 2023-09-24 | Stop reason: HOSPADM

## 2023-09-24 RX ORDER — SODIUM CHLORIDE 0.9 % (FLUSH) 0.9 %
10 SYRINGE (ML) INJECTION AS NEEDED
Status: DISCONTINUED | OUTPATIENT
Start: 2023-09-24 | End: 2023-09-24 | Stop reason: HOSPADM

## 2023-09-24 RX ORDER — ALBUTEROL SULFATE 90 UG/1
2 AEROSOL, METERED RESPIRATORY (INHALATION) ONCE
Status: COMPLETED | OUTPATIENT
Start: 2023-09-24 | End: 2023-09-24

## 2023-09-24 RX ADMIN — DEXAMETHASONE SODIUM PHOSPHATE 6 MG: 4 INJECTION, SOLUTION INTRAMUSCULAR; INTRAVENOUS at 20:15

## 2023-09-24 RX ADMIN — ALBUTEROL SULFATE 2 PUFF: 108 INHALANT RESPIRATORY (INHALATION) at 20:42

## 2023-09-24 NOTE — TELEPHONE ENCOUNTER
"Reason for Disposition   MODERATE difficulty breathing (e.g., speaks in phrases, SOB even at rest, pulse 100-120)    Additional Information   Negative: SEVERE difficulty breathing (e.g., struggling for each breath, speaks in single words)   Negative: Difficult to awaken or acting confused (e.g., disoriented, slurred speech)   Negative: Bluish (or gray) lips or face now   Negative: Shock suspected (e.g., cold/pale/clammy skin, too weak to stand, low BP, rapid pulse)   Negative: Sounds like a life-threatening emergency to the triager   Negative: [1] Diagnosed or suspected COVID-19 AND [2] symptoms lasting 3 or more weeks   Negative: [1] COVID-19 exposure AND [2] no symptoms   Negative: COVID-19 vaccine reaction suspected (e.g., fever, headache, muscle aches) occurring 1 to 3 days after getting vaccine   Negative: COVID-19 vaccine, questions about   Negative: [1] Lives with someone known to have influenza (flu test positive) AND [2] flu-like symptoms (e.g., cough, runny nose, sore throat, SOB; with or without fever)   Negative: [1] Possible COVID-19 symptoms AND [2] triager concerned about severity of symptoms or other causes   Negative: COVID-19 and breastfeeding, questions about   Negative: SEVERE or constant chest pain or pressure  (Exception: Mild central chest pain, present only when coughing.)    Answer Assessment - Initial Assessment Questions  1. COVID-19 DIAGNOSIS: \"How do you know that you have COVID?\" (e.g., positive lab test or self-test, diagnosed by doctor or NP/PA, symptoms after exposure).      9/20  2. COVID-19 EXPOSURE: \"Was there any known exposure to COVID before the symptoms began?\" CDC Definition of close contact: within 6 feet (2 meters) for a total of 15 minutes or more over a 24-hour period.       no  3. ONSET: \"When did the COVID-19 symptoms start?\"       9/19  4. WORST SYMPTOM: \"What is your worst symptom?\" (e.g., cough, fever, shortness of breath, muscle aches)      Short of air  5. COUGH: " "\"Do you have a cough?\" If Yes, ask: \"How bad is the cough?\"        Dry cough  6. FEVER: \"Do you have a fever?\" If Yes, ask: \"What is your temperature, how was it measured, and when did it start?\"      No fever  7. RESPIRATORY STATUS: \"Describe your breathing?\" (e.g., normal; shortness of breath, wheezing, unable to speak)       Today SOA at rest  8. BETTER-SAME-WORSE: \"Are you getting better, staying the same or getting worse compared to yesterday?\"  If getting worse, ask, \"In what way?\"      worse  9. OTHER SYMPTOMS: \"Do you have any other symptoms?\"  (e.g., chills, fatigue, headache, loss of smell or taste, muscle pain, sore throat)      Fatigue, runny nose  10. 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    11. VACCINE: \"Have you had the COVID-19 vaccine?\" If Yes, ask: \"Which one, how many shots, when did you get it?\"        Fully vaccinated and boosters  12. PREGNANCY: \"Is there any chance you are pregnant?\" \"When was your last menstrual period?\"        na  13. O2 SATURATION MONITOR:  \"Do you use an oxygen saturation monitor (pulse oximeter) at home?\" If Yes, ask \"What is your reading (oxygen level) today?\" \"What is your usual oxygen saturation reading?\" (e.g., 95%)        unknown    Protocols used: Coronavirus (COVID-19) Diagnosed or Suspected-ADULT-AH    "

## 2023-09-24 NOTE — ED PROVIDER NOTES
Subjective   Provider in Triage Note  Patient is an 82-year-old female PMH significant for A-fib, history of lung cancer, CHF, hypertension presenting to the ED with complaints of worsening dyspnea today.  Patient states that she went to her PCPs office for what she thought was a sinus infection on Thursday was found to be positive for COVID.  Patient states she has been on Paxlovid.  He does report productive cough with clear sputum rhinorrhea.  Denies any significant fever or chills.  She reports chest pain worse with deep breaths.     Due to significant overcrowding in the emergency department patient was initially seen and evaluated in triage.  Provider in triage recommended patient placed in treatment area to initiate therapy and movement to an ER bed as soon as possible.      History of Present Illness    Review of Systems   Constitutional:  Negative for fever.   HENT:  Positive for congestion and rhinorrhea.    Respiratory:  Positive for cough and shortness of breath.    Cardiovascular:  Positive for chest pain.   Gastrointestinal:  Negative for abdominal pain, diarrhea and vomiting.   Musculoskeletal:  Negative for back pain.   Neurological:  Negative for headaches.   Psychiatric/Behavioral:  Negative for confusion.      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       Past Surgical History:   Procedure Laterality Date    ATRIAL APPENDAGE  "EXCLUSION LEFT WITH TRANSESOPHAGEAL ECHOCARDIOGRAM Right 7/7/2022    Procedure: Atrial Appendage Occlusion watchman team aware $;  Surgeon: Sage Garcia MD;  Location: [facility] CATH INVASIVE LOCATION;  Service: Cardiovascular;  Laterality: Right;    ATRIAL APPENDAGE EXCLUSION LEFT WITH TRANSESOPHAGEAL ECHOCARDIOGRAM N/A 7/7/2022    Procedure: Atrial Appendage Occlusion;  Surgeon: Fide Mullen MD;  Location: [facility] CATH INVASIVE LOCATION;  Service: Cardiovascular;  Laterality: N/A;    CATARACT EXTRACTION      CHOLECYSTECTOMY      HYSTERECTOMY  1987    Endometriosis    OOPHORECTOMY      PSEUDO ANEURYSM REPAIR, EXTREMITY Right 7/8/2022    Procedure: REPAIR OF POST CATHETERIZATION RIGHT FEMORAL PSEUDOANEURYSM AND ARTERIOVENOUS FISTUAL;  Surgeon: Alfonzo Hooks MD;  Location: [facility] MAIN OR;  Service: Vascular;  Laterality: Right;    TONSILLECTOMY         Family History   Problem Relation Age of Onset    Breast cancer Mother 85    Stroke Mother     Heart disease Mother     Endometrial cancer Mother 70    Heart failure Father     Heart failure Sister     COPD Sister     Heart disease Sister        Social History     Socioeconomic History    Marital status:    Tobacco Use    Smoking status: Never    Smokeless tobacco: Never   Vaping Use    Vaping Use: Never used   Substance and Sexual Activity    Alcohol use: No    Drug use: Never    Sexual activity: Not Currently       /70   Pulse 70   Resp 28   Ht 154.9 cm (61\")   Wt 59.9 kg (132 lb)   SpO2 94%   BMI 24.94 kg/m²       Objective   Physical Exam  Vitals and nursing note reviewed.   Constitutional:       Appearance: She is well-developed.   HENT:      Head: Normocephalic and atraumatic.      Mouth/Throat:      Mouth: Mucous membranes are moist.   Cardiovascular:      Rate and Rhythm: Normal rate and regular rhythm.      Heart sounds: Normal heart sounds.   Pulmonary:      Effort: Pulmonary effort is normal. No respiratory distress. "      Breath sounds: Normal breath sounds.   Abdominal:      General: Bowel sounds are normal.      Palpations: Abdomen is soft.      Tenderness: There is no abdominal tenderness.   Musculoskeletal:      Right lower leg: No tenderness. No edema.      Left lower leg: No tenderness. No edema.   Skin:     General: Skin is warm and dry.   Neurological:      Mental Status: She is alert and oriented to person, place, and time.       Procedures           ED Course      My interpretation of EKG shows sinus tachycardia, rate of 113, no ST elevation     Results for orders placed or performed during the hospital encounter of 09/24/23   Comprehensive Metabolic Panel    Specimen: Blood   Result Value Ref Range    Glucose 116 (H) 65 - 99 mg/dL    BUN 44 (H) 8 - 23 mg/dL    Creatinine 1.58 (H) 0.57 - 1.00 mg/dL    Sodium 139 136 - 145 mmol/L    Potassium 4.9 3.5 - 5.2 mmol/L    Chloride 98 98 - 107 mmol/L    CO2 25.0 22.0 - 29.0 mmol/L    Calcium 9.4 8.6 - 10.5 mg/dL    Total Protein 7.1 6.0 - 8.5 g/dL    Albumin 4.1 3.5 - 5.2 g/dL    ALT (SGPT) 35 (H) 1 - 33 U/L    AST (SGOT) 25 1 - 32 U/L    Alkaline Phosphatase 95 39 - 117 U/L    Total Bilirubin 0.5 0.0 - 1.2 mg/dL    Globulin 3.0 gm/dL    A/G Ratio 1.4 g/dL    BUN/Creatinine Ratio 27.8 (H) 7.0 - 25.0    Anion Gap 16.0 (H) 5.0 - 15.0 mmol/L    eGFR 32.4 (L) >60.0 mL/min/1.73   BNP    Specimen: Blood   Result Value Ref Range    proBNP 605.4 0.0 - 1,800.0 pg/mL   Single High Sensitivity Troponin T    Specimen: Blood   Result Value Ref Range    HS Troponin T 39 (H) <10 ng/L   CBC Auto Differential    Specimen: Blood   Result Value Ref Range    WBC 6.80 3.40 - 10.80 10*3/mm3    RBC 5.34 (H) 3.77 - 5.28 10*6/mm3    Hemoglobin 14.3 12.0 - 15.9 g/dL    Hematocrit 42.7 34.0 - 46.6 %    MCV 80.0 79.0 - 97.0 fL    MCH 26.7 26.6 - 33.0 pg    MCHC 33.4 31.5 - 35.7 g/dL    RDW 14.7 12.3 - 15.4 %    RDW-SD 43.3 37.0 - 54.0 fl    MPV 8.3 6.0 - 12.0 fL    Platelets 272 140 - 450 10*3/mm3     Neutrophil % 79.6 (H) 42.7 - 76.0 %    Lymphocyte % 12.1 (L) 19.6 - 45.3 %    Monocyte % 8.0 5.0 - 12.0 %    Eosinophil % 0.1 (L) 0.3 - 6.2 %    Basophil % 0.2 0.0 - 1.5 %    Neutrophils, Absolute 5.40 1.70 - 7.00 10*3/mm3    Lymphocytes, Absolute 0.80 0.70 - 3.10 10*3/mm3    Monocytes, Absolute 0.50 0.10 - 0.90 10*3/mm3    Eosinophils, Absolute 0.00 0.00 - 0.40 10*3/mm3    Basophils, Absolute 0.00 0.00 - 0.20 10*3/mm3    nRBC 0.1 0.0 - 0.2 /100 WBC   ECG 12 Lead Chest Pain   Result Value Ref Range    QT Interval 334 ms    QTC Interval 458 ms   Light Blue Top   Result Value Ref Range    Extra Tube Hold for add-ons.      XR Chest 1 View    Result Date: 9/24/2023  Impression: 1. No acute cardiopulmonary abnormality. Electronically Signed: Tyson Espinosa  9/24/2023 7:13 PM EDT  Workstation ID: [id number]                                   Medical Decision Making  Amount and/or Complexity of Data Reviewed  Labs: ordered.  Radiology: ordered.  ECG/medicine tests: ordered.    Risk  Prescription drug management.      Patient had the above evaluation.  Results were discussed with the patient.  My interpretation of chest x-ray shows no infiltrate or effusion.  EKG shows no acute ischemia.  Troponin is borderline elevated at 39.  BNP is normal.  White blood cell count is normal.  CMP significant for mild acute kidney injury with BUN 44 and creatinine 1.58.  Patient is oxygenating well on room air.  Lungs are clear on exam.  She states she normally takes Breo but she was told not to take this while she is taking Paxlovid.  She feels like she needs a breathing treatment.  She was given an albuterol nebulizer breathing treatment in the emergency room.  She was also given a dose of Decadron.  I see no indication for admission at this time.  She has 1 more day of the Paxlovid and then she will be going back on her Breo.  She will be discharged to follow-up with her primary doctor.      Final diagnoses:   COVID-19   Dyspnea,  unspecified type       ED Disposition  ED Disposition       ED Disposition   Discharge    Condition   Stable    Comment   --               Etelvina Ulloa MD  [address] [place] [facility]  [phone]    Call in 2 days           Medication List      No changes were made to your prescriptions during this visit.            Lenny Zhou MD  09/24/23 7953

## 2023-09-25 ENCOUNTER — PATIENT OUTREACH (OUTPATIENT)
Dept: CASE MANAGEMENT | Facility: OTHER | Age: 83
End: 2023-09-25

## 2023-09-25 LAB
QT INTERVAL: 334 MS
QTC INTERVAL: 458 MS

## 2023-09-25 NOTE — OUTREACH NOTE
AMBULATORY CASE MANAGEMENT NOTE    Name and Relationship of Patient/Support Person: Chrystal Ruiz E - Self    Patient Outreach    Pt discharged from [facility] ED on 9/24/23, seen for covid 19, dyspnea. RN-ACM outreach call made to pt. Explained role of RN-ACM and reason for call. Pt praises staff for care received during ED visit. Pt reports SOA has resolved. She c/o occasional cough. Denies CP. Reviewed ED AVS with pt. Education provided. Pt reports she plans to follow up with her PCP. Reviewed SDOH. Pt denies any needs. She reports to have good support from family and friends. Advised pt to call RN-ACM or [facility] Nurse Line with any needs. Follow up outreach prn.     Send Education  Questions/Answers      Flowsheet Row Most Recent Value   Annual Wellness Visit:  Patient Has Completed   Other Patient Education/Resources  24/7 [facility] Nurse Call Line   24/7 Nurse Call Line Education Method Verbal   Advanced Directives: --  [resources provided]          SDOH updated and reviewed with the patient during this program:  Financial Resource Strain: Low Risk     Difficulty of Paying Living Expenses: Not very hard      Food Insecurity: No Food Insecurity    Worried About Running Out of Food in the Last Year: Never true    Ran Out of Food in the Last Year: Never true      Transportation Needs: No Transportation Needs    Lack of Transportation (Medical): No    Lack of Transportation (Non-Medical): No         Valerio HENDERSON  Ambulatory Case Management    9/25/2023, 15:18 EDT

## 2023-09-29 RX ORDER — BACLOFEN 10 MG/1
10 TABLET ORAL NIGHTLY PRN
Qty: 30 TABLET | Refills: 0 | Status: SHIPPED | OUTPATIENT
Start: 2023-09-29

## 2023-10-04 ENCOUNTER — OFFICE VISIT (OUTPATIENT)
Dept: FAMILY MEDICINE CLINIC | Facility: CLINIC | Age: 83
End: 2023-10-04
Payer: MEDICARE

## 2023-10-04 VITALS
TEMPERATURE: 98.4 F | HEIGHT: 61 IN | BODY MASS INDEX: 24.47 KG/M2 | WEIGHT: 129.6 LBS | DIASTOLIC BLOOD PRESSURE: 75 MMHG | SYSTOLIC BLOOD PRESSURE: 138 MMHG | OXYGEN SATURATION: 96 % | HEART RATE: 72 BPM

## 2023-10-04 DIAGNOSIS — U07.1 UPPER RESPIRATORY TRACT INFECTION DUE TO COVID-19 VIRUS: Primary | ICD-10-CM

## 2023-10-04 DIAGNOSIS — J06.9 UPPER RESPIRATORY TRACT INFECTION DUE TO COVID-19 VIRUS: Primary | ICD-10-CM

## 2023-10-04 PROCEDURE — 3078F DIAST BP <80 MM HG: CPT | Performed by: FAMILY MEDICINE

## 2023-10-04 PROCEDURE — 99213 OFFICE O/P EST LOW 20 MIN: CPT | Performed by: FAMILY MEDICINE

## 2023-10-04 PROCEDURE — 1159F MED LIST DOCD IN RCRD: CPT | Performed by: FAMILY MEDICINE

## 2023-10-04 PROCEDURE — 1160F RVW MEDS BY RX/DR IN RCRD: CPT | Performed by: FAMILY MEDICINE

## 2023-10-04 PROCEDURE — 3075F SYST BP GE 130 - 139MM HG: CPT | Performed by: FAMILY MEDICINE

## 2023-10-04 NOTE — PROGRESS NOTES
"Chief Complaint  Hospital Follow Up Visit (COVID pos 9-21-23 )    Subjective        Chrystal Ruiz presents to [facility]  History of Present Illness  She was recently diagnosed with COVID-19.  She went to the ER on 9/24/23. Patient is an 82-year-old female PMH significant for A-fib, history of lung cancer, CHF, hypertension presenting to the ED with complaints of worsening dyspnea. Patient states that she went to her PCPs office for what she thought was a sinus infection on Thursday 9/21/23 and was found to be positive for COVID. Patient states she has been on Paxlovid. She does report productive cough with clear sputum rhinorrhea. Denies any significant fever or chills. She reports chest pain worse with deep breaths. She is now coughing less.  She is now back to using her regular inhalers. She is talkative today without any shortness of breath.     Objective   Vital Signs:  /75   Pulse 72   Temp 98.4 °F (36.9 °C) (Infrared)   Ht 154.9 cm (61\")   Wt 58.8 kg (129 lb 9.6 oz)   SpO2 96%   BMI 24.49 kg/m²   Estimated body mass index is 24.49 kg/m² as calculated from the following:    Height as of this encounter: 154.9 cm (61\").    Weight as of this encounter: 58.8 kg (129 lb 9.6 oz).       BMI is within normal parameters. No other follow-up for BMI required.      Physical Exam  Vitals and nursing note reviewed.   Constitutional:       General: She is not in acute distress.     Appearance: She is well-developed.   HENT:      Head: Normocephalic.   Eyes:      General: Lids are normal.      Conjunctiva/sclera: Conjunctivae normal.   Neck:      Thyroid: No thyroid mass or thyromegaly.      Trachea: Trachea normal.   Cardiovascular:      Rate and Rhythm: Normal rate and regular rhythm.      Heart sounds: Normal heart sounds.   Pulmonary:      Effort: Pulmonary effort is normal.      Breath sounds: Normal breath sounds.   Musculoskeletal:      Cervical back: Normal range of " motion.   Lymphadenopathy:      Cervical: No cervical adenopathy.   Skin:     General: Skin is warm and dry.      Findings: Bruising present.      Comments: Right shin   Neurological:      Mental Status: She is alert and oriented to person, place, and time.   Psychiatric:         Attention and Perception: She is attentive.         Mood and Affect: Mood normal.         Speech: Speech normal.         Behavior: Behavior normal.      Result Review :  The following data was reviewed by: Etelvina Ulloa MD on 10/04/2023:  Common labs          6/21/2023    13:30 7/31/2023    14:22 9/24/2023    18:12   Common Labs   Glucose 103     99  99  116    BUN 29     29  23  44    Creatinine 1.24     1.28  0.91  1.58    Sodium 141     141  141  139    Potassium 4.2     4.4  5.1  4.9    Chloride 103     103  105  98    Calcium 9.7     9.7  9.8  9.4    Albumin 4.4     4.6  4.7  4.1    Total Bilirubin 0.5     0.5  0.6  0.5    Alkaline Phosphatase 84     87  85  95    AST (SGOT) 17     16  17  25    ALT (SGPT) 12     11  17  35    WBC 10.02   6.80    Hemoglobin 12.9   14.3    Hematocrit 41.6   42.7    Platelets 279   272    Total Cholesterol  185     Triglycerides  64     HDL Cholesterol  68     LDL Cholesterol   105     Uric Acid  5.1                    Assessment and Plan   Diagnoses and all orders for this visit:    1. Upper respiratory tract infection due to COVID-19 virus (Primary)  Assessment & Plan:  She is now feeling better.                  Follow Up   No follow-ups on file.  Patient was given instructions and counseling regarding her condition or for health maintenance advice. Please see specific information pulled into the AVS if appropriate.

## 2023-10-25 ENCOUNTER — OFFICE VISIT (OUTPATIENT)
Dept: FAMILY MEDICINE CLINIC | Facility: CLINIC | Age: 83
End: 2023-10-25
Payer: MEDICARE

## 2023-10-25 VITALS
HEART RATE: 74 BPM | OXYGEN SATURATION: 96 % | WEIGHT: 132.6 LBS | SYSTOLIC BLOOD PRESSURE: 125 MMHG | TEMPERATURE: 98 F | BODY MASS INDEX: 25.04 KG/M2 | RESPIRATION RATE: 16 BRPM | HEIGHT: 61 IN | DIASTOLIC BLOOD PRESSURE: 75 MMHG

## 2023-10-25 DIAGNOSIS — Z12.31 ENCOUNTER FOR SCREENING MAMMOGRAM FOR BREAST CANCER: ICD-10-CM

## 2023-10-25 DIAGNOSIS — K22.2 ESOPHAGEAL STRICTURE: Primary | ICD-10-CM

## 2023-10-25 RX ORDER — TRIAMCINOLONE ACETONIDE 1 MG/G
1 CREAM TOPICAL 2 TIMES DAILY PRN
Qty: 80 G | Refills: 1 | Status: SHIPPED | OUTPATIENT
Start: 2023-10-25

## 2023-10-25 NOTE — PROGRESS NOTES
"Chief Complaint  Difficulty Swallowing, Heartburn, and Cough    Subjective        Chrystal Ruiz presents to [facility]  Difficulty Swallowing  This is a new problem. The current episode started 1 to 4 weeks ago. The problem occurs daily. The problem has been unchanged. Associated symptoms include coughing and nausea. Pertinent negatives include no chest pain, chills, congestion or fever. The symptoms are aggravated by eating. She has tried nothing for the symptoms.   Heartburn  She complains of coughing and nausea. She reports no chest pain.   Cough  Pertinent negatives include no chest pain, chills or fever.       Objective   Vital Signs:  /75 (BP Location: Left arm, Patient Position: Sitting, Cuff Size: Adult)   Pulse 74   Temp 98 °F (36.7 °C) (Infrared)   Resp 16   Ht 154.9 cm (61\")   Wt 60.1 kg (132 lb 9.6 oz)   SpO2 96%   BMI 25.05 kg/m²   Estimated body mass index is 25.05 kg/m² as calculated from the following:    Height as of this encounter: 154.9 cm (61\").    Weight as of this encounter: 60.1 kg (132 lb 9.6 oz).               Physical Exam  Vitals and nursing note reviewed.   Constitutional:       General: She is not in acute distress.     Appearance: She is well-developed.   HENT:      Head: Normocephalic.   Eyes:      General: Lids are normal.   Neck:      Thyroid: No thyroid mass or thyromegaly.      Trachea: Trachea normal.   Cardiovascular:      Rate and Rhythm: Normal rate and regular rhythm.      Heart sounds: Normal heart sounds.   Pulmonary:      Effort: Pulmonary effort is normal.      Breath sounds: Normal breath sounds.   Abdominal:      Palpations: Abdomen is soft.   Musculoskeletal:      Cervical back: Normal range of motion.   Lymphadenopathy:      Cervical: No cervical adenopathy.   Skin:     General: Skin is warm and dry.   Neurological:      Mental Status: She is alert and oriented to person, place, and time.   Psychiatric:         Attention " and Perception: She is attentive.         Mood and Affect: Mood normal.         Speech: Speech normal.         Behavior: Behavior normal.        Result Review :  The following data was reviewed by: Etelvina Ulloa MD on 10/25/2023:  Common labs          6/21/2023    13:30 7/31/2023    14:22 9/24/2023    18:12   Common Labs   Glucose 103     99  99  116    BUN 29     29  23  44    Creatinine 1.24     1.28  0.91  1.58    Sodium 141     141  141  139    Potassium 4.2     4.4  5.1  4.9    Chloride 103     103  105  98    Calcium 9.7     9.7  9.8  9.4    Albumin 4.4     4.6  4.7  4.1    Total Bilirubin 0.5     0.5  0.6  0.5    Alkaline Phosphatase 84     87  85  95    AST (SGOT) 17     16  17  25    ALT (SGPT) 12     11  17  35    WBC 10.02   6.80    Hemoglobin 12.9   14.3    Hematocrit 41.6   42.7    Platelets 279   272    Total Cholesterol  185     Triglycerides  64     HDL Cholesterol  68     LDL Cholesterol   105     Uric Acid  5.1                    Assessment and Plan   Diagnoses and all orders for this visit:    1. Esophageal stricture (Primary)  -     Ambulatory referral for Screening EGD    2. Encounter for screening mammogram for breast cancer  -     Mammo Screening Digital Tomosynthesis Bilateral With CAD    Other orders  -     triamcinolone (KENALOG) 0.1 % cream; Apply 1 application  topically to the appropriate area as directed 2 (Two) Times a Day As Needed for Irritation.  Dispense: 80 g; Refill: 1             Follow Up   No follow-ups on file.  Patient was given instructions and counseling regarding her condition or for health maintenance advice. Please see specific information pulled into the AVS if appropriate.

## 2023-11-06 ENCOUNTER — HOSPITAL ENCOUNTER (OUTPATIENT)
Dept: MAMMOGRAPHY | Facility: HOSPITAL | Age: 83
Discharge: HOME | End: 2023-11-06
Admitting: FAMILY MEDICINE
Payer: MEDICARE

## 2023-11-06 PROCEDURE — 77067 SCR MAMMO BI INCL CAD: CPT

## 2023-11-06 PROCEDURE — 77063 BREAST TOMOSYNTHESIS BI: CPT

## 2023-11-12 PROBLEM — Z12.31 ENCOUNTER FOR SCREENING MAMMOGRAM FOR BREAST CANCER: Status: ACTIVE | Noted: 2023-11-12

## 2023-11-12 PROBLEM — K22.2 ESOPHAGEAL STRICTURE: Status: ACTIVE | Noted: 2023-11-12

## 2023-11-15 ENCOUNTER — APPOINTMENT (OUTPATIENT)
Dept: GENERAL RADIOLOGY | Facility: HOSPITAL | Age: 83
End: 2023-11-15
Payer: MEDICARE

## 2023-11-15 ENCOUNTER — HOSPITAL ENCOUNTER (EMERGENCY)
Facility: HOSPITAL | Age: 83
Discharge: HOME | End: 2023-11-16
Attending: EMERGENCY MEDICINE
Payer: MEDICARE

## 2023-11-15 ENCOUNTER — TELEPHONE (OUTPATIENT)
Dept: ORTHOPEDIC SURGERY | Facility: CLINIC | Age: 83
End: 2023-11-15

## 2023-11-15 DIAGNOSIS — R06.00 DYSPNEA, UNSPECIFIED TYPE: ICD-10-CM

## 2023-11-15 DIAGNOSIS — R07.9 CHEST PAIN, UNSPECIFIED TYPE: Primary | ICD-10-CM

## 2023-11-15 DIAGNOSIS — M79.10 MYALGIA: ICD-10-CM

## 2023-11-15 LAB
ALBUMIN SERPL-MCNC: 4.1 G/DL (ref 3.5–5.2)
ALBUMIN/GLOB SERPL: 1.4 G/DL
ALP SERPL-CCNC: 72 U/L (ref 39–117)
ALT SERPL W P-5'-P-CCNC: 8 U/L (ref 1–33)
ANION GAP SERPL CALCULATED.3IONS-SCNC: 13 MMOL/L (ref 5–15)
AST SERPL-CCNC: 13 U/L (ref 1–32)
BACTERIA UR QL AUTO: ABNORMAL /HPF
BASOPHILS # BLD AUTO: 0.1 10*3/MM3 (ref 0–0.2)
BASOPHILS NFR BLD AUTO: 0.6 % (ref 0–1.5)
BILIRUB SERPL-MCNC: 0.7 MG/DL (ref 0–1.2)
BILIRUB UR QL STRIP: NEGATIVE
BUN SERPL-MCNC: 31 MG/DL (ref 8–23)
BUN/CREAT SERPL: 27.4 (ref 7–25)
CALCIUM SPEC-SCNC: 9.7 MG/DL (ref 8.6–10.5)
CHLORIDE SERPL-SCNC: 106 MMOL/L (ref 98–107)
CLARITY UR: CLEAR
CO2 SERPL-SCNC: 25 MMOL/L (ref 22–29)
COLOR UR: YELLOW
CREAT SERPL-MCNC: 1.13 MG/DL (ref 0.57–1)
DEPRECATED RDW RBC AUTO: 42 FL (ref 37–54)
EGFRCR SERPLBLD CKD-EPI 2021: 48.1 ML/MIN/1.73
EOSINOPHIL # BLD AUTO: 0.1 10*3/MM3 (ref 0–0.4)
EOSINOPHIL NFR BLD AUTO: 0.9 % (ref 0.3–6.2)
ERYTHROCYTE [DISTWIDTH] IN BLOOD BY AUTOMATED COUNT: 14.1 % (ref 12.3–15.4)
GEN 5 2HR TROPONIN T REFLEX: 34 NG/L
GLOBULIN UR ELPH-MCNC: 2.9 GM/DL
GLUCOSE SERPL-MCNC: 124 MG/DL (ref 65–99)
GLUCOSE UR STRIP-MCNC: NEGATIVE MG/DL
HCT VFR BLD AUTO: 37 % (ref 34–46.6)
HGB BLD-MCNC: 11.9 G/DL (ref 12–15.9)
HGB UR QL STRIP.AUTO: ABNORMAL
HYALINE CASTS UR QL AUTO: ABNORMAL /LPF
KETONES UR QL STRIP: ABNORMAL
LEUKOCYTE ESTERASE UR QL STRIP.AUTO: NEGATIVE
LYMPHOCYTES # BLD AUTO: 1.2 10*3/MM3 (ref 0.7–3.1)
LYMPHOCYTES NFR BLD AUTO: 14.3 % (ref 19.6–45.3)
MCH RBC QN AUTO: 26.1 PG (ref 26.6–33)
MCHC RBC AUTO-ENTMCNC: 32.1 G/DL (ref 31.5–35.7)
MCV RBC AUTO: 81.2 FL (ref 79–97)
MONOCYTES # BLD AUTO: 0.6 10*3/MM3 (ref 0.1–0.9)
MONOCYTES NFR BLD AUTO: 6.7 % (ref 5–12)
NEUTROPHILS NFR BLD AUTO: 6.6 10*3/MM3 (ref 1.7–7)
NEUTROPHILS NFR BLD AUTO: 77.5 % (ref 42.7–76)
NITRITE UR QL STRIP: NEGATIVE
NRBC BLD AUTO-RTO: 0.1 /100 WBC (ref 0–0.2)
NT-PROBNP SERPL-MCNC: 1866 PG/ML (ref 0–1800)
PH UR STRIP.AUTO: 7 [PH] (ref 5–8)
PLATELET # BLD AUTO: 238 10*3/MM3 (ref 140–450)
PMV BLD AUTO: 8.5 FL (ref 6–12)
POTASSIUM SERPL-SCNC: 4.2 MMOL/L (ref 3.5–5.2)
PROT SERPL-MCNC: 7 G/DL (ref 6–8.5)
PROT UR QL STRIP: NEGATIVE
RBC # BLD AUTO: 4.56 10*6/MM3 (ref 3.77–5.28)
RBC # UR STRIP: ABNORMAL /HPF
REF LAB TEST METHOD: ABNORMAL
SODIUM SERPL-SCNC: 144 MMOL/L (ref 136–145)
SP GR UR STRIP: 1.02 (ref 1–1.03)
SQUAMOUS #/AREA URNS HPF: ABNORMAL /HPF
TROPONIN T DELTA: -5 NG/L
TROPONIN T SERPL HS-MCNC: 39 NG/L
UROBILINOGEN UR QL STRIP: ABNORMAL
WBC # UR STRIP: ABNORMAL /HPF
WBC NRBC COR # BLD: 8.5 10*3/MM3 (ref 3.4–10.8)

## 2023-11-15 PROCEDURE — 85025 COMPLETE CBC W/AUTO DIFF WBC: CPT | Performed by: NURSE PRACTITIONER

## 2023-11-15 PROCEDURE — 72170 X-RAY EXAM OF PELVIS: CPT

## 2023-11-15 PROCEDURE — P9612 CATHETERIZE FOR URINE SPEC: HCPCS

## 2023-11-15 PROCEDURE — 25010000002 MORPHINE PER 10 MG: Performed by: EMERGENCY MEDICINE

## 2023-11-15 PROCEDURE — 93005 ELECTROCARDIOGRAM TRACING: CPT | Performed by: EMERGENCY MEDICINE

## 2023-11-15 PROCEDURE — 36415 COLL VENOUS BLD VENIPUNCTURE: CPT

## 2023-11-15 PROCEDURE — 84484 ASSAY OF TROPONIN QUANT: CPT | Performed by: NURSE PRACTITIONER

## 2023-11-15 PROCEDURE — 83880 ASSAY OF NATRIURETIC PEPTIDE: CPT | Performed by: NURSE PRACTITIONER

## 2023-11-15 PROCEDURE — 71045 X-RAY EXAM CHEST 1 VIEW: CPT

## 2023-11-15 PROCEDURE — 80053 COMPREHEN METABOLIC PANEL: CPT | Performed by: NURSE PRACTITIONER

## 2023-11-15 PROCEDURE — 96375 TX/PRO/DX INJ NEW DRUG ADDON: CPT

## 2023-11-15 PROCEDURE — 96374 THER/PROPH/DIAG INJ IV PUSH: CPT

## 2023-11-15 PROCEDURE — 93005 ELECTROCARDIOGRAM TRACING: CPT

## 2023-11-15 PROCEDURE — 99284 EMERGENCY DEPT VISIT MOD MDM: CPT

## 2023-11-15 PROCEDURE — 25010000002 KETOROLAC TROMETHAMINE PER 15 MG: Performed by: EMERGENCY MEDICINE

## 2023-11-15 PROCEDURE — 81001 URINALYSIS AUTO W/SCOPE: CPT | Performed by: NURSE PRACTITIONER

## 2023-11-15 RX ORDER — KETOROLAC TROMETHAMINE 15 MG/ML
15 INJECTION, SOLUTION INTRAMUSCULAR; INTRAVENOUS ONCE
Status: COMPLETED | OUTPATIENT
Start: 2023-11-15 | End: 2023-11-15

## 2023-11-15 RX ORDER — SODIUM CHLORIDE 0.9 % (FLUSH) 0.9 %
10 SYRINGE (ML) INJECTION AS NEEDED
Status: DISCONTINUED | OUTPATIENT
Start: 2023-11-15 | End: 2023-11-16 | Stop reason: HOSPADM

## 2023-11-15 RX ORDER — MORPHINE SULFATE 2 MG/ML
2 INJECTION, SOLUTION INTRAMUSCULAR; INTRAVENOUS ONCE
Status: COMPLETED | OUTPATIENT
Start: 2023-11-15 | End: 2023-11-15

## 2023-11-15 RX ADMIN — MORPHINE SULFATE 2 MG: 2 INJECTION, SOLUTION INTRAMUSCULAR; INTRAVENOUS at 22:09

## 2023-11-15 RX ADMIN — KETOROLAC TROMETHAMINE 15 MG: 15 INJECTION, SOLUTION INTRAMUSCULAR; INTRAVENOUS at 22:08

## 2023-11-15 NOTE — TELEPHONE ENCOUNTER
Caller: Chrystal Ruiz    Relationship to patient: Self    Best call back number: [phone]    Chief complaint: BILATERAL KNEE PAIN - LEFT KNEE IS THE MOST PAINFUL    Type of visit: GEL INJECTION     Requested date: ASAP      If rescheduling, when is the original appointment: N/A      Additional notes:LAST INJECTION 4-5-23 - PATIENT IS HAVING DIFFICULTY SLEEPING DUE TO HER KNEE PAIN.

## 2023-11-16 ENCOUNTER — OFFICE VISIT (OUTPATIENT)
Dept: ORTHOPEDIC SURGERY | Facility: CLINIC | Age: 83
End: 2023-11-16
Payer: MEDICARE

## 2023-11-16 VITALS
WEIGHT: 130 LBS | BODY MASS INDEX: 24.55 KG/M2 | SYSTOLIC BLOOD PRESSURE: 117 MMHG | HEIGHT: 61 IN | OXYGEN SATURATION: 97 % | TEMPERATURE: 98.6 F | DIASTOLIC BLOOD PRESSURE: 66 MMHG | HEART RATE: 80 BPM | RESPIRATION RATE: 20 BRPM

## 2023-11-16 VITALS — OXYGEN SATURATION: 97 % | BODY MASS INDEX: 24.55 KG/M2 | HEIGHT: 61 IN | WEIGHT: 130 LBS | RESPIRATION RATE: 20 BRPM

## 2023-11-16 DIAGNOSIS — M17.0 PRIMARY OSTEOARTHRITIS OF BOTH KNEES: ICD-10-CM

## 2023-11-16 DIAGNOSIS — M54.42 ACUTE LEFT-SIDED LOW BACK PAIN WITH LEFT-SIDED SCIATICA: Primary | ICD-10-CM

## 2023-11-16 LAB
QT INTERVAL: 408 MS
QTC INTERVAL: 457 MS

## 2023-11-16 RX ORDER — TRAMADOL HYDROCHLORIDE 50 MG/1
50 TABLET ORAL EVERY 6 HOURS PRN
Qty: 12 TABLET | Refills: 0 | Status: SHIPPED | OUTPATIENT
Start: 2023-11-16

## 2023-11-16 RX ADMIN — LIDOCAINE HYDROCHLORIDE 2 ML: 10 INJECTION, SOLUTION INFILTRATION; PERINEURAL at 08:41

## 2023-11-16 RX ADMIN — LIDOCAINE HYDROCHLORIDE 2 ML: 10 INJECTION, SOLUTION EPIDURAL; INFILTRATION; INTRACAUDAL; PERINEURAL at 08:40

## 2023-11-16 NOTE — ED PROVIDER NOTES
Subjective     Provider in Triage Note  Due to significant overcrowding in the emergency department patient was initially seen and evaluated in triage.  Provider in triage recommended patient placement in the treatment area to initiate therapy and movement to an ER bed as soon as possible.    Patient is an 83-year-old white female with a history of A-fib status post Watchman procedure last year.  She has a history of hypertension as well as CHF.  She presents today from home where she lives independently.  She complains of feeling short of breath today after walking into a restaurant.  She states after she got home she got significantly short of breath going from her garage into her house.  No chest pain.  She does complain of some pain in her lower back that radiates into her left hip and knee.  It is worse with movement.  She denies any fever or chills.  No leg pain or swelling.      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       Past Surgical History:   Procedure Laterality Date    ATRIAL APPENDAGE EXCLUSION LEFT WITH TRANSESOPHAGEAL ECHOCARDIOGRAM Right 7/7/2022    Procedure: Atrial Appendage Occlusion watchman team aware $;   Surgeon: Sage Garcia MD;  Location: [facility] CATH INVASIVE LOCATION;  Service: Cardiovascular;  Laterality: Right;    ATRIAL APPENDAGE EXCLUSION LEFT WITH TRANSESOPHAGEAL ECHOCARDIOGRAM N/A 7/7/2022    Procedure: Atrial Appendage Occlusion;  Surgeon: Fide Mullen MD;  Location: [facility] CATH INVASIVE LOCATION;  Service: Cardiovascular;  Laterality: N/A;    CATARACT EXTRACTION      CHOLECYSTECTOMY      HYSTERECTOMY  1987    Endometriosis    OOPHORECTOMY      PSEUDO ANEURYSM REPAIR, EXTREMITY Right 7/8/2022    Procedure: REPAIR OF POST CATHETERIZATION RIGHT FEMORAL PSEUDOANEURYSM AND ARTERIOVENOUS FISTUAL;  Surgeon: Alfonzo Hooks MD;  Location: [facility] MAIN OR;  Service: Vascular;  Laterality: Right;    TONSILLECTOMY         Family History   Problem Relation Age of Onset    Breast cancer Mother 85    Stroke Mother     Heart disease Mother     Endometrial cancer Mother 70    Heart failure Father     Heart failure Sister     COPD Sister     Heart disease Sister        Social History     Socioeconomic History    Marital status:    Tobacco Use    Smoking status: Never    Smokeless tobacco: Never   Vaping Use    Vaping Use: Never used   Substance and Sexual Activity    Alcohol use: No    Drug use: Never    Sexual activity: Not Currently           Objective   Physical Exam  Neck has no adenopathy JVD or bruits.  Lungs are clear.  Heart has regular rate rhythm without murmur rub or gallop.  Chest is nontender.  Abdomen soft nontender.  Back has mild diffuse low back pain to palpation.  Extremities M shows pain at the base of the left thigh and left knee.  She has 2+ pulses and is neurovascular tact throughout.  Procedures  My EKG interpretation shows atrial fibrillation at a controlled rate at 75 with no acute ST change         ED Course      Results for orders placed or performed during the hospital encounter of 11/15/23   Comprehensive Metabolic Panel    Specimen: Blood   Result Value Ref  Range    Glucose 124 (H) 65 - 99 mg/dL    BUN 31 (H) 8 - 23 mg/dL    Creatinine 1.13 (H) 0.57 - 1.00 mg/dL    Sodium 144 136 - 145 mmol/L    Potassium 4.2 3.5 - 5.2 mmol/L    Chloride 106 98 - 107 mmol/L    CO2 25.0 22.0 - 29.0 mmol/L    Calcium 9.7 8.6 - 10.5 mg/dL    Total Protein 7.0 6.0 - 8.5 g/dL    Albumin 4.1 3.5 - 5.2 g/dL    ALT (SGPT) 8 1 - 33 U/L    AST (SGOT) 13 1 - 32 U/L    Alkaline Phosphatase 72 39 - 117 U/L    Total Bilirubin 0.7 0.0 - 1.2 mg/dL    Globulin 2.9 gm/dL    A/G Ratio 1.4 g/dL    BUN/Creatinine Ratio 27.4 (H) 7.0 - 25.0    Anion Gap 13.0 5.0 - 15.0 mmol/L    eGFR 48.1 (L) >60.0 mL/min/1.73   Urinalysis With Microscopic If Indicated (No Culture) - Straight Cath    Specimen: Straight Cath; Urine   Result Value Ref Range    Color, UA Yellow Yellow, Straw    Appearance, UA Clear Clear    pH, UA 7.0 5.0 - 8.0    Specific Gravity, UA 1.023 1.005 - 1.030    Glucose, UA Negative Negative    Ketones, UA 15 mg/dL (1+) (A) Negative    Bilirubin, UA Negative Negative    Blood, UA Small (1+) (A) Negative    Protein, UA Negative Negative    Leuk Esterase, UA Negative Negative    Nitrite, UA Negative Negative    Urobilinogen, UA 1.0 E.U./dL 0.2 - 1.0 E.U./dL   BNP    Specimen: Blood   Result Value Ref Range    proBNP 1,866.0 (H) 0.0 - 1,800.0 pg/mL   High Sensitivity Troponin T    Specimen: Blood   Result Value Ref Range    HS Troponin T 39 (H) <14 ng/L   CBC Auto Differential    Specimen: Blood   Result Value Ref Range    WBC 8.50 3.40 - 10.80 10*3/mm3    RBC 4.56 3.77 - 5.28 10*6/mm3    Hemoglobin 11.9 (L) 12.0 - 15.9 g/dL    Hematocrit 37.0 34.0 - 46.6 %    MCV 81.2 79.0 - 97.0 fL    MCH 26.1 (L) 26.6 - 33.0 pg    MCHC 32.1 31.5 - 35.7 g/dL    RDW 14.1 12.3 - 15.4 %    RDW-SD 42.0 37.0 - 54.0 fl    MPV 8.5 6.0 - 12.0 fL    Platelets 238 140 - 450 10*3/mm3    Neutrophil % 77.5 (H) 42.7 - 76.0 %    Lymphocyte % 14.3 (L) 19.6 - 45.3 %    Monocyte % 6.7 5.0 - 12.0 %    Eosinophil % 0.9 0.3 - 6.2 %     Basophil % 0.6 0.0 - 1.5 %    Neutrophils, Absolute 6.60 1.70 - 7.00 10*3/mm3    Lymphocytes, Absolute 1.20 0.70 - 3.10 10*3/mm3    Monocytes, Absolute 0.60 0.10 - 0.90 10*3/mm3    Eosinophils, Absolute 0.10 0.00 - 0.40 10*3/mm3    Basophils, Absolute 0.10 0.00 - 0.20 10*3/mm3    nRBC 0.1 0.0 - 0.2 /100 WBC   High Sensitivity Troponin T 2Hr    Specimen: Blood   Result Value Ref Range    HS Troponin T 34 (H) <14 ng/L    Troponin T Delta -5 (L) >=-4 - <+4 ng/L   Urinalysis, Microscopic Only - Straight Cath    Specimen: Straight Cath; Urine   Result Value Ref Range    RBC, UA 11-20 (A) None Seen, 0-2 /HPF    WBC, UA 0-2 None Seen, 0-2 /HPF    Bacteria, UA None Seen None Seen /HPF    Squamous Epithelial Cells, UA None Seen None Seen, 0-2 /HPF    Hyaline Casts, UA None Seen None Seen /LPF    Methodology Automated Microscopy    ECG 12 Lead Dyspnea   Result Value Ref Range    QT Interval 408 ms    QTC Interval 457 ms     XR Pelvis 1 or 2 View    Result Date: 11/16/2023  Impression: Bilateral hip arthritis. No acute abnormality. Electronically Signed: Robby Vidal MD  11/16/2023 12:04 AM EST  Workstation ID: [id number]    XR Chest 1 View    Result Date: 11/15/2023  Impression: No active pulmonary process. Electronically Signed: Jnoathan Tom MD  11/15/2023 8:46 PM EST  Workstation ID: [id number]                                        Medical Decision Making  Right chest x-ray interpretation shows no cardiac effusion or infiltrate.  My interpretation patient's pelvis x-ray shows bilateral hip arthritis without fracture dislocation or other abnormality.  UA shows no evidence of UTI.  Patient no evidence acute coronary syndrome based on EKG and troponin.  Patient has no leukocytosis and no left shift.  BNP was 1800.  Basic metabolic panel shows no renal insufficiency or electrolyte abnormality.  Patient was pain-free throughout her ED visit.  She was given morphine and Toradol with relief of her pain.  She will be discharged  with a prescription Ultram.  She will see MD for recheck as needed.    Amount and/or Complexity of Data Reviewed  Labs: ordered. Decision-making details documented in ED Course.  Radiology: ordered and independent interpretation performed.  ECG/medicine tests: ordered and independent interpretation performed.    Risk  Prescription drug management.        Final diagnoses:   Chest pain, unspecified type   Dyspnea, unspecified type   Myalgia       ED Disposition  ED Disposition       ED Disposition   Discharge    Condition   Stable    Comment   --               No follow-up provider specified.       Medication List        New Prescriptions      traMADol 50 MG tablet  Commonly known as: ULTRAM  Take 1 tablet by mouth Every 6 (Six) Hours As Needed for Severe Pain.               Where to Get Your Medications        These medications were sent to [facility] [id number] - [facility] [place] [facility] [address] [facility] - [phone]  - [phone] FX  [address] [place] [address]      Phone: [phone]   traMADol 50 MG tablet            Robby Etienne MD  11/16/23 0009

## 2023-11-17 ENCOUNTER — TELEPHONE (OUTPATIENT)
Dept: ORTHOPEDIC SURGERY | Facility: CLINIC | Age: 83
End: 2023-11-17

## 2023-11-17 RX ORDER — LIDOCAINE HYDROCHLORIDE 10 MG/ML
2 INJECTION, SOLUTION EPIDURAL; INFILTRATION; INTRACAUDAL; PERINEURAL
Status: COMPLETED | OUTPATIENT
Start: 2023-11-16 | End: 2023-11-16

## 2023-11-17 RX ORDER — LIDOCAINE HYDROCHLORIDE 10 MG/ML
2 INJECTION, SOLUTION INFILTRATION; PERINEURAL
Status: COMPLETED | OUTPATIENT
Start: 2023-11-16 | End: 2023-11-16

## 2023-11-17 NOTE — TELEPHONE ENCOUNTER
Caller: Chrystal Ruiz    Relationship to patient: Self    Best call back number: [phone]    Patient is needing: PATIENT WOULD LIKE A CALL BACK FROM VLADIMIR GALLOWAY REGARDING PAIN MGMT REFERRAL UPDATE.

## 2023-11-17 NOTE — TELEPHONE ENCOUNTER
Caller: Chrystal Ruiz    Relationship: Self    Best call back number: [phone]    What is the best time to reach you: ANYTIME     Who are you requesting to speak with (clinical staff, provider,  specific staff member): VLADIMIR GALLOWAY     Do you know the name of the person who called: PATIENT ORIGINALLY TALKED WITH JOSUÉ AT THE HUB  REGARDING A MESSAGE PREV SENT FOR VLADIMIR GALLOWAY REGARDING REFERRAL FOR PAIN MGMT.  PATIENT SAYS SHE CAN'T SLEEP, HAS HIGH LEVEL OF PAIN - SAW VLADIMIR GALLOWAY 11/16/23    What was the call regarding: PATIENT WANTS TO SPEAK WITH VLADIMIR GALLOWAY PLEASE     Is it okay if the provider responds through [employer]: NO PATIENT ONLY WANTS TO COMMUNICATE VIA HOME PHONE

## 2023-11-17 NOTE — PROGRESS NOTES
"INJECTION VISIT    Patient: Chrystal Ruiz    YOB: 1940    MRN: [id number]    Chief Complaint   Patient presents with    Left Knee - Pain     Pain- 7    Right Knee - Pain     Pain-5        History of Present Illness:   Chrystal Ruiz is a 83 y.o. year old who presents today for injection of bilateral knees.     Receives steroid and VISCO injections, alternating. Achieves good control of pain symptoms in her knees.     She presented to ED last night with severe left \"hip\" pain. ED notes reviewed. Treated with Morphine and dc'd with Tramadol. She does not want to take the pain meds. She lives home alone and is very concerned as to the cause of the pain; worried she will be disabled and home alone. Pain location described as lower back, left ischial, gluteal line pain. Radiates down the left leg. She did fall on the left side 3 weeks ago but had no immediate pain. No other injury or trauma noted.         Allergies:   Allergies   Allergen Reactions    Ciprofloxacin Unknown (See Comments)    Sulfa Antibiotics Unknown (See Comments)    Iodine Unknown - High Severity    Sudafed [Pseudoephedrine Hcl] Other (See Comments)     Patient states it has been awhile since taking, so she doesn't remember side effects.       Medications:   Home Medications:  Current Outpatient Medications on File Prior to Visit   Medication Sig    acetaminophen (TYLENOL) 500 MG tablet Take 1 tablet by mouth Every 6 (Six) Hours As Needed for Mild Pain.    baclofen (LIORESAL) 10 MG tablet TAKE 1 TABLET BY MOUTH AT NIGHT AS NEEDED FOR MUSCLE SPASMS    Breo Ellipta 200-25 MCG/INH inhaler Inhale 1 puff Daily.    Cholecalciferol (VITAMIN D3) 2000 units capsule Take 1 capsule by mouth Daily.    Cyanocobalamin (VITAMIN B12) 1000 MCG tablet controlled-release Take 2,500 mcg by mouth Daily.    ferrous sulfate 325 (65 FE) MG tablet Take 1 tablet by mouth 3 (Three) Times a Week. Monday, Wednesday and Friday.    furosemide (LASIX) 40 MG tablet Take " 1 tablet by mouth 3 (Three) Times a Week. Tuesday, Thursday and Saturday.    loratadine (CLARITIN) 10 MG tablet Take 1 tablet by mouth Daily.    metoprolol succinate XL (TOPROL-XL) 25 MG 24 hr tablet Take 1 tablet by mouth Daily.    spironolactone (ALDACTONE) 25 MG tablet TAKE 1 TABLET BY MOUTH EVERY OTHER DAY    traMADol (ULTRAM) 50 MG tablet Take 1 tablet by mouth Every 6 (Six) Hours As Needed for Severe Pain.    triamcinolone (KENALOG) 0.1 % cream Apply 1 application  topically to the appropriate area as directed 2 (Two) Times a Day As Needed for Irritation.     No current facility-administered medications on file prior to visit.         I have reviewed the patient's medical history in detail and updated the computerized patient record.  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 Watchman procedure; surgically fixed the next day    Heart murmur     Hypertension     Medicare annual wellness visit, subsequent 02/12/2020     Past Surgical History:   Procedure Laterality Date    ATRIAL APPENDAGE EXCLUSION LEFT WITH TRANSESOPHAGEAL ECHOCARDIOGRAM Right 7/7/2022    Procedure: Atrial Appendage Occlusion watchman team aware $;  Surgeon: Sage Garcia MD;  Location: [facility] CATH INVASIVE LOCATION;  Service: Cardiovascular;  Laterality: Right;    ATRIAL APPENDAGE EXCLUSION LEFT WITH TRANSESOPHAGEAL ECHOCARDIOGRAM N/A 7/7/2022    Procedure: Atrial Appendage Occlusion;  Surgeon: Fide Mullen MD;  Location: [facility] CATH INVASIVE LOCATION;  Service: Cardiovascular;  Laterality: N/A;    CATARACT EXTRACTION      CHOLECYSTECTOMY      HYSTERECTOMY  1987    Endometriosis    OOPHORECTOMY      PSEUDO ANEURYSM REPAIR, EXTREMITY Right 7/8/2022    Procedure: REPAIR OF POST  CATHETERIZATION RIGHT FEMORAL PSEUDOANEURYSM AND ARTERIOVENOUS FISTUAL;  Surgeon: Alfonzo Hooks MD;  Location: [facility] MAIN OR;  Service: Vascular;  Laterality: Right;    TONSILLECTOMY       Social History     Occupational History    Not on file   Tobacco Use    Smoking status: Never    Smokeless tobacco: Never   Vaping Use    Vaping Use: Never used   Substance and Sexual Activity    Alcohol use: No    Drug use: Never    Sexual activity: Not Currently      Social History     Social History Narrative    Not on file     Family History   Problem Relation Age of Onset    Breast cancer Mother 85    Stroke Mother     Heart disease Mother     Endometrial cancer Mother 70    Heart failure Father     Heart failure Sister     COPD Sister     Heart disease Sister                ROS  Negative unless listed in the HPI        Physical Exam  83 y.o. female  Body mass index is 24.56 kg/m²., 59 kg (130 lb)  Vitals:    11/16/23 1547   Resp: 20   SpO2: 97%     General: Alert, cooperative, appears well and in no observable distress.   HEENT: Normocephalic, atraumatic on external visual inspection. No icterus.   CV: No significant peripheral edema.   Respiratory: Normal respiratory effort.   Skin: Warm & well perfused; appropriate skin turgor.  Psych: Appropriate mood & affect.  Neuro: Gross sensation and motor intact in affected extremity/extremities.        Investigations:  Pelvic imaging reviewed  Narrative & Impression   XR PELVIS 1 OR 2 VW     Date of Exam: 11/15/2023 11:45 PM EST     Indication: Low back pain     Comparison: None available.     Findings:  There is bilateral hip arthritic change. Medial and superior joint space narrowing are noted. There is no acute fracture. There is no hip dislocation. There are no lytic or destructive bony lesions.     IMPRESSION:  Impression:  Bilateral hip arthritis. No acute abnormality       Procedure:  Large Joint Arthrocentesis: R knee  Date/Time: 11/16/2023 8:40 AM  Consent given  by: patient  Site marked: site marked  Timeout: Immediately prior to procedure a time out was called to verify the correct patient, procedure, equipment, support staff and site/side marked as required   Supporting Documentation  Indications: pain   Procedure Details  Location: knee - R knee  Needle size: 18 G  Approach: anterolateral  Medications administered: 88 mg Hyaluronan 88 MG/4ML; 2 mL lidocaine PF 1% 1 %  Patient tolerance: patient tolerated the procedure well with no immediate complications      Large Joint Arthrocentesis: L knee  Date/Time: 11/16/2023 8:41 AM  Consent given by: patient  Site marked: site marked  Timeout: Immediately prior to procedure a time out was called to verify the correct patient, procedure, equipment, support staff and site/side marked as required   Supporting Documentation  Indications: pain   Procedure Details  Location: knee - L knee  Needle size: 18 G  Approach: anterolateral  Medications administered: 88 mg Hyaluronan 88 MG/4ML; 2 mL lidocaine 1 %  Patient tolerance: patient tolerated the procedure well with no immediate complications            Assessment:   Diagnoses and all orders for this visit:    1. Acute left-sided low back pain with left-sided sciatica (Primary)  -     Ambulatory Referral to Pain Management Clinic    2. Primary osteoarthritis of both knees    Other orders  -     Cancel: Large Joint Arthrocentesis      Body mass index is 24.56 kg/m².  BMI consistent with Normal: 18.5-24.9kg/m2        Plan:   -     Risks and benefits of injection therapy discussed with patient.  Possibility of bruising, pain, swelling, infection discussed in detail.  Injected patient's bilateral knee joint(s)with Monovisc from an anterolateral approach   Refer to pain management for lower back/SI pain  Rest, ice, compression, and elevation (RICE) therapy  OTC Tylenol for pain PRN  BMI reviewed  Follow up in 3-6 months for repeat injection  Please call with questions or concerns in the  interim        Date of encounter: 11/16/2023   Lynette Allen, APRN

## 2023-11-17 NOTE — TELEPHONE ENCOUNTER
Caller: Joseph Chrystal FAIR    Relationship: Self    Best call back number: [phone]    What is the best time to reach you: ANYTIME     Who are you requesting to speak with (clinical staff, provider,  specific staff member): SPECIFIC STAFF     Do you know the name of the person who called: JOSUÉ    What was the call regarding: SHE WANTED TO ADD TO HER PREV MESSAGE TO CALL HER VIA PHONE AND PLEASE DO NOT COMMUNICATE VIA MY CHART NOR CELL PHONE     Is it okay if the provider responds through MyChart: NO, PATIENT DOES NOT USE MY CHART - ONLY WANTS TO COMMUNICATE VIA PHONE

## 2023-11-28 RX ORDER — BACLOFEN 10 MG/1
10 TABLET ORAL NIGHTLY PRN
Qty: 30 TABLET | Refills: 1 | Status: SHIPPED | OUTPATIENT
Start: 2023-11-28

## 2023-11-30 NOTE — PROGRESS NOTES
CHIEF COMPLAINT  Lower back pain, right shoulder pain      Subjective   Chrystal Ruiz is a 83 y.o. female who was referred by Lynette Allen APRN to our pain management clinic for consultation, evaluation and treatment of lower back pain. Pain started about before Thanksgivings. She had a mechanical fall on 11/4 with mild pain.   No immediate pain, but  she started having pain shortly after.  ED visit on 11/15/23  for severe left hip, knee pain and SOB and unable to stand- she was given morphine and discharged with tramadol.   Patient has been seeing orthopedics for bilateral knee pain and had steroid and viscosupplementation injections. Unable to go through knee replacement due to comorbidity. First steroids lasted for 7 months, 2nd lasted for 2 months, first synvisc didn't help, good relief with second synvnisc injection.  She wears braces in both legs due to tendonitis.  Her pain is significantly improved since her ED visit, but patient is extremely afraid that flareup will happen again.  Pain is tolerable at this point.    Lower back pain is 5/10 on VAS, at maximum is 9/10. Pain is aching, throbbing, tingling, spasms, cramps, numbness in nature. Pain is referred left buttock, left anterior thigh, left knee, anterior shin. The pain is intermittent. The pain is improved by pain meds. The pain is worse with standing, standing after sitting. Pain is affecting her sleep.    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, hypertension    Current Medications:   Tylenol 500 mg PRN  Baclofen 10 mg qhs PRN    Past Medications:    Past Modalities:  TENS:       no          Physical Therapy Within The Last 6 Months     no  Psychotherapy     no  Massage Therapy      no    Patient Complains Of:  Uro-Fecal Incontinence no  Weight Gain/Loss  no  Fever/Chills   no  Weakness   no      PEG Assessment   What number best  describes your pain on average in the past week?3  What number best describes how, during the past week, pain has interfered with your enjoyment of life?3  What number best describes how, during the past week, pain has interfered with your general activity?  3    PHQ-9 Depression Screening  Little interest or pleasure in doing things? 0-->not at all   Feeling down, depressed, or hopeless? 1-->several days   Trouble falling or staying asleep, or sleeping too much?     Feeling tired or having little energy?     Poor appetite or overeating?     Feeling bad about yourself - or that you are a failure or have let yourself or your family down?     Trouble concentrating on things, such as reading the newspaper or watching television?     Moving or speaking so slowly that other people could have noticed? Or the opposite - being so fidgety or restless that you have been moving around a lot more than usual?     Thoughts that you would be better off dead, or of hurting yourself in some way?     PHQ-9 Total Score 1   If you checked off any problems, how difficult have these problems made it for you to do your work, take care of things at home, or get along with other people?           Current Outpatient Medications:     acetaminophen (TYLENOL) 500 MG tablet, Take 1 tablet by mouth Every 6 (Six) Hours As Needed for Mild Pain., Disp: , Rfl:     baclofen (LIORESAL) 10 MG tablet, TAKE 1 TABLET BY MOUTH AT NIGHT AS NEEDED FOR MUSCLE SPASMS, Disp: 30 tablet, Rfl: 1    Breo Ellipta 200-25 MCG/INH inhaler, Inhale 1 puff Daily., Disp: 1 each, Rfl: 2    Cholecalciferol (VITAMIN D3) 2000 units capsule, Take 1 capsule by mouth Daily., Disp: , Rfl:     Cyanocobalamin (VITAMIN B12) 1000 MCG tablet controlled-release, Take 2,500 mcg by mouth Daily., Disp: , Rfl:     ferrous sulfate 325 (65 FE) MG tablet, Take 1 tablet by mouth 3 (Three) Times a Week. Monday, Wednesday and Friday., Disp: 45 tablet, Rfl: 1    furosemide (LASIX) 40 MG tablet,  Take 1 tablet by mouth 3 (Three) Times a Week. Tuesday, Thursday and Saturday., Disp: , Rfl:     loratadine (CLARITIN) 10 MG tablet, Take 1 tablet by mouth Daily., Disp: , Rfl:     metoprolol succinate XL (TOPROL-XL) 25 MG 24 hr tablet, Take 1 tablet by mouth Daily., Disp: 90 tablet, Rfl: 3    spironolactone (ALDACTONE) 25 MG tablet, TAKE 1 TABLET BY MOUTH EVERY OTHER DAY, Disp: 45 tablet, Rfl: 1    triamcinolone (KENALOG) 0.1 % cream, Apply 1 application  topically to the appropriate area as directed 2 (Two) Times a Day As Needed for Irritation., Disp: 80 g, Rfl: 1    traMADol (ULTRAM) 50 MG tablet, Take 1 tablet by mouth Every 6 (Six) Hours As Needed for Severe Pain. (Patient not taking: Reported on 12/4/2023), Disp: 12 tablet, Rfl: 0    The following portions of the patient's history were reviewed and updated as appropriate: allergies, current medications, past family history, past medical history, past social history, past surgical history, and problem list.      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 procedure; surgically fixed the next day    GERD (gastroesophageal reflux disease)     Heart murmur     Hip pain, bilateral     Hypertension     Medicare annual wellness visit, subsequent 02/12/2020    Shoulder pain, right      Past Surgical History:   Procedure Laterality Date    ATRIAL APPENDAGE EXCLUSION LEFT WITH TRANSESOPHAGEAL ECHOCARDIOGRAM Right 7/7/2022    Procedure: Atrial Appendage Occlusion watchman team aware $;  Surgeon: Sage Garcia MD;  Location: [facility] CATH INVASIVE LOCATION;  Service: Cardiovascular;  Laterality: Right;    ATRIAL APPENDAGE EXCLUSION LEFT WITH TRANSESOPHAGEAL ECHOCARDIOGRAM N/A 7/7/2022    Procedure: Atrial Appendage  Occlusion;  Surgeon: Fide Mullen MD;  Location: [facility] CATH INVASIVE LOCATION;  Service: Cardiovascular;  Laterality: N/A;    CATARACT EXTRACTION      CHOLECYSTECTOMY      HYSTERECTOMY  1987    Endometriosis    OOPHORECTOMY      PSEUDO ANEURYSM REPAIR, EXTREMITY Right 7/8/2022    Procedure: REPAIR OF POST CATHETERIZATION RIGHT FEMORAL PSEUDOANEURYSM AND ARTERIOVENOUS FISTUAL;  Surgeon: Alfonzo Hooks MD;  Location: [facility] MAIN OR;  Service: Vascular;  Laterality: Right;    TONSILLECTOMY       Family History   Problem Relation Age of Onset    Breast cancer Mother 85    Stroke Mother     Heart disease Mother     Endometrial cancer Mother 70    Heart failure Father     Heart failure Sister     COPD Sister     Heart disease Sister      Social History     Socioeconomic History    Marital status:    Tobacco Use    Smoking status: Never    Smokeless tobacco: Never   Vaping Use    Vaping Use: Never used   Substance and Sexual Activity    Alcohol use: No    Drug use: Never    Sexual activity: Not Currently         Review of Systems   Musculoskeletal:  Positive for arthralgias and back pain.         Vitals:    12/04/23 1325   BP: 134/62   Pulse: 62   Resp: 16   SpO2: 97%   PainSc:   3         Objective   Physical Exam  Musculoskeletal:         General: Tenderness present.        Legs:            Imaging Reviewed:  X-ray pelvis-11/15/2023  - Bilateral hip arthritis.  Medial and superior joint spaces are narrowed    Bilateral knee x-rays-6/2/2022  - KL grade 2 with moderately advanced osteoarthritis of the knee with narrowing of the medial space    CT chest-2022  - Multiple lung nodules concerning for malignancy    Assessment:    1. Sacroiliac joint dysfunction    2. Lumbar radiculopathy    3. Right shoulder pain, unspecified chronicity         Plan:   1.  Defer UDS for now.     2. We discussed trying a course of formal physical therapy.  Physical therapy can help strengthen and stretch the muscles  around the joints. Continue to be as active as possible. Start physical therapy follow up with [facility].  Physical therapy referral sent on 12/4/2023.  3.  Hip x-rays were reviewed showing moderate bilateral hip osteoarthritis.  I believe her pain is mostly originating from SI joint vs lumbar radiculopathy.  Will order lumbar x-ray and lumbar MRI without contrast to evaluate further.  If patient has severe flareup, I am inclined to proceed with diagnostic left-sided SI joint injection this patient has severe SI joint tenderness on the left side along with MELISSA, SI joint compression and thigh thrust test positive.  4.  Patient is asking if he could do an injection as she would like to avoid flareup.  Discussed with patient that I would recommend avoiding injection until pain returns.  Do not recommend doing preventative injections.  5.  Also complains of chronic right shoulder pain trouble with ROM.  Will obtain right shoulder x-ray sent for physical therapy.    RTC after imaging and physical therapy.    Waqas Berry DO  Pain Management   [facility]         INSPECT REPORT    As part of the patient's treatment plan, I may be prescribing controlled substances. The patient has been made aware of appropriate use of such medications, including potential risk of somnolence, limited ability to drive and/or work safely, and the potential for dependence or overdose. It has also been made clear that these medications are for use by this patient only, without concomitant use of alcohol or other substances unless prescribed.     Patient has completed prescribing agreement detailing terms of continued prescribing of controlled substances, including monitoring INSPECT reports, urine drug screening, and pill counts if necessary. The patient is aware that inappropriate use will results in cessation of prescribing such medications.    INSPECT report has been reviewed and scanned into the patient's chart.      EMR  Dragon/Transcription Disclaimer:   Much of this encounter note is an electronic transcription/translation of spoken language to printed text. The electronic translation of spoken language may permit erroneous, or at times, nonsensical words or phrases to be inadvertently transcribed; Although I have reviewed the note for such errors, some may still exist.

## 2023-12-04 ENCOUNTER — OFFICE VISIT (OUTPATIENT)
Dept: PAIN MEDICINE | Facility: CLINIC | Age: 83
End: 2023-12-04
Payer: MEDICARE

## 2023-12-04 VITALS
RESPIRATION RATE: 16 BRPM | HEART RATE: 62 BPM | OXYGEN SATURATION: 97 % | SYSTOLIC BLOOD PRESSURE: 134 MMHG | DIASTOLIC BLOOD PRESSURE: 62 MMHG

## 2023-12-04 DIAGNOSIS — M54.16 LUMBAR RADICULOPATHY: ICD-10-CM

## 2023-12-04 DIAGNOSIS — M25.511 RIGHT SHOULDER PAIN, UNSPECIFIED CHRONICITY: ICD-10-CM

## 2023-12-04 DIAGNOSIS — M53.3 SACROILIAC JOINT DYSFUNCTION: Primary | ICD-10-CM

## 2023-12-04 PROCEDURE — 1160F RVW MEDS BY RX/DR IN RCRD: CPT | Performed by: STUDENT IN AN ORGANIZED HEALTH CARE EDUCATION/TRAINING PROGRAM

## 2023-12-04 PROCEDURE — 3078F DIAST BP <80 MM HG: CPT | Performed by: STUDENT IN AN ORGANIZED HEALTH CARE EDUCATION/TRAINING PROGRAM

## 2023-12-04 PROCEDURE — 1159F MED LIST DOCD IN RCRD: CPT | Performed by: STUDENT IN AN ORGANIZED HEALTH CARE EDUCATION/TRAINING PROGRAM

## 2023-12-04 PROCEDURE — 3075F SYST BP GE 130 - 139MM HG: CPT | Performed by: STUDENT IN AN ORGANIZED HEALTH CARE EDUCATION/TRAINING PROGRAM

## 2023-12-04 PROCEDURE — 1125F AMNT PAIN NOTED PAIN PRSNT: CPT | Performed by: STUDENT IN AN ORGANIZED HEALTH CARE EDUCATION/TRAINING PROGRAM

## 2023-12-04 PROCEDURE — 99204 OFFICE O/P NEW MOD 45 MIN: CPT | Performed by: STUDENT IN AN ORGANIZED HEALTH CARE EDUCATION/TRAINING PROGRAM

## 2023-12-05 ENCOUNTER — HOSPITAL ENCOUNTER (OUTPATIENT)
Dept: GENERAL RADIOLOGY | Facility: HOSPITAL | Age: 83
Discharge: HOME | End: 2023-12-05
Admitting: STUDENT IN AN ORGANIZED HEALTH CARE EDUCATION/TRAINING PROGRAM
Payer: MEDICARE

## 2023-12-05 ENCOUNTER — OFFICE (AMBULATORY)
Dept: URBAN - METROPOLITAN AREA CLINIC 64 | Facility: CLINIC | Age: 83
End: 2023-12-05

## 2023-12-05 ENCOUNTER — HOSPITAL ENCOUNTER (OUTPATIENT)
Dept: GENERAL RADIOLOGY | Facility: HOSPITAL | Age: 83
Discharge: HOME | End: 2023-12-05
Payer: MEDICARE

## 2023-12-05 VITALS
DIASTOLIC BLOOD PRESSURE: 72 MMHG | SYSTOLIC BLOOD PRESSURE: 133 MMHG | HEART RATE: 61 BPM | WEIGHT: 130 LBS | HEIGHT: 64 IN

## 2023-12-05 DIAGNOSIS — M53.3 SACROILIAC JOINT DYSFUNCTION: ICD-10-CM

## 2023-12-05 DIAGNOSIS — M54.16 LUMBAR RADICULOPATHY: ICD-10-CM

## 2023-12-05 DIAGNOSIS — M25.511 RIGHT SHOULDER PAIN, UNSPECIFIED CHRONICITY: ICD-10-CM

## 2023-12-05 DIAGNOSIS — K21.9 GASTRO-ESOPHAGEAL REFLUX DISEASE WITHOUT ESOPHAGITIS: ICD-10-CM

## 2023-12-05 PROCEDURE — 73030 X-RAY EXAM OF SHOULDER: CPT

## 2023-12-05 PROCEDURE — 99204 OFFICE O/P NEW MOD 45 MIN: CPT | Performed by: INTERNAL MEDICINE

## 2023-12-05 PROCEDURE — 72100 X-RAY EXAM L-S SPINE 2/3 VWS: CPT

## 2023-12-05 RX ORDER — METOCLOPRAMIDE 5 MG/1
10 TABLET ORAL
Qty: 60 | Refills: 11 | Status: ACTIVE
Start: 2023-12-05

## 2023-12-05 RX ORDER — ESOMEPRAZOLE MAGNESIUM 40 MG/1
40 CAPSULE, DELAYED RELEASE ORAL
Qty: 30 | Refills: 11 | Status: ACTIVE
Start: 2023-12-05

## 2023-12-07 ENCOUNTER — HOSPITAL ENCOUNTER (OUTPATIENT)
Dept: PET IMAGING | Facility: HOSPITAL | Age: 83
Discharge: HOME | End: 2023-12-07
Admitting: RADIOLOGY
Payer: MEDICARE

## 2023-12-07 DIAGNOSIS — C34.91 BRONCHOGENIC CANCER OF RIGHT LUNG: ICD-10-CM

## 2023-12-07 PROCEDURE — 71250 CT THORAX DX C-: CPT

## 2023-12-07 NOTE — PROGRESS NOTES
RADIATION ONCOLOGY FOLLOW-UP NOTE    NAME: Chrystal Ruiz  YOB: 1940  MRN #: [id number]  DATE OF SERVICE: 12/11/2023  PRIMARY CARE PROVIDER: Etelvina Ulloa MD    DIAGNOSIS:  Right lung cancer, NSCLC    REASON FOR VISIT/CC:  Right lung cancer, 6M CT F/U    RADIATION TREATMENT COURSE:  6000 cGy in 15 fractions to RUL, completed 04/12/2022.    HISTORY OF PRESENT ILLNESS: The patient is a 83 y.o. year old female who was last seen in our office on 06/21/2023. The plan was to have repeat CT surveillance in 6 months, and follow up here afterwards or earlier as needed.    She follows with Dr. Cheng, and was last seen on 06/21/2023. Their plan was to have repeat labs in 6 months, and follow up there afterwards, if all is well at that visit, then they will move out to yearly visits. She is scheduled for follow up on 12/11/2023, immediately following this appointment.    CT CHEST W/O CONTRAST  DATE OF EXAM: 12/07/2023  FACILITY: [facility]  FINDINGS:  6 mm noncalcified right lower lobe nodule (image 61), additional 6 mm right lower lobe noncalcified nodule (image 69), and 5 mm noncalcified left upper lobe nodule (image 48), are each unchanged compared to the more remote CT chest form 11/12/2019, confirming over 4 years of stability, and benignity.  No new or suspicious pulmonary nodules are identified. There is chronic biapical scarring, right greater than left. Benign calcified nodules are present in the medial left upper lobe. There is no acute airspace disease.  Heart size is normal. Left atrial appendage occlusion device is present. Thoracic aorta caliber is normal. No significant coronary calcifications. No pericardial effusion or pleural effusion. Small esophageal hiatal hernia. No pathologically enlarged lymph nodes are identified.  Included portions of the upper abdominal organs demonstrate a normal noncontrast appearance.  Thoracolumbar levoscoliotic curvature and  thoracic kyphotic curvature is present with multilevel degenerative changes. No acute or suspicious osseous abnormalities are identified.  IMPRESSION:  No evidence of recurrent malignancy or metastatic disease in the chest.  6 mm or less noncalcified pulmonary nodules have been stable since November 2019, confirming benignity.  Consider 12-month CT chest surveillance imaging.    She has no chest pain or rib pain at sight of SBRT.  She denies any brachial plexopathy findings.  No increased SOB or cough. Also follows with Dr. Cheng.    The following portions of the patient's history were reviewed and updated as appropriate: allergies, current medications, past family history, past medical history, past social history, past surgical history and problem list. Reviewed with the patient and remain unchanged.    PAST MEDICAL HISTORY:  she has a past medical history of A-fib, A-fib, Anemia, Arthritis, Asthma, Back pain, Cancer, CHF (congestive heart failure), Femoral artery pseudo-aneurysm, right (07/07/2022), GERD (gastroesophageal reflux disease), Heart murmur, Hip pain, bilateral, Hypertension, Medicare annual wellness visit, subsequent (02/12/2020), and Shoulder pain, right.    MEDICATIONS:    Current Outpatient Medications:     acetaminophen (TYLENOL) 500 MG tablet, Take 1 tablet by mouth Every 6 (Six) Hours As Needed for Mild Pain., Disp: , Rfl:     baclofen (LIORESAL) 10 MG tablet, TAKE 1 TABLET BY MOUTH AT NIGHT AS NEEDED FOR MUSCLE SPASMS, Disp: 30 tablet, Rfl: 1    Breo Ellipta 200-25 MCG/INH inhaler, Inhale 1 puff Daily., Disp: 1 each, Rfl: 2    Cholecalciferol (VITAMIN D3) 2000 units capsule, Take 1 capsule by mouth Daily., Disp: , Rfl:     Cyanocobalamin (VITAMIN B12) 1000 MCG tablet controlled-release, Take 2,500 mcg by mouth Daily., Disp: , Rfl:     ferrous sulfate 325 (65 FE) MG tablet, Take 1 tablet by mouth 3 (Three) Times a Week. Monday, Wednesday and Friday., Disp: 45 tablet, Rfl: 1    furosemide  (LASIX) 40 MG tablet, Take 1 tablet by mouth 3 (Three) Times a Week. Tuesday, Thursday and Saturday., Disp: , Rfl:     loratadine (CLARITIN) 10 MG tablet, Take 1 tablet by mouth Daily., Disp: , Rfl:     metoprolol succinate XL (TOPROL-XL) 25 MG 24 hr tablet, Take 1 tablet by mouth Daily., Disp: 90 tablet, Rfl: 3    spironolactone (ALDACTONE) 25 MG tablet, TAKE 1 TABLET BY MOUTH EVERY OTHER DAY, Disp: 45 tablet, Rfl: 1    traMADol (ULTRAM) 50 MG tablet, Take 1 tablet by mouth Every 6 (Six) Hours As Needed for Severe Pain. (Patient not taking: Reported on 12/4/2023), Disp: 12 tablet, Rfl: 0    triamcinolone (KENALOG) 0.1 % cream, Apply 1 application  topically to the appropriate area as directed 2 (Two) Times a Day As Needed for Irritation., Disp: 80 g, Rfl: 1    ALLERGIES:    Allergies   Allergen Reactions    Ciprofloxacin Unknown (See Comments)    Sulfa Antibiotics Unknown (See Comments)    Iodine Unknown - High Severity     Patient doesn't remember what reaction she gets when she takes this     Sudafed [Pseudoephedrine Hcl] Other (See Comments)     Patient states it has been awhile since taking, so she doesn't remember side effects.       PAST SURGICAL HISTORY:  she has a past surgical history that includes Oophorectomy; Cholecystectomy; Tonsillectomy; Hysterectomy (1987); Cataract extraction; Pseudo Aneurysm Repair, Extremity (Right, 7/8/2022); Atrial Appendage Exclusion Left (Right, 7/7/2022); and Atrial Appendage Exclusion Left (N/A, 7/7/2022).    PREVIOUS RADIOTHERAPY OR CHEMOTHERAPY:  XRT as noted above.    FAMILY HISTORY:  herfamily history includes Breast cancer (age of onset: 85) in her mother; COPD in her sister; Endometrial cancer (age of onset: 70) in her mother; Heart disease in her mother and sister; Heart failure in her father and sister; Stroke in her mother.    SOCIAL HISTORY:  she reports that she has never smoked. She has never used smokeless tobacco. She reports that she does not drink alcohol  and does not use drugs.    PAIN AND PAIN MANAGEMENT:  denies pain.    NUTRITIONAL STATUS:   no issues    KPS:  90:  Minor signs or symptoms      REVIEW OF SYSTEMS:   General: No fevers, chills, weight change, or drenching night sweats. Skin: No rashes or jaundice.  HEENT: No change in vision or hearing, no headaches.  Neck: No dysphagia or masses.  Heme/Lymph: No easy bruising or bleeding.  Respiratory System: No shortness of breath or cough.  Cardiovascular: No chest pain, palpitations, or dyspnea on exertion.  - Pacemaker. GI: No nausea, vomiting, diarrhea, melena, or hematochezia.  : No dysuria or hematuria.  Endocrine: No heat or cold intolerance. Musculoskeletal: No myalgias or arthralgias.  Neuro: No weakness, numbness, syncope, or seizures. Psych: No mood changes or depression. Ext: Denies swelling.    Objective   VITAL SIGNS:  Vitals:    12/11/23 1318   BP: 152/74   Pulse: 68   Resp: 18   Temp: 98 °F (36.7 °C)   SpO2: 99%       PHYSICAL EXAM:  GENERAL: In no apparent distress, sitting comfortably in room.    HEENT: Normocephalic, atraumatic. Pupils are equal, round, reactive to light. Sclera anicteric. Conjunctiva not injected. Oropharynx without erythema, ulcerations or thrush.   NECK: Supple with no masses.  LYMPHATIC: No cervical, supraclavicular or axillary adenopathy appreciated bilaterally.   CARDIOVASCULAR: S1 & S2 detected; no murmurs, rubs or gallops.  CHEST: Clear to auscultation bilaterally; no wheezes, crackles or rubs. Work of breathing normal.  ABDOMEN: Bowel sounds present. Abdomen is soft, nontender, nondistended.   MUSCULOSKELETAL: No tenderness to palpation along the spine or scapulae. Normal range of motion.  EXTREMITIES: No clubbing, cyanosis, edema.  SKIN: No erythema, rashes, ulcerations noted.   NEUROLOGIC: Cranial nerves II-XII grossly intact bilaterally. No focal neurologic deficits.  PSYCHIATRIC:  Alert, aware, and appropriate.      PERTINENT IMAGING/PATHOLOGY/LABS (Medical  Decision Making):     COORDINATION OF CARE: A copy of this note is sent to the referring provider.    PATHOLOGY (Reviewed):     IMAGING (Reviewed): reviewed as noted above.    LABS (Reviewed):  HEMATOLOGY:  WBC   Date Value Ref Range Status   11/15/2023 8.50 3.40 - 10.80 10*3/mm3 Final     RBC   Date Value Ref Range Status   11/15/2023 4.56 3.77 - 5.28 10*6/mm3 Final     Hemoglobin   Date Value Ref Range Status   11/15/2023 11.9 (L) 12.0 - 15.9 g/dL Final     Hematocrit   Date Value Ref Range Status   11/15/2023 37.0 34.0 - 46.6 % Final     Platelets   Date Value Ref Range Status   11/15/2023 238 140 - 450 10*3/mm3 Final     CHEMISTRY:  Lab Results   Component Value Date    GLUCOSE 124 (H) 11/15/2023    BUN 31 (H) 11/15/2023    CREATININE 1.13 (H) 11/15/2023    EGFRIFNONA 84 02/01/2022    BCR 27.4 (H) 11/15/2023    K 4.2 11/15/2023    CO2 25.0 11/15/2023    CALCIUM 9.7 11/15/2023    ALBUMIN 4.1 11/15/2023    LABIL2 1.2 05/29/2019    AST 13 11/15/2023    ALT 8 11/15/2023     Assessment & Plan   ASSESSMENT AND PLAN:    Right lung cancer  -SBRT limited due to right apex/brachial plexus.  -Tolerated 60 Gy in 15 fractions.  -No late toxicities at this time  -Imaging has been reassuring. Discussed interval scans and need to repeat imaging 6/8/2024.  All questions answered.    This assessment comes from my review of the imaging, pathology, physician notes and other pertinent information as mentioned.    DISPOSITION: CT chest and 6 month f/u ordered.    TIME SPENT WITH PATIENT:   I spent 21 minutes caring for Chrystal on this date of service. This time includes time spent by me in the following activities: preparing for the visit, reviewing tests, obtaining and/or reviewing a separately obtained history, performing a medically appropriate examination and/or evaluation, counseling and educating the patient/family/caregiver, ordering medications, tests, or procedures, and documenting information in the medical  record    CC: Frank Cheng MD    Approved by:     Michael Whitten MD

## 2023-12-08 NOTE — PROGRESS NOTES
HEMATOLOGY ONCOLOGY OUTPATIENT FOLLOW UP       Patient name: Chrystal Ruiz  : 1940  MRN: [id number]  Primary Care Physician: Etelvina Ulloa MD  Referring Physician: No ref. provider found  Reason For Consult:     Chief Complaint   Patient presents with    Follow-up     Malignant neoplasm of upper lobe of right lung     HPI:   History of Present Illness:  Chrystal Ruiz is 83 y.o. female who presented to our office on 22 for consultation regarding anemia and thrombocytosis.     3/9/2022: Ms. Ruiz was referred for the investigation of anemia and thrombocytosis that was identified in the process of treating congestive heart failure and a right lung tumor. She came in a wheel chair complaining of severe fatigue and difficulties functioning because of this weakness. She admitted to hematochezia that had been associated to constipation that had been going on for some time, at least a few weeks. She had been afebrile. No pain. The laboratory exams reported microcytic anemia and thrombocytosis that had been present since the end of . A decision was made to transfuse her with one unit of red cells. Tests for iron deficiency were sent. She was scheduled to see me in one week.     3/16/2022. Ms. Ruiz was back in the office for follow up. She was feeling much better after the transfusion. She was still weak but not as much and her affect was also better. The laboratory exams did not clearly suggest iron deficiency but she persisted with microcytic anemia and had a history of gastrointestinal bleeding, such that iron deficiency was strongly considered the cause of the symptoms and laboratory manifestations. She had taken oral iron with multiple side effects and no clear improvement. Plans for intravenous iron were made.     3/22/2022: Admitted with evidence of decompensation of the congestive heart failure. She was treated with diuretics with prompt symptomatic relief. She was given  one dose of intravenous iron. She was discharged feeling much better.     4/4/2022: Back in the office for follow up. She had been receiving iron intravenously without complications. She reported unintended weight loss. The exam was not different than before. The laboratory exams revealed progressive improvement of the blood count with increase in the hemoglobin and the mean corpuscular volume and resolution of the leukocytosis and thrombocytosis. A decision was made to obtain a scan of the abdomen to investigate the history of blood loss in the stools and the unintended weight loss.     4/25/2022: Ms. Ruiz was back in the office for follow up. She was feeling much better and had returned to most of her normal activities. She arrived walking and reported that she had been walking more and more. She still had some fatigue but not the weakness she had before. She also had better appetite. She complained, however, that some of the medication she was receiving had resulted in changes in her taste perception. She had no more dyspnea than before and she had been free of chest pain. She had been offered the Watchman procedure, since she was not able to take anticoagulation safely. She denies abdominal pain or diarrhea. No dysuria. No edema. No skin rash.     5/18/2022: Back in the office to review results. She was feeling very well. Had regained her energy and was very active. The exam did not reveal new changes. Her hemoglobin had continued to improve, but she still had anemia. A decision was made to continue to observe without intervention.     6/15/2022: Back in the office for follow-up.  She had partial correction of her anemia.  She was scheduled to have a watchman procedure early in July.  The physical exam was unremarkable.  A decision was made to continue to observe and she was scheduled to return in 2 months.    8/17/2022: In the office for follow-up.  Compliant with iron.  Underwent the watchman procedure as  planned.  Had some minor complications.  This was followed by an episode of cellulitis that required admission to the hospital.  At this time asymptomatic.  Hemoglobin back to normal range.  A decision was made to follow in 4 months.    12/14/2022: Feeling well.  As active as before.  Functional and self-sufficient.  Eating well and no weight loss.  Afebrile.  On exam no abnormalities to document.  Laboratory exams perfectly within the normal range.  I asked her to return 6 months later and to continue the iron until I see her again.    6/21/2023: Feeling well.  Strong and without new symptoms.  Reasonably active and with good appetite.  No new limitations.  The complications from the Watchman procedure resolved completely and she was then off of all anticoagulation.  On exam there were no changes.  The laboratory exams revealed a hemoglobin within the normal range.  Iron studies were requested and I asked her to return in 6 months.    12/11/2023: About the same.  Complains of pain especially on the left side, at the hip and the knee.  This has been variously attributed to arthritis of the hip and knee and to spinal column problems.  She is receiving local injections as well as physical therapy with only partial relief.  She has had chest pains and has visited the emergency room in the relatively recent past.  On exam alert, conversant and in no distress.  Lungs clear.  Heart regular.  Abdomen soft.  The laboratory exams were reviewed.  The blood count is well within the normal range.  A decision was made to see her 6 months later.  She was asked to continue to take the iron 3 times a week.    Subjective:  12/11/2023: Reports no new symptoms.  Has been as active as before.  Eats as well as before.  No weight loss.  No chest pains.  No abdominal pain.  As above persist with arthralgia, particularly of the left lower extremity.    The following portions of the patient's history were reviewed and updated as  appropriate: allergies, current medications, past family history, past medical history, past social history, past surgical history and problem list.    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 procedure; surgically fixed the next day    GERD (gastroesophageal reflux disease)     Heart murmur     Hip pain, bilateral     Hypertension     Medicare annual wellness visit, subsequent 02/12/2020    Shoulder pain, right      Past Surgical History:   Procedure Laterality Date    ATRIAL APPENDAGE EXCLUSION LEFT WITH TRANSESOPHAGEAL ECHOCARDIOGRAM Right 7/7/2022    Procedure: Atrial Appendage Occlusion watchman team aware $;  Surgeon: Sage Garcia MD;  Location: [facility] CATH INVASIVE LOCATION;  Service: Cardiovascular;  Laterality: Right;    ATRIAL APPENDAGE EXCLUSION LEFT WITH TRANSESOPHAGEAL ECHOCARDIOGRAM N/A 7/7/2022    Procedure: Atrial Appendage Occlusion;  Surgeon: Fide Mullen MD;  Location: [facility] CATH INVASIVE LOCATION;  Service: Cardiovascular;  Laterality: N/A;    CATARACT EXTRACTION      CHOLECYSTECTOMY      HYSTERECTOMY  1987    Endometriosis    OOPHORECTOMY      PSEUDO ANEURYSM REPAIR, EXTREMITY Right 7/8/2022    Procedure: REPAIR OF POST CATHETERIZATION RIGHT FEMORAL PSEUDOANEURYSM AND ARTERIOVENOUS FISTUAL;  Surgeon: Alfonzo Hooks MD;  Location: [facility] MAIN OR;  Service: Vascular;  Laterality: Right;    TONSILLECTOMY         Current Outpatient Medications:     acetaminophen (TYLENOL) 500 MG tablet, Take 1 tablet by mouth Every 6 (Six) Hours As Needed for Mild Pain., Disp: , Rfl:     baclofen (LIORESAL) 10 MG tablet, TAKE 1 TABLET BY MOUTH AT NIGHT AS NEEDED FOR MUSCLE SPASMS, Disp: 30 tablet, Rfl: 1    Breo Ellipta 200-25 MCG/INH inhaler, Inhale 1 puff Daily., Disp:  1 each, Rfl: 2    Cholecalciferol (VITAMIN D3) 2000 units capsule, Take 1 capsule by mouth Daily., Disp: , Rfl:     Cyanocobalamin (VITAMIN B12) 1000 MCG tablet controlled-release, Take 2,500 mcg by mouth Daily., Disp: , Rfl:     esomeprazole (nexIUM) 40 MG capsule, Take 1 capsule by mouth Every Morning Before Breakfast., Disp: , Rfl:     ferrous sulfate 325 (65 FE) MG tablet, Take 1 tablet by mouth 3 (Three) Times a Week. Monday, Wednesday and Friday., Disp: 45 tablet, Rfl: 1    furosemide (LASIX) 40 MG tablet, Take 1 tablet by mouth 3 (Three) Times a Week. Tuesday, Thursday and Saturday., Disp: , Rfl:     loratadine (CLARITIN) 10 MG tablet, Take 1 tablet by mouth Daily., Disp: , Rfl:     metoclopramide (REGLAN) 5 MG tablet, Take 1 tablet by mouth 4 (Four) Times a Day Before Meals & at Bedtime., Disp: , Rfl:     metoprolol succinate XL (TOPROL-XL) 25 MG 24 hr tablet, Take 1 tablet by mouth Daily., Disp: 90 tablet, Rfl: 3    spironolactone (ALDACTONE) 25 MG tablet, TAKE 1 TABLET BY MOUTH EVERY OTHER DAY, Disp: 45 tablet, Rfl: 1    traMADol (ULTRAM) 50 MG tablet, Take 1 tablet by mouth Every 6 (Six) Hours As Needed for Severe Pain., Disp: 12 tablet, Rfl: 0    triamcinolone (KENALOG) 0.1 % cream, Apply 1 application  topically to the appropriate area as directed 2 (Two) Times a Day As Needed for Irritation., Disp: 80 g, Rfl: 1    Allergies   Allergen Reactions    Ciprofloxacin Unknown (See Comments)    Sulfa Antibiotics Unknown (See Comments)    Iodine Unknown - High Severity     Patient doesn't remember what reaction she gets when she takes this     Sudafed [Pseudoephedrine Hcl] Other (See Comments)     Patient states it has been awhile since taking, so she doesn't remember side effects.     Family History   Problem Relation Age of Onset    Breast cancer Mother 85    Stroke Mother     Heart disease Mother     Endometrial cancer Mother 70    Heart failure Father     Heart failure Sister     COPD Sister     Heart  disease Sister      Cancer-related family history includes Breast cancer (age of onset: 85) in her mother; Endometrial cancer (age of onset: 70) in her mother.    Social History     Tobacco Use    Smoking status: Never    Smokeless tobacco: Never   Vaping Use    Vaping Use: Never used   Substance Use Topics    Alcohol use: No    Drug use: Never     Social History     Social History Narrative    Not on file      ROS:     Review of Systems   Constitutional:  Negative for activity change, appetite change, chills, diaphoresis, fatigue, fever and unexpected weight change.   HENT:  Negative for congestion, dental problem, drooling, ear discharge, ear pain, facial swelling, hearing loss, mouth sores, nosebleeds, postnasal drip, rhinorrhea, sinus pressure, sinus pain, sneezing, sore throat, tinnitus, trouble swallowing and voice change.    Eyes:  Negative for photophobia, pain, discharge, redness, itching and visual disturbance.   Respiratory:  Negative for apnea, cough, choking, chest tightness, shortness of breath, wheezing and stridor.    Cardiovascular:  Negative for chest pain, palpitations and leg swelling.   Gastrointestinal:  Negative for abdominal distention, abdominal pain, anal bleeding, blood in stool, constipation, diarrhea, nausea, rectal pain and vomiting.   Endocrine: Negative for cold intolerance, heat intolerance, polydipsia and polyuria.   Genitourinary:  Negative for decreased urine volume, difficulty urinating, dysuria, flank pain, frequency, genital sores, hematuria and urgency.   Musculoskeletal:  Positive for arthralgias and back pain. Negative for gait problem, joint swelling, myalgias, neck pain and neck stiffness.   Skin:  Negative for color change, pallor and rash.   Neurological:  Negative for dizziness, tremors, seizures, syncope, facial asymmetry, speech difficulty, weakness, light-headedness, numbness and headaches.   Hematological:  Negative for adenopathy. Does not bruise/bleed easily.  "  Psychiatric/Behavioral:  Negative for agitation, behavioral problems, confusion, decreased concentration, hallucinations, self-injury, sleep disturbance and suicidal ideas. The patient is not nervous/anxious.      Objective:    Vitals:    12/11/23 1359   BP: 148/68   Pulse: 67   Resp: 18   Temp: 98 °F (36.7 °C)   TempSrc: Oral   SpO2: 98%   Weight: 59.2 kg (130 lb 8.2 oz)   Height: 154.9 cm (61\")   PainSc:   5   PainLoc: Leg  Comment: bilateral     Body mass index is 24.66 kg/m².  ECOG  (3) Capable of limited self-care, confined to bed or chair > 50% of waking hours    Physical Exam:     Physical Exam  Constitutional:       General: She is not in acute distress.     Appearance: Normal appearance. She is not ill-appearing, toxic-appearing or diaphoretic.   HENT:      Head: Normocephalic and atraumatic.      Right Ear: External ear normal.      Left Ear: External ear normal.      Nose: Nose normal.      Mouth/Throat:      Mouth: Mucous membranes are moist.      Pharynx: Oropharynx is clear. No oropharyngeal exudate or posterior oropharyngeal erythema.   Eyes:      General: No scleral icterus.        Right eye: No discharge.         Left eye: No discharge.      Conjunctiva/sclera: Conjunctivae normal.      Pupils: Pupils are equal, round, and reactive to light.   Cardiovascular:      Rate and Rhythm: Normal rate and regular rhythm.      Pulses: Normal pulses.      Heart sounds: No murmur heard.     No friction rub. No gallop.   Pulmonary:      Effort: No respiratory distress.      Breath sounds: No stridor. No wheezing, rhonchi or rales.   Abdominal:      General: Abdomen is flat. Bowel sounds are normal. There is no distension.      Palpations: Abdomen is soft. There is no mass.      Tenderness: There is no abdominal tenderness. There is no right CVA tenderness, left CVA tenderness, guarding or rebound.      Hernia: No hernia is present.   Musculoskeletal:         General: No swelling, tenderness, deformity or signs " "of injury.      Cervical back: No rigidity.      Right lower leg: No edema.      Left lower leg: No edema.   Lymphadenopathy:      Cervical: No cervical adenopathy.   Skin:     Coloration: Skin is not jaundiced.      Findings: No bruising, lesion or rash.   Neurological:      General: No focal deficit present.      Mental Status: She is alert and oriented to person, place, and time.      Cranial Nerves: No cranial nerve deficit.      Motor: No weakness.      Gait: Gait normal.   Psychiatric:         Mood and Affect: Mood normal.         Behavior: Behavior normal.         Thought Content: Thought content normal.         Judgment: Judgment normal.     CM Cheng MD performed the physical exam on 12/11/2023 as documented above.    Lab Results - Last 18 Months   Lab Units 12/11/23  1429 11/15/23  2005 09/24/23  1812   WBC 10*3/mm3 7.33 8.50 6.80   HEMOGLOBIN g/dL 12.3 11.9* 14.3   HEMATOCRIT % 40.0 37.0 42.7   PLATELETS 10*3/mm3 248 238 272   MCV fL 87.1 81.2 80.0     Lab Results - Last 18 Months   Lab Units 11/15/23  2005 09/24/23  1812 07/31/23  1422   SODIUM mmol/L 144 139 141   POTASSIUM mmol/L 4.2 4.9 5.1   CHLORIDE mmol/L 106 98 105   CO2 mmol/L 25.0 25.0 27.0   BUN mg/dL 31* 44* 23   CREATININE mg/dL 1.13* 1.58* 0.91   CALCIUM mg/dL 9.7 9.4 9.8   BILIRUBIN mg/dL 0.7 0.5 0.6   ALK PHOS U/L 72 95 85   ALT (SGPT) U/L 8 35* 17   AST (SGOT) U/L 13 25 17   GLUCOSE mg/dL 124* 116* 99     Lab Results   Component Value Date    GLUCOSE 124 (H) 11/15/2023    BUN 31 (H) 11/15/2023    CREATININE 1.13 (H) 11/15/2023    EGFRIFNONA 84 02/01/2022    BCR 27.4 (H) 11/15/2023    K 4.2 11/15/2023    CO2 25.0 11/15/2023    CALCIUM 9.7 11/15/2023    ALBUMIN 4.1 11/15/2023    LABIL2 1.2 05/29/2019    AST 13 11/15/2023    ALT 8 11/15/2023     No results for input(s): \"APTT\", \"INR\", \"PTT\" in the last 22590 hours.    Lab Results   Component Value Date    PTT 28.2 11/05/2021    INR 1.03 11/05/2021     Assessment & Plan "     Assessment:  Iron deficiency anemia: Resolved.  Her Red cell indices were normal.  Persists with low total iron binding capacity iron saturation.  She is to continue with the same dose of iron at this time.  Discussed with her.  Gastrointestinal bleeding.  None at this time.  Was seen by her gastroenterologist.  She was not felt to require an endoscopy.  Reviewed all the recent laboratory exams including those from the hospital.  Reviewed hospital notes as well as notes from gastroenterology.  Discussed with her.  She is to see me in 6 months.    Plan:  As above.    Frank Cheng MD on 12/11/2023 at 1448.

## 2023-12-11 ENCOUNTER — OFFICE VISIT (OUTPATIENT)
Dept: ONCOLOGY | Facility: CLINIC | Age: 83
End: 2023-12-11
Payer: MEDICARE

## 2023-12-11 ENCOUNTER — LAB (OUTPATIENT)
Dept: LAB | Facility: HOSPITAL | Age: 83
End: 2023-12-11
Payer: MEDICARE

## 2023-12-11 ENCOUNTER — OFFICE VISIT (OUTPATIENT)
Dept: RADIATION ONCOLOGY | Facility: HOSPITAL | Age: 83
End: 2023-12-11
Payer: MEDICARE

## 2023-12-11 VITALS
HEIGHT: 61 IN | OXYGEN SATURATION: 99 % | WEIGHT: 130.6 LBS | DIASTOLIC BLOOD PRESSURE: 74 MMHG | RESPIRATION RATE: 18 BRPM | HEART RATE: 68 BPM | BODY MASS INDEX: 24.66 KG/M2 | TEMPERATURE: 98 F | SYSTOLIC BLOOD PRESSURE: 152 MMHG

## 2023-12-11 VITALS
BODY MASS INDEX: 24.64 KG/M2 | OXYGEN SATURATION: 98 % | SYSTOLIC BLOOD PRESSURE: 148 MMHG | HEART RATE: 67 BPM | TEMPERATURE: 98 F | WEIGHT: 130.51 LBS | RESPIRATION RATE: 18 BRPM | DIASTOLIC BLOOD PRESSURE: 68 MMHG | HEIGHT: 61 IN

## 2023-12-11 DIAGNOSIS — C34.11 MALIGNANT NEOPLASM OF UPPER LOBE OF RIGHT LUNG: Primary | ICD-10-CM

## 2023-12-11 DIAGNOSIS — C34.91 BRONCHOGENIC CANCER OF RIGHT LUNG: Primary | ICD-10-CM

## 2023-12-11 LAB
BASOPHILS # BLD AUTO: 0.02 10*3/MM3 (ref 0–0.2)
BASOPHILS NFR BLD AUTO: 0.3 % (ref 0–1.5)
DEPRECATED RDW RBC AUTO: 42.7 FL (ref 37–54)
EOSINOPHIL # BLD AUTO: 0.14 10*3/MM3 (ref 0–0.4)
EOSINOPHIL NFR BLD AUTO: 1.9 % (ref 0.3–6.2)
ERYTHROCYTE [DISTWIDTH] IN BLOOD BY AUTOMATED COUNT: 13.8 % (ref 12.3–15.4)
HCT VFR BLD AUTO: 40 % (ref 34–46.6)
HGB BLD-MCNC: 12.3 G/DL (ref 12–15.9)
HOLD SPECIMEN: NORMAL
HOLD SPECIMEN: NORMAL
LYMPHOCYTES # BLD AUTO: 1.46 10*3/MM3 (ref 0.7–3.1)
LYMPHOCYTES NFR BLD AUTO: 19.9 % (ref 19.6–45.3)
MCH RBC QN AUTO: 26.8 PG (ref 26.6–33)
MCHC RBC AUTO-ENTMCNC: 30.8 G/DL (ref 31.5–35.7)
MCV RBC AUTO: 87.1 FL (ref 79–97)
MONOCYTES # BLD AUTO: 0.65 10*3/MM3 (ref 0.1–0.9)
MONOCYTES NFR BLD AUTO: 8.9 % (ref 5–12)
NEUTROPHILS NFR BLD AUTO: 5.06 10*3/MM3 (ref 1.7–7)
NEUTROPHILS NFR BLD AUTO: 69 % (ref 42.7–76)
PLATELET # BLD AUTO: 248 10*3/MM3 (ref 140–450)
PMV BLD AUTO: 9.9 FL (ref 6–12)
RBC # BLD AUTO: 4.59 10*6/MM3 (ref 3.77–5.28)
WBC NRBC COR # BLD AUTO: 7.33 10*3/MM3 (ref 3.4–10.8)

## 2023-12-11 PROCEDURE — 85025 COMPLETE CBC W/AUTO DIFF WBC: CPT

## 2023-12-11 PROCEDURE — 36415 COLL VENOUS BLD VENIPUNCTURE: CPT

## 2023-12-11 PROCEDURE — 3077F SYST BP >= 140 MM HG: CPT | Performed by: INTERNAL MEDICINE

## 2023-12-11 PROCEDURE — 3078F DIAST BP <80 MM HG: CPT | Performed by: INTERNAL MEDICINE

## 2023-12-11 PROCEDURE — 1160F RVW MEDS BY RX/DR IN RCRD: CPT | Performed by: INTERNAL MEDICINE

## 2023-12-11 PROCEDURE — 1125F AMNT PAIN NOTED PAIN PRSNT: CPT | Performed by: INTERNAL MEDICINE

## 2023-12-11 PROCEDURE — 99213 OFFICE O/P EST LOW 20 MIN: CPT | Performed by: INTERNAL MEDICINE

## 2023-12-11 PROCEDURE — G0463 HOSPITAL OUTPT CLINIC VISIT: HCPCS | Performed by: RADIOLOGY

## 2023-12-11 PROCEDURE — 1159F MED LIST DOCD IN RCRD: CPT | Performed by: INTERNAL MEDICINE

## 2023-12-11 RX ORDER — METOCLOPRAMIDE 5 MG/1
5 TABLET ORAL
COMMUNITY

## 2023-12-11 RX ORDER — ESOMEPRAZOLE MAGNESIUM 40 MG/1
40 CAPSULE, DELAYED RELEASE ORAL
COMMUNITY

## 2023-12-14 ENCOUNTER — TREATMENT (OUTPATIENT)
Dept: PHYSICAL THERAPY | Facility: CLINIC | Age: 83
End: 2023-12-14
Payer: MEDICARE

## 2023-12-14 DIAGNOSIS — G89.29 CHRONIC PAIN OF BOTH KNEES: ICD-10-CM

## 2023-12-14 DIAGNOSIS — M25.511 CHRONIC RIGHT SHOULDER PAIN: ICD-10-CM

## 2023-12-14 DIAGNOSIS — M53.3 SACROILIAC JOINT DYSFUNCTION: Primary | ICD-10-CM

## 2023-12-14 DIAGNOSIS — M25.561 CHRONIC PAIN OF BOTH KNEES: ICD-10-CM

## 2023-12-14 DIAGNOSIS — G89.29 CHRONIC RIGHT SHOULDER PAIN: ICD-10-CM

## 2023-12-14 DIAGNOSIS — M54.16 RADICULOPATHY, LUMBAR REGION: ICD-10-CM

## 2023-12-14 DIAGNOSIS — M25.562 CHRONIC PAIN OF BOTH KNEES: ICD-10-CM

## 2023-12-14 NOTE — PROGRESS NOTES
"Physical Therapy Initial Evaluation and Plan of Care      [address] [place]   [address]    Patient: Chrystal Ruiz   : 1940  Diagnosis/ICD-10 Code:  Sacroiliac joint dysfunction [M53.3]; radiculopathy, lumbar region M54.16; chronic right shoulder pain M25.511; chronic pain of (B) knees M25.561 and M25.562  Referring practitioner: Waqas Berry DO  Date of Initial Visit: 2023  Today's Date: 2023  Patient seen for 1 sessions           Subjective Questionnaire: QuickDASH: 40.9 % functional impairment   Oswestry: 17/50 indicates 34% functional impairment       Subjective Evaluation    History of Present Illness  Mechanism of injury: - Pt reported recent ER visit on 11/15/23 secondary to chest pain and shortness of breath which she stated may be due to hip and knee pain being so bad    - Pt reported recent gel injection provided short term relief of knee and lower leg pain.  Patient reported knee pain \" comes and goes\"  could be \" very good or very bad\".     - Pt reported onset of (R) shoulder pain \" months \" ago not sure of date.  Patient reported shoulder is \" now giving me more pain than my legs\"     -Per patient and per EMR, pt is not a candidate for knee replacement surgery    - Back pain  \" I guess that has been going on for months\".  Patient reported back feels tired and weak.       - C/o (R) arm numbness and tingling         Patient Occupation: Retired teacher Pain  Current pain rating: 3  At best pain rating: 3  At worst pain rating: 10  Location: (R) shoulder; low back; (B) knees; (B) hips  Aggravating factors: standing    Social Support  [facility]  Lives with: alone ()    Diagnostic Tests  X-ray: abnormal    Treatments  Previous treatment: injection treatment and physical therapy (inpt following Watchman procedure)  Patient Goals  Patient goals for therapy: decreased pain  Patient goal: general activity     PMHx: Watchman's ; (R) lung nodule with 15 " treatments of radiation; a-fib; arthritis; asthma; CHF; Femoral artery pseudo aneurysm (R); GERD; heart murmur; HTN     Objective          Static Posture     Head  Forward.    Palpation     Right   Hypertonic in the middle deltoid. Tenderness of the biceps, middle deltoid and triceps.     Tenderness     Right Shoulder  Tenderness in the AC joint.   Left Knee   Tenderness in the medial joint line and patellar tendon.     Right Knee   Tenderness in the medial joint line.     Active Range of Motion     Right Shoulder   Flexion: 110 degrees with pain  Left Knee   Flexion: 100 degrees with pain  Extension: with pain    Right Knee   Flexion: 80 degrees with pain  Extension: with pain    Patellar Mobility   Left Knee Hypomobile in the left medial, left lateral, left superior and left inferior patellar tendon(s).     Right Knee Hypomobile in the medial, lateral, superior and inferior patellar tendon(s).     Patellar Static Positioning   Left Knee: lateral tilt  Right Knee: lateral tilt    Strength/Myotome Testing     Left Shoulder     Planes of Motion   Flexion: 4-     Isolated Muscles   Biceps: 4   Triceps: 4+     Right Shoulder     Planes of Motion   Flexion: 3     Isolated Muscles   Biceps: 4-   Triceps: 4-     Left Hip   Planes of Motion   Flexion: 4-    Right Hip   Planes of Motion   Flexion: 4-    Left Knee   Flexion: 4  Extension: 4-  Quadriceps contraction: poor    Right Knee   Flexion: 5  Extension: 4-  Quadriceps contraction: poor    Additional Strength Details  -Pain with resisted testing of (B) hip flexors and knee extensors    Tests     Left Hip   Positive MELISSA and FADIR.   SLR: Positive.     Right Hip   Negative MELISSA and FADIR.   SLR: Negative.     Additional Tests Details  (L) SLR, MELISSA, FADIR + for provocation of (L) hip pain     Ambulation     Observational Gait     Additional Observational Gait Details  -Wearing (B) [place] ankle braces  - No assistive device.  Patient reported she uses a cane sometimes  "at night   - HHA of therapist to ambulate ~ 60 ft x 2 from lobby to exam room          Assessment & Plan       Assessment  Impairments: abnormal gait, abnormal muscle firing, abnormal muscle tone, abnormal or restricted ROM, activity intolerance, impaired physical strength, lacks appropriate home exercise program, pain with function, safety issue and weight-bearing intolerance   Functional limitations: carrying objects, lifting, sleeping, walking, pulling, pushing, uncomfortable because of pain, moving in bed, standing, reaching behind back, reaching overhead and unable to perform repetitive tasks   Assessment details: Patient is an 82 y/o female presenting for OPPT evaluation with c/o (R) shoulder pain; low back, (B) knee and (B) hip pain.  Patient reported she has experienced pain for \" months\".    Significantly impaired posture and gait mechanics observed.  Patient held onto therapist to ambulate to exam room. No assistive device.  Patient demonstrates impaired trunk; knee and (R) shoulder ROM; impaired (B) UE and LE strength.  Difficulty with transfers and transitions noted secondary to impairments and provocation of pain     Pt would be a good candidate for aquatic therapy due to multi joint pain; intensity of pain; impaired activity and weight bearing tolerance.  Patient declined; however, reporting that she does not like water despite having benefits explained and discuss no need to put head in or under water.  Patient wished to proceed with attempting land based therapy.    Recommend trial of therapy to determine tolerance and progress, to address noted impairments to maximize (I)  and safety with daily activities and mobility  Barriers to therapy: chronic pain; not a candidate for knee replacement; activity tolerance; age  Prognosis: fair  Prognosis details:         Goals  Plan Goals: Short Term Goals- STGs - 3 weeks (6 visits)  1. Patient will report a 25 % improvement in pain intensity and frequency for " improved tolerance to driving, sitting, standing at [employer]  2. Improved (R) shoulder flexion AROM to 120 deg or better without provocation of pain for ability to lift light objects into upper cabinets and shelving.   3. Pt will be (I) with initial HEP handout for self management of impairments        Long Term Goals- LTGs - 6 weeks (12 visits)   1. Pt will demonstrate improved function of (R) UE as indicated by DASH  and Oswestry score indicating 20% impairment or less  2. Pt will have ability to ambulate 250 ft with improve mechanics, no need to steady self on environmental objects and no evidence for imbalance for improved safety with community mobility   3. Pt will demonstrate improved (B) hip and knee and (R) shoulder and elbow strength for improved ability to complete household tasks.  4. PERFORM 30 SECOND SIT <> STAND AND CREATE GOAL.    Plan  Therapy options: will be seen for skilled therapy services  Planned modality interventions: cryotherapy, hydrotherapy and thermotherapy (hydrocollator packs)  Planned therapy interventions: abdominal trunk stabilization, balance/weight-bearing training, body mechanics training, flexibility, functional ROM exercises, gait training, home exercise program, joint mobilization, manual therapy, neuromuscular re-education, soft tissue mobilization, spinal/joint mobilization, strengthening, stretching and therapeutic activities  Other planned therapy interventions: aquatic  Frequency: 2x week  Duration in visits: 12  Duration in weeks: 12  Treatment plan discussed with: patient        History # of Personal Factors and/or Comorbidities: HIGH (3+)  Examination of Body System(s): # of elements: MODERATE (3)  Clinical Presentation: EVOLVING  Clinical Decision Making: MODERATE      Timed:         Manual Therapy:    0     mins  11969;     Therapeutic Exercise:    0     mins  66756;     Neuromuscular Lyndsey:    0    mins  48896;    Therapeutic Activity:     0     mins  99948;     Gait  Trainin     mins  49629;     Ultrasound:     0     mins  76076;    Ionto                               0    mins   57846  Self Care                       0     mins   34146  Aquatic                          0     mins 24255      Un-Timed:  Electrical Stimulation:    0     mins  43156 ( );  Dry Needling     0     mins self-pay  Traction     0     mins 59108  Low Eval     0     Mins  59789  Mod Eval     45     Mins  43214  High Eval                       0     Mins  87849  Re-Eval                           0    mins  44795        Timed Treatment:   0   mins   Total Treatment:     45   mins    PT SIGNATURE: Addie Reese PT   [place] License#: [id number]      Electronically signed by Addie Reese PT, 23, 2:39 PM EST      Medicare Initial Certification  Certification Period:  2023 thru 3/12/2024  I certify that the therapy services are furnished while this patient is under my care.  The services outlined above are required by this patient, and will be reviewed every 90 days.       Physician Signature:__________________________________________________    PHYSICIAN: Waqas Berry, DO      DATE:     Please sign and return via fax to [phone].. Thank you, [facility].

## 2023-12-18 ENCOUNTER — TREATMENT (OUTPATIENT)
Dept: PHYSICAL THERAPY | Facility: CLINIC | Age: 83
End: 2023-12-18
Payer: MEDICARE

## 2023-12-18 DIAGNOSIS — M25.561 CHRONIC PAIN OF BOTH KNEES: ICD-10-CM

## 2023-12-18 DIAGNOSIS — M25.511 CHRONIC RIGHT SHOULDER PAIN: ICD-10-CM

## 2023-12-18 DIAGNOSIS — G89.29 CHRONIC RIGHT SHOULDER PAIN: ICD-10-CM

## 2023-12-18 DIAGNOSIS — M53.3 SACROILIAC JOINT DYSFUNCTION: Primary | ICD-10-CM

## 2023-12-18 DIAGNOSIS — M54.16 RADICULOPATHY, LUMBAR REGION: ICD-10-CM

## 2023-12-18 DIAGNOSIS — M25.562 CHRONIC PAIN OF BOTH KNEES: ICD-10-CM

## 2023-12-18 DIAGNOSIS — G89.29 CHRONIC PAIN OF BOTH KNEES: ICD-10-CM

## 2023-12-18 NOTE — PROGRESS NOTES
Physical Therapy Daily Treatment Note  [facility]   [address] [place] [address]      Patient: Chrystal Ruiz  : 1940  Referring Practitioner: Waqas Berry DO  Date of Initial Visit: Type: THERAPY  Noted: 2023  Today's Date: 2023  Patient seen for: 2 sessions      Visit Diagnoses:     ICD-10-CM ICD-9-CM   1. Sacroiliac joint dysfunction  M53.3 724.6   2. Radiculopathy, lumbar region  M54.16 724.4   3. Chronic right shoulder pain  M25.511 719.41    G89.29 338.29   4. Chronic pain of both knees  M25.561 719.46    M25.562 338.29    G89.29        Subjective   Chrystal Ruiz reports: today is a rough day in regards to her shoulder and knees, back and hips are not as bothersome.     Pain Level (0-10): 7 in R shoulder, 0 low back, 3 JUDI Hips, 7 JUDI knees.     Objective     See Exercise, Manual, and Modality Logs for complete treatment.     Patient Education: HEP via delta[employer] printout code: [id number]    Assessment & Plan       Assessment  Assessment details: - Gentle AROM, stretching and strengthening exs for R shoulder and JUDI hips & Knees.   - Fatigues quickly with max reps of 10.   - Initial HEP administered with instructions to HOLD any ex that increases pain.   - Increased time for transition between exs and equipment.       Progress per Plan of Care and Progress strengthening /stabilization /functional activity          Timed:         Manual Therapy:         mins  91548;     Therapeutic Exercise:    25     mins  33899;     Neuromuscular Lyndsey:        mins  20354;    Therapeutic Activity:     13     mins  21905;     Gait Training:           mins  92837;     Ultrasound:          mins  54289;    Ionto                                   mins   12930  Self Care                            mins   54109      Un-Timed:  Electrical Stimulation:         mins  01111 (MC );  Traction          mins 34233    No Charge:   Cryopack:        mins  Hydrocollator MHP:         mins      Timed  Treatment:   38   mins   Total Treatment:     38   mins      Tammy Brenner PTA  Physical Therapist Assistant  [place] License: [id number]

## 2023-12-20 ENCOUNTER — TREATMENT (OUTPATIENT)
Dept: PHYSICAL THERAPY | Facility: CLINIC | Age: 83
End: 2023-12-20
Payer: MEDICARE

## 2023-12-20 DIAGNOSIS — G89.29 CHRONIC RIGHT SHOULDER PAIN: ICD-10-CM

## 2023-12-20 DIAGNOSIS — M54.16 RADICULOPATHY, LUMBAR REGION: ICD-10-CM

## 2023-12-20 DIAGNOSIS — M25.561 CHRONIC PAIN OF BOTH KNEES: ICD-10-CM

## 2023-12-20 DIAGNOSIS — G89.29 CHRONIC PAIN OF BOTH KNEES: ICD-10-CM

## 2023-12-20 DIAGNOSIS — M25.562 CHRONIC PAIN OF BOTH KNEES: ICD-10-CM

## 2023-12-20 DIAGNOSIS — M25.511 CHRONIC RIGHT SHOULDER PAIN: ICD-10-CM

## 2023-12-20 DIAGNOSIS — M53.3 SACROILIAC JOINT DYSFUNCTION: Primary | ICD-10-CM

## 2023-12-20 NOTE — PROGRESS NOTES
Physical Therapy Daily Treatment Note  [facility]   [address] [place] [address]      Patient: Chrystal Ruiz  : 1940  Referring Practitioner: Waqas Berry DO  Date of Initial Visit: Type: THERAPY  Noted: 2023  Today's Date: 2023  Patient seen for: 3 sessions      Visit Diagnoses:     ICD-10-CM ICD-9-CM   1. Sacroiliac joint dysfunction  M53.3 724.6   2. Radiculopathy, lumbar region  M54.16 724.4   3. Chronic right shoulder pain  M25.511 719.41    G89.29 338.29   4. Chronic pain of both knees  M25.561 719.46    M25.562 338.29    G89.29        Subjective   Chrystal Ruiz reports her L hip and leg have been bothersome today. Reports that she has not been able to perform HEP at this point.     Pain Level (0-10): 5    Objective     See Exercise, Manual, and Modality Logs for complete treatment.     Patient Education: perform current HEP as able     Assessment & Plan       Assessment  Assessment details: - Progressed with stretching and strengthening exs today.   - Max tolerance remains ~10 reps.   - Addition of STM/MFR, which provides relief during, but is not long lasting.   - Does voice overall reduction in pain by end of session.  - Rest required between reps and increased time for positional changes.            Progress per Plan of Care and Progress strengthening /stabilization /functional activity          Timed:         Manual Therapy:    8     mins  75516;     Therapeutic Exercise:    30     mins  05615;     Neuromuscular Lyndsey:        mins  53208;    Therapeutic Activity:          mins  91782;     Gait Training:           mins  87742;     Ultrasound:          mins  33368;    Ionto                                   mins   21921  Self Care                            mins   79950      Un-Timed:  Electrical Stimulation:         mins  41102 (MC );  Traction          mins 75088    No Charge:   Cryopack:        mins  Hydrocollator MHP:         mins      Timed Treatment:   38    mins   Total Treatment:     38   mins      Tammy Brenner PTA  Physical Therapist Assistant  [place] License: [id number]

## 2023-12-27 ENCOUNTER — TREATMENT (OUTPATIENT)
Dept: PHYSICAL THERAPY | Facility: CLINIC | Age: 83
End: 2023-12-27
Payer: MEDICARE

## 2023-12-27 DIAGNOSIS — G89.29 CHRONIC PAIN OF BOTH KNEES: ICD-10-CM

## 2023-12-27 DIAGNOSIS — M25.511 CHRONIC RIGHT SHOULDER PAIN: ICD-10-CM

## 2023-12-27 DIAGNOSIS — M25.562 CHRONIC PAIN OF BOTH KNEES: ICD-10-CM

## 2023-12-27 DIAGNOSIS — M25.561 CHRONIC PAIN OF BOTH KNEES: ICD-10-CM

## 2023-12-27 DIAGNOSIS — M53.3 SACROILIAC JOINT DYSFUNCTION: Primary | ICD-10-CM

## 2023-12-27 DIAGNOSIS — G89.29 CHRONIC RIGHT SHOULDER PAIN: ICD-10-CM

## 2023-12-27 DIAGNOSIS — M54.16 RADICULOPATHY, LUMBAR REGION: ICD-10-CM

## 2023-12-27 NOTE — PROGRESS NOTES
Physical Therapy Daily Treatment Note  [place]    [address] [place] [address]      Patient: Chrystal Ruiz  : 1940  Referring Practitioner: Waqas Berry DO  Date of Initial Visit: Type: THERAPY  Noted: 2023  Today's Date: 2023  Patient seen for: 4 sessions      Visit Diagnoses:     ICD-10-CM ICD-9-CM   1. Sacroiliac joint dysfunction  M53.3 724.6   2. Radiculopathy, lumbar region  M54.16 724.4   3. Chronic right shoulder pain  M25.511 719.41    G89.29 338.29   4. Chronic pain of both knees  M25.561 719.46    M25.562 338.29    G89.29        Subjective   Chrystal Ruiz reports: her L knee is most bothersome and has been most bothersome over the last several days. Feels as if she is in need of another steroid injection. Low back and shoulder are also painful, but not near as bad as the L knee.     Pain Level (0-10): 7 in L knee, 4 in low back, 5 in shoulder.     Objective     See Exercise, Manual, and Modality Logs for complete treatment.     Patient Education: continue current HEP    Assessment & Plan       Assessment  Assessment details: - Continues to have limited tolerance to continuous exercise, and >10 reps of each.   - no change in pain from start to finish of session  - lumbar compensations noted with scap squeeze due to kyphotic posturing and limited scapular mobility.   - fatigued at end of session, requiring CGA from therapist for mobility through clinic.       Progress per Plan of Care and Progress strengthening /stabilization /functional activity          Timed:         Manual Therapy:         mins  86876;     Therapeutic Exercise:    25     mins  88051;     Neuromuscular Lyndsey:        mins  46359;    Therapeutic Activity:          mins  65492;     Gait Training:           mins  41708;     Ultrasound:          mins  78245;    Ionto                                   mins   45891  Self Care                            mins   38091      Un-Timed:  Electrical Stimulation:          mins  78656 ( );  Traction          mins 29910    No Charge:   Cryopack:        mins  Hydrocollator MHP:         mins      Timed Treatment:   25   mins   Total Treatment:     25   mins      Tammy Brenner PTA  Physical Therapist Assistant  [place] License: [id number]

## 2023-12-28 ENCOUNTER — TELEPHONE (OUTPATIENT)
Dept: ORTHOPEDIC SURGERY | Facility: CLINIC | Age: 83
End: 2023-12-28

## 2023-12-28 NOTE — TELEPHONE ENCOUNTER
Provider: VLADIMIR GALLOWAY     Caller: PATIENT     Phone Number: [phone]    Reason for Call: PATIENT STATES THE GEL INJECTION DID NOT WORK AND SHE IS HAVING A GREAT DEAL OF TROUBLE GETTING AROUND DUE TO HER BILATERAL KNEE PAIN. SHE STATES SHE IS STRUGGLING TO GET IN AND OUT OF THE CAR AND MAKE IT TO THE BATHROOM IN THE MIDDLE OF THE NIGHT. SHE WOULD LIKE SOME ADVICE ON WHAT TO DO MOVING FORWARD AND WOULD IT BE POSSIBLE TO HAVE A STEROID INJECTION. PLEASE CALL TO DISCUSS THIS. OKAY TO LEAVE VOICEMAIL IF NEEDED. SHE WOULD LIKE A CALL BACK SOMETIME TODAY IF POSSIBLE BEFORE 3:30 PM    When was the patient last seen: 11-16-23

## 2024-01-02 ENCOUNTER — OFFICE VISIT (OUTPATIENT)
Dept: ORTHOPEDIC SURGERY | Facility: CLINIC | Age: 84
End: 2024-01-02
Payer: MEDICARE

## 2024-01-02 VITALS — BODY MASS INDEX: 24.55 KG/M2 | RESPIRATION RATE: 20 BRPM | WEIGHT: 130 LBS | OXYGEN SATURATION: 97 % | HEIGHT: 61 IN

## 2024-01-02 DIAGNOSIS — M17.0 PRIMARY OSTEOARTHRITIS OF BOTH KNEES: Primary | ICD-10-CM

## 2024-01-02 RX ORDER — CEPHALEXIN 500 MG/1
500 CAPSULE ORAL EVERY 6 HOURS
Qty: 40 CAPSULE | Refills: 0 | Status: SHIPPED | OUTPATIENT
Start: 2024-01-02

## 2024-01-02 RX ADMIN — LIDOCAINE HYDROCHLORIDE 2 ML: 10 INJECTION, SOLUTION INFILTRATION; PERINEURAL at 08:18

## 2024-01-02 RX ADMIN — TRIAMCINOLONE ACETONIDE 80 MG: 40 INJECTION, SUSPENSION INTRA-ARTICULAR; INTRAMUSCULAR at 08:18

## 2024-01-03 ENCOUNTER — TREATMENT (OUTPATIENT)
Dept: PHYSICAL THERAPY | Facility: CLINIC | Age: 84
End: 2024-01-03
Payer: MEDICARE

## 2024-01-03 ENCOUNTER — TELEPHONE (OUTPATIENT)
Dept: PAIN MEDICINE | Facility: CLINIC | Age: 84
End: 2024-01-03

## 2024-01-03 DIAGNOSIS — M53.3 SACROILIAC JOINT DYSFUNCTION: Primary | ICD-10-CM

## 2024-01-03 DIAGNOSIS — G89.29 CHRONIC PAIN OF BOTH KNEES: ICD-10-CM

## 2024-01-03 DIAGNOSIS — M25.562 CHRONIC PAIN OF BOTH KNEES: ICD-10-CM

## 2024-01-03 DIAGNOSIS — M25.511 CHRONIC RIGHT SHOULDER PAIN: ICD-10-CM

## 2024-01-03 DIAGNOSIS — M25.561 CHRONIC PAIN OF BOTH KNEES: ICD-10-CM

## 2024-01-03 DIAGNOSIS — M54.16 RADICULOPATHY, LUMBAR REGION: ICD-10-CM

## 2024-01-03 DIAGNOSIS — G89.29 CHRONIC RIGHT SHOULDER PAIN: ICD-10-CM

## 2024-01-03 RX ORDER — LIDOCAINE HYDROCHLORIDE 10 MG/ML
2 INJECTION, SOLUTION INFILTRATION; PERINEURAL
Status: COMPLETED | OUTPATIENT
Start: 2024-01-02 | End: 2024-01-02

## 2024-01-03 RX ORDER — TRIAMCINOLONE ACETONIDE 40 MG/ML
80 INJECTION, SUSPENSION INTRA-ARTICULAR; INTRAMUSCULAR
Status: COMPLETED | OUTPATIENT
Start: 2024-01-02 | End: 2024-01-02

## 2024-01-03 NOTE — PROGRESS NOTES
Physical Therapy Treatment  Note  [address] [place]   [address]     Diagnosis Plan   1. Sacroiliac joint dysfunction        2. Radiculopathy, lumbar region        3. Chronic right shoulder pain        4. Chronic pain of both knees            VISIT#: 5    Subjective   Chrystal Ruiz reports: pt reported knee is feeling better since recent steroid injection.  Patient reported she continues to experience (R) shoulder pain. Has request MRI referral for (R) shoulder from pain management.  Back pain is minimal recently.       Objective     See Exercise, Manual, and Modality Logs for complete treatment.     Patient Education:    Assessment/Plan  - Pt presents with improved tolerance to exercise this date versus previous sessions.   - Encouraged to perform all activities in a range which did not illicit increased pain levels.   - Pt denied elevated pain levels at conclusion of treatment     Progress per Plan of Care            Timed:         Manual Therapy:    0     mins  51686;     Therapeutic Exercise:    32     mins  86292;     Neuromuscular Lyndsey:    0    mins  83359;    Therapeutic Activity:     0     mins  24360;     Gait Trainin     mins  67466;     Ultrasound:     0     mins  86823;    Ionto                               0    mins   50750  Self Care                       0     mins   83535  Canalith Repos               0    mins  4209  Aquatic                          0     mins 35630    Un-Timed:  Electrical Stimulation:    0     mins  11271 ( );  Dry Needling     0     mins self-pay  Traction     0     mins 11037  Low Eval     0     Mins  13325  Mod Eval     0     Mins  42958  High Eval                       0     Mins  51882  Re-Eval                           0    mins  83963    Timed Treatment:   32   mins   Total Treatment:     32   mins    Addie Reese, PT PT, DPT, [id number]

## 2024-01-03 NOTE — PROGRESS NOTES
INJECTION VISIT    Patient: Chrystal Ruiz    YOB: 1940    MRN: [id number]    Chief Complaint   Patient presents with    Left Knee - Follow-up, Pain    Right Knee - Follow-up, Pain       History of Present Illness:   Chrystal Ruiz is a 83 y.o. year old who presents today for injection of bilateral knees.     Most recently received VISCO injection to the the knees about 6 weeks ago with no relief. Would like to trial steroid injections.         Allergies:   Allergies   Allergen Reactions    Ciprofloxacin Unknown (See Comments)    Sulfa Antibiotics Unknown (See Comments)    Iodine Unknown - High Severity     Patient doesn't remember what reaction she gets when she takes this     Sudafed [Pseudoephedrine Hcl] Other (See Comments)     Patient states it has been awhile since taking, so she doesn't remember side effects.       Medications:   Home Medications:  Current Outpatient Medications on File Prior to Visit   Medication Sig    acetaminophen (TYLENOL) 500 MG tablet Take 1 tablet by mouth Every 6 (Six) Hours As Needed for Mild Pain.    baclofen (LIORESAL) 10 MG tablet TAKE 1 TABLET BY MOUTH AT NIGHT AS NEEDED FOR MUSCLE SPASMS    Breo Ellipta 200-25 MCG/INH inhaler Inhale 1 puff Daily.    Cholecalciferol (VITAMIN D3) 2000 units capsule Take 1 capsule by mouth Daily.    Cyanocobalamin (VITAMIN B12) 1000 MCG tablet controlled-release Take 2,500 mcg by mouth Daily.    esomeprazole (nexIUM) 40 MG capsule Take 1 capsule by mouth Every Morning Before Breakfast.    ferrous sulfate 325 (65 FE) MG tablet Take 1 tablet by mouth 3 (Three) Times a Week. Monday, Wednesday and Friday.    furosemide (LASIX) 40 MG tablet Take 1 tablet by mouth 3 (Three) Times a Week. Tuesday, Thursday and Saturday.    loratadine (CLARITIN) 10 MG tablet Take 1 tablet by mouth Daily.    metoclopramide (REGLAN) 5 MG tablet Take 1 tablet by mouth 4 (Four) Times a Day Before Meals & at Bedtime.    metoprolol succinate XL (TOPROL-XL) 25 MG  24 hr tablet Take 1 tablet by mouth Daily.    spironolactone (ALDACTONE) 25 MG tablet TAKE 1 TABLET BY MOUTH EVERY OTHER DAY    traMADol (ULTRAM) 50 MG tablet Take 1 tablet by mouth Every 6 (Six) Hours As Needed for Severe Pain.    triamcinolone (KENALOG) 0.1 % cream Apply 1 application  topically to the appropriate area as directed 2 (Two) Times a Day As Needed for Irritation.     No current facility-administered medications on file prior to visit.         I have reviewed the patient's medical history in detail and updated the computerized patient record.  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 procedure; surgically fixed the next day    GERD (gastroesophageal reflux disease)     Heart murmur     Hip pain, bilateral     Hypertension     Medicare annual wellness visit, subsequent 02/12/2020    Shoulder pain, right      Past Surgical History:   Procedure Laterality Date    ATRIAL APPENDAGE EXCLUSION LEFT WITH TRANSESOPHAGEAL ECHOCARDIOGRAM Right 7/7/2022    Procedure: Atrial Appendage Occlusion watchman team aware $;  Surgeon: Sage Garcia MD;  Location: [facility] CATH INVASIVE LOCATION;  Service: Cardiovascular;  Laterality: Right;    ATRIAL APPENDAGE EXCLUSION LEFT WITH TRANSESOPHAGEAL ECHOCARDIOGRAM N/A 7/7/2022    Procedure: Atrial Appendage Occlusion;  Surgeon: Fide Mullen MD;  Location: [facility] CATH INVASIVE LOCATION;  Service: Cardiovascular;  Laterality: N/A;    CATARACT EXTRACTION      CHOLECYSTECTOMY      HYSTERECTOMY  1987    Endometriosis    OOPHORECTOMY      PSEUDO ANEURYSM REPAIR, EXTREMITY Right 7/8/2022    Procedure: REPAIR OF POST CATHETERIZATION RIGHT FEMORAL PSEUDOANEURYSM AND ARTERIOVENOUS FISTUAL;  Surgeon:  Alfonzo Hooks MD;  Location: [facility] MAIN OR;  Service: Vascular;  Laterality: Right;    TONSILLECTOMY       Social History     Occupational History    Not on file   Tobacco Use    Smoking status: Never    Smokeless tobacco: Never   Vaping Use    Vaping Use: Never used   Substance and Sexual Activity    Alcohol use: No    Drug use: Never    Sexual activity: Not Currently      Social History     Social History Narrative    Not on file     Family History   Problem Relation Age of Onset    Breast cancer Mother 85    Stroke Mother     Heart disease Mother     Endometrial cancer Mother 70    Heart failure Father     Heart failure Sister     COPD Sister     Heart disease Sister                ROS  Negative unless listed in the HPI        Physical Exam  83 y.o. female  Body mass index is 24.56 kg/m²., 59 kg (130 lb)  Vitals:    01/02/24 1620   Resp: 20   SpO2: 97%     General: Alert, cooperative, appears well and in no observable distress.   HEENT: Normocephalic, atraumatic on external visual inspection. No icterus.   CV: No significant peripheral edema.   Respiratory: Normal respiratory effort.   Skin: Warm & well perfused; appropriate skin turgor.  Psych: Appropriate mood & affect.  Neuro: Gross sensation and motor intact in affected extremity/extremities.  Vascular: Peripheral pulses palpable in affected extremity/extremities.       Procedure:  Large Joint Arthrocentesis: R knee  Date/Time: 1/2/2024 8:18 AM  Consent given by: patient  Site marked: site marked  Timeout: Immediately prior to procedure a time out was called to verify the correct patient, procedure, equipment, support staff and site/side marked as required   Supporting Documentation  Indications: pain and diagnostic evaluation   Procedure Details  Location: knee - R knee  Needle size: 22 G  Approach: anterolateral  Medications administered: 2 mL lidocaine 1 %; 80 mg triamcinolone acetonide 40 MG/ML  Patient tolerance: patient tolerated the procedure  well with no immediate complications      Large Joint Arthrocentesis: L knee  Date/Time: 1/2/2024 8:18 AM  Consent given by: patient  Site marked: site marked  Timeout: Immediately prior to procedure a time out was called to verify the correct patient, procedure, equipment, support staff and site/side marked as required   Supporting Documentation  Indications: pain and diagnostic evaluation   Procedure Details  Location: knee - L knee  Needle size: 22 G  Approach: anterolateral  Medications administered: 2 mL lidocaine 1 %; 80 mg triamcinolone acetonide 40 MG/ML  Patient tolerance: patient tolerated the procedure well with no immediate complications            Assessment:   Diagnoses and all orders for this visit:    1. Primary osteoarthritis of both knees (Primary)    Other orders  -     cephalexin (KEFLEX) 500 MG capsule; Take 1 capsule by mouth Every 6 (Six) Hours.  Dispense: 40 capsule; Refill: 0  -     Large Joint Arthrocentesis  -     Large Joint Arthrocentesis      Body mass index is 24.56 kg/m².  BMI consistent with Normal: 18.5-24.9kg/m2        Plan:   -     Risks and benefits of injection therapy discussed with patient.   Injected patient's bilateral knee joint(s)with Kenalog from an anterolateral approach   Rest, ice, compression, and elevation (RICE) therapy  OTC Tylenol for pain PRN  BMI reviewed  Follow up in 3 months for repeat injection  Please call with questions or concerns in the interim        Date of encounter: 01/02/2024   ARIANA Mcfadden

## 2024-01-03 NOTE — TELEPHONE ENCOUNTER
Provider: FABIÁN    Caller: KRISTA    Relationship to Patient: SELF    Pharmacy:     Phone Number: [phone] [place] [facility]    Reason for Call: PT CALLING TO SEE IF IMAGING OF THE RIGHT SHOULDER COULD BE ADDED TO HER MRI ORDERS - PLEASE ADVISE - PT IS HAVING MORE PAIN IN HER RIGHT SHOULDER THAN HER BACK AT THE MOMENT

## 2024-01-08 ENCOUNTER — TREATMENT (OUTPATIENT)
Dept: PHYSICAL THERAPY | Facility: CLINIC | Age: 84
End: 2024-01-08
Payer: MEDICARE

## 2024-01-08 DIAGNOSIS — G89.29 CHRONIC RIGHT SHOULDER PAIN: ICD-10-CM

## 2024-01-08 DIAGNOSIS — M25.561 CHRONIC PAIN OF BOTH KNEES: ICD-10-CM

## 2024-01-08 DIAGNOSIS — G89.29 CHRONIC PAIN OF BOTH KNEES: ICD-10-CM

## 2024-01-08 DIAGNOSIS — M54.16 RADICULOPATHY, LUMBAR REGION: ICD-10-CM

## 2024-01-08 DIAGNOSIS — M53.3 SACROILIAC JOINT DYSFUNCTION: Primary | ICD-10-CM

## 2024-01-08 DIAGNOSIS — M25.562 CHRONIC PAIN OF BOTH KNEES: ICD-10-CM

## 2024-01-08 DIAGNOSIS — M25.511 CHRONIC RIGHT SHOULDER PAIN: ICD-10-CM

## 2024-01-08 NOTE — PROGRESS NOTES
Physical Therapy Treatment  Note  [address] [place]   [address]     Diagnosis Plan   1. Sacroiliac joint dysfunction        2. Radiculopathy, lumbar region        3. Chronic right shoulder pain        4. Chronic pain of both knees            VISIT#: 6    Subjective   Chrystal Ruiz reports: no pain upon arrival to therapy. Pt stated she did well following previous therapy session with no soreness or pain following.       Objective     See Exercise, Manual, and Modality Logs for complete treatment.     Patient Education:    Assessment/Plan  - Pt presents with improved tolerance to exercise this date versus previous sessions.   - Encouraged to perform all activities in a range which did not illicit increased pain levels.   - Pt reported (R) shoulder discomfort during AAROM which resolved upon completion of activity     Progress per Plan of Care            Timed:         Manual Therapy:    0     mins  03780;     Therapeutic Exercise:    31     mins  36101;     Neuromuscular Lyndsey:    0    mins  97903;    Therapeutic Activity:     0     mins  28005;     Gait Trainin     mins  60628;     Ultrasound:     0     mins  99738;    Ionto                               0    mins   08524  Self Care                       0     mins   32076  Canalith Repos               0    mins  4209  Aquatic                          0     mins 50897    Un-Timed:  Electrical Stimulation:    0     mins  97707 ( );  Dry Needling     0     mins self-pay  Traction     0     mins 68570  Low Eval     0     Mins  81365  Mod Eval     0     Mins  31829  High Eval                       0     Mins  04982  Re-Eval                           0    mins  53034    Timed Treatment:   31   mins   Total Treatment:     31  mins    Addie Reese, PT PT, DPT, [id number]

## 2024-01-11 ENCOUNTER — TREATMENT (OUTPATIENT)
Dept: PHYSICAL THERAPY | Facility: CLINIC | Age: 84
End: 2024-01-11
Payer: MEDICARE

## 2024-01-11 DIAGNOSIS — M25.511 CHRONIC RIGHT SHOULDER PAIN: ICD-10-CM

## 2024-01-11 DIAGNOSIS — M54.16 RADICULOPATHY, LUMBAR REGION: ICD-10-CM

## 2024-01-11 DIAGNOSIS — G89.29 CHRONIC RIGHT SHOULDER PAIN: ICD-10-CM

## 2024-01-11 DIAGNOSIS — M25.562 CHRONIC PAIN OF BOTH KNEES: ICD-10-CM

## 2024-01-11 DIAGNOSIS — M25.561 CHRONIC PAIN OF BOTH KNEES: ICD-10-CM

## 2024-01-11 DIAGNOSIS — G89.29 CHRONIC PAIN OF BOTH KNEES: ICD-10-CM

## 2024-01-11 DIAGNOSIS — M53.3 SACROILIAC JOINT DYSFUNCTION: Primary | ICD-10-CM

## 2024-01-11 NOTE — PROGRESS NOTES
Physical Therapy Treatment  Note  [address] [place]   [address]     Diagnosis Plan   1. Sacroiliac joint dysfunction        2. Radiculopathy, lumbar region        3. Chronic right shoulder pain        4. Chronic pain of both knees            VISIT#: 7    Subjective   Chrystal Ruiz reports: 2-3/10 shoulder and back pain upon arrival to therapy.  Slightly more pain reported yesterday.       Objective     See Exercise, Manual, and Modality Logs for complete treatment.     Patient Education:  - informed of next appt.  Last appt scheduled for 1/15/24.  Patient stated she does not wish to schedule more at this time.  Would like to wait to have MRI and follow up with MD prior to deciding to schedule more visits.     Assessment/Plan  - No increased pain reported at conclusion of session.   - intermittent cueing for proper posture and technique  - Improved upright posture and trunk extension observed this session.     Progress per Plan of Care            Timed:         Manual Therapy:    0     mins  91768;     Therapeutic Exercise:    35     mins  86279;     Neuromuscular Lyndsey:    0    mins  20710;    Therapeutic Activity:     0     mins  96239;     Gait Trainin     mins  46747;     Ultrasound:     0     mins  73032;    Ionto                               0    mins   37904  Self Care                       0     mins   23278  Canalith Repos               0    mins  4209  Aquatic                          0     mins 16366    Un-Timed:  Electrical Stimulation:    0     mins  07578 ( );  Dry Needling     0     mins self-pay  Traction     0     mins 43846  Low Eval     0     Mins  12239  Mod Eval     0     Mins  16082  High Eval                       0     Mins  51216  Re-Eval                           0    mins  09256    Timed Treatment:   35   mins   Total Treatment:     35  mins    Addie Reese, PT PT, DPT, [id number]

## 2024-01-13 DIAGNOSIS — M25.511 RIGHT SHOULDER PAIN, UNSPECIFIED CHRONICITY: Primary | ICD-10-CM

## 2024-01-15 ENCOUNTER — TREATMENT (OUTPATIENT)
Dept: PHYSICAL THERAPY | Facility: CLINIC | Age: 84
End: 2024-01-15
Payer: MEDICARE

## 2024-01-15 DIAGNOSIS — M25.562 CHRONIC PAIN OF BOTH KNEES: ICD-10-CM

## 2024-01-15 DIAGNOSIS — M54.16 RADICULOPATHY, LUMBAR REGION: ICD-10-CM

## 2024-01-15 DIAGNOSIS — M53.3 SACROILIAC JOINT DYSFUNCTION: Primary | ICD-10-CM

## 2024-01-15 DIAGNOSIS — M25.511 CHRONIC RIGHT SHOULDER PAIN: ICD-10-CM

## 2024-01-15 DIAGNOSIS — G89.29 CHRONIC PAIN OF BOTH KNEES: ICD-10-CM

## 2024-01-15 DIAGNOSIS — M25.561 CHRONIC PAIN OF BOTH KNEES: ICD-10-CM

## 2024-01-15 DIAGNOSIS — G89.29 CHRONIC RIGHT SHOULDER PAIN: ICD-10-CM

## 2024-01-15 NOTE — PROGRESS NOTES
Physical Therapy  Progress Note/Reassessment  [address] [place], [place] [address], Phone: [phone]    Patient: Chrystal Ruiz   : 1940  Diagnosis/ICD-10 Code:  Sacroiliac joint dysfunction [M53.3]  Referring practitioner: Waqas Berry DO  Date of Initial Visit: Type: THERAPY  Noted: 2023  Today's Date: 1/15/2024  Patient seen for 8 sessions      Subjective:   Visit Diagnosis:    ICD-10-CM ICD-9-CM   1. Sacroiliac joint dysfunction  M53.3 724.6   2. Radiculopathy, lumbar region  M54.16 724.4   3. Chronic pain of both knees  M25.561 719.46    M25.562 338.29    G89.29    4. Chronic right shoulder pain  M25.511 719.41    G89.29 338.29       Chrystal Ruiz reports: 3/10 at shoulder upon arrival to therapy.  Patient reported back is doing ok  Subjective Questionnaire: Oswestry: not re-assessed this date (at initial evaluation: QuickDASH: 40.9 % functional impairment   Oswestry: 50 indicates 34% functional impairment  )   Clinical Progress: improved  Home Program Compliance: Yes  Treatment has included: therapeutic exercise    Subjective   Pain  Current pain ratin at back , 4/10 at (B) knees ; 3/10 at (R) shoulder  At best pain ratin at back   At worst pain rating: 3/10 at back ; 6/10 at (R) shoulder    Objective   Active Range of Motion      Right Shoulder   Flexion: 138 degrees  (110 degrees at evaluation)     Assessment/Plan  - Pt demonstrates improved (R) shoulder flexion via goniometric measurement.  Improved ability to lift (R) UE above shoulder level for functional activities  - Overall pain has reduced since initial evaluation allowing for improved tolerance to therapy and general daily activity  - Recommend an additional 2-3 weeks of therapy to address remaining deficits then progress to HEP. Patient reported she would like to conserve insurance benefits for remainder of year .  In agreement to complete 4 additional visits    Progress toward previous goals: Partially  Met    Goals    Plan Goals: Short Term Goals- STGs - 3 weeks (6 visits)  1. Patient will report a 25 % improvement in pain intensity and frequency for improved tolerance to driving, sitting, standing at [employer] MET , in relation to low back symptoms.  2. Improved (R) shoulder flexion AROM to 120 deg or better without provocation of pain for ability to lift light objects into upper cabinets and shelving. MET,  improved from 110 to 138 degrees  3. Pt will be (I) with initial HEP handout for self management of impairments: PROGRESSING           Long Term Goals- LTGs - 6 weeks (12 visits)   1. Pt will demonstrate improved function of (R) UE as indicated by DASH  and Oswestry score indicating 20% impairment or less  2. Pt will have ability to ambulate 250 ft with improve mechanics, no need to steady self on environmental objects and no evidence for imbalance for improved safety with community mobility   3. Pt will demonstrate improved (B) hip and knee and (R) shoulder and elbow strength for improved ability to complete household tasks.  4. PERFORM 30 SECOND SIT <> STAND AND CREATE GOAL: D/C goal  Short-term goals (STG):  2/3  Long-term goals (LTG):       Recommendations: Continue as planned  Timeframe: 2 weeks  Prognosis to achieve goals: fair    Timed:         Manual Therapy:    0     mins  04601;     Therapeutic Exercise:    45     mins  27940;     Neuromuscular Lyndsey:    0    mins  66602;    Therapeutic Activity:     0     mins  08220;     Gait Trainin     mins  29356;     Ultrasound:     0     mins  43608;    Ionto                               0    mins   04990  Self Care                       0     mins   95198  Aquatic                          0     mins 44735      Un-Timed:  Electrical Stimulation:    0     mins  90190 ( );  Dry Needling     0     mins self-pay  Traction     0     mins 26740  Low Eval     0     Mins  17612  Mod Eval     0     Mins  85227  High Eval                       0     Mins   75778  Re-Eval                           0    mins  38672      Timed Treatment:   45   mins   Total Treatment:     45   mins      PT Signature: Addie Reese, PT  [place] License #: [id number]

## 2024-01-15 NOTE — LETTER
Progress Note  2024  Waqas Berry DO    Re: Chrystal Ruiz  ________________________________________________________________  Physical Therapy  Progress Note/Reassessment  [address], [place] [address], Phone: [phone]    Patient: Chrystal Ruiz   : 1940  Diagnosis/ICD-10 Code:  Sacroiliac joint dysfunction [M53.3]  Referring practitioner: Waqas Berry DO  Date of Initial Visit: Type: THERAPY  Noted: 2023  Today's Date: 1/15/2024  Patient seen for 8 sessions      Subjective:   Visit Diagnosis:    ICD-10-CM ICD-9-CM   1. Sacroiliac joint dysfunction  M53.3 724.6   2. Radiculopathy, lumbar region  M54.16 724.4   3. Chronic pain of both knees  M25.561 719.46    M25.562 338.29    G89.29    4. Chronic right shoulder pain  M25.511 719.41    G89.29 338.29       Chrystal Ruiz reports: 3/10 at shoulder upon arrival to therapy.  Patient reported back is doing ok  Subjective Questionnaire: Oswestry: not re-assessed this date (at initial evaluation: QuickDASH: 40.9 % functional impairment   Oswestry: 17/50 indicates 34% functional impairment  )   Clinical Progress: improved  Home Program Compliance: Yes  Treatment has included: therapeutic exercise    Subjective   Pain  Current pain ratin at back , 4/10 at (B) knees ; 3/10 at (R) shoulder  At best pain ratin at back   At worst pain rating: 3/10 at back ; 6/10 at (R) shoulder    Objective   Active Range of Motion      Right Shoulder   Flexion: 138 degrees  (110 degrees at evaluation)     Assessment/Plan  - Pt demonstrates improved (R) shoulder flexion via goniometric measurement.  Improved ability to lift (R) UE above shoulder level for functional activities  - Overall pain has reduced since initial evaluation allowing for improved tolerance to therapy and general daily activity  - Recommend an additional 2-3 weeks of therapy to address remaining deficits then progress to HEP. Patient reported she would like to conserve insurance  benefits for remainder of year .  In agreement to complete 4 additional visits    Progress toward previous goals: Partially Met    Goals    Plan Goals: Short Term Goals- STGs - 3 weeks (6 visits)  1. Patient will report a 25 % improvement in pain intensity and frequency for improved tolerance to driving, sitting, standing at [employer] MET , in relation to low back symptoms.  2. Improved (R) shoulder flexion AROM to 120 deg or better without provocation of pain for ability to lift light objects into upper cabinets and shelving. MET,  improved from 110 to 138 degrees  3. Pt will be (I) with initial HEP handout for self management of impairments: PROGRESSING           Long Term Goals- LTGs - 6 weeks (12 visits)   1. Pt will demonstrate improved function of (R) UE as indicated by DASH  and Oswestry score indicating 20% impairment or less  2. Pt will have ability to ambulate 250 ft with improve mechanics, no need to steady self on environmental objects and no evidence for imbalance for improved safety with community mobility   3. Pt will demonstrate improved (B) hip and knee and (R) shoulder and elbow strength for improved ability to complete household tasks.  4. PERFORM 30 SECOND SIT <> STAND AND CREATE GOAL: D/C goal  Short-term goals (STG):  2/3  Long-term goals (LTG):       Recommendations: Continue as planned  Timeframe: 2 weeks  Prognosis to achieve goals: fair    Timed:         Manual Therapy:    0     mins  29268;     Therapeutic Exercise:    45     mins  27312;     Neuromuscular Lyndsey:    0    mins  56970;    Therapeutic Activity:     0     mins  56391;     Gait Trainin     mins  82240;     Ultrasound:     0     mins  60124;    Ionto                               0    mins   83346  Self Care                       0     mins   79201  Aquatic                          0     mins 42213      Un-Timed:  Electrical Stimulation:    0     mins  21380 ( );  Dry Needling     0     mins self-pay  Traction     0      mins 24665  Low Eval     0     Mins  36729  Mod Eval     0     Mins  43573  High Eval                       0     Mins  55886  Re-Eval                           0    mins  75007      Timed Treatment:   45   mins   Total Treatment:     45   mins      PT Signature: Addie Reese, PT  [place] License #: [id number]   Thank you for this referral.

## 2024-01-16 ENCOUNTER — TELEPHONE (OUTPATIENT)
Dept: FAMILY MEDICINE CLINIC | Facility: CLINIC | Age: 84
End: 2024-01-16
Payer: MEDICARE

## 2024-01-16 NOTE — TELEPHONE ENCOUNTER
Caller: Chrystal Ruiz    Relationship to patient: Self    Best call back number:     Patient is needing: PATIENT WOULD LIKE A CALLBACK ABOUT WHEN HER LAST PNEUMONIA VACCINE WAS AND WHAT TYPE IT WAS.   SHE STATES THERE IS A NEW TYPE OUT NOW.  PLEASE CALL THE PATIENT TO ADVISE.

## 2024-01-16 NOTE — TELEPHONE ENCOUNTER
TR     We do have this but I'm not sure its covered pt will need to get Pneumonia vaccine at the pharmacy

## 2024-01-18 ENCOUNTER — TREATMENT (OUTPATIENT)
Dept: PHYSICAL THERAPY | Facility: CLINIC | Age: 84
End: 2024-01-18
Payer: MEDICARE

## 2024-01-18 DIAGNOSIS — M25.561 CHRONIC PAIN OF BOTH KNEES: ICD-10-CM

## 2024-01-18 DIAGNOSIS — G89.29 CHRONIC PAIN OF BOTH KNEES: ICD-10-CM

## 2024-01-18 DIAGNOSIS — M25.562 CHRONIC PAIN OF BOTH KNEES: ICD-10-CM

## 2024-01-18 DIAGNOSIS — M53.3 SACROILIAC JOINT DYSFUNCTION: Primary | ICD-10-CM

## 2024-01-18 DIAGNOSIS — M54.16 RADICULOPATHY, LUMBAR REGION: ICD-10-CM

## 2024-01-18 DIAGNOSIS — M25.511 CHRONIC RIGHT SHOULDER PAIN: ICD-10-CM

## 2024-01-18 DIAGNOSIS — G89.29 CHRONIC RIGHT SHOULDER PAIN: ICD-10-CM

## 2024-01-18 NOTE — PROGRESS NOTES
Physical Therapy Treatment  Note  [address] [place]   [address]     Diagnosis Plan   1. Sacroiliac joint dysfunction        2. Radiculopathy, lumbar region        3. Chronic pain of both knees        4. Chronic right shoulder pain            VISIT#: 9    Sebastian Ruiz reports: no pain upon arrival to therapy.  Shoulder with no pain upon arrival to therapy.      Objective     See Exercise, Manual, and Modality Logs for complete treatment.     Patient Education:    Goals- Recent progress note complete on 1/15/24     Plan Goals: Short Term Goals- STGs - 3 weeks (6 visits)  1. Patient will report a 25 % improvement in pain intensity and frequency for improved tolerance to driving, sitting, standing at [employer] MET , in relation to low back symptoms.  2. Improved (R) shoulder flexion AROM to 120 deg or better without provocation of pain for ability to lift light objects into upper cabinets and shelving. MET,  improved from 110 to 138 degrees  3. Pt will be (I) with initial HEP handout for self management of impairments: PROGRESSING           Long Term Goals- LTGs - 6 weeks (12 visits)   1. Pt will demonstrate improved function of (R) UE as indicated by DASH  and Oswestry score indicating 20% impairment or less  2. Pt will have ability to ambulate 250 ft with improve mechanics, no need to steady self on environmental objects and no evidence for imbalance for improved safety with community mobility   3. Pt will demonstrate improved (B) hip and knee and (R) shoulder and elbow strength for improved ability to complete household tasks.  4. PERFORM 30 SECOND SIT <> STAND AND CREATE GOAL: D/C goal    Assessment/Plan  - Added shoulder pulleys this date to promote improved ROM.  Pt tolerated well without pain provocation  - Pt denied pain at conclusion of session.       Progress per Plan of Care            Timed:         Manual Therapy:    0     mins  42146;     Therapeutic Exercise:    35     mins   66567;     Neuromuscular Lyndsey:    0    mins  81522;    Therapeutic Activity:     0     mins  81284;     Gait Trainin     mins  59147;     Ultrasound:     0     mins  72187;    Ionto                               0    mins   86860  Self Care                       0     mins   66103  Canalith Repos               0    mins  4209  Aquatic                          0     mins 32871    Un-Timed:  Electrical Stimulation:    0     mins  57731 ( );  Dry Needling     0     mins self-pay  Traction     0     mins 13461  Low Eval     0     Mins  32331  Mod Eval     0     Mins  94486  High Eval                       0     Mins  68552  Re-Eval                           0    mins  39643    Timed Treatment:   35   mins   Total Treatment:     35   mins    Addie Reese, PT PT, DPT, [id number]

## 2024-01-22 ENCOUNTER — TREATMENT (OUTPATIENT)
Dept: PHYSICAL THERAPY | Facility: CLINIC | Age: 84
End: 2024-01-22
Payer: MEDICARE

## 2024-01-22 DIAGNOSIS — M25.561 CHRONIC PAIN OF BOTH KNEES: ICD-10-CM

## 2024-01-22 DIAGNOSIS — G89.29 CHRONIC PAIN OF BOTH KNEES: ICD-10-CM

## 2024-01-22 DIAGNOSIS — M53.3 SACROILIAC JOINT DYSFUNCTION: Primary | ICD-10-CM

## 2024-01-22 DIAGNOSIS — G89.29 CHRONIC RIGHT SHOULDER PAIN: ICD-10-CM

## 2024-01-22 DIAGNOSIS — M25.511 CHRONIC RIGHT SHOULDER PAIN: ICD-10-CM

## 2024-01-22 DIAGNOSIS — M25.562 CHRONIC PAIN OF BOTH KNEES: ICD-10-CM

## 2024-01-22 DIAGNOSIS — M54.16 RADICULOPATHY, LUMBAR REGION: ICD-10-CM

## 2024-01-22 PROCEDURE — 97110 THERAPEUTIC EXERCISES: CPT

## 2024-01-22 NOTE — PROGRESS NOTES
Physical Therapy Treatment  Note  [address] [place]   [address]     Diagnosis Plan   1. Sacroiliac joint dysfunction        2. Radiculopathy, lumbar region        3. Chronic pain of both knees        4. Chronic right shoulder pain            VISIT#: 10    Sebastian Ruiz reports: no pain upon arrival to therapy.  Shoulder with no pain upon arrival to therapy. Patient had no new complaints      Objective     See Exercise, Manual, and Modality Logs for complete treatment.     Patient Education:    Goals- Recent progress note complete on 1/15/24     Plan Goals: Short Term Goals- STGs - 3 weeks (6 visits)  1. Patient will report a 25 % improvement in pain intensity and frequency for improved tolerance to driving, sitting, standing at [employer] MET , in relation to low back symptoms.  2. Improved (R) shoulder flexion AROM to 120 deg or better without provocation of pain for ability to lift light objects into upper cabinets and shelving. MET,  improved from 110 to 138 degrees  3. Pt will be (I) with initial HEP handout for self management of impairments: PROGRESSING           Long Term Goals- LTGs - 6 weeks (12 visits)   1. Pt will demonstrate improved function of (R) UE as indicated by DASH  and Oswestry score indicating 20% impairment or less  2. Pt will have ability to ambulate 250 ft with improve mechanics, no need to steady self on environmental objects and no evidence for imbalance for improved safety with community mobility   3. Pt will demonstrate improved (B) hip and knee and (R) shoulder and elbow strength for improved ability to complete household tasks.  4. PERFORM 30 SECOND SIT <> STAND AND CREATE GOAL: D/C goal    Assessment/Plan  -  Pt was able to progress to standing rows this date. Able to increase reps this date. Tolerated well without complaint  - Pt denied pain at conclusion of session.       Progress per Plan of Care            Timed:         Manual Therapy:    0     mins   92522;     Therapeutic Exercise:    40     mins  72972;     Neuromuscular Lyndsey:    0    mins  76630;    Therapeutic Activity:     0     mins  02681;     Gait Trainin     mins  35908;     Ultrasound:     0     mins  78601;    Ionto                               0    mins   94345  Self Care                       0     mins   48168  Canalith Repos               0    mins  4209  Aquatic                          0     mins 41969    Un-Timed:  Electrical Stimulation:    0     mins  10510 ( );  Dry Needling     0     mins self-pay  Traction     0     mins 12489  Low Eval     0     Mins  83075  Mod Eval     0     Mins  61738  High Eval                       0     Mins  18871  Re-Eval                           0    mins  28285    Timed Treatment:   40   mins   Total Treatment:     40   mins    Addie Reese, PT PT, DPT, [id number]

## 2024-01-23 RX ORDER — SPIRONOLACTONE 25 MG/1
TABLET ORAL
Qty: 45 TABLET | Refills: 1 | Status: SHIPPED | OUTPATIENT
Start: 2024-01-23

## 2024-01-24 ENCOUNTER — TREATMENT (OUTPATIENT)
Dept: PHYSICAL THERAPY | Facility: CLINIC | Age: 84
End: 2024-01-24
Payer: MEDICARE

## 2024-01-24 DIAGNOSIS — G89.29 CHRONIC PAIN OF BOTH KNEES: ICD-10-CM

## 2024-01-24 DIAGNOSIS — M25.511 CHRONIC RIGHT SHOULDER PAIN: ICD-10-CM

## 2024-01-24 DIAGNOSIS — M25.561 CHRONIC PAIN OF BOTH KNEES: ICD-10-CM

## 2024-01-24 DIAGNOSIS — M54.16 RADICULOPATHY, LUMBAR REGION: ICD-10-CM

## 2024-01-24 DIAGNOSIS — M25.562 CHRONIC PAIN OF BOTH KNEES: ICD-10-CM

## 2024-01-24 DIAGNOSIS — M53.3 SACROILIAC JOINT DYSFUNCTION: Primary | ICD-10-CM

## 2024-01-24 DIAGNOSIS — G89.29 CHRONIC RIGHT SHOULDER PAIN: ICD-10-CM

## 2024-01-24 NOTE — PROGRESS NOTES
Physical Therapy Treatment  Note  [address] [place]   [address]     Diagnosis Plan   1. Sacroiliac joint dysfunction        2. Radiculopathy, lumbar region        3. Chronic pain of both knees        4. Chronic right shoulder pain            VISIT#: 11    Subjective   Chrystal Ruiz reports: 3/10 pain at (R) arm.  Patient also c/o (B) knee pain       Objective     See Exercise, Manual, and Modality Logs for complete treatment.     Patient Education:    Goals- Recent progress note complete on 1/15/24     Plan Goals: Short Term Goals- STGs - 3 weeks (6 visits)  1. Patient will report a 25 % improvement in pain intensity and frequency for improved tolerance to driving, sitting, standing at [employer] MET , in relation to low back symptoms.  2. Improved (R) shoulder flexion AROM to 120 deg or better without provocation of pain for ability to lift light objects into upper cabinets and shelving. MET,  improved from 110 to 138 degrees  3. Pt will be (I) with initial HEP handout for self management of impairments: PROGRESSING           Long Term Goals- LTGs - 6 weeks (12 visits)   1. Pt will demonstrate improved function of (R) UE as indicated by DASH  and Oswestry score indicating 20% impairment or less  2. Pt will have ability to ambulate 250 ft with improve mechanics, no need to steady self on environmental objects and no evidence for imbalance for improved safety with community mobility   3. Pt will demonstrate improved (B) hip and knee and (R) shoulder and elbow strength for improved ability to complete household tasks.  4. PERFORM 30 SECOND SIT <> STAND AND CREATE GOAL: D/C goal    Assessment/Plan  - Improved functional (R) shoulder ROM noted as evidenced by pt subjective report of improved ability to perform upper body dressing and lifting into upper cabinets and shelving  - Pt denied elevated pain levels at conclusion of treatment.  Pt reported no change in shoulder pain  - Pt demonstrated difficulty  performing mid rows with scapular retraction correctly this date despite verbal cueing and visual demo . Impaired motor planning. Compensatory strategy via shoulder shrug.       Progress per Plan of Care            Timed:         Manual Therapy:    0     mins  54876;     Therapeutic Exercise:    36     mins  10042;     Neuromuscular Lyndsey:    0    mins  99393;    Therapeutic Activity:     0     mins  19581;     Gait Trainin     mins  11260;     Ultrasound:     0     mins  43941;    Ionto                               0    mins   58832  Self Care                       0     mins   10431  Canalith Repos               0    mins  4209  Aquatic                          0     mins 76890    Un-Timed:  Electrical Stimulation:    0     mins  63928 ( );  Dry Needling     0     mins self-pay  Traction     0     mins 01848  Low Eval     0     Mins  99705  Mod Eval     0     Mins  60756  High Eval                       0     Mins  28440  Re-Eval                           0    mins  23960    Timed Treatment:   36   mins   Total Treatment:     36   mins    Addie Reese, PT PT, DPT, [id number]

## 2024-01-26 RX ORDER — BACLOFEN 10 MG/1
10 TABLET ORAL NIGHTLY PRN
Qty: 30 TABLET | Refills: 1 | Status: SHIPPED | OUTPATIENT
Start: 2024-01-26

## 2024-01-29 ENCOUNTER — TREATMENT (OUTPATIENT)
Dept: PHYSICAL THERAPY | Facility: CLINIC | Age: 84
End: 2024-01-29
Payer: MEDICARE

## 2024-01-29 DIAGNOSIS — G89.29 CHRONIC RIGHT SHOULDER PAIN: ICD-10-CM

## 2024-01-29 DIAGNOSIS — M25.511 CHRONIC RIGHT SHOULDER PAIN: ICD-10-CM

## 2024-01-29 DIAGNOSIS — G89.29 CHRONIC PAIN OF BOTH KNEES: ICD-10-CM

## 2024-01-29 DIAGNOSIS — M25.562 CHRONIC PAIN OF BOTH KNEES: ICD-10-CM

## 2024-01-29 DIAGNOSIS — M53.3 SACROILIAC JOINT DYSFUNCTION: ICD-10-CM

## 2024-01-29 DIAGNOSIS — M54.16 RADICULOPATHY, LUMBAR REGION: Primary | ICD-10-CM

## 2024-01-29 DIAGNOSIS — M25.561 CHRONIC PAIN OF BOTH KNEES: ICD-10-CM

## 2024-01-29 NOTE — PROGRESS NOTES
Physical Therapy  Discharge  [address] [place], [place] [address], Phone: [phone]    Patient: Chrystal Ruiz   : 1940  Diagnosis/ICD-10 Code:  Radiculopathy, lumbar region [M54.16]  Referring practitioner: Waqas Berry DO  Date of Initial Visit: Type: THERAPY  Noted: 2023  Today's Date: 2024  Patient seen for 12 sessions      Subjective:   Visit Diagnosis:    ICD-10-CM ICD-9-CM   1. Radiculopathy, lumbar region  M54.16 724.4   2. Chronic pain of both knees  M25.561 719.46    M25.562 338.29    G89.29    3. Chronic right shoulder pain  M25.511 719.41    G89.29 338.29   4. Sacroiliac joint dysfunction  M53.3 724.6       Chrystal Ruiz reports: current pain level as 2/10.  Patient c/o mild back and shoulder pain   Subjective Questionnaire: QuickDASH: 45.45% impairment  Oswestry: 21/50 indicates 42% impairment  ( at initial evaluation: QuickDASH: 40.9 % functional impairment   Oswestry: 17/50 indicates 34% functional impairment  )  Clinical Progress: improved  Home Program Compliance: Yes  Treatment has included: therapeutic exercise and therapeutic activity    Subjective   Pain  Current pain ratin  At best pain ratin  At worst pain ratin  Location: shoulder pain > low back     Objective     Active Range of Motion      Right Shoulder   Flexion: 138 degrees  (110 degrees at evaluation) -measured at last assessment     Strength/Myotome Testing      Left Shoulder      Planes of Motion   Flexion: 4       Right Shoulder      Planes of Motion   Flexion: 4-     (B) elbow flexion = 5/5  (B) elbow extension = 4/5    Left Hip   Planes of Motion   Flexion: 4     Right Hip   Planes of Motion   Flexion: 5     Left Knee   Flexion: 5  Extension: 5  Quadriceps contraction: poor     Right Knee   Flexion: 5  Extension: 5      Education:   Access Code: [email or web address]  URL: [email or web address]  Date: 2024  Prepared by: Addie Reese    Exercises  - Seated Hamstring Stretch  - 1 x daily  - 7 x weekly - 3 sets - 20 hold  - Seated Long Arc Quad  - 1 x daily - 7 x weekly - 1 sets - 10 reps  - Seated March  - 1 x daily - 7 x weekly - 1 sets - 10 reps  - Seated Hip Abduction with Resistance  - 1 x daily - 7 x weekly - 1 sets - 10 reps  - Standing Shoulder Row with Anchored Resistance  - 1 x daily - 7 x weekly - 1 sets - 10 reps  - Seated Upright Posture Correction  - 1 x daily - 7 x weekly - 1 sets - 10 reps  - Seated shoulder flexion with hands clasped  - 1 x daily - 7 x weekly - 1 sets - 10 reps  - Standing Lumbar Extension  - 1 x daily - 7 x weekly - 1 sets - 10 reps  Assessment/Plan  - Pt has met all STGs and 2/3 LTGs for this episode of therapy  - Pt is ready to D/C with HEP this date.  Updated HEP handout issued.  Patient was able to return demo  Progress toward previous goals: Partially Met      Goals     Plan Goals: Short Term Goals- STGs - 3 weeks (6 visits)  1. Patient will report a 25 % improvement in pain intensity and frequency for improved tolerance to driving, sitting, standing at [employer] MET , in relation to low back symptoms.  2. Improved (R) shoulder flexion AROM to 120 deg or better without provocation of pain for ability to lift light objects into upper cabinets and shelving. MET,  improved from 110 to 138 degrees  3. Pt will be (I) with initial HEP handout for self management of impairments: MET, updated prior to D/C           Long Term Goals- LTGs - 6 weeks (12 visits)   1. Pt will demonstrate improved function of (R) UE as indicated by DASH  and Oswestry score indicating 20% impairment or less: NO significant change, see scoring above  2. Pt will have ability to ambulate 250 ft with improve mechanics, no need to steady self on environmental objects and no evidence for imbalance for improved safety with community mobility MET, pt is able to shop in retail store and go out to eat without c/o provocation of pain   3. Pt will demonstrate improved (B) hip and knee and (R) shoulder and  elbow strength for improved ability to complete household tasks. MET, see objective findings above  4. PERFORM 30 SECOND SIT <> STAND AND CREATE GOAL: D/C goal  Short-term goals (STG): 3/3  Long-term goals (LTG):  2/3      Recommendations: Discharge      Timed:         Manual Therapy:    0     mins  44431;     Therapeutic Exercise:    35     mins  09685;     Neuromuscular Lyndsey:    0    mins  77161;    Therapeutic Activity:     0     mins  69849;     Gait Trainin     mins  26597;     Ultrasound:     0     mins  81378;    Ionto                               0    mins   12263  Self Care                       0     mins   33789  Aquatic                          0     mins 59972      Un-Timed:  Electrical Stimulation:    0     mins  31933 ( );  Dry Needling     0     mins self-pay  Traction     0     mins 57610  Low Eval     0     Mins  04927  Mod Eval     0     Mins  63059  High Eval                       0     Mins  52561  Re-Eval                           0    mins  20347      Timed Treatment:   35   mins   Total Treatment:     35   mins      PT Signature: Addie Reese PT  [place] License #: [id number]

## 2024-02-01 ENCOUNTER — HOSPITAL ENCOUNTER (OUTPATIENT)
Dept: MRI IMAGING | Facility: HOSPITAL | Age: 84
Discharge: HOME | End: 2024-02-01
Admitting: STUDENT IN AN ORGANIZED HEALTH CARE EDUCATION/TRAINING PROGRAM
Payer: MEDICARE

## 2024-02-01 DIAGNOSIS — M54.16 LUMBAR RADICULOPATHY: ICD-10-CM

## 2024-02-01 DIAGNOSIS — M53.3 SACROILIAC JOINT DYSFUNCTION: ICD-10-CM

## 2024-02-01 PROCEDURE — 72148 MRI LUMBAR SPINE W/O DYE: CPT

## 2024-02-06 NOTE — PROGRESS NOTES
Subjective   Chrystal Ruiz is a 83 y.o. female is here for follow up for lower back pain. Patient was last seen on 12/4/24.  Lumbar MRI and right shoulder x-ray has been done.  Right shoulder MRI has been pending. Her back pain is significantly improved since last visit. She is still have significant shoulder pain. Has finished PT in jan which has helped with pain.     On last visit:     Lower back pain is 1/10 on VAS, at maximum is 2/10. Pain is aching, throbbing, tingling, spasms, cramps, numbness in nature. Pain is referred left buttock, left anterior thigh, left knee, anterior shin. The pain is intermittent. The pain is improved by pain meds. The pain is worse with standing, standing after sitting.     Right shoulder pain is 5/10 on VAS.  Significantly improved after physical therapy.    Previous Injection:       Hx: Referred by Lynette Allen APRN for  lower back pain. Pain started about before Thanksgivings. She had a mechanical fall on 11/4 with mild pain.   No immediate pain, but  she started having pain shortly after.  ED visit on 11/15/23  for severe left hip, knee pain and SOB and unable to stand- she was given morphine and discharged with tramadol.   Patient has been seeing orthopedics for bilateral knee pain and had steroid and viscosupplementation injections. Unable to go through knee replacement due to comorbidity. First steroids lasted for 7 months, 2nd lasted for 2 months, first synvisc didn't help, good relief with second synvnisc injection.  She wears braces in both legs due to tendonitis.  Her pain is significantly improved since her ED visit, but patient is extremely afraid that flareup will happen again.  Pain is tolerable at this point.    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, hypertension     Current Medications:   Tylenol 500 mg PRN  Baclofen 10 mg  qhs PRN     Past Medications:     Past Modalities:  TENS:                                                                          no                                                  Physical Therapy Within The Last 6 Months              no  Psychotherapy                                                            no  Massage Therapy                                                       no     Patient Complains Of:  Uro-Fecal Incontinence          no  Weight Gain/Loss                   no  Fever/Chills                             no  Weakness                               no        PEG Assessment   What number best describes your pain on average in the past week?3  What number best describes how, during the past week, pain has interfered with your enjoyment of life?3  What number best describes how, during the past week, pain has interfered with your general activity?  3    Current Outpatient Medications:     acetaminophen (TYLENOL) 500 MG tablet, Take 1 tablet by mouth Every 6 (Six) Hours As Needed for Mild Pain., Disp: , Rfl:     baclofen (LIORESAL) 10 MG tablet, TAKE 1 TABLET BY MOUTH AT NIGHT AS NEEDED FOR MUSCLE SPASMS, Disp: 30 tablet, Rfl: 1    Breo Ellipta 200-25 MCG/INH inhaler, Inhale 1 puff Daily., Disp: 1 each, Rfl: 2    cephalexin (KEFLEX) 500 MG capsule, Take 1 capsule by mouth Every 6 (Six) Hours., Disp: 40 capsule, Rfl: 0    Cholecalciferol (VITAMIN D3) 2000 units capsule, Take 1 capsule by mouth Daily., Disp: , Rfl:     Cyanocobalamin (VITAMIN B12) 1000 MCG tablet controlled-release, Take 2,500 mcg by mouth Daily., Disp: , Rfl:     esomeprazole (nexIUM) 40 MG capsule, Take 1 capsule by mouth Every Morning Before Breakfast., Disp: , Rfl:     ferrous sulfate 325 (65 FE) MG tablet, Take 1 tablet by mouth 3 (Three) Times a Week. Monday, Wednesday and Friday., Disp: 45 tablet, Rfl: 1    furosemide (LASIX) 40 MG tablet, Take 1 tablet by mouth 3 (Three) Times a Week. Tuesday, Thursday and Saturday., Disp: ,  Rfl:     loratadine (CLARITIN) 10 MG tablet, Take 1 tablet by mouth Daily., Disp: , Rfl:     metoclopramide (REGLAN) 5 MG tablet, Take 1 tablet by mouth 4 (Four) Times a Day Before Meals & at Bedtime., Disp: , Rfl:     metoprolol succinate XL (TOPROL-XL) 25 MG 24 hr tablet, Take 1 tablet by mouth Daily., Disp: 90 tablet, Rfl: 3    spironolactone (ALDACTONE) 25 MG tablet, TAKE 1 TABLET BY MOUTH EVERY OTHER DAY, Disp: 45 tablet, Rfl: 1    triamcinolone (KENALOG) 0.1 % cream, Apply 1 application  topically to the appropriate area as directed 2 (Two) Times a Day As Needed for Irritation., Disp: 80 g, Rfl: 1    The following portions of the patient's history were reviewed and updated as appropriate: allergies, current medications, past family history, past medical history, past social history, past surgical history, and problem list.      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 procedure; surgically fixed the next day    GERD (gastroesophageal reflux disease)     Heart murmur     Hip pain, bilateral     Hypertension     Medicare annual wellness visit, subsequent 02/12/2020    Shoulder pain, right      Past Surgical History:   Procedure Laterality Date    ATRIAL APPENDAGE EXCLUSION LEFT WITH TRANSESOPHAGEAL ECHOCARDIOGRAM Right 7/7/2022    Procedure: Atrial Appendage Occlusion watchman team aware $;  Surgeon: Sage Garcia MD;  Location: [facility] CATH INVASIVE LOCATION;  Service: Cardiovascular;  Laterality: Right;    ATRIAL APPENDAGE EXCLUSION LEFT WITH TRANSESOPHAGEAL ECHOCARDIOGRAM N/A 7/7/2022    Procedure: Atrial Appendage Occlusion;  Surgeon: Fide Mullen MD;  Location: [facility] CATH INVASIVE LOCATION;  Service: Cardiovascular;  Laterality: N/A;     CATARACT EXTRACTION      CHOLECYSTECTOMY      HYSTERECTOMY  1987    Endometriosis    OOPHORECTOMY      PSEUDO ANEURYSM REPAIR, EXTREMITY Right 7/8/2022    Procedure: REPAIR OF POST CATHETERIZATION RIGHT FEMORAL PSEUDOANEURYSM AND ARTERIOVENOUS FISTUAL;  Surgeon: Alfonzo Hooks MD;  Location: [facility] MAIN OR;  Service: Vascular;  Laterality: Right;    TONSILLECTOMY       Family History   Problem Relation Age of Onset    Breast cancer Mother 85    Stroke Mother     Heart disease Mother     Endometrial cancer Mother 70    Heart failure Father     Heart failure Sister     COPD Sister     Heart disease Sister      Social History     Socioeconomic History    Marital status:    Tobacco Use    Smoking status: Never    Smokeless tobacco: Never   Vaping Use    Vaping Use: Never used   Substance and Sexual Activity    Alcohol use: No    Drug use: Never    Sexual activity: Not Currently         Review of Systems   Musculoskeletal:  Positive for arthralgias and back pain.         Vitals:    02/07/24 1355   BP: 139/74   Pulse: 62   Resp: 16   SpO2: 95%   Weight: 59 kg (130 lb)   PainSc:   1         Objective   Physical Exam  Musculoskeletal:         General: Tenderness present.        Arms:         Legs:            Imaging Reviewed:  MRI lumbar spine without contrast-2/1/2024  - Left levocurvature of lumbar spine measuring 37 degrees centered at L1  T12-L1-mild right-sided facet arthritis with osseous spurring causing moderate left foraminal stenosis and mild right  L1-2-bulky osteophyte to the right with mild arthritis.  Moderate right foraminal stenosis and mild left  L2-3-mild facet arthritis and ligamentum flavum thickening.  Moderate right foraminal stenosis and mild left  L3-4-moderate facet arthritis.  Mild bilateral foraminal stenosis  L4-5-moderate bilateral facet arthropathy ligamentum flavum thickening.  Moderate left foraminal stenosis  L5-S1-mild facet arthritis with moderate foraminal stenosis related  to far lateral disc osteophyte.    Right shoulder x-ray-12/5/2023  - Mild degenerative changes of AC joint and glenohumeral joint.    X-ray pelvis-11/15/2023  - Bilateral hip arthritis.  Medial and superior joint spaces are narrowed    Bilateral knee x-rays-6/2/2022  - KL grade 2 with moderately advanced osteoarthritis of the knee with narrowing of the medial space    CT chest-2022  - Multiple lung nodules concerning for malignancy    Assessment:    1. Right shoulder pain, unspecified chronicity    2. Sacroiliac joint dysfunction    3. Lumbar radiculopathy         Plan:   1.  Defer UDS for now.     2. We discussed trying a course of formal physical therapy.  Physical therapy can help strengthen and stretch the muscles around the joints. Continue to be as active as possible. Start physical therapy follow up with [facility].  Physical therapy referral sent on 12/4/2023.  3.  Hip x-rays were reviewed showing moderate bilateral hip osteoarthritis.  MRI contrast shows moderate foraminal stenosis at multiple levels along with facet arthropathy.  Patient's pain is overall significantly improved since her last episode.  We may consider left SI joint versus LESI if she has another flareup.  I am more inclined toward SI joint injection.  4.  Bilateral knee pain for which she has been getting intra-articular steroid injections lasting for about 3 months.  Failed viscosupplementation injections.  Not a candidate for knee replacement due to history of CHF.  Recommend continuing cortisone injections with orthopedics.  May consider genicular nerve blocks and RFA if at some point cortisone injections do not help her for prolonged period of time.  5.  Right shoulder pain is significantly improved after physical therapy.  She is scheduled for right shoulder MRI.  Will call her to let her know about the results.  Recommend holding off on any injections at her pain is improved.     Will call after right shoulder MRI.  No need for follow-up as  her pain is significantly improved.     Waqas Berry DO  Pain Management   Psychiatric             INSPECT REPORT    As part of the patient's treatment plan, I may be prescribing controlled substances. The patient has been made aware of appropriate use of such medications, including potential risk of somnolence, limited ability to drive and/or work safely, and the potential for dependence or overdose. It has also been made clear that these medications are for use by this patient only, without concomitant use of alcohol or other substances unless prescribed.     Patient has completed prescribing agreement detailing terms of continued prescribing of controlled substances, including monitoring INSPECT reports, urine drug screening, and pill counts if necessary. The patient is aware that inappropriate use will results in cessation of prescribing such medications.    INSPECT report has been reviewed and scanned into the patient's chart.

## 2024-02-07 ENCOUNTER — OFFICE VISIT (OUTPATIENT)
Dept: PAIN MEDICINE | Facility: CLINIC | Age: 84
End: 2024-02-07
Payer: MEDICARE

## 2024-02-07 VITALS
OXYGEN SATURATION: 95 % | HEART RATE: 62 BPM | RESPIRATION RATE: 16 BRPM | SYSTOLIC BLOOD PRESSURE: 139 MMHG | DIASTOLIC BLOOD PRESSURE: 74 MMHG | BODY MASS INDEX: 24.56 KG/M2 | WEIGHT: 130 LBS

## 2024-02-07 DIAGNOSIS — M54.16 LUMBAR RADICULOPATHY: ICD-10-CM

## 2024-02-07 DIAGNOSIS — M53.3 SACROILIAC JOINT DYSFUNCTION: ICD-10-CM

## 2024-02-07 DIAGNOSIS — M25.511 RIGHT SHOULDER PAIN, UNSPECIFIED CHRONICITY: Primary | ICD-10-CM

## 2024-02-08 ENCOUNTER — OFFICE (AMBULATORY)
Dept: URBAN - METROPOLITAN AREA CLINIC 64 | Facility: CLINIC | Age: 84
End: 2024-02-08

## 2024-02-08 VITALS
DIASTOLIC BLOOD PRESSURE: 71 MMHG | HEART RATE: 60 BPM | SYSTOLIC BLOOD PRESSURE: 126 MMHG | WEIGHT: 130 LBS | HEIGHT: 64 IN

## 2024-02-08 DIAGNOSIS — K21.9 GASTRO-ESOPHAGEAL REFLUX DISEASE WITHOUT ESOPHAGITIS: ICD-10-CM

## 2024-02-08 DIAGNOSIS — R05.3 CHRONIC COUGH: ICD-10-CM

## 2024-02-08 DIAGNOSIS — R49.0 DYSPHONIA: ICD-10-CM

## 2024-02-08 PROCEDURE — 99213 OFFICE O/P EST LOW 20 MIN: CPT

## 2024-02-22 ENCOUNTER — OFFICE VISIT (OUTPATIENT)
Dept: CARDIOLOGY | Facility: CLINIC | Age: 84
End: 2024-02-22
Payer: MEDICARE

## 2024-02-22 VITALS
HEIGHT: 61 IN | WEIGHT: 130 LBS | SYSTOLIC BLOOD PRESSURE: 124 MMHG | DIASTOLIC BLOOD PRESSURE: 73 MMHG | OXYGEN SATURATION: 97 % | HEART RATE: 70 BPM | BODY MASS INDEX: 24.55 KG/M2

## 2024-02-22 DIAGNOSIS — Z95.818 PRESENCE OF WATCHMAN LEFT ATRIAL APPENDAGE CLOSURE DEVICE: ICD-10-CM

## 2024-02-22 DIAGNOSIS — D50.0 IRON DEFICIENCY ANEMIA DUE TO CHRONIC BLOOD LOSS: ICD-10-CM

## 2024-02-22 DIAGNOSIS — I48.0 PAROXYSMAL ATRIAL FIBRILLATION: Primary | ICD-10-CM

## 2024-02-22 DIAGNOSIS — I10 PRIMARY HYPERTENSION: ICD-10-CM

## 2024-02-22 PROCEDURE — 3074F SYST BP LT 130 MM HG: CPT | Performed by: INTERNAL MEDICINE

## 2024-02-22 PROCEDURE — 3078F DIAST BP <80 MM HG: CPT | Performed by: INTERNAL MEDICINE

## 2024-02-22 PROCEDURE — 1160F RVW MEDS BY RX/DR IN RCRD: CPT | Performed by: INTERNAL MEDICINE

## 2024-02-22 PROCEDURE — 99214 OFFICE O/P EST MOD 30 MIN: CPT | Performed by: INTERNAL MEDICINE

## 2024-02-22 PROCEDURE — 1159F MED LIST DOCD IN RCRD: CPT | Performed by: INTERNAL MEDICINE

## 2024-02-22 PROCEDURE — 93000 ELECTROCARDIOGRAM COMPLETE: CPT | Performed by: INTERNAL MEDICINE

## 2024-02-22 NOTE — LETTER
February 22, 2024       No Recipients    Patient: Chrystal Ruiz   YOB: 1940   Date of Visit: 2/22/2024     Dear Etelvina Ulloa MD:       Thank you for referring Chrystal Ruiz to me for evaluation. Below are the relevant portions of my assessment and plan of care.    If you have questions, please do not hesitate to call me. I look forward to following Chrystal along with you.         Sincerely,        Sage Garcia MD        CC:   No Recipients    Sage Garcia MD  02/22/24 1447  Sign when Signing Visit  CC--- anemia and paroxysmal atrial fibrillation    Sub  83-year-old pleasant patient has watchman implantation in July 2022.  Surveillance MELISA was done in 2023 with excellent seal of the device without any clot.  Patient has history of hypertension and diastolic heart failure and a stress test in the past did not show ischemia.  She has hyperlipidemia.  Post watchman implantation complicated by pseudoaneurysm needing open repair.  Patient had prior recurrent epistaxis and aspirin has been stopped.    Past Medical History:   Diagnosis Date   • A-fib (HCC)    • Anemia    • Arthritis    • Asthma    • CHF (congestive heart failure) (HCC)    • Hypertension      Past Surgical History:   Procedure Laterality Date   • CATARACT EXTRACTION     • CHOLECYSTECTOMY     • HYSTERECTOMY  1987    Endometriosis   • OOPHORECTOMY     • TONSILLECTOMY           Physical Exam    General:      well developed, well nourished, in no acute distress.    Head:      normocephalic and atraumatic.    Eyes:      PERRL/EOM intact, conjunctivae and sclerae clear without nystagmus.    Neck:      no  thyromegaly, trachea central with normal respiratory effort  Lungs:      clear bilaterally to auscultation.    Heart:       regular rate and rhythm, S1, S2 without murmurs, rubs, or gallops  Skin:      intact without lesions or rashes.    Psych:      alert and cooperative; normal mood and affect; normal attention span and  concentration.            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  Hypertension well-controlled with metoprolol and spironolactone  Prior history of microcytic anemia and iron deficiency anemia and malabsorption with prior iron infusions followed by GI service  Chronic class II diastolic heart failure on Lasix  Remote history of vasovagal syncope without recurrence  Medications reviewed and follow-up appointments made      ECG 12 Lead    Date/Time: 2/22/2024 2:47 PM  Performed by: Sage Garcia MD    Authorized by: Sage Garcia MD  Comparison: compared with previous ECG   Similar to previous ECG  Rhythm: sinus rhythm  Rate: normal  QRS axis: right      Electronically signed by Sage Garcia MD, 02/22/24, 2:47 PM EST.

## 2024-02-22 NOTE — PROGRESS NOTES
CC--- anemia and paroxysmal atrial fibrillation    Sub  83-year-old pleasant patient has watchman implantation in July 2022.  Surveillance MELISA was done in 2023 with excellent seal of the device without any clot.  Patient has history of hypertension and diastolic heart failure and a stress test in the past did not show ischemia.  She has hyperlipidemia.  Post watchman implantation complicated by pseudoaneurysm needing open repair.  Patient had prior recurrent epistaxis and aspirin has been stopped.    Past Medical History:   Diagnosis Date   • A-fib (HCC)    • Anemia    • Arthritis    • Asthma    • CHF (congestive heart failure) (HCC)    • Hypertension      Past Surgical History:   Procedure Laterality Date   • CATARACT EXTRACTION     • CHOLECYSTECTOMY     • HYSTERECTOMY  1987    Endometriosis   • OOPHORECTOMY     • TONSILLECTOMY           Physical Exam    General:      well developed, well nourished, in no acute distress.    Head:      normocephalic and atraumatic.    Eyes:      PERRL/EOM intact, conjunctivae and sclerae clear without nystagmus.    Neck:      no  thyromegaly, trachea central with normal respiratory effort  Lungs:      clear bilaterally to auscultation.    Heart:       regular rate and rhythm, S1, S2 without murmurs, rubs, or gallops  Skin:      intact without lesions or rashes.    Psych:      alert and cooperative; normal mood and affect; normal attention span and concentration.            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  Hypertension well-controlled with metoprolol and spironolactone  Prior history of microcytic anemia and iron deficiency anemia and malabsorption with prior iron infusions followed by GI service  Chronic class II diastolic heart failure on Lasix  Remote history of vasovagal  syncope without recurrence  Medications reviewed and follow-up appointments made      ECG 12 Lead    Date/Time: 2/22/2024 2:47 PM  Performed by: Sage Garcia MD    Authorized by: Sage Garcia MD  Comparison: compared with previous ECG   Similar to previous ECG  Rhythm: sinus rhythm  Rate: normal  QRS axis: right      Electronically signed by Sage Garcia MD, 02/22/24, 2:47 PM EST.

## 2024-03-04 ENCOUNTER — HOSPITAL ENCOUNTER (OUTPATIENT)
Dept: MRI IMAGING | Facility: HOSPITAL | Age: 84
Discharge: HOME | End: 2024-03-04
Admitting: STUDENT IN AN ORGANIZED HEALTH CARE EDUCATION/TRAINING PROGRAM
Payer: MEDICARE

## 2024-03-04 DIAGNOSIS — M25.511 RIGHT SHOULDER PAIN, UNSPECIFIED CHRONICITY: ICD-10-CM

## 2024-03-04 PROCEDURE — 73221 MRI JOINT UPR EXTREM W/O DYE: CPT

## 2024-03-05 NOTE — PROGRESS NOTES
Called and discussed results of MRI with patient. Recommend following up with Orthopedics for full thinkcness tear of supraspinatus tendon.     Waqas Berry DO  Pain Management   [facility]

## 2024-03-08 RX ORDER — FERROUS SULFATE 325(65) MG
TABLET ORAL
Qty: 45 TABLET | Refills: 1 | Status: SHIPPED | OUTPATIENT
Start: 2024-03-08

## 2024-03-18 ENCOUNTER — OFFICE VISIT (OUTPATIENT)
Dept: ORTHOPEDIC SURGERY | Facility: CLINIC | Age: 84
End: 2024-03-18
Payer: MEDICARE

## 2024-03-18 VITALS — HEART RATE: 76 BPM | HEIGHT: 61 IN | BODY MASS INDEX: 24.73 KG/M2 | WEIGHT: 131 LBS

## 2024-03-18 DIAGNOSIS — M75.121 COMPLETE TEAR OF RIGHT ROTATOR CUFF, UNSPECIFIED WHETHER TRAUMATIC: Primary | ICD-10-CM

## 2024-03-18 PROCEDURE — 99203 OFFICE O/P NEW LOW 30 MIN: CPT | Performed by: ORTHOPAEDIC SURGERY

## 2024-03-18 NOTE — PROGRESS NOTES
Patient ID: Chrystal Ruiz is a 83 y.o. female.    Chief Complaint:    Chief Complaint   Patient presents with    Right Shoulder - Initial Evaluation, Pain     Pain 0       HPI:  This is an 83-year female here with longstanding right shoulder pain there is no injury.  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 CATH INVASIVE LOCATION;  Service: Cardiovascular;  Laterality: Right;    ATRIAL APPENDAGE EXCLUSION LEFT WITH TRANSESOPHAGEAL ECHOCARDIOGRAM N/A 7/7/2022    Procedure: Atrial Appendage Occlusion;  Surgeon: Fide Mullen MD;  Location: [facility] CATH INVASIVE LOCATION;  Service: Cardiovascular;  Laterality: N/A;    CATARACT EXTRACTION      CHOLECYSTECTOMY      HYSTERECTOMY  1987    Endometriosis    OOPHORECTOMY      PSEUDO ANEURYSM REPAIR, EXTREMITY Right 7/8/2022    Procedure: REPAIR OF POST CATHETERIZATION RIGHT FEMORAL PSEUDOANEURYSM AND ARTERIOVENOUS FISTUAL;  Surgeon: Alfonzo Hooks MD;  Location: [facility] MAIN OR;  Service: Vascular;  Laterality: Right;    TONSILLECTOMY         Family History  "  Problem Relation Age of Onset    Breast cancer Mother 85    Stroke Mother     Heart disease Mother     Endometrial cancer Mother 70    Heart failure Father     Heart failure Sister     COPD Sister     Heart disease Sister           Social History     Occupational History    Not on file   Tobacco Use    Smoking status: Never    Smokeless tobacco: Never   Vaping Use    Vaping status: Never Used   Substance and Sexual Activity    Alcohol use: No    Drug use: Never    Sexual activity: Not Currently      Review of Systems   Cardiovascular:  Negative for chest pain.   Musculoskeletal:  Positive for arthralgias.       Objective:    Pulse 76   Ht 154.9 cm (61\")   Wt 59.4 kg (131 lb)   BMI 24.75 kg/m²     Physical Examination:  Right shoulder demonstrates intact skin with pain over the bicep groove passive elevation 170 abduction 140 external rotation 40 with mild pain and weakness on Speed, [place], and supraspinatus testing.  Belly press and liftoff are 5/5 and 4/5.  Sensory and motor exam are intact all distributions. Radial pulse is palpable and capillary refill is less than two seconds to all digits.    Imaging:  X-ray and MRI reviewed demonstrates mild glenohumeral arthritis with a full-thickness supraspinatus tear and bursitis    Assessment:  Right shoulder rotator cuff tear    Plan:  Options discussed.  I recommend continued observation shoulder exercises oral medication and selective steroid injections as needed.  She can see me at any time for injection      Procedures         Disclaimer: Part of this note may be an electronic transcription/translation of spoken language to printed text using the Dragon Dictation System  "

## 2024-03-20 ENCOUNTER — PATIENT ROUNDING (BHMG ONLY) (OUTPATIENT)
Dept: ORTHOPEDIC SURGERY | Facility: CLINIC | Age: 84
End: 2024-03-20
Payer: MEDICARE

## 2024-03-20 NOTE — PROGRESS NOTES
A my chart message has been sent to the patient for PATIENT ROUNDING with [facility]

## 2024-03-28 ENCOUNTER — TELEPHONE (OUTPATIENT)
Dept: PAIN MEDICINE | Facility: CLINIC | Age: 84
End: 2024-03-28
Payer: MEDICARE

## 2024-03-28 ENCOUNTER — HOSPITAL ENCOUNTER (OUTPATIENT)
Dept: PAIN MEDICINE | Facility: HOSPITAL | Age: 84
Discharge: HOME | End: 2024-03-28
Payer: MEDICARE

## 2024-03-28 VITALS
DIASTOLIC BLOOD PRESSURE: 79 MMHG | RESPIRATION RATE: 16 BRPM | OXYGEN SATURATION: 96 % | TEMPERATURE: 97.1 F | HEIGHT: 61 IN | HEART RATE: 65 BPM | SYSTOLIC BLOOD PRESSURE: 153 MMHG | BODY MASS INDEX: 25.11 KG/M2 | WEIGHT: 133 LBS

## 2024-03-28 DIAGNOSIS — M54.16 LUMBAR RADICULOPATHY: Primary | ICD-10-CM

## 2024-03-28 DIAGNOSIS — R52 PAIN: ICD-10-CM

## 2024-03-28 PROCEDURE — 25510000001 IOPAMIDOL 41 % SOLUTION: Performed by: STUDENT IN AN ORGANIZED HEALTH CARE EDUCATION/TRAINING PROGRAM

## 2024-03-28 PROCEDURE — 25010000002 METHYLPREDNISOLONE PER 40 MG: Performed by: STUDENT IN AN ORGANIZED HEALTH CARE EDUCATION/TRAINING PROGRAM

## 2024-03-28 PROCEDURE — 77003 FLUOROGUIDE FOR SPINE INJECT: CPT

## 2024-03-28 RX ORDER — BACLOFEN 10 MG/1
10 TABLET ORAL NIGHTLY PRN
Qty: 30 TABLET | Refills: 1 | Status: SHIPPED | OUTPATIENT
Start: 2024-03-28

## 2024-03-28 RX ORDER — METHYLPREDNISOLONE ACETATE 40 MG/ML
40 INJECTION, SUSPENSION INTRA-ARTICULAR; INTRALESIONAL; INTRAMUSCULAR; SOFT TISSUE ONCE
Status: COMPLETED | OUTPATIENT
Start: 2024-03-28 | End: 2024-03-28

## 2024-03-28 RX ORDER — IOPAMIDOL 408 MG/ML
3 INJECTION, SOLUTION INTRATHECAL
Status: COMPLETED | OUTPATIENT
Start: 2024-03-28 | End: 2024-03-28

## 2024-03-28 RX ADMIN — IOPAMIDOL 3 ML: 408 INJECTION, SOLUTION INTRATHECAL at 11:53

## 2024-03-28 RX ADMIN — METHYLPREDNISOLONE ACETATE 40 MG: 40 INJECTION, SUSPENSION INTRA-ARTICULAR; INTRALESIONAL; INTRAMUSCULAR; INTRASYNOVIAL; SOFT TISSUE at 11:53

## 2024-03-28 NOTE — TELEPHONE ENCOUNTER
Caller: Chrystal Ruiz    Relationship: Self    Best call back number:     What is the best time to reach you: ANY     Who are you requesting to speak with (clinical staff, provider,  specific staff member): CLINICAL    What was the call regarding: PATIENT HAVING SOME SEVERE BACK PAIN.  DR LOCKWOOD TOLD HER TO CALL FOR INJECTIONS     Is it okay if the provider responds through [employer]: PLEASE CALL BACK TO SCHEDULE

## 2024-03-28 NOTE — H&P
H and P reviewed from previous visit.  Patient called stating that she had severe pain going to left leg consistent with LLE radiculopathy as previously her pain was not tolerable.  Will proceed with procedure as planned. Patient denies history of DM and being on blood thinners.    Lower back pain is 9/10 on VAS with radiculopathy to left lower extremity in the L4, L5 dermatome.  MRI shows left-sided foraminal stenosis at L4-5 consistent with patient's symptoms. Patient has pain in the lower back with referred pain in the leg, patient has failed conservative therapy including PT and pharmacological management for more than 6 weeks and pain interferes with activities of daily living. Discussed lumbar CLARITZA L L4-5 discussed the possibility of infection, bleeding, nerve damage, post dural puncture headache, increased pain, paraplegia. Patient understands and agrees.       Waqas Berry DO  Pain Management   [facility]

## 2024-03-28 NOTE — PROCEDURES
PREOPERATIVE DIAGNOSIS:    1. Lumbar DDD    POSTOPERATIVE DIAGNOSIS: Same    PROCEDURE:  Lumbar epidural steroid injection L 4-5    PROCEDURE NOTE:  After obtaining written informed consent patient was taken to the procedure room. Pre-procedure blood pressure and pulse were stable and recorded in patients clinic chart.     The patient was placed in the prone position. The lower back was prepped with antiseptic solution and draped in the usual sterile fashion.  The skin over the L4-5 space was identified under fluoroscopic guidance and infiltrated with 1% lidocaine for local anesthesia via 25 gauge needle.  A 20 gauge tuohy needle was used to access the epidural space using loss of resistance to air technique. Following negative aspiration, 2 cc of the omnipaque dye was injected.  There was good spread of the dye from L3-L5 area. A mixture containing  3 ml of saline with 40 mg of dep-medrol was injected. There was no evidence of CSF, paresthesia or vascular spread. The needle was removed. Skin was cleaned and band aid was applied.    Following the procedure the patient's vital signs were stable. The patient was discharged home in good condition after being given discharge instructions.    COMPLICATIONS: None    Waqas Berry DO  Pain Management   [facility]

## 2024-03-28 NOTE — DISCHARGE INSTRUCTIONS

## 2024-03-28 NOTE — TELEPHONE ENCOUNTER
Caller: [id number] - [facility] [place], [place] - [address]  AT [address] [place] [phone] [place] [phone] FX    Best call back number: [phone]    Requested Prescriptions:   Requested Prescriptions     Pending Prescriptions Disp Refills    baclofen (LIORESAL) 10 MG tablet [Pharmacy Med Name: BACLOFEN 10MG TABLETS] 30 tablet 1     Sig: TAKE 1 TABLET BY MOUTH AT NIGHT AS NEEDED FOR MUSCLE SPASMS        Pharmacy where request should be sent: [id number] - [facility] [place], [place] [address]  AT [address] [place] [phone] [place] [phone] FX     Last office visit with prescribing clinician: 10/25/2023   Last telemedicine visit with prescribing clinician: Visit date not found   Next office visit with prescribing clinician: Visit date not found     Does the patient have less than a 3 day supply:  [x] Yes  [] No      Lyssa Rasmussen Rep   03/28/24 09:07 EDT

## 2024-03-28 NOTE — TELEPHONE ENCOUNTER
Add her on schedule on Tuesday for LESI L4-5 please. Confirm she is not taking any blood thinners please.   Thanks.

## 2024-03-29 ENCOUNTER — TELEPHONE (OUTPATIENT)
Dept: PAIN MEDICINE | Facility: HOSPITAL | Age: 84
End: 2024-03-29
Payer: MEDICARE

## 2024-04-09 ENCOUNTER — OFFICE VISIT (OUTPATIENT)
Dept: ORTHOPEDIC SURGERY | Facility: CLINIC | Age: 84
End: 2024-04-09
Payer: MEDICARE

## 2024-04-09 VITALS — OXYGEN SATURATION: 96 % | BODY MASS INDEX: 25.11 KG/M2 | HEIGHT: 61 IN | WEIGHT: 133 LBS | HEART RATE: 73 BPM

## 2024-04-09 DIAGNOSIS — M17.0 PRIMARY OSTEOARTHRITIS OF BOTH KNEES: Primary | ICD-10-CM

## 2024-04-09 PROCEDURE — 20610 DRAIN/INJ JOINT/BURSA W/O US: CPT | Performed by: NURSE PRACTITIONER

## 2024-04-09 RX ORDER — TRIAMCINOLONE ACETONIDE 40 MG/ML
80 INJECTION, SUSPENSION INTRA-ARTICULAR; INTRAMUSCULAR
Status: COMPLETED | OUTPATIENT
Start: 2024-04-09 | End: 2024-04-09

## 2024-04-09 RX ORDER — LIDOCAINE HYDROCHLORIDE 10 MG/ML
2 INJECTION, SOLUTION INFILTRATION; PERINEURAL
Status: COMPLETED | OUTPATIENT
Start: 2024-04-09 | End: 2024-04-09

## 2024-04-09 RX ADMIN — TRIAMCINOLONE ACETONIDE 80 MG: 40 INJECTION, SUSPENSION INTRA-ARTICULAR; INTRAMUSCULAR at 16:05

## 2024-04-09 RX ADMIN — LIDOCAINE HYDROCHLORIDE 2 ML: 10 INJECTION, SOLUTION INFILTRATION; PERINEURAL at 16:05

## 2024-04-09 NOTE — PROGRESS NOTES
INJECTION VISIT    Patient: Chrystal Ruiz    YOB: 1940    MRN: [id number]    Chief Complaint   Patient presents with    Left Knee - Follow-up, Pain    Right Knee - Follow-up, Pain       History of Present Illness:   Chrystal Ruiz is a 83 y.o. year old who presents today for injection of bilateral knees. Last steroid injection was 3 months ago and provided good relief of symptoms.         Allergies:   Allergies   Allergen Reactions    Ciprofloxacin Unknown (See Comments)    Sulfa Antibiotics Unknown (See Comments)    Iodine Unknown - High Severity     Patient doesn't remember what reaction she gets when she takes this     Sudafed [Pseudoephedrine Hcl] Other (See Comments)     Patient states it has been awhile since taking, so she doesn't remember side effects.       Medications:   Home Medications:  Current Outpatient Medications on File Prior to Visit   Medication Sig    acetaminophen (TYLENOL) 500 MG tablet Take 1 tablet by mouth Every 6 (Six) Hours As Needed for Mild Pain.    baclofen (LIORESAL) 10 MG tablet TAKE 1 TABLET BY MOUTH AT NIGHT AS NEEDED FOR MUSCLE SPASMS    Breo Ellipta 200-25 MCG/INH inhaler Inhale 1 puff Daily.    Cholecalciferol (VITAMIN D3) 2000 units capsule Take 1 capsule by mouth Daily.    Cyanocobalamin (VITAMIN B12) 1000 MCG tablet controlled-release Take 2,500 mcg by mouth Daily.    esomeprazole (nexIUM) 40 MG capsule Take 1 capsule by mouth Every Morning Before Breakfast.    FeroSul 325 (65 Fe) MG tablet TAKE 1 TABLET BY MOUTH THREE DAYS A WEEK( MONDAY, WEDNESDAY, FRIDAY)    furosemide (LASIX) 40 MG tablet Take 1 tablet by mouth 3 (Three) Times a Week. Tuesday, Thursday and Saturday.    loratadine (CLARITIN) 10 MG tablet Take 1 tablet by mouth Daily.    metoclopramide (REGLAN) 5 MG tablet Take 1 tablet by mouth 4 (Four) Times a Day Before Meals & at Bedtime.    metoprolol succinate XL (TOPROL-XL) 25 MG 24 hr tablet Take 1 tablet by mouth Daily.    spironolactone (ALDACTONE)  25 MG tablet TAKE 1 TABLET BY MOUTH EVERY OTHER DAY    triamcinolone (KENALOG) 0.1 % cream Apply 1 application  topically to the appropriate area as directed 2 (Two) Times a Day As Needed for Irritation.     No current facility-administered medications on file prior to visit.         I have reviewed the patient's medical history in detail and updated the computerized patient record.  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 procedure; surgically fixed the next day    GERD (gastroesophageal reflux disease)     Heart murmur     Hip pain, bilateral     Hypertension     Medicare annual wellness visit, subsequent 02/12/2020    Shoulder pain, right      Past Surgical History:   Procedure Laterality Date    ATRIAL APPENDAGE EXCLUSION LEFT WITH TRANSESOPHAGEAL ECHOCARDIOGRAM Right 7/7/2022    Procedure: Atrial Appendage Occlusion watchman team aware $;  Surgeon: Sage Garcia MD;  Location: [facility] CATH INVASIVE LOCATION;  Service: Cardiovascular;  Laterality: Right;    ATRIAL APPENDAGE EXCLUSION LEFT WITH TRANSESOPHAGEAL ECHOCARDIOGRAM N/A 7/7/2022    Procedure: Atrial Appendage Occlusion;  Surgeon: Fide Mullen MD;  Location: [facility] CATH INVASIVE LOCATION;  Service: Cardiovascular;  Laterality: N/A;    CATARACT EXTRACTION      CHOLECYSTECTOMY      HYSTERECTOMY  1987    Endometriosis    OOPHORECTOMY      PSEUDO ANEURYSM REPAIR, EXTREMITY Right 7/8/2022    Procedure: REPAIR OF POST CATHETERIZATION RIGHT FEMORAL PSEUDOANEURYSM AND ARTERIOVENOUS FISTUAL;  Surgeon: Alfonzo Hooks MD;  Location: [facility] MAIN OR;  Service: Vascular;  Laterality: Right;    TONSILLECTOMY       Social History     Occupational History    Not on file    Tobacco Use    Smoking status: Never    Smokeless tobacco: Never   Vaping Use    Vaping status: Never Used   Substance and Sexual Activity    Alcohol use: No    Drug use: Never    Sexual activity: Not Currently      Social History     Social History Narrative    Not on file     Family History   Problem Relation Age of Onset    Breast cancer Mother 85    Stroke Mother     Heart disease Mother     Endometrial cancer Mother 70    Heart failure Father     Heart failure Sister     COPD Sister     Heart disease Sister                ROS  Negative unless listed in the HPI        Physical Exam  83 y.o. female  Body mass index is 25.13 kg/m²., 60.3 kg (133 lb)  Vitals:    04/09/24 1533   Pulse: 73   SpO2: 96%     General: Alert, cooperative, appears well and in no observable distress.   HEENT: Normocephalic, atraumatic on external visual inspection. No icterus.   CV: No significant peripheral edema.   Respiratory: Normal respiratory effort.   Skin: Warm & well perfused; appropriate skin turgor.  Psych: Appropriate mood & affect.  Neuro: Gross sensation and motor intact in affected extremity/extremities.  Vascular: Peripheral pulses palpable in affected extremity/extremities.     Ortho Exam       Investigations:    Procedure:  Large Joint Arthrocentesis: R knee  Date/Time: 4/9/2024 4:05 PM  Consent given by: patient  Site marked: site marked  Timeout: Immediately prior to procedure a time out was called to verify the correct patient, procedure, equipment, support staff and site/side marked as required   Supporting Documentation  Indications: pain   Procedure Details  Location: knee - R knee  Needle size: 25 G  Approach: anterolateral  Medications administered: 2 mL lidocaine 1 %; 80 mg triamcinolone acetonide 40 MG/ML  Patient tolerance: patient tolerated the procedure well with no immediate complications      Large Joint Arthrocentesis: L knee  Date/Time: 4/9/2024 4:05 PM  Consent given by: patient  Site marked: site  marked  Timeout: Immediately prior to procedure a time out was called to verify the correct patient, procedure, equipment, support staff and site/side marked as required   Supporting Documentation  Indications: pain and diagnostic evaluation   Procedure Details  Location: knee - L knee  Needle size: 25 G  Approach: anterolateral  Medications administered: 2 mL lidocaine 1 %; 80 mg triamcinolone acetonide 40 MG/ML  Patient tolerance: patient tolerated the procedure well with no immediate complications            Assessment:   Diagnoses and all orders for this visit:    1. Primary osteoarthritis of both knees (Primary)    Other orders  -     Large Joint Arthrocentesis  -     Large Joint Arthrocentesis      Body mass index is 25.13 kg/m².  BMI consistent with Overweight: 25.0-29.9kg/m2         Plan:   -     Risks and benefits of injection therapy discussed with patient.  Possibility of bruising, pain, swelling, infection, increased blood sugar levels, cortisol flare and soft tissue atrophy discussed in detail.  Injected patient's bilateral knee joint(s)with Kenalog from an anterolateral approach   Compression/brace   Rest, ice, compression, and elevation (RICE) therapy  OTC Tylenol for pain PRN  BMI reviewed  Follow up in 3 months for repeat steroid injections  Please call with questions or concerns in the interim        Date of encounter: 04/09/2024   ARIANA Mcfadden

## 2024-04-16 NOTE — PROGRESS NOTES
Subjective   Chrystal Ruiz is a 83 y.o. female is here for follow up for lower back pain.  Previously, patient had called stating that her pain was significantly worsened all of a sudden.  Patient was last seen on 3/20/2024 for LESI L4-5.    On last visit:     Lower back pain is 1/10 on VAS, at maximum is 2/10. Pain is aching, throbbing, tingling, spasms, cramps, numbness in nature. Pain is referred left buttock, left anterior thigh, left knee, anterior shin. The pain is intermittent. The pain is improved by pain meds. The pain is worse with standing, standing after sitting.     Right shoulder pain is 1/10 on VAS.  Significantly improved after physical therapy.    Previous Injection:   3/20/2024-LESI L4-5- 99% pain relief with functional improvement.     Hx: Referred by Lynette Allen APRN for  lower back pain. Pain started about before Thanksgivings. She had a mechanical fall on 11/4 with mild pain.   No immediate pain, but  she started having pain shortly after.  ED visit on 11/15/23  for severe left hip, knee pain and SOB and unable to stand- she was given morphine and discharged with tramadol.   Patient has been seeing orthopedics for bilateral knee pain and had steroid and viscosupplementation injections. Unable to go through knee replacement due to comorbidity. First steroids lasted for 7 months, 2nd lasted for 2 months, first synvisc didn't help, good relief with second synvnisc injection.  She wears braces in both legs due to tendonitis.  Her pain is significantly improved since her ED visit, but patient is extremely afraid that flareup will happen again.  Pain is tolerable at this point.  Seen by Ortho Dr. Spangler - recommended conservative management and selective steroid injection.    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, hypertension     Current Medications:    Tylenol 500 mg PRN  Baclofen 10 mg qhs PRN     Past Medications:     Past Modalities:  TENS:                                                                          no                                                  Physical Therapy Within The Last 6 Months              no  Psychotherapy                                                            no  Massage Therapy                                                       no     Patient Complains Of:  Uro-Fecal Incontinence          no  Weight Gain/Loss                   no  Fever/Chills                             no  Weakness                               no        PEG Assessment   What number best describes your pain on average in the past week?3  What number best describes how, during the past week, pain has interfered with your enjoyment of life?3  What number best describes how, during the past week, pain has interfered with your general activity?  3    Current Outpatient Medications:     acetaminophen (TYLENOL) 500 MG tablet, Take 1 tablet by mouth Every 6 (Six) Hours As Needed for Mild Pain., Disp: , Rfl:     baclofen (LIORESAL) 10 MG tablet, TAKE 1 TABLET BY MOUTH AT NIGHT AS NEEDED FOR MUSCLE SPASMS, Disp: 30 tablet, Rfl: 1    Breo Ellipta 200-25 MCG/INH inhaler, Inhale 1 puff Daily., Disp: 1 each, Rfl: 2    Cholecalciferol (VITAMIN D3) 2000 units capsule, Take 1 capsule by mouth Daily., Disp: , Rfl:     Cyanocobalamin (VITAMIN B12) 1000 MCG tablet controlled-release, Take 2,500 mcg by mouth Daily., Disp: , Rfl:     esomeprazole (nexIUM) 40 MG capsule, Take 1 capsule by mouth Every Morning Before Breakfast., Disp: , Rfl:     FeroSul 325 (65 Fe) MG tablet, TAKE 1 TABLET BY MOUTH THREE DAYS A WEEK( MONDAY, WEDNESDAY, FRIDAY), Disp: 45 tablet, Rfl: 1    furosemide (LASIX) 40 MG tablet, Take 1 tablet by mouth 3 (Three) Times a Week. Tuesday, Thursday and Saturday., Disp: , Rfl:     loratadine (CLARITIN) 10 MG tablet, Take 1 tablet by mouth Daily., Disp: , Rfl:      metoclopramide (REGLAN) 5 MG tablet, Take 1 tablet by mouth 4 (Four) Times a Day Before Meals & at Bedtime., Disp: , Rfl:     metoprolol succinate XL (TOPROL-XL) 25 MG 24 hr tablet, Take 1 tablet by mouth Daily., Disp: 90 tablet, Rfl: 3    spironolactone (ALDACTONE) 25 MG tablet, TAKE 1 TABLET BY MOUTH EVERY OTHER DAY, Disp: 45 tablet, Rfl: 1    triamcinolone (KENALOG) 0.1 % cream, Apply 1 application  topically to the appropriate area as directed 2 (Two) Times a Day As Needed for Irritation., Disp: 80 g, Rfl: 1    The following portions of the patient's history were reviewed and updated as appropriate: allergies, current medications, past family history, past medical history, past social history, past surgical history, and problem list.      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 procedure; surgically fixed the next day    GERD (gastroesophageal reflux disease)     Heart murmur     Hip pain, bilateral     Hypertension     Medicare annual wellness visit, subsequent 02/12/2020    Shoulder pain, right      Past Surgical History:   Procedure Laterality Date    ATRIAL APPENDAGE EXCLUSION LEFT WITH TRANSESOPHAGEAL ECHOCARDIOGRAM Right 7/7/2022    Procedure: Atrial Appendage Occlusion watchman team aware $;  Surgeon: Sage Garcia MD;  Location: [facility] CATH INVASIVE LOCATION;  Service: Cardiovascular;  Laterality: Right;    ATRIAL APPENDAGE EXCLUSION LEFT WITH TRANSESOPHAGEAL ECHOCARDIOGRAM N/A 7/7/2022    Procedure: Atrial Appendage Occlusion;  Surgeon: Fide Mullen MD;  Location: [facility] CATH INVASIVE LOCATION;  Service: Cardiovascular;  Laterality: N/A;    CATARACT EXTRACTION      CHOLECYSTECTOMY      HYSTERECTOMY  1987    Endometriosis     OOPHORECTOMY      PSEUDO ANEURYSM REPAIR, EXTREMITY Right 7/8/2022    Procedure: REPAIR OF POST CATHETERIZATION RIGHT FEMORAL PSEUDOANEURYSM AND ARTERIOVENOUS FISTUAL;  Surgeon: Alfonzo Hooks MD;  Location: [facility] MAIN OR;  Service: Vascular;  Laterality: Right;    TONSILLECTOMY       Family History   Problem Relation Age of Onset    Breast cancer Mother 85    Stroke Mother     Heart disease Mother     Endometrial cancer Mother 70    Heart failure Father     Heart failure Sister     COPD Sister     Heart disease Sister      Social History     Socioeconomic History    Marital status:    Tobacco Use    Smoking status: Never    Smokeless tobacco: Never   Vaping Use    Vaping status: Never Used   Substance and Sexual Activity    Alcohol use: No    Drug use: Never    Sexual activity: Not Currently         Review of Systems   Musculoskeletal:  Positive for arthralgias and back pain.         Vitals:    04/17/24 1336   BP: 139/64   Pulse: 57   Resp: 16   SpO2: 97%   Weight: 59 kg (130 lb)   PainSc:   1           Objective   Physical Exam  Musculoskeletal:         General: Tenderness present.        Arms:         Legs:            Imaging Reviewed:  Right shoulder MRI without contrast-3/4/2024  - Mild to moderate glenohumeral joint arthritis  - Large full-thickness tear of distal supraspinatus tendon from the insertion site  - Severe tendinosis of infraspinatus tendon.  - Moderate amount of subacromial/subdeltoid bursal fluid.    MRI lumbar spine without contrast-2/1/2024  - Left levocurvature of lumbar spine measuring 37 degrees centered at L1  T12-L1-mild right-sided facet arthritis with osseous spurring causing moderate left foraminal stenosis and mild right  L1-2-bulky osteophyte to the right with mild arthritis.  Moderate right foraminal stenosis and mild left  L2-3-mild facet arthritis and ligamentum flavum thickening.  Moderate right foraminal stenosis and mild left  L3-4-moderate facet arthritis.  Mild  bilateral foraminal stenosis  L4-5-moderate bilateral facet arthropathy ligamentum flavum thickening.  Moderate left foraminal stenosis  L5-S1-mild facet arthritis with moderate foraminal stenosis related to far lateral disc osteophyte.    Right shoulder x-ray-12/5/2023  - Mild degenerative changes of AC joint and glenohumeral joint.    X-ray pelvis-11/15/2023  - Bilateral hip arthritis.  Medial and superior joint spaces are narrowed    Bilateral knee x-rays-6/2/2022  - KL grade 2 with moderately advanced osteoarthritis of the knee with narrowing of the medial space    CT chest-2022  - Multiple lung nodules concerning for malignancy    Assessment:    1. Lumbar radiculopathy    2. Sacroiliac joint dysfunction    3. Right shoulder pain, unspecified chronicity           Plan:   1.  Defer UDS for now.     2. We discussed trying a course of formal physical therapy.  Physical therapy can help strengthen and stretch the muscles around the joints. Continue to be as active as possible. Start physical therapy follow [facility].  Physical therapy referral sent on 12/4/2023.  3.  Hip x-rays were reviewed showing moderate bilateral hip osteoarthritis.  MRI contrast shows moderate foraminal stenosis at multiple levels along with facet arthropathy.  Patient's pain is overall significantly improved since her last episode.  We will repeat LESI PRN.  4.  Bilateral knee pain for which she has been getting intra-articular steroid injections lasting for about 3 months.  Failed viscosupplementation injections.  Not a candidate for knee replacement due to history of CHF.  Recommend continuing cortisone injections with orthopedics.  May consider genicular nerve blocks and RFA if at some point cortisone injections do not help her for prolonged period of time.     RTC PRN.     Waqas Berry DO  Pain Management   [facility]             INSPECT REPORT    As part of the patient's treatment plan, I may be prescribing controlled substances.  The patient has been made aware of appropriate use of such medications, including potential risk of somnolence, limited ability to drive and/or work safely, and the potential for dependence or overdose. It has also been made clear that these medications are for use by this patient only, without concomitant use of alcohol or other substances unless prescribed.     Patient has completed prescribing agreement detailing terms of continued prescribing of controlled substances, including monitoring INSPECT reports, urine drug screening, and pill counts if necessary. The patient is aware that inappropriate use will results in cessation of prescribing such medications.    INSPECT report has been reviewed and scanned into the patient's chart.

## 2024-04-17 ENCOUNTER — OFFICE VISIT (OUTPATIENT)
Dept: PAIN MEDICINE | Facility: CLINIC | Age: 84
End: 2024-04-17
Payer: MEDICARE

## 2024-04-17 VITALS
WEIGHT: 130 LBS | DIASTOLIC BLOOD PRESSURE: 64 MMHG | RESPIRATION RATE: 16 BRPM | HEART RATE: 57 BPM | BODY MASS INDEX: 24.56 KG/M2 | OXYGEN SATURATION: 97 % | SYSTOLIC BLOOD PRESSURE: 139 MMHG

## 2024-04-17 DIAGNOSIS — M25.511 RIGHT SHOULDER PAIN, UNSPECIFIED CHRONICITY: ICD-10-CM

## 2024-04-17 DIAGNOSIS — M54.16 LUMBAR RADICULOPATHY: Primary | ICD-10-CM

## 2024-04-17 DIAGNOSIS — M53.3 SACROILIAC JOINT DYSFUNCTION: ICD-10-CM

## 2024-04-17 PROCEDURE — 3075F SYST BP GE 130 - 139MM HG: CPT | Performed by: STUDENT IN AN ORGANIZED HEALTH CARE EDUCATION/TRAINING PROGRAM

## 2024-04-17 PROCEDURE — 3078F DIAST BP <80 MM HG: CPT | Performed by: STUDENT IN AN ORGANIZED HEALTH CARE EDUCATION/TRAINING PROGRAM

## 2024-04-17 PROCEDURE — 1159F MED LIST DOCD IN RCRD: CPT | Performed by: STUDENT IN AN ORGANIZED HEALTH CARE EDUCATION/TRAINING PROGRAM

## 2024-04-17 PROCEDURE — 99213 OFFICE O/P EST LOW 20 MIN: CPT | Performed by: STUDENT IN AN ORGANIZED HEALTH CARE EDUCATION/TRAINING PROGRAM

## 2024-04-17 PROCEDURE — 1160F RVW MEDS BY RX/DR IN RCRD: CPT | Performed by: STUDENT IN AN ORGANIZED HEALTH CARE EDUCATION/TRAINING PROGRAM

## 2024-04-17 PROCEDURE — 1125F AMNT PAIN NOTED PAIN PRSNT: CPT | Performed by: STUDENT IN AN ORGANIZED HEALTH CARE EDUCATION/TRAINING PROGRAM

## 2024-04-18 NOTE — PROGRESS NOTES
"Chief Complaint  Lung Nodule (8 week follow up CT chest )    Subjective          Chrystal Ruiz presents to [facility] THORACIC SURGERY  History of Present Illness    The patient has consented to being recorded using [facility].     The patient is a pleasant 81-year-old lady who presented with multiple lung nodules. She is status post biopsy, which demonstrates some chronic inflammation as well as a PET scan, which demonstrated hypermetabolism in her lung nodules. She presents today in follow-up with a CT of the chest.     The patient is accompanied by an adult female, Marylou, and her son, Kashif, on the phone who contribute in her medical history.     She states she is doing well overall. She is concentered on undergoing surgical treatment, secondary to the COVID-19 virus on the rise. Marylou states that the patient lives alone.       Objective   Vital Signs:   /71 (BP Location: Left arm, Patient Position: Sitting, Cuff Size: Adult)   Pulse 106   Temp 99.3 °F (37.4 °C) (Infrared)   Ht 154.9 cm (61\")   Wt 64.9 kg (143 lb)   SpO2 97%   BMI 27.02 kg/m²     Physical Exam  Vitals and nursing note reviewed.   Constitutional:       Appearance: She is well-developed.   HENT:      Head: Normocephalic and atraumatic.      Nose: Nose normal.   Eyes:      Conjunctiva/sclera: Conjunctivae normal.   Cardiovascular:      Rate and Rhythm: Normal rate.   Pulmonary:      Effort: Pulmonary effort is normal.   Abdominal:      Palpations: Abdomen is soft.   Musculoskeletal:      Cervical back: Neck supple.   Skin:     General: Skin is warm and dry.   Neurological:      Mental Status: She is alert and oriented to person, place, and time.   Psychiatric:         Behavior: Behavior normal.         Thought Content: Thought content normal.         Judgment: Judgment normal.          Result Review :                     Assessment and Plan    Diagnoses and all orders for this visit:    1. Lung nodule (Primary)         Chrystal" Joseph is a pleasant 81-year-old lady who presented with multiple lung nodules. She is status post biopsy which demonstrates some chronic inflammation as well as a PET scan which demonstrated hypermetabolism in her lung nodules.  I educated the patient, Kashif and Marylou regarding radiation versus surgery in great detail.  The patient wishes to proceed with Video-assisted thoracoscopic surgery. Counseled the patient on surgical risk, benefits, and recovery time.   She is advised she will need care following surgery in a rehab facility or may consider home rehab.           I spent 40 minutes caring for Chrystal on this date of service. This time includes time spent by me in the following activities:preparing for the visit, reviewing tests, obtaining and/or reviewing a separately obtained history, performing a medically appropriate examination and/or evaluation , counseling and educating the patient/family/caregiver, ordering medications, tests, or procedures, documenting information in the medical record and independently interpreting results and communicating that information with the patient/family/caregiver  Follow Up   No follow-ups on file.  Patient was given instructions and counseling regarding her condition or for health maintenance advice. Please see specific information pulled into the AVS if appropriate.       Transcribed from ambient dictation for Etelvina Mcconnell MD by Roger Shepard.  01/06/22   17:04 EST    Patient verbalized consent to the visit recording.  I have personally performed the services described in this document as transcribed by the above individual, and it is both accurate and complete.  Etelvina Mcconnell MD  1/10/2022  18:07 EST    97

## 2024-05-09 ENCOUNTER — OFFICE (AMBULATORY)
Dept: URBAN - METROPOLITAN AREA CLINIC 64 | Facility: CLINIC | Age: 84
End: 2024-05-09

## 2024-05-09 VITALS
SYSTOLIC BLOOD PRESSURE: 150 MMHG | HEART RATE: 67 BPM | WEIGHT: 131 LBS | DIASTOLIC BLOOD PRESSURE: 85 MMHG | HEIGHT: 64 IN

## 2024-05-09 DIAGNOSIS — K21.9 GASTRO-ESOPHAGEAL REFLUX DISEASE WITHOUT ESOPHAGITIS: ICD-10-CM

## 2024-05-09 DIAGNOSIS — I48.91 UNSPECIFIED ATRIAL FIBRILLATION: ICD-10-CM

## 2024-05-09 DIAGNOSIS — R10.13 EPIGASTRIC PAIN: ICD-10-CM

## 2024-05-09 DIAGNOSIS — R14.2 ERUCTATION: ICD-10-CM

## 2024-05-09 DIAGNOSIS — R13.10 DYSPHAGIA, UNSPECIFIED: ICD-10-CM

## 2024-05-09 PROCEDURE — 99214 OFFICE O/P EST MOD 30 MIN: CPT | Performed by: NURSE PRACTITIONER

## 2024-05-23 RX ORDER — BACLOFEN 10 MG/1
10 TABLET ORAL NIGHTLY PRN
Qty: 30 TABLET | Refills: 1 | Status: SHIPPED | OUTPATIENT
Start: 2024-05-23

## 2024-05-29 ENCOUNTER — OFFICE VISIT (OUTPATIENT)
Dept: CARDIOLOGY | Facility: CLINIC | Age: 84
End: 2024-05-29
Payer: MEDICARE

## 2024-05-29 VITALS
RESPIRATION RATE: 18 BRPM | DIASTOLIC BLOOD PRESSURE: 73 MMHG | HEART RATE: 60 BPM | SYSTOLIC BLOOD PRESSURE: 148 MMHG | HEIGHT: 61 IN | BODY MASS INDEX: 24.55 KG/M2 | WEIGHT: 130 LBS

## 2024-05-29 DIAGNOSIS — K92.2 GASTROINTESTINAL HEMORRHAGE, UNSPECIFIED GASTROINTESTINAL HEMORRHAGE TYPE: Primary | ICD-10-CM

## 2024-05-29 PROCEDURE — 3077F SYST BP >= 140 MM HG: CPT | Performed by: INTERNAL MEDICINE

## 2024-05-29 PROCEDURE — 3078F DIAST BP <80 MM HG: CPT | Performed by: INTERNAL MEDICINE

## 2024-05-29 PROCEDURE — G2211 COMPLEX E/M VISIT ADD ON: HCPCS | Performed by: INTERNAL MEDICINE

## 2024-05-29 PROCEDURE — 99213 OFFICE O/P EST LOW 20 MIN: CPT | Performed by: INTERNAL MEDICINE

## 2024-06-04 NOTE — PROGRESS NOTES
Cardiology Clinic Note  Jeremy Mccall MD, PhD    Subjective:     Encounter Date:05/29/2024      Patient ID: Chrystal Ruiz is a 83 y.o. female.    Chief Complaint:  Chief Complaint   Patient presents with    Follow-up       HPI:      I the pleasure to see this 83 year-old female  history of diastolic CHF  atrial fibrillation status post Watchman, [id number] score of at  3-4 at 8-10 %/year.  She is maintained on spironolactone Lasix for volume control.  Metoprolol succinate 12.5 daily blood pressures at goal, weight is stable, heart rates are controlled she has no new CV complaints today.   She has no chest pain or shortness of breath of new or worsening onset.    She is off anticoagulation with no residual shunt around the Watchman device by transesophageal echo. No bleeding reported.   She has a normal EF of 60% with some mild right-sided enlargement but otherwise mild developed valvular regurgitation as documented.  She has no volume overload, labs recently stable creatinine 1.1.  No anginal chest pain, she is pending possible endoscopy which is okay from a cardiac standpoint, very low risk procedure, does not need ischemic evaluation prior to this which we discussed today and she can follow-up with GI for dyspepsia and reflux disease      Transesophageal echo July 2023    Left ventricular systolic function is normal. Estimated left ventricular EF = 60%    Left ventricular wall thickness is consistent with mild to moderate concentric hypertrophy.    The right ventricular cavity is borderline dilated.    The left atrial cavity is moderately dilated.    The right atrial cavity is mildly  dilated.    There is mild calcification of the aortic valve.    Abnormal mitral valve structure consistent with dilated annulus.    Estimated right ventricular systolic pressure from tricuspid regurgitation is normal (<35 mmHg).     Review of systems otherwise negative x14 point review of systems except as mentioned above next    "  Historical data copied forward from previous encounters in EMR including the history, exam, and assessment/plan has been reviewed and is unchanged unless noted otherwise.     Cardiac medicines reviewed with risk, benefits, and necessity of each discussed.     Risk and benefit of cardiac testing reviewed including death heart attack stroke pain bleeding infection need for vascular /cardiovascular surgery were discussed and the patient      Objective:         Objective          Unchanged from prior  Vitals reviewed below        Physical Exam  Regular rate and rhythm with no rubs murmurs gallops today  No heave no lift  No peripheral edema next no carotid bruits or JVD  Normal radial pulses  Normal cap refill  Soft nontender nondistended  Clear to auscultation bilaterally  Unchanged prior encounter    Assessment:          1. Paroxysmal atrial fibrillation (HCC) (Primary)  Watchman procedure  Off anticoagulation  Metoprolol XL decreased to   12.5 daily        Has bled score greater than 3  [id number] score greater than 3  Watchman recently implanted      Paroxysmal A. fib  Off Cardizem  Off Eliquis   Metoprolol succinate 12.5 daily  status post watchman  Aspirin low-dose 81 daily     Essential hypertension well-controlled  Hyperlipidemia continue present medicines  No history of any CAD  Normal stress test in the last 18 months with no new symptoms  Preserved LVEF  No clinical heart failure     Doing well, okay for endoscopy, does not need ischemic evaluation  Referred back to GI for evaluation of gastroesophageal reflux disease and dyspepsia    Follow-up 1 year cardiology      Objective:         /73 (BP Location: Left arm, Patient Position: Sitting)   Pulse 60   Resp 18   Ht 154.9 cm (61\")   Wt 59 kg (130 lb)   BMI 24.56 kg/m²     Physical Exam    Assessment:         Diagnoses and all orders for this visit:    1. Gastrointestinal hemorrhage, unspecified gastrointestinal hemorrhage type (Primary)  -     " Ambulatory Referral to Gastroenterology           Plan:              The pleasure to be involved in this patient's cardiovascular care.  Please call with any questions or concerns  Jeremy Mccall MD, PhD    Most recent EKG as reviewed and interpreted by me:  Procedures     Most recent echo as reviewed and interpreted by me:  Results for orders placed during the hospital encounter of 07/05/23    Adult Transesophageal Echo (MELISA) W/ Cont if Necessary Per Protocol    Interpretation Summary    Left ventricular systolic function is normal. Estimated left ventricular EF = 60%    Left ventricular wall thickness is consistent with mild to moderate concentric hypertrophy.    The right ventricular cavity is borderline dilated.    The left atrial cavity is moderately dilated.    The right atrial cavity is mildly  dilated.    There is mild calcification of the aortic valve.    Abnormal mitral valve structure consistent with dilated annulus.    Estimated right ventricular systolic pressure from tricuspid regurgitation is normal (<35 mmHg).    Procedure indication  History of atrial fibrillation and Watchman device in situ patient came for MELISA to evaluate watchman 1 year post implantation as per protocol    conscious sedation administered by anesthesia  Timeout before procedure    consent obtained before procedure      Procedure note  after obtaining a valid consent patient was sedated by Anesthesia and a MELISA probe was easily placed into esophagus with multiplane imaging with 2D, color and Doppler followed by bubble study with agitated saline without any complications    MELISA  Findings  The Watchman device is well-seated without any leak or thrombus there is no clot  LV function is normal with EF of 60%  Aortic valve mildly calcified without aortic stenosis  No effusion or shunt        Procedure done  Transesophageal echocardiography      Electronically signed by Sage Garcia MD, 07/05/23, 1:27 PM EDT.      Most recent  stress test as reviewed and interpreted by me:  Results for orders placed during the hospital encounter of 01/24/22    Stress Test With Myocardial Perfusion One Day    Interpretation Summary  Indications  Shortness of breath    This study was performed under my direct supervision.    Resting ECG  Sinus rhythm.    The patient was injected with Lexiscan intravenously while constantly monitoring electrocardiogram and vital signs.  Patient did not have any chest discomfort ST abnormalities or ectopy with injection of Lexiscan.    Cardiolite was used as an imaging agent.    Cardiolite images showed uniform distribution of radionuclide without any evidence for myocardial ischemia.    Gated SPECT images revealed normal left ventricle size and contractility with ejection fraction of 74%.    Impression  ========  Lexiscan Cardiolite test is negative for myocardial ischemia.    Gated SPECT images revealed normal left ventricular size and contractility with ejection fraction of 74%.      Most recent cardiac catheterization as reviewed interpreted by me:  No results found for this or any previous visit.    The following portions of the patient's history were reviewed and updated as appropriate: allergies, current medications, past family history, past medical history, past social history, past surgical history, and problem list.      ROS:  14 point review of systems negative except as mentioned above    Current Outpatient Medications:     acetaminophen (TYLENOL) 500 MG tablet, Take 1 tablet by mouth Every 6 (Six) Hours As Needed for Mild Pain., Disp: , Rfl:     baclofen (LIORESAL) 10 MG tablet, TAKE 1 TABLET BY MOUTH AT NIGHT AS NEEDED FOR MUSCLE SPASMS, Disp: 30 tablet, Rfl: 1    Breo Ellipta 200-25 MCG/INH inhaler, Inhale 1 puff Daily., Disp: 1 each, Rfl: 2    Cholecalciferol (VITAMIN D3) 2000 units capsule, Take 1 capsule by mouth Daily., Disp: , Rfl:     Cyanocobalamin (VITAMIN B12) 1000 MCG tablet controlled-release, Take  2,500 mcg by mouth Daily., Disp: , Rfl:     esomeprazole (nexIUM) 40 MG capsule, Take 1 capsule by mouth Every Morning Before Breakfast., Disp: , Rfl:     FeroSul 325 (65 Fe) MG tablet, TAKE 1 TABLET BY MOUTH THREE DAYS A WEEK( MONDAY, WEDNESDAY, FRIDAY), Disp: 45 tablet, Rfl: 1    furosemide (LASIX) 40 MG tablet, Take 1 tablet by mouth 3 (Three) Times a Week. Tuesday, Thursday and Saturday., Disp: , Rfl:     loratadine (CLARITIN) 10 MG tablet, Take 1 tablet by mouth Daily., Disp: , Rfl:     metoclopramide (REGLAN) 5 MG tablet, Take 1 tablet by mouth Daily., Disp: , Rfl:     metoprolol succinate XL (TOPROL-XL) 25 MG 24 hr tablet, Take 1 tablet by mouth Daily., Disp: 90 tablet, Rfl: 3    spironolactone (ALDACTONE) 25 MG tablet, TAKE 1 TABLET BY MOUTH EVERY OTHER DAY, Disp: 45 tablet, Rfl: 1    triamcinolone (KENALOG) 0.1 % cream, Apply 1 application  topically to the appropriate area as directed 2 (Two) Times a Day As Needed for Irritation., Disp: 80 g, Rfl: 1    Problem List:  Patient Active Problem List   Diagnosis    Grief reaction    Alopecia    Arthritis    Back pain    Encounter for general adult medical examination without abnormal findings    Hypertension    Lung nodule    Vitamin D deficiency    Shortness of breath    Postmenopausal status    Nevus, non-neoplastic    Medicare annual wellness visit, subsequent    Fam hx-ischem heart disease    FH: thyroid condition    Cellulitis of right anterior lower leg    Vasculitis of skin    Pedal edema    Bilateral calf pain    Neck pain    Immunization reaction    Mixed hyperlipidemia    Tremor    Bright red rectal bleeding    Acute maxillary sinusitis    Acute pain of right knee    Chronic pain of right knee    Need for vaccination    Lung nodules    Tachycardia    Essential (primary) hypertension     Dermatitis    A-fib    Leukopenia    Anemia    Primary osteoarthritis of right knee    Bronchogenic cancer of right lung    Iron deficiency    Malabsorption due to  intolerance, not elsewhere classified    Dyspnea    Other iron deficiency anemias    Syncope    Paroxysmal atrial fibrillation    Bilateral impacted cerumen    Acute anterior epistaxis    Callus of toe    Bronchitis    Presence of Watchman left atrial appendage closure device    Primary osteoarthritis of both knees    Acute URI    Cough in adult    Upper respiratory tract infection due to COVID-19 virus    Encounter for screening mammogram for breast cancer    Esophageal stricture     Past Medical History:  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 procedure; surgically fixed the next day    GERD (gastroesophageal reflux disease)     Heart murmur     Hip pain, bilateral     Hypertension     Medicare annual wellness visit, subsequent 02/12/2020    Shoulder pain, right      Past Surgical History:  Past Surgical History:   Procedure Laterality Date    ATRIAL APPENDAGE EXCLUSION LEFT WITH TRANSESOPHAGEAL ECHOCARDIOGRAM Right 7/7/2022    Procedure: Atrial Appendage Occlusion watchman team aware $;  Surgeon: Sage Garcia MD;  Location: [facility] CATH INVASIVE LOCATION;  Service: Cardiovascular;  Laterality: Right;    ATRIAL APPENDAGE EXCLUSION LEFT WITH TRANSESOPHAGEAL ECHOCARDIOGRAM N/A 7/7/2022    Procedure: Atrial Appendage Occlusion;  Surgeon: Fide Mullen MD;  Location: [facility] CATH INVASIVE LOCATION;  Service: Cardiovascular;  Laterality: N/A;    CATARACT EXTRACTION      CHOLECYSTECTOMY      HYSTERECTOMY  1987    Endometriosis    OOPHORECTOMY      PSEUDO ANEURYSM REPAIR, EXTREMITY Right 7/8/2022    Procedure: REPAIR OF POST CATHETERIZATION RIGHT FEMORAL PSEUDOANEURYSM AND ARTERIOVENOUS FISTUAL;  Surgeon: Alfonzo Hooks MD;  Location: [facility] MAIN OR;  Service: Vascular;   Laterality: Right;    TONSILLECTOMY       Social History:  Social History     Socioeconomic History    Marital status:    Tobacco Use    Smoking status: Never    Smokeless tobacco: Never   Vaping Use    Vaping status: Never Used   Substance and Sexual Activity    Alcohol use: No    Drug use: Never    Sexual activity: Not Currently     Allergies:  Allergies   Allergen Reactions    Ciprofloxacin Unknown (See Comments)    Sulfa Antibiotics Unknown (See Comments)    Iodine Unknown - High Severity     Patient doesn't remember what reaction she gets when she takes this     Sudafed [Pseudoephedrine Hcl] Other (See Comments)     Patient states it has been awhile since taking, so she doesn't remember side effects.     Immunizations:  Immunization History   Administered Date(s) Administered    Arexvy (RSV, Adults 60+ yrs) 12/08/2023    COVID-19 (MODERNA) 1st,2nd,3rd Dose Monovalent 01/20/2021, 02/17/2021, 08/26/2021    COVID-19 (MODERNA) Monovalent Original Booster 01/20/2021, 02/17/2021, 08/26/2021    COVID-19 (PFIZER) BIVALENT 12+YRS 09/10/2022    COVID-19 F23 (MODERNA) 12YRS+ (SPIKEVAX) 10/30/2023    FLUAD TRI 65YR+ 10/25/2013, 11/13/2014, 09/30/2015, 09/25/2019    Fluad Quad 65+ 09/08/2020, 10/08/2022, 11/27/2023    Fluzone High Dose =>65 Years ([employer] ONLY) 10/05/2016, 10/17/2017, 09/25/2019    Fluzone High-Dose 65+yrs 11/15/2021, 10/08/2022    H1N1 All Forms 01/29/2010    Hepatitis A 04/24/2018, 10/24/2018    Influenza Quad Vaccine (Inpatient) 09/08/2020    Influenza Seasonal Injectable 10/25/2013, 11/13/2014, 09/30/2015    Pneumococcal Conjugate 13-Valent (PCV13) 02/24/2016    Shingrix 07/01/2019, 10/09/2019            In-Office Procedure(s):  No orders to display        ASCVD RIsk Score::  The ASCVD Risk score (Albert CLAYTON, et al., 2019) failed to calculate for the following reasons:    The 2019 ASCVD risk score is only valid for ages 40 to 79    Imaging:    Results for orders placed during the hospital  encounter of 12/05/23    XR Shoulder 2+ View Right    Narrative  XR SHOULDER 2+ VW RIGHT    Date of Exam: 12/5/2023 10:51 AM EST    Indication: Right shoulder pain    Comparison: 3/21/2017    Findings:  No acute fracture or dislocation is noted. Minimal degenerative changes are noted at the AC joint. Mild degenerative changes are noted at the glenohumeral joint. Limited imaging of the chest demonstrates no acute abnormality.    Impression  Impression:  Mild degenerative changes at the AC joint and glenohumeral joint.      Electronically Signed: Omari Haddad MD  12/5/2023 5:42 PM EST  Workstation ID: [id number]       Results for orders placed during the hospital encounter of 12/07/23    CT Chest Without Contrast Diagnostic    Narrative  CT CHEST WO CONTRAST DIAGNOSTIC    Date of Exam: 12/7/2023 1:02 PM EST    Indication: Right lung cancer, s/p SBRT; surveillance.    Comparison: CT chest 6/14/2023, 11/12/2019. AP portable chest 11/15/2023    Technique: Axial CT images were obtained of the chest without contrast administration.  Sagittal and coronal reconstructions were performed.  Automated exposure control and iterative reconstruction methods were used.      Findings: 6 mm noncalcified right lower lobe nodule (image 61), additional 6 mm right lower lobe noncalcified nodule (image 69), and 5 mm noncalcified left upper lobe nodule (image 48), are each unchanged compared to the more remote CT chest from  11/12/2019, confirming over 4 years of stability, and benignity.    No new or suspicious pulmonary nodules are identified. There is chronic biapical scarring, right greater than left. Benign calcified nodules are present in the medial left upper lobe. There is no acute airspace disease    Heart size is normal. Left atrial appendage occlusion device is present. Thoracic aortic caliber is normal. No significant coronary calcifications. No pericardial effusion or pleural effusion. Small esophageal hiatal hernia. No  pathologically enlarged  lymph nodes are identified.    Included portions of the upper abdominal organs demonstrate a normal noncontrast appearance.    Thoracolumbar levoscoliotic curvature and thoracic kyphotic curvature is present with multilevel degenerative endplate changes. No acute or suspicious osseous abnormalities are identified.    Impression  1. No evidence of recurrent malignancy or metastatic disease in the chest.  2.. 6 mm or less noncalcified pulmonary nodules have been stable since November 2019, confirming benignity.  3. Consider 12-month CT chest surveillance imaging.    Electronically Signed: Loni Serrano MD  12/8/2023 11:17 AM EST  Workstation ID: [id number]      Results for orders placed during the hospital encounter of 12/07/23    CT Chest Without Contrast Diagnostic    Narrative  CT CHEST WO CONTRAST DIAGNOSTIC    Date of Exam: 12/7/2023 1:02 PM EST    Indication: Right lung cancer, s/p SBRT; surveillance.    Comparison: CT chest 6/14/2023, 11/12/2019. AP portable chest 11/15/2023    Technique: Axial CT images were obtained of the chest without contrast administration.  Sagittal and coronal reconstructions were performed.  Automated exposure control and iterative reconstruction methods were used.      Findings: 6 mm noncalcified right lower lobe nodule (image 61), additional 6 mm right lower lobe noncalcified nodule (image 69), and 5 mm noncalcified left upper lobe nodule (image 48), are each unchanged compared to the more remote CT chest from  11/12/2019, confirming over 4 years of stability, and benignity.    No new or suspicious pulmonary nodules are identified. There is chronic biapical scarring, right greater than left. Benign calcified nodules are present in the medial left upper lobe. There is no acute airspace disease    Heart size is normal. Left atrial appendage occlusion device is present. Thoracic aortic caliber is normal. No significant coronary calcifications. No pericardial  effusion or pleural effusion. Small esophageal hiatal hernia. No pathologically enlarged  lymph nodes are identified.    Included portions of the upper abdominal organs demonstrate a normal noncontrast appearance.    Thoracolumbar levoscoliotic curvature and thoracic kyphotic curvature is present with multilevel degenerative endplate changes. No acute or suspicious osseous abnormalities are identified.    Impression  1. No evidence of recurrent malignancy or metastatic disease in the chest.  2.. 6 mm or less noncalcified pulmonary nodules have been stable since November 2019, confirming benignity.  3. Consider 12-month CT chest surveillance imaging.    Electronically Signed: Loni Serrano MD  12/8/2023 11:17 AM EST  Workstation ID: [id number]      Lab Review:   Lab on 12/11/2023   Component Date Value    WBC 12/11/2023 7.33     RBC 12/11/2023 4.59     Hemoglobin 12/11/2023 12.3     Hematocrit 12/11/2023 40.0     MCV 12/11/2023 87.1     MCH 12/11/2023 26.8     MCHC 12/11/2023 30.8 (L)     RDW 12/11/2023 13.8     RDW-SD 12/11/2023 42.7     MPV 12/11/2023 9.9     Platelets 12/11/2023 248     Neutrophil % 12/11/2023 69.0     Lymphocyte % 12/11/2023 19.9     Monocyte % 12/11/2023 8.9     Eosinophil % 12/11/2023 1.9     Basophil % 12/11/2023 0.3     Neutrophils, Absolute 12/11/2023 5.06     Lymphocytes, Absolute 12/11/2023 1.46     Monocytes, Absolute 12/11/2023 0.65     Eosinophils, Absolute 12/11/2023 0.14     Basophils, Absolute 12/11/2023 0.02     Extra Tube 12/11/2023 Hold for add-ons.     Extra Tube 12/11/2023 Hold for add-ons.      Recent labs reviewed and interpreted for clinical significance and application            Level of Care:           Jeremy Mccall MD  06/04/24  .

## 2024-06-10 ENCOUNTER — HOSPITAL ENCOUNTER (OUTPATIENT)
Dept: PET IMAGING | Facility: HOSPITAL | Age: 84
Discharge: HOME | End: 2024-06-10
Admitting: RADIOLOGY
Payer: MEDICARE

## 2024-06-10 DIAGNOSIS — C34.91 BRONCHOGENIC CANCER OF RIGHT LUNG: ICD-10-CM

## 2024-06-10 PROCEDURE — 71250 CT THORAX DX C-: CPT

## 2024-06-10 NOTE — PROGRESS NOTES
[facility] RADIATION ONCOLOGY  FOLLOW-UP NOTE    NAME: Chrystal Ruiz  YOB: 1940  MRN #: [id number]  DATE OF SERVICE: 6/13/2024  REFERRING PROVIDER: No ref. provider found   PRIMARY CARE PROVIDER: Etelvina Ulloa MD    CHIEF COMPLAINT:  6M CT F/U    DIAGNOSIS:    Encounter Diagnosis   Name Primary?    Bronchogenic cancer of right lung Yes      RADIATION TREATMENT COURSE:    03/23/2022- 04/12/2022: 6000 cGy in 15 fractions to right upper lobe.    INTERVAL HISTORY     Chrystal Ruiz is a 83 y.o. female who was last seen in our office on 12/11/2023 by Dr. Michael Whitten. The plan was to have repeat CT chest in 6 months, and follow up here afterwards.    She follows with Dr. Cheng, but has not been seen since 12/11/2023. She is scheduled for follow up 06/13/2024, immediately following this appointment.    IMAGING     CT CHEST W/O CONTRAST  DATE OF EXAM: 06/10/2024  FACILITY: [facility]  FINDINGS:  Imaging report not available at time of this appointment.       MRI Shoulder Right Without Contrast  Result Date: 3/4/2024  1.Mild to moderate glenohumeral joint arthritis. 2.There is a large full-thickness tear of the distal supraspinatus tendon from the insertion site. Mild muscle atrophy. 3.Severe tendinosis of the infraspinatus tendon. 4.Moderate amount subacromial-subdeltoid bursal fluid. Electronically Signed: Tavia Samaniego MD  3/4/2024 4:22 PM EST  Workstation ID: [id number]       PATHOLOGY     No recent pathology to review.    LABS     HEMATOLOGY:  WBC   Date Value Ref Range Status   12/11/2023 7.33 3.40 - 10.80 10*3/mm3 Final     RBC   Date Value Ref Range Status   12/11/2023 4.59 3.77 - 5.28 10*6/mm3 Final     Hemoglobin   Date Value Ref Range Status   12/11/2023 12.3 12.0 - 15.9 g/dL Final     Hematocrit   Date Value Ref Range Status   12/11/2023 40.0 34.0 - 46.6 % Final     Platelets   Date Value Ref Range Status   12/11/2023 248 140 - 450 10*3/mm3 Final      CHEMISTRY:  Lab Results   Component Value Date    GLUCOSE 124 (H) 11/15/2023    BUN 31 (H) 11/15/2023    CREATININE 1.13 (H) 11/15/2023    EGFRIFNONA 84 02/01/2022    BCR 27.4 (H) 11/15/2023    K 4.2 11/15/2023    CO2 25.0 11/15/2023    CALCIUM 9.7 11/15/2023    ALBUMIN 4.1 11/15/2023    LABIL2 1.2 05/29/2019    AST 13 11/15/2023    ALT 8 11/15/2023     PROBLEM LIST     Patient Active Problem List   Diagnosis    Grief reaction    Alopecia    Arthritis    Back pain    Encounter for general adult medical examination without abnormal findings    Hypertension    Lung nodule    Vitamin D deficiency    Shortness of breath    Postmenopausal status    Nevus, non-neoplastic    Medicare annual wellness visit, subsequent    Fam hx-ischem heart disease    FH: thyroid condition    Cellulitis of right anterior lower leg    Vasculitis of skin    Pedal edema    Bilateral calf pain    Neck pain    Immunization reaction    Mixed hyperlipidemia    Tremor    Bright red rectal bleeding    Acute maxillary sinusitis    Acute pain of right knee    Chronic pain of right knee    Need for vaccination    Lung nodules    Tachycardia    Essential (primary) hypertension     Dermatitis    A-fib    Leukopenia    Anemia    Primary osteoarthritis of right knee    Bronchogenic cancer of right lung    Iron deficiency    Malabsorption due to intolerance, not elsewhere classified    Dyspnea    Other iron deficiency anemias    Syncope    Paroxysmal atrial fibrillation    Bilateral impacted cerumen    Acute anterior epistaxis    Callus of toe    Bronchitis    Presence of Watchman left atrial appendage closure device    Primary osteoarthritis of both knees    Acute URI    Cough in adult    Upper respiratory tract infection due to COVID-19 virus    Encounter for screening mammogram for breast cancer    Esophageal stricture    Acute gastritis without bleeding    Diaphragmatic hernia without obstruction or gangrene    Diverticulosis of colon    Dysphagia     Epigastric pain    Esophageal motility disorder    Gastro-esophageal reflux disease without esophagitis    Internal hemorrhoids without complication    Osteoporosis    Schatzki's ring    Undiagnosed cardiac murmurs    Other asthma      CURRENT MEDICATIONS     Current Outpatient Medications   Medication Instructions    acetaminophen (TYLENOL) 500 mg, Oral, Every 6 Hours PRN    baclofen (LIORESAL) 10 mg, Oral, Nightly PRN    Breo Ellipta 200-25 MCG/INH inhaler 1 puff, Inhalation, Daily - RT    esomeprazole (NEXIUM) 40 mg, Oral, Every Morning Before Breakfast    FeroSul 325 (65 Fe) MG tablet TAKE 1 TABLET BY MOUTH THREE DAYS A WEEK( MONDAY, WEDNESDAY, FRIDAY)    furosemide (LASIX) 40 mg, Oral, 3 Times Weekly, Tuesday, Thursday and Saturday. <BR>    loratadine (CLARITIN) 10 mg, Oral, Daily    metoclopramide (REGLAN) 5 mg, Oral, Daily    metoprolol succinate XL (TOPROL-XL) 25 mg, Oral, Daily    spironolactone (ALDACTONE) 25 MG tablet TAKE 1 TABLET BY MOUTH EVERY OTHER DAY    triamcinolone (KENALOG) 0.1 % cream 1 application , Topical, 2 Times Daily PRN    Vitamin B12 2,500 mcg, Oral, Daily    Vitamin D3 2,000 Units, Oral, Daily      ALLERGIES     Allergies   Allergen Reactions    Ciprofloxacin Unknown (See Comments)    Sulfa Antibiotics Unknown (See Comments)    Iodine Unknown - High Severity     Patient doesn't remember what reaction she gets when she takes this     Sudafed [Pseudoephedrine Hcl] Other (See Comments)     Patient states it has been awhile since taking, so she doesn't remember side effects.       REVIEW OF SYSTEMS     Review of Systems   Constitutional:  Negative for activity change and fatigue.   Respiratory:  Negative for shortness of breath.         Vitals:    06/13/24 1316   BP: 159/70   Pulse: 69   Resp: 20   SpO2: 98%      Physical Exam  Constitutional:       General: She is not in acute distress.  HENT:      Head: Normocephalic and atraumatic.   Pulmonary:      Effort: Pulmonary effort is normal.  No respiratory distress.   Neurological:      Mental Status: She is alert and oriented to person, place, and time. Mental status is at baseline.   Psychiatric:         Mood and Affect: Mood normal.         Behavior: Behavior normal.            ECOG:  Fully active, able to carry on all pre-disease performance without restriction = 0  PHQ-2 Depression Screening  Little interest or pleasure in doing things? 0-->not at all   Feeling down, depressed, or hopeless? 0-->not at all   PHQ-2 Total Score 0         ASSESSMENT AND PLAN     ASSESSMENT:      Chrystal Ruiz is an 83 y.o. female with NSCLC, stage I, [id number]. She is sp radiation, 60 Gy in 15 fx completed on 04/12/2022.  She returns for routine imaging surveillance.    Diagnoses and all orders for this visit:    1. Bronchogenic cancer of right lung (Primary)       PLAN:      Orders:  - Wait for final report of lung CT  - CT chest in 6 months   - Follow-up after CT     the patient has no imaging evidence of disease recurrence on my personal review.  We discussed the importance of waiting for the official CT report to confirm this finding.  If there are no worrisome imaging findings, We discussed the plan for repeat chest CT in 6 months.  If the CT identifies any worrisome areas, we will follow-up as indicated.  The patient is encouraged to reach out sooner with any questions or concerns.        I spent 30 minutes caring for Chrystal on this date of service. This time includes time spent by me in the following activities:preparing for the visit, reviewing tests, obtaining and/or reviewing a separately obtained history, performing a medically appropriate examination and/or evaluation , counseling and educating the patient/family/caregiver, ordering medications, tests, or procedures, documenting information in the medical record, and independently interpreting results and communicating that information with the patient/family/caregiver    FOLLOW UP     No follow-ups on file.      CC: No ref. provider found Etelvina Ulloa MD

## 2024-06-11 NOTE — PROGRESS NOTES
HEMATOLOGY ONCOLOGY OUTPATIENT FOLLOW UP       Patient name: Chrystal Ruiz  : 1940  MRN: [id number]  Primary Care Physician: Etelvina Ulloa MD  Referring Physician: No ref. provider found  Reason For Consult:     Chief Complaint   Patient presents with    Follow-up     Malignant neoplasm of upper lobe of right lung     HPI:   History of Present Illness:  Chrystal Ruiz is 83 y.o. female who presented to our office on 22 for consultation regarding anemia and thrombocytosis.     3/9/2022: Ms. Ruiz was referred for the investigation of anemia and thrombocytosis that was identified in the process of treating congestive heart failure and a right lung tumor. She came in a wheel chair complaining of severe fatigue and difficulties functioning because of this weakness. She admitted to hematochezia that had been associated to constipation that had been going on for some time, at least a few weeks. She had been afebrile. No pain. The laboratory exams reported microcytic anemia and thrombocytosis that had been present since the end of . A decision was made to transfuse her with one unit of red cells. Tests for iron deficiency were sent. She was scheduled to see me in one week.     3/16/2022. Ms. Ruiz was back in the office for follow up. She was feeling much better after the transfusion. She was still weak but not as much and her affect was also better. The laboratory exams did not clearly suggest iron deficiency but she persisted with microcytic anemia and had a history of gastrointestinal bleeding, such that iron deficiency was strongly considered the cause of the symptoms and laboratory manifestations. She had taken oral iron with multiple side effects and no clear improvement. Plans for intravenous iron were made.     3/22/2022: Admitted with evidence of decompensation of the congestive heart failure. She was treated with diuretics with prompt symptomatic relief. She was given  one dose of intravenous iron. She was discharged feeling much better.     4/4/2022: Back in the office for follow up. She had been receiving iron intravenously without complications. She reported unintended weight loss. The exam was not different than before. The laboratory exams revealed progressive improvement of the blood count with increase in the hemoglobin and the mean corpuscular volume and resolution of the leukocytosis and thrombocytosis. A decision was made to obtain a scan of the abdomen to investigate the history of blood loss in the stools and the unintended weight loss.     4/25/2022: Ms. Ruiz was back in the office for follow up. She was feeling much better and had returned to most of her normal activities. She arrived walking and reported that she had been walking more and more. She still had some fatigue but not the weakness she had before. She also had better appetite. She complained, however, that some of the medication she was receiving had resulted in changes in her taste perception. She had no more dyspnea than before and she had been free of chest pain. She had been offered the Watchman procedure, since she was not able to take anticoagulation safely. She denies abdominal pain or diarrhea. No dysuria. No edema. No skin rash.     5/18/2022: Back in the office to review results. She was feeling very well. Had regained her energy and was very active. The exam did not reveal new changes. Her hemoglobin had continued to improve, but she still had anemia. A decision was made to continue to observe without intervention.     6/15/2022: Back in the office for follow-up.  She had partial correction of her anemia.  She was scheduled to have a watchman procedure early in July.  The physical exam was unremarkable.  A decision was made to continue to observe and she was scheduled to return in 2 months.    8/17/2022: In the office for follow-up.  Compliant with iron.  Underwent the watchman procedure as  planned.  Had some minor complications.  This was followed by an episode of cellulitis that required admission to the hospital.  At this time asymptomatic.  Hemoglobin back to normal range.  A decision was made to follow in 4 months.    12/14/2022: Feeling well.  As active as before.  Functional and self-sufficient.  Eating well and no weight loss.  Afebrile.  On exam no abnormalities to document.  Laboratory exams perfectly within the normal range.  I asked her to return 6 months later and to continue the iron until I see her again.    6/21/2023: Feeling well.  Strong and without new symptoms.  Reasonably active and with good appetite.  No new limitations.  The complications from the Watchman procedure resolved completely and she was then off of all anticoagulation.  On exam there were no changes.  The laboratory exams revealed a hemoglobin within the normal range.  Iron studies were requested and I asked her to return in 6 months.    12/11/2023: About the same.  Complains of pain especially on the left side, at the hip and the knee.  This has been variously attributed to arthritis of the hip and knee and to spinal column problems.  She is receiving local injections as well as physical therapy with only partial relief.  She has had chest pains and has visited the emergency room in the relatively recent past.  On exam alert, conversant and in no distress.  Lungs clear.  Heart regular.  Abdomen soft.  The laboratory exams were reviewed.  The blood count is well within the normal range.  A decision was made to see her 6 months later.  She was asked to continue to take the iron 3 times a week.    6/13/2024: Feeling well and without new symptoms.  Active and without new limitations.  Recently had a subconjunctival hemorrhage on the right side after sneezing.  She has been hypertensive but not particularly so.  She is eating well and her weight is stable.  She has been afebrile.  No chest pains and no abdominal pain.  As  well no obvious gastrointestinal bleeding.  On exam indeed she has a large subconjunctival hemorrhage on the right.  There is some extension of the ecchymosis to the inferior eyelid.  No palpable lymphadenopathy in the preauricular region.  The lungs are clear bilaterally and the heart regular.  The abdomen is soft.  No or minimal edema.  Reviewed the laboratory exams.  No major changes.  I have asked her to see me in approximately 6 months.  I will continue to monitor her blood counts.  I did review the images of the recent scans.  I do not see any change but the official interpretation is not out yet.      The following portions of the patient's history were reviewed and updated as appropriate: allergies, current medications, past family history, past medical history, past social history, past surgical history and problem list.    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 procedure; surgically fixed the next day    GERD (gastroesophageal reflux disease)     Heart murmur     Hip pain, bilateral     Hypertension     Medicare annual wellness visit, subsequent 02/12/2020    Shoulder pain, right      Past Surgical History:   Procedure Laterality Date    ATRIAL APPENDAGE EXCLUSION LEFT WITH TRANSESOPHAGEAL ECHOCARDIOGRAM Right 7/7/2022    Procedure: Atrial Appendage Occlusion watchman team aware $;  Surgeon: Sage Garcia MD;  Location: [facility] CATH INVASIVE LOCATION;  Service: Cardiovascular;  Laterality: Right;    ATRIAL APPENDAGE EXCLUSION LEFT WITH TRANSESOPHAGEAL ECHOCARDIOGRAM N/A 7/7/2022    Procedure: Atrial Appendage Occlusion;  Surgeon: Fide Mullen MD;  Location: [facility] CATH INVASIVE LOCATION;  Service: Cardiovascular;  Laterality: N/A;    CATARACT EXTRACTION       CHOLECYSTECTOMY      HYSTERECTOMY  1987    Endometriosis    OOPHORECTOMY      PSEUDO ANEURYSM REPAIR, EXTREMITY Right 7/8/2022    Procedure: REPAIR OF POST CATHETERIZATION RIGHT FEMORAL PSEUDOANEURYSM AND ARTERIOVENOUS FISTUAL;  Surgeon: Alfonzo Hooks MD;  Location: [facility] MAIN OR;  Service: Vascular;  Laterality: Right;    TONSILLECTOMY         Current Outpatient Medications:     acetaminophen (TYLENOL) 500 MG tablet, Take 1 tablet by mouth Every 6 (Six) Hours As Needed for Mild Pain., Disp: , Rfl:     baclofen (LIORESAL) 10 MG tablet, TAKE 1 TABLET BY MOUTH AT NIGHT AS NEEDED FOR MUSCLE SPASMS, Disp: 30 tablet, Rfl: 1    Breo Ellipta 200-25 MCG/INH inhaler, Inhale 1 puff Daily., Disp: 1 each, Rfl: 2    Cholecalciferol (VITAMIN D3) 2000 units capsule, Take 1 capsule by mouth Daily., Disp: , Rfl:     Cyanocobalamin (VITAMIN B12) 1000 MCG tablet controlled-release, Take 2,500 mcg by mouth Daily., Disp: , Rfl:     esomeprazole (nexIUM) 40 MG capsule, Take 1 capsule by mouth Every Morning Before Breakfast., Disp: , Rfl:     FeroSul 325 (65 Fe) MG tablet, TAKE 1 TABLET BY MOUTH THREE DAYS A WEEK( MONDAY, WEDNESDAY, FRIDAY), Disp: 45 tablet, Rfl: 1    furosemide (LASIX) 40 MG tablet, Take 1 tablet by mouth 3 (Three) Times a Week. Tuesday, Thursday and Saturday., Disp: , Rfl:     loratadine (CLARITIN) 10 MG tablet, Take 1 tablet by mouth Daily., Disp: , Rfl:     metoclopramide (REGLAN) 5 MG tablet, Take 1 tablet by mouth Daily., Disp: , Rfl:     metoprolol succinate XL (TOPROL-XL) 25 MG 24 hr tablet, Take 1 tablet by mouth Daily., Disp: 90 tablet, Rfl: 3    spironolactone (ALDACTONE) 25 MG tablet, TAKE 1 TABLET BY MOUTH EVERY OTHER DAY, Disp: 45 tablet, Rfl: 1    triamcinolone (KENALOG) 0.1 % cream, Apply 1 application  topically to the appropriate area as directed 2 (Two) Times a Day As Needed for Irritation., Disp: 80 g, Rfl: 1    Allergies   Allergen Reactions    Ciprofloxacin Unknown (See Comments)    Sulfa  Antibiotics Unknown (See Comments)    Iodine Unknown - High Severity     Patient doesn't remember what reaction she gets when she takes this     Sudafed [Pseudoephedrine Hcl] Other (See Comments)     Patient states it has been awhile since taking, so she doesn't remember side effects.     Family History   Problem Relation Age of Onset    Breast cancer Mother 85    Stroke Mother     Heart disease Mother     Endometrial cancer Mother 70    Heart failure Father     Heart failure Sister     COPD Sister     Heart disease Sister      Cancer-related family history includes Breast cancer (age of onset: 85) in her mother; Endometrial cancer (age of onset: 70) in her mother.    Social History     Tobacco Use    Smoking status: Never    Smokeless tobacco: Never   Vaping Use    Vaping status: Never Used   Substance Use Topics    Alcohol use: No    Drug use: Never     Social History     Social History Narrative    Not on file      ROS:     Review of Systems   Constitutional:  Negative for activity change, appetite change, chills, diaphoresis, fatigue, fever and unexpected weight change.   HENT:  Negative for congestion, dental problem, drooling, ear discharge, ear pain, facial swelling, hearing loss, mouth sores, nosebleeds, postnasal drip, rhinorrhea, sinus pressure, sinus pain, sneezing, sore throat, tinnitus, trouble swallowing and voice change.    Eyes:  Negative for photophobia, pain, discharge, redness, itching and visual disturbance.   Respiratory:  Negative for apnea, cough, choking, chest tightness, shortness of breath, wheezing and stridor.    Cardiovascular:  Negative for chest pain, palpitations and leg swelling.   Gastrointestinal:  Negative for abdominal distention, abdominal pain, anal bleeding, blood in stool, constipation, diarrhea, nausea, rectal pain and vomiting.   Endocrine: Negative for cold intolerance, heat intolerance, polydipsia and polyuria.   Genitourinary:  Negative for decreased urine volume,  "difficulty urinating, dysuria, flank pain, frequency, genital sores, hematuria and urgency.   Musculoskeletal:  Positive for arthralgias and back pain. Negative for gait problem, joint swelling, myalgias, neck pain and neck stiffness.   Skin:  Negative for color change, pallor and rash.   Neurological:  Negative for dizziness, tremors, seizures, syncope, facial asymmetry, speech difficulty, weakness, light-headedness, numbness and headaches.   Hematological:  Negative for adenopathy. Does not bruise/bleed easily.   Psychiatric/Behavioral:  Negative for agitation, behavioral problems, confusion, decreased concentration, hallucinations, self-injury, sleep disturbance and suicidal ideas. The patient is not nervous/anxious.      Objective:    Vitals:    06/13/24 1424   BP: 153/75   Pulse: 65   SpO2: 97%   Weight: 60.8 kg (134 lb)   Height: 154.9 cm (60.98\")   PainSc: 0-No pain     Body mass index is 25.33 kg/m².  ECOG  (3) Capable of limited self-care, confined to bed or chair > 50% of waking hours    Physical Exam:     Physical Exam  Constitutional:       General: She is not in acute distress.     Appearance: Normal appearance. She is not ill-appearing, toxic-appearing or diaphoretic.   HENT:      Head: Normocephalic and atraumatic.      Right Ear: External ear normal.      Left Ear: External ear normal.      Nose: Nose normal.      Mouth/Throat:      Mouth: Mucous membranes are moist.      Pharynx: Oropharynx is clear. No oropharyngeal exudate or posterior oropharyngeal erythema.   Eyes:      General: No scleral icterus.        Right eye: No discharge.         Left eye: No discharge.      Conjunctiva/sclera: Conjunctivae normal.      Pupils: Pupils are equal, round, and reactive to light.   Cardiovascular:      Rate and Rhythm: Normal rate and regular rhythm.      Pulses: Normal pulses.      Heart sounds: No murmur heard.     No friction rub. No gallop.   Pulmonary:      Effort: No respiratory distress.      Breath " sounds: No stridor. No wheezing, rhonchi or rales.   Abdominal:      General: Abdomen is flat. Bowel sounds are normal. There is no distension.      Palpations: Abdomen is soft. There is no mass.      Tenderness: There is no abdominal tenderness. There is no right CVA tenderness, left CVA tenderness, guarding or rebound.      Hernia: No hernia is present.   Musculoskeletal:         General: No swelling, tenderness, deformity or signs of injury.      Cervical back: No rigidity.      Right lower leg: No edema.      Left lower leg: No edema.   Lymphadenopathy:      Cervical: No cervical adenopathy.   Skin:     Coloration: Skin is not jaundiced.      Findings: No bruising, lesion or rash.   Neurological:      General: No focal deficit present.      Mental Status: She is alert and oriented to person, place, and time.      Cranial Nerves: No cranial nerve deficit.      Motor: No weakness.      Gait: Gait normal.   Psychiatric:         Mood and Affect: Mood normal.         Behavior: Behavior normal.         Thought Content: Thought content normal.         Judgment: Judgment normal.     CM Cheng MD performed the physical exam on 6/13/2024 as documented above.    Lab Results - Last 18 Months   Lab Units 06/13/24  1420 12/11/23  1429 11/15/23  2005   WBC 10*3/mm3 7.37 7.33 8.50   HEMOGLOBIN g/dL 11.9* 12.3 11.9*   HEMATOCRIT % 39.2 40.0 37.0   PLATELETS 10*3/mm3 228 248 238   MCV fL 88.5 87.1 81.2     Lab Results - Last 18 Months   Lab Units 11/15/23  2005 09/24/23  1812 07/31/23  1422   SODIUM mmol/L 144 139 141   POTASSIUM mmol/L 4.2 4.9 5.1   CHLORIDE mmol/L 106 98 105   CO2 mmol/L 25.0 25.0 27.0   BUN mg/dL 31* 44* 23   CREATININE mg/dL 1.13* 1.58* 0.91   CALCIUM mg/dL 9.7 9.4 9.8   BILIRUBIN mg/dL 0.7 0.5 0.6   ALK PHOS U/L 72 95 85   ALT (SGPT) U/L 8 35* 17   AST (SGOT) U/L 13 25 17   GLUCOSE mg/dL 124* 116* 99     Lab Results   Component Value Date    GLUCOSE 124 (H) 11/15/2023    BUN 31 (H) 11/15/2023     CREATININE 1.13 (H) 11/15/2023    EGFRIFNONA 84 02/01/2022    BCR 27.4 (H) 11/15/2023    K 4.2 11/15/2023    CO2 25.0 11/15/2023    CALCIUM 9.7 11/15/2023    ALBUMIN 4.1 11/15/2023    LABIL2 1.2 05/29/2019    AST 13 11/15/2023    ALT 8 11/15/2023     Lab Results   Component Value Date    PTT 28.2 11/05/2021    INR 1.03 11/05/2021     Assessment & Plan     Assessment:  Iron deficiency anemia: Resolved.  At this point no intervention.  Gastrointestinal bleeding.  Appears to be resolved.  Independently reviewed and discussed with her the images of the recent scans.  She is to see Dr. Delaney in radiation oncology to review the scan in the next few days.  I reviewed all the laboratory exams.  Reviewed notes from primary care.  She will see me in approximately 6 months.    Plan:  As above.    Frank Cheng MD on 6/13/2024 at 1458.

## 2024-06-13 ENCOUNTER — OFFICE VISIT (OUTPATIENT)
Dept: ONCOLOGY | Facility: CLINIC | Age: 84
End: 2024-06-13
Payer: MEDICARE

## 2024-06-13 ENCOUNTER — OFFICE VISIT (OUTPATIENT)
Dept: RADIATION ONCOLOGY | Facility: HOSPITAL | Age: 84
End: 2024-06-13
Payer: MEDICARE

## 2024-06-13 ENCOUNTER — LAB (OUTPATIENT)
Dept: LAB | Facility: HOSPITAL | Age: 84
End: 2024-06-13
Payer: MEDICARE

## 2024-06-13 VITALS
WEIGHT: 134 LBS | BODY MASS INDEX: 25.32 KG/M2 | HEART RATE: 69 BPM | SYSTOLIC BLOOD PRESSURE: 159 MMHG | RESPIRATION RATE: 20 BRPM | OXYGEN SATURATION: 98 % | DIASTOLIC BLOOD PRESSURE: 70 MMHG

## 2024-06-13 VITALS
SYSTOLIC BLOOD PRESSURE: 153 MMHG | DIASTOLIC BLOOD PRESSURE: 75 MMHG | HEIGHT: 61 IN | HEART RATE: 65 BPM | OXYGEN SATURATION: 97 % | BODY MASS INDEX: 25.3 KG/M2 | WEIGHT: 134 LBS

## 2024-06-13 DIAGNOSIS — C34.11 MALIGNANT NEOPLASM OF UPPER LOBE OF RIGHT LUNG: Primary | ICD-10-CM

## 2024-06-13 DIAGNOSIS — C34.91 BRONCHOGENIC CANCER OF RIGHT LUNG: Primary | ICD-10-CM

## 2024-06-13 PROBLEM — R10.13 EPIGASTRIC PAIN: Status: ACTIVE | Noted: 2024-06-13

## 2024-06-13 PROBLEM — R01.1 UNDIAGNOSED CARDIAC MURMURS: Status: ACTIVE | Noted: 2024-06-13

## 2024-06-13 PROBLEM — J45.998 OTHER ASTHMA: Status: ACTIVE | Noted: 2024-06-13

## 2024-06-13 PROBLEM — M81.0 OSTEOPOROSIS: Status: ACTIVE | Noted: 2024-06-13

## 2024-06-13 LAB
BASOPHILS # BLD AUTO: 0.01 10*3/MM3 (ref 0–0.2)
BASOPHILS NFR BLD AUTO: 0.1 % (ref 0–1.5)
DEPRECATED RDW RBC AUTO: 46.5 FL (ref 37–54)
EOSINOPHIL # BLD AUTO: 0.09 10*3/MM3 (ref 0–0.4)
EOSINOPHIL NFR BLD AUTO: 1.2 % (ref 0.3–6.2)
ERYTHROCYTE [DISTWIDTH] IN BLOOD BY AUTOMATED COUNT: 14.6 % (ref 12.3–15.4)
HCT VFR BLD AUTO: 39.2 % (ref 34–46.6)
HGB BLD-MCNC: 11.9 G/DL (ref 12–15.9)
HOLD SPECIMEN: NORMAL
HOLD SPECIMEN: NORMAL
LYMPHOCYTES # BLD AUTO: 1.59 10*3/MM3 (ref 0.7–3.1)
LYMPHOCYTES NFR BLD AUTO: 21.6 % (ref 19.6–45.3)
MCH RBC QN AUTO: 26.9 PG (ref 26.6–33)
MCHC RBC AUTO-ENTMCNC: 30.4 G/DL (ref 31.5–35.7)
MCV RBC AUTO: 88.5 FL (ref 79–97)
MONOCYTES # BLD AUTO: 0.58 10*3/MM3 (ref 0.1–0.9)
MONOCYTES NFR BLD AUTO: 7.9 % (ref 5–12)
NEUTROPHILS NFR BLD AUTO: 5.1 10*3/MM3 (ref 1.7–7)
NEUTROPHILS NFR BLD AUTO: 69.2 % (ref 42.7–76)
PLATELET # BLD AUTO: 228 10*3/MM3 (ref 140–450)
PMV BLD AUTO: 10 FL (ref 6–12)
RBC # BLD AUTO: 4.43 10*6/MM3 (ref 3.77–5.28)
WBC NRBC COR # BLD AUTO: 7.37 10*3/MM3 (ref 3.4–10.8)

## 2024-06-13 PROCEDURE — G0463 HOSPITAL OUTPT CLINIC VISIT: HCPCS | Performed by: INTERNAL MEDICINE

## 2024-06-13 PROCEDURE — 36415 COLL VENOUS BLD VENIPUNCTURE: CPT

## 2024-06-13 PROCEDURE — 85025 COMPLETE CBC W/AUTO DIFF WBC: CPT

## 2024-06-13 NOTE — PROGRESS NOTES
Called patient and LVM with results. Provided return call back number for any questions or concerns.

## 2024-07-08 ENCOUNTER — OFFICE VISIT (OUTPATIENT)
Dept: CARDIOLOGY | Facility: CLINIC | Age: 84
End: 2024-07-08
Payer: MEDICARE

## 2024-07-08 VITALS
WEIGHT: 136 LBS | DIASTOLIC BLOOD PRESSURE: 77 MMHG | HEART RATE: 70 BPM | BODY MASS INDEX: 25.68 KG/M2 | HEIGHT: 61 IN | SYSTOLIC BLOOD PRESSURE: 143 MMHG

## 2024-07-08 DIAGNOSIS — I10 PRIMARY HYPERTENSION: ICD-10-CM

## 2024-07-08 DIAGNOSIS — D50.0 IRON DEFICIENCY ANEMIA DUE TO CHRONIC BLOOD LOSS: ICD-10-CM

## 2024-07-08 DIAGNOSIS — I48.0 PAROXYSMAL ATRIAL FIBRILLATION: Primary | ICD-10-CM

## 2024-07-08 DIAGNOSIS — Z95.818 PRESENCE OF WATCHMAN LEFT ATRIAL APPENDAGE CLOSURE DEVICE: ICD-10-CM

## 2024-07-08 PROCEDURE — 93000 ELECTROCARDIOGRAM COMPLETE: CPT | Performed by: INTERNAL MEDICINE

## 2024-07-08 PROCEDURE — 3078F DIAST BP <80 MM HG: CPT | Performed by: INTERNAL MEDICINE

## 2024-07-08 PROCEDURE — 3077F SYST BP >= 140 MM HG: CPT | Performed by: INTERNAL MEDICINE

## 2024-07-08 PROCEDURE — 1160F RVW MEDS BY RX/DR IN RCRD: CPT | Performed by: INTERNAL MEDICINE

## 2024-07-08 PROCEDURE — 99214 OFFICE O/P EST MOD 30 MIN: CPT | Performed by: INTERNAL MEDICINE

## 2024-07-08 PROCEDURE — 1159F MED LIST DOCD IN RCRD: CPT | Performed by: INTERNAL MEDICINE

## 2024-07-08 NOTE — PROGRESS NOTES
CC--- anemia and paroxysmal atrial fibrillation    Sub  83-year-old bebeto patient with history of atrial fibrillation and watchman comes in for follow-up    Previous note attached below for reference   83-year-old bebeto patient has watchman implantation in July 2022.  Surveillance MELISA was done in 2023 with excellent seal of the device without any clot.  Patient has history of hypertension and diastolic heart failure and a stress test in the past did not show ischemia.  She has hyperlipidemia.  Post watchman implantation complicated by pseudoaneurysm needing open repair.  Patient had prior recurrent epistaxis and aspirin has been stopped.    Past Medical History:   Diagnosis Date   • A-fib (HCC)    • Anemia    • Arthritis    • Asthma    • CHF (congestive heart failure) (HCC)    • Hypertension      Past Surgical History:   Procedure Laterality Date   • CATARACT EXTRACTION     • CHOLECYSTECTOMY     • HYSTERECTOMY  1987    Endometriosis   • OOPHORECTOMY     • TONSILLECTOMY           Physical Exam    General:      well developed, well nourished, in no acute distress.    Head:      normocephalic and atraumatic.    Eyes:      PERRL/EOM intact, conjunctivae and sclerae clear without nystagmus.    Neck:      no  thyromegaly, trachea central with normal respiratory effort  Lungs:      clear bilaterally to auscultation.    Heart:       regular rate and rhythm, S1, S2 without murmurs, rubs, or gallops  Skin:      intact without lesions or rashes.    Psych:      alert and cooperative; normal mood and affect; normal attention span and concentration.              Assessment and plan  Recent hemoglobin 11.9 and platelets are normal  Paroxysmal atrial fibrillation post watchman implantation   History of hypertension metoprolol spironolactone  History of iron deficiency anemia on ferrous sulfate  Right upper lobe nodule status postradiation followed by oncology  Post watchman implantation complicated with pseudoaneurysm and AV  fistula needing repair with resolution  Remote history of vasovagal syncope without recurrence  Medications reviewed and follow-up appointments made        ECG 12 Lead    Date/Time: 7/8/2024 11:25 AM  Performed by: Sage Garcia MD    Authorized by: Sage Garcia MD  Comparison: compared with previous ECG   Similar to previous ECG  Rhythm: sinus rhythm  Ectopy: infrequent PVCs  Rate: normal  Conduction: conduction normal      Electronically signed by Sage Garcia MD, 07/08/24, 11:25 AM EDT.

## 2024-07-08 NOTE — LETTER
July 8, 2024       No Recipients    Patient: Chrystal Ruiz   YOB: 1940   Date of Visit: 7/8/2024     Dear Etelvina Ulloa MD:       Thank you for referring Chrystal Ruiz to me for evaluation. Below are the relevant portions of my assessment and plan of care.    If you have questions, please do not hesitate to call me. I look forward to following Chrystal along with you.         Sincerely,        Sage Garcia MD        CC:   No Recipients    Sage Garcia MD  07/08/24 1125  Sign when Signing Visit  CC--- anemia and paroxysmal atrial fibrillation    Sub  83-year-old bebeto patient with history of atrial fibrillation and watchman comes in for follow-up    Previous note attached below for reference   83-year-old bebeto patient has watchman implantation in July 2022.  Surveillance MELISA was done in 2023 with excellent seal of the device without any clot.  Patient has history of hypertension and diastolic heart failure and a stress test in the past did not show ischemia.  She has hyperlipidemia.  Post watchman implantation complicated by pseudoaneurysm needing open repair.  Patient had prior recurrent epistaxis and aspirin has been stopped.    Past Medical History:   Diagnosis Date   • A-fib (HCC)    • Anemia    • Arthritis    • Asthma    • CHF (congestive heart failure) (HCC)    • Hypertension      Past Surgical History:   Procedure Laterality Date   • CATARACT EXTRACTION     • CHOLECYSTECTOMY     • HYSTERECTOMY  1987    Endometriosis   • OOPHORECTOMY     • TONSILLECTOMY           Physical Exam    General:      well developed, well nourished, in no acute distress.    Head:      normocephalic and atraumatic.    Eyes:      PERRL/EOM intact, conjunctivae and sclerae clear without nystagmus.    Neck:      no  thyromegaly, trachea central with normal respiratory effort  Lungs:      clear bilaterally to auscultation.    Heart:       regular rate and rhythm, S1, S2 without murmurs, rubs, or  gallops  Skin:      intact without lesions or rashes.    Psych:      alert and cooperative; normal mood and affect; normal attention span and concentration.              Assessment and plan  Recent hemoglobin 11.9 and platelets are normal  Paroxysmal atrial fibrillation post watchman implantation   History of hypertension metoprolol spironolactone  History of iron deficiency anemia on ferrous sulfate  Right upper lobe nodule status postradiation followed by oncology  Post watchman implantation complicated with pseudoaneurysm and AV fistula needing repair with resolution  Remote history of vasovagal syncope without recurrence  Medications reviewed and follow-up appointments made        ECG 12 Lead    Date/Time: 7/8/2024 11:25 AM  Performed by: Sage Garcia MD    Authorized by: Sage Garcia MD  Comparison: compared with previous ECG   Similar to previous ECG  Rhythm: sinus rhythm  Ectopy: infrequent PVCs  Rate: normal  Conduction: conduction normal      Electronically signed by Sage Garcia MD, 07/08/24, 11:25 AM EDT.

## 2024-07-12 ENCOUNTER — OFFICE VISIT (OUTPATIENT)
Dept: ORTHOPEDIC SURGERY | Facility: CLINIC | Age: 84
End: 2024-07-12
Payer: MEDICARE

## 2024-07-12 VITALS — RESPIRATION RATE: 20 BRPM | BODY MASS INDEX: 25.68 KG/M2 | OXYGEN SATURATION: 96 % | HEIGHT: 61 IN | WEIGHT: 136 LBS

## 2024-07-12 DIAGNOSIS — M17.0 PRIMARY OSTEOARTHRITIS OF BOTH KNEES: Primary | ICD-10-CM

## 2024-07-12 RX ORDER — TRIAMCINOLONE ACETONIDE 40 MG/ML
80 INJECTION, SUSPENSION INTRA-ARTICULAR; INTRAMUSCULAR
Status: COMPLETED | OUTPATIENT
Start: 2024-07-12 | End: 2024-07-12

## 2024-07-12 RX ORDER — LIDOCAINE HYDROCHLORIDE 10 MG/ML
2 INJECTION, SOLUTION INFILTRATION; PERINEURAL
Status: COMPLETED | OUTPATIENT
Start: 2024-07-12 | End: 2024-07-12

## 2024-07-12 RX ADMIN — LIDOCAINE HYDROCHLORIDE 2 ML: 10 INJECTION, SOLUTION INFILTRATION; PERINEURAL at 14:31

## 2024-07-12 RX ADMIN — LIDOCAINE HYDROCHLORIDE 2 ML: 10 INJECTION, SOLUTION INFILTRATION; PERINEURAL at 14:32

## 2024-07-12 RX ADMIN — TRIAMCINOLONE ACETONIDE 80 MG: 40 INJECTION, SUSPENSION INTRA-ARTICULAR; INTRAMUSCULAR at 14:32

## 2024-07-12 RX ADMIN — TRIAMCINOLONE ACETONIDE 80 MG: 40 INJECTION, SUSPENSION INTRA-ARTICULAR; INTRAMUSCULAR at 14:31

## 2024-07-12 NOTE — PROGRESS NOTES
INJECTION VISIT    Patient: Chrystal Ruiz    YOB: 1940    MRN: [id number]    Chief Complaint   Patient presents with    Left Knee - Follow-up     Ongoing pain   Would like injections     Right Knee - Follow-up       History of Present Illness:   Chrystal Ruiz is a 83 y.o. year old who presents today for injection of bilateral knees. Last injection was 3 months ago and provided good relief of symptoms. No adverse side effects.         Allergies:   Allergies   Allergen Reactions    Ciprofloxacin Unknown (See Comments)    Sulfa Antibiotics Unknown (See Comments)    Iodine Unknown - High Severity     Patient doesn't remember what reaction she gets when she takes this     Sudafed [Pseudoephedrine Hcl] Other (See Comments)     Patient states it has been awhile since taking, so she doesn't remember side effects.       Medications:   Home Medications:  Current Outpatient Medications on File Prior to Visit   Medication Sig    acetaminophen (TYLENOL) 500 MG tablet Take 1 tablet by mouth Every 6 (Six) Hours As Needed for Mild Pain.    baclofen (LIORESAL) 10 MG tablet TAKE 1 TABLET BY MOUTH AT NIGHT AS NEEDED FOR MUSCLE SPASMS    Breo Ellipta 200-25 MCG/INH inhaler Inhale 1 puff Daily.    Cholecalciferol (VITAMIN D3) 2000 units capsule Take 1 capsule by mouth Daily.    Cyanocobalamin (VITAMIN B12) 1000 MCG tablet controlled-release Take 2,500 mcg by mouth Daily.    esomeprazole (nexIUM) 40 MG capsule Take 1 capsule by mouth Every Morning Before Breakfast.    FeroSul 325 (65 Fe) MG tablet TAKE 1 TABLET BY MOUTH THREE DAYS A WEEK( MONDAY, WEDNESDAY, FRIDAY)    furosemide (LASIX) 40 MG tablet Take 1 tablet by mouth 3 (Three) Times a Week. Tuesday, Thursday and Saturday.    loratadine (CLARITIN) 10 MG tablet Take 1 tablet by mouth Daily.    metoclopramide (REGLAN) 5 MG tablet Take 1 tablet by mouth Daily.    metoprolol succinate XL (TOPROL-XL) 25 MG 24 hr tablet Take 1 tablet by mouth Daily.    spironolactone  (ALDACTONE) 25 MG tablet TAKE 1 TABLET BY MOUTH EVERY OTHER DAY    triamcinolone (KENALOG) 0.1 % cream Apply 1 application  topically to the appropriate area as directed 2 (Two) Times a Day As Needed for Irritation.     No current facility-administered medications on file prior to visit.         I have reviewed the patient's medical history in detail and updated the computerized patient record.  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 procedure; surgically fixed the next day    GERD (gastroesophageal reflux disease)     Heart murmur     Hip pain, bilateral     Hypertension     Medicare annual wellness visit, subsequent 02/12/2020    Shoulder pain, right      Past Surgical History:   Procedure Laterality Date    ATRIAL APPENDAGE EXCLUSION LEFT WITH TRANSESOPHAGEAL ECHOCARDIOGRAM Right 7/7/2022    Procedure: Atrial Appendage Occlusion watchman team aware $;  Surgeon: Sage Garcia MD;  Location: [facility] CATH INVASIVE LOCATION;  Service: Cardiovascular;  Laterality: Right;    ATRIAL APPENDAGE EXCLUSION LEFT WITH TRANSESOPHAGEAL ECHOCARDIOGRAM N/A 7/7/2022    Procedure: Atrial Appendage Occlusion;  Surgeon: Fide Mullen MD;  Location: [facility] CATH INVASIVE LOCATION;  Service: Cardiovascular;  Laterality: N/A;    CATARACT EXTRACTION      CHOLECYSTECTOMY      HYSTERECTOMY  1987    Endometriosis    OOPHORECTOMY      PSEUDO ANEURYSM REPAIR, EXTREMITY Right 7/8/2022    Procedure: REPAIR OF POST CATHETERIZATION RIGHT FEMORAL PSEUDOANEURYSM AND ARTERIOVENOUS FISTUAL;  Surgeon: Alfonzo Hooks MD;  Location: [facility] MAIN OR;  Service: Vascular;  Laterality: Right;    TONSILLECTOMY       Social History     Occupational History    Not on  file   Tobacco Use    Smoking status: Never    Smokeless tobacco: Never   Vaping Use    Vaping status: Never Used   Substance and Sexual Activity    Alcohol use: No    Drug use: Never    Sexual activity: Not Currently      Social History     Social History Narrative    Not on file     Family History   Problem Relation Age of Onset    Breast cancer Mother 85    Stroke Mother     Heart disease Mother     Endometrial cancer Mother 70    Heart failure Father     Heart failure Sister     COPD Sister     Heart disease Sister                ROS  Negative unless listed in the HPI        Physical Exam  83 y.o. female  Body mass index is 25.7 kg/m²., 61.7 kg (136 lb)  Vitals:    07/12/24 1401   Resp: 20   SpO2: 96%     General: Alert, cooperative, appears well and in no observable distress.   HEENT: Normocephalic, atraumatic on external visual inspection. No icterus.   CV: No significant peripheral edema.   Respiratory: Normal respiratory effort.   Skin: Warm & well perfused; appropriate skin turgor.  Psych: Appropriate mood & affect.  Neuro: Gross sensation and motor intact in affected extremity/extremities.  Vascular: Peripheral pulses palpable in affected extremity/extremities.       Procedure:  Large Joint Arthrocentesis: R knee  Date/Time: 7/12/2024 2:31 PM  Consent given by: patient  Site marked: site marked  Timeout: Immediately prior to procedure a time out was called to verify the correct patient, procedure, equipment, support staff and site/side marked as required   Supporting Documentation  Indications: pain and diagnostic evaluation   Procedure Details  Location: knee - R knee  Needle size: 25 G  Approach: anterolateral  Medications administered: 2 mL lidocaine 1 %; 80 mg triamcinolone acetonide 40 MG/ML  Patient tolerance: patient tolerated the procedure well with no immediate complications      Large Joint Arthrocentesis: L knee  Date/Time: 7/12/2024 2:32 PM  Consent given by: patient  Site marked: site  marked  Timeout: Immediately prior to procedure a time out was called to verify the correct patient, procedure, equipment, support staff and site/side marked as required   Supporting Documentation  Indications: pain and diagnostic evaluation   Procedure Details  Location: knee - L knee  Needle size: 25 G  Approach: anterolateral  Medications administered: 2 mL lidocaine 1 %; 80 mg triamcinolone acetonide 40 MG/ML  Patient tolerance: patient tolerated the procedure well with no immediate complications            Assessment:   Diagnoses and all orders for this visit:    1. Primary osteoarthritis of both knees (Primary)    Other orders  -     Large Joint Arthrocentesis  -     Large Joint Arthrocentesis              Plan:   -     Risks and benefits of injection therapy discussed with patient.    Injected patient's bilateral knee joint(s)with Kenalog from an anterolateral approach   Compression/brace   Rest, ice, compression, and elevation (RICE) therapy  OTC Tylenol for pain PRN  BMI reviewed  Follow up in 3 months with repeat injections   Please call with questions or concerns in the interim        Date of encounter: 07/12/2024   ARIANA Mcfadden

## 2024-07-15 RX ORDER — FUROSEMIDE 40 MG/1
40 TABLET ORAL DAILY PRN
Qty: 90 TABLET | Refills: 1 | Status: SHIPPED | OUTPATIENT
Start: 2024-07-15

## 2024-08-09 RX ORDER — BACLOFEN 10 MG/1
10 TABLET ORAL NIGHTLY PRN
Qty: 30 TABLET | Refills: 1 | Status: SHIPPED | OUTPATIENT
Start: 2024-08-09

## 2024-08-12 RX ORDER — SPIRONOLACTONE 25 MG/1
TABLET ORAL
Qty: 45 TABLET | Refills: 1 | Status: SHIPPED | OUTPATIENT
Start: 2024-08-12

## 2024-09-09 RX ORDER — METOPROLOL SUCCINATE 25 MG/1
25 TABLET, EXTENDED RELEASE ORAL DAILY
Qty: 90 TABLET | Refills: 3 | Status: SHIPPED | OUTPATIENT
Start: 2024-09-09

## 2024-10-08 ENCOUNTER — TELEPHONE (OUTPATIENT)
Dept: FAMILY MEDICINE CLINIC | Facility: CLINIC | Age: 84
End: 2024-10-08
Payer: MEDICARE

## 2024-10-08 DIAGNOSIS — Z12.31 ENCOUNTER FOR SCREENING MAMMOGRAM FOR BREAST CANCER: Primary | ICD-10-CM

## 2024-10-08 RX ORDER — FERROUS SULFATE 325(65) MG
TABLET ORAL
Qty: 45 TABLET | Refills: 1 | Status: SHIPPED | OUTPATIENT
Start: 2024-10-08

## 2024-10-08 NOTE — TELEPHONE ENCOUNTER
SHE WOULD LIKE TO HAVE HER ORDER FOR HER MAMMO DONE. SHE GETS IT DONE AT [facility] AND WOULD LIKE A CALL BACK WHEN IT IS IN SO SHE CAN CALL AND SCHEDULE IT.    THANK YOU

## 2024-10-08 NOTE — TELEPHONE ENCOUNTER
Left a detailed vm with this information and stated she can call to schedule an appointment at [phone]

## 2024-10-30 ENCOUNTER — OFFICE VISIT (OUTPATIENT)
Dept: FAMILY MEDICINE CLINIC | Facility: CLINIC | Age: 84
End: 2024-10-30
Payer: MEDICARE

## 2024-10-30 ENCOUNTER — FLU SHOT (OUTPATIENT)
Dept: FAMILY MEDICINE CLINIC | Facility: CLINIC | Age: 84
End: 2024-10-30
Payer: MEDICARE

## 2024-10-30 VITALS
HEART RATE: 72 BPM | SYSTOLIC BLOOD PRESSURE: 145 MMHG | TEMPERATURE: 98.2 F | OXYGEN SATURATION: 96 % | BODY MASS INDEX: 25.53 KG/M2 | HEIGHT: 61 IN | WEIGHT: 135.2 LBS | DIASTOLIC BLOOD PRESSURE: 75 MMHG

## 2024-10-30 DIAGNOSIS — L03.116 CELLULITIS OF LEFT LEG: Primary | ICD-10-CM

## 2024-10-30 PROCEDURE — 1159F MED LIST DOCD IN RCRD: CPT | Performed by: FAMILY MEDICINE

## 2024-10-30 PROCEDURE — 1160F RVW MEDS BY RX/DR IN RCRD: CPT | Performed by: FAMILY MEDICINE

## 2024-10-30 PROCEDURE — 3077F SYST BP >= 140 MM HG: CPT | Performed by: FAMILY MEDICINE

## 2024-10-30 PROCEDURE — 3078F DIAST BP <80 MM HG: CPT | Performed by: FAMILY MEDICINE

## 2024-10-30 PROCEDURE — 99213 OFFICE O/P EST LOW 20 MIN: CPT | Performed by: FAMILY MEDICINE

## 2024-10-30 PROCEDURE — G2211 COMPLEX E/M VISIT ADD ON: HCPCS | Performed by: FAMILY MEDICINE

## 2024-10-30 PROCEDURE — 1126F AMNT PAIN NOTED NONE PRSNT: CPT | Performed by: FAMILY MEDICINE

## 2024-10-30 RX ORDER — CEPHALEXIN 500 MG/1
500 CAPSULE ORAL 3 TIMES DAILY
Qty: 21 CAPSULE | Refills: 0 | Status: SHIPPED | OUTPATIENT
Start: 2024-10-30

## 2024-10-30 RX ORDER — TRIAMCINOLONE ACETONIDE 1 MG/G
1 CREAM TOPICAL 2 TIMES DAILY PRN
Qty: 80 G | Refills: 1 | Status: SHIPPED | OUTPATIENT
Start: 2024-10-30

## 2024-10-30 NOTE — PROGRESS NOTES
"Chief Complaint  Cellulitis (Of both legs, painful and red - just noticed it yesterday )    Subjective        Chrystal Ruiz presents to [facility]  Cellulitis  This is a new problem. The current episode started in the past 7 days. The problem occurs constantly. The problem has been gradually worsening. Associated symptoms include a rash. Pertinent negatives include no abdominal pain, chills, congestion, coughing, fatigue, fever, nausea or swollen glands. Nothing aggravates the symptoms.       Objective   Vital Signs:  /75 (BP Location: Left arm, Patient Position: Sitting, Cuff Size: Adult)   Pulse 72   Temp 98.2 °F (36.8 °C) (Infrared)   Ht 154.9 cm (61\")   Wt 61.3 kg (135 lb 3.2 oz)   SpO2 96%   BMI 25.55 kg/m²   Estimated body mass index is 25.55 kg/m² as calculated from the following:    Height as of this encounter: 154.9 cm (61\").    Weight as of this encounter: 61.3 kg (135 lb 3.2 oz).            Physical Exam  Vitals and nursing note reviewed.   Constitutional:       General: She is not in acute distress.     Appearance: She is well-developed.   HENT:      Head: Normocephalic.   Eyes:      General: Lids are normal.      Conjunctiva/sclera: Conjunctivae normal.   Neck:      Thyroid: No thyroid mass or thyromegaly.      Trachea: Trachea normal.   Cardiovascular:      Rate and Rhythm: Normal rate and regular rhythm.      Heart sounds: Normal heart sounds.   Pulmonary:      Effort: Pulmonary effort is normal.      Breath sounds: Normal breath sounds.   Musculoskeletal:      Cervical back: Normal range of motion.      Right lower leg: Edema present.      Left lower leg: Edema present.   Lymphadenopathy:      Cervical: No cervical adenopathy.   Skin:     General: Skin is warm and dry.      Findings: Erythema and rash present.   Neurological:      Mental Status: She is alert and oriented to person, place, and time.   Psychiatric:         Attention and Perception: She is " attentive.         Mood and Affect: Mood normal.         Speech: Speech normal.         Behavior: Behavior normal.        Result Review :  The following data was reviewed by: Etelvina Ulloa MD on 10/30/2024:  Common labs          11/15/2023    20:05 12/11/2023    14:29 6/13/2024    14:20   Common Labs   Glucose 124      BUN 31      Creatinine 1.13      Sodium 144      Potassium 4.2      Chloride 106      Calcium 9.7      Albumin 4.1      Total Bilirubin 0.7      Alkaline Phosphatase 72      AST (SGOT) 13      ALT (SGPT) 8      WBC 8.50  7.33  7.37    Hemoglobin 11.9  12.3  11.9    Hematocrit 37.0  40.0  39.2    Platelets 238  248  228                Assessment and Plan   Diagnoses and all orders for this visit:    1. Cellulitis of left leg (Primary)  -     cephalexin (Keflex) 500 MG capsule; Take 1 capsule by mouth 3 (Three) Times a Day.  Dispense: 21 capsule; Refill: 0             Follow Up   Return if symptoms worsen or fail to improve.  Patient was given instructions and counseling regarding her condition or for health maintenance advice. Please see specific information pulled into the AVS if appropriate.

## 2024-11-08 ENCOUNTER — HOSPITAL ENCOUNTER (OUTPATIENT)
Dept: MAMMOGRAPHY | Facility: HOSPITAL | Age: 84
Discharge: HOME | End: 2024-11-08
Admitting: FAMILY MEDICINE
Payer: MEDICARE

## 2024-11-08 PROCEDURE — 77067 SCR MAMMO BI INCL CAD: CPT

## 2024-11-08 PROCEDURE — 77063 BREAST TOMOSYNTHESIS BI: CPT

## 2024-11-11 ENCOUNTER — LAB (OUTPATIENT)
Dept: FAMILY MEDICINE CLINIC | Facility: CLINIC | Age: 84
End: 2024-11-11
Payer: MEDICARE

## 2024-11-11 ENCOUNTER — OFFICE VISIT (OUTPATIENT)
Dept: FAMILY MEDICINE CLINIC | Facility: CLINIC | Age: 84
End: 2024-11-11
Payer: MEDICARE

## 2024-11-11 VITALS
WEIGHT: 132.2 LBS | DIASTOLIC BLOOD PRESSURE: 78 MMHG | HEIGHT: 61 IN | BODY MASS INDEX: 24.96 KG/M2 | OXYGEN SATURATION: 95 % | HEART RATE: 60 BPM | TEMPERATURE: 98.6 F | SYSTOLIC BLOOD PRESSURE: 138 MMHG

## 2024-11-11 DIAGNOSIS — Z00.00 MEDICARE ANNUAL WELLNESS VISIT, SUBSEQUENT: Primary | ICD-10-CM

## 2024-11-11 DIAGNOSIS — K21.9 GASTRO-ESOPHAGEAL REFLUX DISEASE WITHOUT ESOPHAGITIS: ICD-10-CM

## 2024-11-11 DIAGNOSIS — E78.2 MIXED HYPERLIPIDEMIA: ICD-10-CM

## 2024-11-11 DIAGNOSIS — R25.2 LEG CRAMP: ICD-10-CM

## 2024-11-11 LAB
ALBUMIN SERPL-MCNC: 4 G/DL (ref 3.5–5.2)
ALBUMIN/GLOB SERPL: 1.4 G/DL
ALP SERPL-CCNC: 84 U/L (ref 39–117)
ALT SERPL W P-5'-P-CCNC: 30 U/L (ref 1–33)
ANION GAP SERPL CALCULATED.3IONS-SCNC: 8 MMOL/L (ref 5–15)
AST SERPL-CCNC: 25 U/L (ref 1–32)
BASOPHILS # BLD AUTO: 0.02 10*3/MM3 (ref 0–0.2)
BASOPHILS NFR BLD AUTO: 0.3 % (ref 0–1.5)
BILIRUB SERPL-MCNC: 0.8 MG/DL (ref 0–1.2)
BUN SERPL-MCNC: 23 MG/DL (ref 8–23)
BUN/CREAT SERPL: 23.2 (ref 7–25)
CALCIUM SPEC-SCNC: 9.6 MG/DL (ref 8.6–10.5)
CHLORIDE SERPL-SCNC: 108 MMOL/L (ref 98–107)
CHOLEST SERPL-MCNC: 227 MG/DL (ref 0–200)
CO2 SERPL-SCNC: 26 MMOL/L (ref 22–29)
CREAT SERPL-MCNC: 0.99 MG/DL (ref 0.57–1)
DEPRECATED RDW RBC AUTO: 34.7 FL (ref 37–54)
EGFRCR SERPLBLD CKD-EPI 2021: 56 ML/MIN/1.73
EOSINOPHIL # BLD AUTO: 0.09 10*3/MM3 (ref 0–0.4)
EOSINOPHIL NFR BLD AUTO: 1.3 % (ref 0.3–6.2)
ERYTHROCYTE [DISTWIDTH] IN BLOOD BY AUTOMATED COUNT: 11.5 % (ref 12.3–15.4)
GLOBULIN UR ELPH-MCNC: 2.8 GM/DL
GLUCOSE SERPL-MCNC: 85 MG/DL (ref 65–99)
HCT VFR BLD AUTO: 37.9 % (ref 34–46.6)
HDLC SERPL-MCNC: 69 MG/DL (ref 40–60)
HGB BLD-MCNC: 11.6 G/DL (ref 12–15.9)
IMM GRANULOCYTES # BLD AUTO: 0.04 10*3/MM3 (ref 0–0.05)
IMM GRANULOCYTES NFR BLD AUTO: 0.6 % (ref 0–0.5)
LDLC SERPL CALC-MCNC: 145 MG/DL (ref 0–100)
LDLC/HDLC SERPL: 2.08 {RATIO}
LYMPHOCYTES # BLD AUTO: 1.64 10*3/MM3 (ref 0.7–3.1)
LYMPHOCYTES NFR BLD AUTO: 24.4 % (ref 19.6–45.3)
MAGNESIUM SERPL-MCNC: 2 MG/DL (ref 1.6–2.4)
MCH RBC QN AUTO: 25.8 PG (ref 26.6–33)
MCHC RBC AUTO-ENTMCNC: 30.6 G/DL (ref 31.5–35.7)
MCV RBC AUTO: 84.2 FL (ref 79–97)
MONOCYTES # BLD AUTO: 0.58 10*3/MM3 (ref 0.1–0.9)
MONOCYTES NFR BLD AUTO: 8.6 % (ref 5–12)
NEUTROPHILS NFR BLD AUTO: 4.34 10*3/MM3 (ref 1.7–7)
NEUTROPHILS NFR BLD AUTO: 64.8 % (ref 42.7–76)
NRBC BLD AUTO-RTO: 0 /100 WBC (ref 0–0.2)
PLATELET # BLD AUTO: 252 10*3/MM3 (ref 140–450)
PMV BLD AUTO: 10.3 FL (ref 6–12)
POTASSIUM SERPL-SCNC: 4 MMOL/L (ref 3.5–5.2)
PROT SERPL-MCNC: 6.8 G/DL (ref 6–8.5)
RBC # BLD AUTO: 4.5 10*6/MM3 (ref 3.77–5.28)
SODIUM SERPL-SCNC: 142 MMOL/L (ref 136–145)
TRIGL SERPL-MCNC: 74 MG/DL (ref 0–150)
VLDLC SERPL-MCNC: 13 MG/DL (ref 5–40)
WBC NRBC COR # BLD AUTO: 6.71 10*3/MM3 (ref 3.4–10.8)

## 2024-11-11 PROCEDURE — 1126F AMNT PAIN NOTED NONE PRSNT: CPT | Performed by: FAMILY MEDICINE

## 2024-11-11 PROCEDURE — 80061 LIPID PANEL: CPT | Performed by: FAMILY MEDICINE

## 2024-11-11 PROCEDURE — 1170F FXNL STATUS ASSESSED: CPT | Performed by: FAMILY MEDICINE

## 2024-11-11 PROCEDURE — 36415 COLL VENOUS BLD VENIPUNCTURE: CPT | Performed by: FAMILY MEDICINE

## 2024-11-11 PROCEDURE — 85025 COMPLETE CBC W/AUTO DIFF WBC: CPT | Performed by: FAMILY MEDICINE

## 2024-11-11 PROCEDURE — 3078F DIAST BP <80 MM HG: CPT | Performed by: FAMILY MEDICINE

## 2024-11-11 PROCEDURE — 1159F MED LIST DOCD IN RCRD: CPT | Performed by: FAMILY MEDICINE

## 2024-11-11 PROCEDURE — G0439 PPPS, SUBSEQ VISIT: HCPCS | Performed by: FAMILY MEDICINE

## 2024-11-11 PROCEDURE — 1160F RVW MEDS BY RX/DR IN RCRD: CPT | Performed by: FAMILY MEDICINE

## 2024-11-11 PROCEDURE — 80053 COMPREHEN METABOLIC PANEL: CPT | Performed by: FAMILY MEDICINE

## 2024-11-11 PROCEDURE — 83735 ASSAY OF MAGNESIUM: CPT | Performed by: FAMILY MEDICINE

## 2024-11-11 PROCEDURE — 3075F SYST BP GE 130 - 139MM HG: CPT | Performed by: FAMILY MEDICINE

## 2024-11-11 NOTE — PROGRESS NOTES
Subjective   The ABCs of the Annual Wellness Visit  Medicare Wellness Visit      Chrystal Ruiz is a 84 y.o. patient who presents for a Medicare Wellness Visit.    The following portions of the patient's history were reviewed and   updated as appropriate: allergies, current medications, past family history, past medical history, past social history, past surgical history, and problem list.    Compared to one year ago, the patient's physical   health is the same.  Compared to one year ago, the patient's mental   health is the same.    Recent Hospitalizations:  She was not admitted to the hospital during the last year.     Current Medical Providers:  Patient Care Team:  Etelvina Ulloa MD as PCP - General (Family Medicine)  Mukund Farias MD as Consulting Physician (Pulmonary Disease)  Alan Wynn DPM as Consulting Physician (Podiatry)  Etelvina Mcconnell MD as Surgeon (Thoracic Surgery)  Frank Cheng MD as Consulting Physician (Hematology and Oncology)  Jeremy Mccall MD as Consulting Physician (Cardiology)  Sage Garcia MD as Consulting Physician (Cardiac Electrophysiology)    Outpatient Medications Prior to Visit   Medication Sig Dispense Refill    acetaminophen (TYLENOL) 500 MG tablet Take 1 tablet by mouth Every 6 (Six) Hours As Needed for Mild Pain.      baclofen (LIORESAL) 10 MG tablet TAKE 1 TABLET BY MOUTH AT NIGHT AS NEEDED FOR MUSCLE SPASMS 30 tablet 1    Breo Ellipta 200-25 MCG/INH inhaler Inhale 1 puff Daily. 1 each 2    Cholecalciferol (VITAMIN D3) 2000 units capsule Take 1 capsule by mouth Daily.      Cyanocobalamin (VITAMIN B12) 1000 MCG tablet controlled-release Take 2,500 mcg by mouth Daily.      FeroSul 325 (65 Fe) MG tablet TAKE 1 TABLET BY MOUTH THREE DAYS A WEEK( MONDAY, WEDNESDAY, FRIDAY) 45 tablet 1    furosemide (LASIX) 40 MG tablet TAKE 1 TABLET BY MOUTH DAILY AS NEEDED FOR SWELLING 90 tablet 1    loratadine (CLARITIN) 10 MG tablet Take 1 tablet by mouth Daily.       metoclopramide (REGLAN) 5 MG tablet Take 1 tablet by mouth Daily.      metoprolol succinate XL (TOPROL-XL) 25 MG 24 hr tablet TAKE 1 TABLET BY MOUTH DAILY 90 tablet 3    spironolactone (ALDACTONE) 25 MG tablet TAKE 1 TABLET BY MOUTH EVERY OTHER DAY 45 tablet 1    triamcinolone (KENALOG) 0.1 % cream Apply 1 Application topically to the appropriate area as directed 2 (Two) Times a Day As Needed for Irritation. 80 g 1    esomeprazole (nexIUM) 40 MG capsule Take 1 capsule by mouth Every Morning Before Breakfast.      cephalexin (Keflex) 500 MG capsule Take 1 capsule by mouth 3 (Three) Times a Day. 21 capsule 0     No facility-administered medications prior to visit.     No opioid medication identified on active medication list. I have reviewed chart for other potential  high risk medication/s and harmful drug interactions in the elderly.      Aspirin is not on active medication list.  Aspirin use is not indicated based on review of current medical condition/s. Risk of harm outweighs potential benefits.  .    Patient Active Problem List   Diagnosis    Grief reaction    Alopecia    Arthritis    Back pain    Encounter for general adult medical examination without abnormal findings    Hypertension    Lung nodule    Vitamin D deficiency    Shortness of breath    Postmenopausal status    Nevus, non-neoplastic    Medicare annual wellness visit, subsequent    Fam hx-ischem heart disease    FH: thyroid condition    Cellulitis of right anterior lower leg    Vasculitis of skin    Pedal edema    Bilateral calf pain    Neck pain    Immunization reaction    Mixed hyperlipidemia    Tremor    Bright red rectal bleeding    Acute maxillary sinusitis    Acute pain of right knee    Chronic pain of right knee    Need for vaccination    Lung nodules    Tachycardia    Essential (primary) hypertension     Dermatitis    A-fib    Leukopenia    Anemia    Primary osteoarthritis of right knee    Bronchogenic cancer of right lung    Iron  "deficiency    Malabsorption due to intolerance, not elsewhere classified    Dyspnea    Other iron deficiency anemias    Syncope    Paroxysmal atrial fibrillation    Bilateral impacted cerumen    Acute anterior epistaxis    Callus of toe    Bronchitis    Presence of Watchman left atrial appendage closure device    Primary osteoarthritis of both knees    Acute URI    Cough in adult    Upper respiratory tract infection due to COVID-19 virus    Encounter for screening mammogram for breast cancer    Esophageal stricture    Acute gastritis without bleeding    Diaphragmatic hernia without obstruction or gangrene    Diverticulosis of colon    Dysphagia    Epigastric pain    Esophageal motility disorder    Gastro-esophageal reflux disease without esophagitis    Internal hemorrhoids without complication    Osteoporosis    Schatzki's ring    Undiagnosed cardiac murmurs    Other asthma     Advance Care Planning Advance Directive is not on file.  ACP discussion was held with the patient during this visit. Patient does not have an advance directive, information provided.            Objective   Vitals:    11/11/24 1510 11/11/24 1528   BP: 144/78 138/78   BP Location: Right arm    Patient Position: Sitting    Cuff Size: Adult    Pulse: 60    Temp: 98.6 °F (37 °C)    TempSrc: Infrared    SpO2: 95%    Weight: 60 kg (132 lb 3.2 oz)    Height: 154.9 cm (61\")        Estimated body mass index is 24.98 kg/m² as calculated from the following:    Height as of this encounter: 154.9 cm (61\").    Weight as of this encounter: 60 kg (132 lb 3.2 oz).    BMI is within normal parameters. No other follow-up for BMI required.  Physical Exam  Vitals and nursing note reviewed.   Constitutional:       General: She is not in acute distress.     Appearance: She is well-developed.   HENT:      Head: Normocephalic.   Eyes:      General: Lids are normal.      Conjunctiva/sclera: Conjunctivae normal.   Neck:      Thyroid: No thyroid mass or thyromegaly.      " Trachea: Trachea normal.   Cardiovascular:      Rate and Rhythm: Normal rate and regular rhythm.      Heart sounds: Normal heart sounds.   Pulmonary:      Effort: Pulmonary effort is normal.      Breath sounds: Normal breath sounds.   Abdominal:      Palpations: Abdomen is soft.   Musculoskeletal:      Cervical back: Normal range of motion.      Right lower leg: No edema.      Left lower leg: No edema.   Lymphadenopathy:      Cervical: No cervical adenopathy.   Skin:     General: Skin is warm and dry.   Neurological:      Mental Status: She is alert and oriented to person, place, and time.   Psychiatric:         Attention and Perception: She is attentive.         Mood and Affect: Mood normal.         Speech: Speech normal.         Behavior: Behavior normal.        Does the patient have evidence of cognitive impairment? No                                                                                               Health  Risk Assessment    Smoking Status:  Social History     Tobacco Use   Smoking Status Never   Smokeless Tobacco Never     Alcohol Consumption:  Social History     Substance and Sexual Activity   Alcohol Use No       Fall Risk Screen  STEADI Fall Risk Assessment was completed, and patient is at LOW risk for falls.Assessment completed on:2024    Depression Screenin/13/2024     1:29 PM   PHQ-2/PHQ-9 Depression Screening   Retired Little Interest or Pleasure in Doing Things 0-->not at all   Retired Feeling Down, Depressed or Hopeless 0-->not at all   Retired PHQ-9: Brief Depression Severity Measure Score 0     Health Habits and Functional and Cognitive Screenin/11/2024     3:00 PM   Functional & Cognitive Status   Do you have difficulty preparing food and eating? No   Do you have difficulty bathing yourself, getting dressed or grooming yourself? No   Do you have difficulty using the toilet? No   Do you have difficulty moving around from place to place? No   Do you have trouble  with steps or getting out of a bed or a chair? No   Current Diet Well Balanced Diet   Dental Exam Up to date   Eye Exam Up to date   Exercise (times per week) 0 times per week   Do you need help using the phone?  No   Are you deaf or do you have serious difficulty hearing?  No   Do you need help to go to places out of walking distance? No   Do you need help shopping? No   Do you need help preparing meals?  No   Do you need help with housework?  No   Do you need help with laundry? No   Do you need help taking your medications? No   Do you ever drive or ride in a car without wearing a seat belt? No   Have you felt unusual stress, anger or loneliness in the last month? No   Who do you live with? Alone   If you need help, do you have trouble finding someone available to you? No   Do you have difficulty concentrating, remembering or making decisions? No         The ASCVD Risk score (Albert CLAYTON, et al., 2019) failed to calculate for the following reasons:    The 2019 ASCVD risk score is only valid for ages 40 to 79    Age-appropriate Screening Schedule:  Refer to the list below for future screening recommendations based on patient's age, sex and/or medical conditions. Orders for these recommended tests are listed in the plan section. The patient has been provided with a written plan.    Health Maintenance List  Health Maintenance   Topic Date Due    TDAP/TD VACCINES (1 - Tdap) Never done    DXA SCAN  03/04/2018    LIPID PANEL  07/31/2024    COVID-19 Vaccine (8 - 2024-25 season) 11/04/2024    ANNUAL WELLNESS VISIT  11/11/2025    RSV Vaccine - Adults  Completed    INFLUENZA VACCINE  Completed    Pneumococcal Vaccine 65+  Completed    ZOSTER VACCINE  Completed                                                                                                                                                CMS Preventative Services Quick Reference  Risk Factors Identified During Encounter  Inactivity/Sedentary: Patient was advised  to exercise at least 150 minutes a week per CDC recommendations.  Dental Screening Recommended  Vision Screening Recommended    The above risks/problems have been discussed with the patient.  Pertinent information has been shared with the patient in the After Visit Summary.  An After Visit Summary and PPPS were made available to the patient.    Follow Up:   Next Medicare Wellness visit to be scheduled in 1 year.     Assessment & Plan  Medicare annual wellness visit, subsequent         Gastro-esophageal reflux disease without esophagitis    Orders:    CBC & Differential    Mixed hyperlipidemia   Lipid abnormalities are stable    Plan:  Continue same medication/s without change.      Counseled patient on lifestyle modifications to help control hyperlipidemia.     Patient Treatment Goals:   LDL goal is under 100    Followup in 6 months.    Orders:    Magnesium; Future    Comprehensive Metabolic Panel    Lipid Panel    CBC & Differential    Leg cramp    Orders:    Magnesium; Future    Comprehensive Metabolic Panel    CBC & Differential         Follow Up:   No follow-ups on file.

## 2024-11-11 NOTE — ASSESSMENT & PLAN NOTE
Lipid abnormalities are stable    Plan:  Continue same medication/s without change.      Counseled patient on lifestyle modifications to help control hyperlipidemia.     Patient Treatment Goals:   LDL goal is under 100    Followup in 6 months.    Orders:    Magnesium; Future    Comprehensive Metabolic Panel    Lipid Panel    CBC & Differential

## 2024-12-05 ENCOUNTER — HOSPITAL ENCOUNTER (OUTPATIENT)
Dept: PET IMAGING | Facility: HOSPITAL | Age: 84
Discharge: HOME | End: 2024-12-05
Admitting: INTERNAL MEDICINE
Payer: MEDICARE

## 2024-12-05 DIAGNOSIS — C34.91 BRONCHOGENIC CANCER OF RIGHT LUNG: ICD-10-CM

## 2024-12-05 PROCEDURE — 71250 CT THORAX DX C-: CPT

## 2024-12-09 NOTE — PROGRESS NOTES
[facility] RADIATION ONCOLOGY  FOLLOW-UP NOTE    NAME: Chrystal Ruiz  YOB: 1940  MRN #: [id number]  DATE OF SERVICE: 12/12/22024  REFERRING PROVIDER: Etelvina Ulloa MD   PRIMARY CARE PROVIDER: Etelvina Ulloa MD    CHIEF COMPLAINT:  6M F/U    DIAGNOSIS:   Encounter Diagnosis   Name Primary?    Bronchogenic cancer of right lung Yes      RADIATION TREATMENT COURSE:    03/23/2022- 04/12/2022: 6000 cGy in 15 fractions to right upper lobe.    INTERVAL HISTORY     Chrystal Ruiz is a 84 y.o. female who was last seen in our office on 06/13/2024. The plan was to have repeat CT chest in 6 months, and follow up here afterwards.    She follows with Dr. Cheng, but has not been seen over the interval. She is scheduled for follow up on 12/12/2024, immediately following this appointment.    The patient reports no significant new changes since our last appointment.     IMAGING     CT CHEST W/O CONTRAST  DATE OF EXAM: 12/05/2024  FACILITY: [facility]  FINDINGS:  Chanell/ Mediastinum:  No adenopathy. Thoracic aorta normal in caliber. No definite coronary artery calcification. Antithrombotic device in the left atrial appendage. Small pericardial effusion, slightly increased from prior. Small hiatal hernia.  Lungs/ Pleura:  Right apical soft tissue or consolidation that has increased in thickness over the comparison interval, best appreciated on the coronal reconstruction. Stable postinflammatory fibronodular changes in the left lung apex. Stable sub-centimeter nodules in the left upper lob (2/23), lingula (2/52, possibly mucoid impaction in an ectatic bronchus), and right lower lobe (2/63, 69). New multifocal groundglass and ill-defined airspace nodules throughout the right lung. No pleural effusion or pleural tumor.  Upper Abdomen:  Unremarkable.  Bones/ Soft Tissues:  No suspicious bone lesions.  IMPRESSION:  Stable previously demonstrated pulmonary nodules.  New  multifocal groundglass opacities and ill-defined airspace nodules throughout the right lung compatible with an infectious/inflammatory process.  Thickening of right apical soft tissue over the comparison interval. This may relate to a chronic indolent infectious process or tumor.  Small pericardial effusion.    Mammo Screening Digital Tomosynthesis Bilateral With CAD  Result Date: 11/8/2024  No mammographic evidence of malignancy.  Recommend annual screening mammography.  BI-RADS ASSESSMENT: Category 2: Benign  Note: It has been reported that there is approximately a 15% false negative rate in mammography.  Therefore, management of a palpable abnormality should not be deferred because of a negative mammogram.  Electronically Signed By-Katelynn Wing MD On:11/8/2024 3:59 PM      PATHOLOGY     No recent pathology to review.    LABS     HEMATOLOGY:  WBC   Date Value Ref Range Status   12/12/2024 7.87 3.40 - 10.80 10*3/mm3 Final     RBC   Date Value Ref Range Status   12/12/2024 4.78 3.77 - 5.28 10*6/mm3 Final     Hemoglobin   Date Value Ref Range Status   12/12/2024 12.4 12.0 - 15.9 g/dL Final     Hematocrit   Date Value Ref Range Status   12/12/2024 40.9 34.0 - 46.6 % Final     Platelets   Date Value Ref Range Status   12/12/2024 275 140 - 450 10*3/mm3 Final     CHEMISTRY:  Lab Results   Component Value Date    GLUCOSE 91 12/12/2024    BUN 24 (H) 12/12/2024    CREATININE 1.13 (H) 12/12/2024    EGFRIFNONA 84 02/01/2022    BCR 21.2 12/12/2024    K 4.2 12/12/2024    CO2 25.7 12/12/2024    CALCIUM 10.0 12/12/2024    ALBUMIN 4.3 12/12/2024    LABIL2 1.2 05/29/2019    AST 21 12/12/2024    ALT 20 12/12/2024     PROBLEM LIST     Patient Active Problem List   Diagnosis    Grief reaction    Alopecia    Arthritis    Back pain    Encounter for general adult medical examination without abnormal findings    Hypertension    Lung nodule    Vitamin D deficiency    Shortness of breath    Postmenopausal status    Nevus, non-neoplastic     Medicare annual wellness visit, subsequent    Fam hx-ischem heart disease    FH: thyroid condition    Cellulitis of right anterior lower leg    Vasculitis of skin    Pedal edema    Bilateral calf pain    Neck pain    Immunization reaction    Mixed hyperlipidemia    Tremor    Bright red rectal bleeding    Acute maxillary sinusitis    Acute pain of right knee    Chronic pain of right knee    Need for vaccination    Lung nodules    Tachycardia    Essential (primary) hypertension     Dermatitis    A-fib    Leukopenia    Anemia    Primary osteoarthritis of right knee    Bronchogenic cancer of right lung    Iron deficiency    Malabsorption due to intolerance, not elsewhere classified    Dyspnea    Other iron deficiency anemias    Syncope    Paroxysmal atrial fibrillation    Bilateral impacted cerumen    Acute anterior epistaxis    Callus of toe    Bronchitis    Presence of Watchman left atrial appendage closure device    Primary osteoarthritis of both knees    Acute URI    Cough in adult    Upper respiratory tract infection due to COVID-19 virus    Encounter for screening mammogram for breast cancer    Esophageal stricture    Acute gastritis without bleeding    Diaphragmatic hernia without obstruction or gangrene    Diverticulosis of colon    Dysphagia    Epigastric pain    Esophageal motility disorder    Gastro-esophageal reflux disease without esophagitis    Internal hemorrhoids without complication    Osteoporosis    Schatzki's ring    Undiagnosed cardiac murmurs    Other asthma      CURRENT MEDICATIONS     Current Outpatient Medications   Medication Instructions    acetaminophen (TYLENOL) 500 mg, Every 6 Hours PRN    baclofen (LIORESAL) 10 mg, Oral, Nightly PRN    Breo Ellipta 200-25 MCG/INH inhaler 1 puff, Inhalation, Daily - RT    FeroSul 325 (65 Fe) MG tablet TAKE 1 TABLET BY MOUTH THREE DAYS A WEEK( MONDAY, WEDNESDAY, FRIDAY)    furosemide (LASIX) 40 mg, Oral, Daily PRN, swelling    loratadine (CLARITIN) 10 mg,  Daily    metoclopramide (REGLAN) 5 mg, Daily    metoprolol succinate XL (TOPROL-XL) 25 mg, Oral, Daily    spironolactone (ALDACTONE) 25 MG tablet TAKE 1 TABLET BY MOUTH EVERY OTHER DAY    triamcinolone (KENALOG) 0.1 % cream 1 Application, Topical, 2 Times Daily PRN    Vitamin B12 2,500 mcg, Daily    Vitamin D3 2,000 Units, Daily      ALLERGIES     Allergies   Allergen Reactions    Ciprofloxacin Unknown (See Comments)    Sulfa Antibiotics Unknown (See Comments)    Iodine Unknown - High Severity     Patient doesn't remember what reaction she gets when she takes this     Sudafed [Pseudoephedrine Hcl] Other (See Comments)     Patient states it has been awhile since taking, so she doesn't remember side effects.       REVIEW OF SYSTEMS     Review of Systems   Constitutional:  Negative for activity change and fatigue.   Respiratory:  Positive for cough.         Vitals:    12/12/24 1339   BP: 152/71   Pulse: 70   Resp: 18   Temp: 97.8 °F (36.6 °C)   SpO2: 97%      Physical Exam  Constitutional:       General: She is not in acute distress.  HENT:      Head: Normocephalic and atraumatic.   Pulmonary:      Effort: Pulmonary effort is normal. No respiratory distress.   Neurological:      Mental Status: She is alert and oriented to person, place, and time. Mental status is at baseline.   Psychiatric:         Mood and Affect: Mood normal.         Behavior: Behavior normal.          ECOG:  Restricted in physically strenuous activity but ambulatory and able to carry out work of a light or sedentary nature, e.g., light house work, office work = 1      ASSESSMENT AND PLAN     ASSESSMENT:      Chrystal Ruiz is an 84 y.o. female with NSCLC, stage I, [id number]. She is sp radiation, 60 Gy in 15 fx completed on 04/12/2022.  She returns for routine imaging surveillance.     Diagnoses and all orders for this visit:    1. Bronchogenic cancer of right lung (Primary)  -     CT Chest Without Contrast Diagnostic; Future       PLAN:       Orders:  - CT Chest in 6 months   - Follow-up after imaging or sooner as clinically indicated    We reviewed the patient's imaging. We discussed the imaging findings. We discussed right apical thickening. My opinion is this is likely evolving radiation fibrosis. We discussed the potential possibility of tumor recurrence and the importance of continued surveillance. We discussed options for a 3 vs 6 month repeat image to re-evaluate the treated malignancy and the patients multifocal ground glass opacities. The patient preferred a 6 month CT chest. She is encouraged to reach out with questions or concerns prior to her next appointment.     I spent 30 minutes caring for Chrystal on this date of service. This time includes time spent by me in the following activities:preparing for the visit, reviewing tests, obtaining and/or reviewing a separately obtained history, performing a medically appropriate examination and/or evaluation , counseling and educating the patient/family/caregiver, ordering medications, tests, or procedures, documenting information in the medical record, and independently interpreting results and communicating that information with the patient/family/caregiver    FOLLOW UP     No follow-ups on file.     CC: Etelvina Ulloa MD Crone, Angela Joy, MD

## 2024-12-10 NOTE — PROGRESS NOTES
HEMATOLOGY ONCOLOGY OUTPATIENT FOLLOW UP       Patient name: Chrystal Ruiz  : 1940  MRN: [id number]  Primary Care Physician: Etelvina Ulloa MD  Referring Physician: Etelvina Ulloa MD  Reason For Consult:     Chief Complaint   Patient presents with    Follow-up     Malignant neoplasm of upper lobe of right lung         HPI:   History of Present Illness:  Chrystal Ruiz is 84 y.o. female who presented to our office on 22 for consultation regarding anemia and thrombocytosis.     3/9/2022: Ms. Ruiz was referred for the investigation of anemia and thrombocytosis that was identified in the process of treating congestive heart failure and a right lung tumor. She came in a wheel chair complaining of severe fatigue and difficulties functioning because of this weakness. She admitted to hematochezia that had been associated to constipation that had been going on for some time, at least a few weeks. She had been afebrile. No pain. The laboratory exams reported microcytic anemia and thrombocytosis that had been present since the end of . A decision was made to transfuse her with one unit of red cells. Tests for iron deficiency were sent. She was scheduled to see me in one week.     3/16/2022. Ms. Ruiz was back in the office for follow up. She was feeling much better after the transfusion. She was still weak but not as much and her affect was also better. The laboratory exams did not clearly suggest iron deficiency but she persisted with microcytic anemia and had a history of gastrointestinal bleeding, such that iron deficiency was strongly considered the cause of the symptoms and laboratory manifestations. She had taken oral iron with multiple side effects and no clear improvement. Plans for intravenous iron were made.     3/22/2022: Admitted with evidence of decompensation of the congestive heart failure. She was treated with diuretics with prompt symptomatic relief. She  was given one dose of intravenous iron. She was discharged feeling much better.     4/4/2022: Back in the office for follow up. She had been receiving iron intravenously without complications. She reported unintended weight loss. The exam was not different than before. The laboratory exams revealed progressive improvement of the blood count with increase in the hemoglobin and the mean corpuscular volume and resolution of the leukocytosis and thrombocytosis. A decision was made to obtain a scan of the abdomen to investigate the history of blood loss in the stools and the unintended weight loss.     4/25/2022: Ms. Ruiz was back in the office for follow up. She was feeling much better and had returned to most of her normal activities. She arrived walking and reported that she had been walking more and more. She still had some fatigue but not the weakness she had before. She also had better appetite. She complained, however, that some of the medication she was receiving had resulted in changes in her taste perception. She had no more dyspnea than before and she had been free of chest pain. She had been offered the Watchman procedure, since she was not able to take anticoagulation safely. She denies abdominal pain or diarrhea. No dysuria. No edema. No skin rash.     5/18/2022: Back in the office to review results. She was feeling very well. Had regained her energy and was very active. The exam did not reveal new changes. Her hemoglobin had continued to improve, but she still had anemia. A decision was made to continue to observe without intervention.     6/15/2022: Back in the office for follow-up.  She had partial correction of her anemia.  She was scheduled to have a watchman procedure early in July.  The physical exam was unremarkable.  A decision was made to continue to observe and she was scheduled to return in 2 months.    8/17/2022: In the office for follow-up.  Compliant with iron.  Underwent the watchman  procedure as planned.  Had some minor complications.  This was followed by an episode of cellulitis that required admission to the hospital.  At this time asymptomatic.  Hemoglobin back to normal range.  A decision was made to follow in 4 months.    12/14/2022: Feeling well.  As active as before.  Functional and self-sufficient.  Eating well and no weight loss.  Afebrile.  On exam no abnormalities to document.  Laboratory exams perfectly within the normal range.  I asked her to return 6 months later and to continue the iron until I see her again.    6/21/2023: Feeling well.  Strong and without new symptoms.  Reasonably active and with good appetite.  No new limitations.  The complications from the Watchman procedure resolved completely and she was then off of all anticoagulation.  On exam there were no changes.  The laboratory exams revealed a hemoglobin within the normal range.  Iron studies were requested and I asked her to return in 6 months.    12/11/2023: About the same.  Complains of pain especially on the left side, at the hip and the knee.  This has been variously attributed to arthritis of the hip and knee and to spinal column problems.  She is receiving local injections as well as physical therapy with only partial relief.  She has had chest pains and has visited the emergency room in the relatively recent past.  On exam alert, conversant and in no distress.  Lungs clear.  Heart regular.  Abdomen soft.  The laboratory exams were reviewed.  The blood count is well within the normal range.  A decision was made to see her 6 months later.  She was asked to continue to take the iron 3 times a week.    6/13/2024: Feeling well and without new symptoms.  Active and without new limitations.  Recently had a subconjunctival hemorrhage on the right side after sneezing.  She has been hypertensive but not particularly so.  She is eating well and her weight is stable.  She has been afebrile.  No chest pains and no  abdominal pain.  As well no obvious gastrointestinal bleeding.  On exam indeed she has a large subconjunctival hemorrhage on the right.  There is some extension of the ecchymosis to the inferior eyelid.  No palpable lymphadenopathy in the preauricular region.  The lungs are clear bilaterally and the heart regular.  The abdomen is soft.  No or minimal edema.  Reviewed the laboratory exams.  No major changes.  I have asked her to see me in approximately 6 months.  I will continue to monitor her blood counts.  I did review the images of the recent scans.  I do not see any change but the official interpretation is not out yet.    12/12/2024: Feeling well.  Active and energetic.  Eating well and with good appetite.  No new limitations.  No fevers or nocturnal diaphoresis.  No bleeding.  On exam she is alert and conversant.  Oriented and in good spirits.  No distress.  No jaundice.  No pallor.  Lungs clear.  Heart regular.  Abdomen soft and without hepatomegaly or splenomegaly.  No edema.  Laboratory exams reviewed and discussed with her.  To see me in 6 months.      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 procedure; surgically fixed the next day    GERD (gastroesophageal reflux disease)     Heart murmur     Hip pain, bilateral     Hypertension     Medicare annual wellness visit, subsequent 02/12/2020    Shoulder pain, right      Past Surgical History:   Procedure Laterality Date    ATRIAL APPENDAGE EXCLUSION LEFT WITH TRANSESOPHAGEAL ECHOCARDIOGRAM Right 7/7/2022    Procedure: Atrial Appendage Occlusion watchman team aware $;  Surgeon: Sage Garcia MD;  Location: [facility] INVASIVE LOCATION;  Service: Cardiovascular;  Laterality: Right;    ATRIAL APPENDAGE EXCLUSION LEFT WITH TRANSESOPHAGEAL  ECHOCARDIOGRAM N/A 7/7/2022    Procedure: Atrial Appendage Occlusion;  Surgeon: Fide Mullen MD;  Location: [facility] CATH INVASIVE LOCATION;  Service: Cardiovascular;  Laterality: N/A;    CATARACT EXTRACTION      CHOLECYSTECTOMY      HYSTERECTOMY  1987    Endometriosis    OOPHORECTOMY      PSEUDO ANEURYSM REPAIR, EXTREMITY Right 7/8/2022    Procedure: REPAIR OF POST CATHETERIZATION RIGHT FEMORAL PSEUDOANEURYSM AND ARTERIOVENOUS FISTUAL;  Surgeon: Alfonzo Hooks MD;  Location: [facility] MAIN OR;  Service: Vascular;  Laterality: Right;    TONSILLECTOMY         Current Outpatient Medications:     acetaminophen (TYLENOL) 500 MG tablet, Take 1 tablet by mouth Every 6 (Six) Hours As Needed for Mild Pain., Disp: , Rfl:     baclofen (LIORESAL) 10 MG tablet, TAKE 1 TABLET BY MOUTH AT NIGHT AS NEEDED FOR MUSCLE SPASMS, Disp: 30 tablet, Rfl: 1    Breo Ellipta 200-25 MCG/INH inhaler, Inhale 1 puff Daily., Disp: 1 each, Rfl: 2    Cholecalciferol (VITAMIN D3) 2000 units capsule, Take 1 capsule by mouth Daily., Disp: , Rfl:     Cyanocobalamin (VITAMIN B12) 1000 MCG tablet controlled-release, Take 2,500 mcg by mouth Daily., Disp: , Rfl:     FeroSul 325 (65 Fe) MG tablet, TAKE 1 TABLET BY MOUTH THREE DAYS A WEEK( MONDAY, WEDNESDAY, FRIDAY), Disp: 45 tablet, Rfl: 1    furosemide (LASIX) 40 MG tablet, TAKE 1 TABLET BY MOUTH DAILY AS NEEDED FOR SWELLING, Disp: 90 tablet, Rfl: 1    loratadine (CLARITIN) 10 MG tablet, Take 1 tablet by mouth Daily., Disp: , Rfl:     metoclopramide (REGLAN) 5 MG tablet, Take 1 tablet by mouth Daily., Disp: , Rfl:     metoprolol succinate XL (TOPROL-XL) 25 MG 24 hr tablet, TAKE 1 TABLET BY MOUTH DAILY, Disp: 90 tablet, Rfl: 3    spironolactone (ALDACTONE) 25 MG tablet, TAKE 1 TABLET BY MOUTH EVERY OTHER DAY, Disp: 45 tablet, Rfl: 1    triamcinolone (KENALOG) 0.1 % cream, Apply 1 Application topically to the appropriate area as directed 2 (Two) Times a Day As Needed for Irritation., Disp: 80 g,  Rfl: 1    Allergies   Allergen Reactions    Ciprofloxacin Unknown (See Comments)    Sulfa Antibiotics Unknown (See Comments)    Iodine Unknown - High Severity     Patient doesn't remember what reaction she gets when she takes this     Sudafed [Pseudoephedrine Hcl] Other (See Comments)     Patient states it has been awhile since taking, so she doesn't remember side effects.     Family History   Problem Relation Age of Onset    Breast cancer Mother 85    Stroke Mother     Heart disease Mother     Endometrial cancer Mother 70    Heart failure Father     Heart failure Sister     COPD Sister     Heart disease Sister      Cancer-related family history includes Breast cancer (age of onset: 85) in her mother; Endometrial cancer (age of onset: 70) in her mother.    Social History     Tobacco Use    Smoking status: Never    Smokeless tobacco: Never   Vaping Use    Vaping status: Never Used   Substance Use Topics    Alcohol use: No    Drug use: Never     Social History     Social History Narrative    Not on file      ROS:     Review of Systems   Constitutional:  Negative for activity change, appetite change, chills, diaphoresis, fatigue, fever and unexpected weight change.   HENT:  Negative for congestion, dental problem, drooling, ear discharge, ear pain, facial swelling, hearing loss, mouth sores, nosebleeds, postnasal drip, rhinorrhea, sinus pressure, sinus pain, sneezing, sore throat, tinnitus, trouble swallowing and voice change.    Eyes:  Negative for photophobia, pain, discharge, redness, itching and visual disturbance.   Respiratory:  Negative for apnea, cough, choking, chest tightness, shortness of breath, wheezing and stridor.    Cardiovascular:  Negative for chest pain, palpitations and leg swelling.   Gastrointestinal:  Negative for abdominal distention, abdominal pain, anal bleeding, blood in stool, constipation, diarrhea, nausea, rectal pain and vomiting.   Endocrine: Negative for cold intolerance, heat  "intolerance, polydipsia and polyuria.   Genitourinary:  Negative for decreased urine volume, difficulty urinating, dysuria, flank pain, frequency, genital sores, hematuria and urgency.   Musculoskeletal:  Positive for arthralgias and back pain. Negative for gait problem, joint swelling, myalgias, neck pain and neck stiffness.   Skin:  Negative for color change, pallor and rash.   Neurological:  Negative for dizziness, tremors, seizures, syncope, facial asymmetry, speech difficulty, weakness, light-headedness, numbness and headaches.   Hematological:  Negative for adenopathy. Does not bruise/bleed easily.   Psychiatric/Behavioral:  Negative for agitation, behavioral problems, confusion, decreased concentration, hallucinations, self-injury, sleep disturbance and suicidal ideas. The patient is not nervous/anxious.      Objective:    Vitals:    12/12/24 1410   BP: 152/71   Pulse: 70   Resp: 18   Temp: 97.8 °F (36.6 °C)   SpO2: 97%   Weight: 58.1 kg (128 lb)   Height: 154.9 cm (61\")   PainSc: 0-No pain     Body mass index is 24.19 kg/m².  ECOG  (3) Capable of limited self-care, confined to bed or chair > 50% of waking hours    Physical Exam:     Physical Exam  Constitutional:       General: She is not in acute distress.     Appearance: Normal appearance. She is not ill-appearing, toxic-appearing or diaphoretic.   HENT:      Head: Normocephalic and atraumatic.      Right Ear: External ear normal.      Left Ear: External ear normal.      Nose: Nose normal.      Mouth/Throat:      Mouth: Mucous membranes are moist.      Pharynx: Oropharynx is clear. No oropharyngeal exudate or posterior oropharyngeal erythema.   Eyes:      General: No scleral icterus.        Right eye: No discharge.         Left eye: No discharge.      Conjunctiva/sclera: Conjunctivae normal.      Pupils: Pupils are equal, round, and reactive to light.   Cardiovascular:      Rate and Rhythm: Normal rate and regular rhythm.      Pulses: Normal pulses.      " Heart sounds: No murmur heard.     No friction rub. No gallop.   Pulmonary:      Effort: No respiratory distress.      Breath sounds: No stridor. No wheezing, rhonchi or rales.   Abdominal:      General: Abdomen is flat. Bowel sounds are normal. There is no distension.      Palpations: Abdomen is soft. There is no mass.      Tenderness: There is no abdominal tenderness. There is no right CVA tenderness, left CVA tenderness, guarding or rebound.      Hernia: No hernia is present.   Musculoskeletal:         General: No swelling, tenderness, deformity or signs of injury.      Cervical back: No rigidity.      Right lower leg: No edema.      Left lower leg: No edema.   Lymphadenopathy:      Cervical: No cervical adenopathy.   Skin:     Coloration: Skin is not jaundiced.      Findings: No bruising, lesion or rash.   Neurological:      General: No focal deficit present.      Mental Status: She is alert and oriented to person, place, and time.      Cranial Nerves: No cranial nerve deficit.      Motor: No weakness.      Gait: Gait normal.   Psychiatric:         Mood and Affect: Mood normal.         Behavior: Behavior normal.         Thought Content: Thought content normal.         Judgment: Judgment normal.     CM Cheng MD performed the physical exam on 12/12/2024 as documented above.    Lab Results - Last 18 Months   Lab Units 12/12/24  1413 11/11/24  1539 06/13/24  1420   WBC 10*3/mm3 7.87 6.71 7.37   HEMOGLOBIN g/dL 12.4 11.6* 11.9*   HEMATOCRIT % 40.9 37.9 39.2   PLATELETS 10*3/mm3 275 252 228   MCV fL 85.6 84.2 88.5     Lab Results - Last 18 Months   Lab Units 11/11/24  1539 11/15/23  2005 09/24/23  1812   SODIUM mmol/L 142 144 139   POTASSIUM mmol/L 4.0 4.2 4.9   CHLORIDE mmol/L 108* 106 98   CO2 mmol/L 26.0 25.0 25.0   BUN mg/dL 23 31* 44*   CREATININE mg/dL 0.99 1.13* 1.58*   CALCIUM mg/dL 9.6 9.7 9.4   BILIRUBIN mg/dL 0.8 0.7 0.5   ALK PHOS U/L 84 72 95   ALT (SGPT) U/L 30 8 35*   AST (SGOT) U/L 25 13 25    GLUCOSE mg/dL 85 124* 116*     Lab Results   Component Value Date    GLUCOSE 85 11/11/2024    BUN 23 11/11/2024    CREATININE 0.99 11/11/2024    EGFRIFNONA 84 02/01/2022    BCR 23.2 11/11/2024    K 4.0 11/11/2024    CO2 26.0 11/11/2024    CALCIUM 9.6 11/11/2024    ALBUMIN 4.0 11/11/2024    LABIL2 1.2 05/29/2019    AST 25 11/11/2024    ALT 30 11/11/2024     Lab Results   Component Value Date    PTT 28.2 11/05/2021    INR 1.03 11/05/2021     Assessment & Plan     Assessment and Plan:  Iron deficiency anemia: Resolved.  Gastrointestinal bleeding.  Not evident at this time.  Continue to monitor.  Reviewed all laboratory exams and discussed with her.  Reviewed the report of the recent CT scans.  Discussed with her.  She will return to see me in approximately 6 months.    Frank Cheng MD on 12/12/2024 at 1432.

## 2024-12-12 ENCOUNTER — LAB (OUTPATIENT)
Dept: LAB | Facility: HOSPITAL | Age: 84
End: 2024-12-12
Payer: MEDICARE

## 2024-12-12 ENCOUNTER — OFFICE VISIT (OUTPATIENT)
Dept: ONCOLOGY | Facility: CLINIC | Age: 84
End: 2024-12-12
Payer: MEDICARE

## 2024-12-12 ENCOUNTER — OFFICE VISIT (OUTPATIENT)
Dept: RADIATION ONCOLOGY | Facility: HOSPITAL | Age: 84
End: 2024-12-12
Payer: MEDICARE

## 2024-12-12 VITALS
SYSTOLIC BLOOD PRESSURE: 152 MMHG | RESPIRATION RATE: 18 BRPM | TEMPERATURE: 97.8 F | DIASTOLIC BLOOD PRESSURE: 71 MMHG | BODY MASS INDEX: 24.2 KG/M2 | WEIGHT: 128.2 LBS | HEIGHT: 61 IN | HEART RATE: 70 BPM | OXYGEN SATURATION: 97 %

## 2024-12-12 VITALS
RESPIRATION RATE: 18 BRPM | OXYGEN SATURATION: 97 % | DIASTOLIC BLOOD PRESSURE: 71 MMHG | HEART RATE: 70 BPM | TEMPERATURE: 97.8 F | SYSTOLIC BLOOD PRESSURE: 152 MMHG | WEIGHT: 128 LBS | HEIGHT: 61 IN | BODY MASS INDEX: 24.17 KG/M2

## 2024-12-12 DIAGNOSIS — C34.11 MALIGNANT NEOPLASM OF UPPER LOBE OF RIGHT LUNG: Primary | ICD-10-CM

## 2024-12-12 DIAGNOSIS — C34.91 BRONCHOGENIC CANCER OF RIGHT LUNG: Primary | ICD-10-CM

## 2024-12-12 LAB
ALBUMIN SERPL-MCNC: 4.3 G/DL (ref 3.5–5.2)
ALBUMIN/GLOB SERPL: 1.5 G/DL
ALP SERPL-CCNC: 70 U/L (ref 39–117)
ALT SERPL W P-5'-P-CCNC: 20 U/L (ref 1–33)
ANION GAP SERPL CALCULATED.3IONS-SCNC: 10.3 MMOL/L (ref 5–15)
AST SERPL-CCNC: 21 U/L (ref 1–32)
BASOPHILS # BLD AUTO: 0.02 10*3/MM3 (ref 0–0.2)
BASOPHILS NFR BLD AUTO: 0.3 % (ref 0–1.5)
BILIRUB SERPL-MCNC: 0.6 MG/DL (ref 0–1.2)
BUN SERPL-MCNC: 24 MG/DL (ref 8–23)
BUN/CREAT SERPL: 21.2 (ref 7–25)
CALCIUM SPEC-SCNC: 10 MG/DL (ref 8.6–10.5)
CHLORIDE SERPL-SCNC: 105 MMOL/L (ref 98–107)
CO2 SERPL-SCNC: 25.7 MMOL/L (ref 22–29)
CREAT SERPL-MCNC: 1.13 MG/DL (ref 0.57–1)
DEPRECATED RDW RBC AUTO: 40 FL (ref 37–54)
EGFRCR SERPLBLD CKD-EPI 2021: 47.8 ML/MIN/1.73
EOSINOPHIL # BLD AUTO: 0.08 10*3/MM3 (ref 0–0.4)
EOSINOPHIL NFR BLD AUTO: 1 % (ref 0.3–6.2)
ERYTHROCYTE [DISTWIDTH] IN BLOOD BY AUTOMATED COUNT: 13 % (ref 12.3–15.4)
GLOBULIN UR ELPH-MCNC: 2.9 GM/DL
GLUCOSE SERPL-MCNC: 91 MG/DL (ref 65–99)
HCT VFR BLD AUTO: 40.9 % (ref 34–46.6)
HGB BLD-MCNC: 12.4 G/DL (ref 12–15.9)
HOLD SPECIMEN: NORMAL
LYMPHOCYTES # BLD AUTO: 1.69 10*3/MM3 (ref 0.7–3.1)
LYMPHOCYTES NFR BLD AUTO: 21.5 % (ref 19.6–45.3)
MCH RBC QN AUTO: 25.9 PG (ref 26.6–33)
MCHC RBC AUTO-ENTMCNC: 30.3 G/DL (ref 31.5–35.7)
MCV RBC AUTO: 85.6 FL (ref 79–97)
MONOCYTES # BLD AUTO: 0.54 10*3/MM3 (ref 0.1–0.9)
MONOCYTES NFR BLD AUTO: 6.9 % (ref 5–12)
NEUTROPHILS NFR BLD AUTO: 5.54 10*3/MM3 (ref 1.7–7)
NEUTROPHILS NFR BLD AUTO: 70.3 % (ref 42.7–76)
PLATELET # BLD AUTO: 275 10*3/MM3 (ref 140–450)
PMV BLD AUTO: 9.4 FL (ref 6–12)
POTASSIUM SERPL-SCNC: 4.2 MMOL/L (ref 3.5–5.2)
PROT SERPL-MCNC: 7.2 G/DL (ref 6–8.5)
RBC # BLD AUTO: 4.78 10*6/MM3 (ref 3.77–5.28)
SODIUM SERPL-SCNC: 141 MMOL/L (ref 136–145)
WBC NRBC COR # BLD AUTO: 7.87 10*3/MM3 (ref 3.4–10.8)

## 2024-12-12 PROCEDURE — 80053 COMPREHEN METABOLIC PANEL: CPT | Performed by: INTERNAL MEDICINE

## 2024-12-12 PROCEDURE — 1159F MED LIST DOCD IN RCRD: CPT | Performed by: INTERNAL MEDICINE

## 2024-12-12 PROCEDURE — 3078F DIAST BP <80 MM HG: CPT | Performed by: INTERNAL MEDICINE

## 2024-12-12 PROCEDURE — 99213 OFFICE O/P EST LOW 20 MIN: CPT | Performed by: INTERNAL MEDICINE

## 2024-12-12 PROCEDURE — G0463 HOSPITAL OUTPT CLINIC VISIT: HCPCS | Performed by: INTERNAL MEDICINE

## 2024-12-12 PROCEDURE — 85025 COMPLETE CBC W/AUTO DIFF WBC: CPT

## 2024-12-12 PROCEDURE — 1126F AMNT PAIN NOTED NONE PRSNT: CPT | Performed by: INTERNAL MEDICINE

## 2024-12-12 PROCEDURE — 36415 COLL VENOUS BLD VENIPUNCTURE: CPT

## 2024-12-12 PROCEDURE — 1160F RVW MEDS BY RX/DR IN RCRD: CPT | Performed by: INTERNAL MEDICINE

## 2024-12-12 PROCEDURE — 3077F SYST BP >= 140 MM HG: CPT | Performed by: INTERNAL MEDICINE

## 2024-12-18 ENCOUNTER — TELEPHONE (OUTPATIENT)
Dept: FAMILY MEDICINE CLINIC | Facility: CLINIC | Age: 84
End: 2024-12-18

## 2024-12-18 ENCOUNTER — OFFICE VISIT (OUTPATIENT)
Dept: FAMILY MEDICINE CLINIC | Facility: CLINIC | Age: 84
End: 2024-12-18
Payer: MEDICARE

## 2024-12-18 VITALS
TEMPERATURE: 98.4 F | HEART RATE: 71 BPM | WEIGHT: 128 LBS | HEIGHT: 61 IN | BODY MASS INDEX: 24.17 KG/M2 | DIASTOLIC BLOOD PRESSURE: 64 MMHG | SYSTOLIC BLOOD PRESSURE: 140 MMHG | OXYGEN SATURATION: 96 %

## 2024-12-18 DIAGNOSIS — K59.00 CONSTIPATION, UNSPECIFIED CONSTIPATION TYPE: Primary | ICD-10-CM

## 2024-12-18 PROCEDURE — 3077F SYST BP >= 140 MM HG: CPT | Performed by: FAMILY MEDICINE

## 2024-12-18 PROCEDURE — 1160F RVW MEDS BY RX/DR IN RCRD: CPT | Performed by: FAMILY MEDICINE

## 2024-12-18 PROCEDURE — 3078F DIAST BP <80 MM HG: CPT | Performed by: FAMILY MEDICINE

## 2024-12-18 PROCEDURE — G2211 COMPLEX E/M VISIT ADD ON: HCPCS | Performed by: FAMILY MEDICINE

## 2024-12-18 PROCEDURE — 1126F AMNT PAIN NOTED NONE PRSNT: CPT | Performed by: FAMILY MEDICINE

## 2024-12-18 PROCEDURE — 1159F MED LIST DOCD IN RCRD: CPT | Performed by: FAMILY MEDICINE

## 2024-12-18 PROCEDURE — 99213 OFFICE O/P EST LOW 20 MIN: CPT | Performed by: FAMILY MEDICINE

## 2024-12-18 NOTE — ASSESSMENT & PLAN NOTE
Recommend otc Dulcolax suppository and/or Miralax.   Increase fluid and fiber intake.    Stay active.   Go to the ER if any bleeding or fever.

## 2024-12-18 NOTE — PROGRESS NOTES
"Chief Complaint  Constipation    Subjective        Chrystal Ruiz presents to [facility]  History of Present Illness  No bowel movement for the last 3 days.  She feels the urge to defecate but cannot pass the stool.    Constipation  This is a new problem. The current episode started in the past 7 days. The problem is unchanged. The patient is on a high fiber diet. She Does not exercise regularly. There has Been adequate water intake. Pertinent negatives include no abdominal pain, diarrhea, fever, hematochezia, hemorrhoids, melena, nausea, rectal pain or vomiting. She has tried nothing for the symptoms. The treatment provided no relief.       Objective   Vital Signs:  /64   Pulse 71   Temp 98.4 °F (36.9 °C) (Temporal)   Ht 154.9 cm (60.98\")   Wt 58.1 kg (128 lb)   SpO2 96%   BMI 24.20 kg/m²   Estimated body mass index is 24.2 kg/m² as calculated from the following:    Height as of this encounter: 154.9 cm (60.98\").    Weight as of this encounter: 58.1 kg (128 lb).    BMI is within normal parameters. No other follow-up for BMI required.      Physical Exam  Vitals and nursing note reviewed.   Constitutional:       General: She is not in acute distress.     Appearance: She is well-developed.   HENT:      Head: Normocephalic.   Eyes:      General: Lids are normal.   Neck:      Thyroid: No thyroid mass or thyromegaly.      Trachea: Trachea normal.   Cardiovascular:      Rate and Rhythm: Normal rate and regular rhythm.      Heart sounds: Normal heart sounds.   Pulmonary:      Effort: Pulmonary effort is normal.      Breath sounds: Normal breath sounds.   Abdominal:      General: Bowel sounds are normal. There is no distension.      Palpations: Abdomen is soft.      Tenderness: There is no abdominal tenderness.   Musculoskeletal:      Cervical back: Normal range of motion.   Lymphadenopathy:      Cervical: No cervical adenopathy.   Skin:     General: Skin is warm and dry. "   Neurological:      Mental Status: She is alert and oriented to person, place, and time.   Psychiatric:         Attention and Perception: She is attentive.         Mood and Affect: Mood normal.         Speech: Speech normal.         Behavior: Behavior normal.        Result Review :  The following data was reviewed by: Etelvina Ulloa MD on 12/18/2024:  Common labs          6/13/2024    14:20 11/11/2024    15:39 12/12/2024    14:13   Common Labs   Glucose  85  91    BUN  23  24    Creatinine  0.99  1.13    Sodium  142  141    Potassium  4.0  4.2    Chloride  108  105    Calcium  9.6  10.0    Albumin  4.0  4.3    Total Bilirubin  0.8  0.6    Alkaline Phosphatase  84  70    AST (SGOT)  25  21    ALT (SGPT)  30  20    WBC 7.37  6.71  7.87    Hemoglobin 11.9  11.6  12.4    Hematocrit 39.2  37.9  40.9    Platelets 228  252  275    Total Cholesterol  227     Triglycerides  74     HDL Cholesterol  69     LDL Cholesterol   145                 Assessment and Plan   Diagnoses and all orders for this visit:    1. Constipation, unspecified constipation type (Primary)  Assessment & Plan:  Recommend otc Dulcolax suppository and/or Miralax.   Increase fluid and fiber intake.    Stay active.   Go to the ER if any bleeding or fever.                Follow Up   No follow-ups on file.  Patient was given instructions and counseling regarding her condition or for health maintenance advice. Please see specific information pulled into the AVS if appropriate.

## 2024-12-19 ENCOUNTER — HOSPITAL ENCOUNTER (OUTPATIENT)
Facility: HOSPITAL | Age: 84
Setting detail: OBSERVATION
Discharge: HOME | End: 2024-12-20
Attending: EMERGENCY MEDICINE | Admitting: EMERGENCY MEDICINE
Payer: MEDICARE

## 2024-12-19 ENCOUNTER — TELEPHONE (OUTPATIENT)
Dept: FAMILY MEDICINE CLINIC | Facility: CLINIC | Age: 84
End: 2024-12-19
Payer: MEDICARE

## 2024-12-19 ENCOUNTER — TELEPHONE (OUTPATIENT)
Dept: FAMILY MEDICINE CLINIC | Facility: CLINIC | Age: 84
End: 2024-12-19

## 2024-12-19 DIAGNOSIS — K56.41 FECAL IMPACTION: Primary | ICD-10-CM

## 2024-12-19 PROCEDURE — 99285 EMERGENCY DEPT VISIT HI MDM: CPT

## 2024-12-19 PROCEDURE — G0378 HOSPITAL OBSERVATION PER HR: HCPCS

## 2024-12-19 RX ORDER — SODIUM CHLORIDE 9 MG/ML
40 INJECTION, SOLUTION INTRAVENOUS AS NEEDED
Status: DISCONTINUED | OUTPATIENT
Start: 2024-12-19 | End: 2024-12-20 | Stop reason: HOSPADM

## 2024-12-19 RX ORDER — SODIUM CHLORIDE 0.9 % (FLUSH) 0.9 %
10 SYRINGE (ML) INJECTION EVERY 12 HOURS SCHEDULED
Status: DISCONTINUED | OUTPATIENT
Start: 2024-12-19 | End: 2024-12-20 | Stop reason: HOSPADM

## 2024-12-19 RX ORDER — BISACODYL 10 MG
10 SUPPOSITORY, RECTAL RECTAL DAILY PRN
Status: DISCONTINUED | OUTPATIENT
Start: 2024-12-19 | End: 2024-12-20 | Stop reason: HOSPADM

## 2024-12-19 RX ORDER — POLYETHYLENE GLYCOL 3350 17 G/17G
17 POWDER, FOR SOLUTION ORAL DAILY PRN
Status: DISCONTINUED | OUTPATIENT
Start: 2024-12-19 | End: 2024-12-20 | Stop reason: HOSPADM

## 2024-12-19 RX ORDER — ONDANSETRON 2 MG/ML
4 INJECTION INTRAMUSCULAR; INTRAVENOUS EVERY 6 HOURS PRN
Status: DISCONTINUED | OUTPATIENT
Start: 2024-12-19 | End: 2024-12-20 | Stop reason: HOSPADM

## 2024-12-19 RX ORDER — AMOXICILLIN 250 MG
2 CAPSULE ORAL 2 TIMES DAILY
Status: DISCONTINUED | OUTPATIENT
Start: 2024-12-19 | End: 2024-12-20 | Stop reason: HOSPADM

## 2024-12-19 RX ORDER — BISACODYL 5 MG/1
5 TABLET, DELAYED RELEASE ORAL DAILY PRN
Status: DISCONTINUED | OUTPATIENT
Start: 2024-12-19 | End: 2024-12-20 | Stop reason: HOSPADM

## 2024-12-19 RX ORDER — SODIUM CHLORIDE 0.9 % (FLUSH) 0.9 %
10 SYRINGE (ML) INJECTION AS NEEDED
Status: DISCONTINUED | OUTPATIENT
Start: 2024-12-19 | End: 2024-12-20 | Stop reason: HOSPADM

## 2024-12-19 RX ADMIN — SENNOSIDES AND DOCUSATE SODIUM 2 TABLET: 50; 8.6 TABLET ORAL at 22:46

## 2024-12-19 RX ADMIN — Medication 10 ML: at 22:35

## 2024-12-19 NOTE — ED PROVIDER NOTES
Subjective   History of Present Illness  Chief complaint: Constipation    84-year-old female presents with constipation.  Patient states she last had a bowel movement on Sunday.  She has tried over-the-counter laxatives with no relief.  She states she has had this issue in the past and the only thing that worked was an enema.  She reports some mild abdominal discomfort.  She has had no nausea or vomiting.    History provided by:  Patient      Review of Systems   Constitutional:  Negative for fever.   HENT:  Negative for congestion.    Respiratory:  Negative for cough and shortness of breath.    Cardiovascular:  Negative for chest pain.   Gastrointestinal:  Positive for constipation. Negative for abdominal pain, nausea and vomiting.   Musculoskeletal:  Negative for back pain.   Neurological:  Negative for headaches.   Psychiatric/Behavioral:  Negative for confusion.        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 procedure; surgically fixed the next day    GERD (gastroesophageal reflux disease)     Heart murmur     Hip pain, bilateral     Hypertension     Medicare annual wellness visit, subsequent 02/12/2020    Shoulder pain, right        Allergies   Allergen Reactions    Ciprofloxacin Unknown (See Comments)    Sulfa Antibiotics Unknown (See Comments)    Iodine Unknown - High Severity     Patient doesn't remember what reaction she gets when she takes this     Sudafed [Pseudoephedrine Hcl] Other (See Comments)     Patient states it has been awhile since taking, so she doesn't remember side effects.       Past Surgical History:   Procedure Laterality Date    ATRIAL APPENDAGE EXCLUSION LEFT WITH TRANSESOPHAGEAL ECHOCARDIOGRAM Right 7/7/2022    Procedure: Atrial Appendage Occlusion watchman team aware  "$;  Surgeon: Sage Garcia MD;  Location: [facility] CATH INVASIVE LOCATION;  Service: Cardiovascular;  Laterality: Right;    ATRIAL APPENDAGE EXCLUSION LEFT WITH TRANSESOPHAGEAL ECHOCARDIOGRAM N/A 7/7/2022    Procedure: Atrial Appendage Occlusion;  Surgeon: Fide Mullen MD;  Location: [facility] CATH INVASIVE LOCATION;  Service: Cardiovascular;  Laterality: N/A;    CATARACT EXTRACTION      CHOLECYSTECTOMY      HYSTERECTOMY  1987    Endometriosis    OOPHORECTOMY      PSEUDO ANEURYSM REPAIR, EXTREMITY Right 7/8/2022    Procedure: REPAIR OF POST CATHETERIZATION RIGHT FEMORAL PSEUDOANEURYSM AND ARTERIOVENOUS FISTUAL;  Surgeon: Alfonzo Hooks MD;  Location: [facility] MAIN OR;  Service: Vascular;  Laterality: Right;    TONSILLECTOMY         Family History   Problem Relation Age of Onset    Breast cancer Mother 85    Stroke Mother     Heart disease Mother     Endometrial cancer Mother 70    Heart failure Father     Heart failure Sister     COPD Sister     Heart disease Sister        Social History     Socioeconomic History    Marital status:    Tobacco Use    Smoking status: Never    Smokeless tobacco: Never   Vaping Use    Vaping status: Never Used   Substance and Sexual Activity    Alcohol use: No    Drug use: Never    Sexual activity: Not Currently       /71   Pulse 66   Temp 98.1 °F (36.7 °C) (Oral)   Resp 17   Ht 154.9 cm (61\")   Wt 59 kg (130 lb 1.1 oz)   SpO2 99%   BMI 24.58 kg/m²       Objective   Physical Exam  Vitals and nursing note reviewed.   Constitutional:       Appearance: Normal appearance.   HENT:      Head: Normocephalic and atraumatic.      Mouth/Throat:      Mouth: Mucous membranes are moist.   Cardiovascular:      Rate and Rhythm: Normal rate and regular rhythm.      Heart sounds: Normal heart sounds.   Pulmonary:      Effort: Pulmonary effort is normal. No respiratory distress.      Breath sounds: Normal breath sounds.   Abdominal:      General: Bowel sounds are " normal.      Palpations: Abdomen is soft.      Tenderness: There is no abdominal tenderness.   Genitourinary:     Comments: Digital rectal exam does show hard stool in the rectal vault.  No gross bleeding.  Skin:     General: Skin is warm and dry.   Neurological:      Mental Status: She is alert and oriented to person, place, and time.         Procedures           ED Course                                                       Medical Decision Making    Patient had 2 enemas attempted in the emergency room as well as some manual disimpaction.  Only a very small amount of stool has passed.  There is still significant stool burden in the rectal vault.  She is getting worn out from the effort at this time.  Patient will be placed in observation for continued attempts to get the stool to pass.  Patient is agreeable with this plan.      Final diagnoses:   Fecal impaction       ED Disposition  ED Disposition       ED Disposition   Decision to Admit    Condition   --    Comment   --               No follow-up provider specified.       Medication List      No changes were made to your prescriptions during this visit.            Lenny Zhou MD  12/19/24 0429

## 2024-12-19 NOTE — ED NOTES
Pt reports this as 4th day since she has had a BM. Pt reports recently being in hospital and having to use an enema for a successful BM after taking multiple meds with no success.

## 2024-12-19 NOTE — TELEPHONE ENCOUNTER
Spoke to patient. I advise that she go to the hospital to get  if she is can't sit and its making her miserable but she doesn't want to go, she thinks they wont help her

## 2024-12-19 NOTE — TELEPHONE ENCOUNTER
Caller: Chrystal Ruiz call back number: [phone]    What is your medical concern? PATIENT STATES THAT FOR THE LAST FOUR DAYS SHE HAS NOT HAD BOWEL MOVEMENT. PATIENT WAS GIVEN LAXATIVE YESTERDAY FROM DR. DALLAS AND THIS HAS NOT HELPED/MADE THINGS WORSE. PATIENT STATES THAT SHE WILL FEEL LIKE SHE NEEDS TO GO, BUT CANNOT GO WHEN SHE TRIES. PATIENT STATES THAT SHE IS MISERABLE AND CANNOT SIT. PATIENT STATES THAT SHE FEELS SEVERELY IMPACTED AND IT FEELS HARD AS ROCK. PLEASE ADVISE PATIENT ON NEXT STEPS OF CARE ASAP.     PATIENT STATES THAT THE ONLY THING THAT WORKED THE LAST TIME WAS WHEN SHE HAD AN ENEMA. PATIENT IS NOT ABLE TO DO THIS HERSELF, SHE IS WANTING TO KNOW HOW THIS CAN BE DONE.     PATIENT WOULD LIKE TO KNOW IF THERE IS ANY HOME HEALTH NURSE THAT COULD DO THIS? OR GO TO THE HOSPITAL? PLEASE ADVISE ON THIS.     HUB ATTEMPTED TO WARM TRANSFER.

## 2024-12-20 ENCOUNTER — READMISSION MANAGEMENT (OUTPATIENT)
Dept: CALL CENTER | Facility: HOSPITAL | Age: 84
End: 2024-12-20
Payer: MEDICARE

## 2024-12-20 ENCOUNTER — APPOINTMENT (OUTPATIENT)
Dept: CT IMAGING | Facility: HOSPITAL | Age: 84
End: 2024-12-20
Payer: MEDICARE

## 2024-12-20 VITALS
HEIGHT: 61 IN | BODY MASS INDEX: 24.56 KG/M2 | WEIGHT: 130.07 LBS | DIASTOLIC BLOOD PRESSURE: 57 MMHG | RESPIRATION RATE: 16 BRPM | TEMPERATURE: 98.2 F | SYSTOLIC BLOOD PRESSURE: 123 MMHG | HEART RATE: 83 BPM | OXYGEN SATURATION: 96 %

## 2024-12-20 LAB
ANION GAP SERPL CALCULATED.3IONS-SCNC: 13.4 MMOL/L (ref 5–15)
BASOPHILS # BLD AUTO: 0.03 10*3/MM3 (ref 0–0.2)
BASOPHILS NFR BLD AUTO: 0.3 % (ref 0–1.5)
BUN SERPL-MCNC: 26 MG/DL (ref 8–23)
BUN/CREAT SERPL: 27.1 (ref 7–25)
CALCIUM SPEC-SCNC: 8.9 MG/DL (ref 8.6–10.5)
CHLORIDE SERPL-SCNC: 107 MMOL/L (ref 98–107)
CO2 SERPL-SCNC: 21.6 MMOL/L (ref 22–29)
CREAT SERPL-MCNC: 0.96 MG/DL (ref 0.57–1)
DEPRECATED RDW RBC AUTO: 38.9 FL (ref 37–54)
EGFRCR SERPLBLD CKD-EPI 2021: 58.1 ML/MIN/1.73
EOSINOPHIL # BLD AUTO: 0.07 10*3/MM3 (ref 0–0.4)
EOSINOPHIL NFR BLD AUTO: 0.8 % (ref 0.3–6.2)
ERYTHROCYTE [DISTWIDTH] IN BLOOD BY AUTOMATED COUNT: 12.9 % (ref 12.3–15.4)
GLUCOSE SERPL-MCNC: 69 MG/DL (ref 65–99)
HCT VFR BLD AUTO: 38.3 % (ref 34–46.6)
HGB BLD-MCNC: 11.7 G/DL (ref 12–15.9)
IMM GRANULOCYTES # BLD AUTO: 0.03 10*3/MM3 (ref 0–0.05)
IMM GRANULOCYTES NFR BLD AUTO: 0.3 % (ref 0–0.5)
LYMPHOCYTES # BLD AUTO: 1.63 10*3/MM3 (ref 0.7–3.1)
LYMPHOCYTES NFR BLD AUTO: 18.3 % (ref 19.6–45.3)
MCH RBC QN AUTO: 25.5 PG (ref 26.6–33)
MCHC RBC AUTO-ENTMCNC: 30.5 G/DL (ref 31.5–35.7)
MCV RBC AUTO: 83.4 FL (ref 79–97)
MONOCYTES # BLD AUTO: 0.63 10*3/MM3 (ref 0.1–0.9)
MONOCYTES NFR BLD AUTO: 7.1 % (ref 5–12)
NEUTROPHILS NFR BLD AUTO: 6.51 10*3/MM3 (ref 1.7–7)
NEUTROPHILS NFR BLD AUTO: 73.2 % (ref 42.7–76)
NRBC BLD AUTO-RTO: 0 /100 WBC (ref 0–0.2)
PLATELET # BLD AUTO: 260 10*3/MM3 (ref 140–450)
PMV BLD AUTO: 10.3 FL (ref 6–12)
POTASSIUM SERPL-SCNC: 3.9 MMOL/L (ref 3.5–5.2)
RBC # BLD AUTO: 4.59 10*6/MM3 (ref 3.77–5.28)
SODIUM SERPL-SCNC: 142 MMOL/L (ref 136–145)
WBC NRBC COR # BLD AUTO: 8.9 10*3/MM3 (ref 3.4–10.8)

## 2024-12-20 PROCEDURE — 85025 COMPLETE CBC W/AUTO DIFF WBC: CPT | Performed by: PHYSICIAN ASSISTANT

## 2024-12-20 PROCEDURE — 80048 BASIC METABOLIC PNL TOTAL CA: CPT | Performed by: PHYSICIAN ASSISTANT

## 2024-12-20 PROCEDURE — 94761 N-INVAS EAR/PLS OXIMETRY MLT: CPT

## 2024-12-20 PROCEDURE — 25810000003 SODIUM CHLORIDE 0.9 % SOLUTION: Performed by: NURSE PRACTITIONER

## 2024-12-20 PROCEDURE — 74176 CT ABD & PELVIS W/O CONTRAST: CPT

## 2024-12-20 PROCEDURE — 96374 THER/PROPH/DIAG INJ IV PUSH: CPT

## 2024-12-20 PROCEDURE — 25010000002 ONDANSETRON PER 1 MG: Performed by: EMERGENCY MEDICINE

## 2024-12-20 PROCEDURE — 94799 UNLISTED PULMONARY SVC/PX: CPT

## 2024-12-20 PROCEDURE — 94664 DEMO&/EVAL PT USE INHALER: CPT

## 2024-12-20 PROCEDURE — G0378 HOSPITAL OBSERVATION PER HR: HCPCS

## 2024-12-20 RX ORDER — BUDESONIDE AND FORMOTEROL FUMARATE DIHYDRATE 160; 4.5 UG/1; UG/1
1 AEROSOL RESPIRATORY (INHALATION)
Status: DISCONTINUED | OUTPATIENT
Start: 2024-12-20 | End: 2024-12-20 | Stop reason: HOSPADM

## 2024-12-20 RX ORDER — SPIRONOLACTONE 25 MG/1
25 TABLET ORAL EVERY OTHER DAY
Status: DISCONTINUED | OUTPATIENT
Start: 2024-12-20 | End: 2024-12-20 | Stop reason: HOSPADM

## 2024-12-20 RX ORDER — POLYETHYLENE GLYCOL 3350 17 G/17G
17 POWDER, FOR SOLUTION ORAL DAILY
Qty: 24 EACH | Refills: 0 | Status: SHIPPED | OUTPATIENT
Start: 2024-12-20

## 2024-12-20 RX ORDER — METOCLOPRAMIDE 5 MG/1
5 TABLET ORAL DAILY
Status: DISCONTINUED | OUTPATIENT
Start: 2024-12-20 | End: 2024-12-20 | Stop reason: HOSPADM

## 2024-12-20 RX ORDER — ACETAMINOPHEN 500 MG
500 TABLET ORAL ONCE
Status: COMPLETED | OUTPATIENT
Start: 2024-12-20 | End: 2024-12-20

## 2024-12-20 RX ORDER — WHEAT DEXTRIN/ASPARTAME 3 G/6 G
1 POWDER IN PACKET (EA) ORAL DAILY
Qty: 28 EACH | Refills: 0 | Status: SHIPPED | OUTPATIENT
Start: 2024-12-20

## 2024-12-20 RX ORDER — SORBITOL SOLUTION 70 %
50 SOLUTION, ORAL MISCELLANEOUS ONCE
Status: COMPLETED | OUTPATIENT
Start: 2024-12-20 | End: 2024-12-20

## 2024-12-20 RX ORDER — CETIRIZINE HYDROCHLORIDE 10 MG/1
10 TABLET ORAL DAILY
Status: DISCONTINUED | OUTPATIENT
Start: 2024-12-20 | End: 2024-12-20 | Stop reason: HOSPADM

## 2024-12-20 RX ORDER — BACLOFEN 10 MG/1
5 TABLET ORAL NIGHTLY PRN
Status: DISCONTINUED | OUTPATIENT
Start: 2024-12-20 | End: 2024-12-20 | Stop reason: HOSPADM

## 2024-12-20 RX ORDER — LACTULOSE 10 G/15ML
300 SOLUTION ORAL ONCE
Status: COMPLETED | OUTPATIENT
Start: 2024-12-20 | End: 2024-12-20

## 2024-12-20 RX ORDER — METOPROLOL SUCCINATE 25 MG/1
25 TABLET, EXTENDED RELEASE ORAL DAILY
Status: DISCONTINUED | OUTPATIENT
Start: 2024-12-20 | End: 2024-12-20 | Stop reason: HOSPADM

## 2024-12-20 RX ADMIN — ACETAMINOPHEN 500 MG: 500 TABLET, FILM COATED ORAL at 01:39

## 2024-12-20 RX ADMIN — Medication 10 ML: at 08:20

## 2024-12-20 RX ADMIN — BUDESONIDE AND FORMOTEROL FUMARATE DIHYDRATE 1 PUFF: 160; 4.5 AEROSOL RESPIRATORY (INHALATION) at 08:00

## 2024-12-20 RX ADMIN — SORBITOL SOLUTION (BULK) 50 ML: 70 SOLUTION at 08:20

## 2024-12-20 RX ADMIN — LACTULOSE 300 ML: 10 SOLUTION ORAL at 13:30

## 2024-12-20 RX ADMIN — POLYETHYLENE GLYCOL 3350 17 G: 17 POWDER, FOR SOLUTION ORAL at 08:20

## 2024-12-20 RX ADMIN — ONDANSETRON 4 MG: 2 INJECTION INTRAMUSCULAR; INTRAVENOUS at 10:35

## 2024-12-20 RX ADMIN — SENNOSIDES AND DOCUSATE SODIUM 2 TABLET: 50; 8.6 TABLET ORAL at 08:20

## 2024-12-20 RX ADMIN — BISACODYL 5 MG: 5 TABLET, COATED ORAL at 08:20

## 2024-12-20 RX ADMIN — SODIUM CHLORIDE 500 ML: 9 INJECTION, SOLUTION INTRAVENOUS at 10:35

## 2024-12-20 NOTE — PLAN OF CARE
Goal Outcome Evaluation:    Patient sleeping between care. Denies pain/discomfort

## 2024-12-20 NOTE — DISCHARGE SUMMARY
[facility]    Etelvina Ulloa MD    CHIEF COMPLAINT:     Constipation     HISTORY OF PRESENT ILLNESS:    HPI  ED 12/19/2024  84-year-old female presents with constipation.  Patient states she last had a bowel movement on Sunday.  She has tried over-the-counter laxatives with no relief.  She states she has had this issue in the past and the only thing that worked was an enema.  She reports some mild abdominal discomfort.  She has had no nausea or vomiting.     Observation 12/20/2024  Patient agrees with HPI noted above including constipation x 5 days. No nausea, vomiting or abdominal pain. Passing gas. Able to tolerate po intake ok.     Reassessed in the afternoon and doing better. Has had multiple Bms and is eager for discharge.     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 procedure; surgically fixed the next day    GERD (gastroesophageal reflux disease)     Heart murmur     Hip pain, bilateral     Hypertension     Medicare annual wellness visit, subsequent 02/12/2020    Shoulder pain, right      Past Surgical History:   Procedure Laterality Date    ATRIAL APPENDAGE EXCLUSION LEFT WITH TRANSESOPHAGEAL ECHOCARDIOGRAM Right 7/7/2022    Procedure: Atrial Appendage Occlusion watchman team aware $;  Surgeon: Sage Garcia MD;  Location: [facility] CATH INVASIVE LOCATION;  Service: Cardiovascular;  Laterality: Right;    ATRIAL APPENDAGE EXCLUSION LEFT WITH TRANSESOPHAGEAL ECHOCARDIOGRAM N/A 7/7/2022    Procedure: Atrial Appendage Occlusion;  Surgeon: Fide Mullen MD;  Location: [facility] CATH INVASIVE LOCATION;  Service: Cardiovascular;  Laterality: N/A;    CATARACT EXTRACTION      CHOLECYSTECTOMY      HYSTERECTOMY  1987    Endometriosis    OOPHORECTOMY      PSEUDO  ANEURYSM REPAIR, EXTREMITY Right 7/8/2022    Procedure: REPAIR OF POST CATHETERIZATION RIGHT FEMORAL PSEUDOANEURYSM AND ARTERIOVENOUS FISTUAL;  Surgeon: Alfonzo Hooks MD;  Location: [facility] MAIN OR;  Service: Vascular;  Laterality: Right;    TONSILLECTOMY       Family History   Problem Relation Age of Onset    Breast cancer Mother 85    Stroke Mother     Heart disease Mother     Endometrial cancer Mother 70    Heart failure Father     Heart failure Sister     COPD Sister     Heart disease Sister      Social History     Tobacco Use    Smoking status: Never    Smokeless tobacco: Never   Vaping Use    Vaping status: Never Used   Substance Use Topics    Alcohol use: No    Drug use: Never     Medications Prior to Admission   Medication Sig Dispense Refill Last Dose/Taking    acetaminophen (TYLENOL) 500 MG tablet Take 1 tablet by mouth Every 6 (Six) Hours As Needed for Mild Pain.   Past Week    baclofen (LIORESAL) 10 MG tablet TAKE 1 TABLET BY MOUTH AT NIGHT AS NEEDED FOR MUSCLE SPASMS 30 tablet 1 Past Month    Cholecalciferol (VITAMIN D3) 2000 units capsule Take 1 capsule by mouth Daily.   12/18/2024 Evening    metoprolol succinate XL (TOPROL-XL) 25 MG 24 hr tablet TAKE 1 TABLET BY MOUTH DAILY 90 tablet 3 12/18/2024 Bedtime    Breo Ellipta 200-25 MCG/INH inhaler Inhale 1 puff Daily. 1 each 2     Cyanocobalamin (VITAMIN B12) 1000 MCG tablet controlled-release Take 2,500 mcg by mouth Daily.       FeroSul 325 (65 Fe) MG tablet TAKE 1 TABLET BY MOUTH THREE DAYS A WEEK( MONDAY, WEDNESDAY, FRIDAY) 45 tablet 1     furosemide (LASIX) 40 MG tablet TAKE 1 TABLET BY MOUTH DAILY AS NEEDED FOR SWELLING 90 tablet 1     loratadine (CLARITIN) 10 MG tablet Take 1 tablet by mouth Daily.       metoclopramide (REGLAN) 5 MG tablet Take 1 tablet by mouth Daily.       spironolactone (ALDACTONE) 25 MG tablet TAKE 1 TABLET BY MOUTH EVERY OTHER DAY 45 tablet 1     triamcinolone (KENALOG) 0.1 % cream Apply 1 Application topically to the  appropriate area as directed 2 (Two) Times a Day As Needed for Irritation. 80 g 1      Allergies:  Ciprofloxacin, Sulfa antibiotics, Iodine, and Sudafed [pseudoephedrine hcl]    Immunization History   Administered Date(s) Administered    Arexvy (RSV, Adults 60+ yrs) 12/08/2023    COVID-19 (MODERNA) 12YRS+ (SPIKEVAX) 10/30/2023, 09/09/2024    COVID-19 (MODERNA) 1st,2nd,3rd Dose Monovalent 01/20/2021, 02/17/2021, 08/26/2021    COVID-19 (MODERNA) Monovalent Original Booster 01/20/2021, 02/17/2021, 08/26/2021    COVID-19 (PFIZER) BIVALENT 12+YRS 09/10/2022    FLUAD TRI 65YR+ 10/25/2013, 11/13/2014, 09/30/2015, 09/25/2019    Fluad Quad 65+ 09/08/2020, 10/08/2022, 11/27/2023    Fluzone  >6mos 09/08/2020    Fluzone High-Dose 65+YRS 10/05/2016, 10/17/2017, 09/25/2019, 09/25/2024    Fluzone High-Dose 65+yrs 11/15/2021, 10/08/2022    H1N1 All Forms 01/29/2010    Hepatitis A 04/24/2018, 10/24/2018    Influenza Seasonal Injectable 10/25/2013, 11/13/2014, 09/30/2015    Pneumococcal Conjugate 13-Valent (PCV13) 02/24/2016    Pneumococcal Conjugate 20-Valent (PCV20) 01/17/2024    Shingrix 07/01/2019, 10/09/2019           REVIEW OF SYSTEMS:    Review of Systems   Constitutional: Negative for malaise/fatigue.   Gastrointestinal:  Positive for constipation. Negative for nausea and vomiting.   All other systems reviewed and are negative.        Vital Signs  Temp:  [98 °F (36.7 °C)-98.2 °F (36.8 °C)] 98.2 °F (36.8 °C)  Heart Rate:  [56-84] 83  Resp:  [15-20] 16  BP: (115-166)/(51-74) 123/57          Physical Exam:  Physical Exam  Vitals and nursing note reviewed.   Constitutional:       Appearance: Normal appearance.   HENT:      Head: Normocephalic and atraumatic.      Right Ear: External ear normal.      Left Ear: External ear normal.      Nose: Nose normal.      Mouth/Throat:      Mouth: Mucous membranes are moist.      Pharynx: Oropharynx is clear.   Eyes:      Extraocular Movements: Extraocular movements intact.   Cardiovascular:       Rate and Rhythm: Normal rate and regular rhythm.      Pulses: Normal pulses.      Heart sounds: Normal heart sounds.   Pulmonary:      Effort: Pulmonary effort is normal.      Breath sounds: Normal breath sounds.   Abdominal:      General: Abdomen is flat. Bowel sounds are normal. There is distension.      Palpations: Abdomen is soft.   Musculoskeletal:         General: Normal range of motion.      Cervical back: Normal range of motion.   Skin:     General: Skin is warm.   Neurological:      General: No focal deficit present.      Mental Status: She is alert and oriented to person, place, and time.   Psychiatric:         Mood and Affect: Mood normal.         Behavior: Behavior normal.           Emotional Behavior:    Normal    Debilities:   None  Results Review:    I reviewed the patient's new clinical results.  Lab Results (most recent)       Procedure Component Value Units Date/Time    Basic Metabolic Panel [[id number]]  (Abnormal) Collected: 12/20/24 0015    Specimen: Blood from Arm, Right Updated: 12/20/24 0123     Glucose 69 mg/dL      BUN 26 mg/dL      Creatinine 0.96 mg/dL      Sodium 142 mmol/L      Potassium 3.9 mmol/L      Comment: Specimen hemolyzed.  Result may be falsely elevated.        Chloride 107 mmol/L      CO2 21.6 mmol/L      Calcium 8.9 mg/dL      BUN/Creatinine Ratio 27.1     Anion Gap 13.4 mmol/L      eGFR 58.1 mL/min/1.73     Narrative:      GFR Categories in Chronic Kidney Disease (CKD)      GFR Category          GFR (mL/min/1.73)    Interpretation  G1                     90 or greater         Normal or high (1)  G2                      60-89                Mild decrease (1)  G3a                   45-59                Mild to moderate decrease  G3b                   30-44                Moderate to severe decrease  G4                    15-29                Severe decrease  G5                    14 or less           Kidney failure          (1)In the absence of evidence of kidney disease,  neither GFR category G1 or G2 fulfill the criteria for CKD.    eGFR calculation 2021 CKD-EPI creatinine equation, which does not include race as a factor    CBC & Differential [[id number]]  (Abnormal) Collected: 12/20/24 0015    Specimen: Blood from Arm, Right Updated: 12/20/24 0058    Narrative:      The following orders were created for panel order CBC & Differential.  Procedure                               Abnormality         Status                     ---------                               -----------         ------                     CBC Auto Differential[[id number]]        Abnormal            Final result                 Please view results for these tests on the individual orders.    CBC Auto Differential [[id number]]  (Abnormal) Collected: 12/20/24 0015    Specimen: Blood from Arm, Right Updated: 12/20/24 0058     WBC 8.90 10*3/mm3      RBC 4.59 10*6/mm3      Hemoglobin 11.7 g/dL      Hematocrit 38.3 %      MCV 83.4 fL      MCH 25.5 pg      MCHC 30.5 g/dL      RDW 12.9 %      RDW-SD 38.9 fl      MPV 10.3 fL      Platelets 260 10*3/mm3      Neutrophil % 73.2 %      Lymphocyte % 18.3 %      Monocyte % 7.1 %      Eosinophil % 0.8 %      Basophil % 0.3 %      Immature Grans % 0.3 %      Neutrophils, Absolute 6.51 10*3/mm3      Lymphocytes, Absolute 1.63 10*3/mm3      Monocytes, Absolute 0.63 10*3/mm3      Eosinophils, Absolute 0.07 10*3/mm3      Basophils, Absolute 0.03 10*3/mm3      Immature Grans, Absolute 0.03 10*3/mm3      nRBC 0.0 /100 WBC             Imaging Results (Most Recent)       Procedure Component Value Units Date/Time    CT Abdomen Pelvis Without Contrast [[id number]] Collected: 12/20/24 1156     Updated: 12/20/24 1205    Narrative:      CT ABDOMEN PELVIS WO CONTRAST    Date of Exam: 12/20/2024 11:52 AM EST    Indication: constipation.    Comparison: CT abdomen and pelvis 4/20/2022    Technique: Axial CT images were obtained of the abdomen and pelvis without the administration of contrast. Sagittal  and coronal reconstructions were performed.  Automated exposure control and iterative reconstruction methods were used.      Findings:  LUNG BASES: There is trace pericardial effusion. A moderate hiatal hernia is noted.    LIVER:  Unremarkable parenchyma without focal lesion.  BILIARY/GALLBLADDER: The gallbladder is surgically absent.  SPLEEN:  Unremarkable  PANCREAS:  Unremarkable  ADRENAL:  Unremarkable  KIDNEYS:  Unremarkable parenchyma with no solid mass identified. No obstruction.  No calculus identified.  GASTROINTESTINAL/MESENTERY: Evaluation of the gastrointestinal tract demonstrates significant constipation throughout the colon with a fecaloma in the rectal vault measuring 6.3 cm. There is marked fecal stasis throughout the sigmoid and descending   colon. Gas fluid levels are present within the transverse and ascending colon. There is gastric fluid distention with fluid extending into a moderate-sized hiatal hernia. Small bowel loops are fluid-filled measuring prominent in size up to 3.3 cm in   diameter with several gas/fluid levels noted. No free intraperitoneal gas.  AORTA/IVC:  Normal caliber.    RETROPERITONEUM/LYMPH NODES:  Unremarkable    REPRODUCTIVE: Likely surgically absent.  BLADDER:  Unremarkable    OSSEUS STRUCTURES: There is lumbar scoliosis convex left with multilevel degenerative disc and facet disease.      Impression:      Impression:    1. 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  · All other right sided veins appeared normal.  · Fluid noted right groin with no pseudoaneurysm after open repair.      Results for orders placed during the hospital encounter of 07/05/23    Adult Transesophageal Echo (MELISA) W/ Cont if Necessary Per Protocol    Interpretation Summary    Left ventricular systolic function is normal. Estimated left ventricular EF = 60%    Left ventricular wall thickness is consistent with mild to moderate concentric hypertrophy.    The right ventricular cavity is borderline dilated.    The left atrial cavity is moderately dilated.    The right atrial cavity is mildly  dilated.    There is mild calcification of the aortic valve.    Abnormal mitral valve structure consistent with dilated annulus.    Estimated right ventricular systolic pressure from tricuspid regurgitation is normal (<35 mmHg).    Procedure indication  History of atrial fibrillation and Watchman device in situ patient came for MELISA to evaluate watchman 1 year post implantation as per protocol    conscious sedation administered by anesthesia  Timeout before procedure    consent obtained before procedure      Procedure note  after obtaining a valid consent patient was sedated by Anesthesia and a MELISA probe was easily placed into esophagus with multiplane imaging with 2D, color and Doppler followed by bubble study with agitated saline without any complications    MELISA  Findings  The Watchman device is well-seated without any leak or thrombus there is no clot  LV function is normal with EF of 60%  Aortic valve mildly calcified without aortic stenosis  No effusion or shunt        Procedure done  Transesophageal echocardiography      Electronically signed by Sage Garcia MD, 07/05/23, 1:27 PM EDT.      Microbiology Results (last 10 days)       ** No results found for the last 240 hours. **            Assessment & Plan     Fecal impaction        Fecal impaction/Constipation  -CMP, CBC generally  unremarkable  -CT abdomen and pelvis showed fecaloma within the rectal vault with severe constipation  -Patient received 2 soap enemas in the ED without any relief  -In the observation unit she was given Dulcolax, senna, MiraLAX, sorbitol without much relief  -She was then given lactulose enema and had multiple bowel movements  -MiraLAX and Benefiber daily    Hypertension  -WELL Controlled   BP Readings from Last 1 Encounters:   12/20/24 123/57   - Continue Metoprolol  - Monitor while admitted     I discussed the patients findings and my recommendations with patient and nursing staff.     Discharge Diagnosis:      Fecal impaction      Hospital Course  Patient is a 84 y.o. female presented with constipation as noted in HPI above.  CMP and CBC unremarkable and CT abdomen and pelvis showed fecaloma with severe constipation.  Patient received 2 soap enemas in the ED without any relief and was admitted to the observation unit where she received multiple laxatives including Dulcolax, senna, MiraLAX, sorbitol and lactulose enema.  She was then able to have several bowel movements and felt significantly with relief. At this time, patient felt to be in good condition for discharge with close follow up with PCP. Recommend daily miralax and benefiber. Instructed to take all medications as prescribed and to return to ED if any concerning signs/symptoms. All test/lab results were discussed with patient. All questions were answered and patient verbalizes understanding.   .      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; surgically fixed the next day    GERD (gastroesophageal reflux disease)     Heart murmur     Hip pain, bilateral     Hypertension     Medicare annual wellness  visit, subsequent 02/12/2020    Shoulder pain, right        Past Surgical History:     Past Surgical History:   Procedure Laterality Date    ATRIAL APPENDAGE EXCLUSION LEFT WITH TRANSESOPHAGEAL ECHOCARDIOGRAM Right 7/7/2022    Procedure: Atrial Appendage Occlusion watchman team aware $;  Surgeon: Sage Garcia MD;  Location: [facility] CATH INVASIVE LOCATION;  Service: Cardiovascular;  Laterality: Right;    ATRIAL APPENDAGE EXCLUSION LEFT WITH TRANSESOPHAGEAL ECHOCARDIOGRAM N/A 7/7/2022    Procedure: Atrial Appendage Occlusion;  Surgeon: Fide Mullen MD;  Location: [facility] CATH INVASIVE LOCATION;  Service: Cardiovascular;  Laterality: N/A;    CATARACT EXTRACTION      CHOLECYSTECTOMY      HYSTERECTOMY  1987    Endometriosis    OOPHORECTOMY      PSEUDO ANEURYSM REPAIR, EXTREMITY Right 7/8/2022    Procedure: REPAIR OF POST CATHETERIZATION RIGHT FEMORAL PSEUDOANEURYSM AND ARTERIOVENOUS FISTUAL;  Surgeon: Alfonzo Hooks MD;  Location: [facility] MAIN OR;  Service: Vascular;  Laterality: Right;    TONSILLECTOMY         Social History:   Social History     Socioeconomic History    Marital status:    Tobacco Use    Smoking status: Never    Smokeless tobacco: Never   Vaping Use    Vaping status: Never Used   Substance and Sexual Activity    Alcohol use: No    Drug use: Never    Sexual activity: Not Currently       Procedures Performed         Consults:   Consults       No orders found for last 30 day(s).            Condition on Discharge:     Stable    Discharge Disposition  Home or Self Care    Discharge Medications     Discharge Medications        New Medications        Instructions Start Date   Benefiber Drink Mix pack   1 each, Oral, Daily      polyethylene glycol 17 g packet  Commonly known as: MIRALAX   17 g, Oral, Daily             Continue These Medications        Instructions Start Date   acetaminophen 500 MG tablet  Commonly known as: TYLENOL   500 mg, Every 6 Hours PRN      baclofen 10 MG  tablet  Commonly known as: LIORESAL   10 mg, Oral, Nightly PRN      Breo Ellipta 200-25 MCG/INH inhaler  Generic drug: Fluticasone Furoate-Vilanterol   1 puff, Inhalation, Daily - RT      FeroSul 325 (65 Fe) MG tablet  Generic drug: ferrous sulfate   TAKE 1 TABLET BY MOUTH THREE DAYS A WEEK( MONDAY, WEDNESDAY, FRIDAY)      furosemide 40 MG tablet  Commonly known as: LASIX   40 mg, Oral, Daily PRN, swelling      loratadine 10 MG tablet  Commonly known as: CLARITIN   10 mg, Daily      metoclopramide 5 MG tablet  Commonly known as: REGLAN   5 mg, Daily      metoprolol succinate XL 25 MG 24 hr tablet  Commonly known as: TOPROL-XL   25 mg, Oral, Daily      spironolactone 25 MG tablet  Commonly known as: ALDACTONE   TAKE 1 TABLET BY MOUTH EVERY OTHER DAY      triamcinolone 0.1 % cream  Commonly known as: KENALOG   1 Application, Topical, 2 Times Daily PRN      Vitamin B12 1000 MCG tablet controlled-release   2,500 mcg, Daily      Vitamin D3 50 MCG (2000 UT) capsule   2,000 Units, Daily               Discharge Diet:   Diet Instructions       Diet: Regular/House Diet; Regular (IDDSI 7); Thin (IDDSI 0)      Discharge Diet: Regular/House Diet    Texture: Regular (IDDSI 7)    Fluid Consistency: Thin (IDDSI 0)            Activity at Discharge:     Follow-up Appointments  Future Appointments   Date Time Provider Department Center   6/4/2025  3:15 PM Jeremy Mccall MD [facility] CAR NA P [facility] NA   6/9/2025  1:30 PM [facility] CC CT [facility] PET [facility]   6/12/2025  1:30 PM Juan Delaney MD [facility] RO [facility] None   6/12/2025  2:05 PM LAB MD  [facility] ONC LAB NA BH [facility] ONAL [facility]   6/12/2025  2:15 PM Frank Cheng MD [facility] ONC NA [facility]   7/14/2025  1:30 PM Sage Garcia MD [facility] CAR PARISA [facility]   11/17/2025  2:30 PM Etelvina Ulloa MD [facility] PC NGATE [facility]     Additional Instructions for the Follow-ups that You Need to Schedule       Discharge Follow-up with PCP   As directed       Currently Documented PCP:    Etelvina Ulloa MD    PCP Phone  Number:    [phone]     Follow Up Details: 5-7 days                Test Results Pending at Discharge  Pending Results       None             Risk for Readmission (LACE) Score: 5 (12/20/2024  6:00 AM)      Greater than 30 minutes spent in discharge activities for this patient    Signature:Electronically signed by ARIANA Lopez, 12/20/24, 2:46 PM EST.

## 2024-12-20 NOTE — ED NOTES
Approx 500 ml of tap water used for soap suds enema. Patient received all 50o ml, instructed to press call light when she feels she cannot hold anymore and feels the need to use bedside commode

## 2024-12-20 NOTE — ED NOTES
Patient ambulated to bedside commode after holding enema as long as she can. Patient sat on bedside commode for a few minutes and could not get anything to come out. States she does not feel the urge to defecate.

## 2024-12-20 NOTE — ED NOTES
Approx 500 cc of tap water soap suds enema given to pt rectally with pt lying on left side. Pt tolerated with no discomfort. Pt will continue to lie on left side until she has urge to defecate. Pt will then use call light to request help to bsc. Call light in hand.

## 2024-12-20 NOTE — PLAN OF CARE
Goal Outcome Evaluation:    Patient on Bowel regimen: miralax, senna, dulcolax - sorbitol given x1

## 2024-12-21 NOTE — OUTREACH NOTE
Prep Survey      Flowsheet Row Responses   [facility] patient discharged from? [facility]   Is LACE score < 7 ? No   Eligibility [facility]   Date of Admission 12/19/24   Date of Discharge 12/20/24   Discharge diagnosis Fecal impaction   Does the patient have one of the following disease processes/diagnoses(primary or secondary)? Other   Prep survey completed? Yes            Nita MUNOZ - Registered Nurse

## 2024-12-23 ENCOUNTER — OFFICE VISIT (OUTPATIENT)
Dept: FAMILY MEDICINE CLINIC | Facility: CLINIC | Age: 84
End: 2024-12-23
Payer: MEDICARE

## 2024-12-23 ENCOUNTER — TRANSITIONAL CARE MANAGEMENT TELEPHONE ENCOUNTER (OUTPATIENT)
Dept: CALL CENTER | Facility: HOSPITAL | Age: 84
End: 2024-12-23
Payer: MEDICARE

## 2024-12-23 VITALS
OXYGEN SATURATION: 98 % | DIASTOLIC BLOOD PRESSURE: 63 MMHG | SYSTOLIC BLOOD PRESSURE: 125 MMHG | HEIGHT: 61 IN | RESPIRATION RATE: 16 BRPM | WEIGHT: 133.4 LBS | TEMPERATURE: 98.2 F | HEART RATE: 61 BPM | BODY MASS INDEX: 25.19 KG/M2

## 2024-12-23 DIAGNOSIS — J40 BRONCHITIS: Primary | ICD-10-CM

## 2024-12-23 PROCEDURE — 1160F RVW MEDS BY RX/DR IN RCRD: CPT | Performed by: NURSE PRACTITIONER

## 2024-12-23 PROCEDURE — 1159F MED LIST DOCD IN RCRD: CPT | Performed by: NURSE PRACTITIONER

## 2024-12-23 PROCEDURE — 99213 OFFICE O/P EST LOW 20 MIN: CPT | Performed by: NURSE PRACTITIONER

## 2024-12-23 PROCEDURE — 3078F DIAST BP <80 MM HG: CPT | Performed by: NURSE PRACTITIONER

## 2024-12-23 PROCEDURE — G2211 COMPLEX E/M VISIT ADD ON: HCPCS | Performed by: NURSE PRACTITIONER

## 2024-12-23 PROCEDURE — 3074F SYST BP LT 130 MM HG: CPT | Performed by: NURSE PRACTITIONER

## 2024-12-23 RX ORDER — METHYLPREDNISOLONE 4 MG/1
TABLET ORAL
Qty: 1 EACH | Refills: 0 | Status: SHIPPED | OUTPATIENT
Start: 2024-12-23

## 2024-12-23 RX ORDER — AZITHROMYCIN 250 MG/1
TABLET, FILM COATED ORAL
Qty: 6 TABLET | Refills: 0 | Status: SHIPPED | OUTPATIENT
Start: 2024-12-23

## 2024-12-23 NOTE — PATIENT INSTRUCTIONS
WASH LEFT EYELID WITH BABYWASH LIQUID WITH GENTLE FINGERTIPS.  PERFORM WARM COMPRESSIONS WITH WARM WASHCLOTH.

## 2024-12-23 NOTE — OUTREACH NOTE
Call Center TCM Note      Flowsheet Row Responses   [facility] patient discharged from? [facility]   Does the patient have one of the following disease processes/diagnoses(primary or secondary)? Other   TCM attempt successful? Yes  [Patient completed PCP follow up appt today, TCM process is complete.]   Comments PCP follow up completed today, 12/23/24   Does the patient have an appointment with their PCP within 7-14 days of discharge? Yes   TCM call completed? Yes   Would this patient benefit from a Referral to [facility] Social Work? No   Is the patient interested in additional calls from an ambulatory ? No            Annabelle Johns RN    12/23/2024, 15:09 EST

## 2024-12-23 NOTE — PROGRESS NOTES
"Chief Complaint  Cough and Eye Problem    Subjective        Chrystal Ruiz presents to [facility]  History of Present Illness  85 y/o Chrystal, patient of Dr Ulloa, presents to PC office for c/o \"bad cough\" since last Thursday and stye on left bottom eyelid.  Patient denies fever, nausea or vomiting.  She denies chills or malaise.  She reports repetitive yellow productive cough.  She denies chest pain.    Patient reports that she was admitted to hospital via ER, last Thursday for constipation and stool impaction.  Patient states that she had to stay overnight and noticed the cough, then next day.  She states \"the hospital told her there was some congestion at bottom of both longs\" and she is worried that she might have pneumonia.  Patient denies fever or malaise.  Cough  This is a new problem. The current episode started in the past 7 days. The problem has been unchanged. The problem occurs every few minutes. The cough is Productive of yellow sputum. Associated symptoms include shortness of breath. Pertinent negatives include no chills, ear congestion, ear pain, eye redness, fever, myalgias, nasal congestion, postnasal drip, sore throat, weight loss or wheezing. Nothing aggravates the symptoms. She has tried rest and steroid inhaler for the symptoms. The treatment provided no relief. Her past medical history is significant for COPD.   Eye Problem   The left eye is affected. This is a new problem. The current episode started yesterday. The problem occurs constantly. The problem has been gradually worsening. There was no injury mechanism. There is No known exposure to pink eye. She Does not wear contacts. Associated symptoms include itching and a recent URI. Pertinent negatives include no blurred vision, eye discharge, double vision, eye redness, fever, foreign body sensation, nausea, photophobia or vomiting. She has tried nothing for the symptoms. The treatment provided no relief. " "      Objective   Vital Signs:  /63 (BP Location: Left arm, Patient Position: Sitting, Cuff Size: Adult)   Pulse 61   Temp 98.2 °F (36.8 °C) (Oral)   Resp 16   Ht 154.9 cm (61\")   Wt 60.5 kg (133 lb 6.4 oz)   SpO2 98%   BMI 25.21 kg/m²   Estimated body mass index is 25.21 kg/m² as calculated from the following:    Height as of this encounter: 154.9 cm (61\").    Weight as of this encounter: 60.5 kg (133 lb 6.4 oz).    Physical Exam  Constitutional:       General: She is not in acute distress.     Appearance: Normal appearance. She is not ill-appearing, toxic-appearing or diaphoretic.   HENT:      Head: Normocephalic and atraumatic.      Mouth/Throat:      Mouth: Mucous membranes are moist.      Pharynx: Oropharynx is clear.   Eyes:      General: Gaze aligned appropriately.         Left eye: Hordeolum present.     Extraocular Movements: Extraocular movements intact.      Right eye: Normal extraocular motion.      Left eye: Normal extraocular motion.      Conjunctiva/sclera:      Right eye: Right conjunctiva is not injected. No exudate.     Left eye: Left conjunctiva is not injected. No exudate.     Pupils: Pupils are equal, round, and reactive to light.     Pulmonary:      Effort: Pulmonary effort is normal.      Breath sounds: Normal breath sounds. Examination of the right-upper field reveals wheezing. Examination of the left-upper field reveals wheezing.   Skin:     General: Skin is warm and dry.   Neurological:      General: No focal deficit present.      Mental Status: She is alert and oriented to person, place, and time.      Motor: No weakness.   Psychiatric:         Mood and Affect: Mood normal.         Behavior: Behavior normal.         Thought Content: Thought content normal.         Judgment: Judgment normal.        Result Review :           Assessment and Plan   Diagnoses and all orders for this visit:    1. Bronchitis (Primary)  Assessment & Plan:  Refill of Trelegy requested by patient and " provided.    Orders:  -     azithromycin (Zithromax Z-Long) 250 MG tablet; Take 2 tablets by mouth on day 1, then 1 tablet daily on days 2-5  Dispense: 6 tablet; Refill: 0  -     methylPREDNISolone (MEDROL) 4 MG dose pack; Take as directed on package instructions.  Dispense: 1 each; Refill: 0           I spent 30 minutes caring for Chrystal on this date of service. This time includes time spent by me in the following activities:preparing for the visit, reviewing tests, performing a medically appropriate examination and/or evaluation , counseling and educating the patient/family/caregiver, ordering medications, tests, or procedures, and documenting information in the medical record  Follow Up   Return if symptoms worsen or fail to improve.  Patient was given instructions and counseling regarding her condition or for health maintenance advice. Please see specific information pulled into the AVS if appropriate.

## 2024-12-23 NOTE — CASE MANAGEMENT/SOCIAL WORK
Case Management Discharge Note      Final Note: Home         Selected Continued Care - Discharged on 12/20/2024 Admission date: 12/19/2024 - Discharge disposition: Home or Self Care       Transportation Services  Private: Car    Final Discharge Disposition Code: 01 - home or self-care

## 2024-12-29 ENCOUNTER — APPOINTMENT (OUTPATIENT)
Dept: GENERAL RADIOLOGY | Facility: HOSPITAL | Age: 84
End: 2024-12-29
Payer: MEDICARE

## 2024-12-29 ENCOUNTER — HOSPITAL ENCOUNTER (EMERGENCY)
Facility: HOSPITAL | Age: 84
Discharge: HOME | End: 2024-12-29
Attending: EMERGENCY MEDICINE | Admitting: EMERGENCY MEDICINE
Payer: MEDICARE

## 2024-12-29 VITALS
DIASTOLIC BLOOD PRESSURE: 58 MMHG | HEART RATE: 62 BPM | WEIGHT: 129.85 LBS | HEIGHT: 61 IN | OXYGEN SATURATION: 94 % | BODY MASS INDEX: 24.52 KG/M2 | SYSTOLIC BLOOD PRESSURE: 154 MMHG | RESPIRATION RATE: 19 BRPM | TEMPERATURE: 98.2 F

## 2024-12-29 DIAGNOSIS — B97.81 ACUTE BRONCHITIS DUE TO HUMAN METAPNEUMOVIRUS: Primary | ICD-10-CM

## 2024-12-29 DIAGNOSIS — J20.8 ACUTE BRONCHITIS DUE TO HUMAN METAPNEUMOVIRUS: Primary | ICD-10-CM

## 2024-12-29 LAB
ANION GAP SERPL CALCULATED.3IONS-SCNC: 9.8 MMOL/L (ref 5–15)
B PARAPERT DNA SPEC QL NAA+PROBE: NOT DETECTED
B PERT DNA SPEC QL NAA+PROBE: NOT DETECTED
BASOPHILS # BLD AUTO: 0.06 10*3/MM3 (ref 0–0.2)
BASOPHILS NFR BLD AUTO: 0.9 % (ref 0–1.5)
BUN SERPL-MCNC: 28 MG/DL (ref 8–23)
BUN/CREAT SERPL: 27.5 (ref 7–25)
C PNEUM DNA NPH QL NAA+NON-PROBE: NOT DETECTED
CALCIUM SPEC-SCNC: 9.1 MG/DL (ref 8.6–10.5)
CHLORIDE SERPL-SCNC: 106 MMOL/L (ref 98–107)
CO2 SERPL-SCNC: 26.2 MMOL/L (ref 22–29)
CREAT SERPL-MCNC: 1.02 MG/DL (ref 0.57–1)
DEPRECATED RDW RBC AUTO: 40.2 FL (ref 37–54)
EGFRCR SERPLBLD CKD-EPI 2021: 54 ML/MIN/1.73
EOSINOPHIL # BLD AUTO: 0.05 10*3/MM3 (ref 0–0.4)
EOSINOPHIL NFR BLD AUTO: 0.7 % (ref 0.3–6.2)
ERYTHROCYTE [DISTWIDTH] IN BLOOD BY AUTOMATED COUNT: 13.2 % (ref 12.3–15.4)
FLUAV SUBTYP SPEC NAA+PROBE: NOT DETECTED
FLUBV RNA ISLT QL NAA+PROBE: NOT DETECTED
GLUCOSE SERPL-MCNC: 104 MG/DL (ref 65–99)
HADV DNA SPEC NAA+PROBE: NOT DETECTED
HCOV 229E RNA SPEC QL NAA+PROBE: NOT DETECTED
HCOV HKU1 RNA SPEC QL NAA+PROBE: NOT DETECTED
HCOV NL63 RNA SPEC QL NAA+PROBE: NOT DETECTED
HCOV OC43 RNA SPEC QL NAA+PROBE: NOT DETECTED
HCT VFR BLD AUTO: 38 % (ref 34–46.6)
HGB BLD-MCNC: 11.8 G/DL (ref 12–15.9)
HMPV RNA NPH QL NAA+NON-PROBE: DETECTED
HPIV1 RNA ISLT QL NAA+PROBE: NOT DETECTED
HPIV2 RNA SPEC QL NAA+PROBE: NOT DETECTED
HPIV3 RNA NPH QL NAA+PROBE: NOT DETECTED
HPIV4 P GENE NPH QL NAA+PROBE: NOT DETECTED
IMM GRANULOCYTES # BLD AUTO: 0.24 10*3/MM3 (ref 0–0.05)
IMM GRANULOCYTES NFR BLD AUTO: 3.6 % (ref 0–0.5)
LYMPHOCYTES # BLD AUTO: 2.11 10*3/MM3 (ref 0.7–3.1)
LYMPHOCYTES NFR BLD AUTO: 31.4 % (ref 19.6–45.3)
M PNEUMO IGG SER IA-ACNC: NOT DETECTED
MCH RBC QN AUTO: 25.9 PG (ref 26.6–33)
MCHC RBC AUTO-ENTMCNC: 31.1 G/DL (ref 31.5–35.7)
MCV RBC AUTO: 83.5 FL (ref 79–97)
MONOCYTES # BLD AUTO: 0.71 10*3/MM3 (ref 0.1–0.9)
MONOCYTES NFR BLD AUTO: 10.6 % (ref 5–12)
NEUTROPHILS NFR BLD AUTO: 3.55 10*3/MM3 (ref 1.7–7)
NEUTROPHILS NFR BLD AUTO: 52.8 % (ref 42.7–76)
NRBC BLD AUTO-RTO: 0 /100 WBC (ref 0–0.2)
PLATELET # BLD AUTO: 252 10*3/MM3 (ref 140–450)
PMV BLD AUTO: 9.5 FL (ref 6–12)
POTASSIUM SERPL-SCNC: 4.2 MMOL/L (ref 3.5–5.2)
RBC # BLD AUTO: 4.55 10*6/MM3 (ref 3.77–5.28)
RHINOVIRUS RNA SPEC NAA+PROBE: NOT DETECTED
RSV RNA NPH QL NAA+NON-PROBE: NOT DETECTED
SARS-COV-2 RNA RESP QL NAA+PROBE: NOT DETECTED
SODIUM SERPL-SCNC: 142 MMOL/L (ref 136–145)
WBC NRBC COR # BLD AUTO: 6.72 10*3/MM3 (ref 3.4–10.8)

## 2024-12-29 PROCEDURE — 85025 COMPLETE CBC W/AUTO DIFF WBC: CPT | Performed by: EMERGENCY MEDICINE

## 2024-12-29 PROCEDURE — 25010000002 METHYLPREDNISOLONE PER 125 MG: Performed by: EMERGENCY MEDICINE

## 2024-12-29 PROCEDURE — 99283 EMERGENCY DEPT VISIT LOW MDM: CPT

## 2024-12-29 PROCEDURE — 0202U NFCT DS 22 TRGT SARS-COV-2: CPT | Performed by: EMERGENCY MEDICINE

## 2024-12-29 PROCEDURE — 96374 THER/PROPH/DIAG INJ IV PUSH: CPT

## 2024-12-29 PROCEDURE — 71045 X-RAY EXAM CHEST 1 VIEW: CPT

## 2024-12-29 PROCEDURE — 80048 BASIC METABOLIC PNL TOTAL CA: CPT | Performed by: EMERGENCY MEDICINE

## 2024-12-29 RX ORDER — METHYLPREDNISOLONE SODIUM SUCCINATE 125 MG/2ML
125 INJECTION, POWDER, LYOPHILIZED, FOR SOLUTION INTRAMUSCULAR; INTRAVENOUS ONCE
Status: COMPLETED | OUTPATIENT
Start: 2024-12-29 | End: 2024-12-29

## 2024-12-29 RX ORDER — SODIUM CHLORIDE 0.9 % (FLUSH) 0.9 %
10 SYRINGE (ML) INJECTION AS NEEDED
Status: DISCONTINUED | OUTPATIENT
Start: 2024-12-29 | End: 2024-12-29 | Stop reason: HOSPADM

## 2024-12-29 RX ORDER — PREDNISONE 20 MG/1
20 TABLET ORAL DAILY
Qty: 5 TABLET | Refills: 0 | Status: SHIPPED | OUTPATIENT
Start: 2024-12-29

## 2024-12-29 RX ADMIN — METHYLPREDNISOLONE SODIUM SUCCINATE 125 MG: 125 INJECTION, POWDER, FOR SOLUTION INTRAMUSCULAR; INTRAVENOUS at 13:39

## 2024-12-29 NOTE — ED NOTES
"Pt reports cough and congestion since last Monday, some SOA with exertion and with coughing. Pt denies any CP, no fevers. Does report feeling \"woozy\" twice today. Denies any n/v/d.  "

## 2024-12-30 ENCOUNTER — PATIENT OUTREACH (OUTPATIENT)
Dept: CASE MANAGEMENT | Facility: OTHER | Age: 84
End: 2024-12-30
Payer: MEDICARE

## 2024-12-30 NOTE — OUTREACH NOTE
AMBULATORY CASE MANAGEMENT NOTE    Names and Relationships of Patient/Support Persons: Contact: Chrystal Ruiz; Relationship: Self -     Patient Outreach    Pt discharged from [facility] ED on 12/29/24, seen for acute bronchitis. RN-ACM outreach call made to pt. Explained role of RN-ACM and reason for call. Pt reports improvement in symptoms. Reviewed ED AVS with pt. Education provided. Pt reports she is taking steroid rx as directed. Pt has next routine PCP appt scheduled. No questions or needs per pt, advised her to call with any. Follow up outreach prn.     Send Education  Questions/Answers      Flowsheet Row Most Recent Value   Annual Wellness Visit:  Patient Has Completed   Other Patient Education/Resources  24/7 [facility] Nurse Call Line   24/7 Nurse Call Line Education Method Verbal   Advanced Directives: --  [resources provided]          SDOH updated and reviewed with the patient during this program:  Questionnaire sent via [facility]  Ambulatory Case Management    12/30/2024, 15:44 EST

## 2025-01-02 ENCOUNTER — READMISSION MANAGEMENT (OUTPATIENT)
Dept: CALL CENTER | Facility: HOSPITAL | Age: 85
End: 2025-01-02
Payer: MEDICARE

## 2025-01-02 NOTE — OUTREACH NOTE
Medical Week 2 Survey      Flowsheet Row Responses   [facility] facility patient discharged from? [facility]   Does the patient have one of the following disease processes/diagnoses(primary or secondary)? Other   Week 2 attempt successful? No   Unsuccessful attempts Attempt 1            Maira Barrett Registered Nurse

## 2025-01-07 ENCOUNTER — READMISSION MANAGEMENT (OUTPATIENT)
Dept: CALL CENTER | Facility: HOSPITAL | Age: 85
End: 2025-01-07
Payer: MEDICARE

## 2025-01-07 NOTE — OUTREACH NOTE
Medical Week 2 Survey      Flowsheet Row Responses   [facility] facility patient discharged from? [facility]   Does the patient have one of the following disease processes/diagnoses(primary or secondary)? Other   Week 2 attempt successful? No   Unsuccessful attempts Attempt 2   Revoke Other transitional program            Emmanuel MCGEE - Registered Nurse

## 2025-01-13 ENCOUNTER — OFFICE VISIT (OUTPATIENT)
Dept: FAMILY MEDICINE CLINIC | Facility: CLINIC | Age: 85
End: 2025-01-13
Payer: MEDICARE

## 2025-01-13 VITALS
WEIGHT: 128.4 LBS | HEIGHT: 61 IN | SYSTOLIC BLOOD PRESSURE: 129 MMHG | OXYGEN SATURATION: 95 % | RESPIRATION RATE: 18 BRPM | TEMPERATURE: 97.5 F | DIASTOLIC BLOOD PRESSURE: 75 MMHG | BODY MASS INDEX: 24.24 KG/M2 | HEART RATE: 63 BPM

## 2025-01-13 DIAGNOSIS — K59.04 CHRONIC IDIOPATHIC CONSTIPATION: ICD-10-CM

## 2025-01-13 DIAGNOSIS — L03.116 CELLULITIS OF LEFT LEG: Primary | ICD-10-CM

## 2025-01-13 PROBLEM — L03.115 CELLULITIS OF RIGHT ANTERIOR LOWER LEG: Status: RESOLVED | Noted: 2020-03-20 | Resolved: 2025-01-13

## 2025-01-13 PROCEDURE — 1126F AMNT PAIN NOTED NONE PRSNT: CPT | Performed by: FAMILY MEDICINE

## 2025-01-13 PROCEDURE — 3074F SYST BP LT 130 MM HG: CPT | Performed by: FAMILY MEDICINE

## 2025-01-13 PROCEDURE — 99213 OFFICE O/P EST LOW 20 MIN: CPT | Performed by: FAMILY MEDICINE

## 2025-01-13 PROCEDURE — 3078F DIAST BP <80 MM HG: CPT | Performed by: FAMILY MEDICINE

## 2025-01-13 PROCEDURE — G2211 COMPLEX E/M VISIT ADD ON: HCPCS | Performed by: FAMILY MEDICINE

## 2025-01-13 RX ORDER — CEPHALEXIN 500 MG/1
500 CAPSULE ORAL 3 TIMES DAILY
Qty: 21 CAPSULE | Refills: 0 | Status: SHIPPED | OUTPATIENT
Start: 2025-01-13

## 2025-01-13 RX ORDER — TRIAMCINOLONE ACETONIDE 1 MG/G
1 CREAM TOPICAL 2 TIMES DAILY PRN
Qty: 80 G | Refills: 1 | Status: SHIPPED | OUTPATIENT
Start: 2025-01-13

## 2025-01-13 NOTE — PROGRESS NOTES
"Chief Complaint  Cellulitis of Left Leg    Subjective        Chrystal Ruiz presents to [facility]  Rash  This is a new problem. The current episode started in the past 7 days. The problem is unchanged. The affected locations include the left lower leg. The rash is characterized by pain, redness and swelling. She was exposed to nothing. Pertinent negatives include no facial edema, fatigue, fever, rhinorrhea or shortness of breath. Past treatments include nothing. The treatment provided no relief.       Objective   Vital Signs:  /75 (BP Location: Left arm, Patient Position: Sitting, Cuff Size: Adult)   Pulse 63   Temp 97.5 °F (36.4 °C) (Temporal)   Resp 18   Ht 154.9 cm (61\")   Wt 58.2 kg (128 lb 6.4 oz)   SpO2 95%   BMI 24.26 kg/m²   Estimated body mass index is 24.26 kg/m² as calculated from the following:    Height as of this encounter: 154.9 cm (61\").    Weight as of this encounter: 58.2 kg (128 lb 6.4 oz).    BMI is within normal parameters. No other follow-up for BMI required.      Physical Exam  Vitals and nursing note reviewed.   Constitutional:       General: She is not in acute distress.     Appearance: She is well-developed.   HENT:      Head: Normocephalic.   Eyes:      General: Lids are normal.      Conjunctiva/sclera: Conjunctivae normal.   Neck:      Thyroid: No thyroid mass or thyromegaly.      Trachea: Trachea normal.   Cardiovascular:      Rate and Rhythm: Normal rate and regular rhythm.      Heart sounds: Normal heart sounds.   Pulmonary:      Effort: Pulmonary effort is normal.      Breath sounds: Normal breath sounds.   Abdominal:      Palpations: Abdomen is soft.   Musculoskeletal:      Cervical back: Normal range of motion.      Left lower leg: Edema present.   Lymphadenopathy:      Cervical: No cervical adenopathy.   Skin:     General: Skin is warm and dry.      Findings: Rash present.   Neurological:      Mental Status: She is alert and oriented " to person, place, and time.   Psychiatric:         Attention and Perception: She is attentive.         Mood and Affect: Mood normal.         Speech: Speech normal.         Behavior: Behavior normal.        Result Review :  The following data was reviewed by: Etelvina Ulloa MD on 01/13/2025:  Common labs          12/12/2024    14:13 12/20/2024    00:15 12/29/2024    11:25   Common Labs   Glucose 91  69  104    BUN 24  26  28    Creatinine 1.13  0.96  1.02    Sodium 141  142  142    Potassium 4.2  3.9  4.2    Chloride 105  107  106    Calcium 10.0  8.9  9.1    Albumin 4.3      Total Bilirubin 0.6      Alkaline Phosphatase 70      AST (SGOT) 21      ALT (SGPT) 20      WBC 7.87  8.90  6.72    Hemoglobin 12.4  11.7  11.8    Hematocrit 40.9  38.3  38.0    Platelets 275  260  252                Assessment and Plan   Diagnoses and all orders for this visit:    1. Cellulitis of left leg (Primary)  -     triamcinolone (KENALOG) 0.1 % cream; Apply 1 Application topically to the appropriate area as directed 2 (Two) Times a Day As Needed for Irritation.  Dispense: 80 g; Refill: 1  -     cephalexin (Keflex) 500 MG capsule; Take 1 capsule by mouth 3 (Three) Times a Day.  Dispense: 21 capsule; Refill: 0    2. Chronic idiopathic constipation             Follow Up   Return if symptoms worsen or fail to improve.  Patient was given instructions and counseling regarding her condition or for health maintenance advice. Please see specific information pulled into the AVS if appropriate.

## 2025-01-31 ENCOUNTER — TELEPHONE (OUTPATIENT)
Dept: FAMILY MEDICINE CLINIC | Facility: CLINIC | Age: 85
End: 2025-01-31

## 2025-01-31 ENCOUNTER — OFFICE VISIT (OUTPATIENT)
Dept: FAMILY MEDICINE CLINIC | Facility: CLINIC | Age: 85
End: 2025-01-31
Payer: MEDICARE

## 2025-01-31 VITALS
BODY MASS INDEX: 24.17 KG/M2 | HEIGHT: 61 IN | RESPIRATION RATE: 18 BRPM | WEIGHT: 128 LBS | OXYGEN SATURATION: 96 % | DIASTOLIC BLOOD PRESSURE: 74 MMHG | TEMPERATURE: 97.9 F | HEART RATE: 71 BPM | SYSTOLIC BLOOD PRESSURE: 138 MMHG

## 2025-01-31 DIAGNOSIS — R13.19 ESOPHAGEAL DYSPHAGIA: ICD-10-CM

## 2025-01-31 DIAGNOSIS — K21.9 GASTRO-ESOPHAGEAL REFLUX DISEASE WITHOUT ESOPHAGITIS: Primary | ICD-10-CM

## 2025-01-31 PROCEDURE — 1126F AMNT PAIN NOTED NONE PRSNT: CPT | Performed by: FAMILY MEDICINE

## 2025-01-31 PROCEDURE — 99213 OFFICE O/P EST LOW 20 MIN: CPT | Performed by: FAMILY MEDICINE

## 2025-01-31 PROCEDURE — 3075F SYST BP GE 130 - 139MM HG: CPT | Performed by: FAMILY MEDICINE

## 2025-01-31 PROCEDURE — 3078F DIAST BP <80 MM HG: CPT | Performed by: FAMILY MEDICINE

## 2025-01-31 RX ORDER — FAMOTIDINE 20 MG/1
20 TABLET, FILM COATED ORAL 2 TIMES DAILY
Start: 2025-01-31

## 2025-01-31 NOTE — PROGRESS NOTES
"Chief Complaint  GI Problem    Subjective        Chrystal Ruiz presents to [facility]  GI Problem  The primary symptoms include fatigue and arthralgias. Primary symptoms do not include fever, vomiting, melena, hematemesis, hematochezia or rash.   The illness is also significant for dysphagia and constipation. The illness does not include chills. Significant associated medical issues include GERD. Associated medical issues do not include inflammatory bowel disease, liver disease, alcohol abuse, gastric bypass or diverticulitis.       Objective   Vital Signs:  /74   Pulse 71   Temp 97.9 °F (36.6 °C) (Temporal)   Resp 18   Ht 154.9 cm (61\")   Wt 58.1 kg (128 lb)   SpO2 96%   BMI 24.19 kg/m²   Estimated body mass index is 24.19 kg/m² as calculated from the following:    Height as of this encounter: 154.9 cm (61\").    Weight as of this encounter: 58.1 kg (128 lb).    BMI is within normal parameters. No other follow-up for BMI required.      Physical Exam  Vitals and nursing note reviewed.   Constitutional:       General: She is not in acute distress.     Appearance: She is well-developed.   HENT:      Head: Normocephalic.   Eyes:      General: Lids are normal.      Conjunctiva/sclera: Conjunctivae normal.   Neck:      Thyroid: No thyroid mass or thyromegaly.      Trachea: Trachea normal.   Cardiovascular:      Rate and Rhythm: Normal rate and regular rhythm.      Heart sounds: Normal heart sounds.   Pulmonary:      Effort: Pulmonary effort is normal.      Breath sounds: Normal breath sounds.   Abdominal:      Palpations: Abdomen is soft.   Musculoskeletal:      Cervical back: Normal range of motion.   Lymphadenopathy:      Cervical: No cervical adenopathy.   Skin:     General: Skin is warm and dry.   Neurological:      Mental Status: She is alert and oriented to person, place, and time.   Psychiatric:         Attention and Perception: She is attentive.         Mood and " Affect: Mood normal.         Speech: Speech normal.         Behavior: Behavior normal.        Result Review :  The following data was reviewed by: Etelvina Ulloa MD on 01/31/2025:  Common labs          12/20/2024    00:15 12/29/2024    11:25 2/5/2025    12:14   Common Labs   Glucose 69  104     BUN 26  28     Creatinine 0.96  1.02     Sodium 142  142     Potassium 3.9  4.2     Chloride 107  106     Calcium 8.9  9.1     WBC 8.90  6.72  7.75    Hemoglobin 11.7  11.8  11.5    Hematocrit 38.3  38.0  36.1    Platelets 260  252  321                Assessment and Plan   Diagnoses and all orders for this visit:    1. Gastro-esophageal reflux disease without esophagitis (Primary)  -     Ambulatory referral for Screening EGD  -     famotidine (Pepcid) 20 MG tablet; Take 1 tablet by mouth 2 (Two) Times a Day.    2. Esophageal dysphagia  -     Ambulatory referral for Screening EGD             Follow Up   No follow-ups on file.  Patient was given instructions and counseling regarding her condition or for health maintenance advice. Please see specific information pulled into the AVS if appropriate.

## 2025-01-31 NOTE — TELEPHONE ENCOUNTER
Dr Ulloa,   Mrs Ruiz forgot to tell you she was at the dentist.  They said she had a little bitty mouth ulcer.  Did not instruct her what to do.  I told her if it was not bothering her leave it alone, or saltwater swish

## 2025-02-05 ENCOUNTER — LAB (OUTPATIENT)
Dept: LAB | Facility: HOSPITAL | Age: 85
End: 2025-02-05
Payer: MEDICARE

## 2025-02-05 ENCOUNTER — HOSPITAL ENCOUNTER (OUTPATIENT)
Dept: CARDIOLOGY | Facility: HOSPITAL | Age: 85
Discharge: HOME | End: 2025-02-05
Payer: MEDICARE

## 2025-02-05 LAB
APTT PPP: 28.6 SECONDS (ref 22.7–35.4)
DEPRECATED RDW RBC AUTO: 36.1 FL (ref 37–54)
ERYTHROCYTE [DISTWIDTH] IN BLOOD BY AUTOMATED COUNT: 12.4 % (ref 12.3–15.4)
HCT VFR BLD AUTO: 36.1 % (ref 34–46.6)
HGB BLD-MCNC: 11.5 G/DL (ref 12–15.9)
INR PPP: 1.08 (ref 0.9–1.1)
MCH RBC QN AUTO: 25.8 PG (ref 26.6–33)
MCHC RBC AUTO-ENTMCNC: 31.9 G/DL (ref 31.5–35.7)
MCV RBC AUTO: 81.1 FL (ref 79–97)
PLATELET # BLD AUTO: 321 10*3/MM3 (ref 140–450)
PMV BLD AUTO: 10.4 FL (ref 6–12)
PROTHROMBIN TIME: 14 SECONDS (ref 11.7–14.2)
RBC # BLD AUTO: 4.45 10*6/MM3 (ref 3.77–5.28)
WBC NRBC COR # BLD AUTO: 7.75 10*3/MM3 (ref 3.4–10.8)

## 2025-02-05 PROCEDURE — 85027 COMPLETE CBC AUTOMATED: CPT

## 2025-02-05 PROCEDURE — 93005 ELECTROCARDIOGRAM TRACING: CPT | Performed by: INTERNAL MEDICINE

## 2025-02-05 PROCEDURE — 85730 THROMBOPLASTIN TIME PARTIAL: CPT

## 2025-02-05 PROCEDURE — 85610 PROTHROMBIN TIME: CPT

## 2025-02-06 ENCOUNTER — ANESTHESIA EVENT (OUTPATIENT)
Dept: GASTROENTEROLOGY | Facility: HOSPITAL | Age: 85
End: 2025-02-06
Payer: MEDICARE

## 2025-02-07 ENCOUNTER — ANESTHESIA (OUTPATIENT)
Dept: GASTROENTEROLOGY | Facility: HOSPITAL | Age: 85
End: 2025-02-07
Payer: MEDICARE

## 2025-02-07 ENCOUNTER — HOSPITAL ENCOUNTER (OUTPATIENT)
Facility: HOSPITAL | Age: 85
Setting detail: HOSPITAL OUTPATIENT SURGERY
Discharge: HOME | End: 2025-02-07
Attending: INTERNAL MEDICINE | Admitting: INTERNAL MEDICINE
Payer: MEDICARE

## 2025-02-07 VITALS
HEIGHT: 61 IN | TEMPERATURE: 98.3 F | OXYGEN SATURATION: 96 % | DIASTOLIC BLOOD PRESSURE: 54 MMHG | SYSTOLIC BLOOD PRESSURE: 119 MMHG | WEIGHT: 131 LBS | BODY MASS INDEX: 24.73 KG/M2 | RESPIRATION RATE: 24 BRPM | HEART RATE: 87 BPM

## 2025-02-07 DIAGNOSIS — R91.1 LUNG NODULE: ICD-10-CM

## 2025-02-07 LAB
B PARAPERT DNA SPEC QL NAA+PROBE: NOT DETECTED
B PERT DNA SPEC QL NAA+PROBE: NOT DETECTED
C PNEUM DNA NPH QL NAA+NON-PROBE: NOT DETECTED
FLUAV SUBTYP SPEC NAA+PROBE: NOT DETECTED
FLUBV RNA ISLT QL NAA+PROBE: NOT DETECTED
HADV DNA SPEC NAA+PROBE: NOT DETECTED
HCOV 229E RNA SPEC QL NAA+PROBE: NOT DETECTED
HCOV HKU1 RNA SPEC QL NAA+PROBE: NOT DETECTED
HCOV NL63 RNA SPEC QL NAA+PROBE: NOT DETECTED
HCOV OC43 RNA SPEC QL NAA+PROBE: NOT DETECTED
HMPV RNA NPH QL NAA+NON-PROBE: DETECTED
HPIV1 RNA ISLT QL NAA+PROBE: NOT DETECTED
HPIV2 RNA SPEC QL NAA+PROBE: NOT DETECTED
HPIV3 RNA NPH QL NAA+PROBE: NOT DETECTED
HPIV4 P GENE NPH QL NAA+PROBE: NOT DETECTED
M PNEUMO IGG SER IA-ACNC: NOT DETECTED
RHINOVIRUS RNA SPEC NAA+PROBE: NOT DETECTED
RSV RNA NPH QL NAA+NON-PROBE: NOT DETECTED
SARS-COV-2 RNA RESP QL NAA+PROBE: NOT DETECTED

## 2025-02-07 PROCEDURE — 25010000002 PROPOFOL 10 MG/ML EMULSION: Performed by: NURSE ANESTHETIST, CERTIFIED REGISTERED

## 2025-02-07 PROCEDURE — 25810000003 SODIUM CHLORIDE 0.9 % SOLUTION: Performed by: NURSE ANESTHETIST, CERTIFIED REGISTERED

## 2025-02-07 PROCEDURE — 0202U NFCT DS 22 TRGT SARS-COV-2: CPT | Performed by: INTERNAL MEDICINE

## 2025-02-07 PROCEDURE — 87116 MYCOBACTERIA CULTURE: CPT | Performed by: INTERNAL MEDICINE

## 2025-02-07 PROCEDURE — 88108 CYTOPATH CONCENTRATE TECH: CPT | Performed by: INTERNAL MEDICINE

## 2025-02-07 PROCEDURE — 94664 DEMO&/EVAL PT USE INHALER: CPT

## 2025-02-07 PROCEDURE — 94640 AIRWAY INHALATION TREATMENT: CPT

## 2025-02-07 PROCEDURE — 87798 DETECT AGENT NOS DNA AMP: CPT | Performed by: INTERNAL MEDICINE

## 2025-02-07 PROCEDURE — 87205 SMEAR GRAM STAIN: CPT | Performed by: INTERNAL MEDICINE

## 2025-02-07 PROCEDURE — 87206 SMEAR FLUORESCENT/ACID STAI: CPT | Performed by: INTERNAL MEDICINE

## 2025-02-07 PROCEDURE — 87102 FUNGUS ISOLATION CULTURE: CPT | Performed by: INTERNAL MEDICINE

## 2025-02-07 PROCEDURE — 94799 UNLISTED PULMONARY SVC/PX: CPT

## 2025-02-07 PROCEDURE — 94761 N-INVAS EAR/PLS OXIMETRY MLT: CPT

## 2025-02-07 PROCEDURE — 87070 CULTURE OTHR SPECIMN AEROBIC: CPT | Performed by: INTERNAL MEDICINE

## 2025-02-07 RX ORDER — IPRATROPIUM BROMIDE AND ALBUTEROL SULFATE 2.5; .5 MG/3ML; MG/3ML
3 SOLUTION RESPIRATORY (INHALATION)
Status: DISCONTINUED | OUTPATIENT
Start: 2025-02-07 | End: 2025-02-07 | Stop reason: HOSPADM

## 2025-02-07 RX ORDER — SODIUM CHLORIDE 9 MG/ML
INJECTION, SOLUTION INTRAVENOUS CONTINUOUS PRN
Status: DISCONTINUED | OUTPATIENT
Start: 2025-02-07 | End: 2025-02-07 | Stop reason: SURG

## 2025-02-07 RX ADMIN — PROPOFOL 20 MCG/KG/MIN: 10 INJECTION, EMULSION INTRAVENOUS at 11:02

## 2025-02-07 RX ADMIN — SODIUM CHLORIDE: 9 INJECTION, SOLUTION INTRAVENOUS at 10:53

## 2025-02-07 NOTE — ANESTHESIA POSTPROCEDURE EVALUATION
Patient: Chrystal Ruiz    Procedure Summary       Date: 02/07/25 Room / Location: [facility] ENDOSCOPY 3 / [facility] ENDOSCOPY    Anesthesia Start: 1053 Anesthesia Stop: 1107    Procedure: BRONCHOSCOPY with right lung wash (Bilateral: Bronchus) Diagnosis:       Lung nodule      (TREE IN BUD)    Surgeons: Mukund Farias MD Provider: Jahaira Shields MD    Anesthesia Type: general ASA Status: 3            Anesthesia Type: general    Vitals  Vitals Value Taken Time   /54 02/07/25 1132   Temp     Pulse 85 02/07/25 1135   Resp 24 02/07/25 1132   SpO2 94 % 02/07/25 1135   Vitals shown include unfiled device data.        Post Anesthesia Care and Evaluation    Patient location during evaluation: PACU  Patient participation: complete - patient participated  Level of consciousness: awake and alert  Pain management: satisfactory to patient    Airway patency: patent  Anesthetic complications: No anesthetic complications  PONV Status: none  Cardiovascular status: acceptable  Respiratory status: acceptable  Hydration status: acceptable

## 2025-02-07 NOTE — OP NOTE
Bronchoscopy Procedure Note    Timeout was done appropriately by staff    Procedure:  Bronchoscopy, Diagnostic  Right lung washing    Preoperative Diagnosis: Chronic cough pulmonary nodules, fatigue and weight loss    Postoperative Diagnosis:     No significant endobronchial findings  Right lung washing was done    Anesthesia: Moderate Sedation    Procedure Details: Patient was consented for the procedure with all risks and benefits of the procedure explained in detail.  Patient was given the opportunity to ask questions and all concerns were answered.  The bronchocope was inserted into the main airway via the oropharynx. An anatomical survey was done of the main airways and the subsegmental bronchus to at least the first subsegmental level of all five lobes of both lungs.  The findings are reported below.  A bronchoalveolar lavage was performed using aliquots of normal saline instilled into the airways then aspirated back.      Findings:      No significant endobronchial findings  Right lung washing was done    Patient tolerated procedure well        Estimated Blood Loss:  Minimal           Specimens:  Sent serosanguinous fluid                Complications:  None; patient tolerated the procedure well.           Disposition: PACU - hemodynamically stable.      Patient tolerated the procedure well.    Mukund Farias MD  2/7/2025  11:52 EST

## 2025-02-07 NOTE — H&P
Patient Care Team:  Etelvina Ulloa MD as PCP - General (Family Medicine)  Mukund Farias MD as Consulting Physician (Pulmonary Disease)  Alan Wynn DPM as Consulting Physician (Podiatry)  Etelvina Mcconnell MD as Surgeon (Thoracic Surgery)  Frank Cheng MD as Consulting Physician (Hematology and Oncology)  Jeremy Mccall MD as Consulting Physician (Cardiology)  Sage Garcia MD as Consulting Physician (Cardiac Electrophysiology)    Chief complaint abnormal CAT scan        Assessment & Plan     84-year-old female, non-smoker dyspnea on exertion walking  yard with chronic cough and some weight loss  CT chest 12/05/2024 tree-in-bud nodules right lung  CT scan  06/10/2024 stable asymmetrical apical pleural  ct chest  1/3/22  RUL nodule. [facility] surgery on 2/4/22. cancelled 2nd to new CHF Dx  finished 15 radiation treatments without tissue diagnosis with good response  PET scan,  11/08/2021 bilateral lung nodules with increased activity   CT-guided biopsy of right upper lobe nodule on 11/05/2021 was benign  asthma,  FEV-1 0.9 L, 52% improved to 68%, %, %, DLCO 69%  alpha 1 antitrypsin MM  Multiple noncalcified pulmonary nodules bilaterally   CT scan  11/12/2019 no change in small lung nodules, PET scan June 2019 no activity  Last CT June 2023  stable appearance of small lung nodules measuring up to 7 mm compared to most recent 12/12/2022 CT.  Largest lung nodules seen on 10/11/2021 CT have essentially resolved.    Plan  Diagnostic bronchoscopy rule out opportunistic infection      History    84-year-old female, non-smoker dyspnea on exertion walking  yard with chronic cough and some weight loss  CT chest 12/05/2024 tree-in-bud nodules right lung  CT scan  06/10/2024 stable asymmetrical apical pleural  ct chest  1/3/22  RUL nodule. [facility] surgery on 2/4/22. cancelled 2nd to new CHF Dx  finished 15 radiation treatments without tissue diagnosis with good response  PET  scan,  11/08/2021 bilateral lung nodules with increased activity   CT-guided biopsy of right upper lobe nodule on 11/05/2021 was benign  asthma,  FEV-1 0.9 L, 52% improved to 68%, %, %, DLCO 69%  alpha 1 antitrypsin MM  Multiple noncalcified pulmonary nodules bilaterally   CT scan  11/12/2019 no change in small lung nodules, PET scan June 2019 no activity  Last CT June 2023  stable appearance of small lung nodules measuring up to 7 mm compared to most recent 12/12/2022 CT.  Largest lung nodules seen on 10/11/2021 CT have essentially resolved.      * No active hospital problems. 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; surgically fixed the next day    GERD (gastroesophageal reflux disease)     Heart murmur     Hip pain, bilateral     Hypertension     Medicare annual wellness visit, subsequent 02/12/2020    Shoulder pain, right        Past Surgical History:   Procedure Laterality Date    ATRIAL APPENDAGE EXCLUSION LEFT WITH TRANSESOPHAGEAL ECHOCARDIOGRAM Right 7/7/2022    Procedure: Atrial Appendage Occlusion watchman team aware $;  Surgeon: Sage Garcia MD;  Location: [facility] CATH INVASIVE LOCATION;  Service: Cardiovascular;  Laterality: Right;    ATRIAL APPENDAGE EXCLUSION LEFT WITH TRANSESOPHAGEAL ECHOCARDIOGRAM N/A 7/7/2022    Procedure: Atrial Appendage Occlusion;  Surgeon: Fide Mullen MD;  Location:  [facility] CATH INVASIVE LOCATION;  Service: Cardiovascular;  Laterality: N/A;    CATARACT EXTRACTION      CHOLECYSTECTOMY      HYSTERECTOMY  1987    Endometriosis    OOPHORECTOMY      PSEUDO ANEURYSM REPAIR, EXTREMITY Right 7/8/2022    Procedure: REPAIR OF POST CATHETERIZATION RIGHT FEMORAL PSEUDOANEURYSM AND ARTERIOVENOUS FISTUAL;  Surgeon: Jessee  "Alfonzo Ford MD;  Location: [facility] MAIN OR;  Service: Vascular;  Laterality: Right;    TONSILLECTOMY         Family History   Problem Relation Age of Onset    Breast cancer Mother 85    Stroke Mother     Heart disease Mother     Endometrial cancer Mother 70    Heart failure Father     Heart failure Sister     COPD Sister     Heart disease Sister        Social History     Socioeconomic History    Marital status:    Tobacco Use    Smoking status: Never    Smokeless tobacco: Never   Vaping Use    Vaping status: Never Used   Substance and Sexual Activity    Alcohol use: No    Drug use: Never    Sexual activity: Not Currently       Review of Systems  Review of Systems   Respiratory:  Positive for cough and shortness of breath. Negative for wheezing and stridor.    Cardiovascular:  Negative for chest pain, palpitations and leg swelling.        Vital Signs  Temp:  [98.3 °F (36.8 °C)] 98.3 °F (36.8 °C)  Heart Rate:  [75] 75  Resp:  [24] 24  BP: (123)/(53) 123/53    Physical Exam:  Physical Exam  Cardiovascular:      Heart sounds: Murmur heard.      No gallop.   Pulmonary:      Effort: No respiratory distress.      Breath sounds: No stridor. Rhonchi and rales present. No wheezing.   Chest:      Chest wall: No tenderness.           Radiology  Imaging Results (Last 24 Hours)       ** No results found for the last 24 hours. **            Labs:  Results from last 7 days   Lab Units 02/05/25  1214   WBC 10*3/mm3 7.75   HEMOGLOBIN g/dL 11.5*   HEMATOCRIT % 36.1   PLATELETS 10*3/mm3 321           Invalid input(s): \"LABALBU\", \"PROT\"                      Results from last 7 days   Lab Units 02/05/25  1214   INR  1.08   APTT seconds 28.6               Meds:   SCHEDULE  ipratropium-albuterol, 3 mL, Nebulization, 4x Daily - RT      Infusions     PRNs        I discussed the patient's findings and my recommendations with patient.     Mukund Farias MD  02/07/25  10:39 EST    Time: Critical care 60 min  "

## 2025-02-07 NOTE — ANESTHESIA PREPROCEDURE EVALUATION
Anesthesia Evaluation     Patient summary reviewed   NPO Solid Status: > 8 hours  NPO Liquid Status: > 8 hours           Airway   Mallampati: I  TM distance: >3 FB  Neck ROM: full  No difficulty expected  Dental - normal exam     Pulmonary - normal exam   (+) lung cancer, asthma,shortness of breath  Cardiovascular - normal exam    (+) hypertension, valvular problems/murmurs, dysrhythmias Atrial Fib, CHF , hyperlipidemia      Neuro/Psych  (+) tremors  GI/Hepatic/Renal/Endo    (+) GERD    Musculoskeletal     (+) back pain, neck pain  Abdominal  - normal exam    Bowel sounds: normal.   Substance History      OB/GYN          Other   arthritis,   history of cancer                Anesthesia Plan    ASA 3     general   total IV anesthesia  intravenous induction     Anesthetic plan, risks, benefits, and alternatives have been provided, discussed and informed consent has been obtained with: patient.  Pre-procedure education provided  Plan discussed with CRNA.    CODE STATUS:

## 2025-02-07 NOTE — DISCHARGE INSTRUCTIONS
Do not drink alcohol, drive, operate any heavy machinery or power tools, or make any important/legal decisions for the next 24 hours.    Call you doctor immediately if you experience severe chest pain, shortness of breath, bleeding or coughing up blood, or fever over 101 F.    Diet: Nothing by mouth until 1:05 pm    After a bronchoscopy, you may experience a scratchy throat. This will gradually get better. You may gargle with warm salt water for this after the time noted above is over.     A responsible adult should stay with you and you should rest quietly for the rest of the day. Follow up with MD as instructed.      To reach your provider for questions:    M-F 8am-5pm call [phone]  If after hours or weekends call [phone]

## 2025-02-08 LAB
NIGHT BLUE STAIN TISS: NORMAL
QT INTERVAL: 429 MS
QTC INTERVAL: 432 MS

## 2025-02-09 LAB
BACTERIA SPEC RESP CULT: NORMAL
GRAM STN SPEC: NORMAL

## 2025-02-10 LAB
P JIROVECII DNA L RESP QL NAA+NON-PROBE: NEGATIVE
REF LAB TEST METHOD: NORMAL

## 2025-02-11 LAB
LAB AP CASE REPORT: NORMAL
PATH REPORT.FINAL DX SPEC: NORMAL
PATH REPORT.GROSS SPEC: NORMAL

## 2025-02-14 LAB
FUNGUS WND CULT: NORMAL
MYCOBACTERIUM SPEC CULT: NORMAL
NIGHT BLUE STAIN TISS: NORMAL

## 2025-02-21 LAB
FUNGUS WND CULT: NORMAL
MYCOBACTERIUM SPEC CULT: NORMAL
NIGHT BLUE STAIN TISS: NORMAL

## 2025-02-24 RX ORDER — FUROSEMIDE 40 MG/1
40 TABLET ORAL DAILY PRN
Qty: 90 TABLET | Refills: 1 | Status: SHIPPED | OUTPATIENT
Start: 2025-02-24

## 2025-02-25 LAB
MYCOBACTERIUM SPEC CULT: ABNORMAL
NIGHT BLUE STAIN TISS: ABNORMAL

## 2025-02-28 LAB — FUNGUS WND CULT: NORMAL

## 2025-03-03 ENCOUNTER — OFFICE VISIT (OUTPATIENT)
Dept: FAMILY MEDICINE CLINIC | Facility: CLINIC | Age: 85
End: 2025-03-03
Payer: MEDICARE

## 2025-03-03 VITALS
HEART RATE: 77 BPM | OXYGEN SATURATION: 94 % | DIASTOLIC BLOOD PRESSURE: 76 MMHG | BODY MASS INDEX: 23.79 KG/M2 | HEIGHT: 61 IN | RESPIRATION RATE: 18 BRPM | SYSTOLIC BLOOD PRESSURE: 148 MMHG | WEIGHT: 126 LBS | TEMPERATURE: 98.3 F

## 2025-03-03 DIAGNOSIS — J40 BRONCHITIS: Primary | ICD-10-CM

## 2025-03-03 DIAGNOSIS — R05.3 CHRONIC COUGH: ICD-10-CM

## 2025-03-03 PROCEDURE — 99213 OFFICE O/P EST LOW 20 MIN: CPT | Performed by: FAMILY MEDICINE

## 2025-03-03 PROCEDURE — 1126F AMNT PAIN NOTED NONE PRSNT: CPT | Performed by: FAMILY MEDICINE

## 2025-03-03 PROCEDURE — G2211 COMPLEX E/M VISIT ADD ON: HCPCS | Performed by: FAMILY MEDICINE

## 2025-03-03 PROCEDURE — 1159F MED LIST DOCD IN RCRD: CPT | Performed by: FAMILY MEDICINE

## 2025-03-03 PROCEDURE — 1160F RVW MEDS BY RX/DR IN RCRD: CPT | Performed by: FAMILY MEDICINE

## 2025-03-03 PROCEDURE — 3078F DIAST BP <80 MM HG: CPT | Performed by: FAMILY MEDICINE

## 2025-03-03 PROCEDURE — 3077F SYST BP >= 140 MM HG: CPT | Performed by: FAMILY MEDICINE

## 2025-03-03 RX ORDER — SPIRONOLACTONE 25 MG/1
TABLET ORAL
Qty: 45 TABLET | Refills: 1 | Status: SHIPPED | OUTPATIENT
Start: 2025-03-03

## 2025-03-03 RX ORDER — CEFDINIR 300 MG/1
300 CAPSULE ORAL 2 TIMES DAILY
Qty: 20 CAPSULE | Refills: 0 | Status: SHIPPED | OUTPATIENT
Start: 2025-03-03

## 2025-03-03 NOTE — PROGRESS NOTES
"Chief Complaint  Cough    Subjective        Chrystal Ruiz presents to [facility]  History of Present Illness  She had a bronchoscopy on 2/7/25 with Dr. Farias.  She is scheduled for a follow up with him for test results on 3/24/25.  She does not think she can wait that long because her cough has gotten worse. She had Zpak and steroid pack in December and felt a little better but then the symptoms returned.  Cough  This is a chronic problem. The current episode started more than 1 month ago. The problem occurs every few minutes. The cough is Productive of clear sputum. Pertinent negatives include no chills or fever.       Objective   Vital Signs:  /76 (BP Location: Left arm, Patient Position: Sitting, Cuff Size: Adult)   Pulse 77   Temp 98.3 °F (36.8 °C) (Temporal)   Resp 18   Ht 154.9 cm (61\")   Wt 57.2 kg (126 lb)   SpO2 94%   BMI 23.81 kg/m²   Estimated body mass index is 23.81 kg/m² as calculated from the following:    Height as of this encounter: 154.9 cm (61\").    Weight as of this encounter: 57.2 kg (126 lb).    BMI is within normal parameters. No other follow-up for BMI required.      Physical Exam  Vitals and nursing note reviewed.   Constitutional:       General: She is not in acute distress.     Appearance: She is well-developed.   HENT:      Head: Normocephalic.   Eyes:      General: Lids are normal.      Conjunctiva/sclera: Conjunctivae normal.   Neck:      Thyroid: No thyroid mass or thyromegaly.      Trachea: Trachea normal.   Cardiovascular:      Rate and Rhythm: Normal rate and regular rhythm.      Heart sounds: Normal heart sounds.   Pulmonary:      Effort: Pulmonary effort is normal.      Breath sounds: Rhonchi present.   Musculoskeletal:      Cervical back: Normal range of motion.   Lymphadenopathy:      Cervical: No cervical adenopathy.   Skin:     General: Skin is warm and dry.   Neurological:      Mental Status: She is alert and oriented to person, " place, and time.   Psychiatric:         Attention and Perception: She is attentive.         Mood and Affect: Mood normal.         Speech: Speech normal.         Behavior: Behavior normal.        Result Review :  The following data was reviewed by: Etelvina Ulloa MD on 03/03/2025:  Common labs          12/20/2024    00:15 12/29/2024    11:25 2/5/2025    12:14   Common Labs   Glucose 69  104     BUN 26  28     Creatinine 0.96  1.02     Sodium 142  142     Potassium 3.9  4.2     Chloride 107  106     Calcium 8.9  9.1     WBC 8.90  6.72  7.75    Hemoglobin 11.7  11.8  11.5    Hematocrit 38.3  38.0  36.1    Platelets 260  252  321                Assessment and Plan   Diagnoses and all orders for this visit:    1. Bronchitis (Primary)  -     cefdinir (OMNICEF) 300 MG capsule; Take 1 capsule by mouth 2 (Two) Times a Day.  Dispense: 20 capsule; Refill: 0    2. Chronic cough             Follow Up   No follow-ups on file.  Patient was given instructions and counseling regarding her condition or for health maintenance advice. Please see specific information pulled into the AVS if appropriate.

## 2025-03-04 LAB
MYCOBACTERIUM SPEC CULT: ABNORMAL
NIGHT BLUE STAIN TISS: ABNORMAL
REF LAB TEST METHOD: NORMAL

## 2025-03-06 LAB — FUNGUS WND CULT: NORMAL

## 2025-03-13 ENCOUNTER — OFFICE VISIT (OUTPATIENT)
Age: 85
End: 2025-03-13
Payer: MEDICARE

## 2025-03-13 ENCOUNTER — TELEPHONE (OUTPATIENT)
Dept: FAMILY MEDICINE CLINIC | Facility: CLINIC | Age: 85
End: 2025-03-13
Payer: MEDICARE

## 2025-03-13 VITALS — OXYGEN SATURATION: 97 % | WEIGHT: 126 LBS | BODY MASS INDEX: 23.79 KG/M2 | HEIGHT: 61 IN | HEART RATE: 69 BPM

## 2025-03-13 DIAGNOSIS — M79.672 BILATERAL FOOT PAIN: Primary | ICD-10-CM

## 2025-03-13 DIAGNOSIS — M20.42 HAMMER TOES OF BOTH FEET: ICD-10-CM

## 2025-03-13 DIAGNOSIS — M79.671 BILATERAL FOOT PAIN: Primary | ICD-10-CM

## 2025-03-13 DIAGNOSIS — M20.12 VALGUS DEFORMITY OF BOTH GREAT TOES: ICD-10-CM

## 2025-03-13 DIAGNOSIS — M20.11 VALGUS DEFORMITY OF BOTH GREAT TOES: ICD-10-CM

## 2025-03-13 DIAGNOSIS — M20.41 HAMMER TOES OF BOTH FEET: ICD-10-CM

## 2025-03-13 DIAGNOSIS — M19.071 OSTEOARTHRITIS OF MIDTARSAL JOINT OF RIGHT FOOT: ICD-10-CM

## 2025-03-13 DIAGNOSIS — B35.1 ONYCHOMYCOSIS: ICD-10-CM

## 2025-03-13 PROCEDURE — 1159F MED LIST DOCD IN RCRD: CPT | Performed by: PODIATRIST

## 2025-03-13 PROCEDURE — 1160F RVW MEDS BY RX/DR IN RCRD: CPT | Performed by: PODIATRIST

## 2025-03-13 PROCEDURE — 99203 OFFICE O/P NEW LOW 30 MIN: CPT | Performed by: PODIATRIST

## 2025-03-13 NOTE — TELEPHONE ENCOUNTER
PATIENT CALLED IN AND SAID SHE TOOK HER LAST PILL TODAY OF THE ANTIBIOTIC THAT YOU PRESCRIBED TO HER. SHE SAID IT DIDN'T HELP AT ALL. SHE WANTED TO KNOW IF YOU COULD PRESCRIBE HER SOMETHING ELSE.    PLEASE ADVISE

## 2025-03-17 NOTE — PROGRESS NOTES
03/13/2025  Foot and Ankle Surgery - New Patient   Provider: Dr. Darion Mcgrath DPM  Location: [facility]    Subjective:  Chrystal Ruiz is a 84 y.o. female.     Chief Complaint   Patient presents with    Left Foot - Callouses, Nail Problem, Initial Evaluation     PTTD, arthritis, at risk foot care    Right Foot - Pain, Callouses, Initial Evaluation, Nail Problem     PTTD, arthritis,     Initial Evaluation     PCP: Etelvina Ulloa MD  Last PCP Visit: 3/3/25       84-year-old female presents complaining of bilateral toe pain.  Patient has had hammertoe on bunion deformity for several years.  Patient also complaining of elongated thickened toenails.  Patient states she received preventative care by Dr. Wynn in the past but since he switched to [facility], he no longer takes her insurance.  Patient states that her hammertoe deformities have become more painful in the past several months.  Patient presents wearing flat dress shoes.  Patient not taking medication for the problem this time.  Patient has not tried any silicone toe spacers or padding to hammertoe deformity.    Pain       Allergies   Allergen Reactions    Ciprofloxacin Unknown (See Comments)    Sulfa Antibiotics Unknown (See Comments)    Iodine Unknown - High Severity     Patient doesn't remember what reaction she gets when she takes this     Sudafed [Pseudoephedrine Hcl] Other (See Comments)     Patient states it has been awhile since taking, so she doesn't remember side effects.       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 procedure; surgically fixed the next day    GERD (gastroesophageal reflux disease)     Heart murmur     Hip pain, bilateral     Hypertension     Medicare annual wellness visit, subsequent 02/12/2020     Shoulder pain, right        Past Surgical History:   Procedure Laterality Date    ATRIAL APPENDAGE EXCLUSION LEFT WITH TRANSESOPHAGEAL ECHOCARDIOGRAM Right 7/7/2022    Procedure: Atrial Appendage Occlusion watchman team aware $;  Surgeon: Sage Garcia MD;  Location: [facility] CATH INVASIVE LOCATION;  Service: Cardiovascular;  Laterality: Right;    ATRIAL APPENDAGE EXCLUSION LEFT WITH TRANSESOPHAGEAL ECHOCARDIOGRAM N/A 7/7/2022    Procedure: Atrial Appendage Occlusion;  Surgeon: Fide Mullen MD;  Location: [facility] CATH INVASIVE LOCATION;  Service: Cardiovascular;  Laterality: N/A;    BRONCHOSCOPY Bilateral 2/7/2025    Procedure: BRONCHOSCOPY with right lung wash;  Surgeon: Mukund Farias MD;  Location: [facility] ENDOSCOPY;  Service: Pulmonary;  Laterality: Bilateral;    CATARACT EXTRACTION      CHOLECYSTECTOMY      HYSTERECTOMY  1987    Endometriosis    OOPHORECTOMY      PSEUDO ANEURYSM REPAIR, EXTREMITY Right 7/8/2022    Procedure: REPAIR OF POST CATHETERIZATION RIGHT FEMORAL PSEUDOANEURYSM AND ARTERIOVENOUS FISTUAL;  Surgeon: Alfonzo Hooks MD;  Location: [facility] MAIN OR;  Service: Vascular;  Laterality: Right;    TONSILLECTOMY         Family History   Problem Relation Age of Onset    Breast cancer Mother 85    Stroke Mother     Heart disease Mother     Endometrial cancer Mother 70    Heart failure Father     Heart failure Sister     COPD Sister     Heart disease Sister        Social History     Socioeconomic History    Marital status:    Tobacco Use    Smoking status: Never    Smokeless tobacco: Never   Vaping Use    Vaping status: Never Used   Substance and Sexual Activity    Alcohol use: No    Drug use: Never    Sexual activity: Not Currently        Current Outpatient Medications on File Prior to Visit   Medication Sig Dispense Refill    acetaminophen (TYLENOL) 500 MG tablet Take 1 tablet by mouth Every 6 (Six) Hours As Needed for Mild Pain.      baclofen (LIORESAL) 10 MG tablet TAKE 1  "TABLET BY MOUTH AT NIGHT AS NEEDED FOR MUSCLE SPASMS 30 tablet 1    Breo Ellipta 200-25 MCG/INH inhaler Inhale 1 puff Daily. 1 each 2    cefdinir (OMNICEF) 300 MG capsule Take 1 capsule by mouth 2 (Two) Times a Day. 20 capsule 0    Cholecalciferol (VITAMIN D3) 2000 units capsule Take 1 capsule by mouth Daily.      Cyanocobalamin (VITAMIN B12) 1000 MCG tablet controlled-release Take 2,500 mcg by mouth Daily.      famotidine (Pepcid) 20 MG tablet Take 1 tablet by mouth 2 (Two) Times a Day.      FeroSul 325 (65 Fe) MG tablet TAKE 1 TABLET BY MOUTH THREE DAYS A WEEK( MONDAY, WEDNESDAY, FRIDAY) 45 tablet 1    furosemide (LASIX) 40 MG tablet TAKE 1 TABLET BY MOUTH DAILY AS NEEDED FOR SWELLING 90 tablet 1    loratadine (CLARITIN) 10 MG tablet Take 1 tablet by mouth Daily.      metoclopramide (REGLAN) 5 MG tablet Take 1 tablet by mouth Daily.      metoprolol succinate XL (TOPROL-XL) 25 MG 24 hr tablet TAKE 1 TABLET BY MOUTH DAILY 90 tablet 3    polyethylene glycol (MIRALAX) 17 g packet Take 17 g by mouth Daily. 24 each 0    spironolactone (ALDACTONE) 25 MG tablet TAKE 1 TABLET BY MOUTH EVERY OTHER DAY 45 tablet 1    triamcinolone (KENALOG) 0.1 % cream Apply 1 Application topically to the appropriate area as directed 2 (Two) Times a Day As Needed for Irritation. 80 g 1    Wheat Dextrin (Benefiber Drink Mix) pack Take 1 each by mouth Daily. 28 each 0     No current facility-administered medications on file prior to visit.         Objective   Pulse 69   Ht 154.9 cm (61\")   Wt 57.2 kg (126 lb)   SpO2 97%   BMI 23.81 kg/m²     Foot/Ankle Exam    GENERAL  Appearance:  appears stated age  Orientation:  AAOx3  Affect:  appropriate  Gait:  unimpaired  Assistance:  independent  Right shoe gear: casual shoe  Left shoe gear: casual shoe    VASCULAR     Right Foot Vascularity   Dorsalis pedis:  2+  Posterior tibial:  2+  Skin temperature:  warm  Edema gradin+  CFT:  < 3 seconds  Pedal hair growth:  Present  Varicosities:  mild " varicosities     Left Foot Vascularity   Dorsalis pedis:  2+  Posterior tibial:  2+  Skin temperature:  warm  Edema gradin+  CFT:  < 3 seconds  Pedal hair growth:  Present  Varicosities:  mild varicosities     NEUROLOGIC     Right Foot Neurologic   Normal sensation    Light touch sensation: normal  Vibratory sensation: normal  Hot/Cold sensation: normal  Normal reflexes    Achilles reflex:  2+  Babinski reflex:  2+     Left Foot Neurologic   Normal sensation    Light touch sensation: normal  Vibratory sensation: normal  Hot/Cold sensation:  normal  Normal reflexes    Achilles reflex:  2+  Babinski reflex:  2+    MUSCULOSKELETAL     Right Foot Musculoskeletal   Tenderness:  toe 1 tenderness, toe 2 tenderness and toenail problem    Hammertoe:  Second toe and third toe  Hallux valgus: Yes       Left Foot Musculoskeletal   Tenderness:  toe 1 tenderness, toe 2 tenderness and toenail problem  Hammertoe:  Second toe and third toe  Hallux valgus: Yes      MUSCLE STRENGTH     Right Foot Muscle Strength   Normal strength    Foot dorsiflexion:  5  Foot plantar flexion:  5  Foot inversion:  5  Foot eversion:  5     Left Foot Muscle Strength   Normal strength    Foot dorsiflexion:  5  Foot plantar flexion:  5  Foot inversion:  5  Foot eversion:  5    RANGE OF MOTION     Right Foot Range of Motion   Foot and ankle ROM within normal limits       Left Foot Range of Motion   Foot and ankle ROM within normal limits      DERMATOLOGIC      Right Foot Dermatologic   Skin  Positive for corn.   Nails  1.  Normal. Positive for elongated, abnormal thickness and dystrophic nail.  2.  Normal. Positive for elongated, abnormal thickness and dystrophic nail.  3.  Normal. Positive for elongated, abnormal thickness and dystrophic nail.  4.  Normal. Positive for elongated, abnormal thickness and dystrophic nail.  5.  Normal. Positive for elongated, abnormal thickness and dystrophic nail.     Left Foot Dermatologic   Skin  Positive for corn.    Nails  1.  Normal. Positive for elongated, abnormal thickness and dystrophic nail.  2.  Normal. Positive for elongated, abnormal thickness and dystrophic nail.  3.  Normal. Positive for elongated, abnormal thickness and dystrophic nail.  4.  Normal. Positive for elongated, abnormally thick and dystrophic nail.  5.  Normal. Positive for elongated, abnormally thick and dystrophic nail.    Bilateral weightbearing foot x-rays performed today.    Impression:  Right-decrease midfoot joints at the navicular cuneiform and talonavicular joint.  There is midfoot collapse with flattened medial longitudinal arch.  Decreased calcaneal inclination angle present.  Spurring to dorsal midfoot joints.  Hammertoe deformity present to digits 2 and 3.  Spurring noted to plantar calcaneus.  No fracture or dislocation noted  Left-midfoot joint collapse with first metatarsal cuneiform joint prominence.  Spurring noted dorsally to tarsometatarsal joints.  Decreased joint space present.  No fracture dislocation noted.  Decreased calcaneal inclination angle.  Plantar spurring to calcaneus.  Hammertoe deformity to lesser digits 2, 3 and 4.      Assessment & Plan   Diagnoses and all orders for this visit:    1. Bilateral foot pain (Primary)  -     Cancel: XR Foot 3+ View Bilateral; Future  -     XR Foot 3+ View Bilateral; Future    2. Valgus deformity of both great toes    3. Hammer toes of both feet    4. Onychomycosis    5. 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  Patient also has bilateral bunion deformity.  Discussed with patient surgical and conservative treatment options.  Patient not a good surgical candidate due to her age and activity status.  Patient to purchase wider toebox shoes, silicone toe sleeve or spacers.    Assessment and diagnosis of onychomycosis discussed with patient.  Treatment plans discussed in detail today including identifying risk  factors, implementing topical versus systemic antifungal therapies, patient education on foot hygiene and self-care practices, and monitoring for treatment response and adverse effects.    Treatment options include oral antifungals, topical antifungals, nail debridements with potential nail removal.    Patient understands the risks associated with oral antifungal medication, including liver injury, interactions with other medications.    Patient like to move forward with conservative topical antifungal medication    Patient to purchase over-the-counter    Patient understands treatment will require long-term commitment and will not see immediate results.      Patient to follow-up in 3 months for treatment evaluation.    Reviewed proper basic stretching and manual therapy exercises along with appropriate shoes and activity.  Discussed proper use and/or avoidance of OTC anti-inflammatories.  Patient is to call with any additional issues or concerns.  Greater than 30 minutes was spent before, during, and after evaluation for patient care.           Procedures     Abdoul Mcgrath DPM

## 2025-03-18 RX ORDER — METOPROLOL SUCCINATE 25 MG/1
25 TABLET, EXTENDED RELEASE ORAL DAILY
Qty: 90 TABLET | Refills: 3 | Status: SHIPPED | OUTPATIENT
Start: 2025-03-18

## 2025-04-07 ENCOUNTER — OFFICE (AMBULATORY)
Dept: URBAN - METROPOLITAN AREA CLINIC 64 | Facility: CLINIC | Age: 85
End: 2025-04-07
Payer: MEDICARE

## 2025-04-07 VITALS
DIASTOLIC BLOOD PRESSURE: 66 MMHG | WEIGHT: 129 LBS | HEART RATE: 66 BPM | SYSTOLIC BLOOD PRESSURE: 130 MMHG | HEIGHT: 64 IN

## 2025-04-07 DIAGNOSIS — K21.9 GASTRO-ESOPHAGEAL REFLUX DISEASE WITHOUT ESOPHAGITIS: ICD-10-CM

## 2025-04-07 PROCEDURE — 99214 OFFICE O/P EST MOD 30 MIN: CPT | Performed by: INTERNAL MEDICINE

## 2025-04-30 ENCOUNTER — OFFICE VISIT (OUTPATIENT)
Dept: ORTHOPEDIC SURGERY | Facility: CLINIC | Age: 85
End: 2025-04-30
Payer: MEDICARE

## 2025-04-30 VITALS — OXYGEN SATURATION: 96 % | RESPIRATION RATE: 20 BRPM | BODY MASS INDEX: 23.79 KG/M2 | HEIGHT: 61 IN | WEIGHT: 126 LBS

## 2025-04-30 DIAGNOSIS — G89.29 CHRONIC PAIN OF LEFT KNEE: ICD-10-CM

## 2025-04-30 DIAGNOSIS — M25.561 CHRONIC PAIN OF RIGHT KNEE: Primary | ICD-10-CM

## 2025-04-30 DIAGNOSIS — M25.562 CHRONIC PAIN OF LEFT KNEE: ICD-10-CM

## 2025-04-30 DIAGNOSIS — G89.29 CHRONIC PAIN OF RIGHT KNEE: Primary | ICD-10-CM

## 2025-04-30 DIAGNOSIS — M17.0 PRIMARY OSTEOARTHRITIS OF BOTH KNEES: ICD-10-CM

## 2025-04-30 RX ORDER — TRIAMCINOLONE ACETONIDE 40 MG/ML
80 INJECTION, SUSPENSION INTRA-ARTICULAR; INTRAMUSCULAR
Status: COMPLETED | OUTPATIENT
Start: 2025-04-30 | End: 2025-04-30

## 2025-04-30 RX ORDER — LIDOCAINE HYDROCHLORIDE 10 MG/ML
2 INJECTION, SOLUTION EPIDURAL; INFILTRATION; INTRACAUDAL; PERINEURAL
Status: COMPLETED | OUTPATIENT
Start: 2025-04-30 | End: 2025-04-30

## 2025-04-30 RX ADMIN — LIDOCAINE HYDROCHLORIDE 2 ML: 10 INJECTION, SOLUTION EPIDURAL; INFILTRATION; INTRACAUDAL; PERINEURAL at 15:18

## 2025-04-30 RX ADMIN — TRIAMCINOLONE ACETONIDE 80 MG: 40 INJECTION, SUSPENSION INTRA-ARTICULAR; INTRAMUSCULAR at 15:18

## 2025-04-30 NOTE — PROGRESS NOTES
INJECTION VISIT    Patient: Chrystal Ruiz    YOB: 1940    MRN: [id number]    Chief Complaint   Patient presents with    Left Knee - Follow-up    Right Knee - Follow-up       History of Present Illness:   Chrystal Ruiz is a 84 y.o. year old who presents today for injection of bilateral knees. Last injections were July 2024.         Allergies:   Allergies   Allergen Reactions    Ciprofloxacin Unknown (See Comments)    Sulfa Antibiotics Unknown (See Comments)    Iodine Unknown - High Severity     Patient doesn't remember what reaction she gets when she takes this     Sudafed [Pseudoephedrine Hcl] Other (See Comments)     Patient states it has been awhile since taking, so she doesn't remember side effects.       Medications:   Home Medications:  Current Outpatient Medications on File Prior to Visit   Medication Sig    acetaminophen (TYLENOL) 500 MG tablet Take 1 tablet by mouth Every 6 (Six) Hours As Needed for Mild Pain.    baclofen (LIORESAL) 10 MG tablet TAKE 1 TABLET BY MOUTH AT NIGHT AS NEEDED FOR MUSCLE SPASMS    Breo Ellipta 200-25 MCG/INH inhaler Inhale 1 puff Daily.    cefdinir (OMNICEF) 300 MG capsule Take 1 capsule by mouth 2 (Two) Times a Day.    Cholecalciferol (VITAMIN D3) 2000 units capsule Take 1 capsule by mouth Daily.    Cyanocobalamin (VITAMIN B12) 1000 MCG tablet controlled-release Take 2,500 mcg by mouth Daily.    famotidine (Pepcid) 20 MG tablet Take 1 tablet by mouth 2 (Two) Times a Day.    FeroSul 325 (65 Fe) MG tablet TAKE 1 TABLET BY MOUTH THREE DAYS A WEEK( MONDAY, WEDNESDAY, FRIDAY)    furosemide (LASIX) 40 MG tablet TAKE 1 TABLET BY MOUTH DAILY AS NEEDED FOR SWELLING    loratadine (CLARITIN) 10 MG tablet Take 1 tablet by mouth Daily.    metoclopramide (REGLAN) 5 MG tablet Take 1 tablet by mouth Daily.    metoprolol succinate XL (TOPROL-XL) 25 MG 24 hr tablet TAKE 1 TABLET BY MOUTH DAILY    polyethylene glycol (MIRALAX) 17 g packet Take 17 g by mouth Daily.    spironolactone  (ALDACTONE) 25 MG tablet TAKE 1 TABLET BY MOUTH EVERY OTHER DAY    triamcinolone (KENALOG) 0.1 % cream Apply 1 Application topically to the appropriate area as directed 2 (Two) Times a Day As Needed for Irritation.    Wheat Dextrin (Benefiber Drink Mix) pack Take 1 each by mouth Daily.     No current facility-administered medications on file prior to visit.         I have reviewed the patient's medical history in detail and updated the computerized patient record.  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 procedure; surgically fixed the next day    GERD (gastroesophageal reflux disease)     Heart murmur     Hip pain, bilateral     Hypertension     Medicare annual wellness visit, subsequent 02/12/2020    Shoulder pain, right      Past Surgical History:   Procedure Laterality Date    ATRIAL APPENDAGE EXCLUSION LEFT WITH TRANSESOPHAGEAL ECHOCARDIOGRAM Right 7/7/2022    Procedure: Atrial Appendage Occlusion watchman team aware $;  Surgeon: Sage Garcia MD;  Location: [facility] CATH INVASIVE LOCATION;  Service: Cardiovascular;  Laterality: Right;    ATRIAL APPENDAGE EXCLUSION LEFT WITH TRANSESOPHAGEAL ECHOCARDIOGRAM N/A 7/7/2022    Procedure: Atrial Appendage Occlusion;  Surgeon: Fide Mullen MD;  Location: [facility] CATH INVASIVE LOCATION;  Service: Cardiovascular;  Laterality: N/A;    BRONCHOSCOPY Bilateral 2/7/2025    Procedure: BRONCHOSCOPY with right lung wash;  Surgeon: Mukund Farias MD;  Location: [facility] ENDOSCOPY;  Service: Pulmonary;  Laterality: Bilateral;    CATARACT EXTRACTION      CHOLECYSTECTOMY      HYSTERECTOMY  1987    Endometriosis    OOPHORECTOMY      PSEUDO ANEURYSM REPAIR, EXTREMITY Right 7/8/2022    Procedure: REPAIR OF  POST CATHETERIZATION RIGHT FEMORAL PSEUDOANEURYSM AND ARTERIOVENOUS FISTUAL;  Surgeon: Alfonzo Hooks MD;  Location: [facility] MAIN OR;  Service: Vascular;  Laterality: Right;    TONSILLECTOMY       Social History     Occupational History    Not on file   Tobacco Use    Smoking status: Never    Smokeless tobacco: Never   Vaping Use    Vaping status: Never Used   Substance and Sexual Activity    Alcohol use: No    Drug use: Never    Sexual activity: Not Currently      Social History     Social History Narrative    Not on file     Family History   Problem Relation Age of Onset    Breast cancer Mother 85    Stroke Mother     Heart disease Mother     Endometrial cancer Mother 70    Heart failure Father     Heart failure Sister     COPD Sister     Heart disease Sister                ROS  Negative unless listed in the HPI        Physical Exam  84 y.o. female  Body mass index is 23.81 kg/m²., 57.2 kg (126 lb)  Vitals:    04/30/25 1443   Resp: 20   SpO2: 96%     General: Alert, cooperative, appears well and in no observable distress.   HEENT: Normocephalic, atraumatic on external visual inspection. No icterus.   CV: No significant peripheral edema.   Respiratory: Normal respiratory effort.   Skin: Warm & well perfused; appropriate skin turgor.  Psych: Appropriate mood & affect.  Neuro: Gross sensation and motor intact in affected extremity/extremities.  Vascular: Peripheral pulses palpable in affected extremity/extremities.         Procedure:  Large Joint Arthrocentesis: R knee  Date/Time: 4/30/2025 3:18 PM  Consent given by: patient  Site marked: site marked  Timeout: Immediately prior to procedure a time out was called to verify the correct patient, procedure, equipment, support staff and site/side marked as required   Supporting Documentation  Indications: pain   Procedure Details  Location: knee - R knee  Preparation: Patient was prepped and draped in the usual sterile fashion  Needle size: 25 G  Approach:  anterolateral  Medications administered: 2 mL lidocaine PF 1% 1 %; 80 mg triamcinolone acetonide 40 MG/ML  Patient tolerance: patient tolerated the procedure well with no immediate complications      Large Joint Arthrocentesis: L knee  Date/Time: 4/30/2025 3:18 PM  Consent given by: patient  Site marked: site marked  Timeout: Immediately prior to procedure a time out was called to verify the correct patient, procedure, equipment, support staff and site/side marked as required   Supporting Documentation  Indications: pain and diagnostic evaluation   Procedure Details  Location: knee - L knee  Preparation: Patient was prepped and draped in the usual sterile fashion  Needle size: 25 G  Approach: anterolateral  Medications administered: 2 mL lidocaine PF 1% 1 %; 80 mg triamcinolone acetonide 40 MG/ML  Patient tolerance: patient tolerated the procedure well with no immediate complications            Assessment:   Diagnoses and all orders for this visit:    1. Chronic pain of right knee (Primary)  -     XR Knee 3 View Bilateral    2. Chronic pain of left knee  -     XR Knee 3 View Bilateral    3. Primary osteoarthritis of both knees    Other orders  -     Large Joint Arthrocentesis  -     Large Joint Arthrocentesis      Body mass index is 23.81 kg/m².  BMI consistent with Normal: 18.5-24.9kg/m2        Plan:   -     Risks and benefits of injection therapy discussed with patient.  Possibility of bruising, pain, swelling, infection, increased blood sugar levels, cortisol flare and soft tissue atrophy discussed in detail.  Injected patient's bilateral knee joint(s)with Kenalog from an anterolateral approach   Compression/brace   Rest, ice, compression, and elevation (RICE) therapy  OTC Tylenol for pain PRN  Follow up in 3 months or PRN for repeat injection  Please call with questions or concerns in the interim        Date of encounter: 04/30/2025   ARIANA Mcfadden

## 2025-06-04 ENCOUNTER — OFFICE VISIT (OUTPATIENT)
Dept: CARDIOLOGY | Facility: CLINIC | Age: 85
End: 2025-06-04
Payer: MEDICARE

## 2025-06-04 VITALS
HEIGHT: 61 IN | WEIGHT: 121 LBS | SYSTOLIC BLOOD PRESSURE: 135 MMHG | HEART RATE: 79 BPM | RESPIRATION RATE: 18 BRPM | BODY MASS INDEX: 22.84 KG/M2 | OXYGEN SATURATION: 94 % | DIASTOLIC BLOOD PRESSURE: 78 MMHG

## 2025-06-04 DIAGNOSIS — Z95.818 PRESENCE OF WATCHMAN LEFT ATRIAL APPENDAGE CLOSURE DEVICE: ICD-10-CM

## 2025-06-04 DIAGNOSIS — K92.2 GASTROINTESTINAL HEMORRHAGE, UNSPECIFIED GASTROINTESTINAL HEMORRHAGE TYPE: ICD-10-CM

## 2025-06-04 DIAGNOSIS — I48.0 PAROXYSMAL ATRIAL FIBRILLATION: Primary | ICD-10-CM

## 2025-06-04 DIAGNOSIS — D50.0 IRON DEFICIENCY ANEMIA DUE TO CHRONIC BLOOD LOSS: ICD-10-CM

## 2025-06-04 DIAGNOSIS — I10 PRIMARY HYPERTENSION: ICD-10-CM

## 2025-06-04 RX ORDER — OMEPRAZOLE 40 MG/1
40 CAPSULE, DELAYED RELEASE ORAL EVERY MORNING
COMMUNITY
Start: 2025-04-28

## 2025-06-04 RX ORDER — AZITHROMYCIN 250 MG/1
250 TABLET, FILM COATED ORAL EVERY OTHER DAY
COMMUNITY

## 2025-06-04 NOTE — PROGRESS NOTES
Cardiology Clinic Note  Jeremy Mccall MD, PhD    Subjective:     Encounter Date:06/04/2025      Patient ID: Chrystal Ruiz is a 84 y.o. female.    Chief Complaint:  Chief Complaint   Patient presents with    Follow-up       HPI:        I the pleasure to see this 84 year-old female  history of diastolic CHF  atrial fibrillation status post Watchman, [id number] score of at  4.  She is maintained on spironolactone Lasix for volume control 3 times per week, we discussed decreasing this to once a week, she is down 9 pounds on encounter mildly dry appearing today.  She is maintaining sinus rhythm, heart rates are controlled she has no new CV complaints today.   She has no chest pain or shortness of breath of new or worsening onset.    She is off anticoagulation with no residual shunt around the Watchman device by transesophageal echo.  Historically she has a normal EF of 60% with some mild right-sided enlargement but otherwise mild developed valvular regurgitation as documented.  she was counseled as unintentional weight loss frailty and deconditioning with healthy diet.  Daily weights were advised.      Transesophageal echo July 2023    Left ventricular systolic function is normal. Estimated left ventricular EF = 60%    Left ventricular wall thickness is consistent with mild to moderate concentric hypertrophy.    The right ventricular cavity is borderline dilated.    The left atrial cavity is moderately dilated.    The right atrial cavity is mildly  dilated.    There is mild calcification of the aortic valve.    Abnormal mitral valve structure consistent with dilated annulus.    Estimated right ventricular systolic pressure from tricuspid regurgitation is normal (<35 mmHg).     EKG today sinus rhythm occasional PVCs    Review of systems otherwise negative x14 point review of systems except as mentioned above next     Historical data copied forward from previous encounters in EMR including the history, exam, and  assessment/plan has been reviewed and is unchanged unless noted otherwise.     Cardiac medicines reviewed with risk, benefits, and necessity of each discussed.     Risk and benefit of cardiac testing reviewed including death heart attack stroke pain bleeding infection need for vascular /cardiovascular surgery were discussed and the patient      Objective:         Objective          Unchanged from prior  Vitals reviewed below        Physical Exam  Regular rate and rhythm with no rubs murmurs gallops today  No heave no lift  No peripheral edema next no carotid bruits or JVD  Normal radial pulses  Normal cap refill  Soft nontender nondistended  Clear to auscultation bilaterally  Unchanged prior encounter     Assessment:        Paroxysmal A. fib, maintaining sinus rhythm  Off Cardizem  Off Eliquis with Watchman   Metoprolol succinate 25 daily  Aspirin low-dose 81 daily  Off Plavix as well     Essential hypertension well-controlled  Hyperlipidemia continue present medicines  No history of any CAD  Normal stress test in the last 36 months with no new symptoms  Preserved LVEF historically     Referred back previously to GI for evaluation of gastroesophageal reflux disease and dyspepsia as indicated       Patient planning moved to [place] and will establish care at [facility]  We discussed decreasing her diuretics to once a week, mildly dry on exam, mild dizziness with standing  No anginal chest pain no heart failure signs or symptoms stable shortness of breath, no excess volume today    Follow-up 1 year cardiology        The pleasure to be involved in this patient's cardiovascular care.  Please call with any questions or concerns  Jeremy Mccall MD, PhD    Most recent EKG as reviewed and interpreted by me:  Procedures     Most recent echo as reviewed and interpreted by me:  Results for orders placed during the hospital encounter of 07/05/23    Adult Transesophageal Echo (MELISA) W/ Cont if Necessary Per  Protocol    Interpretation Summary    Left ventricular systolic function is normal. Estimated left ventricular EF = 60%    Left ventricular wall thickness is consistent with mild to moderate concentric hypertrophy.    The right ventricular cavity is borderline dilated.    The left atrial cavity is moderately dilated.    The right atrial cavity is mildly  dilated.    There is mild calcification of the aortic valve.    Abnormal mitral valve structure consistent with dilated annulus.    Estimated right ventricular systolic pressure from tricuspid regurgitation is normal (<35 mmHg).    Procedure indication  History of atrial fibrillation and Watchman device in situ patient came for MELISA to evaluate watchman 1 year post implantation as per protocol    conscious sedation administered by anesthesia  Timeout before procedure    consent obtained before procedure      Procedure note  after obtaining a valid consent patient was sedated by Anesthesia and a MELISA probe was easily placed into esophagus with multiplane imaging with 2D, color and Doppler followed by bubble study with agitated saline without any complications    MELISA  Findings  The Watchman device is well-seated without any leak or thrombus there is no clot  LV function is normal with EF of 60%  Aortic valve mildly calcified without aortic stenosis  No effusion or shunt        Procedure done  Transesophageal echocardiography      Electronically signed by Sage Garcia MD, 07/05/23, 1:27 PM EDT.      Most recent stress test as reviewed and interpreted by me:  Results for orders placed during the hospital encounter of 01/24/22    Stress Test With Myocardial Perfusion One Day    Interpretation Summary  Indications  Shortness of breath    This study was performed under my direct supervision.    Resting ECG  Sinus rhythm.    The patient was injected with Lexiscan intravenously while constantly monitoring electrocardiogram and vital signs.  Patient did not have any  chest discomfort ST abnormalities or ectopy with injection of Lexiscan.    Cardiolite was used as an imaging agent.    Cardiolite images showed uniform distribution of radionuclide without any evidence for myocardial ischemia.    Gated SPECT images revealed normal left ventricle size and contractility with ejection fraction of 74%.    Impression  ========  Lexiscan Cardiolite test is negative for myocardial ischemia.    Gated SPECT images revealed normal left ventricular size and contractility with ejection fraction of 74%.      Most recent cardiac catheterization as reviewed interpreted by me:  No results found for this or any previous visit.    The following portions of the patient's history were reviewed and updated as appropriate: allergies, current medications, past family history, past medical history, past social history, past surgical history, and problem list.      ROS:  14 point review of systems negative except as mentioned above    Current Outpatient Medications:     acetaminophen (TYLENOL) 500 MG tablet, Take 1 tablet by mouth Every 6 (Six) Hours As Needed for Mild Pain., Disp: , Rfl:     azithromycin (ZITHROMAX) 250 MG tablet, Take 1 tablet by mouth Every Other Day., Disp: , Rfl:     Cholecalciferol (VITAMIN D3) 2000 units capsule, Take 1 capsule by mouth Daily., Disp: , Rfl:     Cyanocobalamin (VITAMIN B12) 1000 MCG tablet controlled-release, Take 2,500 mcg by mouth Daily., Disp: , Rfl:     FeroSul 325 (65 Fe) MG tablet, TAKE 1 TABLET BY MOUTH THREE DAYS A WEEK( MONDAY, WEDNESDAY, FRIDAY), Disp: 45 tablet, Rfl: 1    furosemide (LASIX) 40 MG tablet, TAKE 1 TABLET BY MOUTH DAILY AS NEEDED FOR SWELLING, Disp: 90 tablet, Rfl: 1    loratadine (CLARITIN) 10 MG tablet, Take 1 tablet by mouth Daily., Disp: , Rfl:     metoclopramide (REGLAN) 5 MG tablet, Take 1 tablet by mouth 2 (Two) Times a Day., Disp: , Rfl:     metoprolol succinate XL (TOPROL-XL) 25 MG 24 hr tablet, TAKE 1 TABLET BY MOUTH DAILY, Disp: 90  tablet, Rfl: 3    omeprazole (priLOSEC) 40 MG capsule, Take 1 capsule by mouth Every Morning., Disp: , Rfl:     spironolactone (ALDACTONE) 25 MG tablet, TAKE 1 TABLET BY MOUTH EVERY OTHER DAY, Disp: 45 tablet, Rfl: 1    Problem List:  Patient Active Problem List   Diagnosis    Grief reaction    Alopecia    Arthritis    Back pain    Encounter for general adult medical examination without abnormal findings    Hypertension    Lung nodule    Vitamin D deficiency    Shortness of breath    Postmenopausal status    Nevus, non-neoplastic    Medicare annual wellness visit, subsequent    Fam hx-ischem heart disease    FH: thyroid condition    Vasculitis of skin    Pedal edema    Bilateral calf pain    Neck pain    Immunization reaction    Mixed hyperlipidemia    Tremor    Bright red rectal bleeding    Acute maxillary sinusitis    Acute pain of right knee    Chronic pain of right knee    Need for vaccination    Lung nodules    Tachycardia    Essential (primary) hypertension     Dermatitis    A-fib    Leukopenia    Anemia    Primary osteoarthritis of right knee    Bronchogenic cancer of right lung    Iron deficiency    Malabsorption due to intolerance, not elsewhere classified    Dyspnea    Other iron deficiency anemias    Syncope    Paroxysmal atrial fibrillation    Bilateral impacted cerumen    Acute anterior epistaxis    Callus of toe    Bronchitis    Presence of Watchman left atrial appendage closure device    Primary osteoarthritis of both knees    Acute URI    Cough in adult    Upper respiratory tract infection due to COVID-19 virus    Encounter for screening mammogram for breast cancer    Esophageal stricture    Acute gastritis without bleeding    Diaphragmatic hernia without obstruction or gangrene    Diverticulosis of colon    Dysphagia    Epigastric pain    Esophageal motility disorder    Gastro-esophageal reflux disease without esophagitis    Internal hemorrhoids without complication    Osteoporosis    Schatzki's  ring    Undiagnosed cardiac murmurs    Other asthma    Constipation    Fecal impaction    Cellulitis of left leg     Past Medical History:  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 procedure; surgically fixed the next day    GERD (gastroesophageal reflux disease)     Heart murmur     Hip pain, bilateral     Hypertension     Medicare annual wellness visit, subsequent 02/12/2020    Shoulder pain, right      Past Surgical History:  Past Surgical History:   Procedure Laterality Date    ATRIAL APPENDAGE EXCLUSION LEFT WITH TRANSESOPHAGEAL ECHOCARDIOGRAM Right 7/7/2022    Procedure: Atrial Appendage Occlusion watchman team aware $;  Surgeon: Sage Garcia MD;  Location: [facility] CATH INVASIVE LOCATION;  Service: Cardiovascular;  Laterality: Right;    ATRIAL APPENDAGE EXCLUSION LEFT WITH TRANSESOPHAGEAL ECHOCARDIOGRAM N/A 7/7/2022    Procedure: Atrial Appendage Occlusion;  Surgeon: Fide Mullen MD;  Location: [facility] CATH INVASIVE LOCATION;  Service: Cardiovascular;  Laterality: N/A;    BRONCHOSCOPY Bilateral 2/7/2025    Procedure: BRONCHOSCOPY with right lung wash;  Surgeon: Mukund Farias MD;  Location: [facility] ENDOSCOPY;  Service: Pulmonary;  Laterality: Bilateral;    CATARACT EXTRACTION      CHOLECYSTECTOMY      HYSTERECTOMY  1987    Endometriosis    OOPHORECTOMY      PSEUDO ANEURYSM REPAIR, EXTREMITY Right 7/8/2022    Procedure: REPAIR OF POST CATHETERIZATION RIGHT FEMORAL PSEUDOANEURYSM AND ARTERIOVENOUS FISTUAL;  Surgeon: Alfonzo Hooks MD;  Location: [facility] MAIN OR;  Service: Vascular;  Laterality: Right;    TONSILLECTOMY       Social History:  Social History     Socioeconomic History    Marital status:    Tobacco Use    Smoking status: Never    Smokeless tobacco: Never    Vaping Use    Vaping status: Never Used   Substance and Sexual Activity    Alcohol use: No    Drug use: Never    Sexual activity: Not Currently     Allergies:  Allergies   Allergen Reactions    Ciprofloxacin Unknown (See Comments)    Sulfa Antibiotics Unknown (See Comments)    Iodine Unknown - High Severity     Patient doesn't remember what reaction she gets when she takes this     Sudafed [Pseudoephedrine Hcl] Other (See Comments)     Patient states it has been awhile since taking, so she doesn't remember side effects.     Immunizations:  Immunization History   Administered Date(s) Administered    Arexvy (RSV, Adults 60+ yrs) 12/08/2023    COVID-19 (MODERNA) 12YRS+ (SPIKEVAX) 10/30/2023, 09/09/2024    COVID-19 (MODERNA) 1st,2nd,3rd Dose Monovalent 01/20/2021, 02/17/2021, 08/26/2021    COVID-19 (MODERNA) Monovalent Original Booster 01/20/2021, 02/17/2021, 08/26/2021    COVID-19 (PFIZER) BIVALENT 12+YRS 09/10/2022    FLUAD TRI 65YR+ 10/25/2013, 11/13/2014, 09/30/2015, 09/25/2019    Fluad Quad 65+ 09/08/2020, 10/08/2022, 11/27/2023    Fluzone  >6mos 09/08/2020    Fluzone High-Dose 65+YRS 10/05/2016, 10/17/2017, 09/25/2019, 09/25/2024    Fluzone High-Dose 65+yrs 11/15/2021, 10/08/2022    H1N1 All Forms 01/29/2010    Hepatitis A 04/24/2018, 10/24/2018    Influenza Seasonal Injectable 10/25/2013, 11/13/2014, 09/30/2015    Pneumococcal Conjugate 13-Valent (PCV13) 02/24/2016    Pneumococcal Conjugate 20-Valent (PCV20) 01/17/2024    Shingrix 07/01/2019, 10/09/2019            In-Office Procedure(s):  No orders to display        ASCVD RIsk Score::  The ASCVD Risk score (Albert CLAYTON, et al., 2019) failed to calculate for the following reasons:    The 2019 ASCVD risk score is only valid for ages 40 to 79    Imaging:    Results for orders placed in visit on 04/30/25    XR Knee 3 View Bilateral    Narrative  XR KNEE 3 VW BILATERAL    Date of Exam: 4/30/2025 2:51 PM EDT    Indication: ongoing bilateral knee pain, yearly follow  up    Comparison: Bilateral knee radiographs dated 8/2/2023 and 3/1/2023    Findings:  There is mild to moderate tricompartment joint space narrowing with small osteophytes at the femoral condyles tibial plateaus tibial spines and patella not significantly changed. Small enthesophytes are present at the insertion of the left quadriceps  tendon. No acute fractures or dislocations. There is a chondrocalcinosis of the medial lateral joint compartments bilaterally. There is a small right joint effusion. The overall appearance is not significantly changed. There is suspected chondromalacia  along the medial patellar facet of the left knee.    Impression  Impression:  Moderate tricompartment degenerative joint disease and chondrocalcinosis of each knee. No significant radiographic change since 8/2/2023.      Electronically Signed: Cisco Connolly DO  5/5/2025 11:20 AM EDT  Workstation ID: [id number]       Results for orders placed during the hospital encounter of 12/19/24    CT Abdomen Pelvis Without Contrast    Narrative  CT ABDOMEN PELVIS WO CONTRAST    Date of Exam: 12/20/2024 11:52 AM EST    Indication: constipation.    Comparison: CT abdomen and pelvis 4/20/2022    Technique: Axial CT images were obtained of the abdomen and pelvis without the administration of contrast. Sagittal and coronal reconstructions were performed.  Automated exposure control and iterative reconstruction methods were used.      Findings:  LUNG BASES: There is trace pericardial effusion. A moderate hiatal hernia is noted.    LIVER:  Unremarkable parenchyma without focal lesion.  BILIARY/GALLBLADDER: The gallbladder is surgically absent.  SPLEEN:  Unremarkable  PANCREAS:  Unremarkable  ADRENAL:  Unremarkable  KIDNEYS:  Unremarkable parenchyma with no solid mass identified. No obstruction.  No calculus identified.  GASTROINTESTINAL/MESENTERY: Evaluation of the gastrointestinal tract demonstrates significant constipation throughout the colon with a  fecaloma in the rectal vault measuring 6.3 cm. There is marked fecal stasis throughout the sigmoid and descending  colon. Gas fluid levels are present within the transverse and ascending colon. There is gastric fluid distention with fluid extending into a moderate-sized hiatal hernia. Small bowel loops are fluid-filled measuring prominent in size up to 3.3 cm in  diameter with several gas/fluid levels noted. No free intraperitoneal gas.  AORTA/IVC:  Normal caliber.    RETROPERITONEUM/LYMPH NODES:  Unremarkable    REPRODUCTIVE: Likely surgically absent.  BLADDER:  Unremarkable    OSSEUS STRUCTURES: There is lumbar scoliosis convex left with multilevel degenerative disc and facet disease.    Impression  Impression:    1. 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            Electronically Signed: Elana Yoo MD  12/20/2024 12:03 PM EST  Workstation ID: [id number]      Results for orders placed during the hospital encounter of 12/19/24    CT Abdomen Pelvis Without Contrast    Narrative  CT ABDOMEN PELVIS WO CONTRAST    Date of Exam: 12/20/2024 11:52 AM EST    Indication: constipation.    Comparison: CT abdomen and pelvis 4/20/2022    Technique: Axial CT images were obtained of the abdomen and pelvis without the administration of contrast. Sagittal and coronal reconstructions were performed.  Automated exposure control and iterative reconstruction methods were used.      Findings:  LUNG BASES: There is trace pericardial effusion. A moderate hiatal hernia is noted.    LIVER:  Unremarkable parenchyma without focal lesion.  BILIARY/GALLBLADDER: The gallbladder is surgically absent.  SPLEEN:  Unremarkable  PANCREAS:  Unremarkable  ADRENAL:  Unremarkable  KIDNEYS:  Unremarkable parenchyma with no solid mass identified. No obstruction.  No calculus identified.  GASTROINTESTINAL/MESENTERY: Evaluation of the  gastrointestinal tract demonstrates significant constipation throughout the colon with a fecaloma in the rectal vault measuring 6.3 cm. There is marked fecal stasis throughout the sigmoid and descending  colon. Gas fluid levels are present within the transverse and ascending colon. There is gastric fluid distention with fluid extending into a moderate-sized hiatal hernia. Small bowel loops are fluid-filled measuring prominent in size up to 3.3 cm in  diameter with several gas/fluid levels noted. No free intraperitoneal gas.  AORTA/IVC:  Normal caliber.    RETROPERITONEUM/LYMPH NODES:  Unremarkable    REPRODUCTIVE: Likely surgically absent.  BLADDER:  Unremarkable    OSSEUS STRUCTURES: There is lumbar scoliosis convex left with multilevel degenerative disc and facet disease.    Impression  Impression:    1. 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            Electronically Signed: Elana Yoo MD  12/20/2024 12:03 PM EST  Workstation ID: [id number]      Lab Review:   Admission on 02/07/2025, Discharged on 02/07/2025   Component Date Value    Case Report 02/07/2025                      Value:Medical Cytology Report                           Case: [id number]                                  Authorizing Provider:  Mukund Farias MD             Collected:           02/07/2025 11:05 AM          Ordering Location:     [facility]  Received:            02/07/2025 01:03 PM                                 SUITES                                                                       Pathologist:           Ritesh Oscar MD                                                             Specimen:    Lung, R, right lung wash                                                                   Final Diagnosis 02/07/2025                      Value:Lung, right, bronch wash, smears and cytospin preparation:    Benign  "bronchial cells, reactive bronchial cells and squamous cells    Pulmonary macrophages present    Moderate acute and chronic inflammation    Bacteria present, no fungal organisms identified    Negative for malignant cells    JPR      Gross Description 02/07/2025                      Value:1. Lung, R.  Received in carbowax and designated \"right lung wash\" are 41 mL of clear green fluid. Particulate matter is not present. This specimen is processed as per protocol.          Fungus Culture 02/07/2025 No fungus isolated at 4 weeks     AFB Culture 02/07/2025 Mycobacterium avium complex (A)     AFB Stain 02/07/2025 No acid fast bacilli seen on concentrated smear     Respiratory Culture 02/07/2025 Light growth (2+) Normal respiratory campbell. No S. aureus or Pseudomonas aeruginosa detected. Final report.     Gram Stain 02/07/2025 Few (2+) WBCs seen     Gram Stain 02/07/2025 Few (2+) Bronchial epithelial cells     Gram Stain 02/07/2025 Few (2+) Mixed bacterial morphotypes seen on Gram Stain     Reference Lab Report 02/07/2025 See Attached Report     Pneumocystis 02/07/2025 Negative     ADENOVIRUS, PCR 02/07/2025 Not Detected     Coronavirus 229E 02/07/2025 Not Detected     Coronavirus HKU1 02/07/2025 Not Detected     Coronavirus NL63 02/07/2025 Not Detected     Coronavirus OC43 02/07/2025 Not Detected     COVID19 02/07/2025 Not Detected     Human Metapneumovirus 02/07/2025 Detected (A)     Human Rhinovirus/Enterov* 02/07/2025 Not Detected     Influenza A PCR 02/07/2025 Not Detected     Influenza B PCR 02/07/2025 Not Detected     Parainfluenza Virus 1 02/07/2025 Not Detected     Parainfluenza Virus 2 02/07/2025 Not Detected     Parainfluenza Virus 3 02/07/2025 Not Detected     Parainfluenza Virus 4 02/07/2025 Not Detected     RSV, PCR 02/07/2025 Not Detected     Bordetella pertussis pcr 02/07/2025 Not Detected     Bordetella parapertussis* 02/07/2025 Not Detected     Chlamydophila pneumoniae* 02/07/2025 Not Detected     " Mycoplasma pneumo by PCR 02/07/2025 Not Detected     Reference Lab Report 02/07/2025 Mycobacterium avium complex    Hospital Outpatient Visit on 02/05/2025   Component Date Value    QT Interval 02/05/2025 429     QTC Interval 02/05/2025 432    Lab on 02/05/2025   Component Date Value    WBC 02/05/2025 7.75     RBC 02/05/2025 4.45     Hemoglobin 02/05/2025 11.5 (L)     Hematocrit 02/05/2025 36.1     MCV 02/05/2025 81.1     MCH 02/05/2025 25.8 (L)     MCHC 02/05/2025 31.9     RDW 02/05/2025 12.4     RDW-SD 02/05/2025 36.1 (L)     MPV 02/05/2025 10.4     Platelets 02/05/2025 321     PTT 02/05/2025 28.6     Protime 02/05/2025 14.0     INR 02/05/2025 1.08    Admission on 12/29/2024, Discharged on 12/29/2024   Component Date Value    ADENOVIRUS, PCR 12/29/2024 Not Detected     Coronavirus 229E 12/29/2024 Not Detected     Coronavirus HKU1 12/29/2024 Not Detected     Coronavirus NL63 12/29/2024 Not Detected     Coronavirus OC43 12/29/2024 Not Detected     COVID19 12/29/2024 Not Detected     Human Metapneumovirus 12/29/2024 Detected (A)     Human Rhinovirus/Enterov* 12/29/2024 Not Detected     Influenza A PCR 12/29/2024 Not Detected     Influenza B PCR 12/29/2024 Not Detected     Parainfluenza Virus 1 12/29/2024 Not Detected     Parainfluenza Virus 2 12/29/2024 Not Detected     Parainfluenza Virus 3 12/29/2024 Not Detected     Parainfluenza Virus 4 12/29/2024 Not Detected     RSV, PCR 12/29/2024 Not Detected     Bordetella pertussis pcr 12/29/2024 Not Detected     Bordetella parapertussis* 12/29/2024 Not Detected     Chlamydophila pneumoniae* 12/29/2024 Not Detected     Mycoplasma pneumo by PCR 12/29/2024 Not Detected     Glucose 12/29/2024 104 (H)     BUN 12/29/2024 28 (H)     Creatinine 12/29/2024 1.02 (H)     Sodium 12/29/2024 142     Potassium 12/29/2024 4.2     Chloride 12/29/2024 106     CO2 12/29/2024 26.2     Calcium 12/29/2024 9.1     BUN/Creatinine Ratio 12/29/2024 27.5 (H)     Anion Gap 12/29/2024 9.8     eGFR  12/29/2024 54.0 (L)     WBC 12/29/2024 6.72     RBC 12/29/2024 4.55     Hemoglobin 12/29/2024 11.8 (L)     Hematocrit 12/29/2024 38.0     MCV 12/29/2024 83.5     MCH 12/29/2024 25.9 (L)     MCHC 12/29/2024 31.1 (L)     RDW 12/29/2024 13.2     RDW-SD 12/29/2024 40.2     MPV 12/29/2024 9.5     Platelets 12/29/2024 252     Neutrophil % 12/29/2024 52.8     Lymphocyte % 12/29/2024 31.4     Monocyte % 12/29/2024 10.6     Eosinophil % 12/29/2024 0.7     Basophil % 12/29/2024 0.9     Immature Grans % 12/29/2024 3.6 (H)     Neutrophils, Absolute 12/29/2024 3.55     Lymphocytes, Absolute 12/29/2024 2.11     Monocytes, Absolute 12/29/2024 0.71     Eosinophils, Absolute 12/29/2024 0.05     Basophils, Absolute 12/29/2024 0.06     Immature Grans, Absolute 12/29/2024 0.24 (H)     nRBC 12/29/2024 0.0    Admission on 12/19/2024, Discharged on 12/20/2024   Component Date Value    Glucose 12/20/2024 69     BUN 12/20/2024 26 (H)     Creatinine 12/20/2024 0.96     Sodium 12/20/2024 142     Potassium 12/20/2024 3.9     Chloride 12/20/2024 107     CO2 12/20/2024 21.6 (L)     Calcium 12/20/2024 8.9     BUN/Creatinine Ratio 12/20/2024 27.1 (H)     Anion Gap 12/20/2024 13.4     eGFR 12/20/2024 58.1 (L)     WBC 12/20/2024 8.90     RBC 12/20/2024 4.59     Hemoglobin 12/20/2024 11.7 (L)     Hematocrit 12/20/2024 38.3     MCV 12/20/2024 83.4     MCH 12/20/2024 25.5 (L)     MCHC 12/20/2024 30.5 (L)     RDW 12/20/2024 12.9     RDW-SD 12/20/2024 38.9     MPV 12/20/2024 10.3     Platelets 12/20/2024 260     Neutrophil % 12/20/2024 73.2     Lymphocyte % 12/20/2024 18.3 (L)     Monocyte % 12/20/2024 7.1     Eosinophil % 12/20/2024 0.8     Basophil % 12/20/2024 0.3     Immature Grans % 12/20/2024 0.3     Neutrophils, Absolute 12/20/2024 6.51     Lymphocytes, Absolute 12/20/2024 1.63     Monocytes, Absolute 12/20/2024 0.63     Eosinophils, Absolute 12/20/2024 0.07     Basophils, Absolute 12/20/2024 0.03     Immature Grans, Absolute 12/20/2024 0.03      nRBC 12/20/2024 0.0    Lab on 12/12/2024   Component Date Value    Glucose 12/12/2024 91     BUN 12/12/2024 24 (H)     Creatinine 12/12/2024 1.13 (H)     Sodium 12/12/2024 141     Potassium 12/12/2024 4.2     Chloride 12/12/2024 105     CO2 12/12/2024 25.7     Calcium 12/12/2024 10.0     Total Protein 12/12/2024 7.2     Albumin 12/12/2024 4.3     ALT (SGPT) 12/12/2024 20     AST (SGOT) 12/12/2024 21     Alkaline Phosphatase 12/12/2024 70     Total Bilirubin 12/12/2024 0.6     Globulin 12/12/2024 2.9     A/G Ratio 12/12/2024 1.5     BUN/Creatinine Ratio 12/12/2024 21.2     Anion Gap 12/12/2024 10.3     eGFR 12/12/2024 47.8 (L)     WBC 12/12/2024 7.87     RBC 12/12/2024 4.78     Hemoglobin 12/12/2024 12.4     Hematocrit 12/12/2024 40.9     MCV 12/12/2024 85.6     MCH 12/12/2024 25.9 (L)     MCHC 12/12/2024 30.3 (L)     RDW 12/12/2024 13.0     RDW-SD 12/12/2024 40.0     MPV 12/12/2024 9.4     Platelets 12/12/2024 275     Neutrophil % 12/12/2024 70.3     Lymphocyte % 12/12/2024 21.5     Monocyte % 12/12/2024 6.9     Eosinophil % 12/12/2024 1.0     Basophil % 12/12/2024 0.3     Neutrophils, Absolute 12/12/2024 5.54     Lymphocytes, Absolute 12/12/2024 1.69     Monocytes, Absolute 12/12/2024 0.54     Eosinophils, Absolute 12/12/2024 0.08     Basophils, Absolute 12/12/2024 0.02     Extra Tube 12/12/2024 Hold for add-ons.      Recent labs reviewed and interpreted for clinical significance and application            Level of Care:           Jeremy Mccall MD  06/04/25  .

## 2025-06-07 DIAGNOSIS — L03.116 CELLULITIS OF LEFT LEG: ICD-10-CM

## 2025-06-09 ENCOUNTER — HOSPITAL ENCOUNTER (OUTPATIENT)
Dept: PET IMAGING | Facility: HOSPITAL | Age: 85
Discharge: HOME | End: 2025-06-09
Admitting: INTERNAL MEDICINE
Payer: MEDICARE

## 2025-06-09 DIAGNOSIS — C34.91 BRONCHOGENIC CANCER OF RIGHT LUNG: ICD-10-CM

## 2025-06-09 PROCEDURE — 71250 CT THORAX DX C-: CPT

## 2025-06-09 RX ORDER — FERROUS SULFATE 325(65) MG
TABLET ORAL
Qty: 45 TABLET | Refills: 1 | Status: SHIPPED | OUTPATIENT
Start: 2025-06-09

## 2025-06-09 RX ORDER — TRIAMCINOLONE ACETONIDE 1 MG/G
CREAM TOPICAL
Qty: 80 G | Refills: 1 | Status: SHIPPED | OUTPATIENT
Start: 2025-06-09

## 2025-06-10 NOTE — PROGRESS NOTES
[facility] RADIATION ONCOLOGY  FOLLOW-UP    Name: Chrystal Ruiz  YOB: 1940  MRN #: [id number]  Date of Service: 6/14/2025    Chief Complaint:  Routine 6 month follow up with imaging review    Diagnosis:   Encounter Diagnosis   Name Primary?    Bronchogenic cancer of right lung Yes          Radiation Treatment Course       Treating Physician: Dr. Michael Whitten     Start: 3/23/2022     End: 4/12/2022   Site: RUL     Total Dose: 6000 cGy    Dose/Fraction: 400 cGy    Total Fractions: 15 Daily or BID: Daily  Modality: Photon  Technique: IMRT/VMAT/Rapid Arc  Bolus: No         Interval History       Chrystal Ruiz is a 84 y.o. female with NSCLC, stage I, [id number]. She is sp radiation, 60 Gy in 15 fx completed on 04/12/2022. She returns for routine imaging surveillance.     Since our last appointment, the patient's imaging findings worrisome for infection were worked up.      She underwent bronchoscopy with right lung washing on 2/7/2025. 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 Patient was started on antibiotics with Dr. Farias. The patient is feeling well with no major complaints. She presents to review recent imaging.       Imaging     CT CHEST WO CONTRAST DIAGNOSTIC     Date of Exam: 6/9/2025 1:38 PM EDT     Indication: Surveillance of RUL lung cancer sp radiation. 84-year-old female     Comparison: Chest CT without contrast dated 12/5/2024 and 6/10/2024 chest CT without contrast dated 3/2/2022 and 5/29/2019     Technique: Axial CT images were obtained of the chest without contrast administration.  Sagittal and coronal reconstructions were performed.  Automated exposure control and iterative reconstruction methods were used.        Findings:  LUNGS and PLEURA: The extensive groundglass and nodular opacities throughout the right lung on the study from 6 months ago have resolved compatible with resolving pneumonia. There is stable linear scarring in the inferior right upper lobe and apical    thickening in the right apex. There is stable scarring in the left apex. The right apical opacity on today's study (image 12 series 8, image 57 series 4) is 2.2 x 2.3 x 1.2 cm unchanged from 1 year ago. When compared with the study from 6/14/2023, this   is more nodular and masslike with convex margins. While some of this is likely post radiation change, some residual/recurrent malignancy is not excluded. The subcentimeter noncalcified nodules in the right lower lobe previously described are stable and   are considered benign given the greater than 2-year stability. Central tracheobronchial tree is widely patent. No bronchiectasis or abnormal bronchial wall thickening. No pleural effusions or pneumothorax.     MEDIASTINUM AND DANIELA:No pathologically enlarged thoracic or axillary adenopathy.     CARDIOVASCULAR: Heart size is within normal limits. Thoracic aorta is not aneurysmal.        SOFT TISSUES:No significant findings.     BONES:No acute bony abnormality or aggressive appearing focal osseous lesions.  There is a moderate levoscoliosis in the upper lumbar spine and associated degenerative changes.     IMPRESSION:  Impression:  1.The right apical opacity is stable in size from 1 year ago but is more nodular and masslike with convex margins than on older prior studies. While some of this is likely post radiation change, some residual/recurrent malignancy is not excluded. No new   pulmonary nodules or consolidations.  2.The extensive groundglass and nodular opacities throughout the right lung on the study from 6 months ago have resolved, compatible with resolving pneumonia.                  Pathology   Tissue Pathology Exam: Diagnostic bronchoscopy   Date: 2/7/2025   Medical Cytology Report                           Case: [id number]                                   Authorizing Provider:  Mukund Farias MD             Collected:           02/07/2025 11:05 AM           Ordering Location:     [facility]   "Received:            02/07/2025 01:03 PM                                  SUITES                                                                       Pathologist:           Ritesh Oscar MD                                                             Specimen:    Lung, R, right lung wash                                                                  Final Diagnosis   Lung, right, bronch wash, smears and cytospin preparation:    Benign bronchial cells, reactive bronchial cells and squamous cells    Pulmonary macrophages present    Moderate acute and chronic inflammation    Bacteria present, no fungal organisms identified    Negative for malignant cells     JPR   Electronically signed by Ritesh Oscar MD on 2/11/2025 at 0850 EST   Gross Description    1. Lung, R.  Received in [place] and designated \"right lung wash\" are 41 mL of clear green fluid. Particulate matter is not present. This specimen is processed as per protocol.            Labs       Hematology:  WBC   Date Value Ref Range Status   06/12/2025 6.15 3.40 - 10.80 10*3/mm3 Final     RBC   Date Value Ref Range Status   06/12/2025 4.77 3.77 - 5.28 10*6/mm3 Final     Hemoglobin   Date Value Ref Range Status   06/12/2025 12.6 12.0 - 15.9 g/dL Final     Hematocrit   Date Value Ref Range Status   06/12/2025 40.1 34.0 - 46.6 % Final     Platelets   Date Value Ref Range Status   06/12/2025 227 140 - 450 10*3/mm3 Final     Chemistry:  Lab Results   Component Value Date    GLUCOSE 89 06/12/2025    BUN 23.2 (H) 06/12/2025    CREATININE 1.11 (H) 06/12/2025    EGFRIFNONA 84 02/01/2022    BCR 20.9 06/12/2025    K 4.3 06/12/2025    CO2 26.8 06/12/2025    CALCIUM 10.1 06/12/2025    ALBUMIN 4.3 06/12/2025    AST 18 06/12/2025    ALT 15 06/12/2025         Problem List       Patient Active Problem List   Diagnosis    Grief reaction    Alopecia    Arthritis    Back pain    Encounter for general adult medical examination without abnormal findings    Hypertension    Lung nodule "    Vitamin D deficiency    Shortness of breath    Postmenopausal status    Nevus, non-neoplastic    Medicare annual wellness visit, subsequent    Fam hx-ischem heart disease    FH: thyroid condition    Vasculitis of skin    Pedal edema    Bilateral calf pain    Neck pain    Immunization reaction    Mixed hyperlipidemia    Tremor    Bright red rectal bleeding    Acute maxillary sinusitis    Acute pain of right knee    Chronic pain of right knee    Need for vaccination    Lung nodules    Tachycardia    Essential (primary) hypertension     Dermatitis    A-fib    Leukopenia    Anemia    Primary osteoarthritis of right knee    Bronchogenic cancer of right lung    Iron deficiency    Malabsorption due to intolerance, not elsewhere classified    Dyspnea    Other iron deficiency anemias    Syncope    Paroxysmal atrial fibrillation    Bilateral impacted cerumen    Acute anterior epistaxis    Callus of toe    Bronchitis    Presence of Watchman left atrial appendage closure device    Primary osteoarthritis of both knees    Acute URI    Cough in adult    Upper respiratory tract infection due to COVID-19 virus    Encounter for screening mammogram for breast cancer    Esophageal stricture    Acute gastritis without bleeding    Diaphragmatic hernia without obstruction or gangrene    Diverticulosis of colon    Dysphagia    Epigastric pain    Esophageal motility disorder    Gastro-esophageal reflux disease without esophagitis    Internal hemorrhoids without complication    Osteoporosis    Schatzki's ring    Undiagnosed cardiac murmurs    Other asthma    Constipation    Fecal impaction    Cellulitis of left leg          Current Medications       Current Outpatient Medications   Medication Instructions    acetaminophen (TYLENOL) 500 mg, Every 6 Hours PRN    azithromycin (ZITHROMAX) 250 mg, Every Other Day    FeroSul 325 (65 Fe) MG tablet TAKE 1 TABLET BY MOUTH THREE DAYS A WEEK( MONDAY, WEDNESDAY, FRIDAY)    furosemide (LASIX) 40 mg,  Oral, Daily PRN, swelling    loratadine (CLARITIN) 10 mg, Daily    metoclopramide (REGLAN) 5 mg, 2 Times Daily    metoprolol succinate XL (TOPROL-XL) 25 mg, Oral, Daily    NON FORMULARY Hylands leg cramps supplement    omeprazole (PRILOSEC) 40 mg, Every Morning    spironolactone (ALDACTONE) 25 MG tablet TAKE 1 TABLET BY MOUTH EVERY OTHER DAY    triamcinolone (KENALOG) 0.1 % cream APPLY TOPICALLY TO THE AFFECTED AREA TWICE DAILY AS DIRECTED AS NEEDED FOR IRRITATION    Vitamin B12 2,500 mcg, Daily    Vitamin D3 2,000 Units, Daily          Allergies       Allergies   Allergen Reactions    Ciprofloxacin Unknown - High Severity    Iodine Other (See Comments)     Patient doesn't remember what reaction she gets when she takes this     Sulfa Antibiotics Unknown - High Severity    Sudafed [Pseudoephedrine Hcl] Other (See Comments)     Patient states it has been awhile since taking, so she doesn't remember side effects.           Review of Systems         Vitals:    06/12/25 1337   BP: 150/73   Pulse: 81   Resp: 18   SpO2: 94%      Physical Exam  Constitutional:       General: She is not in acute distress.  HENT:      Head: Normocephalic and atraumatic.   Pulmonary:      Effort: Pulmonary effort is normal. No respiratory distress.   Neurological:      Mental Status: She is alert and oriented to person, place, and time. Mental status is at baseline.   Psychiatric:         Mood and Affect: Mood normal.         Behavior: Behavior normal.         ECOG:  Restricted in physically strenuous activity but ambulatory and able to carry out work of a light or sedentary nature, e.g., light house work, office work = 1          Assessment and Plan       Assessment:      Chrystal Ruiz is a 84 y.o. female with NSCLC, stage I, [id number]. She is sp radiation, 60 Gy in 15 fx completed on 04/12/2022. She returns for routine imaging surveillance.     Diagnoses and all orders for this visit:    1. Bronchogenic cancer of right lung (Primary)  -      CT Chest Without Contrast Diagnostic; Future       Plan:      Orders:  - CT Chest in 3 months  - Follow-up after imaging    We reviewed the patient's recent imaging.  We discussed the finding of increased nodularity over the past 2 years.  We discussed the possibility of evolving posttreatment change versus residual/recurrent malignancy.  We discussed options including close interval surveillance versus pet imaging or further workup.  I discussed given the stability of her findings over the past year, I would favor close interval surveillance given her lack of symptoms or other issues.  We also discussed the findings of resolving pneumonia with improvement in the groundglass opacities on the most recent exam compared to her December 2024 imaging.  The patient expressed understanding.  She will follow-up with Dr. Farias about imaging findings and an upcoming appointment.  He is encouraged to reach out with any questions or concerns prior to her next appointment.    I spent 30 minutes caring for Chrystal on this date of service. This time includes time spent by me in the following activities:preparing for the visit, reviewing tests, obtaining and/or reviewing a separately obtained history, performing a medically appropriate examination and/or evaluation , counseling and educating the patient/family/caregiver, ordering medications, tests, or procedures, documenting information in the medical record, and independently interpreting results and communicating that information with the patient/family/caregiver        Follow-Up       No follow-ups on file.       CC: Etelvina Ulloa MD

## 2025-06-12 ENCOUNTER — LAB (OUTPATIENT)
Dept: LAB | Facility: HOSPITAL | Age: 85
End: 2025-06-12
Payer: MEDICARE

## 2025-06-12 ENCOUNTER — OFFICE VISIT (OUTPATIENT)
Dept: ONCOLOGY | Facility: CLINIC | Age: 85
End: 2025-06-12
Payer: MEDICARE

## 2025-06-12 ENCOUNTER — OFFICE VISIT (OUTPATIENT)
Dept: RADIATION ONCOLOGY | Facility: HOSPITAL | Age: 85
End: 2025-06-12
Payer: MEDICARE

## 2025-06-12 VITALS
RESPIRATION RATE: 18 BRPM | BODY MASS INDEX: 23.05 KG/M2 | WEIGHT: 122 LBS | DIASTOLIC BLOOD PRESSURE: 73 MMHG | SYSTOLIC BLOOD PRESSURE: 150 MMHG | OXYGEN SATURATION: 94 % | HEART RATE: 81 BPM

## 2025-06-12 VITALS
BODY MASS INDEX: 23.03 KG/M2 | WEIGHT: 122 LBS | RESPIRATION RATE: 18 BRPM | TEMPERATURE: 97.8 F | OXYGEN SATURATION: 98 % | DIASTOLIC BLOOD PRESSURE: 70 MMHG | HEART RATE: 60 BPM | SYSTOLIC BLOOD PRESSURE: 165 MMHG | HEIGHT: 61 IN

## 2025-06-12 DIAGNOSIS — C34.11 MALIGNANT NEOPLASM OF UPPER LOBE OF RIGHT LUNG: Primary | ICD-10-CM

## 2025-06-12 DIAGNOSIS — C34.91 BRONCHOGENIC CANCER OF RIGHT LUNG: Primary | ICD-10-CM

## 2025-06-12 LAB
ALBUMIN SERPL-MCNC: 4.3 G/DL (ref 3.5–5.2)
ALBUMIN/GLOB SERPL: 1.9 G/DL
ALP SERPL-CCNC: 59 U/L (ref 39–117)
ALT SERPL W P-5'-P-CCNC: 15 U/L (ref 1–33)
ANION GAP SERPL CALCULATED.3IONS-SCNC: 13.2 MMOL/L (ref 5–15)
AST SERPL-CCNC: 18 U/L (ref 1–32)
BASOPHILS # BLD AUTO: 0.01 10*3/MM3 (ref 0–0.2)
BASOPHILS NFR BLD AUTO: 0.2 % (ref 0–1.5)
BILIRUB SERPL-MCNC: 0.8 MG/DL (ref 0–1.2)
BUN SERPL-MCNC: 23.2 MG/DL (ref 8–23)
BUN/CREAT SERPL: 20.9 (ref 7–25)
CALCIUM SPEC-SCNC: 10.1 MG/DL (ref 8.6–10.5)
CHLORIDE SERPL-SCNC: 103 MMOL/L (ref 98–107)
CO2 SERPL-SCNC: 26.8 MMOL/L (ref 22–29)
CREAT SERPL-MCNC: 1.11 MG/DL (ref 0.57–1)
DEPRECATED RDW RBC AUTO: 45 FL (ref 37–54)
EGFRCR SERPLBLD CKD-EPI 2021: 48.8 ML/MIN/1.73
EOSINOPHIL # BLD AUTO: 0.03 10*3/MM3 (ref 0–0.4)
EOSINOPHIL NFR BLD AUTO: 0.5 % (ref 0.3–6.2)
ERYTHROCYTE [DISTWIDTH] IN BLOOD BY AUTOMATED COUNT: 14.9 % (ref 12.3–15.4)
FERRITIN SERPL-MCNC: 551 NG/ML (ref 13–150)
FOLATE SERPL-MCNC: 18.4 NG/ML (ref 4.78–24.2)
GLOBULIN UR ELPH-MCNC: 2.3 GM/DL
GLUCOSE SERPL-MCNC: 89 MG/DL (ref 65–99)
HCT VFR BLD AUTO: 40.1 % (ref 34–46.6)
HGB BLD-MCNC: 12.6 G/DL (ref 12–15.9)
HOLD SPECIMEN: NORMAL
IRON 24H UR-MRATE: 88 MCG/DL (ref 37–145)
IRON SATN MFR SERPL: 25 % (ref 20–50)
LYMPHOCYTES # BLD AUTO: 1.4 10*3/MM3 (ref 0.7–3.1)
LYMPHOCYTES NFR BLD AUTO: 22.8 % (ref 19.6–45.3)
MCH RBC QN AUTO: 26.4 PG (ref 26.6–33)
MCHC RBC AUTO-ENTMCNC: 31.4 G/DL (ref 31.5–35.7)
MCV RBC AUTO: 84.1 FL (ref 79–97)
MONOCYTES # BLD AUTO: 0.6 10*3/MM3 (ref 0.1–0.9)
MONOCYTES NFR BLD AUTO: 9.8 % (ref 5–12)
NEUTROPHILS NFR BLD AUTO: 4.11 10*3/MM3 (ref 1.7–7)
NEUTROPHILS NFR BLD AUTO: 66.7 % (ref 42.7–76)
PLATELET # BLD AUTO: 227 10*3/MM3 (ref 140–450)
PMV BLD AUTO: 10 FL (ref 6–12)
POTASSIUM SERPL-SCNC: 4.3 MMOL/L (ref 3.5–5.2)
PROT SERPL-MCNC: 6.6 G/DL (ref 6–8.5)
RBC # BLD AUTO: 4.77 10*6/MM3 (ref 3.77–5.28)
SODIUM SERPL-SCNC: 143 MMOL/L (ref 136–145)
TIBC SERPL-MCNC: 349 MCG/DL (ref 298–536)
TRANSFERRIN SERPL-MCNC: 234 MG/DL (ref 200–360)
VIT B12 BLD-MCNC: 681 PG/ML (ref 211–946)
WBC NRBC COR # BLD AUTO: 6.15 10*3/MM3 (ref 3.4–10.8)
WHOLE BLOOD HOLD COAG: NORMAL

## 2025-06-12 PROCEDURE — 85025 COMPLETE CBC W/AUTO DIFF WBC: CPT

## 2025-06-12 PROCEDURE — 99213 OFFICE O/P EST LOW 20 MIN: CPT | Performed by: INTERNAL MEDICINE

## 2025-06-12 PROCEDURE — 36415 COLL VENOUS BLD VENIPUNCTURE: CPT

## 2025-06-12 PROCEDURE — 1160F RVW MEDS BY RX/DR IN RCRD: CPT | Performed by: INTERNAL MEDICINE

## 2025-06-12 PROCEDURE — 82607 VITAMIN B-12: CPT | Performed by: INTERNAL MEDICINE

## 2025-06-12 PROCEDURE — 84466 ASSAY OF TRANSFERRIN: CPT | Performed by: INTERNAL MEDICINE

## 2025-06-12 PROCEDURE — 82728 ASSAY OF FERRITIN: CPT | Performed by: INTERNAL MEDICINE

## 2025-06-12 PROCEDURE — 1125F AMNT PAIN NOTED PAIN PRSNT: CPT | Performed by: INTERNAL MEDICINE

## 2025-06-12 PROCEDURE — 3077F SYST BP >= 140 MM HG: CPT | Performed by: INTERNAL MEDICINE

## 2025-06-12 PROCEDURE — 82746 ASSAY OF FOLIC ACID SERUM: CPT | Performed by: INTERNAL MEDICINE

## 2025-06-12 PROCEDURE — 1159F MED LIST DOCD IN RCRD: CPT | Performed by: INTERNAL MEDICINE

## 2025-06-12 PROCEDURE — 80053 COMPREHEN METABOLIC PANEL: CPT | Performed by: INTERNAL MEDICINE

## 2025-06-12 PROCEDURE — G0463 HOSPITAL OUTPT CLINIC VISIT: HCPCS | Performed by: INTERNAL MEDICINE

## 2025-06-12 PROCEDURE — 3078F DIAST BP <80 MM HG: CPT | Performed by: INTERNAL MEDICINE

## 2025-06-12 PROCEDURE — 83540 ASSAY OF IRON: CPT | Performed by: INTERNAL MEDICINE

## 2025-06-12 NOTE — PROGRESS NOTES
HEMATOLOGY ONCOLOGY OUTPATIENT FOLLOW UP       Patient name: Chrystal Ruiz  : 1940  MRN: [id number]  Primary Care Physician: Etelvina Ulloa MD  Referring Physician: No ref. provider found  Reason For Consult:     Chief Complaint   Patient presents with    Follow-up     Malignant neoplasm of upper lobe of right lung     HPI:   History of Present Illness:  Chrystal Ruiz is 84 y.o. female who presented to our office on 22 for consultation regarding anemia and thrombocytosis.     3/9/2022: Ms. Ruiz was referred for the investigation of anemia and thrombocytosis that was identified in the process of treating congestive heart failure and a right lung tumor. She came in a wheel chair complaining of severe fatigue and difficulties functioning because of this weakness. She admitted to hematochezia that had been associated to constipation that had been going on for some time, at least a few weeks. She had been afebrile. No pain. The laboratory exams reported microcytic anemia and thrombocytosis that had been present since the end of . A decision was made to transfuse her with one unit of red cells. Tests for iron deficiency were sent. She was scheduled to see me in one week.     3/16/2022. Ms. Ruiz was back in the office for follow up. She was feeling much better after the transfusion. She was still weak but not as much and her affect was also better. The laboratory exams did not clearly suggest iron deficiency but she persisted with microcytic anemia and had a history of gastrointestinal bleeding, such that iron deficiency was strongly considered the cause of the symptoms and laboratory manifestations. She had taken oral iron with multiple side effects and no clear improvement. Plans for intravenous iron were made.     3/22/2022: Admitted with evidence of decompensation of the congestive heart failure. She was treated with diuretics with prompt symptomatic relief. She was  given one dose of intravenous iron. She was discharged feeling much better.     4/4/2022: Back in the office for follow up. She had been receiving iron intravenously without complications. She reported unintended weight loss. The exam was not different than before. The laboratory exams revealed progressive improvement of the blood count with increase in the hemoglobin and the mean corpuscular volume and resolution of the leukocytosis and thrombocytosis. A decision was made to obtain a scan of the abdomen to investigate the history of blood loss in the stools and the unintended weight loss.     4/25/2022: Ms. Ruiz was back in the office for follow up. She was feeling much better and had returned to most of her normal activities. She arrived walking and reported that she had been walking more and more. She still had some fatigue but not the weakness she had before. She also had better appetite. She complained, however, that some of the medication she was receiving had resulted in changes in her taste perception. She had no more dyspnea than before and she had been free of chest pain. She had been offered the Watchman procedure, since she was not able to take anticoagulation safely. She denies abdominal pain or diarrhea. No dysuria. No edema. No skin rash.     5/18/2022: Back in the office to review results. She was feeling very well. Had regained her energy and was very active. The exam did not reveal new changes. Her hemoglobin had continued to improve, but she still had anemia. A decision was made to continue to observe without intervention.     6/15/2022: Back in the office for follow-up.  She had partial correction of her anemia.  She was scheduled to have a watchman procedure early in July.  The physical exam was unremarkable.  A decision was made to continue to observe and she was scheduled to return in 2 months.    8/17/2022: In the office for follow-up.  Compliant with iron.  Underwent the watchman procedure  as planned.  Had some minor complications.  This was followed by an episode of cellulitis that required admission to the hospital.  At this time asymptomatic.  Hemoglobin back to normal range.  A decision was made to follow in 4 months.    12/14/2022: Feeling well.  As active as before.  Functional and self-sufficient.  Eating well and no weight loss.  Afebrile.  On exam no abnormalities to document.  Laboratory exams perfectly within the normal range.  I asked her to return 6 months later and to continue the iron until I see her again.    6/21/2023: Feeling well.  Strong and without new symptoms.  Reasonably active and with good appetite.  No new limitations.  The complications from the Watchman procedure resolved completely and she was then off of all anticoagulation.  On exam there were no changes.  The laboratory exams revealed a hemoglobin within the normal range.  Iron studies were requested and I asked her to return in 6 months.    12/11/2023: About the same.  Complains of pain especially on the left side, at the hip and the knee.  This has been variously attributed to arthritis of the hip and knee and to spinal column problems.  She is receiving local injections as well as physical therapy with only partial relief.  She has had chest pains and has visited the emergency room in the relatively recent past.  On exam alert, conversant and in no distress.  Lungs clear.  Heart regular.  Abdomen soft.  The laboratory exams were reviewed.  The blood count is well within the normal range.  A decision was made to see her 6 months later.  She was asked to continue to take the iron 3 times a week.    6/13/2024: Feeling well and without new symptoms.  Active and without new limitations.  Recently had a subconjunctival hemorrhage on the right side after sneezing.  She has been hypertensive but not particularly so.  She is eating well and her weight is stable.  She has been afebrile.  No chest pains and no abdominal pain.   As well no obvious gastrointestinal bleeding.  On exam indeed she has a large subconjunctival hemorrhage on the right.  There is some extension of the ecchymosis to the inferior eyelid.  No palpable lymphadenopathy in the preauricular region.  The lungs are clear bilaterally and the heart regular.  The abdomen is soft.  No or minimal edema.  Reviewed the laboratory exams.  No major changes.  I have asked her to see me in approximately 6 months.  I will continue to monitor her blood counts.  I did review the images of the recent scans.  I do not see any change but the official interpretation is not out yet.    12/12/2024: Feeling well.  Active and energetic.  Eating well and with good appetite.  No new limitations.  No fevers or nocturnal diaphoresis.  No bleeding.  On exam she is alert and conversant.  Oriented and in good spirits.  No distress.  No jaundice.  No pallor.  Lungs clear.  Heart regular.  Abdomen soft and without hepatomegaly or splenomegaly.  No edema.  Laboratory exams reviewed and discussed with her.  To see me in 6 months.    6/12/2025: Feels about the same. Has had episodes of light headedness. She has been without fevers. No chest pain. Denies abdominal pain but she admits to hematochezia that has been occurring intermittently for some time. It is enough the stain the water of the toilet. It is painless. Denies chest pain or dyspnea. No chest pain or cough. On exam alert and conversant. Well hydrated and neither pale nor jaundice. No oral lesions and respirations not labored. Lungs clear and heart regular. Abdomen soft. No edema. Reviewed the laboratory exams. While she does not have anemia, the mean corpuscular hemoglobin is lower. Thus she could have iron deficiency again. To investigate. See me with results.     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    with Watchman procedure; surgically fixed the next day    GERD (gastroesophageal reflux disease)     Heart murmur     Hip pain, bilateral     Hypertension     Medicare annual wellness visit, subsequent 02/12/2020    Shoulder pain, right      Past Surgical History:   Procedure Laterality Date    ATRIAL APPENDAGE EXCLUSION LEFT WITH TRANSESOPHAGEAL ECHOCARDIOGRAM Right 7/7/2022    Procedure: Atrial Appendage Occlusion watchman team aware $;  Surgeon: Sage Garcia MD;  Location: [facility] CATH INVASIVE LOCATION;  Service: Cardiovascular;  Laterality: Right;    ATRIAL APPENDAGE EXCLUSION LEFT WITH TRANSESOPHAGEAL ECHOCARDIOGRAM N/A 7/7/2022    Procedure: Atrial Appendage Occlusion;  Surgeon: Fide Mullen MD;  Location: [facility] CATH INVASIVE LOCATION;  Service: Cardiovascular;  Laterality: N/A;    BRONCHOSCOPY Bilateral 2/7/2025    Procedure: BRONCHOSCOPY with right lung wash;  Surgeon: Mukund Farias MD;  Location: [facility] ENDOSCOPY;  Service: Pulmonary;  Laterality: Bilateral;    CATARACT EXTRACTION      CHOLECYSTECTOMY      HYSTERECTOMY  1987    Endometriosis    OOPHORECTOMY      PSEUDO ANEURYSM REPAIR, EXTREMITY Right 7/8/2022    Procedure: REPAIR OF POST CATHETERIZATION RIGHT FEMORAL PSEUDOANEURYSM AND ARTERIOVENOUS FISTUAL;  Surgeon: Alfonzo Hooks MD;  Location: [facility] MAIN OR;  Service: Vascular;  Laterality: Right;    TONSILLECTOMY         Current Outpatient Medications:     acetaminophen (TYLENOL) 500 MG tablet, Take 1 tablet by mouth Every 6 (Six) Hours As Needed for Mild Pain., Disp: , Rfl:     azithromycin (ZITHROMAX) 250 MG tablet, Take 1 tablet by mouth Every Other Day., Disp: , Rfl:     Cholecalciferol (VITAMIN D3) 2000 units capsule, Take 1 capsule by mouth Daily., Disp: , Rfl:     Cyanocobalamin (VITAMIN B12) 1000 MCG tablet controlled-release, Take 2,500 mcg by mouth Daily., Disp: , Rfl:     FeroSul 325 (65 Fe) MG  tablet, TAKE 1 TABLET BY MOUTH THREE DAYS A WEEK( MONDAY, WEDNESDAY, FRIDAY), Disp: 45 tablet, Rfl: 1    furosemide (LASIX) 40 MG tablet, TAKE 1 TABLET BY MOUTH DAILY AS NEEDED FOR SWELLING, Disp: 90 tablet, Rfl: 1    loratadine (CLARITIN) 10 MG tablet, Take 1 tablet by mouth Daily., Disp: , Rfl:     metoclopramide (REGLAN) 5 MG tablet, Take 1 tablet by mouth 2 (Two) Times a Day., Disp: , Rfl:     metoprolol succinate XL (TOPROL-XL) 25 MG 24 hr tablet, TAKE 1 TABLET BY MOUTH DAILY, Disp: 90 tablet, Rfl: 3    NON FORMULARY, H[place]ands leg cramps supplement, Disp: , Rfl:     omeprazole (priLOSEC) 40 MG capsule, Take 1 capsule by mouth Every Morning., Disp: , Rfl:     spironolactone (ALDACTONE) 25 MG tablet, TAKE 1 TABLET BY MOUTH EVERY OTHER DAY, Disp: 45 tablet, Rfl: 1    triamcinolone (KENALOG) 0.1 % cream, APPLY TOPICALLY TO THE AFFECTED AREA TWICE DAILY AS DIRECTED AS NEEDED FOR IRRITATION, Disp: 80 g, Rfl: 1    Allergies   Allergen Reactions    Ciprofloxacin Unknown (See Comments)    Sulfa Antibiotics Unknown (See Comments)    Iodine Unknown - High Severity     Patient doesn't remember what reaction she gets when she takes this     Sudafed [Pseudoephedrine Hcl] Other (See Comments)     Patient states it has been awhile since taking, so she doesn't remember side effects.     Family History   Problem Relation Age of Onset    Breast cancer Mother 85    Stroke Mother     Heart disease Mother     Endometrial cancer Mother 70    Heart failure Father     Heart failure Sister     COPD Sister     Heart disease Sister      Cancer-related family history includes Breast cancer (age of onset: 85) in her mother; Endometrial cancer (age of onset: 70) in her mother.    Social History     Tobacco Use    Smoking status: Never    Smokeless tobacco: Never   Vaping Use    Vaping status: Never Used   Substance Use Topics    Alcohol use: No    Drug use: Never     Social History     Social History Narrative    Not on file      ROS:  "    Review of Systems   Constitutional:  Negative for activity change, appetite change, chills, diaphoresis, fatigue, fever and unexpected weight change.   HENT:  Negative for congestion, dental problem, drooling, ear discharge, ear pain, facial swelling, hearing loss, mouth sores, nosebleeds, postnasal drip, rhinorrhea, sinus pressure, sinus pain, sneezing, sore throat, tinnitus, trouble swallowing and voice change.    Eyes:  Negative for photophobia, pain, discharge, redness, itching and visual disturbance.   Respiratory:  Negative for apnea, cough, choking, chest tightness, shortness of breath, wheezing and stridor.    Cardiovascular:  Negative for chest pain, palpitations and leg swelling.   Gastrointestinal:  Negative for abdominal distention, abdominal pain, anal bleeding, blood in stool, constipation, diarrhea, nausea, rectal pain and vomiting.   Endocrine: Negative for cold intolerance, heat intolerance, polydipsia and polyuria.   Genitourinary:  Negative for decreased urine volume, difficulty urinating, dysuria, flank pain, frequency, genital sores, hematuria and urgency.   Musculoskeletal:  Negative for arthralgias, back pain, gait problem, joint swelling, myalgias, neck pain and neck stiffness.   Skin:  Negative for color change, pallor and rash.   Neurological:  Positive for light-headedness. Negative for dizziness, tremors, seizures, syncope, facial asymmetry, speech difficulty, weakness, numbness and headaches.   Hematological:  Negative for adenopathy. Does not bruise/bleed easily.   Psychiatric/Behavioral:  Negative for agitation, behavioral problems, confusion, decreased concentration, hallucinations, self-injury, sleep disturbance and suicidal ideas. The patient is not nervous/anxious.      Objective:    Vitals:    06/12/25 1527   BP: 165/70   Pulse: 60   Resp: 18   Temp: 97.8 °F (36.6 °C)   TempSrc: Temporal   SpO2: 98%   Weight: 55.3 kg (122 lb)   Height: 154.9 cm (61\")   PainSc: 5    PainLoc: " Foot  Comment: both feet     Body mass index is 23.05 kg/m².  ECOG  (3) Capable of limited self-care, confined to bed or chair > 50% of waking hours    Physical Exam:     Physical Exam  Constitutional:       General: She is not in acute distress.     Appearance: Normal appearance. She is not ill-appearing, toxic-appearing or diaphoretic.   HENT:      Head: Normocephalic and atraumatic.      Right Ear: External ear normal.      Left Ear: External ear normal.      Nose: Nose normal.      Mouth/Throat:      Mouth: Mucous membranes are moist.      Pharynx: Oropharynx is clear. No oropharyngeal exudate or posterior oropharyngeal erythema.   Eyes:      General: No scleral icterus.        Right eye: No discharge.         Left eye: No discharge.      Conjunctiva/sclera: Conjunctivae normal.      Pupils: Pupils are equal, round, and reactive to light.   Cardiovascular:      Rate and Rhythm: Normal rate and regular rhythm.      Pulses: Normal pulses.      Heart sounds: No murmur heard.     No friction rub. No gallop.   Pulmonary:      Effort: No respiratory distress.      Breath sounds: No stridor. No wheezing, rhonchi or rales.   Abdominal:      General: Abdomen is flat. Bowel sounds are normal. There is no distension.      Palpations: Abdomen is soft. There is no mass.      Tenderness: There is no abdominal tenderness. There is no right CVA tenderness, left CVA tenderness, guarding or rebound.      Hernia: No hernia is present.   Musculoskeletal:         General: No swelling, tenderness, deformity or signs of injury.      Cervical back: No rigidity.      Right lower leg: No edema.      Left lower leg: No edema.   Lymphadenopathy:      Cervical: No cervical adenopathy.   Skin:     Coloration: Skin is not jaundiced.      Findings: No bruising, lesion or rash.   Neurological:      General: No focal deficit present.      Mental Status: She is alert and oriented to person, place, and time.      Cranial Nerves: No cranial nerve  deficit.      Motor: No weakness.      Gait: Gait normal.   Psychiatric:         Mood and Affect: Mood normal.         Behavior: Behavior normal.         Thought Content: Thought content normal.         Judgment: Judgment normal.     CM Cheng MD performed the physical exam on 6/12/2025 as documented above.     Lab Results - Last 18 Months   Lab Units 06/12/25  1456 02/05/25  1214 12/29/24  1125   WBC 10*3/mm3 6.15 7.75 6.72   HEMOGLOBIN g/dL 12.6 11.5* 11.8*   HEMATOCRIT % 40.1 36.1 38.0   PLATELETS 10*3/mm3 227 321 252   MCV fL 84.1 81.1 83.5     Lab Results - Last 18 Months   Lab Units 12/29/24  1125 12/20/24  0015 12/12/24  1413 11/11/24  1539   SODIUM mmol/L 142 142 141 142   POTASSIUM mmol/L 4.2 3.9 4.2 4.0   CHLORIDE mmol/L 106 107 105 108*   CO2 mmol/L 26.2 21.6* 25.7 26.0   BUN mg/dL 28* 26* 24* 23   CREATININE mg/dL 1.02* 0.96 1.13* 0.99   CALCIUM mg/dL 9.1 8.9 10.0 9.6   BILIRUBIN mg/dL  --   --  0.6 0.8   ALK PHOS U/L  --   --  70 84   ALT (SGPT) U/L  --   --  20 30   AST (SGOT) U/L  --   --  21 25   GLUCOSE mg/dL 104* 69 91 85     Lab Results   Component Value Date    GLUCOSE 104 (H) 12/29/2024    BUN 28 (H) 12/29/2024    CREATININE 1.02 (H) 12/29/2024    EGFRIFNONA 84 02/01/2022    BCR 27.5 (H) 12/29/2024    K 4.2 12/29/2024    CO2 26.2 12/29/2024    CALCIUM 9.1 12/29/2024    ALBUMIN 4.3 12/12/2024    AST 21 12/12/2024    ALT 20 12/12/2024     Lab Results   Component Value Date    PTT 28.6 02/05/2025    INR 1.08 02/05/2025     Assessment & Plan     Assessment and Plan:  Iron deficiency anemia: Recurrent?. She reports a history of hematochezia in the last few months.   Gastrointestinal bleeding.  Recurrent.   Reviewed all recent laboratory exams.   She will see me in 3 weeks     Frank Cheng MD on 6/12/2025 at 16:01

## 2025-06-13 ENCOUNTER — OFFICE VISIT (OUTPATIENT)
Age: 85
End: 2025-06-13
Payer: MEDICARE

## 2025-06-13 VITALS — RESPIRATION RATE: 20 BRPM | BODY MASS INDEX: 23.03 KG/M2 | HEIGHT: 61 IN | WEIGHT: 122 LBS

## 2025-06-13 DIAGNOSIS — M20.41 HAMMER TOES OF BOTH FEET: ICD-10-CM

## 2025-06-13 DIAGNOSIS — M20.11 VALGUS DEFORMITY OF BOTH GREAT TOES: ICD-10-CM

## 2025-06-13 DIAGNOSIS — M79.672 BILATERAL FOOT PAIN: Primary | ICD-10-CM

## 2025-06-13 DIAGNOSIS — M20.12 VALGUS DEFORMITY OF BOTH GREAT TOES: ICD-10-CM

## 2025-06-13 DIAGNOSIS — M19.071 OSTEOARTHRITIS OF MIDTARSAL JOINT OF RIGHT FOOT: ICD-10-CM

## 2025-06-13 DIAGNOSIS — M20.42 HAMMER TOES OF BOTH FEET: ICD-10-CM

## 2025-06-13 DIAGNOSIS — M79.671 BILATERAL FOOT PAIN: Primary | ICD-10-CM

## 2025-06-13 NOTE — PROGRESS NOTES
06/13/2025  Foot and Ankle Surgery - Established Patient/Follow-up  Provider: Dr. Darion Mcgrath DPM  Location: [facility]    Subjective:  Chrystal Ruiz is a 84 y.o. female following up for bilateral osteoarthritis feet, bunion deformity, hammertoe deformity.  Patient states she has been wearing wider toebox shoes and wearing bracing regularly.  Patient states her feet are still painful after the end of the day.  Patient has not been taking medication.  Patient inquiring about topical pain medication.    Chief Complaint   Patient presents with    Left Foot - Callouses, Nail Problem, Follow-up     PTTD, arthritis, at risk foot care    Right Foot - Pain, Callouses, Nail Problem, Follow-up     PTTD, arthritis,     Follow-up     PCP: Etelvina Ulloa MD  Last PCP Visit: 3/3/25       Allergies   Allergen Reactions    Ciprofloxacin Unknown - High Severity    Iodine Other (See Comments)     Patient doesn't remember what reaction she gets when she takes this     Sulfa Antibiotics Unknown - High Severity    Sudafed [Pseudoephedrine Hcl] Other (See Comments)     Patient states it has been awhile since taking, so she doesn't remember side effects.       Current Outpatient Medications on File Prior to Visit   Medication Sig Dispense Refill    acetaminophen (TYLENOL) 500 MG tablet Take 1 tablet by mouth Every 6 (Six) Hours As Needed for Mild Pain.      azithromycin (ZITHROMAX) 250 MG tablet Take 1 tablet by mouth Every Other Day.      Cholecalciferol (VITAMIN D3) 2000 units capsule Take 1 capsule by mouth Daily.      Cyanocobalamin (VITAMIN B12) 1000 MCG tablet controlled-release Take 2,500 mcg by mouth Daily.      FeroSul 325 (65 Fe) MG tablet TAKE 1 TABLET BY MOUTH THREE DAYS A WEEK( MONDAY, WEDNESDAY, FRIDAY) 45 tablet 1    furosemide (LASIX) 40 MG tablet TAKE 1 TABLET BY MOUTH DAILY AS NEEDED FOR SWELLING 90 tablet 1    loratadine (CLARITIN) 10 MG tablet Take 1 tablet by mouth Daily.      metoclopramide (REGLAN) 5  "MG tablet Take 1 tablet by mouth 2 (Two) Times a Day.      metoprolol succinate XL (TOPROL-XL) 25 MG 24 hr tablet TAKE 1 TABLET BY MOUTH DAILY 90 tablet 3    NON FORMULARY Hylands leg cramps supplement      omeprazole (priLOSEC) 40 MG capsule Take 1 capsule by mouth Every Morning.      spironolactone (ALDACTONE) 25 MG tablet TAKE 1 TABLET BY MOUTH EVERY OTHER DAY 45 tablet 1    triamcinolone (KENALOG) 0.1 % cream APPLY TOPICALLY TO THE AFFECTED AREA TWICE DAILY AS DIRECTED AS NEEDED FOR IRRITATION 80 g 1     No current facility-administered medications on file prior to visit.       Objective   Resp 20   Ht 154.9 cm (61\")   Wt 55.3 kg (122 lb)   BMI 23.05 kg/m²     Foot/Ankle Exam    GENERAL  Appearance:  appears stated age  Orientation:  AAOx3  Affect:  appropriate  Gait:  unimpaired  Assistance:  independent  Right shoe gear: casual shoe  Left shoe gear: casual shoe    VASCULAR     Right Foot Vascularity   Dorsalis pedis:  2+  Posterior tibial:  2+  Skin temperature:  warm  Edema gradin+  CFT:  < 3 seconds  Pedal hair growth:  Present  Varicosities:  mild varicosities     Left Foot Vascularity   Dorsalis pedis:  2+  Posterior tibial:  2+  Skin temperature:  warm  Edema gradin+  CFT:  < 3 seconds  Pedal hair growth:  Present  Varicosities:  mild varicosities     NEUROLOGIC     Right Foot Neurologic   Normal sensation    Light touch sensation: normal  Vibratory sensation: normal  Hot/Cold sensation: normal  Normal reflexes    Achilles reflex:  2+  Babinski reflex:  2+     Left Foot Neurologic   Normal sensation    Light touch sensation: normal  Vibratory sensation: normal  Hot/Cold sensation:  normal  Normal reflexes    Achilles reflex:  2+  Babinski reflex:  2+    MUSCULOSKELETAL     Right Foot Musculoskeletal   Tenderness:  toe 1 tenderness, toe 2 tenderness and toenail problem    Hammertoe:  Second toe and third toe  Hallux valgus: Yes       Left Foot Musculoskeletal   Tenderness:  toe 1 tenderness, " toe 2 tenderness and toenail problem  Hammertoe:  Second toe and third toe  Hallux valgus: Yes      MUSCLE STRENGTH     Right Foot Muscle Strength   Normal strength    Foot dorsiflexion:  5  Foot plantar flexion:  5  Foot inversion:  5  Foot eversion:  5     Left Foot Muscle Strength   Normal strength    Foot dorsiflexion:  5  Foot plantar flexion:  5  Foot inversion:  5  Foot eversion:  5    RANGE OF MOTION     Right Foot Range of Motion   Foot and ankle ROM within normal limits       Left Foot Range of Motion   Foot and ankle ROM within normal limits      DERMATOLOGIC      Right Foot Dermatologic   Skin  Positive for corn.   Nails  1.  Positive for elongated, abnormal thickness and dystrophic nail.  2.  Positive for elongated, abnormal thickness and dystrophic nail.  3.  Positive for elongated, abnormal thickness and dystrophic nail.  4.  Positive for elongated, abnormal thickness and dystrophic nail.  5.  Positive for elongated, abnormal thickness and dystrophic nail.     Left Foot Dermatologic   Skin  Positive for corn.   Nails  1.  Positive for elongated, abnormal thickness and dystrophic nail.  2.  Positive for elongated, abnormal thickness and dystrophic nail.  3.  Positive for elongated, abnormal thickness and dystrophic nail.  4.  Positive for elongated, abnormally thick and dystrophic nail.  5.  Positive for elongated, abnormally thick and dystrophic nail.        Assessment & Plan   Diagnoses and all orders for this visit:    1. Bilateral foot pain (Primary)    2. Valgus deformity of both great toes    3. Hammer toes of both feet    4. 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  Patient is not a surgical candidate due to her age and living status.  Patient states that she may be moving in with her son and would have assistance at home.    Surgical interventions discussed with patient including joint fusion. Patient would like to pursue conservative  treatment at this time.     Discussed with patient possible steroid injection to the affected joint.  Patient understands risks and benefits of steroid injection.  Hold off on steroid injection this time.    Patient would benefit from topical anti-inflammatory medication such as Voltaren gel.  Patient will purchase over-the-counter.    Patient also needs to start using compression stockings to bilateral lower extremities.  Patient's braces evaluated they are fitting her properly.    Discussed with patient prescription and over-the-counter medications for arthritis including NSAIDs or short course steroid therapy.  Also discussed topical anti-inflammatory medication as a form of pain relief.  Joint supplementation discussed.    Patient will follow-up as needed at this point.           Procedures     Abdoul Mcgrath DPM

## 2025-06-24 NOTE — PROGRESS NOTES
HEMATOLOGY ONCOLOGY OUTPATIENT FOLLOW UP       Patient name: Chrystal Ruiz  : 1940  MRN: [id number]  Primary Care Physician: Etelvina Ulloa MD  Referring Physician: No ref. provider found  Reason For Consult:     Chief Complaint   Patient presents with    Follow-up     Malignant neoplasm of upper lobe of right lung         HPI:   History of Present Illness:  Chrystal Ruiz is 84 y.o. female who presented to our office on 22 for consultation regarding anemia and thrombocytosis.     3/9/2022: Ms. Ruiz was referred for the investigation of anemia and thrombocytosis that was identified in the process of treating congestive heart failure and a right lung tumor. She came in a wheel chair complaining of severe fatigue and difficulties functioning because of this weakness. She admitted to hematochezia that had been associated to constipation that had been going on for some time, at least a few weeks. She had been afebrile. No pain. The laboratory exams reported microcytic anemia and thrombocytosis that had been present since the end of . A decision was made to transfuse her with one unit of red cells. Tests for iron deficiency were sent. She was scheduled to see me in one week.     3/16/2022. Ms. Ruiz was back in the office for follow up. She was feeling much better after the transfusion. She was still weak but not as much and her affect was also better. The laboratory exams did not clearly suggest iron deficiency but she persisted with microcytic anemia and had a history of gastrointestinal bleeding, such that iron deficiency was strongly considered the cause of the symptoms and laboratory manifestations. She had taken oral iron with multiple side effects and no clear improvement. Plans for intravenous iron were made.     3/22/2022: Admitted with evidence of decompensation of the congestive heart failure. She was treated with diuretics with prompt symptomatic relief. She  was given one dose of intravenous iron. She was discharged feeling much better.     4/4/2022: Back in the office for follow up. She had been receiving iron intravenously without complications. She reported unintended weight loss. The exam was not different than before. The laboratory exams revealed progressive improvement of the blood count with increase in the hemoglobin and the mean corpuscular volume and resolution of the leukocytosis and thrombocytosis. A decision was made to obtain a scan of the abdomen to investigate the history of blood loss in the stools and the unintended weight loss.     4/25/2022: Ms. Ruiz was back in the office for follow up. She was feeling much better and had returned to most of her normal activities. She arrived walking and reported that she had been walking more and more. She still had some fatigue but not the weakness she had before. She also had better appetite. She complained, however, that some of the medication she was receiving had resulted in changes in her taste perception. She had no more dyspnea than before and she had been free of chest pain. She had been offered the Watchman procedure, since she was not able to take anticoagulation safely. She denies abdominal pain or diarrhea. No dysuria. No edema. No skin rash.     5/18/2022: Back in the office to review results. She was feeling very well. Had regained her energy and was very active. The exam did not reveal new changes. Her hemoglobin had continued to improve, but she still had anemia. A decision was made to continue to observe without intervention.     6/15/2022: Back in the office for follow-up.  She had partial correction of her anemia.  She was scheduled to have a watchman procedure early in July.  The physical exam was unremarkable.  A decision was made to continue to observe and she was scheduled to return in 2 months.    8/17/2022: In the office for follow-up.  Compliant with iron.  Underwent the watchman  procedure as planned.  Had some minor complications.  This was followed by an episode of cellulitis that required admission to the hospital.  At this time asymptomatic.  Hemoglobin back to normal range.  A decision was made to follow in 4 months.    12/14/2022: Feeling well.  As active as before.  Functional and self-sufficient.  Eating well and no weight loss.  Afebrile.  On exam no abnormalities to document.  Laboratory exams perfectly within the normal range.  I asked her to return 6 months later and to continue the iron until I see her again.    6/21/2023: Feeling well.  Strong and without new symptoms.  Reasonably active and with good appetite.  No new limitations.  The complications from the Watchman procedure resolved completely and she was then off of all anticoagulation.  On exam there were no changes.  The laboratory exams revealed a hemoglobin within the normal range.  Iron studies were requested and I asked her to return in 6 months.    12/11/2023: About the same.  Complains of pain especially on the left side, at the hip and the knee.  This has been variously attributed to arthritis of the hip and knee and to spinal column problems.  She is receiving local injections as well as physical therapy with only partial relief.  She has had chest pains and has visited the emergency room in the relatively recent past.  On exam alert, conversant and in no distress.  Lungs clear.  Heart regular.  Abdomen soft.  The laboratory exams were reviewed.  The blood count is well within the normal range.  A decision was made to see her 6 months later.  She was asked to continue to take the iron 3 times a week.    6/13/2024: Feeling well and without new symptoms.  Active and without new limitations.  Recently had a subconjunctival hemorrhage on the right side after sneezing.  She has been hypertensive but not particularly so.  She is eating well and her weight is stable.  She has been afebrile.  No chest pains and no  abdominal pain.  As well no obvious gastrointestinal bleeding.  On exam indeed she has a large subconjunctival hemorrhage on the right.  There is some extension of the ecchymosis to the inferior eyelid.  No palpable lymphadenopathy in the preauricular region.  The lungs are clear bilaterally and the heart regular.  The abdomen is soft.  No or minimal edema.  Reviewed the laboratory exams.  No major changes.  I have asked her to see me in approximately 6 months.  I will continue to monitor her blood counts.  I did review the images of the recent scans.  I do not see any change but the official interpretation is not out yet.    12/12/2024: Feeling well.  Active and energetic.  Eating well and with good appetite.  No new limitations.  No fevers or nocturnal diaphoresis.  No bleeding.  On exam she is alert and conversant.  Oriented and in good spirits.  No distress.  No jaundice.  No pallor.  Lungs clear.  Heart regular.  Abdomen soft and without hepatomegaly or splenomegaly.  No edema.  Laboratory exams reviewed and discussed with her.  To see me in 6 months.    6/12/2025: Feels about the same. Has had episodes of light headedness. She has been without fevers. No chest pain. Denies abdominal pain but she admits to hematochezia that has been occurring intermittently for some time. It is enough the stain the water of the toilet. It is painless. Denies chest pain or dyspnea. No chest pain or cough. On exam alert and conversant. Well hydrated and neither pale nor jaundice. No oral lesions and respirations not labored. Lungs clear and heart regular. Abdomen soft. No edema. Reviewed the laboratory exams. While she does not have anemia, the mean corpuscular hemoglobin is lower. Thus she could have iron deficiency again. To investigate. See me with results.     6/26/2025: Feels well though she persists with lightheadedness.  She was seen by her cardiologist who did not think that there is any connection with her heart.  She  has been eating well and her appetite is good.  No nausea or vomiting.  No dyspnea and no substernal chest pains.  As well no abdominal pain or diarrhea.  On exam alert, conversant and oriented.  No distress.  No jaundice.  Lungs clear bilaterally and heart regular.  Abdomen soft.  No edema.  Laboratory exams reviewed.  No evidence of iron deficiency.  No evidence of folate or cobalamin deficiency.  Hemoglobin is well within the normal range.  No intervention at this time.  See me in 6 months.    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 procedure; surgically fixed the next day    GERD (gastroesophageal reflux disease)     Heart murmur     Hip pain, bilateral     Hypertension     Medicare annual wellness visit, subsequent 02/12/2020    Shoulder pain, right      Past Surgical History:   Procedure Laterality Date    ATRIAL APPENDAGE EXCLUSION LEFT WITH TRANSESOPHAGEAL ECHOCARDIOGRAM Right 7/7/2022    Procedure: Atrial Appendage Occlusion watchman team aware $;  Surgeon: Sage Garcia MD;  Location: [facility] CATH INVASIVE LOCATION;  Service: Cardiovascular;  Laterality: Right;    ATRIAL APPENDAGE EXCLUSION LEFT WITH TRANSESOPHAGEAL ECHOCARDIOGRAM N/A 7/7/2022    Procedure: Atrial Appendage Occlusion;  Surgeon: Fide Mullen MD;  Location: [facility] CATH INVASIVE LOCATION;  Service: Cardiovascular;  Laterality: N/A;    BRONCHOSCOPY Bilateral 2/7/2025    Procedure: BRONCHOSCOPY with right lung wash;  Surgeon: Mukund Farias MD;  Location: [facility] ENDOSCOPY;  Service: Pulmonary;  Laterality: Bilateral;    CATARACT EXTRACTION      CHOLECYSTECTOMY      HYSTERECTOMY  1987    Endometriosis    OOPHORECTOMY      PSEUDO ANEURYSM REPAIR, EXTREMITY Right 7/8/2022    Procedure: REPAIR OF POST CATHETERIZATION  RIGHT FEMORAL PSEUDOANEURYSM AND ARTERIOVENOUS FISTUAL;  Surgeon: Alfonzo Hooks MD;  Location: [facility] MAIN OR;  Service: Vascular;  Laterality: Right;    TONSILLECTOMY         Current Outpatient Medications:     acetaminophen (TYLENOL) 500 MG tablet, Take 1 tablet by mouth Every 6 (Six) Hours As Needed for Mild Pain., Disp: , Rfl:     azithromycin (ZITHROMAX) 250 MG tablet, Take 1 tablet by mouth Every Other Day., Disp: , Rfl:     Cholecalciferol (VITAMIN D3) 2000 units capsule, Take 1 capsule by mouth Daily., Disp: , Rfl:     Cyanocobalamin (VITAMIN B12) 1000 MCG tablet controlled-release, Take 2,500 mcg by mouth Daily., Disp: , Rfl:     FeroSul 325 (65 Fe) MG tablet, TAKE 1 TABLET BY MOUTH THREE DAYS A WEEK( MONDAY, WEDNESDAY, FRIDAY), Disp: 45 tablet, Rfl: 1    furosemide (LASIX) 40 MG tablet, TAKE 1 TABLET BY MOUTH DAILY AS NEEDED FOR SWELLING, Disp: 90 tablet, Rfl: 1    loratadine (CLARITIN) 10 MG tablet, Take 1 tablet by mouth Daily., Disp: , Rfl:     metoclopramide (REGLAN) 5 MG tablet, Take 1 tablet by mouth 2 (Two) Times a Day., Disp: , Rfl:     metoprolol succinate XL (TOPROL-XL) 25 MG 24 hr tablet, TAKE 1 TABLET BY MOUTH DAILY, Disp: 90 tablet, Rfl: 3    NON FORMULARY, Hylands leg cramps supplement, Disp: , Rfl:     omeprazole (priLOSEC) 40 MG capsule, Take 1 capsule by mouth Every Morning., Disp: , Rfl:     spironolactone (ALDACTONE) 25 MG tablet, TAKE 1 TABLET BY MOUTH EVERY OTHER DAY, Disp: 45 tablet, Rfl: 1    triamcinolone (KENALOG) 0.1 % cream, APPLY TOPICALLY TO THE AFFECTED AREA TWICE DAILY AS DIRECTED AS NEEDED FOR IRRITATION, Disp: 80 g, Rfl: 1    Allergies   Allergen Reactions    Ciprofloxacin Unknown - High Severity    Iodine Other (See Comments)     Patient doesn't remember what reaction she gets when she takes this     Sulfa Antibiotics Unknown - High Severity    Sudafed [Pseudoephedrine Hcl] Other (See Comments)     Patient states it has been awhile since taking, so she doesn't  remember side effects.     Family History   Problem Relation Age of Onset    Breast cancer Mother 85    Stroke Mother     Heart disease Mother     Endometrial cancer Mother 70    Heart failure Father     Heart failure Sister     COPD Sister     Heart disease Sister      Cancer-related family history includes Breast cancer (age of onset: 85) in her mother; Endometrial cancer (age of onset: 70) in her mother.    Social History     Tobacco Use    Smoking status: Never    Smokeless tobacco: Never   Vaping Use    Vaping status: Never Used   Substance Use Topics    Alcohol use: No    Drug use: Never     Social History     Social History Narrative    Not on file      ROS:     Review of Systems   Constitutional:  Negative for activity change, appetite change, chills, diaphoresis, fatigue, fever and unexpected weight change.   HENT:  Negative for congestion, dental problem, drooling, ear discharge, ear pain, facial swelling, hearing loss, mouth sores, nosebleeds, postnasal drip, rhinorrhea, sinus pressure, sinus pain, sneezing, sore throat, tinnitus, trouble swallowing and voice change.    Eyes:  Negative for photophobia, pain, discharge, redness, itching and visual disturbance.   Respiratory:  Negative for apnea, cough, choking, chest tightness, shortness of breath, wheezing and stridor.    Cardiovascular:  Negative for chest pain, palpitations and leg swelling.   Gastrointestinal:  Negative for abdominal distention, abdominal pain, anal bleeding, blood in stool, constipation, diarrhea, nausea, rectal pain and vomiting.   Endocrine: Negative for cold intolerance, heat intolerance, polydipsia and polyuria.   Genitourinary:  Negative for decreased urine volume, difficulty urinating, dysuria, flank pain, frequency, genital sores, hematuria and urgency.   Musculoskeletal:  Negative for arthralgias, back pain, gait problem, joint swelling, myalgias, neck pain and neck stiffness.   Skin:  Negative for color change, pallor and  "rash.   Neurological:  Positive for light-headedness. Negative for dizziness, tremors, seizures, syncope, facial asymmetry, speech difficulty, weakness, numbness and headaches.   Hematological:  Negative for adenopathy. Does not bruise/bleed easily.   Psychiatric/Behavioral:  Negative for agitation, behavioral problems, confusion, decreased concentration, hallucinations, self-injury, sleep disturbance and suicidal ideas. The patient is not nervous/anxious.      Objective:    Vitals:    06/26/25 1108   BP: 148/72   Pulse: 67   Resp: 14   Temp: 97.5 °F (36.4 °C)   SpO2: 97%   Weight: 55.8 kg (123 lb)   Height: 154.9 cm (61\")   PainSc: 0-No pain     Body mass index is 23.24 kg/m².  ECOG  (3) Capable of limited self-care, confined to bed or chair > 50% of waking hours    Physical Exam:     Physical Exam  Constitutional:       General: She is not in acute distress.     Appearance: Normal appearance. She is not ill-appearing, toxic-appearing or diaphoretic.   HENT:      Head: Normocephalic and atraumatic.      Right Ear: External ear normal.      Left Ear: External ear normal.      Nose: Nose normal.      Mouth/Throat:      Mouth: Mucous membranes are moist.      Pharynx: Oropharynx is clear. No oropharyngeal exudate or posterior oropharyngeal erythema.   Eyes:      General: No scleral icterus.        Right eye: No discharge.         Left eye: No discharge.      Conjunctiva/sclera: Conjunctivae normal.      Pupils: Pupils are equal, round, and reactive to light.   Cardiovascular:      Rate and Rhythm: Normal rate and regular rhythm.      Pulses: Normal pulses.      Heart sounds: No murmur heard.     No friction rub. No gallop.   Pulmonary:      Effort: No respiratory distress.      Breath sounds: No stridor. No wheezing, rhonchi or rales.   Abdominal:      General: Abdomen is flat. Bowel sounds are normal. There is no distension.      Palpations: Abdomen is soft. There is no mass.      Tenderness: There is no abdominal " tenderness. There is no right CVA tenderness, left CVA tenderness, guarding or rebound.      Hernia: No hernia is present.   Musculoskeletal:         General: No swelling, tenderness, deformity or signs of injury.      Cervical back: No rigidity.      Right lower leg: No edema.      Left lower leg: No edema.   Lymphadenopathy:      Cervical: No cervical adenopathy.   Skin:     Coloration: Skin is not jaundiced.      Findings: No bruising, lesion or rash.   Neurological:      General: No focal deficit present.      Mental Status: She is alert and oriented to person, place, and time.      Cranial Nerves: No cranial nerve deficit.      Motor: No weakness.      Gait: Gait normal.   Psychiatric:         Mood and Affect: Mood normal.         Behavior: Behavior normal.         Thought Content: Thought content normal.         Judgment: Judgment normal.     CM Cheng MD performed the physical exam on 6/26/2025 as documented above.    Lab Results - Last 18 Months   Lab Units 06/26/25  1103 06/12/25  1456 02/05/25  1214   WBC 10*3/mm3 8.21 6.15 7.75   HEMOGLOBIN g/dL 12.7 12.6 11.5*   HEMATOCRIT % 41.0 40.1 36.1   PLATELETS 10*3/mm3 222 227 321   MCV fL 84.4 84.1 81.1     Lab Results - Last 18 Months   Lab Units 06/12/25  1456 12/29/24  1125 12/20/24  0015 12/12/24  1413 11/11/24  1539   SODIUM mmol/L 143 142 142 141 142   POTASSIUM mmol/L 4.3 4.2 3.9 4.2 4.0   CHLORIDE mmol/L 103 106 107 105 108*   CO2 mmol/L 26.8 26.2 21.6* 25.7 26.0   BUN mg/dL 23.2* 28* 26* 24* 23   CREATININE mg/dL 1.11* 1.02* 0.96 1.13* 0.99   CALCIUM mg/dL 10.1 9.1 8.9 10.0 9.6   BILIRUBIN mg/dL 0.8  --   --  0.6 0.8   ALK PHOS U/L 59  --   --  70 84   ALT (SGPT) U/L 15  --   --  20 30   AST (SGOT) U/L 18  --   --  21 25   GLUCOSE mg/dL 89 104* 69 91 85     Lab Results   Component Value Date    GLUCOSE 89 06/12/2025    BUN 23.2 (H) 06/12/2025    CREATININE 1.11 (H) 06/12/2025    EGFRIFNONA 84 02/01/2022    BCR 20.9 06/12/2025    K 4.3 06/12/2025     CO2 26.8 06/12/2025    CALCIUM 10.1 06/12/2025    ALBUMIN 4.3 06/12/2025    AST 18 06/12/2025    ALT 15 06/12/2025     Lab Results   Component Value Date    PTT 28.6 02/05/2025    INR 1.08 02/05/2025     Assessment & Plan     Assessment and Plan:  Iron deficiency anemia: Resolved no intervention at this time.  Gastrointestinal bleeding.  No evidence of it at this time.  Reviewed and discussed with her the laboratory exams.  I have asked her to see me in 6 months.    Frank Cheng MD on 6/26/2025 at 11:24 AM.

## 2025-06-26 ENCOUNTER — OFFICE VISIT (OUTPATIENT)
Dept: ONCOLOGY | Facility: CLINIC | Age: 85
End: 2025-06-26
Payer: MEDICARE

## 2025-06-26 ENCOUNTER — LAB (OUTPATIENT)
Dept: LAB | Facility: HOSPITAL | Age: 85
End: 2025-06-26
Payer: MEDICARE

## 2025-06-26 VITALS
HEART RATE: 67 BPM | TEMPERATURE: 97.5 F | BODY MASS INDEX: 23.22 KG/M2 | WEIGHT: 123 LBS | RESPIRATION RATE: 14 BRPM | SYSTOLIC BLOOD PRESSURE: 148 MMHG | OXYGEN SATURATION: 97 % | HEIGHT: 61 IN | DIASTOLIC BLOOD PRESSURE: 72 MMHG

## 2025-06-26 DIAGNOSIS — D50.9 IRON DEFICIENCY ANEMIA, UNSPECIFIED IRON DEFICIENCY ANEMIA TYPE: Primary | ICD-10-CM

## 2025-06-26 DIAGNOSIS — C34.11 MALIGNANT NEOPLASM OF UPPER LOBE OF RIGHT LUNG: Primary | ICD-10-CM

## 2025-06-26 LAB
BASOPHILS # BLD AUTO: 0.02 10*3/MM3 (ref 0–0.2)
BASOPHILS NFR BLD AUTO: 0.2 % (ref 0–1.5)
DEPRECATED RDW RBC AUTO: 44.8 FL (ref 37–54)
EOSINOPHIL # BLD AUTO: 0.08 10*3/MM3 (ref 0–0.4)
EOSINOPHIL NFR BLD AUTO: 1 % (ref 0.3–6.2)
ERYTHROCYTE [DISTWIDTH] IN BLOOD BY AUTOMATED COUNT: 14.9 % (ref 12.3–15.4)
HCT VFR BLD AUTO: 41 % (ref 34–46.6)
HGB BLD-MCNC: 12.7 G/DL (ref 12–15.9)
HOLD SPECIMEN: NORMAL
HOLD SPECIMEN: NORMAL
LYMPHOCYTES # BLD AUTO: 1.55 10*3/MM3 (ref 0.7–3.1)
LYMPHOCYTES NFR BLD AUTO: 18.9 % (ref 19.6–45.3)
MCH RBC QN AUTO: 26.1 PG (ref 26.6–33)
MCHC RBC AUTO-ENTMCNC: 31 G/DL (ref 31.5–35.7)
MCV RBC AUTO: 84.4 FL (ref 79–97)
MONOCYTES # BLD AUTO: 0.65 10*3/MM3 (ref 0.1–0.9)
MONOCYTES NFR BLD AUTO: 7.9 % (ref 5–12)
NEUTROPHILS NFR BLD AUTO: 5.91 10*3/MM3 (ref 1.7–7)
NEUTROPHILS NFR BLD AUTO: 72 % (ref 42.7–76)
PLATELET # BLD AUTO: 222 10*3/MM3 (ref 140–450)
PMV BLD AUTO: 10.3 FL (ref 6–12)
RBC # BLD AUTO: 4.86 10*6/MM3 (ref 3.77–5.28)
WBC NRBC COR # BLD AUTO: 8.21 10*3/MM3 (ref 3.4–10.8)

## 2025-06-26 PROCEDURE — 99213 OFFICE O/P EST LOW 20 MIN: CPT | Performed by: INTERNAL MEDICINE

## 2025-06-26 PROCEDURE — 1159F MED LIST DOCD IN RCRD: CPT | Performed by: INTERNAL MEDICINE

## 2025-06-26 PROCEDURE — 1126F AMNT PAIN NOTED NONE PRSNT: CPT | Performed by: INTERNAL MEDICINE

## 2025-06-26 PROCEDURE — 3077F SYST BP >= 140 MM HG: CPT | Performed by: INTERNAL MEDICINE

## 2025-06-26 PROCEDURE — 85025 COMPLETE CBC W/AUTO DIFF WBC: CPT

## 2025-06-26 PROCEDURE — 1160F RVW MEDS BY RX/DR IN RCRD: CPT | Performed by: INTERNAL MEDICINE

## 2025-06-26 PROCEDURE — 3078F DIAST BP <80 MM HG: CPT | Performed by: INTERNAL MEDICINE

## 2025-06-26 PROCEDURE — 36415 COLL VENOUS BLD VENIPUNCTURE: CPT

## 2025-07-16 ENCOUNTER — OFFICE (AMBULATORY)
Dept: URBAN - METROPOLITAN AREA CLINIC 64 | Facility: CLINIC | Age: 85
End: 2025-07-16
Payer: MEDICARE

## 2025-07-16 VITALS
SYSTOLIC BLOOD PRESSURE: 121 MMHG | HEART RATE: 62 BPM | WEIGHT: 130 LBS | DIASTOLIC BLOOD PRESSURE: 65 MMHG | HEIGHT: 64 IN

## 2025-07-16 DIAGNOSIS — K21.9 GASTRO-ESOPHAGEAL REFLUX DISEASE WITHOUT ESOPHAGITIS: ICD-10-CM

## 2025-07-16 PROCEDURE — 99212 OFFICE O/P EST SF 10 MIN: CPT | Performed by: NURSE PRACTITIONER

## 2025-08-01 ENCOUNTER — OFFICE VISIT (OUTPATIENT)
Dept: ORTHOPEDIC SURGERY | Facility: CLINIC | Age: 85
End: 2025-08-01
Payer: MEDICARE

## 2025-08-01 ENCOUNTER — OFFICE VISIT (OUTPATIENT)
Dept: CARDIOLOGY | Facility: CLINIC | Age: 85
End: 2025-08-01
Payer: MEDICARE

## 2025-08-01 VITALS — BODY MASS INDEX: 23.6 KG/M2 | WEIGHT: 125 LBS | HEIGHT: 61 IN | RESPIRATION RATE: 20 BRPM

## 2025-08-01 VITALS
HEART RATE: 65 BPM | SYSTOLIC BLOOD PRESSURE: 136 MMHG | WEIGHT: 125 LBS | DIASTOLIC BLOOD PRESSURE: 74 MMHG | OXYGEN SATURATION: 96 % | HEIGHT: 61 IN | BODY MASS INDEX: 23.6 KG/M2

## 2025-08-01 DIAGNOSIS — I48.0 PAROXYSMAL ATRIAL FIBRILLATION: Primary | ICD-10-CM

## 2025-08-01 DIAGNOSIS — M17.0 PRIMARY OSTEOARTHRITIS OF BOTH KNEES: Primary | ICD-10-CM

## 2025-08-01 DIAGNOSIS — Z95.818 PRESENCE OF WATCHMAN LEFT ATRIAL APPENDAGE CLOSURE DEVICE: ICD-10-CM

## 2025-08-01 DIAGNOSIS — D50.0 IRON DEFICIENCY ANEMIA DUE TO CHRONIC BLOOD LOSS: ICD-10-CM

## 2025-08-01 DIAGNOSIS — I10 PRIMARY HYPERTENSION: ICD-10-CM

## 2025-08-01 RX ORDER — TRIAMCINOLONE ACETONIDE 40 MG/ML
80 INJECTION, SUSPENSION INTRA-ARTICULAR; INTRAMUSCULAR
Status: COMPLETED | OUTPATIENT
Start: 2025-08-01 | End: 2025-08-01

## 2025-08-01 RX ORDER — LIDOCAINE HYDROCHLORIDE 10 MG/ML
2 INJECTION, SOLUTION EPIDURAL; INFILTRATION; INTRACAUDAL; PERINEURAL
Status: COMPLETED | OUTPATIENT
Start: 2025-08-01 | End: 2025-08-01

## 2025-08-01 RX ADMIN — LIDOCAINE HYDROCHLORIDE 2 ML: 10 INJECTION, SOLUTION EPIDURAL; INFILTRATION; INTRACAUDAL; PERINEURAL at 16:17

## 2025-08-01 RX ADMIN — TRIAMCINOLONE ACETONIDE 80 MG: 40 INJECTION, SUSPENSION INTRA-ARTICULAR; INTRAMUSCULAR at 16:18

## 2025-08-01 RX ADMIN — LIDOCAINE HYDROCHLORIDE 2 ML: 10 INJECTION, SOLUTION EPIDURAL; INFILTRATION; INTRACAUDAL; PERINEURAL at 16:18

## 2025-08-01 RX ADMIN — TRIAMCINOLONE ACETONIDE 80 MG: 40 INJECTION, SUSPENSION INTRA-ARTICULAR; INTRAMUSCULAR at 16:17

## 2025-08-01 NOTE — PROGRESS NOTES
INJECTION VISIT    Patient: Chrystal Ruiz    YOB: 1940    MRN: [id number]    Chief Complaint   Patient presents with    Right Knee - Follow-up    Left Knee - Follow-up       History of Present Illness:   Chrystal Ruiz is a 84 y.o. year old who presents today for injection of bilateral knees.         Allergies:   Allergies   Allergen Reactions    Ciprofloxacin Unknown - High Severity    Iodine Other (See Comments)     Patient doesn't remember what reaction she gets when she takes this     Sulfa Antibiotics Unknown - High Severity    Sudafed [Pseudoephedrine Hcl] Other (See Comments)     Patient states it has been awhile since taking, so she doesn't remember side effects.       Medications:   Home Medications:  Current Outpatient Medications on File Prior to Visit   Medication Sig    acetaminophen (TYLENOL) 500 MG tablet Take 1 tablet by mouth Every 6 (Six) Hours As Needed for Mild Pain.    Cholecalciferol (VITAMIN D3) 2000 units capsule Take 1 capsule by mouth Daily.    Cyanocobalamin (VITAMIN B12) 1000 MCG tablet controlled-release Take 2,500 mcg by mouth Daily.    FeroSul 325 (65 Fe) MG tablet TAKE 1 TABLET BY MOUTH THREE DAYS A WEEK( MONDAY, WEDNESDAY, FRIDAY)    furosemide (LASIX) 40 MG tablet TAKE 1 TABLET BY MOUTH DAILY AS NEEDED FOR SWELLING    loratadine (CLARITIN) 10 MG tablet Take 1 tablet by mouth Daily.    metoclopramide (REGLAN) 5 MG tablet Take 1 tablet by mouth 2 (Two) Times a Day.    metoprolol succinate XL (TOPROL-XL) 25 MG 24 hr tablet TAKE 1 TABLET BY MOUTH DAILY    NON FORMULARY [place]ands leg cramps supplement    spironolactone (ALDACTONE) 25 MG tablet TAKE 1 TABLET BY MOUTH EVERY OTHER DAY    triamcinolone (KENALOG) 0.1 % cream APPLY TOPICALLY TO THE AFFECTED AREA TWICE DAILY AS DIRECTED AS NEEDED FOR IRRITATION    [DISCONTINUED] azithromycin (ZITHROMAX) 250 MG tablet Take 1 tablet by mouth Every Other Day.    [DISCONTINUED] omeprazole (priLOSEC) 40 MG capsule Take 1 capsule by  mouth Every Morning.     No current facility-administered medications on file prior to visit.         I have reviewed the patient's medical history in detail and updated the computerized patient record.  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 procedure; surgically fixed the next day    GERD (gastroesophageal reflux disease)     Heart murmur     Hip pain, bilateral     Hypertension     Medicare annual wellness visit, subsequent 02/12/2020    Shoulder pain, right      Past Surgical History:   Procedure Laterality Date    ATRIAL APPENDAGE EXCLUSION LEFT WITH TRANSESOPHAGEAL ECHOCARDIOGRAM Right 7/7/2022    Procedure: Atrial Appendage Occlusion watchman team aware $;  Surgeon: Sage Garcia MD;  Location: [facility] CATH INVASIVE LOCATION;  Service: Cardiovascular;  Laterality: Right;    ATRIAL APPENDAGE EXCLUSION LEFT WITH TRANSESOPHAGEAL ECHOCARDIOGRAM N/A 7/7/2022    Procedure: Atrial Appendage Occlusion;  Surgeon: Fide Mullen MD;  Location: [facility] CATH INVASIVE LOCATION;  Service: Cardiovascular;  Laterality: N/A;    BRONCHOSCOPY Bilateral 2/7/2025    Procedure: BRONCHOSCOPY with right lung wash;  Surgeon: Mukund Farias MD;  Location: [facility] ENDOSCOPY;  Service: Pulmonary;  Laterality: Bilateral;    CATARACT EXTRACTION      CHOLECYSTECTOMY      HYSTERECTOMY  1987    Endometriosis    OOPHORECTOMY      PSEUDO ANEURYSM REPAIR, EXTREMITY Right 7/8/2022    Procedure: REPAIR OF POST CATHETERIZATION RIGHT FEMORAL PSEUDOANEURYSM AND ARTERIOVENOUS FISTUAL;  Surgeon: Alfonzo Hooks MD;  Location: [facility] MAIN OR;  Service: Vascular;  Laterality: Right;    TONSILLECTOMY       Social History     Occupational History    Not on file    Tobacco Use    Smoking status: Never    Smokeless tobacco: Never   Vaping Use    Vaping status: Never Used   Substance and Sexual Activity    Alcohol use: No    Drug use: Never    Sexual activity: Not Currently      Social History     Social History Narrative    Not on file     Family History   Problem Relation Age of Onset    Breast cancer Mother 85    Stroke Mother     Heart disease Mother     Endometrial cancer Mother 70    Heart failure Father     Heart failure Sister     COPD Sister     Heart disease Sister                ROS  Negative unless listed in the HPI        Physical Exam  84 y.o. female  Body mass index is 23.62 kg/m²., 56.7 kg (125 lb)  Vitals:    08/01/25 1552   Resp: 20     General: Alert, cooperative, appears well and in no observable distress.   HEENT: Normocephalic, atraumatic on external visual inspection. No icterus.   CV: No significant peripheral edema.   Respiratory: Normal respiratory effort.   Skin: Warm & well perfused; appropriate skin turgor.  Psych: Appropriate mood & affect.  Neuro: Gross sensation and motor intact in affected extremity/extremities.  Vascular: Peripheral pulses palpable in affected extremity/extremities.         Procedure:  Large Joint Arthrocentesis: R knee  Date/Time: 8/1/2025 4:17 PM  Consent given by: patient  Site marked: site marked  Timeout: Immediately prior to procedure a time out was called to verify the correct patient, procedure, equipment, support staff and site/side marked as required   Supporting Documentation  Indications: pain and diagnostic evaluation   Procedure Details  Location: knee - R knee  Preparation: Patient was prepped and draped in the usual sterile fashion  Needle size: 25 G  Approach: anterolateral  Medications administered: 2 mL lidocaine PF 1% 1 %; 80 mg triamcinolone acetonide 40 MG/ML  Patient tolerance: patient tolerated the procedure well with no immediate complications      Large Joint Arthrocentesis: L knee  Date/Time:  8/1/2025 4:18 PM  Consent given by: patient  Site marked: site marked  Timeout: Immediately prior to procedure a time out was called to verify the correct patient, procedure, equipment, support staff and site/side marked as required   Supporting Documentation  Indications: pain and diagnostic evaluation   Procedure Details  Location: knee - L knee  Preparation: Patient was prepped and draped in the usual sterile fashion  Needle size: 25 G  Approach: anterolateral  Medications administered: 2 mL lidocaine PF 1% 1 %; 80 mg triamcinolone acetonide 40 MG/ML  Patient tolerance: patient tolerated the procedure well with no immediate complications            Assessment:   Diagnoses and all orders for this visit:    1. Primary osteoarthritis of both knees (Primary)    Other orders  -     Large Joint Arthrocentesis  -     Large Joint Arthrocentesis      Body mass index is 23.62 kg/m².  BMI consistent with Normal: 18.5-24.9kg/m2        Plan:   -     Risks and benefits of injection therapy discussed with patient.  Possibility of bruising, pain, swelling, infection, increased blood sugar levels, cortisol flare and soft tissue atrophy discussed in detail.  Injected patient's bilateral knee joint(s)with Kenalog from an anterolateral approach   Compression/brace   Rest, ice, compression, and elevation (RICE) therapy  OTC Tylenol for pain PRN  Follow up in 3 months for repeat injections   Please call with questions or concerns in the interim        Date of encounter: 08/01/2025   ARIANA Mcfadden    Patient or patient representative verbalized consent for the use of Ambient Listening during the visit with  ARIANA Mcfadden for chart documentation. 8/1/2025  16:19 EDT

## 2025-08-01 NOTE — LETTER
August 1, 2025     Etelvina Ulloa MD  [address]  [address]  [place] [address]    Patient: Chrystal Ruiz   YOB: 1940   Date of Visit: 8/1/2025     Dear Etelvina Ulloa MD:       Thank you for referring Chrystal Ruiz to me for evaluation. Below are the relevant portions of my assessment and plan of care.    If you have questions, please do not hesitate to call me. I look forward to following Chrystal along with you.         Sincerely,        Sage Garcia MD        CC: No Recipients    Sage Garcia MD  08/01/25 1333  Sign when Signing Visit  CC--- anemia and paroxysmal atrial fibrillation    Sub  84-year-old bebeot patient has atrial fibrillation Watchman and comes in for follow-up  Watchman implantation done in July 2022  Surveillance MELISA was done in 2023 without any thrombus or leak  Patient has history of hypertension and diastolic heart failure and a stress test in the past without ischemia and has history of hyperlipidemia      Previous note attached below for reference   83-year-old bebeto patient has watchman implantation in July 2022.  Surveillance MELISA was done in 2023 with excellent seal of the device without any clot.  Patient has history of hypertension and diastolic heart failure and a stress test in the past did not show ischemia.  She has hyperlipidemia.  Post watchman implantation complicated by pseudoaneurysm needing open repair.  Patient had prior recurrent epistaxis and aspirin has been stopped.    Past Medical History:   Diagnosis Date   • A-fib (HCC)    • Anemia    • Arthritis    • Asthma    • CHF (congestive heart failure) (HCC)    • Hypertension      Past Surgical History:   Procedure Laterality Date   • CATARACT EXTRACTION     • CHOLECYSTECTOMY     • HYSTERECTOMY  1987    Endometriosis   • OOPHORECTOMY     • TONSILLECTOMY             Physical Exam    General:      well developed, well nourished, in no acute distress.    Head:      normocephalic and  atraumatic.    Eyes:      PERRL/EOM intact, conjunctivae and sclerae clear without nystagmus.    Neck:      no  thyromegaly, trachea central with normal respiratory effort  Lungs:      clear bilaterally to auscultation.    Heart:       regular rate and rhythm, S1, S2 without murmurs, rubs, or gallops  Skin:      intact without lesions or rashes.    Psych:      alert and cooperative; normal mood and affect; normal attention span and concentration.            Assessment and plan  Recent creatinine of 1.11.  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

## 2025-08-01 NOTE — PROGRESS NOTES
CC--- anemia and paroxysmal atrial fibrillation    Sub  84-year-old bebeto patient has atrial fibrillation Watchman and comes in for follow-up  Watchman implantation done in July 2022  Surveillance MELISA was done in 2023 without any thrombus or leak  Patient has history of hypertension and diastolic heart failure and a stress test in the past without ischemia and has history of hyperlipidemia      Previous note attached below for reference   83-year-old bebeto patient has watchman implantation in July 2022.  Surveillance MELISA was done in 2023 with excellent seal of the device without any clot.  Patient has history of hypertension and diastolic heart failure and a stress test in the past did not show ischemia.  She has hyperlipidemia.  Post watchman implantation complicated by pseudoaneurysm needing open repair.  Patient had prior recurrent epistaxis and aspirin has been stopped.    Past Medical History:   Diagnosis Date    A-fib (HCC)     Anemia     Arthritis     Asthma     CHF (congestive heart failure) (HCC)     Hypertension      Past Surgical History:   Procedure Laterality Date    CATARACT EXTRACTION      CHOLECYSTECTOMY      HYSTERECTOMY  1987    Endometriosis    OOPHORECTOMY      TONSILLECTOMY             Physical Exam    General:      well developed, well nourished, in no acute distress.    Head:      normocephalic and atraumatic.    Eyes:      PERRL/EOM intact, conjunctivae and sclerae clear without nystagmus.    Neck:      no  thyromegaly, trachea central with normal respiratory effort  Lungs:      clear bilaterally to auscultation.    Heart:       regular rate and rhythm, S1, S2 without murmurs, rubs, or gallops  Skin:      intact without lesions or rashes.    Psych:      alert and cooperative; normal mood and affect; normal attention span and concentration.            Assessment and plan  Recent creatinine of 1.11.  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

## 2025-08-08 ENCOUNTER — OFFICE VISIT (OUTPATIENT)
Age: 85
End: 2025-08-08
Payer: MEDICARE

## 2025-08-08 VITALS — BODY MASS INDEX: 23.6 KG/M2 | RESPIRATION RATE: 20 BRPM | WEIGHT: 125 LBS | HEIGHT: 61 IN

## 2025-08-08 DIAGNOSIS — M79.671 BILATERAL FOOT PAIN: Primary | ICD-10-CM

## 2025-08-08 DIAGNOSIS — G57.63 MORTON METATARSALGIA, BILATERAL: ICD-10-CM

## 2025-08-08 DIAGNOSIS — M79.672 BILATERAL FOOT PAIN: Primary | ICD-10-CM

## 2025-08-08 DIAGNOSIS — M20.41 HAMMER TOES OF BOTH FEET: ICD-10-CM

## 2025-08-08 DIAGNOSIS — M20.42 HAMMER TOES OF BOTH FEET: ICD-10-CM

## 2025-08-08 PROCEDURE — 99213 OFFICE O/P EST LOW 20 MIN: CPT | Performed by: PODIATRIST

## 2025-08-08 PROCEDURE — 1160F RVW MEDS BY RX/DR IN RCRD: CPT | Performed by: PODIATRIST

## 2025-08-08 PROCEDURE — 1159F MED LIST DOCD IN RCRD: CPT | Performed by: PODIATRIST

## (undated) DEVICE — SUT PROLN 5/0 RB1 D/A 36IN 8556H

## (undated) DEVICE — ADHS SKIN PREMIERPRO EXOFIN TOPICAL HI/VISC .5ML

## (undated) DEVICE — VASCULAR CDS: Brand: MEDLINE INDUSTRIES, INC.

## (undated) DEVICE — SOLUTION,WATER,IRRIGATION,1000ML,STERILE: Brand: MEDLINE

## (undated) DEVICE — INTRO PERFORMER CHECKFLO/LG RAD/BND NO/GW 14F .038IN 30CM

## (undated) DEVICE — CATH DIAG IMPULSE PIG 5F 100CM

## (undated) DEVICE — PK TRY HEART CATH 50

## (undated) DEVICE — PENCL HND ROCKRSWTCH HOLSTR EZ CLEAN TP CRD 10FT

## (undated) DEVICE — SUT VIC 3/0 SH 27IN J416H

## (undated) DEVICE — 3M™ PATIENT PLATE, CORDED, SPLIT, LARGE, 40 PER CASE, 1179: Brand: 3M™

## (undated) DEVICE — GLV SURG SIGNATURE ESSENTIAL PF LTX SZ7.5

## (undated) DEVICE — SUT VIC 2/0 CT1 36IN

## (undated) DEVICE — UNDYED BRAIDED (POLYGLACTIN 910), SYNTHETIC ABSORBABLE SUTURE: Brand: COATED VICRYL

## (undated) DEVICE — SYS COMPR FEMOSTOP GLD W/PUMP LF

## (undated) DEVICE — ELECTRD DEFIB M/FUNC PROPADZ RADIOL 2PK

## (undated) DEVICE — ACCESS SHEATH WITH DILATOR: Brand: WATCHMAN™ TRUSEAL™ ACCESS SYSTEM

## (undated) DEVICE — SUT PROLN SURGILENE 5.0 24IN BLU 9702H

## (undated) DEVICE — Device: Brand: NRG TRANSSEPTAL NEEDLE

## (undated) DEVICE — ST ACC MICROPUNCTURE STFF/CANN PLAT/TP 4F 21G 40CM

## (undated) DEVICE — GW XCHG AMPLTZ XSTIF PTFE CRV .035IN 3X180CM

## (undated) DEVICE — PREF.GUIDING SHEATH W/MULT.CRV: Brand: PREFACE

## (undated) DEVICE — GOWN,REINFRCE,POLY,SIRUS,BREATH SLV,XXLG: Brand: MEDLINE

## (undated) DEVICE — SOL IRRIG NACL 1000ML

## (undated) DEVICE — INTENDED FOR TISSUE SEPARATION, AND OTHER PROCEDURES THAT REQUIRE A SHARP SURGICAL BLADE TO PUNCTURE OR CUT.: Brand: BARD-PARKER ® CARBON RIB-BACK BLADES

## (undated) DEVICE — BAPTIST FLOYD BRONCHOSCOPY: Brand: MEDLINE INDUSTRIES, INC.

## (undated) DEVICE — SOL NS 500ML

## (undated) DEVICE — Device: Brand: TORAYGUIDE GUIDEWIRE

## (undated) DEVICE — Device: Brand: RFP-100A CONNECTOR CABLE

## (undated) DEVICE — MICRO TIP WIPE: Brand: DEVON

## (undated) DEVICE — KT SURG TURNOVER 050